# Patient Record
Sex: MALE | Race: WHITE | NOT HISPANIC OR LATINO | Employment: OTHER | URBAN - METROPOLITAN AREA
[De-identification: names, ages, dates, MRNs, and addresses within clinical notes are randomized per-mention and may not be internally consistent; named-entity substitution may affect disease eponyms.]

---

## 2017-03-16 ENCOUNTER — APPOINTMENT (OUTPATIENT)
Dept: LAB | Facility: CLINIC | Age: 67
End: 2017-03-16
Payer: MEDICARE

## 2017-03-16 ENCOUNTER — TRANSCRIBE ORDERS (OUTPATIENT)
Dept: ADMINISTRATIVE | Facility: HOSPITAL | Age: 67
End: 2017-03-16

## 2017-03-16 DIAGNOSIS — I82.402 DEEP VEIN THROMBOSIS (DVT) OF LEFT LOWER EXTREMITY, UNSPECIFIED CHRONICITY, UNSPECIFIED VEIN (HCC): ICD-10-CM

## 2017-03-16 DIAGNOSIS — I82.402 DEEP VEIN THROMBOSIS (DVT) OF LEFT LOWER EXTREMITY, UNSPECIFIED CHRONICITY, UNSPECIFIED VEIN (HCC): Primary | ICD-10-CM

## 2017-03-16 LAB
ALBUMIN SERPL BCP-MCNC: 3.7 G/DL (ref 3.5–5)
ALP SERPL-CCNC: 57 U/L (ref 46–116)
ALT SERPL W P-5'-P-CCNC: 41 U/L (ref 12–78)
ANION GAP SERPL CALCULATED.3IONS-SCNC: 8 MMOL/L (ref 4–13)
APTT PPP: 31 SECONDS (ref 24–36)
AST SERPL W P-5'-P-CCNC: 23 U/L (ref 5–45)
BASOPHILS # BLD AUTO: 0.01 THOUSANDS/ΜL (ref 0–0.1)
BASOPHILS NFR BLD AUTO: 0 % (ref 0–1)
BILIRUB SERPL-MCNC: 0.53 MG/DL (ref 0.2–1)
BUN SERPL-MCNC: 15 MG/DL (ref 5–25)
CALCIUM SERPL-MCNC: 9 MG/DL (ref 8.3–10.1)
CHLORIDE SERPL-SCNC: 108 MMOL/L (ref 100–108)
CO2 SERPL-SCNC: 26 MMOL/L (ref 21–32)
CREAT SERPL-MCNC: 1.09 MG/DL (ref 0.6–1.3)
EOSINOPHIL # BLD AUTO: 0.41 THOUSAND/ΜL (ref 0–0.61)
EOSINOPHIL NFR BLD AUTO: 5 % (ref 0–6)
ERYTHROCYTE [DISTWIDTH] IN BLOOD BY AUTOMATED COUNT: 13 % (ref 11.6–15.1)
GFR SERPL CREATININE-BSD FRML MDRD: >60 ML/MIN/1.73SQ M
GLUCOSE SERPL-MCNC: 134 MG/DL (ref 65–140)
HCT VFR BLD AUTO: 43.6 % (ref 36.5–49.3)
HGB BLD-MCNC: 15.5 G/DL (ref 12–17)
INR PPP: 1.19 (ref 0.86–1.16)
LYMPHOCYTES # BLD AUTO: 2.88 THOUSANDS/ΜL (ref 0.6–4.47)
LYMPHOCYTES NFR BLD AUTO: 38 % (ref 14–44)
MCH RBC QN AUTO: 32.8 PG (ref 26.8–34.3)
MCHC RBC AUTO-ENTMCNC: 35.6 G/DL (ref 31.4–37.4)
MCV RBC AUTO: 92 FL (ref 82–98)
MONOCYTES # BLD AUTO: 0.84 THOUSAND/ΜL (ref 0.17–1.22)
MONOCYTES NFR BLD AUTO: 11 % (ref 4–12)
NEUTROPHILS # BLD AUTO: 3.52 THOUSANDS/ΜL (ref 1.85–7.62)
NEUTS SEG NFR BLD AUTO: 46 % (ref 43–75)
NRBC BLD AUTO-RTO: 0 /100 WBCS
PLATELET # BLD AUTO: 240 THOUSANDS/UL (ref 149–390)
PMV BLD AUTO: 11.4 FL (ref 8.9–12.7)
POTASSIUM SERPL-SCNC: 3.7 MMOL/L (ref 3.5–5.3)
PROT SERPL-MCNC: 7.5 G/DL (ref 6.4–8.2)
PROTHROMBIN TIME: 15.2 SECONDS (ref 12–14.3)
RBC # BLD AUTO: 4.72 MILLION/UL (ref 3.88–5.62)
SODIUM SERPL-SCNC: 142 MMOL/L (ref 136–145)
WBC # BLD AUTO: 7.68 THOUSAND/UL (ref 4.31–10.16)

## 2017-03-16 PROCEDURE — 36415 COLL VENOUS BLD VENIPUNCTURE: CPT

## 2017-03-16 PROCEDURE — 85613 RUSSELL VIPER VENOM DILUTED: CPT

## 2017-03-16 PROCEDURE — 85598 HEXAGNAL PHOSPH PLTLT NEUTRL: CPT

## 2017-03-16 PROCEDURE — 85610 PROTHROMBIN TIME: CPT

## 2017-03-16 PROCEDURE — 85730 THROMBOPLASTIN TIME PARTIAL: CPT

## 2017-03-16 PROCEDURE — 85670 THROMBIN TIME PLASMA: CPT

## 2017-03-16 PROCEDURE — 85732 THROMBOPLASTIN TIME PARTIAL: CPT

## 2017-03-16 PROCEDURE — 80053 COMPREHEN METABOLIC PANEL: CPT

## 2017-03-16 PROCEDURE — 85025 COMPLETE CBC W/AUTO DIFF WBC: CPT

## 2017-03-16 PROCEDURE — 85705 THROMBOPLASTIN INHIBITION: CPT

## 2017-03-21 LAB
APTT HEX PL PPP: 24 SEC (ref 0–11)
APTT SCREEN TO CONFIRM RATIO: 0.82 RATIO (ref 0–1.4)
APTT-LA IMM 4:1 NP PPP: 44.5 SEC (ref 0–40.6)
CONFIRM APTT/NORMAL: 54.2 SEC (ref 0–55)
DRVVT IMM 1:2 NP PPP: 54.9 SEC (ref 0–44)
DRVVT SCREEN TO CONFIRM RATIO: 1.6 RATIO (ref 0.8–1.2)
LA PPP-IMP: ABNORMAL
SCREEN APTT: 49.9 SEC (ref 0–43.6)
SCREEN DRVVT: 88.8 SEC (ref 0–44)
THROMBIN TIME: 18.7 SEC (ref 0–20.9)

## 2017-03-27 ENCOUNTER — ALLSCRIPTS OFFICE VISIT (OUTPATIENT)
Dept: OTHER | Facility: OTHER | Age: 67
End: 2017-03-27

## 2017-03-28 ENCOUNTER — GENERIC CONVERSION - ENCOUNTER (OUTPATIENT)
Dept: OTHER | Facility: OTHER | Age: 67
End: 2017-03-28

## 2017-03-30 ENCOUNTER — LAB CONVERSION - ENCOUNTER (OUTPATIENT)
Dept: OTHER | Facility: OTHER | Age: 67
End: 2017-03-30

## 2017-03-30 ENCOUNTER — GENERIC CONVERSION - ENCOUNTER (OUTPATIENT)
Dept: OTHER | Facility: OTHER | Age: 67
End: 2017-03-30

## 2017-03-30 LAB
CHOLEST SERPL-MCNC: 159 MG/DL (ref 125–200)
CHOLEST/HDLC SERPL: 3.3 (CALC)
HBA1C MFR BLD HPLC: 5.9 % OF TOTAL HGB
HDLC SERPL-MCNC: 48 MG/DL
LDL CHOLESTEROL (HISTORICAL): 61 MG/DL (CALC)
NON-HDL-CHOL (CHOL-HDL) (HISTORICAL): 111 MG/DL (CALC)
PROSTATE SPECIFIC ANTIGEN TOTAL (HISTORICAL): 0.5 NG/ML
TRIGL SERPL-MCNC: 250 MG/DL

## 2017-03-31 ENCOUNTER — GENERIC CONVERSION - ENCOUNTER (OUTPATIENT)
Dept: OTHER | Facility: OTHER | Age: 67
End: 2017-03-31

## 2017-04-03 ENCOUNTER — ALLSCRIPTS OFFICE VISIT (OUTPATIENT)
Dept: OTHER | Facility: OTHER | Age: 67
End: 2017-04-03

## 2017-04-03 DIAGNOSIS — Z13.6 ENCOUNTER FOR SCREENING FOR CARDIOVASCULAR DISORDERS: ICD-10-CM

## 2017-10-06 ENCOUNTER — GENERIC CONVERSION - ENCOUNTER (OUTPATIENT)
Dept: OTHER | Facility: OTHER | Age: 67
End: 2017-10-06

## 2017-10-10 ENCOUNTER — GENERIC CONVERSION - ENCOUNTER (OUTPATIENT)
Dept: OTHER | Facility: OTHER | Age: 67
End: 2017-10-10

## 2017-10-12 ENCOUNTER — GENERIC CONVERSION - ENCOUNTER (OUTPATIENT)
Dept: OTHER | Facility: OTHER | Age: 67
End: 2017-10-12

## 2017-10-13 ENCOUNTER — APPOINTMENT (OUTPATIENT)
Dept: LAB | Facility: CLINIC | Age: 67
End: 2017-10-13
Payer: MEDICARE

## 2017-10-13 ENCOUNTER — TRANSCRIBE ORDERS (OUTPATIENT)
Dept: ADMINISTRATIVE | Facility: HOSPITAL | Age: 67
End: 2017-10-13

## 2017-10-13 DIAGNOSIS — R73.01 IMPAIRED FASTING GLUCOSE: ICD-10-CM

## 2017-10-13 DIAGNOSIS — I10 BENIGN HYPERTENSION: ICD-10-CM

## 2017-10-13 DIAGNOSIS — E78.5 HYPERLIPIDEMIA, UNSPECIFIED HYPERLIPIDEMIA TYPE: Primary | ICD-10-CM

## 2017-10-13 DIAGNOSIS — E78.5 HYPERLIPIDEMIA, UNSPECIFIED HYPERLIPIDEMIA TYPE: ICD-10-CM

## 2017-10-13 LAB
ALBUMIN SERPL BCP-MCNC: 3.9 G/DL (ref 3.5–5)
ALP SERPL-CCNC: 51 U/L (ref 46–116)
ALT SERPL W P-5'-P-CCNC: 46 U/L (ref 12–78)
ANION GAP SERPL CALCULATED.3IONS-SCNC: 3 MMOL/L (ref 4–13)
AST SERPL W P-5'-P-CCNC: 26 U/L (ref 5–45)
BILIRUB SERPL-MCNC: 0.7 MG/DL (ref 0.2–1)
BUN SERPL-MCNC: 15 MG/DL (ref 5–25)
CALCIUM SERPL-MCNC: 9.1 MG/DL (ref 8.3–10.1)
CHLORIDE SERPL-SCNC: 105 MMOL/L (ref 100–108)
CO2 SERPL-SCNC: 30 MMOL/L (ref 21–32)
CREAT SERPL-MCNC: 1.17 MG/DL (ref 0.6–1.3)
EST. AVERAGE GLUCOSE BLD GHB EST-MCNC: 123 MG/DL
GFR SERPL CREATININE-BSD FRML MDRD: 65 ML/MIN/1.73SQ M
GLUCOSE P FAST SERPL-MCNC: 119 MG/DL (ref 65–99)
HBA1C MFR BLD: 5.9 % (ref 4.2–6.3)
POTASSIUM SERPL-SCNC: 3.8 MMOL/L (ref 3.5–5.3)
PROT SERPL-MCNC: 8.1 G/DL (ref 6.4–8.2)
SODIUM SERPL-SCNC: 138 MMOL/L (ref 136–145)

## 2017-10-13 PROCEDURE — 80053 COMPREHEN METABOLIC PANEL: CPT

## 2017-10-13 PROCEDURE — 83036 HEMOGLOBIN GLYCOSYLATED A1C: CPT

## 2017-10-13 PROCEDURE — 36415 COLL VENOUS BLD VENIPUNCTURE: CPT

## 2017-11-13 ENCOUNTER — TRANSCRIBE ORDERS (OUTPATIENT)
Dept: ADMINISTRATIVE | Facility: HOSPITAL | Age: 67
End: 2017-11-13

## 2017-11-13 DIAGNOSIS — R42 VERTIGO: Primary | ICD-10-CM

## 2017-11-21 ENCOUNTER — HOSPITAL ENCOUNTER (OUTPATIENT)
Dept: NON INVASIVE DIAGNOSTICS | Facility: HOSPITAL | Age: 67
Discharge: HOME | End: 2017-11-21
Attending: OTOLARYNGOLOGY
Payer: MEDICARE

## 2017-11-21 ENCOUNTER — HOSPITAL ENCOUNTER (OUTPATIENT)
Dept: MRI IMAGING | Facility: HOSPITAL | Age: 67
Discharge: HOME | End: 2017-11-21
Attending: OTOLARYNGOLOGY
Payer: MEDICARE

## 2017-11-21 DIAGNOSIS — R42 VERTIGO: ICD-10-CM

## 2017-11-21 PROCEDURE — A9585 GADOBUTROL INJECTION: HCPCS | Performed by: OTOLARYNGOLOGY

## 2017-11-21 PROCEDURE — 93880 EXTRACRANIAL BILAT STUDY: CPT

## 2017-11-21 PROCEDURE — 70553 MRI BRAIN STEM W/O & W/DYE: CPT

## 2017-11-21 RX ADMIN — GADOBUTROL 9 ML: 604.72 INJECTION INTRAVENOUS at 09:34

## 2017-12-12 ENCOUNTER — GENERIC CONVERSION - ENCOUNTER (OUTPATIENT)
Dept: FAMILY MEDICINE CLINIC | Facility: CLINIC | Age: 67
End: 2017-12-12

## 2017-12-12 ENCOUNTER — GENERIC CONVERSION - ENCOUNTER (OUTPATIENT)
Dept: OTHER | Facility: OTHER | Age: 67
End: 2017-12-12

## 2017-12-12 ENCOUNTER — APPOINTMENT (OUTPATIENT)
Dept: PHYSICAL THERAPY | Facility: CLINIC | Age: 67
End: 2017-12-12
Payer: MEDICARE

## 2017-12-12 PROCEDURE — G8978 MOBILITY CURRENT STATUS: HCPCS

## 2017-12-12 PROCEDURE — G8979 MOBILITY GOAL STATUS: HCPCS

## 2017-12-12 PROCEDURE — 97162 PT EVAL MOD COMPLEX 30 MIN: CPT

## 2017-12-14 ENCOUNTER — APPOINTMENT (OUTPATIENT)
Dept: PHYSICAL THERAPY | Facility: CLINIC | Age: 67
End: 2017-12-14
Payer: MEDICARE

## 2017-12-14 PROCEDURE — 97112 NEUROMUSCULAR REEDUCATION: CPT

## 2017-12-18 ENCOUNTER — APPOINTMENT (OUTPATIENT)
Dept: PHYSICAL THERAPY | Facility: CLINIC | Age: 67
End: 2017-12-18
Payer: MEDICARE

## 2017-12-18 PROCEDURE — 97140 MANUAL THERAPY 1/> REGIONS: CPT

## 2017-12-18 PROCEDURE — 97112 NEUROMUSCULAR REEDUCATION: CPT

## 2017-12-21 ENCOUNTER — APPOINTMENT (OUTPATIENT)
Dept: PHYSICAL THERAPY | Facility: CLINIC | Age: 67
End: 2017-12-21
Payer: MEDICARE

## 2017-12-21 PROCEDURE — 97140 MANUAL THERAPY 1/> REGIONS: CPT

## 2017-12-21 PROCEDURE — 97112 NEUROMUSCULAR REEDUCATION: CPT

## 2017-12-26 ENCOUNTER — APPOINTMENT (OUTPATIENT)
Dept: PHYSICAL THERAPY | Facility: CLINIC | Age: 67
End: 2017-12-26
Payer: MEDICARE

## 2017-12-26 PROCEDURE — 97140 MANUAL THERAPY 1/> REGIONS: CPT

## 2017-12-26 PROCEDURE — 97112 NEUROMUSCULAR REEDUCATION: CPT

## 2017-12-28 ENCOUNTER — APPOINTMENT (OUTPATIENT)
Dept: PHYSICAL THERAPY | Facility: CLINIC | Age: 67
End: 2017-12-28
Payer: MEDICARE

## 2017-12-28 PROCEDURE — 97140 MANUAL THERAPY 1/> REGIONS: CPT

## 2018-01-04 ENCOUNTER — APPOINTMENT (OUTPATIENT)
Dept: PHYSICAL THERAPY | Facility: CLINIC | Age: 68
End: 2018-01-04
Payer: MEDICARE

## 2018-01-05 ENCOUNTER — APPOINTMENT (OUTPATIENT)
Dept: PHYSICAL THERAPY | Facility: CLINIC | Age: 68
End: 2018-01-05
Payer: MEDICARE

## 2018-01-05 PROCEDURE — 97112 NEUROMUSCULAR REEDUCATION: CPT

## 2018-01-05 PROCEDURE — 97140 MANUAL THERAPY 1/> REGIONS: CPT

## 2018-01-09 NOTE — RESULT NOTES
Message   Recorded as Task   Date: 03/30/2017 01:09 PM, Created By: Tee Sensor   Task Name: Call Patient with results   Assigned To: Sedrick Villarreal   Regarding Patient: Hannah Vieira, Status: Active   CommentHenery Krabbe - 30 Mar 2017 1:09 PM     Patient Phone: [phone]    Labs are okay-I thought he was to have schedule appointment   Heydi Burdick - 31 Mar 2017 10:35 AM     TASK EDITED  PT AWARE   MM SCHEDULED FOR 4/3/17 WITH PM        Signatures   Electronically signed by : Willow Cobian, ; Mar 31 2017 10:35AM EST                       (Author)

## 2018-01-10 ENCOUNTER — APPOINTMENT (OUTPATIENT)
Dept: PHYSICAL THERAPY | Facility: CLINIC | Age: 68
End: 2018-01-10
Payer: MEDICARE

## 2018-01-10 PROCEDURE — 97112 NEUROMUSCULAR REEDUCATION: CPT

## 2018-01-10 PROCEDURE — 97140 MANUAL THERAPY 1/> REGIONS: CPT

## 2018-01-10 PROCEDURE — G8979 MOBILITY GOAL STATUS: HCPCS

## 2018-01-10 PROCEDURE — G8978 MOBILITY CURRENT STATUS: HCPCS

## 2018-01-10 NOTE — RESULT NOTES
Verified Results  (1) HEMOGLOBIN A1C [id number] 08:42AM [facility]     Test Name Result Flag Reference   HEMOGLOBIN A1C 5 9 %  4 2-6 3   EST  AVG  GLUCOSE 123 mg/dl       (1) COMPREHENSIVE METABOLIC PANEL [id number] 71:77ET [facility]     Test Name Result Flag Reference   SODIUM 138 mmol/L  136-145   POTASSIUM 3 8 mmol/L  3 5-5 3   CHLORIDE 105 mmol/L  100-108   CARBON DIOXIDE 30 mmol/L  21-32   ANION GAP (CALC) 3 mmol/L L 4-13   BLOOD UREA NITROGEN 15 mg/dL  5-25   CREATININE 1 17 mg/dL  0 60-1 30   Standardized to IDMS reference method   CALCIUM 9 1 mg/dL  8 3-10 1   BILI, TOTAL 0 70 mg/dL  0 20-1 00   ALK PHOSPHATAS 51 U/L     ALT (SGPT) 46 U/L  12-78   Specimen collection should occur prior to Sulfasalazine and/or Sulfapyridine administration due to the potential for falsely depressed results  AST(SGOT) 26 U/L  5-45   Specimen collection should occur prior to Sulfasalazine administration due to the potential for falsely depressed results  ALBUMIN 3 9 g/dL  3 5-5 0   TOTAL PROTEIN 8 1 g/dL  6 4-8 2   eGFR 65 ml/min/1 73sq m     National Kidney Disease Education Program recommendations are as follows:  GFR calculation is accurate only with a steady state creatinine  Chronic Kidney disease less than 60 ml/min/1 73 sq  meters  Kidney failure less than 15 ml/min/1 73 sq  meters  GLUCOSE FASTING 119 mg/dL H 65-99   Specimen collection should occur prior to Sulfasalazine administration due to the potential for falsely depressed results  Specimen collection should occur prior to Sulfapyridine administration due to the potential for falsely elevated results         Plan  Hypertension    · AmLODIPine Besylate 5 MG Oral Tablet; TAKE 1 TABLET DAILY FOR BLOOD  PRESSURE

## 2018-01-11 NOTE — RESULT NOTES
Verified Results  (1) LIPID PANEL, FASTING [id number] 09:44AM [facility]     Test Name Result Flag Reference   CHOLESTEROL, TOTAL 159 mg/dL  125-200   HDL CHOLESTEROL 48 mg/dL  > OR = 40   TRIGLICERIDES 188 mg/dL H <150   LDL-CHOLESTEROL 61 mg/dL (calc)  <130   Desirable range <100 mg/dL for patients with CHD or  diabetes and <70 mg/dL for diabetic patients with  known heart disease  CHOL/HDLC RATIO 3 3 (calc)  < OR = 5 0   NON HDL CHOLESTEROL 111 mg/dL (calc)     Target for non-HDL cholesterol is 30 mg/dL higher than   LDL cholesterol target  (1) PSA (SCREEN) (Dx V76 44 Screen for Prostate Cancer) [id number] 09:44AM [facility]     Test Name Result Flag Reference   PSA, TOTAL 0 5 ng/mL  < OR = 4 0   This test was performed using the Siemens  chemiluminescent method  Values obtained from  different assay methods cannot be used  interchangeably  PSA levels, regardless of  value, should not be interpreted as absolute  evidence of the presence or absence of disease  (Q) HEMOGLOBIN A1c [id number] 09:44AM [facility]   REPORT COMMENT:  FASTING:YES     Test Name Result Flag Reference   HEMOGLOBIN A1c 5 9 % of total Hgb H <5 7   For someone without known diabetes, a hemoglobin   A1c value between 5 7% and 6 4% is consistent with  prediabetes and should be confirmed with a   follow-up test      For someone with known diabetes, a value <7%  indicates that their diabetes is well controlled  A1c  targets should be individualized based on duration of  diabetes, age, comorbid conditions, and other  considerations  This assay result is consistent with an increased risk  of diabetes  Currently, no consensus exists regarding use of  hemoglobin A1c for diagnosis of diabetes for children

## 2018-01-13 VITALS
SYSTOLIC BLOOD PRESSURE: 122 MMHG | RESPIRATION RATE: 16 BRPM | HEIGHT: 70 IN | OXYGEN SATURATION: 98 % | WEIGHT: 228.25 LBS | BODY MASS INDEX: 32.68 KG/M2 | HEART RATE: 64 BPM | DIASTOLIC BLOOD PRESSURE: 80 MMHG | TEMPERATURE: 98 F

## 2018-01-13 VITALS
BODY MASS INDEX: 32.25 KG/M2 | OXYGEN SATURATION: 98 % | HEART RATE: 84 BPM | WEIGHT: 225.25 LBS | RESPIRATION RATE: 16 BRPM | SYSTOLIC BLOOD PRESSURE: 130 MMHG | DIASTOLIC BLOOD PRESSURE: 80 MMHG | HEIGHT: 70 IN

## 2018-01-15 NOTE — RESULT NOTES
Verified Results  [address] [place] [address] [address]andres Rdz [id number] 10:47AM García Syriac Order Number: [id number]     Test Name Result Flag Reference   US KIDNEY AND BLADDER (Report)     RENAL ULTRASOUND     INDICATION: And catheter for therapeutic drug level monitoring  Gross hematuria  COMPARISON: None  TECHNIQUE:  Ultrasound of the retroperitoneum was performed with a curvilinear transducer utilizing volumetric sweeps and still imaging techniques  FINDINGS:     KIDNEYS:   Symmetric and normal size  Right kidney: 12 1 x 5 5 cm  Normal echogenicity and contour  No suspicious masses detected  No hydronephrosis  No shadowing calculi  No perinephric fluid collections  Left kidney: 12 2 x 6 1 cm  Normal echogenicity and contour  No suspicious masses detected  No hydronephrosis  No shadowing calculi  No perinephric fluid collections  URETERS:   Nonvisualized  BLADDER:    Normally distended  No focal thickening or mass lesions  Bilateral ureteral jets not detected  Prevoid bladder volume measures 697 mL  Post void bladder volume measures 108 mL (normal less than 50 mL)  IMPRESSION:   1  Normal sonographic appearance of the kidneys  2  Elevated post void residual bladder volume         Workstation performed: [id number]     Signed by:   Jarrod Galarza DO   3/31/16

## 2018-01-18 NOTE — RESULT NOTES
Verified Results  (1) COMPREHENSIVE METABOLIC PANEL [id number] 78:21TB [facility]     Test Name Result Flag Reference   GLUCOSE,RANDM 93 mg/dL     If the patient is fasting, the ADA then defines impaired fasting glucose as > 100 mg/dL and diabetes as > or equal to 123 mg/dL  SODIUM 140 mmol/L  136-145   POTASSIUM 4 1 mmol/L  3 5-5 3   CHLORIDE 104 mmol/L  100-108   CARBON DIOXIDE 25 mmol/L  21-32   ANION GAP (CALC) 11 mmol/L  4-13   BLOOD UREA NITROGEN 16 mg/dL  5-25   CREATININE 1 04 mg/dL  0 60-1 30   Standardized to IDMS reference method   CALCIUM 9 4 mg/dL  8 3-10 1   BILI, TOTAL 0 44 mg/dL  0 20-1 00   ALK PHOSPHATAS 53 U/L     ALT (SGPT) 38 U/L  12-78   AST(SGOT) 26 U/L  5-45   ALBUMIN 4 1 g/dL  3 5-5 0   TOTAL PROTEIN 8 1 g/dL  6 4-8 2   eGFR Non-African American      >60 0 ml/min/1 73sq [facility] Disease Education Program recommendations are as follows:  GFR calculation is accurate only with a steady state creatinine  Chronic Kidney disease less than 60 ml/min/1 73 sq  meters  Kidney failure less than 15 ml/min/1 73 sq  meters  (1) LIPID PANEL, FASTING [id number] 10:04AM [facility]     Test Name Result Flag Reference   CHOLESTEROL 165 mg/dL     HDL,DIRECT 55 mg/dL  40-60   Specimen collection should occur prior to Metamizole administration due to the potential for falsely depressed results  LDL CHOLESTEROL CALCULATED 60 mg/dL  0-100   Triglyceride:         Normal              <150 mg/dl       Borderline High    150-199 mg/dl       High               200-499 mg/dl       Very High          >499 mg/dl  Cholesterol:         Desirable        <200 mg/dl      Borderline High  200-239 mg/dl      High             >239 mg/dl  HDL Cholesterol:        High    >59 mg/dL      Low     <41 mg/dL  LDL CALCULATED:    This screening LDL is a calculated result    It does not have the accuracy of the Direct Measured LDL in the monitoring of patients with hyperlipidemia and/or statin therapy  Direct Measure LDL (HDZ115) must be ordered separately in these patients  TRIGLYCERIDES 248 mg/dL H <=150   Specimen collection should occur prior to N-Acetylcysteine or Metamizole administration due to the potential for falsely depressed results       (1) URINALYSIS WITH MICROSCOPIC [id number] 10:04AM [facility]     Test Name Result Flag Reference   COLOR Yellow     CLARITY Clear     PH UA 6 5  4 5-8 0   LEUKOCYTE ESTERASE UA Negative  Negative   NITRITE UA Negative  Negative   PROTEIN UA Negative mg/dl  Negative   GLUCOSE UA Negative mg/dl  Negative   KETONES UA Negative mg/dl  Negative   UROBILINOGEN UA 0 2 E U /dl  0 2, 1 0 E U /dl   BILIRUBIN UA Negative  Negative   BLOOD UA Negative  Negative   SPECIFIC GRAVITY UA 1 018  1 003-1 030   WBC UA None Seen /hpf  None Seen   RBC UA None Seen /hpf  None Seen   BACTERIA None Seen /hpf  None Seen, Occasional   EPITHELIAL CELLS None Seen /hpf  None Seen, Occasional

## 2018-01-22 VITALS
SYSTOLIC BLOOD PRESSURE: 126 MMHG | BODY MASS INDEX: 32.21 KG/M2 | HEIGHT: 70 IN | DIASTOLIC BLOOD PRESSURE: 84 MMHG | WEIGHT: 225 LBS

## 2018-01-24 ENCOUNTER — OFFICE VISIT (OUTPATIENT)
Dept: PHYSICAL THERAPY | Facility: CLINIC | Age: 68
End: 2018-01-24
Payer: MEDICARE

## 2018-01-24 DIAGNOSIS — R42 DIZZINESS: Primary | ICD-10-CM

## 2018-01-24 PROCEDURE — G8980 MOBILITY D/C STATUS: HCPCS | Performed by: PHYSICAL THERAPIST

## 2018-01-24 PROCEDURE — G8979 MOBILITY GOAL STATUS: HCPCS | Performed by: PHYSICAL THERAPIST

## 2018-01-24 PROCEDURE — 97140 MANUAL THERAPY 1/> REGIONS: CPT | Performed by: PHYSICAL THERAPIST

## 2018-01-24 NOTE — PROGRESS NOTES
Daily Note / D/c Note    Today's date: 2018  Patient name: Lara Dear  : 1950  MRN: [id number]  Referring provider: Terese Beavers MD  Dx:   Encounter Diagnosis   Name Primary?  Dizziness Yes                  Subjective: Pt reports some dizziness if he turns his head quickly  At times he feels clumsy with his feet if he turns and walks quickly  Pt is performing       Objective: See treatment diary below  Performed progress report  Will d/c today  All impairment and functional goals have been met  Assessment: Tolerated treatment well   Patient has a convergcence of 15 cm      Plan: D/c from PT at this time

## 2018-02-07 DIAGNOSIS — I82.402 ACUTE EMBOLISM AND THROMBOSIS OF DEEP VEIN OF LEFT LOWER EXTREMITY (HCC): Primary | ICD-10-CM

## 2018-03-09 DIAGNOSIS — E78.5 HYPERLIPIDEMIA, UNSPECIFIED HYPERLIPIDEMIA TYPE: Primary | ICD-10-CM

## 2018-03-12 RX ORDER — FENOFIBRIC ACID 135 MG/1
CAPSULE, DELAYED RELEASE ORAL
Qty: 30 CAPSULE | Refills: 0 | Status: SHIPPED | OUTPATIENT
Start: 2018-03-12 | End: 2018-04-02 | Stop reason: SDUPTHER

## 2018-03-12 NOTE — TELEPHONE ENCOUNTER
Please approve    Pt due labs: lip, cmp, psa and hm 04/2018 - vm for pt to call back with lab and sched ov

## 2018-03-26 DIAGNOSIS — Z12.5 SCREENING PSA (PROSTATE SPECIFIC ANTIGEN): ICD-10-CM

## 2018-03-26 DIAGNOSIS — E78.5 HYPERLIPIDEMIA, UNSPECIFIED HYPERLIPIDEMIA TYPE: Primary | ICD-10-CM

## 2018-03-27 ENCOUNTER — LAB (OUTPATIENT)
Dept: LAB | Facility: CLINIC | Age: 68
End: 2018-03-27
Payer: MEDICARE

## 2018-03-27 DIAGNOSIS — E78.5 HYPERLIPIDEMIA, UNSPECIFIED HYPERLIPIDEMIA TYPE: ICD-10-CM

## 2018-03-27 DIAGNOSIS — Z12.5 SCREENING PSA (PROSTATE SPECIFIC ANTIGEN): ICD-10-CM

## 2018-03-27 LAB
ALBUMIN SERPL BCP-MCNC: 4.2 G/DL (ref 3.5–5)
ALP SERPL-CCNC: 53 U/L (ref 46–116)
ALT SERPL W P-5'-P-CCNC: 45 U/L (ref 12–78)
ANION GAP SERPL CALCULATED.3IONS-SCNC: 7 MMOL/L (ref 4–13)
AST SERPL W P-5'-P-CCNC: 27 U/L (ref 5–45)
BILIRUB SERPL-MCNC: 0.66 MG/DL (ref 0.2–1)
BUN SERPL-MCNC: 14 MG/DL (ref 5–25)
CALCIUM SERPL-MCNC: 9.2 MG/DL (ref 8.3–10.1)
CHLORIDE SERPL-SCNC: 104 MMOL/L (ref 100–108)
CHOLEST SERPL-MCNC: 174 MG/DL (ref 50–200)
CO2 SERPL-SCNC: 28 MMOL/L (ref 21–32)
CREAT SERPL-MCNC: 1.18 MG/DL (ref 0.6–1.3)
GFR SERPL CREATININE-BSD FRML MDRD: 64 ML/MIN/1.73SQ M
GLUCOSE P FAST SERPL-MCNC: 130 MG/DL (ref 65–99)
HDLC SERPL-MCNC: 53 MG/DL (ref 40–60)
LDLC SERPL CALC-MCNC: 76 MG/DL (ref 0–100)
POTASSIUM SERPL-SCNC: 4 MMOL/L (ref 3.5–5.3)
PROT SERPL-MCNC: 8.1 G/DL (ref 6.4–8.2)
PSA SERPL-MCNC: 0.6 NG/ML (ref 0–4)
SODIUM SERPL-SCNC: 139 MMOL/L (ref 136–145)
TRIGL SERPL-MCNC: 227 MG/DL

## 2018-03-27 PROCEDURE — G0103 PSA SCREENING: HCPCS

## 2018-03-27 PROCEDURE — 36415 COLL VENOUS BLD VENIPUNCTURE: CPT

## 2018-03-27 PROCEDURE — 80053 COMPREHEN METABOLIC PANEL: CPT

## 2018-03-27 PROCEDURE — 80061 LIPID PANEL: CPT

## 2018-03-28 ENCOUNTER — TELEPHONE (OUTPATIENT)
Dept: FAMILY MEDICINE CLINIC | Facility: CLINIC | Age: 68
End: 2018-03-28

## 2018-03-28 NOTE — TELEPHONE ENCOUNTER
Advised patient as per PM -- appt scheduled for 4/2/18 @ 1430 ([id number])        ----- Message from Dariel Salazar MD sent at 3/27/2018  1:07 PM EDT -----  Call patient with lab result- labs are okay, looks like due for 6 month office visit

## 2018-04-02 ENCOUNTER — OFFICE VISIT (OUTPATIENT)
Dept: FAMILY MEDICINE CLINIC | Facility: CLINIC | Age: 68
End: 2018-04-02
Payer: MEDICARE

## 2018-04-02 VITALS
RESPIRATION RATE: 16 BRPM | BODY MASS INDEX: 29.8 KG/M2 | DIASTOLIC BLOOD PRESSURE: 100 MMHG | OXYGEN SATURATION: 98 % | HEIGHT: 72 IN | SYSTOLIC BLOOD PRESSURE: 170 MMHG | HEART RATE: 124 BPM | WEIGHT: 220 LBS

## 2018-04-02 DIAGNOSIS — I10 ESSENTIAL HYPERTENSION: ICD-10-CM

## 2018-04-02 DIAGNOSIS — Z79.01 ANTICOAGULANT LONG-TERM USE: ICD-10-CM

## 2018-04-02 DIAGNOSIS — R73.01 IMPAIRED FASTING GLUCOSE: ICD-10-CM

## 2018-04-02 DIAGNOSIS — Z13.6 SCREENING FOR AAA (ABDOMINAL AORTIC ANEURYSM): ICD-10-CM

## 2018-04-02 DIAGNOSIS — I87.2 CHRONIC VENOUS INSUFFICIENCY: ICD-10-CM

## 2018-04-02 DIAGNOSIS — I82.402 ACUTE EMBOLISM AND THROMBOSIS OF DEEP VEIN OF LEFT LOWER EXTREMITY (HCC): ICD-10-CM

## 2018-04-02 DIAGNOSIS — E78.01 FAMILIAL HYPERCHOLESTEROLEMIA: ICD-10-CM

## 2018-04-02 DIAGNOSIS — E78.5 HYPERLIPIDEMIA, UNSPECIFIED HYPERLIPIDEMIA TYPE: ICD-10-CM

## 2018-04-02 DIAGNOSIS — Z00.00 MEDICARE ANNUAL WELLNESS VISIT, INITIAL: Primary | ICD-10-CM

## 2018-04-02 PROCEDURE — G0438 PPPS, INITIAL VISIT: HCPCS | Performed by: FAMILY MEDICINE

## 2018-04-02 PROCEDURE — 99214 OFFICE O/P EST MOD 30 MIN: CPT | Performed by: FAMILY MEDICINE

## 2018-04-02 RX ORDER — AMLODIPINE BESYLATE 5 MG/1
5 TABLET ORAL DAILY
Qty: 30 TABLET | Refills: 5 | Status: SHIPPED | OUTPATIENT
Start: 2018-04-02 | End: 2018-11-02 | Stop reason: SDUPTHER

## 2018-04-02 RX ORDER — FENOFIBRIC ACID 135 MG/1
1 CAPSULE, DELAYED RELEASE ORAL DAILY
Qty: 30 CAPSULE | Refills: 5 | Status: SHIPPED | OUTPATIENT
Start: 2018-04-02 | End: 2018-11-02 | Stop reason: SDUPTHER

## 2018-04-02 RX ORDER — SIMVASTATIN 10 MG
10 TABLET ORAL
Qty: 90 TABLET | Refills: 1 | Status: SHIPPED | OUTPATIENT
Start: 2018-04-02 | End: 2018-12-03 | Stop reason: SDUPTHER

## 2018-04-02 RX ORDER — CHLORAL HYDRATE 500 MG
CAPSULE ORAL
COMMUNITY
Start: 2015-11-25 | End: 2019-06-18 | Stop reason: ALTCHOICE

## 2018-04-02 NOTE — PROGRESS NOTES
HPI:  Anny Chavez is a 78 y o  male here for his Initial Wellness Visit  Patient Active Problem List   Diagnosis    Hyperlipidemia    Hypertension    Impaired fasting glucose    BPH with obstruction/lower urinary tract symptoms    Chronic venous insufficiency    Varicose veins with swelling    Primary osteoarthritis of left knee    Medicare annual wellness visit, initial     Past Medical History:   Diagnosis Date    DVT (deep venous thrombosis) ([facility] [address] )     Gross hematuria     [place]  [place] Sinks Herminia Sinks 3/29/16   R    4/3/17     Hematuria     [place]    Herminia Sinks Herminia Sinks 4/12/16    R    4/3/17    Hyperlipidemia     Hypertension     Long-term (current) use of anticoagulants     [place]  Herminia Sinks Herminia Sinks 3/29/16   R    4/3/17     Seasonal allergies      Past Surgical History:   Procedure Laterality Date    APPENDECTOMY      WISDOM TOOTH EXTRACTION       Family History   Problem Relation Age of Onset    Hypertension Mother     Leukemia Father     Heart disease Father     Alcohol abuse Father     Depression Father     Heart disease Family     Leukemia Family     Depression Sister     Alcohol abuse Brother     Depression Brother      History   Smoking Status    Former Smoker    Types: Cigarettes    Quit date: 1975   Smokeless Tobacco    Never Used     History   Alcohol Use    Yes     Comment: social      History   Drug Use No     /100 (BP Location: Left arm, Patient Position: Sitting, Cuff Size: Standard)   Pulse (!) 124   Resp 16   Ht 6' (1 829 m)   Wt 99 8 kg (220 lb)   SpO2 98%   BMI 29 84 kg/m²       Current Outpatient Prescriptions   Medication Sig Dispense Refill    Omega-3 Fatty Acids (FISH OIL) 1,000 mg Take by mouth      amLODIPine (NORVASC) 5 mg tablet Take 5 mg by mouth daily        Choline Fenofibrate (FENOFIBRIC ACID) 135 MG CPDR TAKE ONE CAPSULE BY MOUTH EVERY DAY 30 capsule 0    rivaroxaban (XARELTO) 15 mg tablet Take 1 tablet (15 mg total) by mouth daily with breakfast 30 tablet 1    simvastatin (ZOCOR) 10 mg tablet Take 10 mg by mouth daily at bedtime Indications: unsure of dosage  No current facility-administered medications for this visit  No Known Allergies  Immunization History   Administered Date(s) Administered    Influenza 11/24/2014    Influenza Quadrivalent Preservative Free 3 years and older IM 11/25/2015, 04/03/2017    Pneumococcal Conjugate 13-Valent 04/03/2017    Tdap 06/17/2015       Patient Care Team:  Aarti Gamez MD as PCP - Lupe Boucher MD    Medicare Screening Tests and Risk Assessments:  AWV Clinical     ISAR:       Once in a Lifetime Medicare Screening:       Medicare Screening Tests and Risk Assessment:   AAA Risk Assessment     Tobacco use (males only):   Yes   Age over 72 (males only):  Yes    Osteoporosis Risk Assessment    HIV Risk Assessment        Drug and Alcohol Use:   Tobacco use    Tobacco use duration    Tobacco Cessation Readiness    Alcohol use    Alcohol Treatment Readiness   Illicit Drug Use        Diet & Exercise:   Diet   How many servings a day of the following:   Exercise        Cognitive Impairment Screening:   Cognitive Impairment Screening        Functional Ability/Level of Safety:   Hearing    Hearing Impairment Assessment    Current Activities    Help needed with the folllowing:    ADL    Fall Risk   Injury History       Home Safety:   Home Safety Risk Factors       Advanced Directives:   Advanced Directives    Patient's End of Life Decisions        Urinary Incontinence:       Glaucoma:            Provider Screening     Preventative Screening/Counseling:   Cardiovascular Screening/Counseling:   (Labs Q5 years, EKG optional one-time)   General:  Screening Current           Diabetes Screening/Counseling:   (2 tests/year if Pre-Diabetes or 1 test/year if no Diabetes)   General:  Screening Current           Colorectal Cancer Screening/Counseling:   (FOBT Q1 yr; Flex Sig Q4 yrs or Q10 yrs after Screening Colonoscopy; Screening Colonoscpy Q2 yrs High Risk or Q10 yrs Low Risk; Barium Enema Q2 yrs High Risk or Q4 yrs Low Risk)   General:  Screening Current           Prostate Cancer Screening/Counseling:   (Annual)    General:  Screening Current          Breast Cancer Screening/Counseling:   (Baseline Age 28 - 43; Annual Age 36+)         Cervical Cancer Screening/Counseling:   (Annual for High Risk or Childbearing Age with Abnormal Pap in Last 3 yrs; Every 2 all others)         Osteoporosis Screening/Counseling:   (Every 2 Yrs if at risk or more if medically necessary)   General:  Screening Not Indicated           AAA Screening/Counseling:   (Once per Lifetime with risk factors)     Age over 72 (males only):  Yes Tobacco use (males only):  Yes   General:  Risks and Benefits Discussed  [place] US:  Screening US         Glaucoma Screening/Counseling:   (Annual)   General:  Screening Current          HIV Screening/Counseling:   (Voluntary; Once annually for high risk OR 3 times for Pregnancy at diagnosis of IUP; 3rd trimester; and at Labor         Hepatitis C Screening:   Hepatitis C Counseling Provided: Yes               Immunizations:   Influenza (annual): Influenza Recommended Annually   Pneumococcal (Once in a Lifetime):  Pneumococcal Due Today   Zostavax (Medicare D Coverage, Pt >72 yo):  Risks & Benefits Discussed   Tdap (Non-Medicare Wellness Visit required):   Tdap Vaccine UTD       Other Preventative Couseling (Non-Medicare Wellness Visit Required):   nutrition counseling performed, fall prevention education provided, car/seat belt/driving safety reviewed       Referrals (Non-Medicare Wellness Visit Required):   neurology consult, referral to otolaryngology       Medical Equipment/Suppliers:           No exam data present    Physical Exam :  Physical Exam    Reviewed Updated [facility] Prior Wellness Visits:   Last Medicare wellness visit information was reviewed, patient interviewed , no change since last AWVyes  Last Medicare wellness visit information was reviewed, patient interviewed and updates made to the record today yes    Assessment and Plan:  1  Medicare annual wellness visit, initial     2  Screening for AAA (abdominal aortic aneurysm)  US abdominal aorta screening aaa       Health Maintenance Due   Topic Date Due    Hepatitis C Screening  1950    PT PLAN OF CARE  1950    Depression Screening PHQ-9  07/06/1963    Fall Risk  07/06/2016    ABDOMINAL AORTIC ANEURYSM (AAA) SCREEN  07/06/2016    PNEUMOCOCCAL POLYSACCHARIDE VACCINE AGE 72 AND OVER  07/06/2016    INFLUENZA VACCINE  09/01/2017     Patient Instructions   Medicare health maintenance-metabolic screens are current  Cancer screens are also current  He should have a ultrasound of aorta due to your age in previous smoking history  Hepatitis-C should have been screened doing blood donation 3-4 years ago, I see no need to repeat that at this point  Continue with eye exams on a scheduled basis  Advance directives forms are provided  Vaccines are current  If you wish to get a shingles vaccine this will need to be done at the pharmacy  I would wait the new 1 is fully available as will be more effective the current Zostavax

## 2018-04-02 NOTE — PROGRESS NOTES
Assessment/Plan:    No problem-specific Assessment & Plan notes found for this encounter  Diagnoses and all orders for this visit:    Medicare annual wellness visit, initial    Screening for AAA (abdominal aortic aneurysm)  -     US abdominal aorta screening aaa; Future    Essential hypertension  -     amLODIPine (NORVASC) 5 mg tablet; Take 1 tablet (5 mg total) by mouth daily    Familial hypercholesterolemia  -     Choline Fenofibrate (FENOFIBRIC ACID) 135 MG CPDR; Take 1 capsule (135 mg total) by mouth daily  -     simvastatin (ZOCOR) 10 mg tablet; Take 1 tablet (10 mg total) by mouth daily at bedtime    Chronic venous insufficiency    Impaired fasting glucose  -     Basic metabolic panel; Future  -     HEMOGLOBIN A1C W/ EAG ESTIMATION; Future    Hyperlipidemia, unspecified hyperlipidemia type  -     Choline Fenofibrate (FENOFIBRIC ACID) 135 MG CPDR; Take 1 capsule (135 mg total) by mouth daily    Acute embolism and thrombosis of deep vein of left lower extremity (HCC)    Anticoagulant long-term use  -     rivaroxaban (XARELTO) 15 mg tablet; Take 1 tablet (15 mg total) by mouth daily with breakfast    Other orders  -     Omega-3 Fatty Acids (FISH OIL) 1,000 mg; Take by mouth        Medical management-continue current medications  We should recheck a fasting sugar and A1c in approximately 6 weeks  This would to be sure there is no diabetes developing  Continue healthy lifestyle with exercise, proper diet, and weight loss  Recheck her blood pressure over the next 2-3 weeks once or twice a week to be sure this is just a transient elevation of your blood pressure  Goal should be less than 135/85  Subjective:   Chief Complaint   Patient presents with    Follow-up     mm - review labs, med refill        Medicare Wellness Visit     annual wellness          Patient ID: Vasiliy Yip is a 78 y o  male      Medical management multiple conditions-  1) hypertension-elevated today-patient does have monitor home which we will monitor over the next several weeks before making any medication changes  2) hyperlipidemia-probably high triglycerides in the past he believes to have been greater than 300  No side effects from current combination therapy of statin and fenofibrate  3) impaired fasting glucose-slightly elevated at 130 fasting blood sugar  No A1c was done  4) vertigo-this has resolved for the most part  However doing evaluation by ear nose and throat there is some abnormality found on MRI of the cerebellum  Patient has appointment to discuss with Neurology which is in June  There been multiple delays to reschedule that appointment  He  5) chronic edema with venous insufficiency and varicose veins in the left leg  The following portions of the patient's history were reviewed and updated as appropriate: allergies, current medications, past family history, past medical history, past social history, past surgical history and problem list     Review of Systems   Constitutional: Negative for activity change, appetite change, diaphoresis and fatigue  Respiratory: Negative for cough, chest tightness, shortness of breath and wheezing  Cardiovascular: Negative for chest pain, palpitations and leg swelling  Fast or slow heart rate   Gastrointestinal: Negative for abdominal pain, blood in stool, constipation, diarrhea, nausea and vomiting  Genitourinary: Positive for difficulty urinating  Negative for dysuria and frequency  Nocturia x1   Musculoskeletal: Negative for arthralgias, gait problem, joint swelling and myalgias  Neurological: Negative for dizziness, weakness, light-headedness, numbness and headaches  Psychiatric/Behavioral: Negative for agitation, confusion, dysphoric mood and sleep disturbance  The patient is not nervous/anxious            Objective:      /100 (BP Location: Left arm, Patient Position: Sitting, Cuff Size: Standard)   Pulse (!) 124   Resp 16   Ht 6' (1 829 m)   Wt 99 8 kg (220 lb)   SpO2 98%   BMI 29 84 kg/m²     Repeat 160/98   Physical Exam   Constitutional: He is oriented to person, place, and time  He appears well-developed and well-nourished  No distress  HENT:   Head: Normocephalic and atraumatic  Right Ear: Tympanic membrane, external ear and ear canal normal    Left Ear: Tympanic membrane, external ear and ear canal normal    Nose: No mucosal edema or rhinorrhea  Right sinus exhibits no maxillary sinus tenderness  Left sinus exhibits no maxillary sinus tenderness  Mouth/Throat: Uvula is midline  Mucous membranes are not pale and not dry  Normal dentition  No oropharyngeal exudate  Eyes: EOM are normal  Pupils are equal, round, and reactive to light  Right eye exhibits no discharge  Left eye exhibits no discharge  Neck: Normal range of motion  Neck supple  No thyromegaly present  Cardiovascular: Normal rate, regular rhythm, normal heart sounds and intact distal pulses  No murmur heard  Pulmonary/Chest: Effort normal and breath sounds normal  No respiratory distress  He has no wheezes  He has no rales  Musculoskeletal: Normal range of motion  He exhibits no edema or tenderness  Lymphadenopathy:     He has no cervical adenopathy  Neurological: He is alert and oriented to person, place, and time  No cranial nerve deficit  Skin: He is not diaphoretic  Psychiatric: He has a normal mood and affect   His behavior is normal

## 2018-04-02 NOTE — PATIENT INSTRUCTIONS
Medicare health maintenance-metabolic screens are current  Cancer screens are also current  He should have a ultrasound of aorta due to your age in previous smoking history  Hepatitis-C should have been screened doing blood donation 3-4 years ago, I see no need to repeat that at this point  Continue with eye exams on a scheduled basis  Advance directives forms are provided  Vaccines are current  If you wish to get a shingles vaccine this will need to be done at the pharmacy  I would wait the new 1 is fully available as will be more effective the current Zostavax  Medical management-continue current medications  We should recheck a fasting sugar and A1c in approximately 6 weeks  This would to be sure there is no diabetes developing  Continue healthy lifestyle with exercise, proper diet, and weight loss  Recheck her blood pressure over the next 2-3 weeks once or twice a week to be sure this is just a transient elevation of your blood pressure  Goal should be less than 135/85

## 2018-05-14 ENCOUNTER — TELEPHONE (OUTPATIENT)
Dept: FAMILY MEDICINE CLINIC | Facility: CLINIC | Age: 68
End: 2018-05-14

## 2018-05-14 ENCOUNTER — LAB (OUTPATIENT)
Dept: LAB | Facility: CLINIC | Age: 68
End: 2018-05-14
Payer: MEDICARE

## 2018-05-14 DIAGNOSIS — R73.01 IMPAIRED FASTING GLUCOSE: ICD-10-CM

## 2018-05-14 LAB
ANION GAP SERPL CALCULATED.3IONS-SCNC: 7 MMOL/L (ref 4–13)
BUN SERPL-MCNC: 14 MG/DL (ref 5–25)
CALCIUM SERPL-MCNC: 9.3 MG/DL (ref 8.3–10.1)
CHLORIDE SERPL-SCNC: 104 MMOL/L (ref 100–108)
CO2 SERPL-SCNC: 28 MMOL/L (ref 21–32)
CREAT SERPL-MCNC: 1.1 MG/DL (ref 0.6–1.3)
EST. AVERAGE GLUCOSE BLD GHB EST-MCNC: 120 MG/DL
GFR SERPL CREATININE-BSD FRML MDRD: 70 ML/MIN/1.73SQ M
GLUCOSE P FAST SERPL-MCNC: 130 MG/DL (ref 65–99)
HBA1C MFR BLD: 5.8 % (ref 4.2–6.3)
POTASSIUM SERPL-SCNC: 4.1 MMOL/L (ref 3.5–5.3)
SODIUM SERPL-SCNC: 139 MMOL/L (ref 136–145)

## 2018-05-14 PROCEDURE — 36415 COLL VENOUS BLD VENIPUNCTURE: CPT

## 2018-05-14 PROCEDURE — 80048 BASIC METABOLIC PNL TOTAL CA: CPT

## 2018-05-14 PROCEDURE — 83036 HEMOGLOBIN GLYCOSYLATED A1C: CPT

## 2018-05-14 NOTE — TELEPHONE ENCOUNTER
----- Message from Kristopher Schirmer, MD sent at 5/14/2018  2:44 PM EDT -----  Call patient with lab result-labs look good, same medications

## 2018-05-16 ENCOUNTER — HOSPITAL ENCOUNTER (OUTPATIENT)
Dept: ULTRASOUND IMAGING | Facility: HOSPITAL | Age: 68
Discharge: HOME | End: 2018-05-16
Payer: MEDICARE

## 2018-05-16 DIAGNOSIS — Z13.6 SCREENING FOR AAA (ABDOMINAL AORTIC ANEURYSM): ICD-10-CM

## 2018-05-16 PROCEDURE — 76706 US ABDL AORTA SCREEN AAA: CPT

## 2018-05-18 ENCOUNTER — TELEPHONE (OUTPATIENT)
Dept: FAMILY MEDICINE CLINIC | Facility: CLINIC | Age: 68
End: 2018-05-18

## 2018-05-18 NOTE — TELEPHONE ENCOUNTER
----- Message from Yuan Jaramillo MD sent at 5/17/2018  5:20 PM EDT -----  Call patient with lab result-no aortic aneurysm, ultrasound does show fatty liver

## 2018-06-29 ENCOUNTER — OFFICE VISIT (OUTPATIENT)
Dept: NEUROLOGY | Facility: CLINIC | Age: 68
End: 2018-06-29
Payer: MEDICARE

## 2018-06-29 VITALS
HEART RATE: 81 BPM | RESPIRATION RATE: 14 BRPM | HEIGHT: 72 IN | DIASTOLIC BLOOD PRESSURE: 80 MMHG | SYSTOLIC BLOOD PRESSURE: 124 MMHG | WEIGHT: 220 LBS | BODY MASS INDEX: 29.8 KG/M2

## 2018-06-29 DIAGNOSIS — G93.9 CEREBELLAR LESION: ICD-10-CM

## 2018-06-29 DIAGNOSIS — R09.89 ABNORMAL VASCULAR FLOW: ICD-10-CM

## 2018-06-29 DIAGNOSIS — R90.89 MRI OF BRAIN ABNORMAL: Primary | ICD-10-CM

## 2018-06-29 DIAGNOSIS — D18.02 HEMANGIOMA OF INTRACRANIAL STRUCTURES (HCC): ICD-10-CM

## 2018-06-29 DIAGNOSIS — R90.89 ABNORMAL BRAIN MRI: Primary | ICD-10-CM

## 2018-06-29 PROCEDURE — 99204 OFFICE O/P NEW MOD 45 MIN: CPT | Performed by: PSYCHIATRY & NEUROLOGY

## 2018-06-29 NOTE — PROGRESS NOTES
Patient ID: Marta Montenegro is a 78 y o  male  Assessment/Plan:    No problem-specific Assessment & Plan notes found for this encounter  Diagnoses and all orders for this visit:    Abnormal brain MRI 11/2017- left cerebellar lesion noted- neurologic exam with minimally ataxic gait when walking tandem otherwise with no cerebellar signs  Potential hemangioma vs tumor- will evaluate further with repeat MRI And MRA to make sure no AVM, other vascular lesion  -     MRI brain with and without contrast; Future  -     MRA and or MRV head wo contrast; Future    Cerebellar lesion  -     MRI brain with and without contrast; Future  -     MRA and or MRV head wo contrast; Future    Abnormal vascular flow  -     MRA and or MRV head wo contrast; Future    Hemangioma of intracranial structures (HCC)   -     MRA and or MRV head wo contrast; Future     He will follow up as needed per his preference pending MRI Results      Subjective:    HPI     Mr Ankur Bentley, hx + lupus anticoagulant with several DVT in LLE, on xarelto, is a pleasant 77 yo male seen in consultation for abnormal MRI with left middle cerebellar peduncular region abnormality- with faint enhancement noted in 11/2017 MRI  He underwent MRI due to symptoms of vertigo that have since resolved  States vertigo occurred the first time April 2017 with no inciting etiology states was holding a cup of coffee and felt off balance (no room spinning or other nausea or hearing change or other weakness or diplopia)  Tells me had it four times in October, states it was positional  Once did have emesis  States has done vestibular therapy- and this has since resolved  States saw ENT , had VNG done and was told vertigo likely not peripheral  States even though his vertigo has resolved, balance has not been the same since April 2017 and denies falls    States no concussion or head injury  Denies headaches     Denies hx stroke, seizure, kidney, liver disease  No personal history of cancer  States has three first paternal first cousins with some type of cancer  States brother with leukemia  Denies tobacco use now  States intermittent use for 5-10 years, stopped in 1970s  The following portions of the patient's history were reviewed and updated as appropriate: allergies, current medications, past family history, past medical history, past social history, past surgical history and problem list          Objective:    Blood pressure 124/80, pulse 81, resp  rate 14, height 6' (1 829 m), weight 99 8 kg (220 lb)  Physical Exam   Constitutional: He appears well-developed and well-nourished  HENT:   Head: Normocephalic and atraumatic  Eyes: Conjunctivae and EOM are normal  Pupils are equal, round, and reactive to light  Neck: Normal range of motion  Neck supple  Cardiovascular: Normal rate and regular rhythm  Pulmonary/Chest: Effort normal    Musculoskeletal: Normal range of motion  Neurological: He has normal strength and normal reflexes  Gait and coordination normal    Nursing note and vitals reviewed  Neurological Exam    Mental Status  The patient is alert and oriented to person, place, time, and situation  His recent and remote memory are normal  He has no dysarthria  He is able to name object, read and repeat  He has normal attention span and concentration  He follows multi-step commands  He has a normal fund of knowledge  Cranial Nerves    CN II: The patient's visual acuity and visual fields are normal   CN III, IV, VI: The patient's pupils are equally round and reactive to light and ocular movements are normal   CN V: The patient has normal facial sensation  CN VII:  The patient has symmetric facial movement  CN VIII:  The patient's hearing is normal   CN IX, X: The patient has symmetric palate movement and normal gag reflex    CN XI: The patient's shoulder shrug strength is normal   CN XII: The patient's tongue is midline without atrophy or fasciculations  Motor  The patient has normal muscle bulk throughout  His overall muscle tone is normal throughout  His strength is 5/5 throughout all four extremities  Sensory  The patient's sensation is normal in all four extremities to light touch, temperature and vibration  He has no right-sided and no left-sided hemispatial neglect  Reflexes  Deep tendon reflexes are 2+ and symmetric in all four extremities with downgoing toes bilaterally  Gait and Coordination  The patient has normal gait and station  He has abnormal tandem gait  Romberg's sign is sways with eyes closed  He has normal coordination bilaterally  Minimal sway with eyes closed         ROS:    Review of Systems   Constitutional: Negative  HENT: Negative  Eyes: Negative  Respiratory: Negative  Cardiovascular: Negative  Gastrointestinal: Negative  Endocrine: Negative  Genitourinary: Negative  Musculoskeletal: Negative  Skin: Negative  Allergic/Immunologic: Negative  Neurological: Positive for dizziness  Balance problems     Hematological: Negative  Psychiatric/Behavioral: Negative

## 2018-07-02 ENCOUNTER — APPOINTMENT (OUTPATIENT)
Dept: LAB | Facility: CLINIC | Age: 68
End: 2018-07-02
Payer: MEDICARE

## 2018-07-02 DIAGNOSIS — R90.89 MRI OF BRAIN ABNORMAL: ICD-10-CM

## 2018-07-02 LAB
BUN SERPL-MCNC: 15 MG/DL (ref 5–25)
CREAT SERPL-MCNC: 1.21 MG/DL (ref 0.6–1.3)
GFR SERPL CREATININE-BSD FRML MDRD: 62 ML/MIN/1.73SQ M

## 2018-07-02 PROCEDURE — 82565 ASSAY OF CREATININE: CPT

## 2018-07-02 PROCEDURE — 36415 COLL VENOUS BLD VENIPUNCTURE: CPT

## 2018-07-02 PROCEDURE — 84520 ASSAY OF UREA NITROGEN: CPT

## 2018-07-09 ENCOUNTER — HOSPITAL ENCOUNTER (OUTPATIENT)
Dept: MRI IMAGING | Facility: HOSPITAL | Age: 68
Discharge: HOME | End: 2018-07-09
Attending: PSYCHIATRY & NEUROLOGY
Payer: MEDICARE

## 2018-07-09 DIAGNOSIS — R90.89 ABNORMAL BRAIN MRI: ICD-10-CM

## 2018-07-09 DIAGNOSIS — R09.89 ABNORMAL VASCULAR FLOW: ICD-10-CM

## 2018-07-09 DIAGNOSIS — G93.9 CEREBELLAR LESION: ICD-10-CM

## 2018-07-09 DIAGNOSIS — D18.02 HEMANGIOMA OF INTRACRANIAL STRUCTURES (HCC): ICD-10-CM

## 2018-07-09 PROCEDURE — A9585 GADOBUTROL INJECTION: HCPCS | Performed by: PSYCHIATRY & NEUROLOGY

## 2018-07-09 PROCEDURE — 70544 MR ANGIOGRAPHY HEAD W/O DYE: CPT

## 2018-07-09 PROCEDURE — 70553 MRI BRAIN STEM W/O & W/DYE: CPT

## 2018-07-09 RX ADMIN — GADOBUTROL 9 ML: 604.72 INJECTION INTRAVENOUS at 14:07

## 2018-11-02 DIAGNOSIS — E78.01 FAMILIAL HYPERCHOLESTEROLEMIA: ICD-10-CM

## 2018-11-02 DIAGNOSIS — E78.5 HYPERLIPIDEMIA, UNSPECIFIED HYPERLIPIDEMIA TYPE: ICD-10-CM

## 2018-11-02 DIAGNOSIS — I10 ESSENTIAL HYPERTENSION: ICD-10-CM

## 2018-11-02 RX ORDER — AMLODIPINE BESYLATE 5 MG/1
5 TABLET ORAL DAILY
Qty: 30 TABLET | Refills: 3 | Status: SHIPPED | OUTPATIENT
Start: 2018-11-02 | End: 2018-11-14 | Stop reason: SDUPTHER

## 2018-11-02 RX ORDER — FENOFIBRIC ACID 135 MG/1
1 CAPSULE, DELAYED RELEASE ORAL DAILY
Qty: 30 CAPSULE | Refills: 3 | Status: SHIPPED | OUTPATIENT
Start: 2018-11-02 | End: 2019-03-07 | Stop reason: SDUPTHER

## 2018-11-14 DIAGNOSIS — I10 ESSENTIAL HYPERTENSION: ICD-10-CM

## 2018-11-14 RX ORDER — AMLODIPINE BESYLATE 5 MG/1
TABLET ORAL
Qty: 30 TABLET | Refills: 0 | Status: SHIPPED | OUTPATIENT
Start: 2018-11-14 | End: 2018-12-03 | Stop reason: SDUPTHER

## 2018-11-15 DIAGNOSIS — R73.01 IFG (IMPAIRED FASTING GLUCOSE): Primary | ICD-10-CM

## 2018-11-15 DIAGNOSIS — Z13.6 SCREENING FOR CARDIOVASCULAR CONDITION: ICD-10-CM

## 2018-11-28 ENCOUNTER — LAB (OUTPATIENT)
Dept: LAB | Facility: CLINIC | Age: 68
End: 2018-11-28
Payer: MEDICARE

## 2018-11-28 ENCOUNTER — TELEPHONE (OUTPATIENT)
Dept: FAMILY MEDICINE CLINIC | Facility: CLINIC | Age: 68
End: 2018-11-28

## 2018-11-28 DIAGNOSIS — Z13.6 SCREENING FOR CARDIOVASCULAR CONDITION: ICD-10-CM

## 2018-11-28 DIAGNOSIS — R73.01 IFG (IMPAIRED FASTING GLUCOSE): ICD-10-CM

## 2018-11-28 LAB
ALBUMIN SERPL BCP-MCNC: 3.9 G/DL (ref 3.5–5)
ALP SERPL-CCNC: 56 U/L (ref 46–116)
ALT SERPL W P-5'-P-CCNC: 47 U/L (ref 12–78)
ANION GAP SERPL CALCULATED.3IONS-SCNC: 7 MMOL/L (ref 4–13)
AST SERPL W P-5'-P-CCNC: 27 U/L (ref 5–45)
BILIRUB SERPL-MCNC: 0.58 MG/DL (ref 0.2–1)
BUN SERPL-MCNC: 11 MG/DL (ref 5–25)
CALCIUM SERPL-MCNC: 8.8 MG/DL (ref 8.3–10.1)
CHLORIDE SERPL-SCNC: 107 MMOL/L (ref 100–108)
CHOLEST SERPL-MCNC: 163 MG/DL (ref 50–200)
CO2 SERPL-SCNC: 25 MMOL/L (ref 21–32)
CREAT SERPL-MCNC: 1.21 MG/DL (ref 0.6–1.3)
EST. AVERAGE GLUCOSE BLD GHB EST-MCNC: 128 MG/DL
GFR SERPL CREATININE-BSD FRML MDRD: 62 ML/MIN/1.73SQ M
GLUCOSE P FAST SERPL-MCNC: 133 MG/DL (ref 65–99)
HBA1C MFR BLD: 6.1 % (ref 4.2–6.3)
HDLC SERPL-MCNC: 49 MG/DL (ref 40–60)
LDLC SERPL CALC-MCNC: 71 MG/DL (ref 0–100)
NONHDLC SERPL-MCNC: 114 MG/DL
POTASSIUM SERPL-SCNC: 3.6 MMOL/L (ref 3.5–5.3)
PROT SERPL-MCNC: 7.9 G/DL (ref 6.4–8.2)
SODIUM SERPL-SCNC: 139 MMOL/L (ref 136–145)
TRIGL SERPL-MCNC: 217 MG/DL

## 2018-11-28 PROCEDURE — 36415 COLL VENOUS BLD VENIPUNCTURE: CPT

## 2018-11-28 PROCEDURE — 80061 LIPID PANEL: CPT

## 2018-11-28 PROCEDURE — 83036 HEMOGLOBIN GLYCOSYLATED A1C: CPT

## 2018-11-28 PROCEDURE — 80053 COMPREHEN METABOLIC PANEL: CPT

## 2018-11-28 NOTE — TELEPHONE ENCOUNTER
----- Message from Shin Luke MD sent at 11/28/2018  1:31 PM EST -----  Results reviewed-will discuss at scheduled appointment-no changes needed now

## 2018-12-01 DIAGNOSIS — Z79.01 ANTICOAGULANT LONG-TERM USE: ICD-10-CM

## 2018-12-03 ENCOUNTER — OFFICE VISIT (OUTPATIENT)
Dept: FAMILY MEDICINE CLINIC | Facility: CLINIC | Age: 68
End: 2018-12-03
Payer: MEDICARE

## 2018-12-03 VITALS
DIASTOLIC BLOOD PRESSURE: 86 MMHG | WEIGHT: 222 LBS | BODY MASS INDEX: 30.07 KG/M2 | OXYGEN SATURATION: 96 % | SYSTOLIC BLOOD PRESSURE: 130 MMHG | HEIGHT: 72 IN | HEART RATE: 98 BPM | RESPIRATION RATE: 16 BRPM

## 2018-12-03 DIAGNOSIS — I10 ESSENTIAL HYPERTENSION: ICD-10-CM

## 2018-12-03 DIAGNOSIS — R73.01 IMPAIRED FASTING GLUCOSE: ICD-10-CM

## 2018-12-03 DIAGNOSIS — E78.01 FAMILIAL HYPERCHOLESTEROLEMIA: ICD-10-CM

## 2018-12-03 DIAGNOSIS — E66.9 OBESITY (BMI 30.0-34.9): ICD-10-CM

## 2018-12-03 DIAGNOSIS — N40.1 BPH WITH OBSTRUCTION/LOWER URINARY TRACT SYMPTOMS: ICD-10-CM

## 2018-12-03 DIAGNOSIS — M17.12 PRIMARY OSTEOARTHRITIS OF LEFT KNEE: ICD-10-CM

## 2018-12-03 DIAGNOSIS — Z23 NEED FOR VACCINATION: Primary | ICD-10-CM

## 2018-12-03 DIAGNOSIS — N13.8 BPH WITH OBSTRUCTION/LOWER URINARY TRACT SYMPTOMS: ICD-10-CM

## 2018-12-03 DIAGNOSIS — I83.899 VARICOSE VEINS WITH SWELLING: ICD-10-CM

## 2018-12-03 PROCEDURE — G0009 ADMIN PNEUMOCOCCAL VACCINE: HCPCS

## 2018-12-03 PROCEDURE — G0008 ADMIN INFLUENZA VIRUS VAC: HCPCS

## 2018-12-03 PROCEDURE — 99214 OFFICE O/P EST MOD 30 MIN: CPT | Performed by: FAMILY MEDICINE

## 2018-12-03 PROCEDURE — 90662 IIV NO PRSV INCREASED AG IM: CPT

## 2018-12-03 PROCEDURE — 90732 PPSV23 VACC 2 YRS+ SUBQ/IM: CPT

## 2018-12-03 RX ORDER — AMLODIPINE BESYLATE 5 MG/1
5 TABLET ORAL DAILY
Qty: 30 TABLET | Refills: 5 | Status: SHIPPED | OUTPATIENT
Start: 2018-12-03 | End: 2019-06-18 | Stop reason: SDUPTHER

## 2018-12-03 RX ORDER — RIVAROXABAN 15 MG/1
TABLET, FILM COATED ORAL
Qty: 30 TABLET | Refills: 0 | Status: SHIPPED | OUTPATIENT
Start: 2018-12-03 | End: 2019-01-02 | Stop reason: SDUPTHER

## 2018-12-03 RX ORDER — SIMVASTATIN 10 MG
10 TABLET ORAL
Qty: 30 TABLET | Refills: 5 | Status: SHIPPED | OUTPATIENT
Start: 2018-12-03 | End: 2019-06-18 | Stop reason: SDUPTHER

## 2018-12-03 NOTE — PROGRESS NOTES
[id number]         NAME: Dede Hughes is a 80 y o  male  : 1950    MRN: [id number]  DATE: December 3, 2018  TIME: 3:52 PM    Assessment and Plan   Need for vaccination [Z23]  1  Need for vaccination  influenza vaccine, 2309-0911, high-dose, PF 0 5 mL, for patients 65 yr+ (FLUZONE HIGH-DOSE)    PNEUMOCOCCAL POLYSACCHARIDE VACCINE 23-VALENT =>3YO SQ IM   2  Essential hypertension  amLODIPine (NORVASC) 5 mg tablet   3  Varicose veins with swelling  Ambulatory referral to Vascular Surgery   4  Impaired fasting glucose     5  BPH with obstruction/lower urinary tract symptoms     6  Primary osteoarthritis of left knee     7  Familial hypercholesterolemia  simvastatin (ZOCOR) 10 mg tablet   8  Obesity (BMI 30 0-34 9)         No problem-specific Assessment & Plan notes found for this encounter  Patient Instructions     Patient Instructions   Continue current medications  Vascular surgeon-Dr Agustin-re-consultation  Flu shot today and pneumonia shot  Consider getting new shingles vaccine at the pharmacy  Recheck labs 6 months with office visit after  Obesity   AMBULATORY CARE:   Obesity  is when your body mass index (BMI) is greater than 30  Your healthcare provider will use your height and weight to measure your BMI  The risks of obesity include  many health problems, such as injuries or physical disability  You may need tests to check for the following:  · Diabetes     · High blood pressure or high cholesterol     · Heart disease     · Gallbladder or liver disease     · Cancer of the colon, breast, prostate, liver, or kidney     · Sleep apnea     · Arthritis or gout  Seek care immediately if:   · You have a severe headache, confusion, or difficulty speaking  · You have weakness on one side of your body  · You have chest pain, sweating, or shortness of breath    Contact your healthcare provider if:   · You have symptoms of gallbladder or liver disease, such as pain in your upper abdomen  · You have knee or hip pain and discomfort while walking  · You have symptoms of diabetes, such as intense hunger and thirst, and frequent urination  · You have symptoms of sleep apnea, such as snoring or daytime sleepiness  · You have questions or concerns about your condition or care  Treatment for obesity  focuses on helping you lose weight to improve your health  Even a small decrease in BMI can reduce the risk for many health problems  Your healthcare provider will help you set a weight-loss goal   · Lifestyle changes  are the first step in treating obesity  These include making healthy food choices and getting regular physical activity  Your healthcare provider may suggest a weight-loss program that involves coaching, education, and therapy  · Medicine  may help you lose weight when it is used with a healthy diet and physical activity  · Surgery  can help you lose weight if you are very obese and have other health problems  There are several types of weight-loss surgery  Ask your healthcare provider for more information  Be successful losing weight:   · Set small, realistic goals  An example of a small goal is to walk for 20 minutes 5 days a week  Anther goal is to lose 5% of your body weight  · Tell friends, family members, and coworkers about your goals  and ask for their support  Ask a friend to lose weight with you, or join a weight-loss support group  · Identify foods or triggers that may cause you to overeat , and find ways to avoid them  Remove tempting high-calorie foods from your home and workplace  Place a bowl of fresh fruit on your kitchen counter  If stress causes you to eat, then find other ways to cope with stress  · Keep a diary to track what you eat and drink  Also write down how many minutes of physical activity you do each day  Weigh yourself once a week and record it in your diary  Eating changes:   You will need to eat 500 to 1,000 fewer calories each day than you currently eat to lose 1 to 2 pounds a week  The following changes will help you cut calories:  · Eat smaller portions  Use small plates, no larger than 9 inches in diameter  Fill your plate half full of fruits and vegetables  Measure your food using measuring cups until you know what a serving size looks like  · Eat 3 meals and 1 or 2 snacks each day  Plan your meals in advance  Cal Moyer and eat at home most of the time  Eat slowly  · Eat fruits and vegetables at every meal   They are low in calories and high in fiber, which makes you feel full  Do not add butter, margarine, or cream sauce to vegetables  Use herbs to season steamed vegetables  · Eat less fat and fewer fried foods  Eat more baked or grilled chicken and fish  These protein sources are lower in calories and fat than red meat  Limit fast food  Dress your salads with olive oil and vinegar instead of bottled dressing  · Limit the amount of sugar you eat  Do not drink sugary beverages  Limit alcohol  Activity changes:  Physical activity is good for your body in many ways  It helps you burn calories and build strong muscles  It decreases stress and depression, and improves your mood  It can also help you sleep better  Talk to your healthcare provider before you begin an exercise program   · Exercise for at least 30 minutes 5 days a week  Start slowly  Set aside time each day for physical activity that you enjoy and that is convenient for you  It is best to do both weight training and an activity that increases your heart rate, such as walking, bicycling, or swimming  · Find ways to be more active  Do yard work and housecleaning  Walk up the stairs instead of using elevators  Spend your leisure time going to events that require walking, such as outdoor festivals or fairs  This extra physical activity can help you lose weight and keep it off    Follow up with your healthcare provider as directed: You may need to meet with a dietitian  Write down your questions so you remember to ask them during your visits  © 2017 [employer] Information is for End User's use only and may not be sold, redistributed or otherwise used for commercial purposes  All illustrations and images included in CareNotes® are the copyrighted property of [employer]  or [employer]  The above information is an  only  It is not intended as medical advice for individual conditions or treatments  Talk to your doctor, nurse or pharmacist before following any medical regimen to see if it is safe and effective for you  Weight Management   AMBULATORY CARE:   Why it is important to manage your weight:  Being overweight increases your risk of health conditions such as heart disease, high blood pressure, type 2 diabetes, and certain types of cancer  It can also increase your risk for osteoarthritis, sleep apnea, and other respiratory problems  Aim for a slow, steady weight loss  Even a small amount of weight loss can lower your risk of health problems  How to lose weight safely:  A safe and healthy way to lose weight is to eat fewer calories and get regular exercise  You can lose up about 1 pound a week by decreasing the number of calories you eat by 500 calories each day  You can decrease calories by eating smaller portion sizes or by cutting out high-calorie foods  Read labels to find out how many calories are in the foods you eat  You can also burn calories with exercise such as walking, swimming, or biking  You will be more likely to keep weight off if you make these changes part of your lifestyle  Healthy meal plan for weight management:  A healthy meal plan includes a variety of foods, contains fewer calories, and helps you stay healthy  A healthy meal plan includes the following:  · Eat whole-grain foods more often  A healthy meal plan should contain fiber   Fiber is the part of grains, fruits, and vegetables that is not broken down by your body  Whole-grain foods are healthy and provide extra fiber in your diet  Some examples of whole-grain foods are whole-wheat breads and pastas, oatmeal, brown rice, and bulgur  · Eat a variety of vegetables every day  Include dark, leafy greens such as spinach, kale, emy greens, and mustard greens  Eat yellow and orange vegetables such as carrots, sweet potatoes, and winter squash  · Eat a variety of fruits every day  Choose fresh or canned fruit (canned in its own juice or light syrup) instead of juice  Fruit juice has very little or no fiber  · Eat low-fat dairy foods  Drink fat-free (skim) milk or 1% milk  Eat fat-free yogurt and low-fat cottage cheese  Try low-fat cheeses such as mozzarella and other reduced-fat cheeses  · Choose meat and other protein foods that are low in fat  Choose beans or other legumes such as split peas or lentils  Choose fish, skinless poultry (chicken or turkey), or lean cuts of red meat (beef or pork)  Before you cook meat or poultry, cut off any visible fat  · Use less fat and oil  Try baking foods instead of frying them  Add less fat, such as margarine, sour cream, regular salad dressing and mayonnaise to foods  Eat fewer high-fat foods  Some examples of high-fat foods include french fries, doughnuts, ice cream, and cakes  · Eat fewer sweets  Limit foods and drinks that are high in sugar  This includes candy, cookies, regular soda, and sweetened drinks  Ways to decrease calories:   · Eat smaller portions  ¨ Use a small plate with smaller servings  ¨ Do not eat second helpings  ¨ When you eat at a restaurant, ask for a box and place half of your meal in the box before you eat  ¨ Share an entrée with someone else  · Replace high-calorie snacks with healthy, low-calorie snacks       ¨ Choose fresh fruit, vegetables, fat-free rice cakes, or air-popped popcorn instead of potato chips, nuts, or chocolate  ¨ Choose water or calorie-free drinks instead of soda or sweetened drinks  · Eat regular meals  Skipping meals can lead to overeating later in the day  Eat a healthy snack in place of a meal if you do not have time to eat a regular meal      · Do not shop for groceries when you are hungry  You may be more likely to make unhealthy food choices  Take a grocery list of healthy foods and shop after you have eaten  Exercise:  Exercise at least 30 minutes per day on most days of the week  Some examples of exercise include walking, biking, dancing, and swimming  You can also fit in more physical activity by taking the stairs instead of the elevator or parking farther away from stores  Ask your healthcare provider about the best exercise plan for you  Other things to consider as you try to lose weight:   · Be aware of situations that may give you the urge to overeat, such as eating while watching television  Find ways to avoid these situations  For example, read a book, go for a walk, or do crafts  · Meet with a weight loss support group or friends who are also trying to lose weight  This may help you stay motivated to continue working on your weight loss goals  © 2017 [employer] Information is for End User's use only and may not be sold, redistributed or otherwise used for commercial purposes  All illustrations and images included in CareNotes® are the copyrighted property of [employer]  or [employer]  The above information is an  only  It is not intended as medical advice for individual conditions or treatments  Talk to your doctor, nurse or pharmacist before following any medical regimen to see if it is safe and effective for you              Chief Complaint     Chief Complaint   Patient presents with    Follow-up     mm - review labs,          History of Present Illness       Medical Management-  1) hypertension stable good control current medication  2) hyperlipidemia-levels are good with triglycerides at 2:27 a m -patient has a history of very high triglycerides in the past   Tolerating current combination of fenofibrate and statin  3) impaired fasting glucose-A1c in prediabetic range  No current medications  4) varicose veins with recurrent DVTs left leg  Stable on Xarelto  Patient interested in re-consulting vascular surgeon  5) overweight-weight remains stable  6) BPH patient gets up maybe once or twice a night  No urinary flow issues  Review of Systems   Review of Systems   Constitutional: Negative for activity change, appetite change, diaphoresis and fatigue  Respiratory: Negative for cough, chest tightness, shortness of breath and wheezing  Cardiovascular: Negative for chest pain, palpitations and leg swelling  Fast or slow heart rate   Gastrointestinal: Negative for abdominal pain, blood in stool, constipation, diarrhea, nausea and vomiting  Genitourinary: Negative for difficulty urinating, dysuria and frequency  Musculoskeletal: Positive for back pain and neck stiffness  Negative for arthralgias, gait problem, joint swelling and myalgias  Neurological: Negative for dizziness, light-headedness and headaches  Psychiatric/Behavioral: Negative for agitation, confusion, dysphoric mood and sleep disturbance  The patient is not nervous/anxious            Current Medications       Current Outpatient Prescriptions:     amLODIPine (NORVASC) 5 mg tablet, Take 1 tablet (5 mg total) by mouth daily For blood pressure, Disp: 30 tablet, Rfl: 5    Choline Fenofibrate (FENOFIBRIC ACID) 135 MG CPDR, Take 1 capsule (135 mg total) by mouth daily, Disp: 30 capsule, Rfl: 3    Omega-3 Fatty Acids (FISH OIL) 1,000 mg, Take by mouth, Disp: , Rfl:     simvastatin (ZOCOR) 10 mg tablet, Take 1 tablet (10 mg total) by mouth daily at bedtime, Disp: 30 tablet, Rfl: 5    XARELTO 15 MG tablet, TAKE ONE TABLET BY MOUTH DAILY WITH breakfast, Disp: 30 tablet, Rfl: 0    Current Allergies     Allergies as of 12/03/2018    (No Known Allergies)            The following portions of the patient's history were reviewed and updated as appropriate: allergies, current medications, past family history, past medical history, past social history, past surgical history and problem list      Past Medical History:   Diagnosis Date    DVT (deep venous thrombosis) ([facility] [address] )     Gross hematuria     [place]  Lisa Nissen Lisa Nissen 3/29/16   R    4/3/17     Hematuria     [place]    Lisa Nissen Lisa Nissen 4/12/16    R    4/3/17    Hyperlipidemia     Hypertension     Long-term (current) use of anticoagulants     [place]  Lisa Nissen Lisa Nissen 3/29/16   R    4/3/17     Seasonal allergies        Past Surgical History:   Procedure Laterality Date    APPENDECTOMY      WISDOM TOOTH EXTRACTION         Family History   Problem Relation Age of Onset    Hypertension Mother     Leukemia Father     Heart disease Father     Alcohol abuse Father     Depression Father     Heart disease Family     Leukemia Family     Depression Sister     Alcohol abuse Brother     Depression Brother          Medications have been verified  Objective   /86 (BP Location: Left arm, Patient Position: Sitting, Cuff Size: Large)   Pulse 98   Resp 16   Ht 6' (1 829 m)   Wt 101 kg (222 lb)   SpO2 96%   BMI 30 11 kg/m²        Physical Exam     Physical Exam   Constitutional: He is oriented to person, place, and time  He appears well-developed and well-nourished  No distress  HENT:   Head: Normocephalic and atraumatic  Right Ear: Tympanic membrane, external ear and ear canal normal    Left Ear: Tympanic membrane, external ear and ear canal normal    Nose: No mucosal edema or rhinorrhea  Right sinus exhibits no maxillary sinus tenderness  Left sinus exhibits no maxillary sinus tenderness  Mouth/Throat: Uvula is midline  Mucous membranes are not pale and not dry  Normal dentition  No oropharyngeal exudate     Eyes: Pupils are equal, round, and reactive to light  EOM are normal  Right eye exhibits no discharge  Left eye exhibits no discharge  Neck: Normal range of motion  Neck supple  No thyromegaly present  Cardiovascular: Normal rate, regular rhythm, normal heart sounds and intact distal pulses  No murmur heard  Pulmonary/Chest: Effort normal and breath sounds normal  No respiratory distress  He has no wheezes  He has no rales  Musculoskeletal: Normal range of motion  He exhibits edema  He exhibits no tenderness  Left leg multiple varicose veins fairly well distended  Lymphadenopathy:     He has no cervical adenopathy  Neurological: He is alert and oriented to person, place, and time  No cranial nerve deficit  Skin: He is not diaphoretic  Psychiatric: He has a normal mood and affect  His behavior is normal                BMI Counseling: Body mass index is 30 11 kg/m²  Discussed the patient's BMI with him  The BMI is above average  BMI counseling and education was provided to the patient  Nutrition recommendations include reducing portion sizes, decreasing overall calorie intake, 3-5 servings of fruits/vegetables daily, moderation in carbohydrate intake, reducing intake of saturated fat and trans fat and reducing intake of cholesterol

## 2018-12-03 NOTE — PATIENT INSTRUCTIONS
Continue current medications  Vascular surgeon-Dr Agustin-re-consultation  Flu shot today and pneumonia shot  Consider getting new shingles vaccine at the pharmacy  Recheck labs 6 months with office visit after  Obesity   AMBULATORY CARE:   Obesity  is when your body mass index (BMI) is greater than 30  Your healthcare provider will use your height and weight to measure your BMI  The risks of obesity include  many health problems, such as injuries or physical disability  You may need tests to check for the following:  · Diabetes     · High blood pressure or high cholesterol     · Heart disease     · Gallbladder or liver disease     · Cancer of the colon, breast, prostate, liver, or kidney     · Sleep apnea     · Arthritis or gout  Seek care immediately if:   · You have a severe headache, confusion, or difficulty speaking  · You have weakness on one side of your body  · You have chest pain, sweating, or shortness of breath  Contact your healthcare provider if:   · You have symptoms of gallbladder or liver disease, such as pain in your upper abdomen  · You have knee or hip pain and discomfort while walking  · You have symptoms of diabetes, such as intense hunger and thirst, and frequent urination  · You have symptoms of sleep apnea, such as snoring or daytime sleepiness  · You have questions or concerns about your condition or care  Treatment for obesity  focuses on helping you lose weight to improve your health  Even a small decrease in BMI can reduce the risk for many health problems  Your healthcare provider will help you set a weight-loss goal   · Lifestyle changes  are the first step in treating obesity  These include making healthy food choices and getting regular physical activity  Your healthcare provider may suggest a weight-loss program that involves coaching, education, and therapy       · Medicine  may help you lose weight when it is used with a healthy diet and physical activity  · Surgery  can help you lose weight if you are very obese and have other health problems  There are several types of weight-loss surgery  Ask your healthcare provider for more information  Be successful losing weight:   · Set small, realistic goals  An example of a small goal is to walk for 20 minutes 5 days a week  Anther goal is to lose 5% of your body weight  · Tell friends, family members, and coworkers about your goals  and ask for their support  Ask a friend to lose weight with you, or join a weight-loss support group  · Identify foods or triggers that may cause you to overeat , and find ways to avoid them  Remove tempting high-calorie foods from your home and workplace  Place a bowl of fresh fruit on your kitchen counter  If stress causes you to eat, then find other ways to cope with stress  · Keep a diary to track what you eat and drink  Also write down how many minutes of physical activity you do each day  Weigh yourself once a week and record it in your diary  Eating changes: You will need to eat 500 to 1,000 fewer calories each day than you currently eat to lose 1 to 2 pounds a week  The following changes will help you cut calories:  · Eat smaller portions  Use small plates, no larger than 9 inches in diameter  Fill your plate half full of fruits and vegetables  Measure your food using measuring cups until you know what a serving size looks like  · Eat 3 meals and 1 or 2 snacks each day  Plan your meals in advance  Machelle Laughter and eat at home most of the time  Eat slowly  · Eat fruits and vegetables at every meal   They are low in calories and high in fiber, which makes you feel full  Do not add butter, margarine, or cream sauce to vegetables  Use herbs to season steamed vegetables  · Eat less fat and fewer fried foods  Eat more baked or grilled chicken and fish  These protein sources are lower in calories and fat than red meat  Limit fast food   Dress your salads with olive oil and vinegar instead of bottled dressing  · Limit the amount of sugar you eat  Do not drink sugary beverages  Limit alcohol  Activity changes:  Physical activity is good for your body in many ways  It helps you burn calories and build strong muscles  It decreases stress and depression, and improves your mood  It can also help you sleep better  Talk to your healthcare provider before you begin an exercise program   · Exercise for at least 30 minutes 5 days a week  Start slowly  Set aside time each day for physical activity that you enjoy and that is convenient for you  It is best to do both weight training and an activity that increases your heart rate, such as walking, bicycling, or swimming  · Find ways to be more active  Do yard work and housecleaning  Walk up the stairs instead of using elevators  Spend your leisure time going to events that require walking, such as outdoor festivals or fairs  This extra physical activity can help you lose weight and keep it off  Follow up with your healthcare provider as directed: You may need to meet with a dietitian  Write down your questions so you remember to ask them during your visits  © 2017 [address] [facility] Information is for End User's use only and may not be sold, redistributed or otherwise used for commercial purposes  All illustrations and images included in CareNotes® are the copyrighted property of [employer]  or David Lynn  The above information is an  only  It is not intended as medical advice for individual conditions or treatments  Talk to your doctor, nurse or pharmacist before following any medical regimen to see if it is safe and effective for you  Weight Management   AMBULATORY CARE:   Why it is important to manage your weight:  Being overweight increases your risk of health conditions such as heart disease, high blood pressure, type 2 diabetes, and certain types of cancer   It can also increase your risk for osteoarthritis, sleep apnea, and other respiratory problems  Aim for a slow, steady weight loss  Even a small amount of weight loss can lower your risk of health problems  How to lose weight safely:  A safe and healthy way to lose weight is to eat fewer calories and get regular exercise  You can lose up about 1 pound a week by decreasing the number of calories you eat by 500 calories each day  You can decrease calories by eating smaller portion sizes or by cutting out high-calorie foods  Read labels to find out how many calories are in the foods you eat  You can also burn calories with exercise such as walking, swimming, or biking  You will be more likely to keep weight off if you make these changes part of your lifestyle  Healthy meal plan for weight management:  A healthy meal plan includes a variety of foods, contains fewer calories, and helps you stay healthy  A healthy meal plan includes the following:  · Eat whole-grain foods more often  A healthy meal plan should contain fiber  Fiber is the part of grains, fruits, and vegetables that is not broken down by your body  Whole-grain foods are healthy and provide extra fiber in your diet  Some examples of whole-grain foods are whole-wheat breads and pastas, oatmeal, brown rice, and bulgur  · Eat a variety of vegetables every day  Include dark, leafy greens such as spinach, kale, emy greens, and mustard greens  Eat yellow and orange vegetables such as carrots, sweet potatoes, and winter squash  · Eat a variety of fruits every day  Choose fresh or canned fruit (canned in its own juice or light syrup) instead of juice  Fruit juice has very little or no fiber  · Eat low-fat dairy foods  Drink fat-free (skim) milk or 1% milk  Eat fat-free yogurt and low-fat cottage cheese  Try low-fat cheeses such as mozzarella and other reduced-fat cheeses  · Choose meat and other protein foods that are low in fat    Choose beans or other legumes such as split peas or lentils  Choose fish, skinless poultry (chicken or turkey), or lean cuts of red meat (beef or pork)  Before you cook meat or poultry, cut off any visible fat  · Use less fat and oil  Try baking foods instead of frying them  Add less fat, such as margarine, sour cream, regular salad dressing and mayonnaise to foods  Eat fewer high-fat foods  Some examples of high-fat foods include french fries, doughnuts, ice cream, and cakes  · Eat fewer sweets  Limit foods and drinks that are high in sugar  This includes candy, cookies, regular soda, and sweetened drinks  Ways to decrease calories:   · Eat smaller portions  ¨ Use a small plate with smaller servings  ¨ Do not eat second helpings  ¨ When you eat at a restaurant, ask for a box and place half of your meal in the box before you eat  ¨ Share an entrée with someone else  · Replace high-calorie snacks with healthy, low-calorie snacks  ¨ Choose fresh fruit, vegetables, fat-free rice cakes, or air-popped popcorn instead of potato chips, nuts, or chocolate  ¨ Choose water or calorie-free drinks instead of soda or sweetened drinks  · Eat regular meals  Skipping meals can lead to overeating later in the day  Eat a healthy snack in place of a meal if you do not have time to eat a regular meal      · Do not shop for groceries when you are hungry  You may be more likely to make unhealthy food choices  Take a grocery list of healthy foods and shop after you have eaten  Exercise:  Exercise at least 30 minutes per day on most days of the week  Some examples of exercise include walking, biking, dancing, and swimming  You can also fit in more physical activity by taking the stairs instead of the elevator or parking farther away from stores  Ask your healthcare provider about the best exercise plan for you     Other things to consider as you try to lose weight:   · Be aware of situations that may give you the urge to overeat, such as eating while watching television  Find ways to avoid these situations  For example, read a book, go for a walk, or do crafts  · Meet with a weight loss support group or friends who are also trying to lose weight  This may help you stay motivated to continue working on your weight loss goals  © 2017 [address] [employer] Information is for End User's use only and may not be sold, redistributed or otherwise used for commercial purposes  All illustrations and images included in CareNotes® are the copyrighted property of [employer]  or David Lynn  The above information is an  only  It is not intended as medical advice for individual conditions or treatments  Talk to your doctor, nurse or pharmacist before following any medical regimen to see if it is safe and effective for you

## 2018-12-17 ENCOUNTER — OFFICE VISIT (OUTPATIENT)
Dept: VASCULAR SURGERY | Facility: CLINIC | Age: 68
End: 2018-12-17
Payer: MEDICARE

## 2018-12-17 VITALS
HEIGHT: 72 IN | DIASTOLIC BLOOD PRESSURE: 74 MMHG | SYSTOLIC BLOOD PRESSURE: 136 MMHG | BODY MASS INDEX: 30.11 KG/M2 | TEMPERATURE: 98.4 F

## 2018-12-17 DIAGNOSIS — I87.2 CHRONIC VENOUS INSUFFICIENCY: Primary | ICD-10-CM

## 2018-12-17 DIAGNOSIS — Z86.718 HISTORY OF DVT (DEEP VEIN THROMBOSIS): ICD-10-CM

## 2018-12-17 DIAGNOSIS — I83.899 VARICOSE VEINS WITH SWELLING: ICD-10-CM

## 2018-12-17 PROCEDURE — 99203 OFFICE O/P NEW LOW 30 MIN: CPT | Performed by: NURSE PRACTITIONER

## 2018-12-17 NOTE — PROGRESS NOTES
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 who presents for vascular evaluation  -Recommended compression stockings to help manage lower extremity edema  Rx given  -Periodic leg elevation  -Regular moisturizers to maintain skin integrity  -Continued anticoagulation  -Follow up on as-needed basis       Problem List Items Addressed This Visit        Cardiovascular and Mediastinum    Chronic venous insufficiency - Primary    Relevant Orders    Compression Stocking    Varicose veins with swelling       Other    History of DVT (deep vein thrombosis)    Relevant Orders    Compression Stocking            Subjective:      Patient ID: Briana Arenas is a 80 y o  male  Chief complaint: Pt is new to our office referred by PCP  Pt is here to discuss swelling and discoloration of left foot  Pt states it has been onging for "years" Pt denies open wounds or sores  No recent testing  Pt is on xarelto  Pt has HX of DVT to left leg  HPI  15-year-old male with HTN, HLD, BPH, chronic venous insufficiency, 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 who presents for vascular evaluation referred by Dr Kiran Esteban  Mild hyperpigmentation of the left lower extremity secondary to chronic venous insufficiency  He has history of unprovoked left lower extremity DVT in 2009 and then again in 2015  He was   Previously seen by Hematology recommended for long-term anticoagulation  He is on Xarelto without any bleeding issues  He has intermittent fatigue of the left lower extremity and swelling of the left ankle  He is utilizing over-the-counter compression  He denies claudication symptoms    The following portions of the patient's history were reviewed and updated as appropriate: allergies, current medications, past family history, past medical history, past social history, past surgical history and problem list     Review of Systems   Constitutional: Negative  HENT: Negative  Eyes: Negative  Respiratory: Negative  Cardiovascular: Positive for leg swelling  Gastrointestinal: Negative  Endocrine: Negative  Genitourinary: Negative  Musculoskeletal: Negative  Skin: Positive for color change  Allergic/Immunologic: Negative  Neurological: Negative  Hematological: Negative  Psychiatric/Behavioral: Negative  Objective:    Vitals:    12/17/18 1034   BP: 136/74   BP Location: Right arm   Patient Position: Sitting   Cuff Size: Adult   Temp: 98 4 °F (36 9 °C)   TempSrc: Tympanic   Height: 6' (1 829 m)       Patient Active Problem List   Diagnosis    Familial hypercholesterolemia    Essential hypertension    Impaired fasting glucose    BPH with obstruction/lower urinary tract symptoms    Chronic venous insufficiency    Varicose veins with swelling    Primary osteoarthritis of left knee    Medicare annual wellness visit, initial    History of DVT (deep vein thrombosis)       Past Surgical History:   Procedure Laterality Date    APPENDECTOMY      WISDOM TOOTH EXTRACTION         Family History   Problem Relation Age of Onset    Hypertension Mother     Leukemia Father     Heart disease Father     Alcohol abuse Father     Depression Father     Heart disease Family     Leukemia Family     Depression Sister     Alcohol abuse Brother     Depression Brother        Social History     Social History    Marital status: /Civil Union     Spouse name: N/A    Number of children: N/A    Years of education: N/A     Occupational History    Not on file       Social History Main Topics    Smoking status: Former Smoker     Types: Cigarettes     Quit date: 1975    Smokeless tobacco: Never Used    Alcohol use Yes      Comment: social    Drug use: No    Sexual activity: Not on file     Other Topics Concern    Not on file     Social History Narrative Caffeine use       No Known Allergies      Current Outpatient Prescriptions:     amLODIPine (NORVASC) 5 mg tablet, Take 1 tablet (5 mg total) by mouth daily For blood pressure, Disp: 30 tablet, Rfl: 5    Choline Fenofibrate (FENOFIBRIC ACID) 135 MG CPDR, Take 1 capsule (135 mg total) by mouth daily, Disp: 30 capsule, Rfl: 3    Omega-3 Fatty Acids (FISH OIL) 1,000 mg, Take by mouth, Disp: , Rfl:     simvastatin (ZOCOR) 10 mg tablet, Take 1 tablet (10 mg total) by mouth daily at bedtime, Disp: 30 tablet, Rfl: 5    XARELTO 15 MG tablet, TAKE ONE TABLET BY MOUTH DAILY WITH breakfast, Disp: 30 tablet, Rfl: 0           Physical Exam   Constitutional: He appears well-nourished  HENT:   Head: Normocephalic and atraumatic  Eyes: EOM are normal    Cardiovascular: Normal heart sounds and intact distal pulses  Pulses:       Dorsalis pedis pulses are 2+ on the right side, and 2+ on the left side  Pulmonary/Chest: Effort normal and breath sounds normal    Abdominal: Soft  Bowel sounds are normal    Musculoskeletal: Normal range of motion  Trace left ankle edema   Skin: Skin is warm and dry  Psychiatric: His behavior is normal  Thought content normal    Nursing note and vitals reviewed

## 2018-12-17 NOTE — PATIENT INSTRUCTIONS
Varicose Veins   AMBULATORY CARE:   Varicose veins  are veins that become large, twisted, and swollen  They are common on the back of the calves, knees, and thighs  Varicose veins are caused by valves in your veins that do not work properly  This causes blood to collect and increase pressure in the veins of your legs  The increased pressure causes your veins to stretch, get larger, swell, and twist        Common symptoms include the following: Your symptoms may be worse after you stand or sit for long periods of time  You may have any of the following:  · Blue, purple, or bulging veins in your legs     · Pain, swelling, or muscle cramps in your legs    · Feeling of fatigue or heaviness in your legs    · Cramping in your legs  Seek care immediately if:   · You have a wound that does not heal or is infected  · You have an injury that has broken your skin and caused your varicose veins to bleed  · Your leg is swollen and hard  · You notice that your legs or feet are turning blue or black  · Your leg feels warm, tender, and painful  It may look swollen and red  Contact your healthcare provider if:   · You have pain in your leg that does not go away or gets worse  · You notice sudden large bruising on your legs  · You have a rash on your leg  · Your symptoms keep you from doing your daily activities  · You have questions or concerns about your condition or care  Treatment of varicose veins  aims to decrease symptoms, improve appearance, and prevent further problems  Treatment will depend on which veins are affected and how severe your condition is  You may need procedures to treat or remove your varicose veins  For example, your healthcare provider may inject a solution or use a laser to close the varicose veins  Surgery to remove long veins may also be done  Ask your healthcare provider for more information about procedures used to treat varicose veins    Manage varicose veins:   · Do not sit or stand for long periods of time  This can cause the blood to collect in your legs and make your symptoms worse  Bend or rotate your ankles several times every hour  Walk around for a few minutes every hour to get blood moving in your legs  · Do not cross your legs when you sit  This decreases blood flow to your feet and can make your symptoms worse  · Do not wear tight clothing or shoes  Do not wear high-heeled shoes  Do not wear clothes that are tight around the waist or knees  · Maintain a healthy weight  Being overweight or obese can make your varicose veins worse  Ask your healthcare provider how much you should weigh  Ask him or her to help you create a weight loss plan if you are overweight  · Wear pressure stockings as directed  The stockings are tight and put pressure on your legs  They improve blood flow and help prevent clots  · Elevate your legs  Keep them above the level of your heart for 15 to 30 minutes several times a day  You can also prop the end of your bed up slightly to elevate your legs while you sleep  This will help blood to flow back to your heart  · Get regular exercise  Talk to your healthcare provider about the best exercise plan for you  Exercise can improve blood flow to your legs and feet  Follow up with your healthcare provider as directed:  Write down your questions so you remember to ask them during your visits  © 2017 [address] [employer] Information is for End User's use only and may not be sold, redistributed or otherwise used for commercial purposes  All illustrations and images included in CareNotes® are the copyrighted property of [employer]  or David Lynn  The above information is an  only  It is not intended as medical advice for individual conditions or treatments  Talk to your doctor, nurse or pharmacist before following any medical regimen to see if it is safe and effective for you      Edema WHAT YOU NEED TO KNOW:   What is edema? Edema is swelling throughout your body  Edema is usually a sign that you are retaining fluid  The swelling may be caused by heart failure or kidney, thyroid, or liver disease  It may also be caused by medicines such as antidepressants, blood pressure medicines, or hormones  Sudden swelling around the lips or face may be a sign of a severe allergic reaction  Swelling of an arm or leg may be caused by blockage of your veins  What other signs and symptoms may occur with edema? · Discomfort or tenderness in the swollen areas    · Tight and shiny skin over the swollen areas    · Weight gain  How is edema diagnosed? Your healthcare provider will ask about your symptoms and any other symptoms you have  He may also ask about any medical conditions you have  Your healthcare provider will examine your skin over the swollen areas  He may gently push on the swollen area for a short time to see if this leaves a dimple  He may also order tests to find the cause of your edema  How is edema treated and managed? Treatment for edema depends on the cause  Depending on your medical condition, you may be given medicine to help get rid of extra body fluid  Your healthcare provider may suggest that you do any of the following to help manage edema:  · Elevate  your arms or legs as directed  Raise them above the level of your heart as often as you can  This will help decrease swelling and pain  Prop them on pillows or blankets to keep them elevated comfortably  · Wear pressure stockings as directed  The stockings are tight and put pressure on your legs  This helps to keep fluid from collecting in your legs or ankles  · Limit your salt intake  Salt causes your body to hold water  Ask about any other changes to your diet  · Stay active  Do not stand or sit for long periods of time  Ask your healthcare provider about the best exercise plan for you      · Keep your skin moist  using lotion, cream, or ointment  Ask your healthcare provider what to use and how often to use it  When should I contact my healthcare provider? · The swollen area feels cold and is pale or blue in color  · The swollen area feels warm, painful, and is red in color  · You have increased swelling or swelling in other parts of your body  · You have questions or concerns about your condition or care  When should I seek immediate care? · You have shortness of breath at rest, especially when you lie down  · You cough up pink, foamy sputum  · You have chest pain  · Your heartbeat is fast or uneven  CARE AGREEMENT:   You have the right to help plan your care  Learn about your health condition and how it may be treated  Discuss treatment options with your caregivers to decide what care you want to receive  You always have the right to refuse treatment  The above information is an  only  It is not intended as medical advice for individual conditions or treatments  Talk to your doctor, nurse or pharmacist before following any medical regimen to see if it is safe and effective for you  © 2017 [address] [employer] Information is for End User's use only and may not be sold, redistributed or otherwise used for commercial purposes  All illustrations and images included in CareNotes® are the copyrighted property of [employer]  or David Lynn

## 2018-12-17 NOTE — PROGRESS NOTES
Assessment/Plan:   ***         [facility]}      Subjective:      Patient ID: Kamilla Clark is a 80 y o  male     Chief Complaint: Pt is new to our office,referred by Dr Byron Vidal (PCP) Pt is here to discuss VV  Pt is on statin and xarelto   HPI  Varicose Veins    Kamilla Clark is seen for evaluation of: []Varicose veins/legs  []Spider veins/legs  []Spider veins/face  []Venous stasis ulcer  []Superficial thrombophlebitis  []Other -      He complains of []none  []bulging veins  []dilated veins  []discolored veins         There is []no edema              []right leg edema  []left leg edema       []bilateral lower extremity edema     There is   []no leg pain          []right leg pain  []left leg pain         []bilateral leg pain  []bilateral leg pain; L>R   []bilateral leg pain; R>L     Pain is described as []aching              []itching  []sharp                []burning  []throbbing         []stinging  []heavy                []dull  []other -      Symptoms have been ongoing for:  ***   There is  []no pertinent medical history  []history of DVT  []PE  []superficial venous thrombosis     Prior treatment includes []none  []EVLT  []OTC stockings  []prescription compression stockings  []vein ligation  []vein stripping  []stab phlebectomy  []sclerotherapy injections  []Other -      Current treatment includes []none  []compression socks  []avoiding tight clothing  []leg elevation  []rest  []exercise  []weight management  []skin care  []periodic evaluation   []Other-     Treatment has been []effective  []ineffective     Review of Systems       Objective   Physical Exam    No text in SmartText          {Common ambulatory SmartLinks:19977}    Review of Systems      Objective: There were no vitals filed for this visit      Patient Active Problem List   Diagnosis    Familial hypercholesterolemia    Essential hypertension    Impaired fasting glucose    BPH with obstruction/lower urinary tract symptoms    Chronic venous insufficiency    Varicose veins with swelling    Primary osteoarthritis of left knee    Medicare annual wellness visit, initial       Past Surgical History:   Procedure Laterality Date    APPENDECTOMY      WISDOM TOOTH EXTRACTION         Family History   Problem Relation Age of Onset    Hypertension Mother     Leukemia Father     Heart disease Father     Alcohol abuse Father     Depression Father     Heart disease Family     Leukemia Family     Depression Sister     Alcohol abuse Brother     Depression Brother        Social History     Social History    Marital status: /Civil Union     Spouse name: N/A    Number of children: N/A    Years of education: N/A     Occupational History    Not on file  Social History Main Topics    Smoking status: Former Smoker     Types: Cigarettes     Quit date: 1975    Smokeless tobacco: Never Used    Alcohol use Yes      Comment: social    Drug use: No    Sexual activity: Not on file     Other Topics Concern    Not on file     Social History Narrative    Caffeine use       No Known Allergies      Current Outpatient Prescriptions:     amLODIPine (NORVASC) 5 mg tablet, Take 1 tablet (5 mg total) by mouth daily For blood pressure, Disp: 30 tablet, Rfl: 5    Choline Fenofibrate (FENOFIBRIC ACID) 135 MG CPDR, Take 1 capsule (135 mg total) by mouth daily, Disp: 30 capsule, Rfl: 3    Omega-3 Fatty Acids (FISH OIL) 1,000 mg, Take by mouth, Disp: , Rfl:     simvastatin (ZOCOR) 10 mg tablet, Take 1 tablet (10 mg total) by mouth daily at bedtime, Disp: 30 tablet, Rfl: 5    XARELTO 15 MG tablet, TAKE ONE TABLET BY MOUTH DAILY WITH breakfast, Disp: 30 tablet, Rfl: 0      There were no vitals filed for this visit       Imaging Tab: ***     Physical Exam

## 2019-01-02 DIAGNOSIS — Z79.01 ANTICOAGULANT LONG-TERM USE: ICD-10-CM

## 2019-01-03 RX ORDER — RIVAROXABAN 15 MG/1
TABLET, FILM COATED ORAL
Qty: 90 TABLET | Refills: 1 | Status: SHIPPED | OUTPATIENT
Start: 2019-01-03 | End: 2019-05-02 | Stop reason: SDUPTHER

## 2019-03-07 DIAGNOSIS — E78.5 HYPERLIPIDEMIA, UNSPECIFIED HYPERLIPIDEMIA TYPE: ICD-10-CM

## 2019-03-07 DIAGNOSIS — E78.01 FAMILIAL HYPERCHOLESTEROLEMIA: ICD-10-CM

## 2019-03-08 RX ORDER — FENOFIBRIC ACID 135 MG/1
CAPSULE, DELAYED RELEASE ORAL
Qty: 90 CAPSULE | Refills: 0 | Status: SHIPPED | OUTPATIENT
Start: 2019-03-08 | End: 2019-06-04 | Stop reason: SDUPTHER

## 2019-04-09 ENCOUNTER — OFFICE VISIT (OUTPATIENT)
Dept: URGENT CARE | Facility: CLINIC | Age: 69
End: 2019-04-09
Payer: MEDICARE

## 2019-04-09 VITALS
DIASTOLIC BLOOD PRESSURE: 80 MMHG | SYSTOLIC BLOOD PRESSURE: 168 MMHG | HEIGHT: 72 IN | RESPIRATION RATE: 20 BRPM | BODY MASS INDEX: 30.2 KG/M2 | TEMPERATURE: 100.4 F | WEIGHT: 223 LBS | HEART RATE: 92 BPM

## 2019-04-09 DIAGNOSIS — S16.1XXA STRAIN OF NECK MUSCLE, INITIAL ENCOUNTER: Primary | ICD-10-CM

## 2019-04-09 PROCEDURE — G0463 HOSPITAL OUTPT CLINIC VISIT: HCPCS | Performed by: PHYSICIAN ASSISTANT

## 2019-04-09 PROCEDURE — 99213 OFFICE O/P EST LOW 20 MIN: CPT | Performed by: PHYSICIAN ASSISTANT

## 2019-04-09 RX ORDER — METHOCARBAMOL 500 MG/1
500 TABLET, FILM COATED ORAL 3 TIMES DAILY
Qty: 15 TABLET | Refills: 0 | Status: SHIPPED | OUTPATIENT
Start: 2019-04-09 | End: 2019-06-18 | Stop reason: ALTCHOICE

## 2019-05-02 DIAGNOSIS — Z79.01 ANTICOAGULANT LONG-TERM USE: ICD-10-CM

## 2019-05-02 RX ORDER — RIVAROXABAN 15 MG/1
TABLET, FILM COATED ORAL
Qty: 90 TABLET | Refills: 1 | Status: SHIPPED | OUTPATIENT
Start: 2019-05-02 | End: 2020-01-08 | Stop reason: SDUPTHER

## 2019-06-04 DIAGNOSIS — E78.01 FAMILIAL HYPERCHOLESTEROLEMIA: ICD-10-CM

## 2019-06-04 DIAGNOSIS — E78.5 HYPERLIPIDEMIA, UNSPECIFIED HYPERLIPIDEMIA TYPE: ICD-10-CM

## 2019-06-05 DIAGNOSIS — E78.01 FAMILIAL HYPERCHOLESTEROLEMIA: ICD-10-CM

## 2019-06-05 DIAGNOSIS — E78.5 HYPERLIPIDEMIA, UNSPECIFIED HYPERLIPIDEMIA TYPE: ICD-10-CM

## 2019-06-05 DIAGNOSIS — Z12.5 SCREENING PSA (PROSTATE SPECIFIC ANTIGEN): ICD-10-CM

## 2019-06-05 DIAGNOSIS — R73.01 IMPAIRED FASTING GLUCOSE: Primary | ICD-10-CM

## 2019-06-05 RX ORDER — FENOFIBRIC ACID 135 MG/1
CAPSULE, DELAYED RELEASE ORAL
Qty: 90 CAPSULE | Refills: 0 | Status: SHIPPED | OUTPATIENT
Start: 2019-06-05 | End: 2019-09-09 | Stop reason: SDUPTHER

## 2019-06-06 ENCOUNTER — LAB (OUTPATIENT)
Dept: LAB | Facility: CLINIC | Age: 69
End: 2019-06-06
Payer: MEDICARE

## 2019-06-06 DIAGNOSIS — E78.5 HYPERLIPIDEMIA, UNSPECIFIED HYPERLIPIDEMIA TYPE: ICD-10-CM

## 2019-06-06 DIAGNOSIS — Z12.5 SCREENING PSA (PROSTATE SPECIFIC ANTIGEN): ICD-10-CM

## 2019-06-06 DIAGNOSIS — E78.01 FAMILIAL HYPERCHOLESTEROLEMIA: ICD-10-CM

## 2019-06-06 DIAGNOSIS — R73.01 IMPAIRED FASTING GLUCOSE: ICD-10-CM

## 2019-06-06 LAB
ALBUMIN SERPL BCP-MCNC: 4.3 G/DL (ref 3.5–5)
ALP SERPL-CCNC: 55 U/L (ref 46–116)
ALT SERPL W P-5'-P-CCNC: 41 U/L (ref 12–78)
ANION GAP SERPL CALCULATED.3IONS-SCNC: 7 MMOL/L (ref 4–13)
AST SERPL W P-5'-P-CCNC: 23 U/L (ref 5–45)
BILIRUB SERPL-MCNC: 0.64 MG/DL (ref 0.2–1)
BUN SERPL-MCNC: 14 MG/DL (ref 5–25)
CALCIUM SERPL-MCNC: 8.9 MG/DL (ref 8.3–10.1)
CHLORIDE SERPL-SCNC: 103 MMOL/L (ref 100–108)
CHOLEST SERPL-MCNC: 152 MG/DL (ref 50–200)
CO2 SERPL-SCNC: 26 MMOL/L (ref 21–32)
CREAT SERPL-MCNC: 1.17 MG/DL (ref 0.6–1.3)
EST. AVERAGE GLUCOSE BLD GHB EST-MCNC: 128 MG/DL
GFR SERPL CREATININE-BSD FRML MDRD: 64 ML/MIN/1.73SQ M
GLUCOSE P FAST SERPL-MCNC: 142 MG/DL (ref 65–99)
HBA1C MFR BLD: 6.1 % (ref 4.2–6.3)
HDLC SERPL-MCNC: 49 MG/DL (ref 40–60)
LDLC SERPL CALC-MCNC: 64 MG/DL (ref 0–100)
NONHDLC SERPL-MCNC: 103 MG/DL
POTASSIUM SERPL-SCNC: 3.8 MMOL/L (ref 3.5–5.3)
PROT SERPL-MCNC: 8 G/DL (ref 6.4–8.2)
PSA SERPL-MCNC: 0.7 NG/ML (ref 0–4)
SODIUM SERPL-SCNC: 136 MMOL/L (ref 136–145)
TRIGL SERPL-MCNC: 196 MG/DL

## 2019-06-06 PROCEDURE — G0103 PSA SCREENING: HCPCS

## 2019-06-06 PROCEDURE — 83036 HEMOGLOBIN GLYCOSYLATED A1C: CPT

## 2019-06-06 PROCEDURE — 80053 COMPREHEN METABOLIC PANEL: CPT

## 2019-06-06 PROCEDURE — 36415 COLL VENOUS BLD VENIPUNCTURE: CPT

## 2019-06-06 PROCEDURE — 80061 LIPID PANEL: CPT

## 2019-06-07 ENCOUNTER — TELEPHONE (OUTPATIENT)
Dept: FAMILY MEDICINE CLINIC | Facility: CLINIC | Age: 69
End: 2019-06-07

## 2019-06-18 ENCOUNTER — OFFICE VISIT (OUTPATIENT)
Dept: FAMILY MEDICINE CLINIC | Facility: CLINIC | Age: 69
End: 2019-06-18
Payer: MEDICARE

## 2019-06-18 VITALS
BODY MASS INDEX: 30.2 KG/M2 | HEART RATE: 92 BPM | DIASTOLIC BLOOD PRESSURE: 98 MMHG | OXYGEN SATURATION: 97 % | HEIGHT: 72 IN | WEIGHT: 223 LBS | SYSTOLIC BLOOD PRESSURE: 152 MMHG

## 2019-06-18 DIAGNOSIS — I87.2 CHRONIC VENOUS INSUFFICIENCY: ICD-10-CM

## 2019-06-18 DIAGNOSIS — I10 ESSENTIAL HYPERTENSION: ICD-10-CM

## 2019-06-18 DIAGNOSIS — Z11.59 ENCOUNTER FOR HEPATITIS C SCREENING TEST FOR LOW RISK PATIENT: ICD-10-CM

## 2019-06-18 DIAGNOSIS — N40.1 BPH WITH OBSTRUCTION/LOWER URINARY TRACT SYMPTOMS: ICD-10-CM

## 2019-06-18 DIAGNOSIS — E78.01 FAMILIAL HYPERCHOLESTEROLEMIA: ICD-10-CM

## 2019-06-18 DIAGNOSIS — N13.8 BPH WITH OBSTRUCTION/LOWER URINARY TRACT SYMPTOMS: ICD-10-CM

## 2019-06-18 DIAGNOSIS — Z79.01 ANTICOAGULANT LONG-TERM USE: ICD-10-CM

## 2019-06-18 DIAGNOSIS — Z00.00 MEDICARE ANNUAL WELLNESS VISIT, SUBSEQUENT: Primary | ICD-10-CM

## 2019-06-18 DIAGNOSIS — R73.01 IMPAIRED FASTING GLUCOSE: ICD-10-CM

## 2019-06-18 PROBLEM — M17.10 ARTHRITIS OF KNEE: Status: ACTIVE | Noted: 2019-06-18

## 2019-06-18 PROBLEM — D18.02 HEMANGIOMA OF INTRACRANIAL STRUCTURES (HCC): Status: RESOLVED | Noted: 2019-06-18 | Resolved: 2019-06-18

## 2019-06-18 PROBLEM — I82.402 ACUTE EMBOLISM AND THROMBOSIS OF DEEP VEIN OF LEFT LOWER EXTREMITY (HCC): Status: RESOLVED | Noted: 2019-06-18 | Resolved: 2019-06-18

## 2019-06-18 PROBLEM — I82.402 ACUTE EMBOLISM AND THROMBOSIS OF DEEP VEIN OF LEFT LOWER EXTREMITY (HCC): Status: ACTIVE | Noted: 2019-06-18

## 2019-06-18 PROBLEM — D18.02 HEMANGIOMA OF INTRACRANIAL STRUCTURES (HCC): Status: ACTIVE | Noted: 2019-06-18

## 2019-06-18 PROCEDURE — G0439 PPPS, SUBSEQ VISIT: HCPCS | Performed by: FAMILY MEDICINE

## 2019-06-18 PROCEDURE — 99214 OFFICE O/P EST MOD 30 MIN: CPT | Performed by: FAMILY MEDICINE

## 2019-06-18 RX ORDER — VALSARTAN 160 MG/1
160 TABLET ORAL DAILY
Qty: 30 TABLET | Refills: 5 | Status: SHIPPED | OUTPATIENT
Start: 2019-06-18 | End: 2019-12-12 | Stop reason: SDUPTHER

## 2019-06-18 RX ORDER — SIMVASTATIN 10 MG
10 TABLET ORAL
Qty: 90 TABLET | Refills: 1 | Status: SHIPPED | OUTPATIENT
Start: 2019-06-18 | End: 2019-07-17 | Stop reason: HOSPADM

## 2019-06-18 RX ORDER — AMLODIPINE BESYLATE 5 MG/1
5 TABLET ORAL DAILY
Qty: 90 TABLET | Refills: 1 | Status: SHIPPED | OUTPATIENT
Start: 2019-06-18 | End: 2019-10-16 | Stop reason: SDUPTHER

## 2019-07-16 ENCOUNTER — HOSPITAL ENCOUNTER (OUTPATIENT)
Facility: HOSPITAL | Age: 69
Setting detail: OBSERVATION
Discharge: HOME | End: 2019-07-17
Attending: EMERGENCY MEDICINE | Admitting: INTERNAL MEDICINE
Payer: MEDICARE

## 2019-07-16 ENCOUNTER — APPOINTMENT (EMERGENCY)
Dept: RADIOLOGY | Facility: HOSPITAL | Age: 69
End: 2019-07-16
Payer: MEDICARE

## 2019-07-16 ENCOUNTER — APPOINTMENT (OUTPATIENT)
Dept: CT IMAGING | Facility: HOSPITAL | Age: 69
End: 2019-07-16
Payer: MEDICARE

## 2019-07-16 ENCOUNTER — APPOINTMENT (EMERGENCY)
Dept: CT IMAGING | Facility: HOSPITAL | Age: 69
End: 2019-07-16
Payer: MEDICARE

## 2019-07-16 DIAGNOSIS — G45.9 TIA (TRANSIENT ISCHEMIC ATTACK): Primary | ICD-10-CM

## 2019-07-16 DIAGNOSIS — R51.9 HEADACHE: ICD-10-CM

## 2019-07-16 DIAGNOSIS — R73.01 IMPAIRED FASTING GLUCOSE: ICD-10-CM

## 2019-07-16 DIAGNOSIS — E78.01 FAMILIAL HYPERCHOLESTEROLEMIA: ICD-10-CM

## 2019-07-16 PROBLEM — R20.0 NUMBNESS: Status: ACTIVE | Noted: 2019-07-16

## 2019-07-16 LAB
ANION GAP SERPL CALCULATED.3IONS-SCNC: 10 MMOL/L (ref 4–13)
APTT PPP: 31 SECONDS (ref 23–37)
BASOPHILS # BLD AUTO: 0.05 THOUSANDS/ΜL (ref 0–0.1)
BASOPHILS NFR BLD AUTO: 1 % (ref 0–1)
BUN SERPL-MCNC: 13 MG/DL (ref 5–25)
CALCIUM SERPL-MCNC: 9.4 MG/DL (ref 8.3–10.1)
CHLORIDE SERPL-SCNC: 103 MMOL/L (ref 100–108)
CO2 SERPL-SCNC: 27 MMOL/L (ref 21–32)
CREAT SERPL-MCNC: 1.06 MG/DL (ref 0.6–1.3)
EOSINOPHIL # BLD AUTO: 0.45 THOUSAND/ΜL (ref 0–0.61)
EOSINOPHIL NFR BLD AUTO: 5 % (ref 0–6)
ERYTHROCYTE [DISTWIDTH] IN BLOOD BY AUTOMATED COUNT: 13.1 % (ref 11.6–15.1)
GFR SERPL CREATININE-BSD FRML MDRD: 72 ML/MIN/1.73SQ M
GLUCOSE SERPL-MCNC: 82 MG/DL (ref 65–140)
HCT VFR BLD AUTO: 40.6 % (ref 36.5–49.3)
HGB BLD-MCNC: 14.4 G/DL (ref 12–17)
IMM GRANULOCYTES # BLD AUTO: 0.03 THOUSAND/UL (ref 0–0.2)
IMM GRANULOCYTES NFR BLD AUTO: 0 % (ref 0–2)
INR PPP: 1.28 (ref 0.84–1.19)
LYMPHOCYTES # BLD AUTO: 3.31 THOUSANDS/ΜL (ref 0.6–4.47)
LYMPHOCYTES NFR BLD AUTO: 40 % (ref 14–44)
MCH RBC QN AUTO: 33 PG (ref 26.8–34.3)
MCHC RBC AUTO-ENTMCNC: 35.5 G/DL (ref 31.4–37.4)
MCV RBC AUTO: 93 FL (ref 82–98)
MONOCYTES # BLD AUTO: 0.98 THOUSAND/ΜL (ref 0.17–1.22)
MONOCYTES NFR BLD AUTO: 12 % (ref 4–12)
NEUTROPHILS # BLD AUTO: 3.57 THOUSANDS/ΜL (ref 1.85–7.62)
NEUTS SEG NFR BLD AUTO: 42 % (ref 43–75)
NRBC BLD AUTO-RTO: 0 /100 WBCS
PLATELET # BLD AUTO: 225 THOUSANDS/UL (ref 149–390)
PMV BLD AUTO: 11.3 FL (ref 8.9–12.7)
POTASSIUM SERPL-SCNC: 3.8 MMOL/L (ref 3.5–5.3)
PROTHROMBIN TIME: 15.7 SECONDS (ref 11.6–14.5)
RBC # BLD AUTO: 4.37 MILLION/UL (ref 3.88–5.62)
SODIUM SERPL-SCNC: 140 MMOL/L (ref 136–145)
TROPONIN I SERPL-MCNC: 0.02 NG/ML
WBC # BLD AUTO: 8.39 THOUSAND/UL (ref 4.31–10.16)

## 2019-07-16 PROCEDURE — 1124F ACP DISCUSS-NO DSCNMKR DOCD: CPT | Performed by: PSYCHIATRY & NEUROLOGY

## 2019-07-16 PROCEDURE — 71046 X-RAY EXAM CHEST 2 VIEWS: CPT

## 2019-07-16 PROCEDURE — 80048 BASIC METABOLIC PNL TOTAL CA: CPT | Performed by: EMERGENCY MEDICINE

## 2019-07-16 PROCEDURE — 85025 COMPLETE CBC W/AUTO DIFF WBC: CPT | Performed by: EMERGENCY MEDICINE

## 2019-07-16 PROCEDURE — 93005 ELECTROCARDIOGRAM TRACING: CPT

## 2019-07-16 PROCEDURE — 99285 EMERGENCY DEPT VISIT HI MDM: CPT

## 2019-07-16 PROCEDURE — 85610 PROTHROMBIN TIME: CPT | Performed by: EMERGENCY MEDICINE

## 2019-07-16 PROCEDURE — 70498 CT ANGIOGRAPHY NECK: CPT

## 2019-07-16 PROCEDURE — 70496 CT ANGIOGRAPHY HEAD: CPT

## 2019-07-16 PROCEDURE — 99285 EMERGENCY DEPT VISIT HI MDM: CPT | Performed by: EMERGENCY MEDICINE

## 2019-07-16 PROCEDURE — 36415 COLL VENOUS BLD VENIPUNCTURE: CPT | Performed by: EMERGENCY MEDICINE

## 2019-07-16 PROCEDURE — 85730 THROMBOPLASTIN TIME PARTIAL: CPT | Performed by: EMERGENCY MEDICINE

## 2019-07-16 PROCEDURE — 84484 ASSAY OF TROPONIN QUANT: CPT | Performed by: EMERGENCY MEDICINE

## 2019-07-16 RX ORDER — LOSARTAN POTASSIUM 50 MG/1
100 TABLET ORAL DAILY
Status: DISCONTINUED | OUTPATIENT
Start: 2019-07-17 | End: 2019-07-17 | Stop reason: HOSPADM

## 2019-07-16 RX ORDER — PRAVASTATIN SODIUM 20 MG
20 TABLET ORAL
Status: DISCONTINUED | OUTPATIENT
Start: 2019-07-17 | End: 2019-07-17

## 2019-07-16 RX ORDER — FENOFIBRATE 145 MG/1
145 TABLET, COATED ORAL
Status: DISCONTINUED | OUTPATIENT
Start: 2019-07-16 | End: 2019-07-17 | Stop reason: HOSPADM

## 2019-07-16 RX ORDER — AMLODIPINE BESYLATE 5 MG/1
5 TABLET ORAL DAILY
Status: DISCONTINUED | OUTPATIENT
Start: 2019-07-17 | End: 2019-07-17 | Stop reason: HOSPADM

## 2019-07-16 RX ADMIN — RIVAROXABAN 15 MG: 20 TABLET, FILM COATED ORAL at 23:11

## 2019-07-16 RX ADMIN — IOHEXOL 100 ML: 350 INJECTION, SOLUTION INTRAVENOUS at 20:55

## 2019-07-16 RX ADMIN — FENOFIBRATE 145 MG: 145 TABLET ORAL at 23:12

## 2019-07-16 NOTE — ED PROVIDER NOTES
History  Chief Complaint   Patient presents with    Extremity Weakness     pt presents to ED c/o a numbing feeling on the right side of his face, arm, and leg  Now it feels weaker  Pt states he has also had pain in his neck for about a week      71y M here with episode of right sided numbness and ?mild weakness  Started while driving and went to turn right  Had some right sided neck pain / spasm earlier in the week and noted a twinge in his neck with the turn, but no specific pain  After the turn noted some numbness to the right side of the face, right arm and right leg  Feeling slightly intensified and pulled over  States right arm felt heavy and a little weak when he reached to turn down the radio  Started to feel better and drove here  Arm and leg symptoms lasted about 15 minutes and face about 30 minutes  Currently completely symptom free except mild headache  Denies recent falls or injuries  Had an episode of vertigo in January and was admitted at that time  Had MRI, echo and carotid duplex that were all essentially negative  Pt w/ hx of recurrent left LE dvt - on xarelto  No recent changes in medications  No other co      History provided by:  Patient   used: No    Neurologic Problem   Location:  Right sided numbness  Quality:  Numb  Severity:  Mild  Onset quality:  Sudden  Duration: 15-30 minutes  Timing:  Constant  Progression:  Unchanged  Chronicity:  New  Context:  See above  Relieved by:  Nothing tried  Worsened by:  Nothing  Ineffective treatments:  Nothing tried  Associated symptoms: headaches    Associated symptoms: no abdominal pain, no chest pain, no congestion, no fever, no rhinorrhea and no shortness of breath        Prior to Admission Medications   Prescriptions Last Dose Informant Patient Reported? Taking?    Choline Fenofibrate (FENOFIBRIC ACID) 135 MG CPDR   No No   Sig: TAKE ONE CAPSULE BY MOUTH DAILY   XARELTO 15 MG tablet   No No   Sig: TAKE ONE TABLET BY MOUTH DAILY WITH breakfast   acetaminophen (TYLENOL) 500 mg tablet   Yes Yes   Sig: Take 500 mg by mouth every 6 (six) hours as needed for mild pain   amLODIPine (NORVASC) 5 mg tablet   No No   Sig: Take 1 tablet (5 mg total) by mouth daily For blood pressure   diazepam (VALIUM) 5 mg tablet   Yes Yes   Sig: Take 5 mg by mouth daily at bedtime as needed for anxiety or sleep   methocarbamol (ROBAXIN) 500 mg tablet   Yes Yes   Sig: Take 500 mg by mouth 2 (two) times a day as needed for muscle spasms   simvastatin (ZOCOR) 10 mg tablet   No No   Sig: Take 1 tablet (10 mg total) by mouth daily at bedtime   valsartan (DIOVAN) 160 mg tablet   No No   Sig: Take 1 tablet (160 mg total) by mouth daily      Facility-Administered Medications: None       Past Medical History:   Diagnosis Date    [facility])     Gross hematuria     [place]  Marcine Grow Marcine Grow 3/29/16   R    4/3/17     Hematuria     [place]    Marcine Grow Marcine Grow 16    R    4/3/17    Hyperlipidemia     Hypertension     Long-term (current) use of anticoagulants     [place]  Marcine Grow Marcine Grow 3/29/16   R    4/3/17     Seasonal allergies        Past Surgical History:   Procedure Laterality Date    APPENDECTOMY      WISDOM TOOTH EXTRACTION         Family History   Problem Relation Age of Onset    Hypertension Mother     Leukemia Father     Heart disease Father     Alcohol abuse Father     Depression Father     Heart disease Family     Leukemia Family     Depression Sister     Alcohol abuse Brother     Depression Brother      I have reviewed and agree with the history as documented  Social History     Tobacco Use    Smoking status: Former Smoker     Types: Cigarettes     Last attempt to quit:      Years since quittin 5    Smokeless tobacco: Never Used   Substance Use Topics    Alcohol use:  Yes     Alcohol/week: 4 0 standard drinks     Types: 4 Cans of beer per week     Frequency: 2-4 times a month     Drinks per session: 1 or 2     Comment: social    Drug use: Yes     Types: Marijuana        Review of Systems   Constitutional: Negative for chills, diaphoresis and fever  HENT: Negative for congestion and rhinorrhea  Respiratory: Negative for chest tightness and shortness of breath  Cardiovascular: Negative for chest pain and palpitations  Gastrointestinal: Negative for abdominal pain  Genitourinary: Negative for dysuria  Musculoskeletal: Negative for back pain  Neurological: Positive for weakness, numbness and headaches  Negative for light-headedness  All other systems reviewed and are negative  Physical Exam  Physical Exam   Constitutional: He is oriented to person, place, and time  He appears well-developed and well-nourished  HENT:   Nose: Nose normal    Eyes: Conjunctivae are normal    Neck: Neck supple  Cardiovascular: Normal rate and regular rhythm  Pulmonary/Chest: Effort normal and breath sounds normal    Abdominal: Soft  There is no tenderness  Musculoskeletal: He exhibits no deformity  Neurological: He is alert and oriented to person, place, and time  He has normal strength  No cranial nerve deficit or sensory deficit  Coordination normal  GCS eye subscore is 4  GCS verbal subscore is 5  GCS motor subscore is 6  Skin: Skin is warm  Psychiatric: He has a normal mood and affect  Nursing note and vitals reviewed        Vital Signs  ED Triage Vitals [07/16/19 1717]   Temperature Pulse Respirations Blood Pressure SpO2   98 3 °F (36 8 °C) 83 17 159/85 95 %      Temp Source Heart Rate Source Patient Position - Orthostatic VS BP Location FiO2 (%)   Temporal Monitor Lying Right arm --      Pain Score       No Pain           Vitals:    07/17/19 0722 07/17/19 0915 07/17/19 1315 07/17/19 1715   BP: 145/96 142/81 132/69 131/73   Pulse: 95 80 77 67   Patient Position - Orthostatic VS: Sitting Sitting Lying Lying         Visual Acuity  Visual Acuity      Most Recent Value   L Pupil Size (mm)  3   R Pupil Size (mm)  3   L Pupil Shape  Round   R Pupil Shape Round          ED Medications  Medications   amLODIPine (NORVASC) tablet 5 mg (5 mg Oral Given 7/17/19 0955)   fenofibrate (TRICOR) tablet 145 mg (145 mg Oral Given 7/16/19 2312)   losartan (COZAAR) tablet 100 mg (100 mg Oral Given 7/17/19 0955)   rivaroxaban (XARELTO) tablet 15 mg (15 mg Oral Given 7/17/19 1558)   aspirin (ECOTRIN LOW STRENGTH) EC tablet 81 mg (81 mg Oral Given 7/17/19 0955)   atorvastatin (LIPITOR) tablet 40 mg (40 mg Oral Given 7/17/19 1558)   iohexol (OMNIPAQUE) 350 MG/ML injection (MULTI-DOSE) 100 mL (100 mL Intravenous Given 7/16/19 2055)       Diagnostic Studies  Results Reviewed     Procedure Component Value Units Date/Time    Protime-INR [[id number]]  (Abnormal) Collected:  07/16/19 1816    Lab Status:  Final result Specimen:  Blood from Arm, Left Updated:  07/16/19 1851     Protime 15 7 seconds      INR 1 28    APTT [[id number]]  (Normal) Collected:  07/16/19 1816    Lab Status:  Final result Specimen:  Blood from Arm, Left Updated:  07/16/19 1851     PTT 31 seconds     Troponin I [[id number]]  (Normal) Collected:  07/16/19 1756    Lab Status:  Final result Specimen:  Blood from Arm, Left Updated:  07/16/19 1824     Troponin I 0 02 ng/mL     Basic metabolic panel [[id number]] Collected:  07/16/19 1756    Lab Status:  Final result Specimen:  Blood from Arm, Left Updated:  07/16/19 1816     Sodium 140 mmol/L      Potassium 3 8 mmol/L      Chloride 103 mmol/L      CO2 27 mmol/L      ANION GAP 10 mmol/L      BUN 13 mg/dL      Creatinine 1 06 mg/dL      Glucose 82 mg/dL      Calcium 9 4 mg/dL      eGFR 72 ml/min/1 73sq m     Narrative:       Matt guidelines for Chronic Kidney Disease (CKD):     Stage 1 with normal or high GFR (GFR > 90 mL/min/1 73 square meters)    Stage 2 Mild CKD (GFR = 60-89 mL/min/1 73 square meters)    Stage 3A Moderate CKD (GFR = 45-59 mL/min/1 73 square meters)    Stage 3B Moderate CKD (GFR = 30-44 mL/min/1 73 square meters)    Stage 4 Severe CKD (GFR = 15-29 mL/min/1 73 square meters)    Stage 5 End Stage CKD (GFR <15 mL/min/1 73 square meters)  Note: GFR calculation is accurate only with a steady state creatinine    CBC and differential [[id number]]  (Abnormal) Collected:  07/16/19 1756    Lab Status:  Final result Specimen:  Blood from Arm, Left Updated:  07/16/19 1805     WBC 8 39 Thousand/uL      RBC 4 37 Million/uL      Hemoglobin 14 4 g/dL      Hematocrit 40 6 %      MCV 93 fL      MCH 33 0 pg      MCHC 35 5 g/dL      RDW 13 1 %      MPV 11 3 fL      Platelets 504 Thousands/uL      nRBC 0 /100 WBCs      Neutrophils Relative 42 %      Immat GRANS % 0 %      Lymphocytes Relative 40 %      Monocytes Relative 12 %      Eosinophils Relative 5 %      Basophils Relative 1 %      Neutrophils Absolute 3 57 Thousands/µL      Immature Grans Absolute 0 03 Thousand/uL      Lymphocytes Absolute 3 31 Thousands/µL      Monocytes Absolute 0 98 Thousand/µL      Eosinophils Absolute 0 45 Thousand/µL      Basophils Absolute 0 05 Thousands/µL                  CTA head and neck with and without contrast   Final Result by Jessica Garg DO (07/16 2131)      Moderate focal stenosis of the posterior cerebral artery bilaterally in its mid P1 segment  No large vessel occlusion  No significant carotid or vertebral artery stenosis  Workstation performed: [id number]         CT head without contrast   Final Result by Jessica Garg DO (07/16 1912)      No acute intracranial abnormality  Several chronic lacunar infarcts are noted as described above  Workstation performed: [id number]         XR chest 2 views   Final Result by Abner Valadez MD (07/17 1469)      No acute cardiopulmonary disease              Workstation performed: [id number]         MRI brain wo contrast    (Results Pending)              Procedures  Procedures       ED Course  ED Course as of Jul 17 1736   Tue Jul 16, 2019   1900 Care transferred to   Jennifer Saunders  Awaiting CT results and d/w neurology                Stroke Assessment     Row Name 07/16/19 4794             NIH Stroke Scale    Interval  Baseline      Level of Consciousness (1a )  0      LOC Questions (1b )  0      LOC Commands (1c )  0      Best Gaze (2 )  0      Visual (3 )  0      Facial Palsy (4 )  0      Motor Arm, Left (5a )  0      Motor Arm, Right (5b )  0      Motor Leg, Left (6a )  0      Motor Leg, Right (6b )  0      Limb Ataxia (7 )  0      Sensory (8 )  0      Best Language (9 )  0      Dysarthria (10 )  0      Extinction and Inattention (11 ) (Formerly Neglect)  0      Total  0          First Filed Value   TPA Decision  Patient not a TPA candidate  Patient is not a candidate options  Symptoms resolved/clearly non disabling  MDM    Disposition  Final diagnoses:   TIA (transient ischemic attack)   Headache   Impaired fasting glucose     Time reflects when diagnosis was documented in both MDM as applicable and the Disposition within this note     Time User Action Codes Description Comment    7/16/2019  8:13 PM Norval Soulier S Add [G45 9] TIA (transient ischemic attack)     7/16/2019  8:13 PM [place], Yolie [place] Tommy Headache     7/16/2019  9:23 PM Sharon Rodas Add [R73 01] Impaired fasting glucose       ED Disposition     ED Disposition Condition Date/Time Comment    Admit Stable Tue Jul 16, 2019  8:13 PM Case was discussed with Asher Servin and the patient's admission status was agreed to be Admission Status: observation status to the service of Dr Rosette Meredith           Follow-up Information    None         Current Discharge Medication List      CONTINUE these medications which have NOT CHANGED    Details   acetaminophen (TYLENOL) 500 mg tablet Take 500 mg by mouth every 6 (six) hours as needed for mild pain      diazepam (VALIUM) 5 mg tablet Take 5 mg by mouth daily at bedtime as needed for anxiety or sleep      methocarbamol (ROBAXIN) 500 mg tablet Take 500 mg by mouth 2 (two) times a day as needed for muscle spasms      amLODIPine (NORVASC) 5 mg tablet Take 1 tablet (5 mg total) by mouth daily For blood pressure  Qty: 90 tablet, Refills: 1    Comments: Due appt  Associated Diagnoses: Essential hypertension      Choline Fenofibrate (FENOFIBRIC ACID) 135 MG CPDR TAKE ONE CAPSULE BY MOUTH DAILY  Qty: 90 capsule, Refills: 0    Associated Diagnoses: Hyperlipidemia, unspecified hyperlipidemia type; Familial hypercholesterolemia      simvastatin (ZOCOR) 10 mg tablet Take 1 tablet (10 mg total) by mouth daily at bedtime  Qty: 90 tablet, Refills: 1    Associated Diagnoses: Familial hypercholesterolemia      valsartan (DIOVAN) 160 mg tablet Take 1 tablet (160 mg total) by mouth daily  Qty: 30 tablet, Refills: 5    Associated Diagnoses: Essential hypertension      XARELTO 15 MG tablet TAKE ONE TABLET BY MOUTH DAILY WITH breakfast  Qty: 90 tablet, Refills: 1    Associated Diagnoses: Anticoagulant long-term use           No discharge procedures on file      ED Provider  Electronically Signed by           Christa Humphreys DO  07/17/19 6293

## 2019-07-17 ENCOUNTER — APPOINTMENT (OUTPATIENT)
Dept: MRI IMAGING | Facility: HOSPITAL | Age: 69
End: 2019-07-17
Payer: MEDICARE

## 2019-07-17 ENCOUNTER — APPOINTMENT (OUTPATIENT)
Dept: NON INVASIVE DIAGNOSTICS | Facility: HOSPITAL | Age: 69
End: 2019-07-17
Payer: MEDICARE

## 2019-07-17 VITALS
OXYGEN SATURATION: 96 % | RESPIRATION RATE: 18 BRPM | TEMPERATURE: 97.8 F | HEART RATE: 75 BPM | HEIGHT: 72 IN | BODY MASS INDEX: 30.08 KG/M2 | WEIGHT: 222.1 LBS | SYSTOLIC BLOOD PRESSURE: 143 MMHG | DIASTOLIC BLOOD PRESSURE: 76 MMHG

## 2019-07-17 LAB
ANION GAP SERPL CALCULATED.3IONS-SCNC: 11 MMOL/L (ref 4–13)
ATRIAL RATE: 82 BPM
ATRIAL RATE: 84 BPM
ATRIAL RATE: 86 BPM
BASOPHILS # BLD AUTO: 0.05 THOUSANDS/ΜL (ref 0–0.1)
BASOPHILS NFR BLD AUTO: 1 % (ref 0–1)
BUN SERPL-MCNC: 14 MG/DL (ref 5–25)
CALCIUM SERPL-MCNC: 9.1 MG/DL (ref 8.3–10.1)
CHLORIDE SERPL-SCNC: 103 MMOL/L (ref 100–108)
CHOLEST SERPL-MCNC: 155 MG/DL (ref 50–200)
CO2 SERPL-SCNC: 25 MMOL/L (ref 21–32)
CREAT SERPL-MCNC: 1.17 MG/DL (ref 0.6–1.3)
EOSINOPHIL # BLD AUTO: 0.46 THOUSAND/ΜL (ref 0–0.61)
EOSINOPHIL NFR BLD AUTO: 5 % (ref 0–6)
ERYTHROCYTE [DISTWIDTH] IN BLOOD BY AUTOMATED COUNT: 12.9 % (ref 11.6–15.1)
EST. AVERAGE GLUCOSE BLD GHB EST-MCNC: 143 MG/DL
GFR SERPL CREATININE-BSD FRML MDRD: 64 ML/MIN/1.73SQ M
GLUCOSE P FAST SERPL-MCNC: 126 MG/DL (ref 65–99)
GLUCOSE SERPL-MCNC: 126 MG/DL (ref 65–140)
HBA1C MFR BLD: 6.6 % (ref 4.2–6.3)
HCT VFR BLD AUTO: 43.4 % (ref 36.5–49.3)
HDLC SERPL-MCNC: 44 MG/DL (ref 40–60)
HGB BLD-MCNC: 15 G/DL (ref 12–17)
IMM GRANULOCYTES # BLD AUTO: 0.03 THOUSAND/UL (ref 0–0.2)
IMM GRANULOCYTES NFR BLD AUTO: 0 % (ref 0–2)
LDLC SERPL CALC-MCNC: 66 MG/DL (ref 0–100)
LYMPHOCYTES # BLD AUTO: 2.95 THOUSANDS/ΜL (ref 0.6–4.47)
LYMPHOCYTES NFR BLD AUTO: 35 % (ref 14–44)
MAGNESIUM SERPL-MCNC: 1.8 MG/DL (ref 1.6–2.6)
MCH RBC QN AUTO: 32.2 PG (ref 26.8–34.3)
MCHC RBC AUTO-ENTMCNC: 34.6 G/DL (ref 31.4–37.4)
MCV RBC AUTO: 93 FL (ref 82–98)
MONOCYTES # BLD AUTO: 0.95 THOUSAND/ΜL (ref 0.17–1.22)
MONOCYTES NFR BLD AUTO: 11 % (ref 4–12)
NEUTROPHILS # BLD AUTO: 4.07 THOUSANDS/ΜL (ref 1.85–7.62)
NEUTS SEG NFR BLD AUTO: 48 % (ref 43–75)
NRBC BLD AUTO-RTO: 0 /100 WBCS
P AXIS: 63 DEGREES
P AXIS: 64 DEGREES
P AXIS: 67 DEGREES
PHOSPHATE SERPL-MCNC: 2.9 MG/DL (ref 2.3–4.1)
PLATELET # BLD AUTO: 246 THOUSANDS/UL (ref 149–390)
PMV BLD AUTO: 11.2 FL (ref 8.9–12.7)
POTASSIUM SERPL-SCNC: 3.6 MMOL/L (ref 3.5–5.3)
PR INTERVAL: 224 MS
PR INTERVAL: 238 MS
PR INTERVAL: 250 MS
QRS AXIS: -38 DEGREES
QRS AXIS: -41 DEGREES
QRS AXIS: -44 DEGREES
QRSD INTERVAL: 76 MS
QRSD INTERVAL: 78 MS
QRSD INTERVAL: 78 MS
QT INTERVAL: 362 MS
QT INTERVAL: 364 MS
QT INTERVAL: 366 MS
QTC INTERVAL: 427 MS
QTC INTERVAL: 427 MS
QTC INTERVAL: 435 MS
RBC # BLD AUTO: 4.66 MILLION/UL (ref 3.88–5.62)
SODIUM SERPL-SCNC: 139 MMOL/L (ref 136–145)
T WAVE AXIS: 55 DEGREES
T WAVE AXIS: 63 DEGREES
T WAVE AXIS: 63 DEGREES
TRIGL SERPL-MCNC: 226 MG/DL
VENTRICULAR RATE: 82 BPM
VENTRICULAR RATE: 84 BPM
VENTRICULAR RATE: 86 BPM
WBC # BLD AUTO: 8.51 THOUSAND/UL (ref 4.31–10.16)

## 2019-07-17 PROCEDURE — 83735 ASSAY OF MAGNESIUM: CPT | Performed by: PHYSICIAN ASSISTANT

## 2019-07-17 PROCEDURE — 80048 BASIC METABOLIC PNL TOTAL CA: CPT | Performed by: PHYSICIAN ASSISTANT

## 2019-07-17 PROCEDURE — 93010 ELECTROCARDIOGRAM REPORT: CPT | Performed by: INTERNAL MEDICINE

## 2019-07-17 PROCEDURE — 93799 UNLISTED CV SVC/PROCEDURE: CPT

## 2019-07-17 PROCEDURE — 93306 TTE W/DOPPLER COMPLETE: CPT | Performed by: INTERNAL MEDICINE

## 2019-07-17 PROCEDURE — G8980 MOBILITY D/C STATUS: HCPCS

## 2019-07-17 PROCEDURE — 85025 COMPLETE CBC W/AUTO DIFF WBC: CPT | Performed by: PHYSICIAN ASSISTANT

## 2019-07-17 PROCEDURE — 80061 LIPID PANEL: CPT | Performed by: PHYSICIAN ASSISTANT

## 2019-07-17 PROCEDURE — 97161 PT EVAL LOW COMPLEX 20 MIN: CPT

## 2019-07-17 PROCEDURE — 83036 HEMOGLOBIN GLYCOSYLATED A1C: CPT | Performed by: PHYSICIAN ASSISTANT

## 2019-07-17 PROCEDURE — 70551 MRI BRAIN STEM W/O DYE: CPT

## 2019-07-17 PROCEDURE — 84100 ASSAY OF PHOSPHORUS: CPT | Performed by: PHYSICIAN ASSISTANT

## 2019-07-17 PROCEDURE — 93306 TTE W/DOPPLER COMPLETE: CPT

## 2019-07-17 PROCEDURE — G8978 MOBILITY CURRENT STATUS: HCPCS

## 2019-07-17 PROCEDURE — 99236 HOSP IP/OBS SAME DATE HI 85: CPT | Performed by: INTERNAL MEDICINE

## 2019-07-17 PROCEDURE — 99215 OFFICE O/P EST HI 40 MIN: CPT | Performed by: PSYCHIATRY & NEUROLOGY

## 2019-07-17 PROCEDURE — G8979 MOBILITY GOAL STATUS: HCPCS

## 2019-07-17 PROCEDURE — 93005 ELECTROCARDIOGRAM TRACING: CPT

## 2019-07-17 RX ORDER — ATORVASTATIN CALCIUM 40 MG/1
40 TABLET, FILM COATED ORAL
Qty: 30 TABLET | Refills: 0 | Status: SHIPPED | OUTPATIENT
Start: 2019-07-18 | End: 2019-08-19 | Stop reason: SDUPTHER

## 2019-07-17 RX ORDER — ACETAMINOPHEN 500 MG
500 TABLET ORAL EVERY 6 HOURS PRN
COMMUNITY

## 2019-07-17 RX ORDER — METHOCARBAMOL 500 MG/1
500 TABLET, FILM COATED ORAL 2 TIMES DAILY PRN
COMMUNITY
End: 2020-01-08 | Stop reason: ALTCHOICE

## 2019-07-17 RX ORDER — ASPIRIN 81 MG/1
81 TABLET ORAL DAILY
Qty: 30 TABLET | Refills: 0 | Status: SHIPPED | OUTPATIENT
Start: 2019-07-18 | End: 2019-08-21 | Stop reason: SDUPTHER

## 2019-07-17 RX ORDER — ASPIRIN 81 MG/1
81 TABLET ORAL DAILY
Status: DISCONTINUED | OUTPATIENT
Start: 2019-07-17 | End: 2019-07-17 | Stop reason: HOSPADM

## 2019-07-17 RX ORDER — DIAZEPAM 5 MG/1
5 TABLET ORAL
COMMUNITY
End: 2020-01-08 | Stop reason: ALTCHOICE

## 2019-07-17 RX ORDER — ATORVASTATIN CALCIUM 40 MG/1
40 TABLET, FILM COATED ORAL
Status: DISCONTINUED | OUTPATIENT
Start: 2019-07-17 | End: 2019-07-17 | Stop reason: HOSPADM

## 2019-07-17 RX ADMIN — RIVAROXABAN 15 MG: 20 TABLET, FILM COATED ORAL at 15:58

## 2019-07-17 RX ADMIN — ATORVASTATIN CALCIUM 40 MG: 40 TABLET, FILM COATED ORAL at 15:58

## 2019-07-17 RX ADMIN — ASPIRIN 81 MG: 81 TABLET, COATED ORAL at 09:55

## 2019-07-17 RX ADMIN — LOSARTAN POTASSIUM 100 MG: 50 TABLET ORAL at 09:55

## 2019-07-17 RX ADMIN — AMLODIPINE BESYLATE 5 MG: 5 TABLET ORAL at 09:55

## 2019-07-17 NOTE — CONSULTS
Consultation - Neurology   Verenice Pierre 77 y o  male MRN: [id number]  Unit/Bed#: [address] [place] [address] [id number] Encounter: [id number]      Assessment/Plan   Assessment/Plan:  56-year-old man with TIA versus small lacunar stroke  -MRI brain to clarify  -okay to continue Xarelto, aspirin 81, and Lipitor 40   - CTA head and neck was unremarkable for any hemodynamically significant stenosis  -TTE was complete, official report pending  -A1c was 6 6, FLP demonstrated a triglycerides of 226 otherwise unremarkable  - will follow above-mentioned MRI      History of Present Illness     Reason for Consult / Principal Problem:  TIA  Hx and PE limited by:  Not applicable  HPI:(as per H&P verbatim) Verenice Pierre is a 77 y o  right handed male who presents with with right-sided numbness and mild weakness started to during driving he was trying to make a right turn  He noted some numbness in the right side of face right arm and right leg in the feeling intensified he pulled over at this point he stated his right arm felt very heavy and a little weak when he tried to reach down to turn down the radio  He waited approximately 15 minutes and his arm weakness resolved he continued to felt better and drove himself to the ER he did state that the facial weakness lasted approximately 30 minutes and after this symptoms completed he felt a mild headache  Patient does have a history of episodes of vertigo which she was worked up for and no significant finding, patient also has urea current left DVTs and he is on Xarelto for that         Inpatient consult to Neurology  Consult performed by: Katya Abernathy DO  Consult ordered by: Katharina Hand MD          Review of Systems   Constitutional: Negative  HENT: Negative for hearing loss  Eyes: Negative for photophobia and visual disturbance  Respiratory: Negative for wheezing  Cardiovascular: Negative for chest pain and palpitations     Genitourinary: Negative for dysuria and urgency  Neurological: Negative for dizziness, weakness, light-headedness, numbness and headaches  All other systems reviewed and are negative  Historical Information   Past Medical History:   Diagnosis Date    DVT (deep venous thrombosis) ([facility])     Gross hematuria     TARA Ramirez Ped 3/29/16   R    4/3/17     Hematuria     TARA Bonds 16    R    4/3/17    Hyperlipidemia     Hypertension     Long-term (current) use of anticoagulants     TARA Bonds 3/29/16   R    4/3/17     Seasonal allergies      Past Surgical History:   Procedure Laterality Date    APPENDECTOMY      WISDOM TOOTH EXTRACTION       Social History   Social History     Substance and Sexual Activity   Alcohol Use Yes    Alcohol/week: 4 0 standard drinks    Types: 4 Cans of beer per week    Frequency: 2-4 times a month    Drinks per session: 1 or 2    Comment: social     Social History     Substance and Sexual Activity   Drug Use Yes    Types: Marijuana     Social History     Tobacco Use   Smoking Status Former Smoker    Types: Cigarettes    Last attempt to quit:     Years since quittin 5   Smokeless Tobacco Never Used     Family History:   Family History   Problem Relation Age of Onset    Hypertension Mother     Leukemia Father     Heart disease Father     Alcohol abuse Father     Depression Father     Heart disease Family     Leukemia Family     Depression Sister     Alcohol abuse Brother     Depression Brother        Review of previous medical records was  completed  Meds/Allergies   all current active meds have been reviewed    No Known Allergies    Objective   Vitals:Blood pressure 132/69, pulse 77, temperature 99 3 °F (37 4 °C), temperature source Oral, resp  rate 18, height 6' (1 829 m), weight 101 kg (222 lb 1 6 oz), SpO2 95 %  ,Body mass index is 30 12 kg/m²  No intake or output data in the 24 hours ending 19 1420    Invasive Devices:    Invasive Devices     Peripheral Intravenous Line            Peripheral IV 07/16/19 Left Forearm less than 1 day                Physical Exam   Constitutional: He appears well-developed and well-nourished  HENT:   Head: Normocephalic and atraumatic  Eyes: Conjunctivae are normal    Cardiovascular: Normal rate and regular rhythm  No murmur heard  Pulmonary/Chest: Effort normal and breath sounds normal    Abdominal: Soft  Bowel sounds are normal  There is no tenderness  Neurologic Exam     Mental Status   - Oriented to person, place, and date  - level of consciousness alert  - follows commands appropriately  - Attention normal  - Fund of knowledge appropriate    - No evidence of Aphasia  Cranial Nerves   -Visual Fields full, PERRLA, EOMI  -Fundoscopic exam attempted, unable to visualize disks  -Face is symmetric with respect to motor and sensory   -Audition intact and symmetric    -Tongue, palate, uvula midline   -Shoulder shrug intact and symmetric  Motor Exam - 5/5 strength in all four extremities  (note left lower extremity was limited secondary to pain with hip flexion)  - normal bulk throughout  - normal tone throughout     Sensory Exam   Sensation intact to touch, pin, temp, vib, in all four extremities  Gait, Coordination, and Reflexes - DTR's in all four extremities are intact and symmetric   - Toes down-going bilaterally  - No Gross ataxia per finger to nose    - gait defered  Lab Results: I have personally reviewed pertinent reports  Imaging Studies: I have personally reviewed pertinent films in PACS  EKG, Pathology, and Other Studies: I have personally reviewed pertinent reports      VTE Prophylaxis: Sequential compression device (Venodyne)

## 2019-07-17 NOTE — ASSESSMENT & PLAN NOTE
Patient had a 30 minutes duration of right-sided numbness and 10 minutes duration of right upper extremity weakness  By this morning patient's numbness and weakness had resolved  CTA of the neck and head showed no carotid artery stenosis    Will get an MRI of the brain to evaluate for acute CVA  Will start patient on aspirin and high-dose atorvastatin      EKG is ordered    Telemetry so far shows sinus rhythm    Patient may not require more than 2 midnights hospital stay, will keep him in observation    Will await neurology evaluation

## 2019-07-17 NOTE — ASSESSMENT & PLAN NOTE
Continue statin and fenofibrate  Patient takes non formulary medications were changed to [address]

## 2019-07-17 NOTE — SPEECH THERAPY NOTE
Speech Language/Pathology    Speech/Language Pathology Progress Note    Patient Name: Kori Thurman  WGMZE'Y Date: 7/17/2019     Consult received for speech/swallow eval on stroke pathway  Pt passed nsg swallow screen; tolerating regular diet w/o s/s dysphagia or aspiration  No speech/language deficits reported  NIH score is 0  No need for formal speech/swallow eval at this time  Reconsult if needed    Ludwin Dangelo, SLP

## 2019-07-17 NOTE — PHYSICAL THERAPY NOTE
PHYSICAL THERAPY EVAL       07/17/19 0800   Note Type   Note type Eval only   Pain Assessment   Pain Assessment No/denies pain   Pain Score No Pain   Home Living   Type of Home Mobile home   Home Layout One level;Stairs to enter with rails  (4STE)   Prior Function   Level of Kay Independent with ADLs and functional mobility   Lives With Spouse   ADL Assistance Independent   IADLs Independent   Falls in the last 6 months 0   Vocational Retired   Comments +drives   Restrictions/Precautions   Wells Magraret Bearing Precautions Per Order No   Other Precautions Telemetry   General   Family/Caregiver Present No   Cognition   Overall Cognitive Status WFL   Arousal/Participation Alert   Orientation Level Oriented X4   Memory Within functional limits   Following Commands Follows all commands and directions without difficulty   RUE Assessment   RUE Assessment WFL   LUE Assessment   LUE Assessment WFL   RLE Assessment   RLE Assessment WFL   LLE Assessment   LLE Assessment WFL   Coordination   Movements are Fluid and Coordinated 1   Sensation WFL   Light Touch   RLE Light Touch Grossly intact   LLE Light Touch Grossly intact   Proprioception   RLE Proprioception Grossly intact   LLE Proprioception Grossly Intact   Bed Mobility   Supine to Sit 7  Independent   Transfers   Sit to Stand 7  Independent   Stand to Sit 7  Independent   Ambulation/Elevation   Gait pattern WNL   Gait Assistance 7  Independent   Distance 75ft   Stair Management Assistance Not tested   Balance   Static Sitting Normal   Dynamic Sitting Normal   Static Standing Normal   Dynamic Standing Normal   Ambulatory Normal   Endurance Deficit   Endurance Deficit No   Activity Tolerance   Activity Tolerance Patient tolerated treatment well   Nurse Made Aware TRACE Hough   Assessment   Prognosis Good   Assessment Patient is a 70y/o M who presented with R sided numbness and weakness   Now resolved  CT: negative  PMH significant for DVT, hematuria, HTN, HLD  Patient lives with spouse in a mobile home with steps to enter  He was independent with all mobility and adl's prior to admission  Current medical status includes telemetry  Patient presenting with no significant strength deficits  Sensation, coordination and proprioception all normal  Patient performed bed mobility, transfers and amb independently  He appears to be at his baseline  Discharge P T  Goals   Patient Goals To go home   Treatment Day 0   Recommendation   Recommendation D/C PT   PT - OK to Discharge Yes   Modified [place] Scale   Modified [place] Scale 0   Barthel Index   Feeding 10   Bathing 5   Grooming Score 5   Dressing Score 10   Bladder Score 10   Bowels Score 10   Toilet Use Score 10   Transfers (Bed/Chair) Score 15   Mobility (Level Surface) Score 15   Stairs Score 0   Barthel Index Score 90   Fracne Yu, PT                Patient Name: Letty Malik  RXBPN'R Date: 7/17/2019

## 2019-07-17 NOTE — PROGRESS NOTES
Patient receptive  About to undergo a procedure  Oriented him to pastoral care       07/17/19 1300   Stress Factors   Patient Stress Factors None identified

## 2019-07-17 NOTE — PROGRESS NOTES
Progress Note - Aisha Eisenmenger 1950, 77 y o  male MRN: [id number]    Unit/Bed#: [address] [place] [address] Encounter: [id number]    Primary Care Provider: Alondra Giang MD   Date and time admitted to hospital: 7/16/2019  5:14 PM        * TIA (transient ischemic attack)  Assessment & Plan  CTA head and neck :Moderate focal stenosis of the posterior cerebral artery bilaterally in its mid P1 segment  No large vessel occlusion  No significant carotid or vertebral artery stenosis       patient had symptoms of weakness and numbness in his right side today lasted approximately 30 minutes then resolved   neurology consult -   recommended observation overnight and CTA   patient is on Xarelto for chronic DVTs 15 mg at night   his CTA did show of moderate focal stenosis of the posterior cerebral artery bilaterally-  He had lesions in the cerebellum on MRA MRI but no mentioning of this focal stenosis -- neurology input appreciated   continue to monitor closely   patient is symptoms free and this time   Neurologist Jailyn Johnson - was called in the ER and CTA results were relayed, no acute measures at this time will address in the a m   - order echocardiogram  - order MRI after discussion with Neurology if he needs MRI / MRA          Anticoagulant long-term use  Assessment & Plan  Patient is on Xarelto lifelong due to history of DVTs reoccur and    History of DVT (deep vein thrombosis)  Assessment & Plan  Patient is a history of multiple DVTs  Is on Xarelto 15 mg daily at night- received his nighttime dose here    Impaired fasting glucose  Assessment & Plan  Patient has a history of impaired fasting glucose  Will monitor blood glucoses daily  If they increase will start insulin sliding scale     Essential hypertension  Assessment & Plan  Continue valsartan and Norvasc  Valsartan was changed to losartan during the hospital stay    Familial hypercholesterolemia  Assessment & Plan  Continue statin and fenofibrate  Patient takes non formulary medications were changed to [address]          VTE Prophylaxis:  Xarelto  / sequential compression device   Code Status: level 1  POLST: POLST form is not discussed and not completed at this time  Anticipated Length of Stay:  Patient will be admitted on an Observation basis with an anticipated length of stay of  < 2 midnights  Justification for Hospital Stay: YIA    Total Time for Visit, including Counseling / Coordination of Care: 30 minutes  Greater than 50% of this total time spent on direct patient counseling and coordination of care  Chief Complaint:   Numbness    History of Present Illness:    Marta Montenegro is a 77 y o  male who presents with with right-sided numbness and mild weakness started to during driving he was trying to make a right turn  He noted some numbness in the right side of face right arm and right leg in the feeling intensified he pulled over at this point he stated his right arm felt very heavy and a little weak when he tried to reach down to turn down the radio  He waited approximately 15 minutes and his arm weakness resolved he continued to felt better and drove himself to the ER he did state that the facial weakness lasted approximately 30 minutes and after this symptoms completed he felt a mild headache  Patient does have a history of episodes of vertigo which she was worked up for and no significant finding, patient also has urea current left DVTs and he is on Xarelto for that  Review of Systems:    Review of Systems   Constitutional: Negative  Negative for appetite change, chills, fatigue and fever  HENT: Negative for drooling, ear pain, facial swelling, rhinorrhea, trouble swallowing and voice change  Eyes: Negative  Negative for pain and redness  Respiratory: Negative  Negative for cough, chest tightness, shortness of breath, wheezing and stridor  Cardiovascular: Negative  Negative for chest pain, palpitations and leg swelling  Gastrointestinal: Negative  Negative for abdominal pain, blood in stool, nausea and vomiting  Endocrine: Negative  Negative for polydipsia, polyphagia and polyuria  Genitourinary: Negative for difficulty urinating, dysuria, flank pain, frequency, hematuria and urgency  Musculoskeletal: Negative  Negative for arthralgias, joint swelling, myalgias, neck pain and neck stiffness  Skin: Negative  Negative for pallor, rash and wound  Allergic/Immunologic: Negative  Negative for environmental allergies, food allergies and immunocompromised state  Neurological: Positive for facial asymmetry, weakness and numbness  Negative for dizziness, tremors, seizures, syncope, light-headedness and headaches  Hematological: Negative  Does not bruise/bleed easily  Psychiatric/Behavioral: Negative  Negative for agitation, confusion, hallucinations, self-injury and sleep disturbance  The patient is not hyperactive  Past Medical and Surgical History:     Past Medical History:   Diagnosis Date    DVT (deep venous thrombosis) (HCC)     Gross hematuria     LA  Nat Settle Nat Settle 3/29/16   R    4/3/17     Hematuria     LA    Nat Settle McPherson Settle 4/12/16    R    4/3/17    Hyperlipidemia     Hypertension     Long-term (current) use of anticoagulants     LA  Nat Settle Nat Settle 3/29/16   R    4/3/17     Seasonal allergies        Past Surgical History:   Procedure Laterality Date    APPENDECTOMY      WISDOM TOOTH EXTRACTION         Meds/Allergies:    Prior to Admission medications    Medication Sig Start Date End Date Taking?  Authorizing Provider   acetaminophen (TYLENOL) 500 mg tablet Take 500 mg by mouth every 6 (six) hours as needed for mild pain   Yes Historical Provider, MD   diazepam (VALIUM) 5 mg tablet Take 5 mg by mouth daily at bedtime as needed for anxiety or sleep   Yes Historical Provider, MD   methocarbamol (ROBAXIN) 500 mg tablet Take 500 mg by mouth 2 (two) times a day as needed for muscle spasms   Yes Historical Provider, MD   amLODIPine (NORVASC) 5 mg tablet Take 1 tablet (5 mg total) by mouth daily For blood pressure 19   Aarti Gamez MD   Choline Fenofibrate (FENOFIBRIC ACID) 135 MG CPDR TAKE ONE CAPSULE BY MOUTH DAILY 19   Aarti Gamez MD   simvastatin (ZOCOR) 10 mg tablet Take 1 tablet (10 mg total) by mouth daily at bedtime 19   Aarti Gamez MD   valsartan (DIOVAN) 160 mg tablet Take 1 tablet (160 mg total) by mouth daily 19   Aarti Gamez MD   XARELTO 15 MG tablet TAKE ONE TABLET BY MOUTH DAILY WITH breakfast 19   Aarti Gamez MD     I have reviewed home medications with patient personally      Allergies: No Known Allergies    Social History:     Marital Status: /Civil Union   Occupation: retired  Patient Pre-hospital Living Situation: home   Patient Pre-hospital Level of Mobility: normal  Patient Pre-hospital Diet Restrictions: none   Substance Use History:   Social History     Substance and Sexual Activity   Alcohol Use Yes    Alcohol/week: 4 0 standard drinks    Types: 4 Cans of beer per week    Frequency: 2-4 times a month    Drinks per session: 1 or 2    Comment: social     Social History     Tobacco Use   Smoking Status Former Smoker    Types: Cigarettes    Last attempt to quit: Susie Dates Years since quittin 5   Smokeless Tobacco Never Used     Social History     Substance and Sexual Activity   Drug Use Yes    Types: Marijuana       Family History:    Family History   Problem Relation Age of Onset    Hypertension Mother     Leukemia Father     Heart disease Father     Alcohol abuse Father     Depression Father     Heart disease Family     Leukemia Family     Depression Sister     Alcohol abuse Brother     Depression Brother        Physical Exam:     Vitals:   Blood Pressure: 135/82 (19)  Pulse: 70 (19)  Temperature: 98 5 °F (36 9 °C) (19)  Temp Source: Oral (19)  Respirations: 16 (19)  Height: 6' (182 9 cm) (19)  Weight - Scale: 101 kg (223 lb) (07/16/19 1717)  SpO2: 98 % (07/17/19 0115)    Physical Exam   Constitutional: He is oriented to person, place, and time  He appears well-developed and well-nourished  No distress  HENT:   Head: Normocephalic and atraumatic  Eyes: Pupils are equal, round, and reactive to light  Conjunctivae and EOM are normal  No scleral icterus  Neck: Normal range of motion  Neck supple  No JVD present  No tracheal deviation present  Cardiovascular: Normal rate, regular rhythm, normal heart sounds and intact distal pulses  Exam reveals no friction rub  No murmur heard  Pulmonary/Chest: Effort normal and breath sounds normal  No stridor  No respiratory distress  He has no wheezes  He has no rales  Abdominal: Soft  Bowel sounds are normal  He exhibits no distension  There is no tenderness  Musculoskeletal: He exhibits edema  Size difference between the right and the left leg left leg is significant larger, bilateral pulses are present  Neurological: He is alert and oriented to person, place, and time  He has normal reflexes  Skin: Skin is warm and dry  Capillary refill takes less than 2 seconds  He is not diaphoretic  Psychiatric: He has a normal mood and affect  His behavior is normal        Additional Data:     Lab Results: I have personally reviewed pertinent reports  Results from last 7 days   Lab Units 07/16/19  1756   WBC Thousand/uL 8 39   HEMOGLOBIN g/dL 14 4   HEMATOCRIT % 40 6   PLATELETS Thousands/uL 225   NEUTROS PCT % 42*   LYMPHS PCT % 40   MONOS PCT % 12   EOS PCT % 5     Results from last 7 days   Lab Units 07/16/19  1756   POTASSIUM mmol/L 3 8   CHLORIDE mmol/L 103   CO2 mmol/L 27   BUN mg/dL 13   CREATININE mg/dL 1 06   CALCIUM mg/dL 9 4     Results from last 7 days   Lab Units 07/16/19  1816   INR  1 28*       Imaging: I have personally reviewed pertinent reports        Cta Head And Neck With And Without Contrast    Result Date: 7/16/2019  Narrative: CTA NECK AND BRAIN WITH AND WITHOUT CONTRAST INDICATION: right sided numbness/ha  COMPARISON:   None  TECHNIQUE:  Routine CT imaging of the Brain without contrast   Post contrast imaging was performed after administration of iodinated contrast through the neck and brain  Post contrast axial 0 625 mm images timed to opacify the arterial system  3D rendering was performed on an independent workstation  MIP reconstructions performed  Coronal reconstructions were performed of the noncontrast portion of the brain  Radiation dose length product (DLP) for this visit:  519 mGy-cm   This examination, like all CT scans performed in the [facility], was performed utilizing techniques to minimize radiation dose exposure, including the use of iterative reconstruction and automated exposure control  IV Contrast:  100 mL of iohexol (OMNIPAQUE)  IMAGE QUALITY:   Diagnostic FINDINGS: NONCONTRAST BRAIN PARENCHYMA:  No intracranial mass, mass effect or midline shift  No CT signs of acute infarction  No acute parenchymal hemorrhage  VENTRICLES AND EXTRA-AXIAL SPACES:  Normal for the patient's age  VISUALIZED ORBITS AND PARANASAL SINUSES:  Unremarkable  CERVICAL VASCULATURE AORTIC ARCH AND GREAT VESSELS:  Normal aortic arch and great vessel origins  Normal visualized subclavian vessels  RIGHT VERTEBRAL ARTERY CERVICAL SEGMENT:  Normal origin  The vessel is normal in caliber throughout the neck  LEFT VERTEBRAL ARTERY CERVICAL SEGMENT:  Normal origin  The vessel is normal in caliber throughout the neck  RIGHT EXTRACRANIAL CAROTID SEGMENT:  Normal caliber common carotid artery  Normal bifurcation and cervical internal carotid artery  No stenosis or dissection  LEFT EXTRACRANIAL CAROTID SEGMENT:  Normal caliber common carotid artery  Normal bifurcation and cervical internal carotid artery  No stenosis or dissection  NASCET criteria was used to determine the degree of internal carotid artery diameter stenosis   INTRACRANIAL VASCULATURE INTERNAL CAROTID ARTERIES:  Normal enhancement of the intracranial portions of the internal carotid arteries  Normal ophthalmic artery origins  Normal ICA terminus  ANTERIOR CIRCULATION:  Symmetric A1 segments and anterior cerebral arteries with normal enhancement  Normal anterior communicating artery  MIDDLE CEREBRAL ARTERY CIRCULATION:  M1 segment and middle cerebral artery branches demonstrate normal enhancement bilaterally  DISTAL VERTEBRAL ARTERIES:  Normal distal vertebral arteries  Posterior inferior cerebellar artery origins are normal  Normal vertebral basilar junction  BASILAR ARTERY:  Basilar artery is normal in caliber  Normal superior cerebellar arteries  POSTERIOR CEREBRAL ARTERIES: There is some moderate stenosis of the bilateral posterior cerebral arteries in the mid P1 segment  DURAL VENOUS SINUSES:  Normal  NON VASCULAR ANATOMY BONY STRUCTURES:  No acute osseous abnormality  SOFT TISSUES OF THE NECK:  Normal  THORACIC INLET:  Unremarkable  Impression: Moderate focal stenosis of the posterior cerebral artery bilaterally in its mid P1 segment  No large vessel occlusion  No significant carotid or vertebral artery stenosis  Workstation performed: [id number]     Ct Head Without Contrast    Result Date: 7/16/2019  Narrative: CT BRAIN - WITHOUT CONTRAST INDICATION:   R side numb/weak - resolved  COMPARISON:  Prior MRI July 9, 2018 TECHNIQUE:  CT examination of the brain was performed  In addition to axial images, coronal 2D reformatted images were created and submitted for interpretation  Radiation dose length product (DLP) for this visit:  [phone] mGy-cm   This examination, like all CT scans performed in the [facility], was performed utilizing techniques to minimize radiation dose exposure, including the use of iterative reconstruction and automated exposure control  IMAGE QUALITY:  Diagnostic   FINDINGS: PARENCHYMA:  Chronic lacunar infarcts identified right thalamus and right subinsular region as well as the left centrum semiovale  VENTRICLES AND EXTRA-AXIAL SPACES:  Normal for the patient's age  VISUALIZED ORBITS AND PARANASAL SINUSES:  Unremarkable  CALVARIUM AND EXTRACRANIAL SOFT TISSUES:  Normal      Impression: No acute intracranial abnormality  Several chronic lacunar infarcts are noted as described above  Workstation performed: [id number]       EKG, Pathology, and Other Studies Reviewed on Admission:   EKG:  Normal sinus rhythm  Allscripts / Epic Records Reviewed: Yes     ** Please Note: This note has been constructed using a voice recognition system   **

## 2019-07-17 NOTE — PLAN OF CARE
Problem: Potential for Falls  Goal: Patient will remain free of falls  Description  INTERVENTIONS:  - Assess patient frequently for physical needs  -  Identify cognitive and physical deficits and behaviors that affect risk of falls  -  [place] fall precautions as indicated by assessment   - Educate patient/family on patient safety including physical limitations  - Instruct patient to call for assistance with activity based on assessment  - Modify environment to reduce risk of injury  - Consider OT/PT consult to assist with strengthening/mobility  Outcome: Progressing     Problem: CARDIOVASCULAR - ADULT  Goal: Maintains optimal cardiac output and hemodynamic stability  Description  INTERVENTIONS:  - Monitor I/O, vital signs and rhythm  - Monitor for S/S and trends of decreased cardiac output i e  bleeding, hypotension  - Administer and titrate ordered vasoactive medications to optimize hemodynamic stability  - Assess quality of pulses, skin color and temperature  - Assess for signs of decreased coronary artery perfusion - ex   Angina  - Instruct patient to report change in severity of symptoms  Outcome: Progressing

## 2019-07-17 NOTE — PROGRESS NOTES
Pt awake during hrly rounds with freq neuro checks and vital signs; denies pain and states no reoccurrence of symptoms

## 2019-07-17 NOTE — SOCIAL WORK
Met with patient  Observation status notice given and signed  LOS 0, Patient is not a Medicare  Bundled or 30 day readmission patient  Received consult, met with patient  He reports residng with his wife in a one level home with 4 JAIMIE with rails  He is independent of ADL's and uses no assistive device  His wife prepares meals and he drives   He is retired  He has no history of BHU, VNA, drug and alcohol or SNF rehab stay  He does reports POA and no advance directives  He declined information on AD  He indefinites his wife as his caregiver  He plans to return home and anticipates no discharge needs                        l

## 2019-07-17 NOTE — PHYSICIAN ADVISOR
Current patient class: Observation  The patient is currently on Hospital Day: 2 at [place] [address]        The patient was admitted to the hospital  on N/A at N/A for the following diagnosis:  Impaired fasting glucose [R73 01]  TIA (transient ischemic attack) [G45 9]  Numbness [R20 0]  Headache [R51]     After review of the relevant documentation, labs, vital signs and test results, the patient is most appropriate for OBSERVATION STATUS  Rationale is as follows: The patient is a 77 yrs   Male who presented to the ED at 7/16/2019  5:14 PM with a chief complaint of Extremity Weakness (pt presents to ED c/o a numbing feeling on the right side of his face, arm, and leg  Now it feels weaker  Pt states he has also had pain in his neck for about a week )  The symptoms only last half an hour and then resolved  The patient is already on anticoagulation for DVT and seen by Neurology  They recommended an MRI to rule out a TIA versus a small lacunar infarction  MRI still pending however the patient's symptoms did resolve  I would keep the patient observation status for now  The patient does have any acute findings on the MRI then we can re-evaluate and change the patient to in-patient at that time    The patients vitals on arrival were ED Triage Vitals [07/16/19 1717]   Temperature Pulse Respirations Blood Pressure SpO2   98 3 °F (36 8 °C) 83 17 159/85 95 %      Temp Source Heart Rate Source Patient Position - Orthostatic VS BP Location FiO2 (%)   Temporal Monitor Lying Right arm --      Pain Score       No Pain           Past Medical History:   Diagnosis Date    DVT (deep venous thrombosis) (HCC)     Gross hematuria     LA Georgette Ormond Georgette Ormond 3/29/16   R    4/3/17     Hematuria     LA Georgette Ormond Georgette Ormond 4/12/16    R    4/3/17    Hyperlipidemia     Hypertension     Long-term (current) use of anticoagulants     LA Georgette Ormond Georgette Ormond 3/29/16   R    4/3/17     Seasonal allergies      Past Surgical History:   Procedure Laterality Date  APPENDECTOMY      WISDOM TOOTH EXTRACTION             Consults have been placed to:   IP CONSULT TO NEUROLOGY  IP CONSULT TO CASE MANAGEMENT  IP CONSULT TO NUTRITION SERVICES    Vitals:    07/17/19 0715 07/17/19 0722 07/17/19 0915 07/17/19 1315   BP:  145/96 142/81 132/69   BP Location:  Right arm Right arm Right arm   Pulse:  95 80 77   Resp:  18 18 18   Temp:  98 6 °F (37 °C) 97 8 °F (36 6 °C) 99 3 °F (37 4 °C)   TempSrc:  Oral Oral Oral   SpO2: 95% 95% 94% 95%   Weight:       Height:           Most recent labs:    Recent Labs     07/16/19  1756 07/16/19  1816 07/17/19  0522   WBC 8 39  --  8 51   HGB 14 4  --  15 0   HCT 40 6  --  43 4     --  246   K 3 8  --  3 6   CALCIUM 9 4  --  9 1   BUN 13  --  14   CREATININE 1 06  --  1 17   INR  --  1 28*  --    TROPONINI 0 02  --   --        Scheduled Meds:  Current Facility-Administered Medications:  amLODIPine 5 mg Oral Daily Huong Rodas PA-C   aspirin 81 mg Oral Daily Isa Magallanes MD   atorvastatin 40 mg Oral Daily With Chelsy Griffin MD   fenofibrate 145 mg Oral HS Huong Rodas PA-C   losartan 100 mg Oral Daily Huong Rodas PA-C   rivaroxaban 15 mg Oral Daily With Dinner Huong Rodas PA-C     Continuous Infusions:   PRN Meds:

## 2019-07-17 NOTE — PLAN OF CARE
Problem: Potential for Falls  Goal: Patient will remain free of falls  Description  INTERVENTIONS:  - Assess patient frequently for physical needs  -  Identify cognitive and physical deficits and behaviors that affect risk of falls  -  [place] fall precautions as indicated by assessment   - Educate patient/family on patient safety including physical limitations  - Instruct patient to call for assistance with activity based on assessment  - Modify environment to reduce risk of injury  - Consider OT/PT consult to assist with strengthening/mobility  Outcome: Progressing     Problem: CARDIOVASCULAR - ADULT  Goal: Maintains optimal cardiac output and hemodynamic stability  Description  INTERVENTIONS:  - Monitor I/O, vital signs and rhythm  - Monitor for S/S and trends of decreased cardiac output i e  bleeding, hypotension  - Administer and titrate ordered vasoactive medications to optimize hemodynamic stability  - Assess quality of pulses, skin color and temperature  - Assess for signs of decreased coronary artery perfusion - ex   Angina  - Instruct patient to report change in severity of symptoms  Outcome: Progressing

## 2019-07-17 NOTE — PROGRESS NOTES
Progress Note - Marta Montenegro 1950, 77 y o  male MRN: [id number]    Unit/Bed#: [address] [place] [address] Encounter: [id number]    Primary Care Provider: Mary Ibanez MD   Date and time admitted to hospital: 7/16/2019  5:14 PM        * TIA (transient ischemic attack)  Assessment & Plan  Patient had a 30 minutes duration of right-sided numbness and 10 minutes duration of right upper extremity weakness  By this morning patient's numbness and weakness had resolved  CTA of the neck and head showed no carotid artery stenosis    Will get an MRI of the brain to evaluate for acute CVA  Will start patient on aspirin and high-dose atorvastatin  EKG is ordered    Telemetry so far shows sinus rhythm    Patient may not require more than 2 midnights hospital stay, will keep him in observation    Will await neurology evaluation      Essential hypertension  Assessment & Plan  Blood pressure runs between 130 and 150 this morning, will continue losartan and amlodipine    History of DVT (deep vein thrombosis)  Assessment & Plan  Continue Xarelto for history of DVTs    Impaired fasting glucose  Assessment & Plan  Blood sugar this morning is 126 will continue monitoring it     Anticoagulant long-term use  Assessment & Plan  Patient is on Xarelto lifelong due to history of DVTs reoccur and    Familial hypercholesterolemia  Assessment & Plan  Continue statin and fenofibrate  Patient takes non formulary medications were changed to [address]        VTE Prophylaxis: in place    Patient Centered Rounds: I rounded with patient's nurse    Current Length of Stay: 0 day(s)    Current Patient Status: Observation    Certification Statement: Pt requires additional inpatient hospital stay due to: see assessment and plan        Subjective:   Patient suddenly developed right-sided numbness and right upper extremity weakness as he was driving yesterday in the afternoon    He was not able to manipulate the volume control of his radio due to right hand weakness  This lasted for about 10 minutes but the right-sided numbness lasted for a total of 30 minutes or so  Patient denies any headache before the incident but after that he had a mild headache  He denies any chest pain, palpitations, nausea, change in vision before or after the event  Patient's nurse reports that since presentation to the floor patient has been in sinus rhythm without any complaints of numbness or weakness  This morning patient feels that he is back to normal     Denies history of TIA or CVA before  Does have history of hypertension and losartan was recently added to his amlodipine for blood pressure control  Has history of DVTs and takes Xarelto  He denies any signs of bleeding  All other ROS are negative    Objective:     Vitals:   Temp (24hrs), Av 3 °F (36 8 °C), Min:98 °F (36 7 °C), Max:98 6 °F (37 °C)    Temp:  [98 °F (36 7 °C)-98 6 °F (37 °C)] 98 6 °F (37 °C)  HR:  [64-95] 95  Resp:  [10-18] 18  BP: (110-169)/(60-96) 145/96  SpO2:  [95 %-98 %] 95 %  Body mass index is 30 12 kg/m²  Input and Output Summary (last 24 hours):     No intake or output data in the 24 hours ending 19 0901    Physical Exam:     Physical Exam   Constitutional: He is oriented to person, place, and time  He appears well-developed  No distress  HENT:   Head: Normocephalic  Mouth/Throat: Oropharynx is clear and moist    Eyes: Conjunctivae are normal    Neck: Neck supple  Cardiovascular: Normal rate and regular rhythm  Pulmonary/Chest: Effort normal  No respiratory distress  He has no wheezes  He has no rales  Abdominal: Soft  Bowel sounds are normal  He exhibits no distension  There is no tenderness  Musculoskeletal: He exhibits no tenderness  Lymphadenopathy:     He has no cervical adenopathy  Neurological: He is alert and oriented to person, place, and time  He displays normal reflexes  No cranial nerve deficit or sensory deficit   Coordination normal  Strength is 5/5 bilaterally equal, speech is normal, patient has no pronator drift   Skin: Skin is warm and dry  No rash noted  Psychiatric: He has a normal mood and affect  Vitals reviewed  I personally reviewed labs and imaging reports for today  Last 24 Hours Medication List:     Current Facility-Administered Medications:  amLODIPine 5 mg Oral Daily Huong Rodas PA-C   aspirin 81 mg Oral Daily Epi Fontanez MD   atorvastatin 40 mg Oral Daily With Hue Spivey MD   fenofibrate 145 mg Oral HS Huong Rodas PA-C   losartan 100 mg Oral Daily Huong Rodas PA-C   rivaroxaban 15 mg Oral Daily With Dinner Huong Rodas PA-C          Today, Patient Was Seen By: Epi Fontanez MD    ** Please Note: Dictation voice to text software may have been used in the creation of this document   **

## 2019-07-17 NOTE — UTILIZATION REVIEW
Initial Clinical Review    Admission: Date/Time/Statement: 7/16/2019 2012 OBSERVATION     On 7/17- patient with TIA, requiring MRI to further evaluate for CVA    Orders Placed This Encounter   Procedures    Place in Observation (expected length of stay for this patient is less than two midnights)     Standing Status:   Standing     Number of Occurrences:   1     Order Specific Question:   Admitting Physician     Answer:   Deana Mendez [[id number]]     Order Specific Question:   Level of Care     Answer:   Med Surg [[id number]]     ED Arrival Information     Expected Arrival Acuity Means of Arrival Escorted By Service Admission Type    - 7/16/2019 17:09 Urgent Walk-In Self General Medicine Urgent    Arrival Complaint    -        Chief Complaint   Patient presents with    Extremity Weakness     pt presents to ED c/o a numbing feeling on the right side of his face, arm, and leg  Now it feels weaker  Pt states he has also had pain in his neck for about a week      Assessment/Plan:   TIA (transient ischemic attack)  Assessment & Plan  CTA head and neck :Moderate focal stenosis of the posterior cerebral artery bilaterally in its mid P1 segment  No large vessel occlusion   No significant carotid or vertebral artery stenosis        patient had symptoms of weakness and numbness in his right side today lasted approximately 30 minutes then resolved   neurology consult -   recommended observation overnight and CTA   patient is on Xarelto for chronic DVTs 15 mg at night   his CTA did show of moderate focal stenosis of the posterior cerebral artery bilaterally-  He had lesions in the cerebellum on MRA MRI but no mentioning of this focal stenosis -- neurology input appreciated   continue to monitor closely   patient is symptoms free and this time   Neurologist Kacie Hall - was called in the ER and CTA results were relayed, no acute measures at this time will address in the a m   - order echocardiogram  - order MRI after discussion with Neurology if he needs MRI / MRA      Anticoagulant long-term use  Assessment & Plan  Patient is on Xarelto lifelong due to history of DVTs reoccur and     History of DVT (deep vein thrombosis)  Assessment & Plan  Patient is a history of multiple DVTs  Is on Xarelto 15 mg daily at night- received his nighttime dose here     Impaired fasting glucose  Assessment & Plan  Patient has a history of impaired fasting glucose  Will monitor blood glucoses daily  If they increase will start insulin sliding scale      Essential hypertension  Assessment & Plan  Continue valsartan and Norvasc  Valsartan was changed to losartan during the hospital stay     Familial hypercholesterolemia  Assessment & Plan  Continue statin and fenofibrate  Patient takes non formulary [facility]  ED Triage Vitals [07/16/19 1717]   Temperature Pulse Respirations Blood Pressure SpO2   98 3 °F (36 8 °C) 83 17 159/85 95 %      Temp Source Heart Rate Source Patient Position - Orthostatic VS BP Location FiO2 (%)   Temporal Monitor Lying Right arm --      Pain Score       No Pain        Wt Readings from Last 1 Encounters:   07/17/19 101 kg (222 lb 1 6 oz)     Additional Vital Signs:   07/17/19 0715          95 %  None (Room air)     07/17/19 0315  98 4 °F (36 9 °C)  88  18  135/69  97 %  None (Room air)     07/17/19 0115  98 5 °F (36 9 °C)  70  16  135/82  98 %  None (Room air)  Lying   07/17/19 0015  98 4 °F (36 9 °C)  68  16  110/60           Glasglow Coma scale   07/17/19 0715 4 5 6 15   07/17/19 0515 4 5 6 15   07/17/19 0315 4 5 6 15   07/17/19 0115 4 5 6 15   07/17/19 0015 4 5 6 15   07/16/19 2315 4 5 6 15   07/16/19 2215 4 5 6 15   07/16/19 1728 4 5 6 15         Pertinent Labs/Diagnostic Test Results:   7/16/2019 cta head and neck - Moderate focal stenosis of the posterior cerebral artery bilaterally in its mid P1 segment    No large vessel occlusion   No significant carotid or vertebral artery stenosis    7/16/2019 CT head - No acute intracranial abnormality   Several chronic lacunar infarcts are noted as described above    7/17/2019 CxR- No acute cardiopulmonary disease  Results from last 7 days   Lab Units 07/17/19  0522 07/16/19  1756   WBC Thousand/uL 8 51 8 39   HEMOGLOBIN g/dL 15 0 14 4   HEMATOCRIT % 43 4 40 6   PLATELETS Thousands/uL 246 225   NEUTROS ABS Thousands/µL 4 07 3 57     Results from last 7 days   Lab Units 07/17/19  0522 07/16/19  1756   SODIUM mmol/L 139 140   POTASSIUM mmol/L 3 6 3 8   CHLORIDE mmol/L 103 103   CO2 mmol/L 25 27   ANION GAP mmol/L 11 10   BUN mg/dL 14 13   CREATININE mg/dL 1 17 1 06   EGFR ml/min/1 73sq m 64 72   CALCIUM mg/dL 9 1 9 4   MAGNESIUM mg/dL 1 8  --    PHOSPHORUS mg/dL 2 9  --      Results from last 7 days   Lab Units 07/17/19  0522 07/16/19  1756   GLUCOSE RANDOM mg/dL 126 82     Results from last 7 days   Lab Units 07/16/19  1756   TROPONIN I ng/mL 0 02     Results from last 7 days   Lab Units 07/16/19  1816   PROTIME seconds 15 7*   INR  1 28*   PTT seconds 31       ED Treatment: no medication  Medication Administration from 07/16/2019 1709 to 07/16/2019 2137       Date/Time Order Dose Route Action Action by Comments        Past Medical History:   Diagnosis Date    DVT (deep venous thrombosis) (HCC)     Gross hematuria     [place]  Erich Sam 3/29/16   R    4/3/17     Hematuria     [place]    Erich Sam 4/12/16    R    4/3/17    Hyperlipidemia     Hypertension     Long-term (current) use of anticoagulants     [place]  Erich Sam 3/29/16   R    4/3/17     Seasonal allergies      Present on Admission:   Essential hypertension   Familial hypercholesterolemia   TIA (transient ischemic attack)   Impaired fasting glucose      Admitting Diagnosis: Impaired fasting glucose [R73 01]  TIA (transient ischemic attack) [G45 9]  Numbness [R20 0]  Headache [R51]  Age/Sex: 77 y o  male  Admission Orders:  7/16/2019  2012 OBSERVATION     Current Facility-Administered Medications:  amLODIPine 5 mg Oral Daily   fenofibrate 145 mg Oral HS   losartan 100 mg Oral Daily   pravastatin 20 mg Oral Daily With Dinner   rivaroxaban 15 mg Oral Daily With Dinner     IP CONSULT TO NEUROLOGY  Neuro checks q 1 hour x 4 then q 2 hours x 8 then q 4h      Network Utilization Review Department  Phone: [phone]; Fax [phone]  [email or web address]  ATTENTION: Please call with any questions or concerns to [phone]  and carefully listen to the prompts so that you are directed to the right person  Send all requests for admission clinical reviews, approved or denied determinations and any other requests to fax [phone]   All voicemails are confidential

## 2019-07-17 NOTE — H&P
МАРИНА and Venkat Fulton 1950, 77 y o  male MRN: [id number]    Unit/Bed#: [address] [place] [address] Encounter: [id number]    Primary Care Provider: Marifer Pena MD   Date and time admitted to hospital: 7/16/2019  5:14 PM        TIA (transient ischemic attack)  Assessment & Plan  CTA head and neck :Moderate focal stenosis of the posterior cerebral artery bilaterally in its mid P1 segment  No large vessel occlusion   No significant carotid or vertebral artery stenosis       patient had symptoms of weakness and numbness in his right side today lasted approximately 30 minutes then resolved   neurology consult -   recommended observation overnight and CTA   patient is on Xarelto for chronic DVTs 15 mg at night   his CTA did show of moderate focal stenosis of the posterior cerebral artery bilaterally-  He had lesions in the cerebellum on MRA MRI but no mentioning of this focal stenosis -- neurology input appreciated   continue to monitor closely   patient is symptoms free and this time   Neurologist Guy Kimbrough - was called in the ER and CTA results were relayed, no acute measures at this time will address in the a m   - order echocardiogram  - order MRI after discussion with Neurology if he needs MRI / MRA              Anticoagulant long-term use  Assessment & Plan  Patient is on Xarelto lifelong due to history of DVTs reoccur and     History of DVT (deep vein thrombosis)  Assessment & Plan  Patient is a history of multiple DVTs  Is on Xarelto 15 mg daily at night- received his nighttime dose here     Impaired fasting glucose  Assessment & Plan  Patient has a history of impaired fasting glucose  Will monitor blood glucoses daily  If they increase will start insulin sliding scale      Essential hypertension  Assessment & Plan  Continue valsartan and Norvasc  Valsartan was changed to losartan during the hospital stay     Familial hypercholesterolemia  Assessment & Plan  Continue statin and fenofibrate  Patient takes non formulary medications were changed to [address]             VTE Prophylaxis:  Xarelto  / sequential compression device   Code Status: level 1  POLST: POLST form is not discussed and not completed at this time  Anticipated Length of Stay:  Patient will be admitted on an Observation basis with an anticipated length of stay of  < 2 midnights  Justification for Hospital Stay: YIA    Total Time for Visit, including Counseling / Coordination of Care: 30 minutes  Greater than 50% of this total time spent on direct patient counseling and coordination of care  Chief Complaint:   Numbness    History of Present Illness:    Sheila Rouse is a 77 y o  male who presents with with right-sided numbness and mild weakness started to during driving he was trying to make a right turn  He noted some numbness in the right side of face right arm and right leg in the feeling intensified he pulled over at this point he stated his right arm felt very heavy and a little weak when he tried to reach down to turn down the radio  He waited approximately 15 minutes and his arm weakness resolved he continued to felt better and drove himself to the ER he did state that the facial weakness lasted approximately 30 minutes and after this symptoms completed he felt a mild headache  Patient does have a history of episodes of vertigo which she was worked up for and no significant finding, patient also has urea current left DVTs and he is on Xarelto for that  Review of Systems:    Review of Systems   Constitutional: Negative  Negative for appetite change, chills, fatigue and fever  HENT: Negative for drooling, ear pain, facial swelling, rhinorrhea, trouble swallowing and voice change  Eyes: Negative  Negative for pain and redness  Respiratory: Negative  Negative for cough, chest tightness, shortness of breath, wheezing and stridor  Cardiovascular: Negative    Negative for chest pain, palpitations and leg swelling  Gastrointestinal: Negative  Negative for abdominal pain, blood in stool, nausea and vomiting  Endocrine: Negative  Negative for polydipsia, polyphagia and polyuria  Genitourinary: Negative for difficulty urinating, dysuria, flank pain, frequency, hematuria and urgency  Musculoskeletal: Negative  Negative for arthralgias, joint swelling, myalgias, neck pain and neck stiffness  Skin: Negative  Negative for pallor, rash and wound  Allergic/Immunologic: Negative  Negative for environmental allergies, food allergies and immunocompromised state  Neurological: Positive for facial asymmetry, weakness and numbness  Negative for dizziness, tremors, seizures, syncope, light-headedness and headaches  Hematological: Negative  Does not bruise/bleed easily  Psychiatric/Behavioral: Negative  Negative for agitation, confusion, hallucinations, self-injury and sleep disturbance  The patient is not hyperactive  Past Medical and Surgical History:     Past Medical History:   Diagnosis Date    DVT (deep venous thrombosis) (HCC)     Gross hematuria     [facility] 3/29/16   R    4/3/17     Hematuria     [facility] 4/12/16    R    4/3/17    Hyperlipidemia     Hypertension     Long-term (current) use of anticoagulants     [facility] 3/29/16   R    4/3/17     Seasonal allergies        Past Surgical History:   Procedure Laterality Date    APPENDECTOMY      WISDOM TOOTH EXTRACTION         Meds/Allergies:    Prior to Admission medications    Medication Sig Start Date End Date Taking?  Authorizing Provider   acetaminophen (TYLENOL) 500 mg tablet Take 500 mg by mouth every 6 (six) hours as needed for mild pain   Yes Historical Provider, MD   diazepam (VALIUM) 5 mg tablet Take 5 mg by mouth daily at bedtime as needed for anxiety or sleep   Yes Historical Provider, MD   methocarbamol (ROBAXIN) 500 mg tablet Take 500 mg by mouth 2 (two) times a day as needed for muscle spasms   Yes Historical Provider, MD   amLODIPine (NORVASC) 5 mg tablet Take 1 tablet (5 mg total) by mouth daily For blood pressure 19   Davin Sanchez MD   Choline Fenofibrate (FENOFIBRIC ACID) 135 MG CPDR TAKE ONE CAPSULE BY MOUTH DAILY 19   Davin Sanchez MD   simvastatin (ZOCOR) 10 mg tablet Take 1 tablet (10 mg total) by mouth daily at bedtime 19   Davin Sanchez MD   valsartan (DIOVAN) 160 mg tablet Take 1 tablet (160 mg total) by mouth daily 19   Davin Sanchez MD   XARELTO 15 MG tablet TAKE ONE TABLET BY MOUTH DAILY WITH breakfast 19   Davin Sanchez MD     I have reviewed home medications with patient personally      Allergies: No Known Allergies    Social History:     Marital Status: /Civil Union   Occupation: retired  Patient Pre-hospital Living Situation: home   Patient Pre-hospital Level of Mobility: normal  Patient Pre-hospital Diet Restrictions: none   Substance Use History:   Social History     Substance and Sexual Activity   Alcohol Use Yes    Alcohol/week: 4 0 standard drinks    Types: 4 Cans of beer per week    Frequency: 2-4 times a month    Drinks per session: 1 or 2    Comment: social     Social History     Tobacco Use   Smoking Status Former Smoker    Types: Cigarettes    Last attempt to quit: Lamonte Silva Years since quittin 5   Smokeless Tobacco Never Used     Social History     Substance and Sexual Activity   Drug Use Yes    Types: Marijuana       Family History:    Family History   Problem Relation Age of Onset    Hypertension Mother     Leukemia Father     Heart disease Father     Alcohol abuse Father     Depression Father     Heart disease Family     Leukemia Family     Depression Sister     Alcohol abuse Brother     Depression Brother        Physical Exam:     Vitals:   Blood Pressure: 135/82 (19)  Pulse: 70 (19)  Temperature: 98 5 °F (36 9 °C) (19)  Temp Source: Oral (19)  Respirations: 16 (19)  Height: 6' (182 9 cm) (19)  Weight - Scale: 101 kg (223 lb) (07/16/19 1717)  SpO2: 98 % (07/17/19 0115)    Physical Exam   Constitutional: He is oriented to person, place, and time  He appears well-developed and well-nourished  No distress  HENT:   Head: Normocephalic and atraumatic  Eyes: Pupils are equal, round, and reactive to light  Conjunctivae and EOM are normal  No scleral icterus  Neck: Normal range of motion  Neck supple  No JVD present  No tracheal deviation present  Cardiovascular: Normal rate, regular rhythm, normal heart sounds and intact distal pulses  Exam reveals no friction rub  No murmur heard  Pulmonary/Chest: Effort normal and breath sounds normal  No stridor  No respiratory distress  He has no wheezes  He has no rales  Abdominal: Soft  Bowel sounds are normal  He exhibits no distension  There is no tenderness  Musculoskeletal: He exhibits edema  Size difference between the right and the left leg left leg is significant larger, bilateral pulses are present  Neurological: He is alert and oriented to person, place, and time  He has normal reflexes  Skin: Skin is warm and dry  Capillary refill takes less than 2 seconds  He is not diaphoretic  Psychiatric: He has a normal mood and affect  His behavior is normal        Additional Data:     Lab Results: I have personally reviewed pertinent reports  Results from last 7 days   Lab Units 07/16/19  1756   WBC Thousand/uL 8 39   HEMOGLOBIN g/dL 14 4   HEMATOCRIT % 40 6   PLATELETS Thousands/uL 225   NEUTROS PCT % 42*   LYMPHS PCT % 40   MONOS PCT % 12   EOS PCT % 5     Results from last 7 days   Lab Units 07/16/19  1756   POTASSIUM mmol/L 3 8   CHLORIDE mmol/L 103   CO2 mmol/L 27   BUN mg/dL 13   CREATININE mg/dL 1 06   CALCIUM mg/dL 9 4     Results from last 7 days   Lab Units 07/16/19  1816   INR  1 28*       Imaging: I have personally reviewed pertinent reports        Cta Head And Neck With And Without Contrast    Result Date: 7/16/2019  Narrative: CTA NECK AND BRAIN WITH AND WITHOUT CONTRAST INDICATION: right sided numbness/ha  COMPARISON:   None  TECHNIQUE:  Routine CT imaging of the Brain without contrast   Post contrast imaging was performed after administration of iodinated contrast through the neck and brain  Post contrast axial 0 625 mm images timed to opacify the arterial system  3D rendering was performed on an independent workstation  MIP reconstructions performed  Coronal reconstructions were performed of the noncontrast portion of the brain  Radiation dose length product (DLP) for this visit:  519 mGy-cm   This examination, like all CT scans performed in the [facility], was performed utilizing techniques to minimize radiation dose exposure, including the use of iterative reconstruction and automated exposure control  IV Contrast:  100 mL of iohexol (OMNIPAQUE)  IMAGE QUALITY:   Diagnostic FINDINGS: NONCONTRAST BRAIN PARENCHYMA:  No intracranial mass, mass effect or midline shift  No CT signs of acute infarction  No acute parenchymal hemorrhage  VENTRICLES AND EXTRA-AXIAL SPACES:  Normal for the patient's age  VISUALIZED ORBITS AND PARANASAL SINUSES:  Unremarkable  CERVICAL VASCULATURE AORTIC ARCH AND GREAT VESSELS:  Normal aortic arch and great vessel origins  Normal visualized subclavian vessels  RIGHT VERTEBRAL ARTERY CERVICAL SEGMENT:  Normal origin  The vessel is normal in caliber throughout the neck  LEFT VERTEBRAL ARTERY CERVICAL SEGMENT:  Normal origin  The vessel is normal in caliber throughout the neck  RIGHT EXTRACRANIAL CAROTID SEGMENT:  Normal caliber common carotid artery  Normal bifurcation and cervical internal carotid artery  No stenosis or dissection  LEFT EXTRACRANIAL CAROTID SEGMENT:  Normal caliber common carotid artery  Normal bifurcation and cervical internal carotid artery  No stenosis or dissection  NASCET criteria was used to determine the degree of internal carotid artery diameter stenosis   INTRACRANIAL VASCULATURE INTERNAL CAROTID ARTERIES:  Normal enhancement of the intracranial portions of the internal carotid arteries  Normal ophthalmic artery origins  Normal ICA terminus  ANTERIOR CIRCULATION:  Symmetric A1 segments and anterior cerebral arteries with normal enhancement  Normal anterior communicating artery  MIDDLE CEREBRAL ARTERY CIRCULATION:  M1 segment and middle cerebral artery branches demonstrate normal enhancement bilaterally  DISTAL VERTEBRAL ARTERIES:  Normal distal vertebral arteries  Posterior inferior cerebellar artery origins are normal  Normal vertebral basilar junction  BASILAR ARTERY:  Basilar artery is normal in caliber  Normal superior cerebellar arteries  POSTERIOR CEREBRAL ARTERIES: There is some moderate stenosis of the bilateral posterior cerebral arteries in the mid P1 segment  DURAL VENOUS SINUSES:  Normal  NON VASCULAR ANATOMY BONY STRUCTURES:  No acute osseous abnormality  SOFT TISSUES OF THE NECK:  Normal  THORACIC INLET:  Unremarkable  Impression: Moderate focal stenosis of the posterior cerebral artery bilaterally in its mid P1 segment  No large vessel occlusion  No significant carotid or vertebral artery stenosis  Workstation performed: [id number]     Ct Head Without Contrast    Result Date: 7/16/2019  Narrative: CT BRAIN - WITHOUT CONTRAST INDICATION:   R side numb/weak - resolved  COMPARISON:  Prior MRI July 9, 2018 TECHNIQUE:  CT examination of the brain was performed  In addition to axial images, coronal 2D reformatted images were created and submitted for interpretation  Radiation dose length product (DLP) for this visit:  [phone] mGy-cm   This examination, like all CT scans performed in the [facility], was performed utilizing techniques to minimize radiation dose exposure, including the use of iterative reconstruction and automated exposure control  IMAGE QUALITY:  Diagnostic   FINDINGS: PARENCHYMA:  Chronic lacunar infarcts identified right thalamus and right subinsular region as well as the left centrum semiovale  VENTRICLES AND EXTRA-AXIAL SPACES:  Normal for the patient's age  VISUALIZED ORBITS AND PARANASAL SINUSES:  Unremarkable  CALVARIUM AND EXTRACRANIAL SOFT TISSUES:  Normal      Impression: No acute intracranial abnormality  Several chronic lacunar infarcts are noted as described above  Workstation performed: [id number]       EKG, Pathology, and Other Studies Reviewed on Admission:   EKG:  Normal sinus rhythm  Allscripts / Epic Records Reviewed: Yes     ** Please Note: This note has been constructed using a voice recognition system   **

## 2019-07-17 NOTE — ED CARE HANDOFF
Emergency Department Sign Out Note        Sign out and transfer of care from Dr Maikol Navarro  See Separate Emergency Department note  The patient, Carrington Lennox, was evaluated by the previous provider for neuro sx  Workup Completed:  CT head without contrast   Final Result      No acute intracranial abnormality  Several chronic lacunar infarcts are noted as described above                    Workstation performed: [id number]         XR chest 2 views    (Results Pending)   CTA head and neck with and without contrast    (Results Pending)      Labs Reviewed   CBC AND DIFFERENTIAL - Abnormal       Result Value Ref Range Status    WBC 8 39  4 31 - 10 16 Thousand/uL Final    RBC 4 37  3 88 - 5 62 Million/uL Final    Hemoglobin 14 4  12 0 - 17 0 g/dL Final    Hematocrit 40 6  36 5 - 49 3 % Final    MCV 93  82 - 98 fL Final    MCH 33 0  26 8 - 34 3 pg Final    MCHC 35 5  31 4 - 37 4 g/dL Final    RDW 13 1  11 6 - 15 1 % Final    MPV 11 3  8 9 - 12 7 fL Final    Platelets 681  645 - 390 Thousands/uL Final    nRBC 0  /100 WBCs Final    Neutrophils Relative 42 (*) 43 - 75 % Final    Immat GRANS % 0  0 - 2 % Final    Lymphocytes Relative 40  14 - 44 % Final    Monocytes Relative 12  4 - 12 % Final    Eosinophils Relative 5  0 - 6 % Final    Basophils Relative 1  0 - 1 % Final    Neutrophils Absolute 3 57  1 85 - 7 62 Thousands/µL Final    Immature Grans Absolute 0 03  0 00 - 0 20 Thousand/uL Final    Lymphocytes Absolute 3 31  0 60 - 4 47 Thousands/µL Final    Monocytes Absolute 0 98  0 17 - 1 22 Thousand/µL Final    Eosinophils Absolute 0 45  0 00 - 0 61 Thousand/µL Final    Basophils Absolute 0 05  0 00 - 0 10 Thousands/µL Final   PROTIME-INR - Abnormal    Protime 15 7 (*) 11 6 - 14 5 seconds Final    INR 1 28 (*) 0 84 - 1 19 Final   TROPONIN I - Normal    Troponin I 0 02  <=0 04 ng/mL Final    Comment: 3Autovalidation override  [employer] Chemistry analyzer 99% cutoff is > 0 04 ng/mL in network labs     o cTnI 99% cutoff is useful only when applied to patients in the clinical setting of myocardial ischemia   o cTnI 99% cutoff should be interpreted in the context of clinical history, ECG findings and possibly cardiac imaging to establish correct diagnosis  o cTnI 99% cutoff may be suggestive but clearly not indicative of a coronary event without the clinical setting of myocardial ischemia  APTT - Normal    PTT 31  23 - 37 seconds Final    Comment: Therapeutic Heparin Range =  60-90 seconds   BASIC METABOLIC PANEL    Sodium 882  136 - 145 mmol/L Final    Potassium 3 8  3 5 - 5 3 mmol/L Final    Chloride 103  100 - 108 mmol/L Final    CO2 27  21 - 32 mmol/L Final    ANION GAP 10  4 - 13 mmol/L Final    BUN 13  5 - 25 mg/dL Final    Creatinine 1 06  0 60 - 1 30 mg/dL Final    Comment: Standardized to IDMS reference method    Glucose 82  65 - 140 mg/dL Final    Comment:   If the patient is fasting, the ADA then defines impaired fasting glucose as > 100 mg/dL and diabetes as > or equal to 123 mg/dL  Specimen collection should occur prior to Sulfasalazine administration due to the potential for falsely depressed results  Specimen collection should occur prior to Sulfapyridine administration due to the potential for falsely elevated results      Calcium 9 4  8 3 - 10 1 mg/dL Final    eGFR 72  ml/min/1 73sq m Final    Narrative:     [place] guidelines for Chronic Kidney Disease (CKD):     Stage 1 with normal or high GFR (GFR > 90 mL/min/1 73 square meters)    Stage 2 Mild CKD (GFR = 60-89 mL/min/1 73 square meters)    Stage 3A Moderate CKD (GFR = 45-59 mL/min/1 73 square meters)    Stage 3B Moderate CKD (GFR = 30-44 mL/min/1 73 square meters)    Stage 4 Severe CKD (GFR = 15-29 mL/min/1 73 square meters)    Stage 5 End Stage CKD (GFR <15 mL/min/1 73 square meters)  Note: GFR calculation is accurate only with a steady state creatinine        ED Course / Workup Pending (followup):        Stroke Assessment     Row Name 07/16/19 2926             NIH Stroke Scale    Interval  Baseline      Level of Consciousness (1a )  0      LOC Questions (1b )  0      LOC Commands (1c )  0      Best Gaze (2 )  0      Visual (3 )  0      Facial Palsy (4 )  0      Motor Arm, Left (5a )  0      Motor Arm, Right (5b )  0      Motor Leg, Left (6a )  0      Motor Leg, Right (6b )  0      Limb Ataxia (7 )  0      Sensory (8 )  0      Best Language (9 )  0      Dysarthria (10 )  0      Extinction and Inattention (11 ) (Formerly Neglect)  0      Total  0          First Filed Value   TPA Decision  Patient not a TPA candidate  Patient is not a candidate options  Symptoms resolved/clearly non disabling  ED Course as of Jul 16 2014 Tue Jul 16, 2019   1925 Pt signed out from Dr Romelia Galvan  See her note for full details  [facility] pend  No neuro deficit at this time per Dr Romelia Galvan  2004 Pt with only mild HA at this time, no other complaints  CTH noted  Neuro paged  2011 Discussed w/ Dr Fozia Lock (neuro)  He recommends CTA head/neck and overnight obs  Discussed with Mary Quintana, accepted           Procedures  MDM  Number of Diagnoses or Management Options  Headache: new and requires workup  TIA (transient ischemic attack): new and requires workup     Amount and/or Complexity of Data Reviewed  Clinical lab tests: reviewed  Tests in the radiology section of CPT®: ordered and reviewed  Tests in the medicine section of CPT®: ordered and reviewed  Discussion of test results with the performing providers: yes  Discuss the patient with other providers: yes  Independent visualization of images, tracings, or specimens: yes    Risk of Complications, Morbidity, and/or Mortality  Presenting problems: moderate  Diagnostic procedures: low  Management options: low    Patient Progress  Patient progress: improved      Disposition  Final diagnoses:   TIA (transient ischemic attack)   Headache     Time reflects when diagnosis was documented in both MDM as applicable and the Disposition within this note     Time User Action Codes Description Comment    7/16/2019  8:13 PM Paco Alves S Add [G45 9] TIA (transient ischemic attack)     7/16/2019  8:13 PM Yolie Bonilla [facility] Headache       ED Disposition     ED Disposition Condition Date/Time Comment    Admit Stable Tue Jul 16, 2019  8:13 PM Case was discussed with Jean-Claude Styles and the patient's admission status was agreed to be Admission Status: observation status to the service of Dr Seda Orona   Follow-up Information    None       Patient's Medications   Discharge Prescriptions    No medications on file     No discharge procedures on file         ED Provider  Electronically Signed by     Vic Coulter MD  07/16/19 2014

## 2019-07-18 ENCOUNTER — TRANSITIONAL CARE MANAGEMENT (OUTPATIENT)
Dept: FAMILY MEDICINE CLINIC | Facility: CLINIC | Age: 69
End: 2019-07-18

## 2019-07-18 NOTE — ASSESSMENT & PLAN NOTE
Patient had a 30 minutes duration of right-sided numbness and 10 minutes duration of right upper extremity weakness that led to his observation in the hospital      His symptoms completely resolved,    Echo showed no cardiac sources of emboli, ef was 55%  MRI of the brain showed old lacunar strokes but no acute cva  Pt remained in sinus rhythm during the hospital stay    Pt was discharged in a stable condition home  To prevent further strokes, I added aspirin 81mg to his medications and replaced simvastatin with atorvastatin 40mg antolin  I discussed the case with Dr Jordan Mcadams on day of discharge

## 2019-07-18 NOTE — DISCHARGE SUMMARY
Discharge- Aisha Eisenmenger 1950, 77 y o  male MRN: [id number]    Unit/Bed#: [address] [place] [address] Encounter: [id number]    Primary Care Provider: Alondra Giang MD   Date and time admitted to hospital: 7/16/2019  5:14 PM        * TIA (transient ischemic attack)  Assessment & Plan  Patient had a 30 minutes duration of right-sided numbness and 10 minutes duration of right upper extremity weakness that led to his observation in the hospital      His symptoms completely resolved,    Echo showed no cardiac sources of emboli, ef was 55%  MRI of the brain showed old lacunar strokes but no acute cva  Pt remained in sinus rhythm during the hospital stay    Pt was discharged in a stable condition home  To prevent further strokes, I added aspirin 81mg to his medications and replaced simvastatin with atorvastatin 40mg dialy  I discussed the case with Dr Camilla Gaucher on day of discharge  Essential hypertension  Assessment & Plan   will continue losartan and amlodipine as an outpatient for BP control    History of DVT (deep vein thrombosis)  Assessment & Plan  Continue Xarelto for history of DVTs              Hospital Course:     Aisha Eisenmenger is a 77 y o  male patient who originally presented to the hospital on   Admission Orders (From admission, onward)    Ordered        07/16/19 2012  Place in Observation (expected length of stay for this patient is less than two midnights)  Once            due to right sided numbness    Please see above list of diagnoses and related plan for additional information  Condition at Discharge:  good      Discharge instructions/Information to patient and family:   See after visit summary for information provided to patient and family  Provisions for Follow-Up Care:  See after visit summary for information related to follow-up care and any pertinent home health orders  Disposition:     home       Discharge Statement:  I spent 28 minutes discharging the patient   This time was spent on the day of discharge  I had direct contact with the patient on the day of discharge  Greater than 50% of the total time was spent examining patient, answering all patient questions, arranging and discussing plan of care with patient as well as directly providing post-discharge instructions  Additional time then spent on discharge activities  Discharge Medications:  See after visit summary for reconciled discharge medications provided to patient and family        ** Please Note: This note has been constructed using a voice recognition system **

## 2019-07-18 NOTE — PLAN OF CARE
Problem: Potential for Falls  Goal: Patient will remain free of falls  Description  INTERVENTIONS:  - Assess patient frequently for physical needs  -  Identify cognitive and physical deficits and behaviors that affect risk of falls  -  [place] fall precautions as indicated by assessment   - Educate patient/family on patient safety including physical limitations  - Instruct patient to call for assistance with activity based on assessment  - Modify environment to reduce risk of injury  - Consider OT/PT consult to assist with strengthening/mobility  Outcome: Adequate for Discharge     Problem: CARDIOVASCULAR - ADULT  Goal: Maintains optimal cardiac output and hemodynamic stability  Description  INTERVENTIONS:  - Monitor I/O, vital signs and rhythm  - Monitor for S/S and trends of decreased cardiac output i e  bleeding, hypotension  - Administer and titrate ordered vasoactive medications to optimize hemodynamic stability  - Assess quality of pulses, skin color and temperature  - Assess for signs of decreased coronary artery perfusion - ex  Angina  - Instruct patient to report change in severity of symptoms  Outcome: Adequate for Discharge     Problem: Neurological Deficit  Goal: Neurological status is stable or improving  Description  Interventions:  - Monitor and assess patient's level of consciousness, motor function, sensory function, and level of assistance needed for ADLs  - Monitor and report changes from baseline  Collaborate with interdisciplinary team to initiate plan and implement interventions as ordered  - Provide and maintain a safe environment  - Utilize seizure precautions  - Utilize fall precautions  - Utilize aspiration precautions  - Utilize bleeding precautions    Outcome: Adequate for Discharge

## 2019-07-23 ENCOUNTER — OFFICE VISIT (OUTPATIENT)
Dept: FAMILY MEDICINE CLINIC | Facility: CLINIC | Age: 69
End: 2019-07-23
Payer: MEDICARE

## 2019-07-23 VITALS
HEIGHT: 72 IN | HEART RATE: 86 BPM | OXYGEN SATURATION: 96 % | SYSTOLIC BLOOD PRESSURE: 124 MMHG | BODY MASS INDEX: 30.48 KG/M2 | DIASTOLIC BLOOD PRESSURE: 82 MMHG | WEIGHT: 225 LBS

## 2019-07-23 DIAGNOSIS — G45.9 TIA (TRANSIENT ISCHEMIC ATTACK): Primary | ICD-10-CM

## 2019-07-23 DIAGNOSIS — I10 ESSENTIAL HYPERTENSION: ICD-10-CM

## 2019-07-23 DIAGNOSIS — Z79.01 ANTICOAGULANT LONG-TERM USE: ICD-10-CM

## 2019-07-23 PROCEDURE — 99495 TRANSJ CARE MGMT MOD F2F 14D: CPT | Performed by: FAMILY MEDICINE

## 2019-07-23 NOTE — PATIENT INSTRUCTIONS
Continue current medications as per discharge  I will contact Neurology and ask if they feel they should see you or any other evaluation should be done

## 2019-07-23 NOTE — PROGRESS NOTES
[address]        NAME: Burke Castle is a 77 y o  male  : 1950    MRN: [id number]  DATE: 2019  TIME: 11:56 AM    Assessment and Plan   TIA (transient ischemic attack) [G45 9]  1  TIA (transient ischemic attack)     2  Essential hypertension     3  Anticoagulant long-term use         No problem-specific Assessment & Plan notes found for this encounter  Patient Instructions     Patient Instructions   Continue current medications as per discharge  I will contact Neurology and ask if they feel they should see you or any other evaluation should be done  Chief Complaint     Chief Complaint   Patient presents with    Transition of Care Management         History of Present Illness       HPI  TCM Call (since 2019)     Date and time call was made  2019  8:43 AM    Hospital care reviewed  Records reviewed    Patient was hospitialized at  [facility]    Date of Admission  19    Date of discharge  19    Diagnosis  TIA    Disposition  Home    Were the patients medications reviewed and updated  Yes    Current Symptoms  None      TCM Call (since 2019)     Should patient be enrolled in anticoag monitoring? No    Scheduled for follow up? Yes    Did you obtain your prescribed medications  Yes    Do you need help managing your prescriptions or medications  No    I have advised the patient to call PCP with any new or worsening symptoms  KANDICE WISEMAN MA    Living Arrangements  Spouse or Significiant other    Are you recieving any outpatient services  No    Are you recieving home care services  No    Have you fallen in the last 12 months  No          Review of Systems   Review of Systems   Constitutional: Negative for chills, diaphoresis, fatigue and fever  Respiratory: Negative for cough, shortness of breath and wheezing  Cardiovascular: Negative for chest pain, palpitations and leg swelling     Neurological: Negative for dizziness, syncope, speech difficulty, weakness, light-headedness and headaches  Current Medications       Current Outpatient Medications:     acetaminophen (TYLENOL) 500 mg tablet, Take 500 mg by mouth every 6 (six) hours as needed for mild pain, Disp: , Rfl:     amLODIPine (NORVASC) 5 mg tablet, Take 1 tablet (5 mg total) by mouth daily For blood pressure, Disp: 90 tablet, Rfl: 1    aspirin (ECOTRIN LOW STRENGTH) 81 mg EC tablet, Take 1 tablet (81 mg total) by mouth daily, Disp: 30 tablet, Rfl: 0    atorvastatin (LIPITOR) 40 mg tablet, Take 1 tablet (40 mg total) by mouth daily with dinner, Disp: 30 tablet, Rfl: 0    Choline Fenofibrate (FENOFIBRIC ACID) 135 MG CPDR, TAKE ONE CAPSULE BY MOUTH DAILY, Disp: 90 capsule, Rfl: 0    diazepam (VALIUM) 5 mg tablet, Take 5 mg by mouth daily at bedtime as needed for anxiety or sleep, Disp: , Rfl:     methocarbamol (ROBAXIN) 500 mg tablet, Take 500 mg by mouth 2 (two) times a day as needed for muscle spasms, Disp: , Rfl:     valsartan (DIOVAN) 160 mg tablet, Take 1 tablet (160 mg total) by mouth daily, Disp: 30 tablet, Rfl: 5    XARELTO 15 MG tablet, TAKE ONE TABLET BY MOUTH DAILY WITH breakfast, Disp: 90 tablet, Rfl: 1    Current Allergies     Allergies as of 07/23/2019    (No Known Allergies)            The following portions of the patient's history were reviewed and updated as appropriate: allergies, current medications, past family history, past medical history, past social history, past surgical history and problem list      Past Medical History:   Diagnosis Date    DVT (deep venous thrombosis) (HCC)     Gross hematuria     [place]  Meghann Shi Meghann Shi 3/29/16   R    4/3/17     Hematuria     [place]    Meghann Shi Meghann Shi 4/12/16    R    4/3/17    Hyperlipidemia     Hypertension     Long-term (current) use of anticoagulants     [place]  Meghann Shi Meghann Shi 3/29/16   R    4/3/17     Seasonal allergies        Past Surgical History:   Procedure Laterality Date    APPENDECTOMY      WISDOM TOOTH EXTRACTION Family History   Problem Relation Age of Onset    Hypertension Mother     Leukemia Father     Heart disease Father     Alcohol abuse Father     Depression Father     Heart disease Family     Leukemia Family     Depression Sister     Alcohol abuse Brother     Depression Brother          Medications have been verified  Objective   /82   Pulse 86   Ht 6' (1 829 m)   Wt 102 kg (225 lb)   SpO2 96%   BMI 30 52 kg/m²        Physical Exam     Physical Exam   Constitutional: He is oriented to person, place, and time  He appears well-developed and well-nourished  No distress  HENT:   Head: Normocephalic and atraumatic  Right Ear: Tympanic membrane and external ear normal  No drainage  Left Ear: Tympanic membrane normal  No drainage  Mouth/Throat: No oropharyngeal exudate  Eyes: Conjunctivae and EOM are normal  Right eye exhibits no discharge  Left eye exhibits no discharge  Neck: Normal range of motion  Neck supple  No thyromegaly present  Cardiovascular: Normal rate, regular rhythm and normal heart sounds  Pulmonary/Chest: Effort normal  No respiratory distress  He has no wheezes  He has no rales  Lymphadenopathy:     He has no cervical adenopathy  Neurological: He is alert and oriented to person, place, and time  No cranial nerve deficit  Coordination normal    Psychiatric: He has a normal mood and affect   His behavior is normal

## 2019-08-13 DIAGNOSIS — E78.01 FAMILIAL HYPERCHOLESTEROLEMIA: ICD-10-CM

## 2019-08-13 DIAGNOSIS — G45.9 TIA (TRANSIENT ISCHEMIC ATTACK): ICD-10-CM

## 2019-08-13 RX ORDER — ASPIRIN 81 MG/1
TABLET, COATED ORAL
Qty: 30 TABLET | Refills: 0 | OUTPATIENT
Start: 2019-08-13

## 2019-08-13 RX ORDER — ATORVASTATIN CALCIUM 40 MG/1
TABLET, FILM COATED ORAL
Qty: 30 TABLET | Refills: 0 | OUTPATIENT
Start: 2019-08-13

## 2019-08-19 DIAGNOSIS — E78.01 FAMILIAL HYPERCHOLESTEROLEMIA: ICD-10-CM

## 2019-08-19 RX ORDER — ATORVASTATIN CALCIUM 40 MG/1
40 TABLET, FILM COATED ORAL
Qty: 90 TABLET | Refills: 0 | Status: SHIPPED | OUTPATIENT
Start: 2019-08-19 | End: 2019-11-15 | Stop reason: SDUPTHER

## 2019-08-21 DIAGNOSIS — G45.9 TIA (TRANSIENT ISCHEMIC ATTACK): ICD-10-CM

## 2019-08-22 RX ORDER — ASPIRIN 81 MG/1
TABLET, COATED ORAL
Qty: 30 TABLET | Refills: 11 | Status: SHIPPED | OUTPATIENT
Start: 2019-08-22

## 2019-09-09 DIAGNOSIS — E78.01 FAMILIAL HYPERCHOLESTEROLEMIA: ICD-10-CM

## 2019-09-09 DIAGNOSIS — E78.5 HYPERLIPIDEMIA, UNSPECIFIED HYPERLIPIDEMIA TYPE: ICD-10-CM

## 2019-09-10 RX ORDER — FENOFIBRIC ACID 135 MG/1
CAPSULE, DELAYED RELEASE ORAL
Qty: 90 CAPSULE | Refills: 0 | Status: SHIPPED | OUTPATIENT
Start: 2019-09-10 | End: 2019-12-12 | Stop reason: SDUPTHER

## 2019-10-16 DIAGNOSIS — I10 ESSENTIAL HYPERTENSION: ICD-10-CM

## 2019-10-16 RX ORDER — AMLODIPINE BESYLATE 5 MG/1
5 TABLET ORAL DAILY
Qty: 90 TABLET | Refills: 1 | Status: SHIPPED | OUTPATIENT
Start: 2019-10-16 | End: 2020-01-06 | Stop reason: SDUPTHER

## 2019-10-16 NOTE — TELEPHONE ENCOUNTER
Prescription sent  Call patient let him know he should have a routine blood pressure check in December

## 2019-11-15 DIAGNOSIS — E78.01 FAMILIAL HYPERCHOLESTEROLEMIA: ICD-10-CM

## 2019-11-15 RX ORDER — ATORVASTATIN CALCIUM 40 MG/1
40 TABLET, FILM COATED ORAL
Qty: 90 TABLET | Refills: 0 | Status: SHIPPED | OUTPATIENT
Start: 2019-11-15 | End: 2020-03-03 | Stop reason: SDUPTHER

## 2019-12-12 DIAGNOSIS — E78.01 FAMILIAL HYPERCHOLESTEROLEMIA: ICD-10-CM

## 2019-12-12 DIAGNOSIS — E78.5 HYPERLIPIDEMIA, UNSPECIFIED HYPERLIPIDEMIA TYPE: ICD-10-CM

## 2019-12-12 DIAGNOSIS — I10 ESSENTIAL HYPERTENSION: ICD-10-CM

## 2019-12-13 RX ORDER — FENOFIBRIC ACID 135 MG/1
1 CAPSULE, DELAYED RELEASE ORAL DAILY
Qty: 90 CAPSULE | Refills: 0 | Status: SHIPPED | OUTPATIENT
Start: 2019-12-13 | End: 2020-03-27 | Stop reason: SDUPTHER

## 2019-12-13 RX ORDER — VALSARTAN 160 MG/1
160 TABLET ORAL DAILY
Qty: 30 TABLET | Refills: 0 | Status: SHIPPED | OUTPATIENT
Start: 2020-01-13 | End: 2020-01-08 | Stop reason: SDUPTHER

## 2020-01-06 DIAGNOSIS — I10 ESSENTIAL HYPERTENSION: ICD-10-CM

## 2020-01-06 RX ORDER — AMLODIPINE BESYLATE 5 MG/1
5 TABLET ORAL DAILY
Qty: 30 TABLET | Refills: 0 | Status: SHIPPED | OUTPATIENT
Start: 2020-01-06 | End: 2020-01-08 | Stop reason: SDUPTHER

## 2020-01-08 ENCOUNTER — OFFICE VISIT (OUTPATIENT)
Dept: FAMILY MEDICINE CLINIC | Facility: CLINIC | Age: 70
End: 2020-01-08
Payer: MEDICARE

## 2020-01-08 VITALS
HEIGHT: 72 IN | SYSTOLIC BLOOD PRESSURE: 130 MMHG | HEART RATE: 88 BPM | BODY MASS INDEX: 31.15 KG/M2 | OXYGEN SATURATION: 98 % | DIASTOLIC BLOOD PRESSURE: 80 MMHG | WEIGHT: 230 LBS

## 2020-01-08 DIAGNOSIS — F41.1 GENERALIZED ANXIETY DISORDER: ICD-10-CM

## 2020-01-08 DIAGNOSIS — G45.9 TIA (TRANSIENT ISCHEMIC ATTACK): ICD-10-CM

## 2020-01-08 DIAGNOSIS — E78.01 FAMILIAL HYPERCHOLESTEROLEMIA: ICD-10-CM

## 2020-01-08 DIAGNOSIS — R73.01 IMPAIRED FASTING GLUCOSE: ICD-10-CM

## 2020-01-08 DIAGNOSIS — Z79.01 ANTICOAGULANT LONG-TERM USE: ICD-10-CM

## 2020-01-08 DIAGNOSIS — N13.8 BPH WITH OBSTRUCTION/LOWER URINARY TRACT SYMPTOMS: ICD-10-CM

## 2020-01-08 DIAGNOSIS — N40.1 BPH WITH OBSTRUCTION/LOWER URINARY TRACT SYMPTOMS: ICD-10-CM

## 2020-01-08 DIAGNOSIS — I10 ESSENTIAL HYPERTENSION: Primary | ICD-10-CM

## 2020-01-08 PROCEDURE — 99214 OFFICE O/P EST MOD 30 MIN: CPT | Performed by: FAMILY MEDICINE

## 2020-01-08 RX ORDER — VALSARTAN 160 MG/1
160 TABLET ORAL DAILY
Qty: 90 TABLET | Refills: 1 | Status: SHIPPED | OUTPATIENT
Start: 2020-01-13 | End: 2020-07-13 | Stop reason: SDUPTHER

## 2020-01-08 RX ORDER — AMLODIPINE BESYLATE 5 MG/1
5 TABLET ORAL DAILY
Qty: 90 TABLET | Refills: 1 | Status: SHIPPED | OUTPATIENT
Start: 2020-01-08 | End: 2020-07-13 | Stop reason: SDUPTHER

## 2020-01-08 NOTE — PROGRESS NOTES
[address]        NAME: Roberto Madrid is a 77 y o  male  : 1950    MRN: [id number]  DATE: 2020  TIME: 2:20 PM    Assessment and Plan   Essential hypertension [I10]  1  Essential hypertension  valsartan (DIOVAN) 160 mg tablet    amLODIPine (NORVASC) 5 mg tablet    CBC and differential    Comprehensive metabolic panel   2  Familial hypercholesterolemia  CBC and differential    Comprehensive metabolic panel    Lipid Panel with Direct LDL reflex   3  Impaired fasting glucose  Comprehensive metabolic panel    Hemoglobin A1C   4  BPH with obstruction/lower urinary tract symptoms     5  Anticoagulant long-term use  rivaroxaban (XARELTO) 20 mg tablet    CBC and differential   6  TIA (transient ischemic attack)     7  Generalized anxiety disorder         No problem-specific Assessment & Plan notes found for this encounter  Patient Instructions     Patient Instructions   Get labs as ordered next month  Continue current medications  Consider cardiology consultation  Chief Complaint     Chief Complaint   Patient presents with    Follow-up     6 month         History of Present Illness       6 month follow-up multiple issues-  1) hypertension-good control with current medication  No cardiac symptoms  2) hyperlipidemia stable  Tolerating statin  3) impaired fasting glucose-A1c in nondiabetic range  4) BPH-stable symptom pattern  5) anxiety-patient had some local stresses  He has not been taking the Valium as previously prescribed  He is interested in medical marijuana for treatment of as anxiety  He is facing some extra stresses due to local neighborhood issues  6) history of TIAs  Currently on aspirin  No recent symptoms  Also continues on statin  6) history of multiple DVTs-has been directed long-term anticoagulation with Xarelto  No signs of bleeding        Review of Systems   Review of Systems   Constitutional: Negative for activity change, appetite change, diaphoresis and fatigue  Respiratory: Negative for cough, chest tightness, shortness of breath and wheezing  Cardiovascular: Negative for chest pain, palpitations and leg swelling  Fast or slow heart rate   Gastrointestinal: Negative for abdominal pain, blood in stool, constipation, diarrhea, nausea and vomiting  Genitourinary: Positive for difficulty urinating  Negative for dysuria, frequency and hematuria  Musculoskeletal: Negative for arthralgias, gait problem, joint swelling and myalgias  Neurological: Negative for dizziness, weakness, light-headedness, numbness and headaches  Psychiatric/Behavioral: Positive for dysphoric mood  Negative for agitation, confusion and sleep disturbance  The patient is nervous/anxious            Current Medications       Current Outpatient Medications:     acetaminophen (TYLENOL) 500 mg tablet, Take 500 mg by mouth every 6 (six) hours as needed for mild pain, Disp: , Rfl:     amLODIPine (NORVASC) 5 mg tablet, Take 1 tablet (5 mg total) by mouth daily For blood pressure, Disp: 90 tablet, Rfl: 1    ASPIRIN LOW DOSE 81 MG EC tablet, TAKE ONE TABLET BY MOUTH DAILY, Disp: 30 tablet, Rfl: 11    atorvastatin (LIPITOR) 40 mg tablet, Take 1 tablet (40 mg total) by mouth daily with dinner, Disp: 90 tablet, Rfl: 0    Choline Fenofibrate (FENOFIBRIC ACID) 135 MG CPDR, Take 1 capsule (135 mg total) by mouth daily, Disp: 90 capsule, Rfl: 0    rivaroxaban (XARELTO) 20 mg tablet, Take 1 tablet (20 mg total) by mouth daily with breakfast, Disp: 90 tablet, Rfl: 1    [START ON 1/13/2020] valsartan (DIOVAN) 160 mg tablet, Take 1 tablet (160 mg total) by mouth daily, Disp: 90 tablet, Rfl: 1    Current Allergies     Allergies as of 01/08/2020    (No Known Allergies)            The following portions of the patient's history were reviewed and updated as appropriate: allergies, current medications, past family history, past medical history, past social history, past surgical history and problem list      Past Medical History:   Diagnosis Date    DVT (deep venous thrombosis) (HCC)     Gross hematuria     TARA Nicolas 3/29/16   R    4/3/17     Hematuria     TARA Nicolas 4/12/16    R    4/3/17    Hyperlipidemia     Hypertension     Long-term (current) use of anticoagulants     TARA Nicolas 3/29/16   R    4/3/17     Seasonal allergies        Past Surgical History:   Procedure Laterality Date    APPENDECTOMY      WISDOM TOOTH EXTRACTION         Family History   Problem Relation Age of Onset    Hypertension Mother     Leukemia Father     Heart disease Father     Alcohol abuse Father     Depression Father     Heart disease Family     Leukemia Family     Depression Sister     Alcohol abuse Brother     Depression Brother          Medications have been verified  Objective   /80   Pulse 88   Ht 6' (1 829 m)   Wt 104 kg (230 lb)   SpO2 98%   BMI 31 19 kg/m²        Physical Exam     Physical Exam   Constitutional: He is oriented to person, place, and time  He appears well-developed and well-nourished  No distress  HENT:   Head: Normocephalic and atraumatic  Right Ear: Tympanic membrane and external ear normal  No drainage  Left Ear: Tympanic membrane normal  No drainage  Mouth/Throat: No oropharyngeal exudate  Eyes: Conjunctivae and EOM are normal  Right eye exhibits no discharge  Left eye exhibits no discharge  Neck: Normal range of motion  Neck supple  No thyromegaly present  Cardiovascular: Normal rate, regular rhythm and normal heart sounds  Pulmonary/Chest: Effort normal  No respiratory distress  He has no wheezes  He has no rales  Lymphadenopathy:     He has no cervical adenopathy  Neurological: He is alert and oriented to person, place, and time  Skin: Skin is warm and dry  Psychiatric: He has a normal mood and affect   His behavior is normal      Depression Screening Follow-up Plan: Patient's depression screening was positive with a PHQ-2 score of 3  Their PHQ-9 score was 8  Patient assessed for underlying major depression  They have no active suicidal ideations  Brief counseling provided and recommend additional follow-up/re-evaluation next office visit

## 2020-01-13 ENCOUNTER — OFFICE VISIT (OUTPATIENT)
Dept: FAMILY MEDICINE CLINIC | Facility: CLINIC | Age: 70
End: 2020-01-13
Payer: MEDICARE

## 2020-01-13 VITALS
HEIGHT: 72 IN | SYSTOLIC BLOOD PRESSURE: 136 MMHG | WEIGHT: 233 LBS | HEART RATE: 92 BPM | BODY MASS INDEX: 31.56 KG/M2 | OXYGEN SATURATION: 98 % | DIASTOLIC BLOOD PRESSURE: 82 MMHG

## 2020-01-13 DIAGNOSIS — F41.9 ANXIETY: ICD-10-CM

## 2020-01-13 DIAGNOSIS — Z23 NEED FOR VACCINATION: Primary | ICD-10-CM

## 2020-01-13 PROCEDURE — 99214 OFFICE O/P EST MOD 30 MIN: CPT | Performed by: FAMILY MEDICINE

## 2020-01-13 PROCEDURE — G0008 ADMIN INFLUENZA VIRUS VAC: HCPCS

## 2020-01-13 PROCEDURE — 90662 IIV NO PRSV INCREASED AG IM: CPT

## 2020-01-13 NOTE — PROGRESS NOTES
[facility]        NAME: Jacki Villa is a 77 y o  male  : 1950    MRN: [id number]  DATE: 2020  TIME: 11:11 AM    Assessment and Plan   Need for vaccination [Z23]  1  Need for vaccination  influenza vaccine, [id number], high-dose, PF 0 5 mL (FLUZONE HIGH-DOSE)   2  Anxiety           Patient Instructions     Patient Instructions   Pt appears to be good candidate for med marij          Chief Complaint     Chief Complaint   Patient presents with    Anxiety         History of Present Illness       appt to eval anxiety--incr stress lately---pt intolerant benzos      Review of Systems   Review of Systems   Constitutional: Negative for fatigue, fever and unexpected weight change  HENT: Negative for congestion, sinus pain and sore throat  Eyes: Negative for visual disturbance  Respiratory: Negative for shortness of breath and wheezing  Cardiovascular: Negative for chest pain and palpitations  Gastrointestinal: Negative for abdominal pain, nausea and vomiting  Musculoskeletal: Negative  Negative for arthralgias and myalgias  Neurological: Negative for syncope, weakness and numbness  Psychiatric/Behavioral: Positive for sleep disturbance  Negative for confusion, dysphoric mood and suicidal ideas  The patient is nervous/anxious            Current Medications       Current Outpatient Medications:     acetaminophen (TYLENOL) 500 mg tablet, Take 500 mg by mouth every 6 (six) hours as needed for mild pain, Disp: , Rfl:     amLODIPine (NORVASC) 5 mg tablet, Take 1 tablet (5 mg total) by mouth daily For blood pressure, Disp: 90 tablet, Rfl: 1    ASPIRIN LOW DOSE 81 MG EC tablet, TAKE ONE TABLET BY MOUTH DAILY, Disp: 30 tablet, Rfl: 11    atorvastatin (LIPITOR) 40 mg tablet, Take 1 tablet (40 mg total) by mouth daily with dinner, Disp: 90 tablet, Rfl: 0    Choline Fenofibrate (FENOFIBRIC ACID) 135 MG CPDR, Take 1 capsule (135 mg total) by mouth daily, Disp: 90 capsule, Rfl: 0    rivaroxaban (XARELTO) 20 mg tablet, Take 1 tablet (20 mg total) by mouth daily with breakfast, Disp: 90 tablet, Rfl: 1    valsartan (DIOVAN) 160 mg tablet, Take 1 tablet (160 mg total) by mouth daily, Disp: 90 tablet, Rfl: 1    Current Allergies     Allergies as of 01/13/2020    (No Known Allergies)            The following portions of the patient's history were reviewed and updated as appropriate: allergies, current medications, past family history, past medical history, past social history, past surgical history and problem list      Past Medical History:   Diagnosis Date    DVT (deep venous thrombosis) ([facility] [address] )     Gross hematuria     [place]  Shavonrea David Sandsrea Ramming 3/29/16   R    4/3/17     Hematuria     [place]    Audrea Ramming Audrea Ramming 4/12/16    R    4/3/17    Hyperlipidemia     Hypertension     Long-term (current) use of anticoagulants     [place]  Audrea David Sandsrea Ramming 3/29/16   R    4/3/17     Seasonal allergies        Past Surgical History:   Procedure Laterality Date    APPENDECTOMY      WISDOM TOOTH EXTRACTION         Family History   Problem Relation Age of Onset    Hypertension Mother     Leukemia Father     Heart disease Father     Alcohol abuse Father     Depression Father     Heart disease Family     Leukemia Family     Depression Sister     Alcohol abuse Brother     Depression Brother          Medications have been verified  Objective   /82   Pulse 92   Ht 6' (1 829 m)   Wt 106 kg (233 lb)   SpO2 98%   BMI 31 60 kg/m²        Physical Exam     Physical Exam   Constitutional: He is oriented to person, place, and time  Vital signs are normal  He appears well-developed and well-nourished  HENT:   Right Ear: Ear canal normal  Tympanic membrane is not injected  Left Ear: Ear canal normal  Tympanic membrane is not injected  Nose: Nose normal    Mouth/Throat: Oropharynx is clear and moist    Eyes: Pupils are equal, round, and reactive to light  Conjunctivae and EOM are normal  Right eye exhibits no discharge   Left eye exhibits no discharge  Neck: Normal range of motion  Neck supple  No thyromegaly present  Cardiovascular: Normal rate, regular rhythm and normal heart sounds  No murmur heard  Pulmonary/Chest: Effort normal and breath sounds normal  No respiratory distress  He has no wheezes  Abdominal: Soft  Bowel sounds are normal  He exhibits no distension  There is no tenderness  Musculoskeletal: Normal range of motion  Lymphadenopathy:     He has no cervical adenopathy  Neurological: He is alert and oriented to person, place, and time  He has normal strength and normal reflexes  He is not disoriented  No sensory deficit  Gait normal    Skin: Skin is warm and dry  Psychiatric: He has a normal mood and affect  His speech is normal and behavior is normal  Judgment and thought content normal  Cognition and memory are normal        BMI Counseling: Body mass index is 31 6 kg/m²  The BMI is above normal  Nutrition recommendations include reducing portion sizes

## 2020-02-17 ENCOUNTER — LAB (OUTPATIENT)
Dept: LAB | Facility: CLINIC | Age: 70
End: 2020-02-17
Payer: MEDICARE

## 2020-02-17 DIAGNOSIS — Z79.01 ANTICOAGULANT LONG-TERM USE: ICD-10-CM

## 2020-02-17 DIAGNOSIS — R73.01 IMPAIRED FASTING GLUCOSE: ICD-10-CM

## 2020-02-17 DIAGNOSIS — E78.01 FAMILIAL HYPERCHOLESTEROLEMIA: ICD-10-CM

## 2020-02-17 DIAGNOSIS — I10 ESSENTIAL HYPERTENSION: ICD-10-CM

## 2020-02-17 LAB
ALBUMIN SERPL BCP-MCNC: 4.1 G/DL (ref 3.5–5)
ALP SERPL-CCNC: 60 U/L (ref 46–116)
ALT SERPL W P-5'-P-CCNC: 47 U/L (ref 12–78)
ANION GAP SERPL CALCULATED.3IONS-SCNC: 6 MMOL/L (ref 4–13)
AST SERPL W P-5'-P-CCNC: 24 U/L (ref 5–45)
BASOPHILS # BLD AUTO: 0.07 THOUSANDS/ΜL (ref 0–0.1)
BASOPHILS NFR BLD AUTO: 1 % (ref 0–1)
BILIRUB SERPL-MCNC: 0.65 MG/DL (ref 0.2–1)
BUN SERPL-MCNC: 16 MG/DL (ref 5–25)
CALCIUM SERPL-MCNC: 9.4 MG/DL (ref 8.3–10.1)
CHLORIDE SERPL-SCNC: 107 MMOL/L (ref 100–108)
CHOLEST SERPL-MCNC: 143 MG/DL (ref 50–200)
CO2 SERPL-SCNC: 28 MMOL/L (ref 21–32)
CREAT SERPL-MCNC: 1.05 MG/DL (ref 0.6–1.3)
EOSINOPHIL # BLD AUTO: 0.47 THOUSAND/ΜL (ref 0–0.61)
EOSINOPHIL NFR BLD AUTO: 5 % (ref 0–6)
ERYTHROCYTE [DISTWIDTH] IN BLOOD BY AUTOMATED COUNT: 12.9 % (ref 11.6–15.1)
EST. AVERAGE GLUCOSE BLD GHB EST-MCNC: 163 MG/DL
GFR SERPL CREATININE-BSD FRML MDRD: 73 ML/MIN/1.73SQ M
GLUCOSE P FAST SERPL-MCNC: 145 MG/DL (ref 65–99)
HBA1C MFR BLD: 7.3 %
HCT VFR BLD AUTO: 45.2 % (ref 36.5–49.3)
HDLC SERPL-MCNC: 50 MG/DL
HGB BLD-MCNC: 15.6 G/DL (ref 12–17)
IMM GRANULOCYTES # BLD AUTO: 0.03 THOUSAND/UL (ref 0–0.2)
IMM GRANULOCYTES NFR BLD AUTO: 0 % (ref 0–2)
LDLC SERPL CALC-MCNC: 56 MG/DL (ref 0–100)
LYMPHOCYTES # BLD AUTO: 4.26 THOUSANDS/ΜL (ref 0.6–4.47)
LYMPHOCYTES NFR BLD AUTO: 42 % (ref 14–44)
MCH RBC QN AUTO: 32.9 PG (ref 26.8–34.3)
MCHC RBC AUTO-ENTMCNC: 34.5 G/DL (ref 31.4–37.4)
MCV RBC AUTO: 95 FL (ref 82–98)
MONOCYTES # BLD AUTO: 1.04 THOUSAND/ΜL (ref 0.17–1.22)
MONOCYTES NFR BLD AUTO: 10 % (ref 4–12)
NEUTROPHILS # BLD AUTO: 4.35 THOUSANDS/ΜL (ref 1.85–7.62)
NEUTS SEG NFR BLD AUTO: 42 % (ref 43–75)
NRBC BLD AUTO-RTO: 0 /100 WBCS
PLATELET # BLD AUTO: 250 THOUSANDS/UL (ref 149–390)
PMV BLD AUTO: 10.8 FL (ref 8.9–12.7)
POTASSIUM SERPL-SCNC: 3.6 MMOL/L (ref 3.5–5.3)
PROT SERPL-MCNC: 7.9 G/DL (ref 6.4–8.2)
RBC # BLD AUTO: 4.74 MILLION/UL (ref 3.88–5.62)
SODIUM SERPL-SCNC: 141 MMOL/L (ref 136–145)
TRIGL SERPL-MCNC: 184 MG/DL
WBC # BLD AUTO: 10.22 THOUSAND/UL (ref 4.31–10.16)

## 2020-02-17 PROCEDURE — 36415 COLL VENOUS BLD VENIPUNCTURE: CPT

## 2020-02-17 PROCEDURE — 83036 HEMOGLOBIN GLYCOSYLATED A1C: CPT

## 2020-02-17 PROCEDURE — 80061 LIPID PANEL: CPT

## 2020-02-17 PROCEDURE — 85025 COMPLETE CBC W/AUTO DIFF WBC: CPT

## 2020-02-17 PROCEDURE — 80053 COMPREHEN METABOLIC PANEL: CPT

## 2020-02-17 NOTE — RESULT ENCOUNTER NOTE
Call patient with lab result- A1c now all up in a diabetic range  He should start metformin 500 XR 2 tablets daily  He should schedule an office visit to talk about his diabetes and advise what else needs to be done  He should be seen within next 3-4 weeks  If he has not had an eye exam within 2 years, he should schedule 1 due to the diabetes

## 2020-02-18 DIAGNOSIS — E11.9 TYPE 2 DIABETES MELLITUS WITHOUT COMPLICATION, WITHOUT LONG-TERM CURRENT USE OF INSULIN (HCC): Primary | ICD-10-CM

## 2020-02-18 RX ORDER — METFORMIN HYDROCHLORIDE 500 MG/1
500 TABLET, EXTENDED RELEASE ORAL 2 TIMES DAILY WITH MEALS
Qty: 30 TABLET | Refills: 1 | Status: SHIPPED | OUTPATIENT
Start: 2020-02-18 | End: 2020-03-27 | Stop reason: SDUPTHER

## 2020-03-03 DIAGNOSIS — E78.01 FAMILIAL HYPERCHOLESTEROLEMIA: ICD-10-CM

## 2020-03-03 RX ORDER — ATORVASTATIN CALCIUM 40 MG/1
40 TABLET, FILM COATED ORAL
Qty: 90 TABLET | Refills: 0 | Status: SHIPPED | OUTPATIENT
Start: 2020-03-03 | End: 2020-06-01

## 2020-03-17 ENCOUNTER — OFFICE VISIT (OUTPATIENT)
Dept: FAMILY MEDICINE CLINIC | Facility: CLINIC | Age: 70
End: 2020-03-17
Payer: MEDICARE

## 2020-03-17 VITALS
TEMPERATURE: 97.6 F | BODY MASS INDEX: 33.92 KG/M2 | HEIGHT: 69 IN | DIASTOLIC BLOOD PRESSURE: 80 MMHG | OXYGEN SATURATION: 98 % | WEIGHT: 229 LBS | RESPIRATION RATE: 18 BRPM | SYSTOLIC BLOOD PRESSURE: 130 MMHG | HEART RATE: 87 BPM

## 2020-03-17 DIAGNOSIS — Z79.01 ANTICOAGULANT LONG-TERM USE: ICD-10-CM

## 2020-03-17 DIAGNOSIS — E11.9 TYPE 2 DIABETES MELLITUS WITHOUT COMPLICATION, WITHOUT LONG-TERM CURRENT USE OF INSULIN (HCC): ICD-10-CM

## 2020-03-17 DIAGNOSIS — I10 ESSENTIAL HYPERTENSION: Primary | ICD-10-CM

## 2020-03-17 DIAGNOSIS — I83.899 VARICOSE VEINS WITH SWELLING: ICD-10-CM

## 2020-03-17 PROCEDURE — 99214 OFFICE O/P EST MOD 30 MIN: CPT | Performed by: FAMILY MEDICINE

## 2020-03-17 PROCEDURE — 3079F DIAST BP 80-89 MM HG: CPT | Performed by: FAMILY MEDICINE

## 2020-03-17 PROCEDURE — 3075F SYST BP GE 130 - 139MM HG: CPT | Performed by: FAMILY MEDICINE

## 2020-03-17 PROCEDURE — 4040F PNEUMOC VAC/ADMIN/RCVD: CPT | Performed by: FAMILY MEDICINE

## 2020-03-17 PROCEDURE — 3008F BODY MASS INDEX DOCD: CPT | Performed by: FAMILY MEDICINE

## 2020-03-17 PROCEDURE — 3051F HG A1C>EQUAL 7.0%<8.0%: CPT | Performed by: FAMILY MEDICINE

## 2020-03-17 PROCEDURE — 1160F RVW MEDS BY RX/DR IN RCRD: CPT | Performed by: FAMILY MEDICINE

## 2020-03-17 PROCEDURE — 1036F TOBACCO NON-USER: CPT | Performed by: FAMILY MEDICINE

## 2020-03-17 NOTE — PATIENT INSTRUCTIONS
Diabetes-continue current medications  I would repeat blood work to include BMP, A1c, microalbumin in about 3 and half months  You will be due your colonoscopy in May 2020  You should have an eye exam due to diabetes  10% - bad control"> 10% - bad control,Hemoglobin A1c (HbA1c) greater than 10% indicating poor diabetic control,Haemoglobin A1c greater than 10% indicating poor diabetic control">   Diabetes Mellitus Type 2 in Adults, Ambulatory Care   GENERAL INFORMATION:   Diabetes mellitus type 2  is a disease that affects how your body uses glucose (sugar)  Insulin helps move sugar out of the blood so it can be used for energy  Normally, when the blood sugar level increases, the pancreas makes more insulin  Type 2 diabetes develops because either the body cannot make enough insulin, or it cannot use the insulin correctly  After many years, your pancreas may stop making insulin  Common symptoms include the following:   · More hunger or thirst than usual     · Frequent urination     · Weight loss without trying     · Blurred vision  Seek immediate care for the following symptoms:   · Severe abdominal pain, or pain that spreads to your back  You may also be vomiting  · Trouble staying awake or focusing    · Shaking or sweating    · Blurred or double vision    · Breath has a fruity, sweet smell    · Breathing is deep and labored, or rapid and shallow    · Heartbeat is fast and weak  Treatment for diabetes mellitus type 2  includes keeping your blood sugar at a normal level  You must eat the right foods, and exercise regularly  You may also need medicine if you cannot control your blood sugar level with nutrition and exercise  Manage diabetes mellitus type 2:   · Check your blood sugar level  You will be taught how to check a small drop of blood in a glucose monitor  Ask your healthcare provider when and how often to check during the day   Ask your healthcare provider what your blood sugar levels should be when you check them  · Keep track of carbohydrates (sugar and starchy foods)  Your blood sugar level can get too high if you eat too many carbohydrates  Your dietitian will help you plan meals and snacks that have the right amount of carbohydrates  · Eat low-fat foods  Some examples are skinless chicken and low-fat milk  · Eat less sodium (salt)  Some examples of high-sodium foods to limit are soy sauce, potato chips, and soup  Do not add salt to food you cook  Limit your use of table salt  · Eat high-fiber foods  Foods that are a good source of fiber include vegetables, whole grain bread, and beans  · Limit alcohol  Alcohol affects your blood sugar level and can make it harder to manage your diabetes  Women should limit alcohol to 1 drink a day  Men should limit alcohol to 2 drinks a day  A drink of alcohol is 12 ounces of beer, 5 ounces of wine, or 1½ ounces of liquor  · Get regular exercise  Exercise can help keep your blood sugar level steady, decrease your risk of heart disease, and help you lose weight  Exercise for at least 30 minutes, 5 days a week  Include muscle strengthening activities 2 days each week  Work with your healthcare provider to create an exercise plan  · Check your feet each day  for injuries or open sores  Ask your healthcare provider for activities you can do if you have an open sore  · Quit smoking  If you smoke, it is never too late to quit  Smoking can worsen the problems that may occur with diabetes  Ask your healthcare provider for information about how to stop smoking if you are having trouble quitting  · Ask about your weight:  Ask healthcare providers if you need to lose weight, and how much to lose  Ask them to help you with a weight loss program  Even a 10 to 15 pound weight loss can help you manage your blood sugar level  · Carry medical alert identification  Wear medical alert jewelry or carry a card that says you have diabetes   Ask your healthcare provider where to get these items  · Ask about vaccines  Diabetes puts you at risk of serious illness if you get the flu, pneumonia, or hepatitis  Ask your healthcare provider if you should get a flu, pneumonia, or hepatitis B vaccine, and when to get the vaccine  Follow up with your healthcare provider as directed:  Write down your questions so you remember to ask them during your visits  CARE AGREEMENT:   You have the right to help plan your care  Learn about your health condition and how it may be treated  Discuss treatment options with your caregivers to decide what care you want to receive  You always have the right to refuse treatment  The above information is an  only  It is not intended as medical advice for individual conditions or treatments  Talk to your doctor, nurse or pharmacist before following any medical regimen to see if it is safe and effective for you  © 2014 [address] is for End User's use only and may not be sold, redistributed or otherwise used for commercial purposes  All illustrations and images included in CareNotes® are the copyrighted property of A D [employer] M [employer] Inc  or David Lynn

## 2020-03-17 NOTE — PROGRESS NOTES
[id number]         NAME: Everton Dao is a 77 y o  male  : 1950    MRN: [id number]  DATE: 2020  TIME: 1:40 PM    Assessment and Plan   Essential hypertension [I10]  1  Essential hypertension     2  Type 2 diabetes mellitus without complication, without long-term current use of insulin (HCC)  Basic metabolic panel    Hemoglobin A1C    Microalbumin / creatinine urine ratio   3  Varicose veins with swelling     4  Anticoagulant long-term use         Type 2 diabetes mellitus without complication, without long-term current use of insulin (HCC)    Lab Results   Component Value Date    HGBA1C 7 3 (H) 2020   A1c last month was 7 3  This is up from 6 6  Patient has started metformin 1000 mg daily          Patient Instructions     Patient Instructions   Diabetes-continue current medications  I would repeat blood work to include BMP, A1c, microalbumin in about 3 and half months  You will be due your colonoscopy in May 2020  You should have an eye exam due to diabetes  10% - bad control"> 10% - bad control,Hemoglobin A1c (HbA1c) greater than 10% indicating poor diabetic control,Haemoglobin A1c greater than 10% indicating poor diabetic control">   Diabetes Mellitus Type 2 in Adults, Ambulatory Care   GENERAL INFORMATION:   Diabetes mellitus type 2  is a disease that affects how your body uses glucose (sugar)  Insulin helps move sugar out of the blood so it can be used for energy  Normally, when the blood sugar level increases, the pancreas makes more insulin  Type 2 diabetes develops because either the body cannot make enough insulin, or it cannot use the insulin correctly  After many years, your pancreas may stop making insulin    Common symptoms include the following:   · More hunger or thirst than usual     · Frequent urination     · Weight loss without trying     · Blurred vision  Seek immediate care for the following symptoms:   · Severe abdominal pain, or pain that spreads to your back  You may also be vomiting  · Trouble staying awake or focusing    · Shaking or sweating    · Blurred or double vision    · Breath has a fruity, sweet smell    · Breathing is deep and labored, or rapid and shallow    · Heartbeat is fast and weak  Treatment for diabetes mellitus type 2  includes keeping your blood sugar at a normal level  You must eat the right foods, and exercise regularly  You may also need medicine if you cannot control your blood sugar level with nutrition and exercise  Manage diabetes mellitus type 2:   · Check your blood sugar level  You will be taught how to check a small drop of blood in a glucose monitor  Ask your healthcare provider when and how often to check during the day  Ask your healthcare provider what your blood sugar levels should be when you check them  · Keep track of carbohydrates (sugar and starchy foods)  Your blood sugar level can get too high if you eat too many carbohydrates  Your dietitian will help you plan meals and snacks that have the right amount of carbohydrates  · Eat low-fat foods  Some examples are skinless chicken and low-fat milk  · Eat less sodium (salt)  Some examples of high-sodium foods to limit are soy sauce, potato chips, and soup  Do not add salt to food you cook  Limit your use of table salt  · Eat high-fiber foods  Foods that are a good source of fiber include vegetables, whole grain bread, and beans  · Limit alcohol  Alcohol affects your blood sugar level and can make it harder to manage your diabetes  Women should limit alcohol to 1 drink a day  Men should limit alcohol to 2 drinks a day  A drink of alcohol is 12 ounces of beer, 5 ounces of wine, or 1½ ounces of liquor  · Get regular exercise  Exercise can help keep your blood sugar level steady, decrease your risk of heart disease, and help you lose weight  Exercise for at least 30 minutes, 5 days a week   Include muscle strengthening activities 2 days each week  Work with your healthcare provider to create an exercise plan  · Check your feet each day  for injuries or open sores  Ask your healthcare provider for activities you can do if you have an open sore  · Quit smoking  If you smoke, it is never too late to quit  Smoking can worsen the problems that may occur with diabetes  Ask your healthcare provider for information about how to stop smoking if you are having trouble quitting  · Ask about your weight:  Ask healthcare providers if you need to lose weight, and how much to lose  Ask them to help you with a weight loss program  Even a 10 to 15 pound weight loss can help you manage your blood sugar level  · Carry medical alert identification  Wear medical alert jewelry or carry a card that says you have diabetes  Ask your healthcare provider where to get these items  · Ask about vaccines  Diabetes puts you at risk of serious illness if you get the flu, pneumonia, or hepatitis  Ask your healthcare provider if you should get a flu, pneumonia, or hepatitis B vaccine, and when to get the vaccine  Follow up with your healthcare provider as directed:  Write down your questions so you remember to ask them during your visits  CARE AGREEMENT:   You have the right to help plan your care  Learn about your health condition and how it may be treated  Discuss treatment options with your caregivers to decide what care you want to receive  You always have the right to refuse treatment  The above information is an  only  It is not intended as medical advice for individual conditions or treatments  Talk to your doctor, nurse or pharmacist before following any medical regimen to see if it is safe and effective for you  © 2014 [address] is for End User's use only and may not be sold, redistributed or otherwise used for commercial purposes   All illustrations and images included in CareNotes® are the copyrighted property of [employer] , Quynh  or David Lynn  Chief Complaint     Chief Complaint   Patient presents with    Follow-up     patient presents for 4 weeks follow up         History of Present Illness       Patient here for follow-up on newly diagnosed diabetes-patient is started metformin 500 mg XR 2 tablets daily  No side effect  Patient is trying to improve his diet  He has not had a recent eye exam   Hypertension-good control current medications  Varicose veins with the current clotting  On long-term Xarelto  Review of Systems   Review of Systems   Constitutional: Negative for activity change, appetite change, diaphoresis and fatigue  Respiratory: Negative for cough, chest tightness, shortness of breath and wheezing  Cardiovascular: Negative for chest pain, palpitations and leg swelling  Fast or slow heart rate   Gastrointestinal: Negative for abdominal pain, blood in stool, constipation, diarrhea, nausea and vomiting  Endocrine: Negative for cold intolerance, heat intolerance, polyphagia and polyuria  Genitourinary: Negative for difficulty urinating, dysuria, frequency and hematuria  Musculoskeletal: Negative for arthralgias, gait problem, joint swelling and myalgias  Neurological: Negative for dizziness, light-headedness and headaches  Psychiatric/Behavioral: Negative for agitation, confusion, dysphoric mood and sleep disturbance  The patient is not nervous/anxious            Current Medications       Current Outpatient Medications:     acetaminophen (TYLENOL) 500 mg tablet, Take 500 mg by mouth every 6 (six) hours as needed for mild pain, Disp: , Rfl:     amLODIPine (NORVASC) 5 mg tablet, Take 1 tablet (5 mg total) by mouth daily For blood pressure, Disp: 90 tablet, Rfl: 1    ASPIRIN LOW DOSE 81 MG EC tablet, TAKE ONE TABLET BY MOUTH DAILY, Disp: 30 tablet, Rfl: 11    atorvastatin (LIPITOR) 40 mg tablet, Take 1 tablet (40 mg total) by mouth daily with dinner, Disp: 90 tablet, Rfl: 0    Choline Fenofibrate (FENOFIBRIC ACID) 135 MG CPDR, Take 1 capsule (135 mg total) by mouth daily, Disp: 90 capsule, Rfl: 0    metFORMIN (GLUCOPHAGE-XR) 500 mg 24 hr tablet, Take 1 tablet (500 mg total) by mouth 2 (two) times a day with meals, Disp: 30 tablet, Rfl: 1    rivaroxaban (XARELTO) 20 mg tablet, Take 1 tablet (20 mg total) by mouth daily with breakfast, Disp: 90 tablet, Rfl: 1    valsartan (DIOVAN) 160 mg tablet, Take 1 tablet (160 mg total) by mouth daily, Disp: 90 tablet, Rfl: 1    Current Allergies     Allergies as of 03/17/2020    (No Known Allergies)            The following portions of the patient's history were reviewed and updated as appropriate: allergies, current medications, past family history, past medical history, past social history, past surgical history and problem list      Past Medical History:   Diagnosis Date    DVT (deep venous thrombosis) ([facility] [address] )     Gross hematuria     [place]  Cehryle Sequin Cheryle Sequin 3/29/16   R    4/3/17     Hematuria     [place]    Cheryle Sequin Cheryle Sequin 4/12/16    R    4/3/17    Hyperlipidemia     Hypertension     Long-term (current) use of anticoagulants     [place]  Cheryle Sequin Cheryle Sequin 3/29/16   R    4/3/17     Seasonal allergies        Past Surgical History:   Procedure Laterality Date    APPENDECTOMY      WISDOM TOOTH EXTRACTION         Family History   Problem Relation Age of Onset    Hypertension Mother     Leukemia Father     Heart disease Father     Alcohol abuse Father     Depression Father     Heart disease Family     Leukemia Family     Depression Sister     Alcohol abuse Brother     Depression Brother          Medications have been verified          Objective   /80 (BP Location: Left arm, Patient Position: Sitting, Cuff Size: Large)   Pulse 87   Temp 97 6 °F (36 4 °C) (Tympanic)   Resp 18   Ht 5' 9 29" (1 76 m)   Wt 104 kg (229 lb)   SpO2 98%   BMI 33 53 kg/m²        Physical Exam     Physical Exam   Constitutional: He is oriented to person, place, and time  He appears well-developed and well-nourished  No distress  HENT:   Head: Normocephalic and atraumatic  Right Ear: Tympanic membrane and external ear normal  No drainage  Left Ear: Tympanic membrane normal  No drainage  Mouth/Throat: No oropharyngeal exudate  Eyes: Conjunctivae and EOM are normal  Right eye exhibits no discharge  Left eye exhibits no discharge  Neck: Normal range of motion  Neck supple  No thyromegaly present  Cardiovascular: Normal rate, regular rhythm and normal heart sounds  Pulses are no weak pulses  Pulses:       Dorsalis pedis pulses are 2+ on the right side, and 2+ on the left side  Posterior tibial pulses are 2+ on the right side, and 2+ on the left side  Pulmonary/Chest: Effort normal  No respiratory distress  He has no wheezes  He has no rales  Musculoskeletal: He exhibits edema  Feet:   Right Foot:   Skin Integrity: Negative for ulcer, skin breakdown, erythema, warmth, callus or dry skin  Left Foot:   Skin Integrity: Negative for ulcer, skin breakdown, erythema, warmth, callus or dry skin  Lymphadenopathy:     He has no cervical adenopathy  Neurological: He is alert and oriented to person, place, and time  Skin: Skin is dry  Psychiatric: He has a normal mood and affect  His behavior is normal    Vitals reviewed  Patient's shoes and socks removed  Right Foot/Ankle   Right Foot Inspection  Skin Exam: skin normal and skin intact no dry skin, no warmth, no callus, no erythema, no maceration, no abnormal color, no pre-ulcer, no ulcer and no callus                            Sensory   Vibration: intact    Monofilament testing: intact  Vascular  Capillary refills: < 3 seconds  The right DP pulse is 2+  The right PT pulse is 2+       Left Foot/Ankle  Left Foot Inspection  Skin Exam: skin normal and skin intactno dry skin, no warmth, no erythema, no maceration, normal color, no pre-ulcer, no ulcer and no callus                         Toe Exam: swelling Sensory   Vibration: intact    Monofilament: intact  Vascular  Capillary refills: < 3 seconds  The left DP pulse is 2+  The left PT pulse is 2+  Assign Risk Category:  No deformity present; No loss of protective sensation;  No weak pulses       Risk: 0

## 2020-03-17 NOTE — ASSESSMENT & PLAN NOTE
Lab Results   Component Value Date    HGBA1C 7 3 (H) 02/17/2020   A1c last month was 7 3  This is up from 6 6    Patient has started metformin 1000 mg daily

## 2020-03-27 DIAGNOSIS — E78.01 FAMILIAL HYPERCHOLESTEROLEMIA: ICD-10-CM

## 2020-03-27 DIAGNOSIS — E11.9 TYPE 2 DIABETES MELLITUS WITHOUT COMPLICATION, WITHOUT LONG-TERM CURRENT USE OF INSULIN (HCC): ICD-10-CM

## 2020-03-27 DIAGNOSIS — E78.5 HYPERLIPIDEMIA, UNSPECIFIED HYPERLIPIDEMIA TYPE: ICD-10-CM

## 2020-03-27 NOTE — TELEPHONE ENCOUNTER
Patient called requesting a refill for metFORMIN (GLUCOPHAGE-XR) 500 mg 24 hr tablet and Choline Fenofibrate (FENOFIBRIC ACID) 135 to be send to pharmacy on file

## 2020-03-29 RX ORDER — FENOFIBRIC ACID 135 MG/1
1 CAPSULE, DELAYED RELEASE ORAL DAILY
Qty: 90 CAPSULE | Refills: 0 | Status: SHIPPED | OUTPATIENT
Start: 2020-03-29 | End: 2020-06-29

## 2020-03-29 RX ORDER — METFORMIN HYDROCHLORIDE 500 MG/1
500 TABLET, EXTENDED RELEASE ORAL 2 TIMES DAILY WITH MEALS
Qty: 30 TABLET | Refills: 1 | Status: SHIPPED | OUTPATIENT
Start: 2020-03-29 | End: 2020-04-20

## 2020-04-20 DIAGNOSIS — E11.9 TYPE 2 DIABETES MELLITUS WITHOUT COMPLICATION, WITHOUT LONG-TERM CURRENT USE OF INSULIN (HCC): ICD-10-CM

## 2020-04-20 RX ORDER — METFORMIN HYDROCHLORIDE 500 MG/1
TABLET, EXTENDED RELEASE ORAL
Qty: 30 TABLET | Refills: 1 | Status: SHIPPED | OUTPATIENT
Start: 2020-04-20 | End: 2020-06-29

## 2020-06-01 DIAGNOSIS — E78.01 FAMILIAL HYPERCHOLESTEROLEMIA: ICD-10-CM

## 2020-06-01 RX ORDER — ATORVASTATIN CALCIUM 40 MG/1
TABLET, FILM COATED ORAL
Qty: 90 TABLET | Refills: 0 | Status: SHIPPED | OUTPATIENT
Start: 2020-06-01 | End: 2020-09-10 | Stop reason: SDUPTHER

## 2020-06-24 DIAGNOSIS — E78.5 HYPERLIPIDEMIA, UNSPECIFIED HYPERLIPIDEMIA TYPE: ICD-10-CM

## 2020-06-24 DIAGNOSIS — E78.01 FAMILIAL HYPERCHOLESTEROLEMIA: ICD-10-CM

## 2020-06-24 DIAGNOSIS — E11.9 TYPE 2 DIABETES MELLITUS WITHOUT COMPLICATION, WITHOUT LONG-TERM CURRENT USE OF INSULIN (HCC): ICD-10-CM

## 2020-06-29 RX ORDER — FENOFIBRIC ACID 135 MG/1
CAPSULE, DELAYED RELEASE ORAL
Qty: 90 CAPSULE | Refills: 0 | Status: SHIPPED | OUTPATIENT
Start: 2020-06-29 | End: 2020-10-05 | Stop reason: SDUPTHER

## 2020-06-29 RX ORDER — METFORMIN HYDROCHLORIDE 500 MG/1
TABLET, EXTENDED RELEASE ORAL
Qty: 30 TABLET | Refills: 1 | Status: SHIPPED | OUTPATIENT
Start: 2020-06-29 | End: 2020-07-17 | Stop reason: SDUPTHER

## 2020-07-01 ENCOUNTER — LAB (OUTPATIENT)
Dept: LAB | Facility: CLINIC | Age: 70
End: 2020-07-01
Payer: MEDICARE

## 2020-07-01 DIAGNOSIS — E11.9 TYPE 2 DIABETES MELLITUS WITHOUT COMPLICATION, WITHOUT LONG-TERM CURRENT USE OF INSULIN (HCC): ICD-10-CM

## 2020-07-01 LAB
ANION GAP SERPL CALCULATED.3IONS-SCNC: 7 MMOL/L (ref 4–13)
BUN SERPL-MCNC: 13 MG/DL (ref 5–25)
CALCIUM SERPL-MCNC: 9.6 MG/DL (ref 8.3–10.1)
CHLORIDE SERPL-SCNC: 108 MMOL/L (ref 100–108)
CO2 SERPL-SCNC: 23 MMOL/L (ref 21–32)
CREAT SERPL-MCNC: 1.05 MG/DL (ref 0.6–1.3)
CREAT UR-MCNC: 386 MG/DL
EST. AVERAGE GLUCOSE BLD GHB EST-MCNC: 128 MG/DL
GFR SERPL CREATININE-BSD FRML MDRD: 73 ML/MIN/1.73SQ M
GLUCOSE P FAST SERPL-MCNC: 150 MG/DL (ref 65–99)
HBA1C MFR BLD: 6.1 %
MICROALBUMIN UR-MCNC: 54.6 MG/L (ref 0–20)
MICROALBUMIN/CREAT 24H UR: 14 MG/G CREATININE (ref 0–30)
POTASSIUM SERPL-SCNC: 3.9 MMOL/L (ref 3.5–5.3)
SODIUM SERPL-SCNC: 138 MMOL/L (ref 136–145)

## 2020-07-01 PROCEDURE — 36415 COLL VENOUS BLD VENIPUNCTURE: CPT

## 2020-07-01 PROCEDURE — 80048 BASIC METABOLIC PNL TOTAL CA: CPT

## 2020-07-01 PROCEDURE — 82570 ASSAY OF URINE CREATININE: CPT

## 2020-07-01 PROCEDURE — 83036 HEMOGLOBIN GLYCOSYLATED A1C: CPT

## 2020-07-01 PROCEDURE — 82043 UR ALBUMIN QUANTITATIVE: CPT

## 2020-07-02 ENCOUNTER — TELEPHONE (OUTPATIENT)
Dept: FAMILY MEDICINE CLINIC | Facility: CLINIC | Age: 70
End: 2020-07-02

## 2020-07-02 NOTE — TELEPHONE ENCOUNTER
LMOM with details results, consent okay, patient is to call us back to schedule his  4 months (around 7/17/2020 per Dr Leonard Houston instructions

## 2020-07-02 NOTE — RESULT ENCOUNTER NOTE
Call patient with lab result-fasting sugar still up an A1c much improved this 6 1  Continue current medications  Remind patient he should have a diabetic eye exam   Also looks like he is due for Medicare wellness  This could be sometime in the next 4-6 weeks  I could even also do this as have video visit  If done in 6 weeks will recheck PT I normal diabetic visit at the same time

## 2020-07-02 NOTE — TELEPHONE ENCOUNTER
----- Message from Gregorio Burch MD sent at 7/2/2020 11:49 AM EDT -----  Call patient with lab result-fasting sugar still up an A1c much improved this 6 1  Continue current medications  Remind patient he should have a diabetic eye exam   Also looks like he is due for Medicare wellness  This could be sometime in the next 4-6 weeks  I could even also do this as have video visit  If done in 6 weeks will recheck PT I normal diabetic visit at the same time

## 2020-07-13 DIAGNOSIS — I10 ESSENTIAL HYPERTENSION: ICD-10-CM

## 2020-07-14 RX ORDER — AMLODIPINE BESYLATE 5 MG/1
5 TABLET ORAL DAILY
Qty: 90 TABLET | Refills: 1 | Status: SHIPPED | OUTPATIENT
Start: 2020-07-14 | End: 2021-01-09 | Stop reason: SDUPTHER

## 2020-07-14 RX ORDER — VALSARTAN 160 MG/1
160 TABLET ORAL DAILY
Qty: 90 TABLET | Refills: 1 | Status: SHIPPED | OUTPATIENT
Start: 2020-07-14 | End: 2021-01-09 | Stop reason: SDUPTHER

## 2020-07-17 ENCOUNTER — OFFICE VISIT (OUTPATIENT)
Dept: FAMILY MEDICINE CLINIC | Facility: CLINIC | Age: 70
End: 2020-07-17
Payer: MEDICARE

## 2020-07-17 VITALS
WEIGHT: 226 LBS | SYSTOLIC BLOOD PRESSURE: 128 MMHG | BODY MASS INDEX: 33.47 KG/M2 | HEART RATE: 95 BPM | HEIGHT: 69 IN | OXYGEN SATURATION: 96 % | DIASTOLIC BLOOD PRESSURE: 80 MMHG | TEMPERATURE: 97.6 F

## 2020-07-17 DIAGNOSIS — I83.899 VARICOSE VEINS WITH SWELLING: ICD-10-CM

## 2020-07-17 DIAGNOSIS — Z79.01 ANTICOAGULANT LONG-TERM USE: ICD-10-CM

## 2020-07-17 DIAGNOSIS — I10 ESSENTIAL HYPERTENSION: ICD-10-CM

## 2020-07-17 DIAGNOSIS — Z12.5 ENCOUNTER FOR PROSTATE CANCER SCREENING: ICD-10-CM

## 2020-07-17 DIAGNOSIS — Z00.00 MEDICARE ANNUAL WELLNESS VISIT, SUBSEQUENT: Primary | ICD-10-CM

## 2020-07-17 DIAGNOSIS — E78.5 HYPERLIPIDEMIA ASSOCIATED WITH TYPE 2 DIABETES MELLITUS (HCC): ICD-10-CM

## 2020-07-17 DIAGNOSIS — I87.2 CHRONIC VENOUS INSUFFICIENCY: ICD-10-CM

## 2020-07-17 DIAGNOSIS — E11.9 TYPE 2 DIABETES MELLITUS WITHOUT COMPLICATION, WITHOUT LONG-TERM CURRENT USE OF INSULIN (HCC): ICD-10-CM

## 2020-07-17 DIAGNOSIS — E11.69 HYPERLIPIDEMIA ASSOCIATED WITH TYPE 2 DIABETES MELLITUS (HCC): ICD-10-CM

## 2020-07-17 DIAGNOSIS — Z12.11 ENCOUNTER FOR SCREENING COLONOSCOPY: ICD-10-CM

## 2020-07-17 PROCEDURE — 99214 OFFICE O/P EST MOD 30 MIN: CPT | Performed by: FAMILY MEDICINE

## 2020-07-17 PROCEDURE — 1160F RVW MEDS BY RX/DR IN RCRD: CPT | Performed by: FAMILY MEDICINE

## 2020-07-17 PROCEDURE — 1036F TOBACCO NON-USER: CPT | Performed by: FAMILY MEDICINE

## 2020-07-17 PROCEDURE — 4040F PNEUMOC VAC/ADMIN/RCVD: CPT | Performed by: FAMILY MEDICINE

## 2020-07-17 PROCEDURE — 1123F ACP DISCUSS/DSCN MKR DOCD: CPT | Performed by: FAMILY MEDICINE

## 2020-07-17 PROCEDURE — 3060F POS MICROALBUMINURIA REV: CPT | Performed by: FAMILY MEDICINE

## 2020-07-17 PROCEDURE — 1170F FXNL STATUS ASSESSED: CPT | Performed by: FAMILY MEDICINE

## 2020-07-17 PROCEDURE — 1125F AMNT PAIN NOTED PAIN PRSNT: CPT | Performed by: FAMILY MEDICINE

## 2020-07-17 PROCEDURE — 3044F HG A1C LEVEL LT 7.0%: CPT | Performed by: FAMILY MEDICINE

## 2020-07-17 PROCEDURE — 3074F SYST BP LT 130 MM HG: CPT | Performed by: FAMILY MEDICINE

## 2020-07-17 PROCEDURE — 3079F DIAST BP 80-89 MM HG: CPT | Performed by: FAMILY MEDICINE

## 2020-07-17 PROCEDURE — 3008F BODY MASS INDEX DOCD: CPT | Performed by: FAMILY MEDICINE

## 2020-07-17 PROCEDURE — G0439 PPPS, SUBSEQ VISIT: HCPCS | Performed by: FAMILY MEDICINE

## 2020-07-17 RX ORDER — METFORMIN HYDROCHLORIDE 500 MG/1
500 TABLET, EXTENDED RELEASE ORAL 2 TIMES DAILY WITH MEALS
Qty: 180 TABLET | Refills: 1 | Status: SHIPPED | OUTPATIENT
Start: 2020-07-17 | End: 2021-02-12 | Stop reason: SDUPTHER

## 2020-07-17 NOTE — PROGRESS NOTES
Assessment and Plan:     Problem List Items Addressed This Visit     None           Preventive health issues were discussed with patient, and age appropriate screening tests were ordered as noted in patient's After Visit Summary  Personalized health advice and appropriate referrals for health education or preventive services given if needed, as noted in patient's After Visit Summary  History of Present Illness:     Patient presents for Medicare Annual Wellness visit    Patient Care Team:  Terry Jain MD as PCP - Conrado August MD     Problem List:     Patient Active Problem List   Diagnosis    Familial hypercholesterolemia    Essential hypertension    Type 2 diabetes mellitus without complication, without long-term current use of insulin ([place] [address] )    BPH with obstruction/lower urinary tract symptoms    Chronic venous insufficiency    Varicose veins with swelling    Medicare annual wellness visit, initial    History of DVT (deep vein thrombosis)    Anticoagulant long-term use    Arthritis of knee    Numbness    TIA (transient ischemic attack)      Past Medical and Surgical History:     Past Medical History:   Diagnosis Date    [facility] thrombosis) ([facility] )     Gross hematuria     [place]  Elisabeth Blend Elisabeth Blend 3/29/16   R    4/3/17     Hematuria     [place]    Elisabeth Blend Elisabeth Blend 4/12/16    R    4/3/17    Hyperlipidemia     Hypertension     Long-term (current) use of anticoagulants     [place]  Elisabeth Blend Elisabeth Blend 3/29/16   R    4/3/17     Seasonal allergies      Past Surgical History:   Procedure Laterality Date    APPENDECTOMY      WISDOM TOOTH EXTRACTION        Family History:     Family History   Problem Relation Age of Onset    Hypertension Mother     Leukemia Father     Heart disease Father     Alcohol abuse Father     Depression Father     Heart disease Family     Leukemia Family     Depression Sister     Alcohol abuse Brother     Depression Brother       Social History:        Social History     Socioeconomic History    Marital status: /Civil [employer]     Spouse name: None    Number of children: None    Years of education: None    Highest education level: None   Occupational History    None   Social Needs    Financial resource strain: None    Food insecurity:     Worry: None     Inability: None    Transportation needs:     Medical: None     Non-medical: None   Tobacco Use    Smoking status: Former Smoker     Types: Cigarettes     Last attempt to quit:      Years since quittin 5    Smokeless tobacco: Never Used   Substance and Sexual Activity    Alcohol use:  Yes     Alcohol/week: 4 0 standard drinks     Types: 4 Cans of beer per week     Frequency: 2-4 times a month     Drinks per session: 1 or 2     Binge frequency: Never     Comment: social    Drug use: Yes     Types: Marijuana    Sexual activity: Yes     Partners: Female   Lifestyle    Physical activity:     Days per week: None     Minutes per session: None    Stress: None   Relationships    Social connections:     Talks on phone: None     Gets together: None     Attends [employer] service: None     Active member of club or organization: None     Attends meetings of clubs or organizations: None     Relationship status: None    Intimate partner violence:     Fear of current or ex partner: None     Emotionally abused: None     Physically abused: None     Forced sexual activity: None   Other Topics Concern    None   Social History Narrative    Caffeine use      Medications and Allergies:     Current Outpatient Medications   Medication Sig Dispense Refill    acetaminophen (TYLENOL) 500 mg tablet Take 500 mg by mouth every 6 (six) hours as needed for mild pain      amLODIPine (NORVASC) 5 mg tablet Take 1 tablet (5 mg total) by mouth daily For blood pressure 90 tablet 1    ASPIRIN LOW DOSE 81 MG EC tablet TAKE ONE TABLET BY MOUTH DAILY 30 tablet 11    atorvastatin (LIPITOR) 40 mg tablet TAKE 1 TABLET BY MOUTH DAILY WITH DINNER 90 tablet 0    Choline Fenofibrate (FENOFIBRIC ACID) 135 MG CPDR TAKE ONE CAPSULE BY MOUTH EVERY DAY 90 capsule 0    metFORMIN (GLUCOPHAGE-XR) 500 mg 24 hr tablet TAKE ONE TABLET BY MOUTH TWICE A DAY WITH MEALS 30 tablet 1    rivaroxaban (XARELTO) 20 mg tablet Take 1 tablet (20 mg total) by mouth daily with breakfast 90 tablet 1    valsartan (DIOVAN) 160 mg tablet Take 1 tablet (160 mg total) by mouth daily 90 tablet 1     No current facility-administered medications for this visit  No Known Allergies   Immunizations:     Immunization History   Administered Date(s) Administered    INFLUENZA 11/24/2014    Influenza Quadrivalent Preservative Free 3 years and older IM 11/25/2015, 04/03/2017    Influenza, high dose seasonal 0 5 mL 12/03/2018, 01/13/2020    Pneumococcal Conjugate 13-Valent 04/03/2017    Pneumococcal Polysaccharide PPV23 12/03/2018    Tdap 06/17/2015      Health Maintenance:         Topic Date Due    Hepatitis C Screening  1950    CRC Screening: Colonoscopy  05/17/2020         Topic Date Due    Influenza Vaccine  07/01/2020      Medicare Health Risk Assessment:     /80 (BP Location: Left arm, Patient Position: Sitting, Cuff Size: Extra-Large)   Pulse 95   Temp 97 6 °F (36 4 °C) (Tympanic)   Ht 5' 9" (1 753 m)   Wt 103 kg (226 lb)   SpO2 96%   BMI 33 37 kg/m²      Sara Hubbard is here for his Subsequent Wellness visit  Last Medicare Wellness visit information reviewed, patient interviewed, no change since last AWV  Health Risk Assessment:   Patient rates overall health as good  Patient feels that their physical health rating is same  Eyesight was rated as same  Hearing was rated as same  Patient feels that their emotional and mental health rating is same  Pain experienced in the last 7 days has been none  Patient states that he has experienced no weight loss or gain in last 6 months  Depression Screening:   PHQ-2 Score: 0      Fall Risk Screening:    In the past year, patient has experienced: no history of falling in past year      Home Safety:  Patient does not have trouble with stairs inside or outside of their home  Patient has working smoke alarms and has no working carbon monoxide detector  Home safety hazards include: none  Nutrition:   Current diet is Regular  Medications:   Patient is currently taking over-the-counter supplements  OTC medications include: see medication list  Patient is able to manage medications  Activities of Daily Living (ADLs)/Instrumental Activities of Daily Living (IADLs):   Walk and transfer into and out of bed and chair?: Yes  Dress and groom yourself?: Yes    Bathe or shower yourself?: Yes    Feed yourself? Yes  Do your laundry/housekeeping?: Yes  Manage your money, pay your bills and track your expenses?: Yes  Make your own meals?: Yes    Do your own shopping?: Yes    Previous Hospitalizations:   Any hospitalizations or ED visits within the last 12 months?: No      Advance Care Planning:   Living will: No    Durable POA for healthcare: No    Advanced directive: No    Five wishes given: Yes      Cognitive Screening:   Provider or family/friend/caregiver concerned regarding cognition?: No    PREVENTIVE SCREENINGS      Cardiovascular Screening:    General: Screening Not Indicated and History Lipid Disorder      Diabetes Screening:     General: Screening Not Indicated and History Diabetes      Osteoporosis Screening:    General: Risks and Benefits Discussed      Abdominal Aortic Aneurysm (AAA) Screening:    Risk factors include: age between 73-67 yo and tobacco use        Lung Cancer Screening:     General: Screening Not Indicated      Hepatitis C Screening:    General: Risks and Benefits Discussed and Screening Current      Preventive Screening Comments: Patient had donated blood up until 3 years ago  Always negative appetite is sleep    Other Counseling Topics:   Car/seat belt/driving safety and regular weightbearing exercise         Janae De Leon MD

## 2020-07-17 NOTE — PROGRESS NOTES
Subjective:   Chief Complaint   Patient presents with    Medicare Wellness Visit     sub, MM        Patient ID: Prince Crowley is a 72 y o  male  Medical management multiple issues-  1) diabetes-A1c much improved  Continues on low-dose metformin  2) hyperlipidemia-requires dual therapy with fenofibrate and atorvastatin  3) hypertension good control current meds  No cardiac symptoms  4) venous insufficiency with varicose veins on left leg  History of multiple clots  No current pain or phlebitis are noted  5) long-term anticoagulation-currently with Xarelto due to frequent DVTs  The following portions of the patient's history were reviewed and updated as appropriate: allergies, current medications, past family history, past medical history, past social history, past surgical history and problem list     Review of Systems   Constitutional: Negative for activity change, appetite change, diaphoresis and fatigue  Respiratory: Negative for cough, chest tightness, shortness of breath and wheezing  Cardiovascular: Negative for chest pain, palpitations and leg swelling  Fast or slow heart rate   Gastrointestinal: Negative for abdominal pain, blood in stool, constipation, diarrhea, nausea and vomiting  Genitourinary: Negative for difficulty urinating, dysuria and frequency  Musculoskeletal: Negative for arthralgias, gait problem, joint swelling and myalgias  Neurological: Negative for dizziness, weakness, light-headedness, numbness and headaches  Psychiatric/Behavioral: Negative for agitation, confusion, dysphoric mood and sleep disturbance  The patient is not nervous/anxious                Objective:  Vitals:    07/17/20 0916   BP: 128/80   BP Location: Left arm   Patient Position: Sitting   Cuff Size: Extra-Large   Pulse: 95   Temp: 97 6 °F (36 4 °C)   TempSrc: Tympanic   SpO2: 96%   Weight: 103 kg (226 lb)   Height: 5' 9" (1 753 m)      Physical Exam   Constitutional: He is oriented to person, place, and time  He appears well-developed and well-nourished  No distress  HENT:   Head: Normocephalic and atraumatic  Right Ear: Tympanic membrane, external ear and ear canal normal    Left Ear: Tympanic membrane, external ear and ear canal normal    Nose: No mucosal edema or rhinorrhea  Right sinus exhibits no maxillary sinus tenderness  Left sinus exhibits no maxillary sinus tenderness  Mouth/Throat: Uvula is midline  Mucous membranes are not pale and not dry  Normal dentition  No oropharyngeal exudate  Eyes: Pupils are equal, round, and reactive to light  EOM are normal  Right eye exhibits no discharge  Left eye exhibits no discharge  Neck: Normal range of motion  Neck supple  No thyromegaly present  Cardiovascular: Normal rate, regular rhythm, normal heart sounds and intact distal pulses  No murmur heard  Pulmonary/Chest: Effort normal and breath sounds normal  No respiratory distress  He has no wheezes  He has no rales  Musculoskeletal: Normal range of motion  He exhibits no edema or tenderness  Lymphadenopathy:     He has no cervical adenopathy  Neurological: He is alert and oriented to person, place, and time  No cranial nerve deficit  Skin: He is not diaphoretic  Psychiatric: He has a normal mood and affect  His behavior is normal    Vitals reviewed  Assessment/Plan:    No problem-specific Assessment & Plan notes found for this encounter  Diagnoses and all orders for this visit:    Medicare annual wellness visit, subsequent    Encounter for screening colonoscopy  -     Ambulatory referral to Gastroenterology; Future    Type 2 diabetes mellitus without complication, without long-term current use of insulin (HCC)  -     metFORMIN (GLUCOPHAGE-XR) 500 mg 24 hr tablet; Take 1 tablet (500 mg total) by mouth 2 (two) times a day with meals  -     Hemoglobin A1C; Future  -     Comprehensive metabolic panel;  Future  -     Lipid Panel with Direct LDL reflex; Future    Essential hypertension    Varicose veins with swelling    Chronic venous insufficiency    Hyperlipidemia associated with type 2 diabetes mellitus ([facility] [address] )  -     Comprehensive metabolic panel; Future  -     Lipid Panel with Direct LDL reflex; Future    Anticoagulant long-term use  -     rivaroxaban (XARELTO) 20 mg tablet; Take 1 tablet (20 mg total) by mouth daily with breakfast    Encounter for prostate cancer screening  -     PSA, Total Screen; Future          Medical management-continue current medications  Get her diabetic eye exam   Continue to work on diet, exercise, weight loss  You should schedule your 10 year colonoscopy  Repeat labs in January 2021 with office visit after

## 2020-07-17 NOTE — PATIENT INSTRUCTIONS
Medical management-continue current medications  Get her diabetic eye exam   Continue to work on diet, exercise, weight loss  You should schedule your 10 year colonoscopy  Repeat labs in January 2021 with office visit after  Medicare Preventive Visit Patient Instructions  Thank you for completing your Welcome to Medicare Visit or Medicare Annual Wellness Visit today  Your next wellness visit will be due in one year (7/17/2021)  The screening/preventive services that you may require over the next 5-10 years are detailed below  Some tests may not apply to you based off risk factors and/or age  Screening tests ordered at today's visit but not completed yet may show as past due  Also, please note that scanned in results may not display below  Preventive Screenings:  Service Recommendations Previous Testing/Comments   Colorectal Cancer Screening  · Colonoscopy    · Fecal Occult Blood Test (FOBT)/Fecal Immunochemical Test (FIT)  · Fecal DNA/Cologuard Test  · Flexible Sigmoidoscopy Age: 54-65 years old   Colonoscopy: every 10 years (May be performed more frequently if at higher risk)  OR  FOBT/FIT: every 1 year  OR  Cologuard: every 3 years  OR  Sigmoidoscopy: every 5 years  Screening may be recommended earlier than age 48 if at higher risk for colorectal cancer  Also, an individualized decision between you and your healthcare provider will decide whether screening between the ages of 74-80 would be appropriate   Colonoscopy: 05/17/2010  FOBT/FIT: Not on file  Cologuard: Not on file  Sigmoidoscopy: Not on file         Prostate Cancer Screening Individualized decision between patient and health care provider in men between ages of 53-78   Medicare will cover every 12 months beginning on the day after your 50th birthday PSA: 0 7 ng/mL          Hepatitis C Screening Once for adults born between [facility]  More frequently in patients at high risk for Hepatitis C Hep C Antibody: Not on file       Diabetes Screening 1-2 times per year if you're at risk for diabetes or have pre-diabetes Fasting glucose: 150 mg/dL   A1C: 6 1 %    Screening Not Indicated  History Diabetes   Cholesterol Screening Once every 5 years if you don't have a lipid disorder  May order more often based on risk factors  Lipid panel: 02/17/2020    Screening Not Indicated  History Lipid Disorder      Other Preventive Screenings Covered by Medicare:  1  Abdominal Aortic Aneurysm (AAA) Screening: covered once if your at risk  You're considered to be at risk if you have a family history of AAA or a male between the age of 73-68 who smoking at least 100 cigarettes in your lifetime  2  Lung Cancer Screening: covers low dose CT scan once per year if you meet all of the following conditions: (1) Age 50-69; (2) No signs or symptoms of lung cancer; (3) Current smoker or have quit smoking within the last 15 years; (4) You have a tobacco smoking history of at least 30 pack years (packs per day x number of years you smoked); (5) You get a written order from a healthcare provider  3  Glaucoma Screening: covered annually if you're considered high risk: (1) You have diabetes OR (2) Family history of glaucoma OR (3)  aged 48 and older OR (3)  American aged 72 and older  3  Osteoporosis Screening: covered every 2 years if you meet one of the following conditions: (1) Have a vertebral abnormality; (2) On glucocorticoid therapy for more than 3 months; (3) Have primary hyperparathyroidism; (4) On osteoporosis medications and need to assess response to drug therapy  5  HIV Screening: covered annually if you're between the age of 12-76  Also covered annually if you are younger than 13 and older than 72 with risk factors for HIV infection  For pregnant patients, it is covered up to 3 times per pregnancy      Immunizations:  Immunization Recommendations   Influenza Vaccine Annual influenza vaccination during flu season is recommended for all persons aged >= 6 months who do not have contraindications   Pneumococcal Vaccine (Prevnar and Pneumovax)  * Prevnar = PCV13  * Pneumovax = PPSV23 Adults 25-60 years old: 1-3 doses may be recommended based on certain risk factors  Adults 72 years old: Prevnar (PCV13) vaccine recommended followed by Pneumovax (PPSV23) vaccine  If already received PPSV23 since turning 65, then PCV13 recommended at least one year after PPSV23 dose  Hepatitis B Vaccine 3 dose series if at intermediate or high risk (ex: diabetes, end stage renal disease, liver disease)   Tetanus (Td) Vaccine - COST NOT COVERED BY MEDICARE PART B Following completion of primary series, a booster dose should be given every 10 years to maintain immunity against tetanus  Td may also be given as tetanus wound prophylaxis  Tdap Vaccine - COST NOT COVERED BY MEDICARE PART B Recommended at least once for all adults  For pregnant patients, recommended with each pregnancy  Shingles Vaccine (Shingrix) - COST NOT COVERED BY MEDICARE PART B  2 shot series recommended in those aged 48 and above     Health Maintenance Due:      Topic Date Due    Hepatitis C Screening  1950    CRC Screening: Colonoscopy  05/17/2020     Immunizations Due:      Topic Date Due    Influenza Vaccine  07/01/2020     Advance Directives   What are advance directives? Advance directives are legal documents that state your wishes and plans for medical care  These plans are made ahead of time in case you lose your ability to make decisions for yourself  Advance directives can apply to any medical decision, such as the treatments you want, and if you want to donate organs  What are the types of advance directives? There are many types of advance directives, and each state has rules about how to use them  You may choose a combination of any of the following:  · Living will: This is a written record of the treatment you want   You can also choose which treatments you do not want, which to limit, and which to stop at a certain time  This includes surgery, medicine, IV fluid, and tube feedings  · Durable power of  for healthcare [facility]): This is a written record that states who you want to make healthcare choices for you when you are unable to make them for yourself  This person, called a proxy, is usually a family member or a friend  You may choose more than 1 proxy  · Do not resuscitate (DNR) order:  A DNR order is used in case your heart stops beating or you stop breathing  It is a request not to have certain forms of treatment, such as CPR  A DNR order may be included in other types of advance directives  · Medical directive: This covers the care that you want if you are in a coma, near death, or unable to make decisions for yourself  You can list the treatments you want for each condition  Treatment may include pain medicine, surgery, blood transfusions, dialysis, IV or tube feedings, and a ventilator (breathing machine)  · Values history: This document has questions about your views, beliefs, and how you feel and think about life  This information can help others choose the care that you would choose  Why are advance directives important? An advance directive helps you control your care  Although spoken wishes may be used, it is better to have your wishes written down  Spoken wishes can be misunderstood, or not followed  Treatments may be given even if you do not want them  An advance directive may make it easier for your family to make difficult choices about your care  Weight Management   Why it is important to manage your weight:  Being overweight increases your risk of health conditions such as heart disease, high blood pressure, type 2 diabetes, and certain types of cancer  It can also increase your risk for osteoarthritis, sleep apnea, and other respiratory problems  Aim for a slow, steady weight loss   Even a small amount of weight loss can lower your risk of health problems  How to lose weight safely:  A safe and healthy way to lose weight is to eat fewer calories and get regular exercise  You can lose up about 1 pound a week by decreasing the number of calories you eat by 500 calories each day  Healthy meal plan for weight management:  A healthy meal plan includes a variety of foods, contains fewer calories, and helps you stay healthy  A healthy meal plan includes the following:  · Eat whole-grain foods more often  A healthy meal plan should contain fiber  Fiber is the part of grains, fruits, and vegetables that is not broken down by your body  Whole-grain foods are healthy and provide extra fiber in your diet  Some examples of whole-grain foods are whole-wheat breads and pastas, oatmeal, brown rice, and bulgur  · Eat a variety of vegetables every day  Include dark, leafy greens such as spinach, kale, emy greens, and mustard greens  Eat yellow and orange vegetables such as carrots, sweet potatoes, and winter squash  · Eat a variety of fruits every day  Choose fresh or canned fruit (canned in its own juice or light syrup) instead of juice  Fruit juice has very little or no fiber  · Eat low-fat dairy foods  Drink fat-free (skim) milk or 1% milk  Eat fat-free yogurt and low-fat cottage cheese  Try low-fat cheeses such as mozzarella and other reduced-fat cheeses  · Choose meat and other protein foods that are low in fat  Choose beans or other legumes such as split peas or lentils  Choose fish, skinless poultry (chicken or turkey), or lean cuts of red meat (beef or pork)  Before you cook meat or poultry, cut off any visible fat  · Use less fat and oil  Try baking foods instead of frying them  Add less fat, such as margarine, sour cream, regular salad dressing and mayonnaise to foods  Eat fewer high-fat foods  Some examples of high-fat foods include french fries, doughnuts, ice cream, and cakes  · Eat fewer sweets    Limit foods and drinks that are high in sugar  This includes candy, cookies, regular soda, and sweetened drinks  Exercise:  Exercise at least 30 minutes per day on most days of the week  Some examples of exercise include walking, biking, dancing, and swimming  You can also fit in more physical activity by taking the stairs instead of the elevator or parking farther away from stores  Ask your healthcare provider about the best exercise plan for you  © Copyright [employer] 2018 Information is for End User's use only and may not be sold, redistributed or otherwise used for commercial purposes   All illustrations and images included in CareNotes® are the copyrighted property of A D A M , Inc  or [address] [place] [address]

## 2020-08-21 DIAGNOSIS — Z79.01 ANTICOAGULANT LONG-TERM USE: ICD-10-CM

## 2020-09-10 DIAGNOSIS — E78.01 FAMILIAL HYPERCHOLESTEROLEMIA: ICD-10-CM

## 2020-09-10 RX ORDER — ATORVASTATIN CALCIUM 40 MG/1
40 TABLET, FILM COATED ORAL
Qty: 90 TABLET | Refills: 1 | Status: SHIPPED | OUTPATIENT
Start: 2020-09-10 | End: 2021-02-12 | Stop reason: SDUPTHER

## 2020-10-05 DIAGNOSIS — E78.5 HYPERLIPIDEMIA, UNSPECIFIED HYPERLIPIDEMIA TYPE: ICD-10-CM

## 2020-10-05 DIAGNOSIS — E78.01 FAMILIAL HYPERCHOLESTEROLEMIA: ICD-10-CM

## 2020-10-05 RX ORDER — FENOFIBRIC ACID 135 MG/1
1 CAPSULE, DELAYED RELEASE ORAL DAILY
Qty: 90 CAPSULE | Refills: 0 | Status: SHIPPED | OUTPATIENT
Start: 2020-10-05 | End: 2021-01-09 | Stop reason: SDUPTHER

## 2020-12-08 ENCOUNTER — OFFICE VISIT (OUTPATIENT)
Dept: FAMILY MEDICINE CLINIC | Facility: CLINIC | Age: 70
End: 2020-12-08
Payer: MEDICARE

## 2020-12-08 VITALS
TEMPERATURE: 99.1 F | WEIGHT: 225.8 LBS | OXYGEN SATURATION: 97 % | BODY MASS INDEX: 33.44 KG/M2 | SYSTOLIC BLOOD PRESSURE: 122 MMHG | HEIGHT: 69 IN | DIASTOLIC BLOOD PRESSURE: 84 MMHG | HEART RATE: 87 BPM

## 2020-12-08 DIAGNOSIS — F41.9 ANXIETY: Primary | ICD-10-CM

## 2020-12-08 PROCEDURE — 99213 OFFICE O/P EST LOW 20 MIN: CPT | Performed by: FAMILY MEDICINE

## 2020-12-10 LAB
LEFT EYE DIABETIC RETINOPATHY: NORMAL
RIGHT EYE DIABETIC RETINOPATHY: NORMAL

## 2021-01-09 ENCOUNTER — TELEPHONE (OUTPATIENT)
Dept: FAMILY MEDICINE CLINIC | Facility: CLINIC | Age: 71
End: 2021-01-09

## 2021-01-09 DIAGNOSIS — E78.5 HYPERLIPIDEMIA, UNSPECIFIED HYPERLIPIDEMIA TYPE: ICD-10-CM

## 2021-01-09 DIAGNOSIS — E78.01 FAMILIAL HYPERCHOLESTEROLEMIA: ICD-10-CM

## 2021-01-09 DIAGNOSIS — I10 ESSENTIAL HYPERTENSION: ICD-10-CM

## 2021-01-09 LAB
ALBUMIN SERPL-MCNC: 4.3 G/DL (ref 3.6–5.1)
ALBUMIN/GLOB SERPL: 1.4 (CALC) (ref 1–2.5)
ALP SERPL-CCNC: 43 U/L (ref 35–144)
ALT SERPL-CCNC: 26 U/L (ref 9–46)
AST SERPL-CCNC: 21 U/L (ref 10–35)
BILIRUB SERPL-MCNC: 0.8 MG/DL (ref 0.2–1.2)
BUN SERPL-MCNC: 15 MG/DL (ref 7–25)
BUN/CREAT SERPL: ABNORMAL (CALC) (ref 6–22)
CALCIUM SERPL-MCNC: 9.6 MG/DL (ref 8.6–10.3)
CHLORIDE SERPL-SCNC: 102 MMOL/L (ref 98–110)
CHOLEST SERPL-MCNC: 118 MG/DL
CHOLEST/HDLC SERPL: 2.5 (CALC)
CO2 SERPL-SCNC: 29 MMOL/L (ref 20–32)
CREAT SERPL-MCNC: 0.93 MG/DL (ref 0.7–1.25)
GLOBULIN SER CALC-MCNC: 3 G/DL (CALC) (ref 1.9–3.7)
GLUCOSE SERPL-MCNC: 130 MG/DL (ref 65–99)
HBA1C MFR BLD: 6.3 % OF TOTAL HGB
HDLC SERPL-MCNC: 47 MG/DL
LDLC SERPL CALC-MCNC: 45 MG/DL (CALC)
NONHDLC SERPL-MCNC: 71 MG/DL (CALC)
POTASSIUM SERPL-SCNC: 4.2 MMOL/L (ref 3.5–5.3)
PROT SERPL-MCNC: 7.3 G/DL (ref 6.1–8.1)
PSA SERPL-MCNC: 0.6 NG/ML
SL AMB EGFR AFRICAN AMERICAN: 97 ML/MIN/1.73M2
SL AMB EGFR NON AFRICAN AMERICAN: 83 ML/MIN/1.73M2
SODIUM SERPL-SCNC: 139 MMOL/L (ref 135–146)
TRIGL SERPL-MCNC: 183 MG/DL

## 2021-01-09 RX ORDER — FENOFIBRIC ACID 135 MG/1
1 CAPSULE, DELAYED RELEASE ORAL DAILY
Qty: 90 CAPSULE | Refills: 0 | Status: SHIPPED | OUTPATIENT
Start: 2021-01-09 | End: 2021-04-13 | Stop reason: SDUPTHER

## 2021-01-09 RX ORDER — AMLODIPINE BESYLATE 5 MG/1
5 TABLET ORAL DAILY
Qty: 90 TABLET | Refills: 1 | Status: SHIPPED | OUTPATIENT
Start: 2021-01-09 | End: 2021-07-09 | Stop reason: SDUPTHER

## 2021-01-09 RX ORDER — VALSARTAN 160 MG/1
160 TABLET ORAL DAILY
Qty: 90 TABLET | Refills: 1 | Status: SHIPPED | OUTPATIENT
Start: 2021-01-09 | End: 2021-07-09 | Stop reason: SDUPTHER

## 2021-01-09 NOTE — TELEPHONE ENCOUNTER
----- Message from Shilpa Tariq MD sent at 1/9/2021  8:40 AM EST -----  Call patient with lab result- labs look good, set of 6 month diabetic visit

## 2021-02-10 DIAGNOSIS — Z79.01 ANTICOAGULANT LONG-TERM USE: ICD-10-CM

## 2021-02-12 ENCOUNTER — OFFICE VISIT (OUTPATIENT)
Dept: FAMILY MEDICINE CLINIC | Facility: CLINIC | Age: 71
End: 2021-02-12
Payer: MEDICARE

## 2021-02-12 VITALS
OXYGEN SATURATION: 98 % | DIASTOLIC BLOOD PRESSURE: 70 MMHG | TEMPERATURE: 97.6 F | BODY MASS INDEX: 31.08 KG/M2 | SYSTOLIC BLOOD PRESSURE: 130 MMHG | WEIGHT: 222 LBS | HEIGHT: 71 IN | HEART RATE: 83 BPM

## 2021-02-12 DIAGNOSIS — E11.9 TYPE 2 DIABETES MELLITUS WITHOUT COMPLICATION, WITHOUT LONG-TERM CURRENT USE OF INSULIN (HCC): Primary | ICD-10-CM

## 2021-02-12 DIAGNOSIS — Z86.718 HISTORY OF DVT (DEEP VEIN THROMBOSIS): ICD-10-CM

## 2021-02-12 DIAGNOSIS — N13.8 BPH WITH OBSTRUCTION/LOWER URINARY TRACT SYMPTOMS: ICD-10-CM

## 2021-02-12 DIAGNOSIS — E11.69 HYPERLIPIDEMIA ASSOCIATED WITH TYPE 2 DIABETES MELLITUS (HCC): ICD-10-CM

## 2021-02-12 DIAGNOSIS — I87.2 CHRONIC VENOUS INSUFFICIENCY: ICD-10-CM

## 2021-02-12 DIAGNOSIS — E78.5 HYPERLIPIDEMIA ASSOCIATED WITH TYPE 2 DIABETES MELLITUS (HCC): ICD-10-CM

## 2021-02-12 DIAGNOSIS — N40.1 BPH WITH OBSTRUCTION/LOWER URINARY TRACT SYMPTOMS: ICD-10-CM

## 2021-02-12 DIAGNOSIS — I10 ESSENTIAL HYPERTENSION: ICD-10-CM

## 2021-02-12 DIAGNOSIS — Z23 NEED FOR INFLUENZA VACCINATION: ICD-10-CM

## 2021-02-12 PROCEDURE — 99214 OFFICE O/P EST MOD 30 MIN: CPT | Performed by: FAMILY MEDICINE

## 2021-02-12 PROCEDURE — G0008 ADMIN INFLUENZA VIRUS VAC: HCPCS | Performed by: FAMILY MEDICINE

## 2021-02-12 PROCEDURE — 90662 IIV NO PRSV INCREASED AG IM: CPT | Performed by: FAMILY MEDICINE

## 2021-02-12 PROCEDURE — 96372 THER/PROPH/DIAG INJ SC/IM: CPT | Performed by: FAMILY MEDICINE

## 2021-02-12 RX ORDER — ATORVASTATIN CALCIUM 40 MG/1
40 TABLET, FILM COATED ORAL
Qty: 90 TABLET | Refills: 1 | Status: SHIPPED | OUTPATIENT
Start: 2021-02-12 | End: 2021-08-12 | Stop reason: SDUPTHER

## 2021-02-12 RX ORDER — METFORMIN HYDROCHLORIDE 500 MG/1
500 TABLET, EXTENDED RELEASE ORAL 2 TIMES DAILY WITH MEALS
Qty: 180 TABLET | Refills: 1 | Status: SHIPPED | OUTPATIENT
Start: 2021-02-12 | End: 2021-07-09 | Stop reason: SDUPTHER

## 2021-02-12 NOTE — PATIENT INSTRUCTIONS
Continue current medications  Repeat labs to include BMP, A1c, and urine microalbumin in 6 months  Office visit after labs are done  Follow-up eye doctor  Okay to postpone colonoscopy until your wife is doing better  Continue to work on diet, exercise, weight loss  Pre register on [employer] for COVID vaccine

## 2021-02-12 NOTE — PROGRESS NOTES
[address]        NAME: Sara Hdz is a 72 y o  male  : 1950    MRN: [id number]  DATE: 2021  TIME: 4:43 PM    Assessment and Plan   Type 2 diabetes mellitus without complication, without long-term current use of insulin ([facility] ) [E11 9]  1  Type 2 diabetes mellitus without complication, without long-term current use of insulin (HCC)  atorvastatin (LIPITOR) 40 mg tablet    metFORMIN (GLUCOPHAGE-XR) 500 mg 24 hr tablet    Basic metabolic panel    Hemoglobin A1C With EAG    Microalbumin, Random Urine (W/Creatinine)    Basic metabolic panel    Hemoglobin A1C With EAG    Microalbumin, Random Urine (W/Creatinine)   2  History of DVT (deep vein thrombosis)     3  BPH with obstruction/lower urinary tract symptoms     4  Essential hypertension     5  Chronic venous insufficiency     6  Familial hypercholesterolemia  atorvastatin (LIPITOR) 40 mg tablet   7  Hyperlipidemia associated with type 2 diabetes mellitus ([facility] [address] )     8  Need for influenza vaccination  influenza vaccine, high-dose, PF 0 7 mL (FLUZONE HIGH-DOSE)       No problem-specific Assessment & Plan notes found for this encounter  Patient Instructions     Patient Instructions   Continue current medications  Repeat labs to include BMP, A1c, and urine microalbumin in 6 months  Office visit after labs are done  Follow-up eye doctor  Okay to postpone colonoscopy until your wife is doing better  Continue to work on diet, exercise, weight loss  Pre register on [employer] for COVID vaccine  Chief Complaint     Chief Complaint   Patient presents with    Follow-up     MM    Flu Vaccine         History of Present Illness       Medical management-6 month check  1) diabetes-good control current medications  2) hyperlipidemia-better levels with current meds  3) hypertension good control current medications  4) BPH stable symptom pattern    5) history of DVTs right leg with chronic venous insufficiency  Review of Systems   Review of Systems   Constitutional: Negative for activity change, appetite change, diaphoresis and fatigue  Respiratory: Negative for cough, chest tightness, shortness of breath and wheezing  Cardiovascular: Positive for leg swelling  Negative for chest pain and palpitations  Fast or slow heart rate   Gastrointestinal: Negative for abdominal pain, blood in stool, constipation, diarrhea, nausea and vomiting  Genitourinary: Negative for difficulty urinating, dysuria and frequency  Musculoskeletal: Negative for arthralgias, gait problem, joint swelling and myalgias  Neurological: Negative for dizziness, light-headedness, numbness and headaches  Psychiatric/Behavioral: Negative for agitation, confusion, dysphoric mood and sleep disturbance  The patient is not nervous/anxious            Current Medications       Current Outpatient Medications:     amLODIPine (NORVASC) 5 mg tablet, Take 1 tablet (5 mg total) by mouth daily For blood pressure, Disp: 90 tablet, Rfl: 1    ASPIRIN LOW DOSE 81 MG EC tablet, TAKE ONE TABLET BY MOUTH DAILY, Disp: 30 tablet, Rfl: 11    atorvastatin (LIPITOR) 40 mg tablet, Take 1 tablet (40 mg total) by mouth daily with dinner, Disp: 90 tablet, Rfl: 1    Choline Fenofibrate (Fenofibric Acid) 135 MG CPDR, Take 1 capsule (135 mg total) by mouth daily, Disp: 90 capsule, Rfl: 0    metFORMIN (GLUCOPHAGE-XR) 500 mg 24 hr tablet, Take 1 tablet (500 mg total) by mouth 2 (two) times a day with meals, Disp: 180 tablet, Rfl: 1    rivaroxaban (XARELTO) 20 mg tablet, Take 1 tablet (20 mg total) by mouth daily with breakfast, Disp: 90 tablet, Rfl: 1    valsartan (DIOVAN) 160 mg tablet, Take 1 tablet (160 mg total) by mouth daily, Disp: 90 tablet, Rfl: 1    acetaminophen (TYLENOL) 500 mg tablet, Take 500 mg by mouth every 6 (six) hours as needed for mild pain, Disp: , Rfl:     Current Allergies     Allergies as of 02/12/2021    (No Known Allergies)            The following portions of the patient's history were reviewed and updated as appropriate: allergies, current medications, past family history, past medical history, past social history, past surgical history and problem list      Past Medical History:   Diagnosis Date    DVT (deep venous thrombosis) ([facility] [address] )     Gross hematuria     TARA [facility] 3/29/16   R    4/3/17     [facility]    R    4/3/17    Hyperlipidemia     Hypertension     Long-term (current) use of anticoagulants     TARA [facility] 3/29/16   R    4/3/17     Seasonal allergies        Past Surgical History:   Procedure Laterality Date    APPENDECTOMY      WISDOM TOOTH EXTRACTION         Family History   Problem Relation Age of Onset    Hypertension Mother     Leukemia Father     Heart disease Father     Alcohol abuse Father     Depression Father     Heart disease Family     Leukemia Family     Depression Sister     Alcohol abuse Brother     Depression Brother          Medications have been verified  Objective   /70 (BP Location: Left arm, Patient Position: Sitting, Cuff Size: Standard)   Pulse 83   Temp 97 6 °F (36 4 °C) (Temporal)   Ht 5' 11" (1 803 m)   Wt 101 kg (222 lb)   SpO2 98%   BMI 30 96 kg/m²        Physical Exam     Physical Exam  Vitals signs reviewed  Constitutional:       General: He is not in acute distress  Appearance: He is well-developed  He is not diaphoretic  HENT:      Head: Normocephalic and atraumatic  Right Ear: Tympanic membrane, ear canal and external ear normal       Left Ear: Tympanic membrane, ear canal and external ear normal       Nose: No mucosal edema or rhinorrhea  Right Sinus: No maxillary sinus tenderness  Left Sinus: No maxillary sinus tenderness  Mouth/Throat:      Mouth: Mucous membranes are not pale and not dry  Dentition: Normal dentition  Pharynx: Uvula midline  No oropharyngeal exudate     Eyes:      General:         Right eye: No discharge  Left eye: No discharge  Pupils: Pupils are equal, round, and reactive to light  Neck:      Musculoskeletal: Normal range of motion and neck supple  Thyroid: No thyromegaly  Cardiovascular:      Rate and Rhythm: Normal rate and regular rhythm  Heart sounds: Normal heart sounds  No murmur  Pulmonary:      Effort: Pulmonary effort is normal  No respiratory distress  Breath sounds: Normal breath sounds  No wheezing or rales  Musculoskeletal: Normal range of motion  General: No tenderness  Right lower leg: No edema  Left lower leg: Edema present  Lymphadenopathy:      Cervical: No cervical adenopathy  Skin:     Comments: Left lower leg positive varicosities and some venous stasis dermatitis  Neurological:      Mental Status: He is alert and oriented to person, place, and time  Cranial Nerves: Cranial nerve deficit present     Psychiatric:         Mood and Affect: Mood normal          Behavior: Behavior normal

## 2021-03-29 DIAGNOSIS — Z23 ENCOUNTER FOR IMMUNIZATION: ICD-10-CM

## 2021-04-11 ENCOUNTER — IMMUNIZATIONS (OUTPATIENT)
Dept: FAMILY MEDICINE CLINIC | Facility: HOSPITAL | Age: 71
End: 2021-04-11

## 2021-04-11 DIAGNOSIS — Z23 ENCOUNTER FOR IMMUNIZATION: Primary | ICD-10-CM

## 2021-04-11 PROCEDURE — 91300 SARS-COV-2 / COVID-19 MRNA VACCINE (PFIZER-BIONTECH) 30 MCG: CPT

## 2021-04-11 PROCEDURE — 0001A SARS-COV-2 / COVID-19 MRNA VACCINE (PFIZER-BIONTECH) 30 MCG: CPT

## 2021-04-13 DIAGNOSIS — E78.01 FAMILIAL HYPERCHOLESTEROLEMIA: ICD-10-CM

## 2021-04-13 DIAGNOSIS — E78.5 HYPERLIPIDEMIA, UNSPECIFIED HYPERLIPIDEMIA TYPE: ICD-10-CM

## 2021-04-13 RX ORDER — FENOFIBRIC ACID 135 MG/1
1 CAPSULE, DELAYED RELEASE ORAL DAILY
Qty: 90 CAPSULE | Refills: 0 | Status: SHIPPED | OUTPATIENT
Start: 2021-04-13 | End: 2021-07-20 | Stop reason: SDUPTHER

## 2021-04-13 NOTE — TELEPHONE ENCOUNTER
Approve    Repeat labs to include BMP, A1c, and urine microalbumin in 6 months    Office visit after labs are done ---8/2021

## 2021-05-03 ENCOUNTER — IMMUNIZATIONS (OUTPATIENT)
Dept: FAMILY MEDICINE CLINIC | Facility: HOSPITAL | Age: 71
End: 2021-05-03

## 2021-05-03 DIAGNOSIS — Z23 ENCOUNTER FOR IMMUNIZATION: Primary | ICD-10-CM

## 2021-05-03 PROCEDURE — 91300 SARS-COV-2 / COVID-19 MRNA VACCINE (PFIZER-BIONTECH) 30 MCG: CPT

## 2021-05-03 PROCEDURE — 0002A SARS-COV-2 / COVID-19 MRNA VACCINE (PFIZER-BIONTECH) 30 MCG: CPT

## 2021-07-09 DIAGNOSIS — I10 ESSENTIAL HYPERTENSION: ICD-10-CM

## 2021-07-09 DIAGNOSIS — E11.9 TYPE 2 DIABETES MELLITUS WITHOUT COMPLICATION, WITHOUT LONG-TERM CURRENT USE OF INSULIN (HCC): ICD-10-CM

## 2021-07-09 RX ORDER — METFORMIN HYDROCHLORIDE 500 MG/1
500 TABLET, EXTENDED RELEASE ORAL 2 TIMES DAILY WITH MEALS
Qty: 180 TABLET | Refills: 1 | Status: SHIPPED | OUTPATIENT
Start: 2021-07-09 | End: 2022-02-02

## 2021-07-09 RX ORDER — VALSARTAN 160 MG/1
160 TABLET ORAL DAILY
Qty: 90 TABLET | Refills: 1 | Status: SHIPPED | OUTPATIENT
Start: 2021-07-09 | End: 2022-01-02

## 2021-07-09 RX ORDER — AMLODIPINE BESYLATE 5 MG/1
5 TABLET ORAL DAILY
Qty: 90 TABLET | Refills: 1 | Status: SHIPPED | OUTPATIENT
Start: 2021-07-09 | End: 2022-01-02

## 2021-07-20 DIAGNOSIS — E78.01 FAMILIAL HYPERCHOLESTEROLEMIA: ICD-10-CM

## 2021-07-20 DIAGNOSIS — E78.5 HYPERLIPIDEMIA, UNSPECIFIED HYPERLIPIDEMIA TYPE: ICD-10-CM

## 2021-07-20 RX ORDER — FENOFIBRIC ACID 135 MG/1
1 CAPSULE, DELAYED RELEASE ORAL DAILY
Qty: 90 CAPSULE | Refills: 0 | Status: SHIPPED | OUTPATIENT
Start: 2021-07-20 | End: 2021-10-20 | Stop reason: SDUPTHER

## 2021-07-30 ENCOUNTER — TELEPHONE (OUTPATIENT)
Dept: FAMILY MEDICINE CLINIC | Facility: CLINIC | Age: 71
End: 2021-07-30

## 2021-07-30 LAB
ALBUMIN/CREAT UR: 17 MCG/MG CREAT
BUN SERPL-MCNC: 17 MG/DL (ref 7–25)
BUN/CREAT SERPL: ABNORMAL (CALC) (ref 6–22)
CALCIUM SERPL-MCNC: 9.9 MG/DL (ref 8.6–10.3)
CHLORIDE SERPL-SCNC: 101 MMOL/L (ref 98–110)
CO2 SERPL-SCNC: 24 MMOL/L (ref 20–32)
CREAT SERPL-MCNC: 1.13 MG/DL (ref 0.7–1.18)
CREAT UR-MCNC: 228 MG/DL (ref 20–320)
EST. AVERAGE GLUCOSE BLD GHB EST-MCNC: 128 (CALC)
EST. AVERAGE GLUCOSE BLD GHB EST-SCNC: 7.1 (CALC)
GLUCOSE SERPL-MCNC: 179 MG/DL (ref 65–99)
HBA1C MFR BLD: 6.1 % OF TOTAL HGB
MICROALBUMIN UR-MCNC: 3.9 MG/DL
POTASSIUM SERPL-SCNC: 4.3 MMOL/L (ref 3.5–5.3)
SL AMB EGFR AFRICAN AMERICAN: 76 ML/MIN/1.73M2
SL AMB EGFR NON AFRICAN AMERICAN: 65 ML/MIN/1.73M2
SODIUM SERPL-SCNC: 138 MMOL/L (ref 135–146)

## 2021-07-30 NOTE — TELEPHONE ENCOUNTER
----- Message from Yuan Jaramillo MD sent at 7/30/2021 12:16 PM EDT -----  Results reviewed-will discuss at scheduled appointment-no changes needed now

## 2021-08-12 ENCOUNTER — OFFICE VISIT (OUTPATIENT)
Dept: FAMILY MEDICINE CLINIC | Facility: CLINIC | Age: 71
End: 2021-08-12
Payer: MEDICARE

## 2021-08-12 VITALS
TEMPERATURE: 98.2 F | WEIGHT: 225.2 LBS | SYSTOLIC BLOOD PRESSURE: 138 MMHG | DIASTOLIC BLOOD PRESSURE: 88 MMHG | OXYGEN SATURATION: 97 % | HEART RATE: 90 BPM | HEIGHT: 71 IN | BODY MASS INDEX: 31.53 KG/M2

## 2021-08-12 DIAGNOSIS — Z86.718 HISTORY OF DVT (DEEP VEIN THROMBOSIS): ICD-10-CM

## 2021-08-12 DIAGNOSIS — Z00.00 MEDICARE ANNUAL WELLNESS VISIT, SUBSEQUENT: Primary | ICD-10-CM

## 2021-08-12 DIAGNOSIS — E11.69 HYPERLIPIDEMIA ASSOCIATED WITH TYPE 2 DIABETES MELLITUS (HCC): ICD-10-CM

## 2021-08-12 DIAGNOSIS — I87.2 CHRONIC VENOUS INSUFFICIENCY: ICD-10-CM

## 2021-08-12 DIAGNOSIS — Z12.5 ENCOUNTER FOR PROSTATE CANCER SCREENING: ICD-10-CM

## 2021-08-12 DIAGNOSIS — I10 ESSENTIAL HYPERTENSION: ICD-10-CM

## 2021-08-12 DIAGNOSIS — Z79.01 ANTICOAGULANT LONG-TERM USE: ICD-10-CM

## 2021-08-12 DIAGNOSIS — E11.9 TYPE 2 DIABETES MELLITUS WITHOUT COMPLICATION, WITHOUT LONG-TERM CURRENT USE OF INSULIN (HCC): ICD-10-CM

## 2021-08-12 DIAGNOSIS — E78.5 HYPERLIPIDEMIA ASSOCIATED WITH TYPE 2 DIABETES MELLITUS (HCC): ICD-10-CM

## 2021-08-12 PROCEDURE — 99214 OFFICE O/P EST MOD 30 MIN: CPT | Performed by: FAMILY MEDICINE

## 2021-08-12 PROCEDURE — G0439 PPPS, SUBSEQ VISIT: HCPCS | Performed by: FAMILY MEDICINE

## 2021-08-12 PROCEDURE — 1123F ACP DISCUSS/DSCN MKR DOCD: CPT | Performed by: FAMILY MEDICINE

## 2021-08-12 RX ORDER — ATORVASTATIN CALCIUM 40 MG/1
40 TABLET, FILM COATED ORAL
Qty: 90 TABLET | Refills: 1 | Status: SHIPPED | OUTPATIENT
Start: 2021-08-12 | End: 2021-09-20 | Stop reason: SDUPTHER

## 2021-08-12 NOTE — PROGRESS NOTES
Assessment and Plan:     Problem List Items Addressed This Visit     None      Visit Diagnoses     Medicare annual wellness visit, subsequent    -  Primary        BMI Counseling: Body mass index is 31 41 kg/m²  The BMI is above normal  Nutrition recommendations include decreasing portion sizes and moderation in carbohydrate intake  Exercise recommendations include moderate physical activity 150 minutes/week  No pharmacotherapy was ordered  Preventive health issues were discussed with patient, and age appropriate screening tests were ordered as noted in patient's After Visit Summary  Personalized health advice and appropriate referrals for health education or preventive services given if needed, as noted in patient's After Visit Summary  History of Present Illness:     Patient presents for Medicare Annual Wellness visit    Patient Care Team:  Oneal Moran MD as PCP - General  Ehsan Martinez MD     Problem List:     Patient Active Problem List   Diagnosis    Essential hypertension    Type 2 diabetes mellitus without complication, without long-term current use of insulin ([facility] )    BPH with obstruction/lower urinary tract symptoms    Chronic venous insufficiency    Varicose veins with swelling    Medicare annual wellness visit, initial    History of DVT (deep vein thrombosis)    Anticoagulant long-term use    Arthritis of knee    Numbness    TIA (transient ischemic attack)    Hyperlipidemia associated with type 2 diabetes mellitus [facility])      Past Medical and Surgical History:     Past Medical History:   Diagnosis Date    DVT (deep venous thrombosis) ([facility] )     Gross hematuria     TARA Womack 3/29/16   R    4/3/17     Hematuria     TARA Womack 4/12/16    R    4/3/17    Hyperlipidemia     Hypertension     Long-term (current) use of anticoagulants     TARA Womack 3/29/16   R    4/3/17     Seasonal allergies      Past Surgical History:   Procedure Laterality Date    APPENDECTOMY      WISDOM TOOTH EXTRACTION Family History:     Family History   Problem Relation Age of Onset    Hypertension Mother     Leukemia Father     Heart disease Father     Alcohol abuse Father     Depression Father     Heart disease Family     Leukemia Family     Depression Sister     Alcohol abuse Brother     Depression Brother       Social History:     Social History     Socioeconomic History    Marital status: /Civil Union     Spouse name: Raheel Aceves Number of children: 2    Years of education: None    Highest education level: None   Occupational History    Occupation: Retired   Tobacco Use    Smoking status: Former Smoker     Types: Cigarettes     Quit date:      Years since quittin 6    Smokeless tobacco: Never Used   Vaping Use    Vaping Use: Every day    Substances: THC   Substance and Sexual Activity    Alcohol use: Yes     Alcohol/week: 4 0 standard drinks     Types: 4 Cans of beer per week     Comment: social    Drug use: Yes     Types: Marijuana    Sexual activity: Yes     Partners: Female   Other Topics Concern    None   Social History Narrative    Caffeine use     Social Determinants of Health     Financial Resource Strain:     Difficulty of Paying Living Expenses:    Food Insecurity:     Worried About Running Out of Food in the Last Year:     Ran Out of Food in the Last Year:    Transportation Needs:     Lack of Transportation (Medical):      Lack of Transportation (Non-Medical):    Physical Activity:     Days of Exercise per Week:     Minutes of Exercise per Session:    Stress:     Feeling of Stress :    Social Connections:     Frequency of Communication with Friends and Family:     Frequency of Social Gatherings with Friends and Family:     Attends Jain Services:     Active Member of Clubs or Organizations:     Attends Club or Organization Meetings:     Marital Status:    Intimate Partner Violence:     Fear of Current or Ex-Partner:     Emotionally Abused:     Physically Abused:     Sexually Abused:       Medications and Allergies:     Current Outpatient Medications   Medication Sig Dispense Refill    acetaminophen (TYLENOL) 500 mg tablet Take 500 mg by mouth every 6 (six) hours as needed for mild pain      amLODIPine (NORVASC) 5 mg tablet Take 1 tablet (5 mg total) by mouth daily For blood pressure 90 tablet 1    ASPIRIN LOW DOSE 81 MG EC tablet TAKE ONE TABLET BY MOUTH DAILY 30 tablet 11    atorvastatin (LIPITOR) 40 mg tablet Take 1 tablet (40 mg total) by mouth daily with dinner 90 tablet 1    Choline Fenofibrate (Fenofibric Acid) 135 MG CPDR Take 1 capsule (135 mg total) by mouth daily 90 capsule 0    metFORMIN (GLUCOPHAGE-XR) 500 mg 24 hr tablet Take 1 tablet (500 mg total) by mouth 2 (two) times a day with meals 180 tablet 1    rivaroxaban (XARELTO) 20 mg tablet Take 1 tablet (20 mg total) by mouth daily with breakfast 90 tablet 1    valsartan (DIOVAN) 160 mg tablet Take 1 tablet (160 mg total) by mouth daily 90 tablet 1     No current facility-administered medications for this visit  No Known Allergies   Immunizations:     Immunization History   Administered Date(s) Administered    INFLUENZA 11/24/2014    Influenza Quadrivalent Preservative Free 3 years and older IM 11/25/2015, 04/03/2017    Influenza, high dose seasonal 0 7 mL 12/03/2018, 01/13/2020, 02/12/2021    Pneumococcal Conjugate 13-Valent 04/03/2017    Pneumococcal Polysaccharide PPV23 12/03/2018    SARS-CoV-2 / COVID-19 mRNA IM (Acteavo) 04/11/2021, 05/03/2021    Tdap 06/17/2015      Health Maintenance:         Topic Date Due    Hepatitis C Screening  Never done    Colorectal Cancer Screening  05/17/2020         Topic Date Due    Influenza Vaccine (1) 09/01/2021      Medicare Health Risk Assessment:     Pulse 90   Temp 98 2 °F (36 8 °C) (Oral)   Ht 5' 11" (1 803 m)   Wt 102 kg (225 lb 3 2 oz)   SpO2 97%   BMI 31 41 kg/m²      Adry Pena is here for his Subsequent Wellness visit   Last Medicare Wellness visit information reviewed, patient interviewed, no change since last AWV  Health Risk Assessment:   Patient rates overall health as good  Patient feels that their physical health rating is same  Patient is satisfied with their life  Eyesight was rated as same  Hearing was rated as same  Patient feels that their emotional and mental health rating is same  Patients states they are never, rarely angry  Patient states they are never, rarely unusually tired/fatigued  Pain experienced in the last 7 days has been none  Patient states that he has experienced no weight loss or gain in last 6 months  Depression Screening:   PHQ-2 Score: 0      Fall Risk Screening: In the past year, patient has experienced: no history of falling in past year      Home Safety:  Patient does not have trouble with stairs inside or outside of their home  Patient has working smoke alarms and has no working carbon monoxide detector  Home safety hazards include: none  Nutrition:   Current diet is Diabetic  Medications:   Patient is not currently taking any over-the-counter supplements  Patient is able to manage medications  Activities of Daily Living (ADLs)/Instrumental Activities of Daily Living (IADLs):   Walk and transfer into and out of bed and chair?: Yes  Dress and groom yourself?: Yes    Bathe or shower yourself?: Yes    Feed yourself?  Yes  Do your laundry/housekeeping?: Yes  Manage your money, pay your bills and track your expenses?: Yes  Make your own meals?: Yes    Do your own shopping?: Yes    Previous Hospitalizations:   Any hospitalizations or ED visits within the last 12 months?: No      Advance Care Planning:   Living will: No    Durable POA for healthcare: No    Five wishes given: Yes      Cognitive Screening:   Provider or family/friend/caregiver concerned regarding cognition?: No    PREVENTIVE SCREENINGS      Cardiovascular Screening:    General: Screening Not Indicated and History Lipid Disorder      Diabetes Screening:     General: Screening Not Indicated and History Diabetes      Prostate Cancer Screening:    General: Screening Current      Osteoporosis Screening:    General: Screening Not Indicated      Abdominal Aortic Aneurysm (AAA) Screening:    Risk factors include: age between 73-67 yo and tobacco use        Lung Cancer Screening:     General: Screening Not Indicated      Hepatitis C Screening:    General: Risks and Benefits Discussed      Preventive Screening Comments: Donates blood regularly    Screening, Brief Intervention, and Referral to Treatment (SBIRT)    Screening    Typical number of drinks in a week: 4    Single Item Drug Screening:  How often have you used an illegal drug (including marijuana) or a prescription medication for non-medical reasons in the past year? never    Single Item Drug Screen Score: 0  Interpretation: Negative screen for possible drug use disorder      Jaxson Gonzales MD

## 2021-08-12 NOTE — PATIENT INSTRUCTIONS
Medical management-continue current medications  Recheck labs as ordered in 6 months  Schedule colonoscopy  Medicare Preventive Visit Patient Instructions  Thank you for completing your Welcome to Medicare Visit or Medicare Annual Wellness Visit today  Your next wellness visit will be due in one year (8/13/2022)  The screening/preventive services that you may require over the next 5-10 years are detailed below  Some tests may not apply to you based off risk factors and/or age  Screening tests ordered at today's visit but not completed yet may show as past due  Also, please note that scanned in results may not display below  Preventive Screenings:  Service Recommendations Previous Testing/Comments   Colorectal Cancer Screening  · Colonoscopy    · Fecal Occult Blood Test (FOBT)/Fecal Immunochemical Test (FIT)  · Fecal DNA/Cologuard Test  · Flexible Sigmoidoscopy Age: 54-65 years old   Colonoscopy: every 10 years (May be performed more frequently if at higher risk)  OR  FOBT/FIT: every 1 year  OR  Cologuard: every 3 years  OR  Sigmoidoscopy: every 5 years  Screening may be recommended earlier than age 48 if at higher risk for colorectal cancer  Also, an individualized decision between you and your healthcare provider will decide whether screening between the ages of 74-80 would be appropriate   Colonoscopy: 05/17/2010  FOBT/FIT: Not on file  Cologuard: Not on file  Sigmoidoscopy: Not on file          Prostate Cancer Screening Individualized decision between patient and health care provider in men between ages of 53-78   Medicare will cover every 12 months beginning on the day after your 50th birthday PSA: 0 6 ng/mL     Screening Current     Hepatitis C Screening Once for adults born between [facility] [place]  More frequently in patients at high risk for Hepatitis C Hep C Antibody: Not on file        Diabetes Screening 1-2 times per year if you're at risk for diabetes or have pre-diabetes Fasting glucose: 150 mg/dL A1C: 6 1 % of total Hgb    Screening Not Indicated  History Diabetes   Cholesterol Screening Once every 5 years if you don't have a lipid disorder  May order more often based on risk factors  Lipid panel: 01/08/2021    Screening Not Indicated  History Lipid Disorder      Other Preventive Screenings Covered by Medicare:  1  Abdominal Aortic Aneurysm (AAA) Screening: covered once if your at risk  You're considered to be at risk if you have a family history of AAA or a male between the age of 73-68 who smoking at least 100 cigarettes in your lifetime  2  Lung Cancer Screening: covers low dose CT scan once per year if you meet all of the following conditions: (1) Age 50-69; (2) No signs or symptoms of lung cancer; (3) Current smoker or have quit smoking within the last 15 years; (4) You have a tobacco smoking history of at least 30 pack years (packs per day x number of years you smoked); (5) You get a written order from a healthcare provider  3  Glaucoma Screening: covered annually if you're considered high risk: (1) You have diabetes OR (2) Family history of glaucoma OR (3)  aged 48 and older OR (3)  American aged 72 and older  3  Osteoporosis Screening: covered every 2 years if you meet one of the following conditions: (1) Have a vertebral abnormality; (2) On glucocorticoid therapy for more than 3 months; (3) Have primary hyperparathyroidism; (4) On osteoporosis medications and need to assess response to drug therapy  5  HIV Screening: covered annually if you're between the age of 12-76  Also covered annually if you are younger than 13 and older than 72 with risk factors for HIV infection  For pregnant patients, it is covered up to 3 times per pregnancy      Immunizations:  Immunization Recommendations   Influenza Vaccine Annual influenza vaccination during flu season is recommended for all persons aged >= 6 months who do not have contraindications   Pneumococcal Vaccine (Prevnar and Pneumovax)  * Prevnar = PCV13  * Pneumovax = PPSV23 Adults 25-60 years old: 1-3 doses may be recommended based on certain risk factors  Adults 72 years old: Prevnar (PCV13) vaccine recommended followed by Pneumovax (PPSV23) vaccine  If already received PPSV23 since turning 65, then PCV13 recommended at least one year after PPSV23 dose  Hepatitis B Vaccine 3 dose series if at intermediate or high risk (ex: diabetes, end stage renal disease, liver disease)   Tetanus (Td) Vaccine - COST NOT COVERED BY MEDICARE PART B Following completion of primary series, a booster dose should be given every 10 years to maintain immunity against tetanus  Td may also be given as tetanus wound prophylaxis  Tdap Vaccine - COST NOT COVERED BY MEDICARE PART B Recommended at least once for all adults  For pregnant patients, recommended with each pregnancy  Shingles Vaccine (Shingrix) - COST NOT COVERED BY MEDICARE PART B  2 shot series recommended in those aged 48 and above     Health Maintenance Due:      Topic Date Due    Hepatitis C Screening  Never done    Colorectal Cancer Screening  05/17/2020     Immunizations Due:      Topic Date Due    Influenza Vaccine (1) 09/01/2021     Advance Directives   What are advance directives? Advance directives are legal documents that state your wishes and plans for medical care  These plans are made ahead of time in case you lose your ability to make decisions for yourself  Advance directives can apply to any medical decision, such as the treatments you want, and if you want to donate organs  What are the types of advance directives? There are many types of advance directives, and each state has rules about how to use them  You may choose a combination of any of the following:  · Living will: This is a written record of the treatment you want  You can also choose which treatments you do not want, which to limit, and which to stop at a certain time   This includes surgery, medicine, IV fluid, and tube feedings  · Durable power of  for healthcare [facility]): This is a written record that states who you want to make healthcare choices for you when you are unable to make them for yourself  This person, called a proxy, is usually a family member or a friend  You may choose more than 1 proxy  · Do not resuscitate (DNR) order:  A DNR order is used in case your heart stops beating or you stop breathing  It is a request not to have certain forms of treatment, such as CPR  A DNR order may be included in other types of advance directives  · Medical directive: This covers the care that you want if you are in a coma, near death, or unable to make decisions for yourself  You can list the treatments you want for each condition  Treatment may include pain medicine, surgery, blood transfusions, dialysis, IV or tube feedings, and a ventilator (breathing machine)  · Values history: This document has questions about your views, beliefs, and how you feel and think about life  This information can help others choose the care that you would choose  Why are advance directives important? An advance directive helps you control your care  Although spoken wishes may be used, it is better to have your wishes written down  Spoken wishes can be misunderstood, or not followed  Treatments may be given even if you do not want them  An advance directive may make it easier for your family to make difficult choices about your care  Weight Management   Why it is important to manage your weight:  Being overweight increases your risk of health conditions such as heart disease, high blood pressure, type 2 diabetes, and certain types of cancer  It can also increase your risk for osteoarthritis, sleep apnea, and other respiratory problems  Aim for a slow, steady weight loss  Even a small amount of weight loss can lower your risk of health problems    How to lose weight safely:  A safe and healthy way to lose weight is to eat fewer calories and get regular exercise  You can lose up about 1 pound a week by decreasing the number of calories you eat by 500 calories each day  Healthy meal plan for weight management:  A healthy meal plan includes a variety of foods, contains fewer calories, and helps you stay healthy  A healthy meal plan includes the following:  · Eat whole-grain foods more often  A healthy meal plan should contain fiber  Fiber is the part of grains, fruits, and vegetables that is not broken down by your body  Whole-grain foods are healthy and provide extra fiber in your diet  Some examples of whole-grain foods are whole-wheat breads and pastas, oatmeal, brown rice, and bulgur  · Eat a variety of vegetables every day  Include dark, leafy greens such as spinach, kale, emy greens, and mustard greens  Eat yellow and orange vegetables such as carrots, sweet potatoes, and winter squash  · Eat a variety of fruits every day  Choose fresh or canned fruit (canned in its own juice or light syrup) instead of juice  Fruit juice has very little or no fiber  · Eat low-fat dairy foods  Drink fat-free (skim) milk or 1% milk  Eat fat-free yogurt and low-fat cottage cheese  Try low-fat cheeses such as mozzarella and other reduced-fat cheeses  · Choose meat and other protein foods that are low in fat  Choose beans or other legumes such as split peas or lentils  Choose fish, skinless poultry (chicken or turkey), or lean cuts of red meat (beef or pork)  Before you cook meat or poultry, cut off any visible fat  · Use less fat and oil  Try baking foods instead of frying them  Add less fat, such as margarine, sour cream, regular salad dressing and mayonnaise to foods  Eat fewer high-fat foods  Some examples of high-fat foods include french fries, doughnuts, ice cream, and cakes  · Eat fewer sweets  Limit foods and drinks that are high in sugar  This includes candy, cookies, regular soda, and sweetened drinks    Exercise: Exercise at least 30 minutes per day on most days of the week  Some examples of exercise include walking, biking, dancing, and swimming  You can also fit in more physical activity by taking the stairs instead of the elevator or parking farther away from stores  Ask your healthcare provider about the best exercise plan for you  © Copyright [employer] 2018 Information is for End User's use only and may not be sold, redistributed or otherwise used for commercial purposes   All illustrations and images included in CareNotes® are the copyrighted property of [employer]  or [address] [place] [address]

## 2021-08-12 NOTE — PROGRESS NOTES
Subjective:   Chief Complaint   Patient presents with    Medicare Wellness Visit        Patient ID: Donna Argueta is a 80 y o  male  Medical Management- labs reviewed, Meds reconciled  1) Diabetes- good control  2) Hyperlipidemia- good levels  3) HTN-adequate control- slightly high today  4) Longtern Xarelto due to recurrent DVT  5) Chronic venous insufficiency       The following portions of the patient's history were reviewed and updated as appropriate: allergies, current medications, past family history, past medical history, past social history, past surgical history and problem list     Review of Systems   Constitutional: Negative for appetite change, chills, diaphoresis and fever  HENT: Negative for ear pain, rhinorrhea, sinus pressure and sore throat  Eyes: Negative for discharge, redness and itching  Respiratory: Negative for cough, shortness of breath and wheezing  Cardiovascular: Negative for chest pain and palpitations  Rapid or slow heart rate   Gastrointestinal: Negative for abdominal pain, diarrhea, nausea and vomiting  Objective:  Vitals:    08/12/21 1104   Pulse: 90   Temp: 98 2 °F (36 8 °C)   TempSrc: Oral   SpO2: 97%   Weight: 102 kg (225 lb 3 2 oz)   Height: 5' 11" (1 803 m)      Physical Exam  Vitals reviewed  Constitutional:       General: He is not in acute distress  Appearance: He is well-developed  He is not diaphoretic  HENT:      Head: Normocephalic and atraumatic  Right Ear: Tympanic membrane, ear canal and external ear normal       Left Ear: Tympanic membrane, ear canal and external ear normal       Nose: No mucosal edema or rhinorrhea  Right Sinus: No maxillary sinus tenderness  Left Sinus: No maxillary sinus tenderness  Mouth/Throat:      Mouth: Mucous membranes are not pale and not dry  Dentition: Normal dentition  Pharynx: Uvula midline  No oropharyngeal exudate     Eyes:      General:         Right eye: No discharge  Left eye: No discharge  Pupils: Pupils are equal, round, and reactive to light  Neck:      Thyroid: No thyromegaly  Cardiovascular:      Rate and Rhythm: Normal rate and regular rhythm  Pulses: no weak pulses          Dorsalis pedis pulses are 2+ on the right side and 2+ on the left side  Posterior tibial pulses are 2+ on the right side and 2+ on the left side  Heart sounds: Normal heart sounds  No murmur heard  Pulmonary:      Effort: Pulmonary effort is normal  No respiratory distress  Breath sounds: Normal breath sounds  No wheezing or rales  Musculoskeletal:         General: No tenderness  Normal range of motion  Cervical back: Normal range of motion and neck supple  Right lower leg: No edema  Left lower leg: No edema  Feet:      Right foot:      Skin integrity: No ulcer, skin breakdown, erythema, warmth, callus or dry skin  Left foot:      Skin integrity: No ulcer, skin breakdown, erythema, warmth, callus or dry skin  Lymphadenopathy:      Cervical: No cervical adenopathy  Neurological:      Mental Status: He is alert and oriented to person, place, and time  Cranial Nerves: No cranial nerve deficit  Psychiatric:         Mood and Affect: Mood normal          Behavior: Behavior normal        Patient's shoes and socks removed  Right Foot/Ankle   Right Foot Inspection  Skin Exam: skin normal and skin intact no dry skin, no warmth, no callus, no erythema, no maceration, no abnormal color, no pre-ulcer, no ulcer and no callus                            Sensory   Vibration: intact    Monofilament testing: intact  Vascular  Capillary refills: < 3 seconds  The right DP pulse is 2+  The right PT pulse is 2+       Left Foot/Ankle  Left Foot Inspection  Skin Exam: skin normal and skin intactno dry skin, no warmth, no erythema, no maceration, normal color, no pre-ulcer, no ulcer and no callus Sensory   Vibration: intact    Monofilament: intact  Vascular  Capillary refills: < 3 seconds  The left DP pulse is 2+  The left PT pulse is 2+  Assign Risk Category:  No deformity present; No loss of protective sensation; No weak pulses       Risk: 0        Assessment/Plan:    No problem-specific Assessment & Plan notes found for this encounter  Diagnoses and all orders for this visit:    Medicare annual wellness visit, subsequent    Type 2 diabetes mellitus without complication, without long-term current use of insulin (HCC)  -     atorvastatin (LIPITOR) 40 mg tablet; Take 1 tablet (40 mg total) by mouth daily with dinner  -     Hemoglobin A1C With EAG; Future  -     Comprehensive metabolic panel; Future  -     Lipid Panel with Direct LDL reflex; Future  -     Hemoglobin A1C With EAG  -     Comprehensive metabolic panel  -     Lipid Panel with Direct LDL reflex    Hyperlipidemia associated with type 2 diabetes mellitus (HCC)  -     atorvastatin (LIPITOR) 40 mg tablet; Take 1 tablet (40 mg total) by mouth daily with dinner  -     Comprehensive metabolic panel; Future  -     Lipid Panel with Direct LDL reflex; Future  -     Comprehensive metabolic panel  -     Lipid Panel with Direct LDL reflex    Anticoagulant long-term use  -     rivaroxaban (XARELTO) 20 mg tablet; Take 1 tablet (20 mg total) by mouth daily with breakfast    Essential hypertension  -     CBC and differential; Future  -     Comprehensive metabolic panel; Future  -     CBC and differential  -     Comprehensive metabolic panel    Chronic venous insufficiency    History of DVT (deep vein thrombosis)    Encounter for prostate cancer screening  -     PSA, Total Screen; Future  -     PSA, Total Screen        Medical management-continue current medications  Recheck labs as ordered in 6 months  Schedule colonoscopy

## 2021-09-20 DIAGNOSIS — E78.5 HYPERLIPIDEMIA ASSOCIATED WITH TYPE 2 DIABETES MELLITUS (HCC): ICD-10-CM

## 2021-09-20 DIAGNOSIS — E11.9 TYPE 2 DIABETES MELLITUS WITHOUT COMPLICATION, WITHOUT LONG-TERM CURRENT USE OF INSULIN (HCC): ICD-10-CM

## 2021-09-20 DIAGNOSIS — E11.69 HYPERLIPIDEMIA ASSOCIATED WITH TYPE 2 DIABETES MELLITUS (HCC): ICD-10-CM

## 2021-09-20 RX ORDER — ATORVASTATIN CALCIUM 40 MG/1
40 TABLET, FILM COATED ORAL
Qty: 90 TABLET | Refills: 1 | Status: SHIPPED | OUTPATIENT
Start: 2021-09-20 | End: 2022-03-16

## 2021-10-20 DIAGNOSIS — E78.01 FAMILIAL HYPERCHOLESTEROLEMIA: ICD-10-CM

## 2021-10-20 DIAGNOSIS — E78.5 HYPERLIPIDEMIA, UNSPECIFIED HYPERLIPIDEMIA TYPE: ICD-10-CM

## 2021-10-20 RX ORDER — FENOFIBRIC ACID 135 MG/1
1 CAPSULE, DELAYED RELEASE ORAL DAILY
Qty: 90 CAPSULE | Refills: 0 | Status: SHIPPED | OUTPATIENT
Start: 2021-10-20 | End: 2022-01-12 | Stop reason: SDUPTHER

## 2021-12-29 ENCOUNTER — OFFICE VISIT (OUTPATIENT)
Dept: FAMILY MEDICINE CLINIC | Facility: CLINIC | Age: 71
End: 2021-12-29
Payer: MEDICARE

## 2021-12-29 VITALS
OXYGEN SATURATION: 97 % | WEIGHT: 232 LBS | DIASTOLIC BLOOD PRESSURE: 82 MMHG | BODY MASS INDEX: 32.48 KG/M2 | HEIGHT: 71 IN | HEART RATE: 80 BPM | RESPIRATION RATE: 15 BRPM | SYSTOLIC BLOOD PRESSURE: 124 MMHG

## 2021-12-29 DIAGNOSIS — F41.9 ANXIETY: ICD-10-CM

## 2021-12-29 DIAGNOSIS — Z12.11 SCREENING FOR MALIGNANT NEOPLASM OF COLON: Primary | ICD-10-CM

## 2021-12-29 PROCEDURE — 99213 OFFICE O/P EST LOW 20 MIN: CPT | Performed by: FAMILY MEDICINE

## 2022-01-02 DIAGNOSIS — I10 ESSENTIAL HYPERTENSION: ICD-10-CM

## 2022-01-02 RX ORDER — VALSARTAN 160 MG/1
TABLET ORAL
Qty: 90 TABLET | Refills: 1 | Status: SHIPPED | OUTPATIENT
Start: 2022-01-02 | End: 2022-01-12 | Stop reason: SDUPTHER

## 2022-01-02 RX ORDER — AMLODIPINE BESYLATE 5 MG/1
TABLET ORAL
Qty: 90 TABLET | Refills: 1 | Status: SHIPPED | OUTPATIENT
Start: 2022-01-02 | End: 2022-01-12 | Stop reason: SDUPTHER

## 2022-01-10 DIAGNOSIS — I10 ESSENTIAL HYPERTENSION: ICD-10-CM

## 2022-01-10 DIAGNOSIS — E11.69 HYPERLIPIDEMIA ASSOCIATED WITH TYPE 2 DIABETES MELLITUS (HCC): ICD-10-CM

## 2022-01-10 DIAGNOSIS — Z12.5 ENCOUNTER FOR PROSTATE CANCER SCREENING: ICD-10-CM

## 2022-01-10 DIAGNOSIS — E78.5 HYPERLIPIDEMIA ASSOCIATED WITH TYPE 2 DIABETES MELLITUS (HCC): ICD-10-CM

## 2022-01-10 DIAGNOSIS — E11.9 TYPE 2 DIABETES MELLITUS WITHOUT COMPLICATION, WITHOUT LONG-TERM CURRENT USE OF INSULIN (HCC): Primary | ICD-10-CM

## 2022-01-11 LAB
ALBUMIN SERPL-MCNC: 4.6 G/DL (ref 3.8–4.8)
ALBUMIN/GLOB SERPL: 2 {RATIO} (ref 1.2–2.2)
ALP SERPL-CCNC: 53 IU/L (ref 44–121)
ALT SERPL-CCNC: 31 IU/L (ref 0–44)
AST SERPL-CCNC: 32 IU/L (ref 0–40)
BASOPHILS # BLD AUTO: 0 X10E3/UL (ref 0–0.2)
BASOPHILS NFR BLD AUTO: 0 %
BILIRUB SERPL-MCNC: 0.5 MG/DL (ref 0–1.2)
BUN SERPL-MCNC: 13 MG/DL (ref 8–27)
BUN/CREAT SERPL: 13 (ref 10–24)
CALCIUM SERPL-MCNC: 9.5 MG/DL (ref 8.6–10.2)
CHLORIDE SERPL-SCNC: 100 MMOL/L (ref 96–106)
CHOLEST SERPL-MCNC: 121 MG/DL (ref 100–199)
CO2 SERPL-SCNC: 25 MMOL/L (ref 20–29)
CREAT SERPL-MCNC: 1.01 MG/DL (ref 0.76–1.27)
EOSINOPHIL # BLD AUTO: 0.5 X10E3/UL (ref 0–0.4)
EOSINOPHIL NFR BLD AUTO: 5 %
ERYTHROCYTE [DISTWIDTH] IN BLOOD BY AUTOMATED COUNT: 12.8 % (ref 11.6–15.4)
GLOBULIN SER-MCNC: 2.3 G/DL (ref 1.5–4.5)
GLUCOSE SERPL-MCNC: 145 MG/DL (ref 65–99)
HBA1C MFR BLD: 7.2 % (ref 4.8–5.6)
HCT VFR BLD AUTO: 42.7 % (ref 37.5–51)
HDLC SERPL-MCNC: 48 MG/DL
HGB BLD-MCNC: 15.1 G/DL (ref 13–17.7)
IMM GRANULOCYTES # BLD: 0 X10E3/UL (ref 0–0.1)
IMM GRANULOCYTES NFR BLD: 0 %
LDLC SERPL CALC-MCNC: 44 MG/DL (ref 0–99)
LDLC/HDLC SERPL: 0.9 RATIO (ref 0–3.6)
LYMPHOCYTES # BLD AUTO: 2.4 X10E3/UL (ref 0.7–3.1)
LYMPHOCYTES NFR BLD AUTO: 28 %
MCH RBC QN AUTO: 32.8 PG (ref 26.6–33)
MCHC RBC AUTO-ENTMCNC: 35.4 G/DL (ref 31.5–35.7)
MCV RBC AUTO: 93 FL (ref 79–97)
MONOCYTES # BLD AUTO: 0.9 X10E3/UL (ref 0.1–0.9)
MONOCYTES NFR BLD AUTO: 11 %
NEUTROPHILS # BLD AUTO: 4.6 X10E3/UL (ref 1.4–7)
NEUTROPHILS NFR BLD AUTO: 56 %
PLATELET # BLD AUTO: 230 X10E3/UL (ref 150–450)
POTASSIUM SERPL-SCNC: 4.4 MMOL/L (ref 3.5–5.2)
PROT SERPL-MCNC: 6.9 G/DL (ref 6–8.5)
PSA SERPL-MCNC: 0.6 NG/ML (ref 0–4)
RBC # BLD AUTO: 4.6 X10E6/UL (ref 4.14–5.8)
SL AMB EGFR AFRICAN AMERICAN: 87 ML/MIN/1.73
SL AMB EGFR NON AFRICAN AMERICAN: 75 ML/MIN/1.73
SL AMB REFLEX CRITERIA: NORMAL
SL AMB VLDL CHOLESTEROL CALC: 29 MG/DL (ref 5–40)
SODIUM SERPL-SCNC: 138 MMOL/L (ref 134–144)
TRIGL SERPL-MCNC: 174 MG/DL (ref 0–149)
WBC # BLD AUTO: 8.4 X10E3/UL (ref 3.4–10.8)

## 2022-01-11 NOTE — RESULT ENCOUNTER NOTE
Call patient with lab result-sugar/A1cs slightly up  Continue current medication  Looks like he had visit with Dr Jennifer Tracey when I was ill  He should repeat BMP and A1c in 3 months with an office visit after

## 2022-01-12 ENCOUNTER — TELEPHONE (OUTPATIENT)
Dept: FAMILY MEDICINE CLINIC | Facility: CLINIC | Age: 72
End: 2022-01-12

## 2022-01-12 DIAGNOSIS — E78.5 HYPERLIPIDEMIA, UNSPECIFIED HYPERLIPIDEMIA TYPE: ICD-10-CM

## 2022-01-12 DIAGNOSIS — E78.01 FAMILIAL HYPERCHOLESTEROLEMIA: ICD-10-CM

## 2022-01-12 DIAGNOSIS — I10 ESSENTIAL HYPERTENSION: ICD-10-CM

## 2022-01-12 RX ORDER — AMLODIPINE BESYLATE 5 MG/1
5 TABLET ORAL DAILY
Qty: 90 TABLET | Refills: 0 | Status: SHIPPED | OUTPATIENT
Start: 2022-01-12 | End: 2022-04-05

## 2022-01-12 RX ORDER — FENOFIBRIC ACID 135 MG/1
1 CAPSULE, DELAYED RELEASE ORAL DAILY
Qty: 90 CAPSULE | Refills: 0 | Status: SHIPPED | OUTPATIENT
Start: 2022-01-12 | End: 2022-04-15

## 2022-01-12 RX ORDER — VALSARTAN 160 MG/1
160 TABLET ORAL DAILY
Qty: 90 TABLET | Refills: 0 | Status: SHIPPED | OUTPATIENT
Start: 2022-01-12 | End: 2022-04-05

## 2022-01-12 NOTE — TELEPHONE ENCOUNTER
----- Message from Bakari Hernandez MD sent at 1/11/2022  8:10 AM EST -----  Call patient with lab result-sugar/A1cs slightly up  Continue current medication  Looks like he had visit with Dr Maksim Nielsen when I was ill  He should repeat BMP and A1c in 3 months with an office visit after

## 2022-02-02 DIAGNOSIS — E11.9 TYPE 2 DIABETES MELLITUS WITHOUT COMPLICATION, WITHOUT LONG-TERM CURRENT USE OF INSULIN (HCC): ICD-10-CM

## 2022-02-02 DIAGNOSIS — Z79.01 ANTICOAGULANT LONG-TERM USE: ICD-10-CM

## 2022-02-02 RX ORDER — METFORMIN HYDROCHLORIDE 500 MG/1
TABLET, EXTENDED RELEASE ORAL
Qty: 180 TABLET | Refills: 1 | Status: SHIPPED | OUTPATIENT
Start: 2022-02-02 | End: 2022-06-13 | Stop reason: SDUPTHER

## 2022-02-02 RX ORDER — RIVAROXABAN 20 MG/1
TABLET, FILM COATED ORAL
Qty: 90 TABLET | Refills: 1 | Status: SHIPPED | OUTPATIENT
Start: 2022-02-02 | End: 2022-02-08 | Stop reason: SDUPTHER

## 2022-02-05 DIAGNOSIS — Z79.01 ANTICOAGULANT LONG-TERM USE: ICD-10-CM

## 2022-02-05 DIAGNOSIS — E11.9 TYPE 2 DIABETES MELLITUS WITHOUT COMPLICATION, WITHOUT LONG-TERM CURRENT USE OF INSULIN (HCC): ICD-10-CM

## 2022-02-05 RX ORDER — METFORMIN HYDROCHLORIDE 500 MG/1
TABLET, EXTENDED RELEASE ORAL
Qty: 180 TABLET | Refills: 1 | OUTPATIENT
Start: 2022-02-05

## 2022-02-05 RX ORDER — RIVAROXABAN 20 MG/1
TABLET, FILM COATED ORAL
Qty: 90 TABLET | Refills: 1 | OUTPATIENT
Start: 2022-02-05

## 2022-02-08 DIAGNOSIS — Z79.01 ANTICOAGULANT LONG-TERM USE: ICD-10-CM

## 2022-02-27 DIAGNOSIS — E11.9 TYPE 2 DIABETES MELLITUS WITHOUT COMPLICATION, WITHOUT LONG-TERM CURRENT USE OF INSULIN (HCC): ICD-10-CM

## 2022-02-27 RX ORDER — METFORMIN HYDROCHLORIDE 500 MG/1
500 TABLET, EXTENDED RELEASE ORAL 2 TIMES DAILY WITH MEALS
Qty: 180 TABLET | Refills: 0 | Status: CANCELLED | OUTPATIENT
Start: 2022-02-27

## 2022-03-16 DIAGNOSIS — E11.9 TYPE 2 DIABETES MELLITUS WITHOUT COMPLICATION, WITHOUT LONG-TERM CURRENT USE OF INSULIN (HCC): ICD-10-CM

## 2022-03-16 DIAGNOSIS — E78.5 HYPERLIPIDEMIA ASSOCIATED WITH TYPE 2 DIABETES MELLITUS (HCC): ICD-10-CM

## 2022-03-16 DIAGNOSIS — E11.69 HYPERLIPIDEMIA ASSOCIATED WITH TYPE 2 DIABETES MELLITUS (HCC): ICD-10-CM

## 2022-03-16 RX ORDER — ATORVASTATIN CALCIUM 40 MG/1
TABLET, FILM COATED ORAL
Qty: 90 TABLET | Refills: 1 | Status: SHIPPED | OUTPATIENT
Start: 2022-03-16 | End: 2022-06-13 | Stop reason: SDUPTHER

## 2022-04-05 DIAGNOSIS — I10 ESSENTIAL HYPERTENSION: ICD-10-CM

## 2022-04-05 RX ORDER — VALSARTAN 160 MG/1
TABLET ORAL
Qty: 90 TABLET | Refills: 0 | Status: SHIPPED | OUTPATIENT
Start: 2022-04-05 | End: 2022-04-18 | Stop reason: SDUPTHER

## 2022-04-05 RX ORDER — AMLODIPINE BESYLATE 5 MG/1
TABLET ORAL
Qty: 90 TABLET | Refills: 0 | Status: SHIPPED | OUTPATIENT
Start: 2022-04-05 | End: 2022-04-18 | Stop reason: SDUPTHER

## 2022-04-15 DIAGNOSIS — E78.01 FAMILIAL HYPERCHOLESTEROLEMIA: ICD-10-CM

## 2022-04-15 DIAGNOSIS — E78.5 HYPERLIPIDEMIA, UNSPECIFIED HYPERLIPIDEMIA TYPE: ICD-10-CM

## 2022-04-15 RX ORDER — FENOFIBRIC ACID 135 MG/1
CAPSULE, DELAYED RELEASE ORAL
Qty: 90 CAPSULE | Refills: 0 | Status: SHIPPED | OUTPATIENT
Start: 2022-04-15 | End: 2022-07-11

## 2022-04-18 DIAGNOSIS — I10 ESSENTIAL HYPERTENSION: ICD-10-CM

## 2022-04-18 DIAGNOSIS — E78.5 HYPERLIPIDEMIA, UNSPECIFIED HYPERLIPIDEMIA TYPE: ICD-10-CM

## 2022-04-18 DIAGNOSIS — E78.01 FAMILIAL HYPERCHOLESTEROLEMIA: ICD-10-CM

## 2022-04-19 RX ORDER — VALSARTAN 160 MG/1
160 TABLET ORAL DAILY
Qty: 90 TABLET | Refills: 0 | Status: SHIPPED | OUTPATIENT
Start: 2022-04-19 | End: 2022-08-02 | Stop reason: SDUPTHER

## 2022-04-19 RX ORDER — AMLODIPINE BESYLATE 5 MG/1
5 TABLET ORAL DAILY
Qty: 90 TABLET | Refills: 0 | Status: SHIPPED | OUTPATIENT
Start: 2022-04-19 | End: 2022-07-15 | Stop reason: ALTCHOICE

## 2022-04-19 RX ORDER — FENOFIBRIC ACID 135 MG/1
1 CAPSULE, DELAYED RELEASE ORAL DAILY
Qty: 90 CAPSULE | Refills: 0 | OUTPATIENT
Start: 2022-04-19

## 2022-05-03 DIAGNOSIS — Z79.01 ANTICOAGULANT LONG-TERM USE: ICD-10-CM

## 2022-05-03 RX ORDER — RIVAROXABAN 20 MG/1
TABLET, FILM COATED ORAL
Qty: 90 TABLET | Refills: 0 | Status: SHIPPED | OUTPATIENT
Start: 2022-05-03 | End: 2022-08-04

## 2022-05-12 ENCOUNTER — OFFICE VISIT (OUTPATIENT)
Dept: FAMILY MEDICINE CLINIC | Facility: CLINIC | Age: 72
End: 2022-05-12
Payer: MEDICARE

## 2022-05-12 VITALS
TEMPERATURE: 98.2 F | BODY MASS INDEX: 31.92 KG/M2 | DIASTOLIC BLOOD PRESSURE: 84 MMHG | WEIGHT: 228 LBS | SYSTOLIC BLOOD PRESSURE: 136 MMHG | OXYGEN SATURATION: 96 % | HEART RATE: 79 BPM | HEIGHT: 71 IN

## 2022-05-12 DIAGNOSIS — Z79.01 ANTICOAGULANT LONG-TERM USE: ICD-10-CM

## 2022-05-12 DIAGNOSIS — E11.9 TYPE 2 DIABETES MELLITUS WITHOUT COMPLICATION, WITHOUT LONG-TERM CURRENT USE OF INSULIN (HCC): ICD-10-CM

## 2022-05-12 DIAGNOSIS — Z86.718 HISTORY OF DVT (DEEP VEIN THROMBOSIS): ICD-10-CM

## 2022-05-12 DIAGNOSIS — E11.69 HYPERLIPIDEMIA ASSOCIATED WITH TYPE 2 DIABETES MELLITUS (HCC): ICD-10-CM

## 2022-05-12 DIAGNOSIS — I10 ESSENTIAL HYPERTENSION: ICD-10-CM

## 2022-05-12 DIAGNOSIS — H25.813 COMBINED FORMS OF AGE-RELATED CATARACT OF BOTH EYES: ICD-10-CM

## 2022-05-12 DIAGNOSIS — Z01.818 PRE-OP EXAMINATION: Primary | ICD-10-CM

## 2022-05-12 DIAGNOSIS — E78.5 HYPERLIPIDEMIA ASSOCIATED WITH TYPE 2 DIABETES MELLITUS (HCC): ICD-10-CM

## 2022-05-12 PROCEDURE — 99214 OFFICE O/P EST MOD 30 MIN: CPT | Performed by: FAMILY MEDICINE

## 2022-05-12 NOTE — PATIENT INSTRUCTIONS
Patient medically stable  Will clear for proposed cataract surgeries  No pre-admission testing requested by operating surgeon  Patient on long-term anticoagulation, eye doctor has not requested this be stopped

## 2022-05-12 NOTE — PROGRESS NOTES
[id number]         NAME: Douglas Juan is a 80 y o  male  : 1950    MRN: [id number]  DATE: May 12, 2022  TIME: 3:00 PM    Assessment and Plan   Pre-op examination [Z01 818]  1  Pre-op examination     2  Combined forms of age-related cataract of both eyes     3  Essential hypertension  CBC and differential    Comprehensive metabolic panel    CBC and differential    Comprehensive metabolic panel   4  Type 2 diabetes mellitus without complication, without long-term current use of insulin (HCC)  Hemoglobin A1C    Comprehensive metabolic panel    Hemoglobin A1C    Comprehensive metabolic panel   5  History of DVT (deep vein thrombosis)     6  Anticoagulant long-term use     7  Hyperlipidemia associated with type 2 diabetes mellitus ([address] )         No problem-specific Assessment & Plan notes found for this encounter  Patient Instructions     Patient Instructions   Patient medically stable  Will clear for proposed cataract surgeries  No pre-admission testing requested by operating surgeon  Patient on long-term anticoagulation, eye doctor has not requested this be stopped  Chief Complaint     Chief Complaint   Patient presents with    Pre-op Exam         History of Present Illness       Patient here for preoperative examination for cataract surgery  1st procedure is 2022  Operating surgeon Dr Clarissa Gonzales  No preoperative testing was requested by operating surgeon  Review of Systems   Review of Systems   Constitutional: Negative for activity change, appetite change, diaphoresis and fatigue  Eyes: Positive for visual disturbance  Respiratory: Negative for cough, chest tightness, shortness of breath and wheezing  Cardiovascular: Negative for chest pain, palpitations and leg swelling  Fast or slow heart rate   Gastrointestinal: Negative for abdominal pain, blood in stool, constipation, diarrhea, nausea and vomiting     Genitourinary: Negative for difficulty urinating, dysuria, frequency and hematuria  Musculoskeletal: Negative for gait problem  Neurological: Negative for dizziness, light-headedness and headaches  Psychiatric/Behavioral: Negative for agitation, confusion, dysphoric mood and sleep disturbance  The patient is not nervous/anxious  Current Medications       Current Outpatient Medications:     acetaminophen (TYLENOL) 500 mg tablet, Take 500 mg by mouth every 6 (six) hours as needed for mild pain, Disp: , Rfl:     amLODIPine (NORVASC) 5 mg tablet, Take 1 tablet (5 mg total) by mouth daily For blood pressure, Disp: 90 tablet, Rfl: 0    ASPIRIN LOW DOSE 81 MG EC tablet, TAKE ONE TABLET BY MOUTH DAILY, Disp: 30 tablet, Rfl: 11    atorvastatin (LIPITOR) 40 mg tablet, TAKE ONE TABLET BY MOUTH EVERY DAY WITH DINNER, Disp: 90 tablet, Rfl: 1    Choline Fenofibrate (Fenofibric Acid) 135 MG CPDR, TAKE ONE CAPSULE BY MOUTH EVERY DAY, Disp: 90 capsule, Rfl: 0    metFORMIN (GLUCOPHAGE-XR) 500 mg 24 hr tablet, TAKE 1 TABLET BY MOUTH TWICE DAILY WITH MEALS, Disp: 180 tablet, Rfl: 1    valsartan (DIOVAN) 160 mg tablet, Take 1 tablet (160 mg total) by mouth daily, Disp: 90 tablet, Rfl: 0    Xarelto 20 MG tablet, TAKE ONE TABLET BY MOUTH EVERY DAY WITH BREAKFAST, Disp: 90 tablet, Rfl: 0    Current Allergies     Allergies as of 05/12/2022    (No Known Allergies)            The following portions of the patient's history were reviewed and updated as appropriate: allergies, current medications, past family history, past medical history, past social history, past surgical history and problem list      Past Medical History:   Diagnosis Date    DVT (deep venous thrombosis) (HCC)     Gross hematuria     [facility] 3/29/16   R    4/3/17     Hematuria     [facility] 4/12/16    R    4/3/17    Hyperlipidemia     Hypertension     Long-term (current) use of anticoagulants     [facility] 3/29/16   R    4/3/17     Seasonal allergies        Past Surgical History: Procedure Laterality Date    APPENDECTOMY      WISDOM TOOTH EXTRACTION         Family History   Problem Relation Age of Onset    Hypertension Mother     Leukemia Father     Heart disease Father     Alcohol abuse Father     Depression Father     Heart disease Family     Leukemia Family     Depression Sister     Alcohol abuse Brother     Depression Brother          Medications have been verified  Objective   /84   Pulse 79   Temp 98 2 °F (36 8 °C) (Tympanic)   Ht 5' 11" (1 803 m)   Wt 103 kg (228 lb)   SpO2 96%   BMI 31 80 kg/m²        Physical Exam     Physical Exam  Vitals reviewed  Constitutional:       General: He is not in acute distress  Appearance: He is well-developed  He is not diaphoretic  HENT:      Head: Normocephalic and atraumatic  Right Ear: Tympanic membrane, ear canal and external ear normal       Left Ear: Tympanic membrane, ear canal and external ear normal       Nose: No mucosal edema or rhinorrhea  Right Sinus: No maxillary sinus tenderness  Left Sinus: No maxillary sinus tenderness  Mouth/Throat:      Mouth: Mucous membranes are not pale and not dry  Dentition: Normal dentition  Pharynx: Uvula midline  No oropharyngeal exudate  Eyes:      General:         Right eye: No discharge  Left eye: No discharge  Pupils: Pupils are equal, round, and reactive to light  Neck:      Thyroid: No thyromegaly  Cardiovascular:      Rate and Rhythm: Normal rate and regular rhythm  Heart sounds: Normal heart sounds  No murmur heard  Pulmonary:      Effort: Pulmonary effort is normal  No respiratory distress  Breath sounds: Normal breath sounds  No wheezing or rales  Musculoskeletal:         General: No tenderness  Cervical back: Normal range of motion and neck supple  Right lower leg: No edema  Left lower leg: No edema  Lymphadenopathy:      Cervical: No cervical adenopathy     Neurological: Mental Status: He is alert and oriented to person, place, and time  Cranial Nerves: No cranial nerve deficit     Psychiatric:         Mood and Affect: Mood normal          Behavior: Behavior normal

## 2022-06-13 DIAGNOSIS — E11.9 TYPE 2 DIABETES MELLITUS WITHOUT COMPLICATION, WITHOUT LONG-TERM CURRENT USE OF INSULIN (HCC): ICD-10-CM

## 2022-06-13 DIAGNOSIS — E11.69 HYPERLIPIDEMIA ASSOCIATED WITH TYPE 2 DIABETES MELLITUS (HCC): ICD-10-CM

## 2022-06-13 DIAGNOSIS — E78.5 HYPERLIPIDEMIA ASSOCIATED WITH TYPE 2 DIABETES MELLITUS (HCC): ICD-10-CM

## 2022-06-13 RX ORDER — METFORMIN HYDROCHLORIDE 500 MG/1
500 TABLET, EXTENDED RELEASE ORAL 2 TIMES DAILY WITH MEALS
Qty: 180 TABLET | Refills: 1 | Status: SHIPPED | OUTPATIENT
Start: 2022-06-13

## 2022-06-13 RX ORDER — ATORVASTATIN CALCIUM 40 MG/1
40 TABLET, FILM COATED ORAL
Qty: 90 TABLET | Refills: 1 | Status: SHIPPED | OUTPATIENT
Start: 2022-06-13

## 2022-06-22 ENCOUNTER — APPOINTMENT (EMERGENCY)
Dept: RADIOLOGY | Facility: HOSPITAL | Age: 72
End: 2022-06-22
Payer: MEDICARE

## 2022-06-22 ENCOUNTER — HOSPITAL ENCOUNTER (EMERGENCY)
Facility: HOSPITAL | Age: 72
Discharge: HOME | End: 2022-06-22
Attending: EMERGENCY MEDICINE
Payer: MEDICARE

## 2022-06-22 VITALS
HEIGHT: 71 IN | WEIGHT: 228 LBS | DIASTOLIC BLOOD PRESSURE: 87 MMHG | HEART RATE: 75 BPM | OXYGEN SATURATION: 97 % | TEMPERATURE: 97.6 F | SYSTOLIC BLOOD PRESSURE: 158 MMHG | RESPIRATION RATE: 19 BRPM | BODY MASS INDEX: 31.92 KG/M2

## 2022-06-22 DIAGNOSIS — I48.92 NEW ONSET ATRIAL FLUTTER (HCC): ICD-10-CM

## 2022-06-22 DIAGNOSIS — S16.1XXA ACUTE STRAIN OF NECK MUSCLE: Primary | ICD-10-CM

## 2022-06-22 LAB
2HR DELTA HS TROPONIN: 0 NG/L
ALBUMIN SERPL BCP-MCNC: 3.7 G/DL (ref 3.5–5)
ALP SERPL-CCNC: 47 U/L (ref 46–116)
ALT SERPL W P-5'-P-CCNC: 27 U/L (ref 12–78)
ANION GAP SERPL CALCULATED.3IONS-SCNC: 8 MMOL/L (ref 4–13)
AST SERPL W P-5'-P-CCNC: 14 U/L (ref 5–45)
ATRIAL RATE: 300 BPM
ATRIAL RATE: 303 BPM
BASOPHILS # BLD AUTO: 0.02 THOUSANDS/ΜL (ref 0–0.1)
BASOPHILS NFR BLD AUTO: 0 % (ref 0–1)
BILIRUB SERPL-MCNC: 1 MG/DL (ref 0.2–1)
BUN SERPL-MCNC: 10 MG/DL (ref 5–25)
CALCIUM SERPL-MCNC: 9 MG/DL (ref 8.3–10.1)
CARDIAC TROPONIN I PNL SERPL HS: 18 NG/L
CARDIAC TROPONIN I PNL SERPL HS: 18 NG/L
CHLORIDE SERPL-SCNC: 98 MMOL/L (ref 100–108)
CO2 SERPL-SCNC: 27 MMOL/L (ref 21–32)
CREAT SERPL-MCNC: 1.16 MG/DL (ref 0.6–1.3)
EOSINOPHIL # BLD AUTO: 0.06 THOUSAND/ΜL (ref 0–0.61)
EOSINOPHIL NFR BLD AUTO: 1 % (ref 0–6)
ERYTHROCYTE [DISTWIDTH] IN BLOOD BY AUTOMATED COUNT: 12.9 % (ref 11.6–15.1)
GFR SERPL CREATININE-BSD FRML MDRD: 63 ML/MIN/1.73SQ M
GLUCOSE SERPL-MCNC: 278 MG/DL (ref 65–140)
HCT VFR BLD AUTO: 44 % (ref 36.5–49.3)
HGB BLD-MCNC: 15.1 G/DL (ref 12–17)
IMM GRANULOCYTES # BLD AUTO: 0.04 THOUSAND/UL (ref 0–0.2)
IMM GRANULOCYTES NFR BLD AUTO: 0 % (ref 0–2)
LYMPHOCYTES # BLD AUTO: 1.66 THOUSANDS/ΜL (ref 0.6–4.47)
LYMPHOCYTES NFR BLD AUTO: 14 % (ref 14–44)
MCH RBC QN AUTO: 32.8 PG (ref 26.8–34.3)
MCHC RBC AUTO-ENTMCNC: 34.3 G/DL (ref 31.4–37.4)
MCV RBC AUTO: 95 FL (ref 82–98)
MONOCYTES # BLD AUTO: 1.19 THOUSAND/ΜL (ref 0.17–1.22)
MONOCYTES NFR BLD AUTO: 10 % (ref 4–12)
NEUTROPHILS # BLD AUTO: 8.54 THOUSANDS/ΜL (ref 1.85–7.62)
NEUTS SEG NFR BLD AUTO: 75 % (ref 43–75)
NRBC BLD AUTO-RTO: 0 /100 WBCS
P AXIS: 121 DEGREES
P AXIS: 74 DEGREES
PLATELET # BLD AUTO: 226 THOUSANDS/UL (ref 149–390)
PMV BLD AUTO: 10.8 FL (ref 8.9–12.7)
POTASSIUM SERPL-SCNC: 3.9 MMOL/L (ref 3.5–5.3)
PROT SERPL-MCNC: 7.7 G/DL (ref 6.4–8.2)
QRS AXIS: -44 DEGREES
QRS AXIS: -53 DEGREES
QRSD INTERVAL: 66 MS
QRSD INTERVAL: 70 MS
QT INTERVAL: 312 MS
QT INTERVAL: 346 MS
QTC INTERVAL: 392 MS
QTC INTERVAL: 448 MS
RBC # BLD AUTO: 4.61 MILLION/UL (ref 3.88–5.62)
SODIUM SERPL-SCNC: 133 MMOL/L (ref 136–145)
T WAVE AXIS: -14 DEGREES
T WAVE AXIS: 41 DEGREES
VENTRICULAR RATE: 101 BPM
VENTRICULAR RATE: 95 BPM
WBC # BLD AUTO: 11.51 THOUSAND/UL (ref 4.31–10.16)

## 2022-06-22 PROCEDURE — 93010 ELECTROCARDIOGRAM REPORT: CPT | Performed by: INTERNAL MEDICINE

## 2022-06-22 PROCEDURE — 99284 EMERGENCY DEPT VISIT MOD MDM: CPT

## 2022-06-22 PROCEDURE — 99285 EMERGENCY DEPT VISIT HI MDM: CPT | Performed by: PHYSICIAN ASSISTANT

## 2022-06-22 PROCEDURE — 71045 X-RAY EXAM CHEST 1 VIEW: CPT

## 2022-06-22 PROCEDURE — 80053 COMPREHEN METABOLIC PANEL: CPT | Performed by: PHYSICIAN ASSISTANT

## 2022-06-22 PROCEDURE — 93005 ELECTROCARDIOGRAM TRACING: CPT

## 2022-06-22 PROCEDURE — 36415 COLL VENOUS BLD VENIPUNCTURE: CPT | Performed by: PHYSICIAN ASSISTANT

## 2022-06-22 PROCEDURE — 84484 ASSAY OF TROPONIN QUANT: CPT | Performed by: PHYSICIAN ASSISTANT

## 2022-06-22 PROCEDURE — 85025 COMPLETE CBC W/AUTO DIFF WBC: CPT | Performed by: PHYSICIAN ASSISTANT

## 2022-06-22 RX ORDER — METHOCARBAMOL 500 MG/1
500 TABLET, FILM COATED ORAL ONCE
Status: COMPLETED | OUTPATIENT
Start: 2022-06-22 | End: 2022-06-22

## 2022-06-22 RX ORDER — LIDOCAINE 50 MG/G
1 PATCH TOPICAL ONCE
Status: DISCONTINUED | OUTPATIENT
Start: 2022-06-22 | End: 2022-06-22 | Stop reason: HOSPADM

## 2022-06-22 RX ORDER — METHOCARBAMOL 500 MG/1
500 TABLET, FILM COATED ORAL 3 TIMES DAILY
Qty: 30 TABLET | Refills: 0 | Status: SHIPPED | OUTPATIENT
Start: 2022-06-22

## 2022-06-22 RX ORDER — ACETAMINOPHEN 325 MG/1
650 TABLET ORAL ONCE
Status: COMPLETED | OUTPATIENT
Start: 2022-06-22 | End: 2022-06-22

## 2022-06-22 RX ADMIN — ACETAMINOPHEN 650 MG: 325 TABLET, FILM COATED ORAL at 08:46

## 2022-06-22 RX ADMIN — METHOCARBAMOL 500 MG: 500 TABLET ORAL at 08:46

## 2022-06-22 RX ADMIN — LIDOCAINE 5% 1 PATCH: 700 PATCH TOPICAL at 08:46

## 2022-06-22 NOTE — DISCHARGE INSTRUCTIONS
Rest, ice for next 2 days, heating pad after 2 days  Tylenol for discomfort  Robaxin 3 times a day for muscle spasm  Follow up with family doctor in 3-5 days for recheck  Return to ER if symptoms worsen  Follow up with cardiologist concerning new onset atrial flutter

## 2022-06-22 NOTE — ED PROVIDER NOTES
History  Chief Complaint   Patient presents with    Neck Pain     Pt arrives with c/o neck pain since last night, denies injury  Patient is a 79 y/o M with h/o DVT on xarelto, HTN, Hyperlipidemia that presents to the ED with "neck spasms" that started last night  Patient states he was laying on the couch all day and thinks it aggravated his neck  He states he has had this same pain in the past and was started on a muscle relaxant and it improved  He denies chest pain or SOB  He denies numbness, weakness, tingling  He states the pain is worse with movement, better with rest   He did not take anything for pain  No radiation of pain  No back pain  No history of trauma  History provided by:  Patient  Neck Pain  Pain location:  Generalized neck  Quality:  Aching  Pain radiates to:  Does not radiate  Pain severity:  Moderate  Onset quality:  Gradual  Duration:  2 days  Timing:  Constant  Progression:  Unchanged  Chronicity:  Recurrent  Context: not fall and not recent injury    Relieved by:  Neck support  Worsened by:  Twisting  Ineffective treatments:  None tried  Associated symptoms: no bladder incontinence, no bowel incontinence, no chest pain, no fever, no headaches, no leg pain, no numbness, no tingling, no visual change and no weakness        Prior to Admission Medications   Prescriptions Last Dose Informant Patient Reported? Taking?    ASPIRIN LOW DOSE 81 MG EC tablet   No No   Sig: TAKE ONE TABLET BY MOUTH DAILY   Choline Fenofibrate (Fenofibric Acid) 135 MG CPDR   No No   Sig: TAKE ONE CAPSULE BY MOUTH EVERY DAY   Xarelto 20 MG tablet   No No   Sig: TAKE ONE TABLET BY MOUTH EVERY DAY WITH BREAKFAST   acetaminophen (TYLENOL) 500 mg tablet   Yes No   Sig: Take 500 mg by mouth every 6 (six) hours as needed for mild pain   amLODIPine (NORVASC) 5 mg tablet   No No   Sig: Take 1 tablet (5 mg total) by mouth daily For blood pressure   atorvastatin (LIPITOR) 40 mg tablet   No No   Sig: Take 1 tablet (40 mg total) by mouth daily with dinner   metFORMIN (GLUCOPHAGE-XR) 500 mg 24 hr tablet   No No   Sig: Take 1 tablet (500 mg total) by mouth 2 (two) times a day with meals   valsartan (DIOVAN) 160 mg tablet   No No   Sig: Take 1 tablet (160 mg total) by mouth daily      Facility-Administered Medications: None       Past Medical History:   Diagnosis Date    DVT (deep venous thrombosis) (HCC)     Gross hematuria     [place]  Farooq Mccracken 3/29/16   R    4/3/17     Hematuria     [place]    Farooq Gayleise 16    R    4/3/17    Hyperlipidemia     Hypertension     Long-term (current) use of anticoagulants     [place]  Farooq Gayleise 3/29/16   R    4/3/17     Seasonal allergies        Past Surgical History:   Procedure Laterality Date    APPENDECTOMY      WISDOM TOOTH EXTRACTION         Family History   Problem Relation Age of Onset    Hypertension Mother     Leukemia Father     Heart disease Father     Alcohol abuse Father     Depression Father     Heart disease Family     Leukemia Family     Depression Sister     Alcohol abuse Brother     Depression Brother      I have reviewed and agree with the history as documented  E-Cigarette/Vaping    E-Cigarette Use Current Every Day User     Comments 1 cartridge per 3 weeks      E-Cigarette/Vaping Substances    THC Yes      Social History     Tobacco Use    Smoking status: Former Smoker     Types: Cigarettes     Quit date:      Years since quittin 5    Smokeless tobacco: Never Used   Vaping Use    Vaping Use: Every day    Substances: THC   Substance Use Topics    Alcohol use: Yes     Alcohol/week: 4 0 standard drinks     Types: 4 Cans of beer per week     Comment: social    Drug use: Yes     Types: Marijuana       Review of Systems   Constitutional: Negative for chills and fever  HENT: Negative  Eyes: Negative for visual disturbance  Respiratory: Negative for cough and shortness of breath  Cardiovascular: Negative for chest pain, palpitations and leg swelling     Gastrointestinal: Negative for abdominal pain, bowel incontinence, diarrhea, nausea and vomiting  Genitourinary: Negative for bladder incontinence and dysuria  Musculoskeletal: Positive for neck pain  Negative for back pain  Skin: Negative for color change, pallor and rash  Neurological: Negative for dizziness, tingling, syncope, facial asymmetry, speech difficulty, weakness, light-headedness, numbness and headaches  Psychiatric/Behavioral: Negative for confusion and decreased concentration  All other systems reviewed and are negative  Physical Exam  Physical Exam  Vitals and nursing note reviewed  Constitutional:       General: He is not in acute distress  Appearance: Normal appearance  He is well-developed, well-groomed and normal weight  He is not ill-appearing or diaphoretic  HENT:      Head: Normocephalic and atraumatic  Right Ear: External ear normal       Left Ear: External ear normal       Nose: Nose normal    Eyes:      Conjunctiva/sclera: Conjunctivae normal       Pupils: Pupils are equal    Neck:     Cardiovascular:      Rate and Rhythm: Regular rhythm  Bradycardia present  Heart sounds: Normal heart sounds  Pulmonary:      Effort: Pulmonary effort is normal       Breath sounds: Normal breath sounds  Chest:      Chest wall: No deformity, swelling or tenderness  Musculoskeletal:         General: Normal range of motion  Cervical back: No rigidity  Muscular tenderness present  No spinous process tenderness  Normal range of motion  Skin:     General: Skin is warm and dry  Coloration: Skin is not jaundiced or pale  Findings: No rash  Neurological:      General: No focal deficit present  Mental Status: He is alert and oriented to person, place, and time  GCS: GCS eye subscore is 4  GCS verbal subscore is 5  GCS motor subscore is 6  Cranial Nerves: Cranial nerves are intact  Sensory: Sensation is intact  Motor: No weakness        Gait: Gait is intact  Psychiatric:         Mood and Affect: Mood normal          Speech: Speech normal          Behavior: Behavior is cooperative           Vital Signs  ED Triage Vitals [06/22/22 0812]   Temperature Pulse Respirations Blood Pressure SpO2   97 6 °F (36 4 °C) (!) 51 16 135/83 95 %      Temp Source Heart Rate Source Patient Position - Orthostatic VS BP Location FiO2 (%)   Temporal Monitor Sitting Left arm --      Pain Score       10 - Worst Possible Pain           Vitals:    06/22/22 0812 06/22/22 0845 06/22/22 1115 06/22/22 1157   BP: 135/83  152/93 158/87   Pulse: (!) 51 73 72 75   Patient Position - Orthostatic VS: Sitting  Sitting Lying         Visual Acuity      ED Medications  Medications   lidocaine (LIDODERM) 5 % patch 1 patch (1 patch Topical Medication Applied 6/22/22 0846)   acetaminophen (TYLENOL) tablet 650 mg (650 mg Oral Given 6/22/22 0846)   methocarbamol (ROBAXIN) tablet 500 mg (500 mg Oral Given 6/22/22 0846)       Diagnostic Studies  Results Reviewed     Procedure Component Value Units Date/Time    HS Troponin I 2hr [[id number]]  (Normal) Collected: 06/22/22 1113    Lab Status: Final result Specimen: Blood from Arm, Left Updated: 06/22/22 1145     hs TnI 2hr 18 ng/L      Delta 2hr hsTnI 0 ng/L     HS Troponin 0hr (reflex protocol) [[id number]]  (Normal) Collected: 06/22/22 0912    Lab Status: Final result Specimen: Blood from Arm, Left Updated: 06/22/22 0947     hs TnI 0hr 18 ng/L     Comprehensive metabolic panel [[id number]]  (Abnormal) Collected: 06/22/22 0912    Lab Status: Final result Specimen: Blood from Arm, Left Updated: 06/22/22 0943     Sodium 133 mmol/L      Potassium 3 9 mmol/L      Chloride 98 mmol/L      CO2 27 mmol/L      ANION GAP 8 mmol/L      BUN 10 mg/dL      Creatinine 1 16 mg/dL      Glucose 278 mg/dL      Calcium 9 0 mg/dL      AST 14 U/L      ALT 27 U/L      Alkaline Phosphatase 47 U/L      Total Protein 7 7 g/dL      Albumin 3 7 g/dL      Total Bilirubin 1 00 mg/dL      eGFR 61 ml/min/1 73sq m     Narrative:      [place] guidelines for Chronic Kidney Disease (CKD):     Stage 1 with normal or high GFR (GFR > 90 mL/min/1 73 square meters)    Stage 2 Mild CKD (GFR = 60-89 mL/min/1 73 square meters)    Stage 3A Moderate CKD (GFR = 45-59 mL/min/1 73 square meters)    Stage 3B Moderate CKD (GFR = 30-44 mL/min/1 73 square meters)    Stage 4 Severe CKD (GFR = 15-29 mL/min/1 73 square meters)    Stage 5 End Stage CKD (GFR <15 mL/min/1 73 square meters)  Note: GFR calculation is accurate only with a steady state creatinine    CBC and differential [[id number]]  (Abnormal) Collected: 06/22/22 0912    Lab Status: Final result Specimen: Blood from Arm, Left Updated: 06/22/22 0919     WBC 11 51 Thousand/uL      RBC 4 61 Million/uL      Hemoglobin 15 1 g/dL      Hematocrit 44 0 %      MCV 95 fL      MCH 32 8 pg      MCHC 34 3 g/dL      RDW 12 9 %      MPV 10 8 fL      Platelets 450 Thousands/uL      nRBC 0 /100 WBCs      Neutrophils Relative 75 %      Immat GRANS % 0 %      Lymphocytes Relative 14 %      Monocytes Relative 10 %      Eosinophils Relative 1 %      Basophils Relative 0 %      Neutrophils Absolute 8 54 Thousands/µL      Immature Grans Absolute 0 04 Thousand/uL      Lymphocytes Absolute 1 66 Thousands/µL      Monocytes Absolute 1 19 Thousand/µL      Eosinophils Absolute 0 06 Thousand/µL      Basophils Absolute 0 02 Thousands/µL                  XR chest 1 view portable   Final Result by Michelle Lama MD (06/22 0930)      No acute cardiopulmonary disease        Findings are stable            Workstation performed: [id number]                    Procedures  ECG 12 Lead Documentation Only    Date/Time: 6/22/2022 8:57 AM  Performed by: Nancy Vela PA-C  Authorized by: Nancy Vela PA-C     Indications / Diagnosis:  Bradycardia  Patient location:  ED  Previous ECG:     Previous ECG:  Compared to current    Comparison ECG info:  Atrial flutter now present    Similarity:  Changes noted  Rate:     ECG rate:  95  Rhythm:     Rhythm: atrial flutter    ST segments:     ST segments:  Normal             ED Course                               SBIRT 20yo+    Flowsheet Row Most Recent Value   SBIRT (25 yo +)    In order to provide better care to our patients, we are screening all of our patients for alcohol and drug use  Would it be okay to ask you these screening questions? No Filed at: 06/22/2022 0831                    MDM  Number of Diagnoses or Management Options  Acute strain of neck muscle: new and does not require workup  New onset atrial flutter [facility]): new and requires workup  Diagnosis management comments: Patient with neck spasms similar to prior episode, will prescribe robaxin  Patient found to be bradycardic, will order ekg to further evaluate  Patient with new onset atrial flutter, will obtain labs  Patient referred to cardiology concerning new onset afib  He is currently on xarelto, flutter in controlled rate  Return precautions given  Amount and/or Complexity of Data Reviewed  Clinical lab tests: ordered and reviewed  Tests in the radiology section of CPT®: ordered and reviewed    Patient Progress  Patient progress: stable      Disposition  Final diagnoses:   Acute strain of neck muscle   New onset atrial flutter ([address] )     Time reflects when diagnosis was documented in both MDM as applicable and the Disposition within this note     Time User Action Codes Description Comment    6/22/2022  8:40 AM Tracy Cho Add [S16  1XXA] Acute strain of neck muscle     6/22/2022  8:58 AM Tracy Cho Add [I48 92] New onset atrial flutter [facility])       ED Disposition     ED Disposition   Discharge    Condition   Stable    Date/Time   Wed Jun 22, 2022 11:47 AM    Comment   Pj Marquez discharge to home/self care                 Follow-up Information     Follow up With Specialties Details Why Contact Info Additional [address] [facility] Lenin Fernando MD Family Medicine Call in 1 week For recheck [address] [address]  [address] [facility] Cardiology Schedule an appointment as soon as possible for a visit  for further evaluation of atrial flutter   [address] [facility] [address]  [address]  Cardiology [address], [address] [place], [place], [address] [phone]          Discharge Medication List as of 6/22/2022 11:51 AM      START taking these medications    Details   methocarbamol (ROBAXIN) 500 mg tablet Take 1 tablet (500 mg total) by mouth 3 (three) times a day, Starting Wed 6/22/2022, Normal         CONTINUE these medications which have NOT CHANGED    Details   acetaminophen (TYLENOL) 500 mg tablet Take 500 mg by mouth every 6 (six) hours as needed for mild pain, Historical Med      amLODIPine (NORVASC) 5 mg tablet Take 1 tablet (5 mg total) by mouth daily For blood pressure, Starting Tue 4/19/2022, Normal      ASPIRIN LOW DOSE 81 MG EC tablet TAKE ONE TABLET BY MOUTH DAILY, Normal      atorvastatin (LIPITOR) 40 mg tablet Take 1 tablet (40 mg total) by mouth daily with dinner, Starting Mon 6/13/2022, Normal      Choline Fenofibrate (Fenofibric Acid) 135 MG CPDR TAKE ONE CAPSULE BY MOUTH EVERY DAY, Normal      metFORMIN (GLUCOPHAGE-XR) 500 mg 24 hr tablet Take 1 tablet (500 mg total) by mouth 2 (two) times a day with meals, Starting Mon 6/13/2022, Normal      valsartan (DIOVAN) 160 mg tablet Take 1 tablet (160 mg total) by mouth daily, Starting Tue 4/19/2022, Normal      Xarelto 20 MG tablet TAKE ONE TABLET BY MOUTH EVERY DAY WITH BREAKFAST, Normal                 PDMP Review     None          ED Provider  Electronically Signed by           Salima Barclay PA-C  06/22/22 0506

## 2022-06-23 ENCOUNTER — TELEPHONE (OUTPATIENT)
Dept: OTHER | Facility: OTHER | Age: 72
End: 2022-06-23

## 2022-06-30 ENCOUNTER — OFFICE VISIT (OUTPATIENT)
Dept: FAMILY MEDICINE CLINIC | Facility: CLINIC | Age: 72
End: 2022-06-30
Payer: MEDICARE

## 2022-06-30 ENCOUNTER — TELEPHONE (OUTPATIENT)
Dept: FAMILY MEDICINE CLINIC | Facility: CLINIC | Age: 72
End: 2022-06-30

## 2022-06-30 VITALS
DIASTOLIC BLOOD PRESSURE: 80 MMHG | HEIGHT: 71 IN | SYSTOLIC BLOOD PRESSURE: 126 MMHG | HEART RATE: 85 BPM | BODY MASS INDEX: 30.8 KG/M2 | TEMPERATURE: 97.2 F | OXYGEN SATURATION: 95 % | WEIGHT: 220 LBS

## 2022-06-30 DIAGNOSIS — E78.5 HYPERLIPIDEMIA ASSOCIATED WITH TYPE 2 DIABETES MELLITUS (HCC): ICD-10-CM

## 2022-06-30 DIAGNOSIS — M62.838 CERVICAL PARASPINAL MUSCLE SPASM: Primary | ICD-10-CM

## 2022-06-30 DIAGNOSIS — I10 ESSENTIAL HYPERTENSION: ICD-10-CM

## 2022-06-30 DIAGNOSIS — E11.9 TYPE 2 DIABETES MELLITUS WITHOUT COMPLICATION, WITHOUT LONG-TERM CURRENT USE OF INSULIN (HCC): ICD-10-CM

## 2022-06-30 DIAGNOSIS — E11.69 HYPERLIPIDEMIA ASSOCIATED WITH TYPE 2 DIABETES MELLITUS (HCC): ICD-10-CM

## 2022-06-30 DIAGNOSIS — Z79.01 ANTICOAGULANT LONG-TERM USE: ICD-10-CM

## 2022-06-30 DIAGNOSIS — I48.19 PERSISTENT ATRIAL FIBRILLATION (HCC): ICD-10-CM

## 2022-06-30 PROCEDURE — 99214 OFFICE O/P EST MOD 30 MIN: CPT | Performed by: FAMILY MEDICINE

## 2022-06-30 NOTE — PATIENT INSTRUCTIONS
Continue current medications  I did contact Cardiology who is advises a Holter monitor  Keep appointment with Cardiology July 15, 2022  Advise should he have persistent heart palpitations with rapid rate above 100, should be evaluate hospital emergency room  Per Cardiology, will order Holter before starting any beta blocker

## 2022-06-30 NOTE — PROGRESS NOTES
[address]        NAME: Jose F Watts is a 80 y o  male  : 1950    MRN: [id number]  DATE: 2022  TIME: 12:32 PM    Assessment and Plan   Cervical paraspinal muscle spasm [M62 838]  1  Cervical paraspinal muscle spasm     2  Hyperlipidemia associated with type 2 diabetes mellitus ([address] )     3  Type 2 diabetes mellitus without complication, without long-term current use of insulin (HCC)  Basic metabolic panel    Hemoglobin P6A    Basic metabolic panel    Hemoglobin A1C   4  Anticoagulant long-term use     5  Persistent atrial fibrillation (HCC)  TSH, 3rd generation with Free T4 reflex    TSH, 3rd generation with Free T4 reflex    Holter monitor   6  Essential hypertension  Basic metabolic panel    TSH, 3rd generation with Free T4 reflex    Basic metabolic panel    TSH, 3rd generation with Free T4 reflex       No problem-specific Assessment & Plan notes found for this encounter  Patient Instructions     Patient Instructions   Continue current medications  I did contact Cardiology who is advises a Holter monitor  Keep appointment with Cardiology July 15, 2022  Advise should he have persistent heart palpitations with rapid rate above 100, should be evaluate hospital emergency room  Per Cardiology, will order Holter before starting any beta blocker  Chief Complaint     Chief Complaint   Patient presents with    Follow-up         History of Present Illness       Patient comes in today for follow-up on ER visit  Patient went there for severe neck pain and spasms  Also found to be in new onset a fib/flutter on his EKG  Other cardiac testing was negative  He is on Xarelto due to previous DVT  On clinical exam appears to be in atrial fib today  I did contact cardiology to advise possibly a Holter monitor prior to their consultation        Review of Systems   Review of Systems   Constitutional: Negative for activity change, appetite change, diaphoresis and fatigue  Respiratory: Negative for cough, chest tightness, shortness of breath and wheezing  Cardiovascular: Positive for leg swelling  Negative for chest pain and palpitations  Fast or slow heart rate   Gastrointestinal: Negative for abdominal pain, blood in stool, constipation, diarrhea, nausea and vomiting  Genitourinary: Negative for difficulty urinating, dysuria and frequency  Musculoskeletal: Positive for neck pain and neck stiffness  Negative for arthralgias, gait problem, joint swelling and myalgias  Neurological: Negative for dizziness, light-headedness and headaches  Psychiatric/Behavioral: Negative for agitation, confusion, dysphoric mood and sleep disturbance  The patient is not nervous/anxious            Current Medications       Current Outpatient Medications:     acetaminophen (TYLENOL) 500 mg tablet, Take 500 mg by mouth every 6 (six) hours as needed for mild pain, Disp: , Rfl:     amLODIPine (NORVASC) 5 mg tablet, Take 1 tablet (5 mg total) by mouth daily For blood pressure, Disp: 90 tablet, Rfl: 0    ASPIRIN LOW DOSE 81 MG EC tablet, TAKE ONE TABLET BY MOUTH DAILY, Disp: 30 tablet, Rfl: 11    atorvastatin (LIPITOR) 40 mg tablet, Take 1 tablet (40 mg total) by mouth daily with dinner, Disp: 90 tablet, Rfl: 1    Choline Fenofibrate (Fenofibric Acid) 135 MG CPDR, TAKE ONE CAPSULE BY MOUTH EVERY DAY, Disp: 90 capsule, Rfl: 0    metFORMIN (GLUCOPHAGE-XR) 500 mg 24 hr tablet, Take 1 tablet (500 mg total) by mouth 2 (two) times a day with meals, Disp: 180 tablet, Rfl: 1    methocarbamol (ROBAXIN) 500 mg tablet, Take 1 tablet (500 mg total) by mouth 3 (three) times a day, Disp: 30 tablet, Rfl: 0    valsartan (DIOVAN) 160 mg tablet, Take 1 tablet (160 mg total) by mouth daily, Disp: 90 tablet, Rfl: 0    Xarelto 20 MG tablet, TAKE ONE TABLET BY MOUTH EVERY DAY WITH BREAKFAST, Disp: 90 tablet, Rfl: 0    Current Allergies     Allergies as of 06/30/2022    (No Known Allergies)            The following portions of the patient's history were reviewed and updated as appropriate: allergies, current medications, past family history, past medical history, past social history, past surgical history and problem list      Past Medical History:   Diagnosis Date    D[facility]) [address] [address] )     Gross hematuria     [place]  Lola Los Angeles Lola Los Angeles 3/29/16   R    4/3/17     Hematuria     [place]    Lola [place] Angeles Lola Los Angeles 4/12/16    R    4/3/17    Hyperlipidemia     Hypertension     Long-term (current) use of anticoagulants     [place]  Lola Los Angeles Lola Los Angeles 3/29/16   R    4/3/17     Seasonal allergies        Past Surgical History:   Procedure Laterality Date    APPENDECTOMY      WISDOM TOOTH EXTRACTION         Family History   Problem Relation Age of Onset    Hypertension Mother     Leukemia Father     Heart disease Father     Alcohol abuse Father     Depression Father     Heart disease Family     Leukemia Family     Depression Sister     Alcohol abuse Brother     Depression Brother          Medications have been verified  Objective   /80   Pulse 85   Temp (!) 97 2 °F (36 2 °C) (Tympanic)   Ht 5' 11" (1 803 m)   Wt 99 8 kg (220 lb)   SpO2 95%   BMI 30 68 kg/m²        Physical Exam     Physical Exam  Vitals reviewed  Constitutional:       General: He is not in acute distress  Appearance: He is well-developed  HENT:      Head: Normocephalic and atraumatic  Right Ear: Tympanic membrane and external ear normal  No drainage  Left Ear: Tympanic membrane normal  No drainage  Mouth/Throat:      Pharynx: No oropharyngeal exudate  Eyes:      General:         Right eye: No discharge  Left eye: No discharge  Conjunctiva/sclera: Conjunctivae normal    Neck:      Thyroid: No thyromegaly  Cardiovascular:      Rate and Rhythm: Normal rate  Rhythm irregularly irregular  Heart sounds: Normal heart sounds  Pulmonary:      Effort: Pulmonary effort is normal  No respiratory distress        Breath sounds: No wheezing or rales  Musculoskeletal:      Cervical back: Normal range of motion and neck supple  Lymphadenopathy:      Cervical: No cervical adenopathy  Skin:     General: Skin is warm and dry

## 2022-07-02 LAB
BUN SERPL-MCNC: 20 MG/DL (ref 8–27)
BUN/CREAT SERPL: 18 (ref 10–24)
CALCIUM SERPL-MCNC: 9.9 MG/DL (ref 8.6–10.2)
CHLORIDE SERPL-SCNC: 100 MMOL/L (ref 96–106)
CO2 SERPL-SCNC: 19 MMOL/L (ref 20–29)
CREAT SERPL-MCNC: 1.09 MG/DL (ref 0.76–1.27)
EGFR: 73 ML/MIN/1.73
EST. AVERAGE GLUCOSE BLD GHB EST-MCNC: 160 MG/DL
GLUCOSE SERPL-MCNC: 155 MG/DL (ref 65–99)
HBA1C MFR BLD: 7.2 % (ref 4.8–5.6)
POTASSIUM SERPL-SCNC: 4.5 MMOL/L (ref 3.5–5.2)
SODIUM SERPL-SCNC: 137 MMOL/L (ref 134–144)
TSH SERPL DL<=0.005 MIU/L-ACNC: 1.25 UIU/ML (ref 0.45–4.5)

## 2022-07-04 NOTE — RESULT ENCOUNTER NOTE
Call patient with lab result-fasting sugar better  A1c is still slightly high  Keep appointment with Cardiology  Continue same meds for diabetes  Try to improve diet

## 2022-07-05 ENCOUNTER — TELEPHONE (OUTPATIENT)
Dept: FAMILY MEDICINE CLINIC | Facility: CLINIC | Age: 72
End: 2022-07-05

## 2022-07-05 NOTE — TELEPHONE ENCOUNTER
----- Message from Clarice Rivas MD sent at 7/4/2022 11:04 AM EDT -----  Call patient with lab result-fasting sugar better  A1c is still slightly high  Keep appointment with Cardiology  Continue same meds for diabetes  Try to improve diet

## 2022-07-08 ENCOUNTER — HOSPITAL ENCOUNTER (OUTPATIENT)
Dept: NON INVASIVE DIAGNOSTICS | Age: 72
Discharge: HOME | End: 2022-07-08
Payer: MEDICARE

## 2022-07-08 DIAGNOSIS — I48.19 PERSISTENT ATRIAL FIBRILLATION (HCC): ICD-10-CM

## 2022-07-08 PROCEDURE — 93225 XTRNL ECG REC<48 HRS REC: CPT

## 2022-07-08 PROCEDURE — 93226 XTRNL ECG REC<48 HR SCAN A/R: CPT

## 2022-07-11 DIAGNOSIS — E78.01 FAMILIAL HYPERCHOLESTEROLEMIA: ICD-10-CM

## 2022-07-11 DIAGNOSIS — E78.5 HYPERLIPIDEMIA, UNSPECIFIED HYPERLIPIDEMIA TYPE: ICD-10-CM

## 2022-07-11 RX ORDER — FENOFIBRIC ACID 135 MG/1
CAPSULE, DELAYED RELEASE ORAL
Qty: 90 CAPSULE | Refills: 0 | Status: SHIPPED | OUTPATIENT
Start: 2022-07-11 | End: 2022-07-14

## 2022-07-14 DIAGNOSIS — E78.5 HYPERLIPIDEMIA, UNSPECIFIED HYPERLIPIDEMIA TYPE: ICD-10-CM

## 2022-07-14 DIAGNOSIS — E78.01 FAMILIAL HYPERCHOLESTEROLEMIA: ICD-10-CM

## 2022-07-14 RX ORDER — FENOFIBRIC ACID 135 MG/1
CAPSULE, DELAYED RELEASE ORAL
Qty: 90 CAPSULE | Refills: 0 | Status: SHIPPED | OUTPATIENT
Start: 2022-07-14 | End: 2022-07-15 | Stop reason: SDUPTHER

## 2022-07-15 ENCOUNTER — OFFICE VISIT (OUTPATIENT)
Dept: CARDIOLOGY CLINIC | Facility: CLINIC | Age: 72
End: 2022-07-15
Payer: MEDICARE

## 2022-07-15 VITALS
HEART RATE: 70 BPM | HEIGHT: 71 IN | WEIGHT: 220.2 LBS | BODY MASS INDEX: 30.83 KG/M2 | DIASTOLIC BLOOD PRESSURE: 82 MMHG | SYSTOLIC BLOOD PRESSURE: 132 MMHG

## 2022-07-15 DIAGNOSIS — I48.92 ATRIAL FLUTTER, UNSPECIFIED TYPE (HCC): Primary | ICD-10-CM

## 2022-07-15 DIAGNOSIS — G45.9 TIA (TRANSIENT ISCHEMIC ATTACK): ICD-10-CM

## 2022-07-15 DIAGNOSIS — E78.5 HYPERLIPIDEMIA ASSOCIATED WITH TYPE 2 DIABETES MELLITUS (HCC): ICD-10-CM

## 2022-07-15 DIAGNOSIS — I49.9 PRE-OPERATIVE CARDIOVASCULAR EXAMINATION, SUPRAVENTRICULAR ARRHYTHMIA: ICD-10-CM

## 2022-07-15 DIAGNOSIS — E78.5 HYPERLIPIDEMIA, UNSPECIFIED HYPERLIPIDEMIA TYPE: ICD-10-CM

## 2022-07-15 DIAGNOSIS — I10 ESSENTIAL HYPERTENSION: ICD-10-CM

## 2022-07-15 DIAGNOSIS — I48.11 LONGSTANDING PERSISTENT ATRIAL FIBRILLATION (HCC): ICD-10-CM

## 2022-07-15 DIAGNOSIS — E11.69 HYPERLIPIDEMIA ASSOCIATED WITH TYPE 2 DIABETES MELLITUS (HCC): ICD-10-CM

## 2022-07-15 DIAGNOSIS — E78.01 FAMILIAL HYPERCHOLESTEROLEMIA: ICD-10-CM

## 2022-07-15 DIAGNOSIS — Z01.810 PRE-OPERATIVE CARDIOVASCULAR EXAMINATION, SUPRAVENTRICULAR ARRHYTHMIA: ICD-10-CM

## 2022-07-15 PROCEDURE — 99205 OFFICE O/P NEW HI 60 MIN: CPT | Performed by: INTERNAL MEDICINE

## 2022-07-15 PROCEDURE — 93000 ELECTROCARDIOGRAM COMPLETE: CPT | Performed by: INTERNAL MEDICINE

## 2022-07-15 RX ORDER — FENOFIBRIC ACID 135 MG/1
1 CAPSULE, DELAYED RELEASE ORAL DAILY
Qty: 90 CAPSULE | Refills: 3 | Status: SHIPPED | OUTPATIENT
Start: 2022-07-15

## 2022-07-15 RX ORDER — METOPROLOL SUCCINATE 25 MG/1
25 TABLET, EXTENDED RELEASE ORAL DAILY
Qty: 30 TABLET | Refills: 3 | Status: SHIPPED | OUTPATIENT
Start: 2022-07-15

## 2022-07-15 NOTE — PROGRESS NOTES
Cardiology Consultation     Yashira Judd  [id number]  1950  [facility] [address] [address] [phone]    1  Atrial flutter, unspecified type ([facility] [address] )  POCT ECG   2  Essential hypertension     3  Longstanding persistent atrial fibrillation (HCC)  metoprolol succinate (TOPROL-XL) 25 mg 24 hr tablet    Echo complete w/ contrast if indicated    NM myocardial perfusion spect (stress and/or rest)   4  TIA (transient ischemic attack)     5  Pre-operative cardiovascular examination, supraventricular arrhythmia     6  Hyperlipidemia, unspecified hyperlipidemia type  Choline Fenofibrate (Fenofibric Acid) 135 MG CPDR   7  Familial hypercholesterolemia  Choline Fenofibrate (Fenofibric Acid) 135 MG CPDR   8  Hyperlipidemia associated with type 2 diabetes mellitus [facility])       Chief Complaint   Patient presents with    Atrial Flutter     New pt  Occasional heart fluttering  HPI: Patient is here for cardiac evaluation of atrial flutter and sensation of heart fluttering  No reported chest pain, shortness of breath, lightheadedness, syncope, LE edema, orthopnea, PND, or significant weight changes  Patient remains active without any increased fatigue out of the ordinary        Patient Active Problem List   Diagnosis    Essential hypertension    Type 2 diabetes mellitus without complication, without long-term current use of insulin ([facility] [address] )    BPH with obstruction/lower urinary tract symptoms    Chronic venous insufficiency    Varicose veins with swelling    History of DVT (deep vein thrombosis)    Anticoagulant long-term use    Arthritis of knee    Numbness    TIA (transient ischemic attack)    Hyperlipidemia associated with type 2 diabetes mellitus ([facility] [address] )    Anxiety    Longstanding persistent atrial fibrillation ([facility] [address] )    Pre-operative cardiovascular examination, supraventricular arrhythmia     Past Medical History:   Diagnosis Date    DVT (deep venous thrombosis) ([facility])     Gross hematuria     TARA Hooper Scuddy 3/29/16   R    4/3/17     Hematuria     TARA Hooper [place] 16    R    4/3/17    Hyperlipidemia     Hypertension     Long-term (current) use of anticoagulants     TARA Hooper Scuddy 3/29/16   R    4/3/17     Seasonal allergies      Social History     Socioeconomic History    Marital status: /Civil Union     Spouse name: Mary Kate Hunt Number of children: 2    Years of education: Not on file    Highest education level: Not on file   Occupational History    Occupation: Retired   Tobacco Use    Smoking status: Former Smoker     Types: Cigarettes     Quit date:      Years since quittin 5    Smokeless tobacco: Never Used   Vaping Use    Vaping Use: Every day    Substances: THC   Substance and Sexual Activity    Alcohol use:  Yes     Alcohol/week: 4 0 standard drinks     Types: 4 Cans of beer per week     Comment: social    Drug use: Yes     Types: Marijuana    Sexual activity: Yes     Partners: Female   Other Topics Concern    Not on file   Social History Narrative    Caffeine use     Social Determinants of Health     Financial Resource Strain: Not on file   Food Insecurity: Not on file   Transportation Needs: Not on file   Physical Activity: Not on file   Stress: Not on file   Social Connections: Not on file   Intimate Partner Violence: Not on file   Housing Stability: Not on file      Family History   Problem Relation Age of Onset    Hypertension Mother     Leukemia Father     Heart disease Father     Alcohol abuse Father     Depression Father     Heart disease Family     Leukemia Family     Depression Sister     Alcohol abuse Brother     Depression Brother      Past Surgical History:   Procedure Laterality Date    APPENDECTOMY      WISDOM TOOTH EXTRACTION         Current Outpatient Medications:     acetaminophen (TYLENOL) 500 mg tablet, Take 500 mg by mouth every 6 (six) hours as needed for mild pain, Disp: , Rfl:     ASPIRIN LOW DOSE 81 MG EC tablet, TAKE ONE TABLET BY MOUTH DAILY, Disp: 30 tablet, Rfl: 11    atorvastatin (LIPITOR) 40 mg tablet, Take 1 tablet (40 mg total) by mouth daily with dinner, Disp: 90 tablet, Rfl: 1    Choline Fenofibrate (Fenofibric Acid) 135 MG CPDR, Take 1 capsule (135 mg total) by mouth daily, Disp: 90 capsule, Rfl: 3    metFORMIN (GLUCOPHAGE-XR) 500 mg 24 hr tablet, Take 1 tablet (500 mg total) by mouth 2 (two) times a day with meals, Disp: 180 tablet, Rfl: 1    metoprolol succinate (TOPROL-XL) 25 mg 24 hr tablet, Take 1 tablet (25 mg total) by mouth daily, Disp: 30 tablet, Rfl: 3    valsartan (DIOVAN) 160 mg tablet, Take 1 tablet (160 mg total) by mouth daily, Disp: 90 tablet, Rfl: 0    Xarelto 20 MG tablet, TAKE ONE TABLET BY MOUTH EVERY DAY WITH BREAKFAST, Disp: 90 tablet, Rfl: 0    methocarbamol (ROBAXIN) 500 mg tablet, Take 1 tablet (500 mg total) by mouth 3 (three) times a day (Patient not taking: Reported on 7/15/2022), Disp: 30 tablet, Rfl: 0  No Known Allergies  Vitals:    07/15/22 1253   BP: 132/82   BP Location: Left arm   Patient Position: Sitting   Cuff Size: Large   Pulse: 70   Weight: 99 9 kg (220 lb 3 2 oz)   Height: 5' 11" (1 803 m)       Labs:  Office Visit on 06/30/2022   Component Date Value    Glucose, Random 07/01/2022 155 (A)    BUN 07/01/2022 20     Creatinine 07/01/2022 1 09     eGFR 07/01/2022 73     SL AMB BUN/CREATININE RA* 07/01/2022 18     Sodium 07/01/2022 137     Potassium 07/01/2022 4 5     Chloride 07/01/2022 100     CO2 07/01/2022 19 (A)    CALCIUM 07/01/2022 9 9     Hemoglobin A1C 07/01/2022 7 2 (A)    Estimated Average Glucose 07/01/2022 160     TSH 07/01/2022 1 250    Admission on 06/22/2022, Discharged on 06/22/2022   Component Date Value    WBC 06/22/2022 11 51 (A)    RBC 06/22/2022 4 61     Hemoglobin 06/22/2022 15 1     Hematocrit 06/22/2022 44 0     MCV 06/22/2022 95     MCH 06/22/2022 32 8  MCHC 06/22/2022 34 3     RDW 06/22/2022 12 9     MPV 06/22/2022 10 8     Platelets 19/87/6958 226     nRBC 06/22/2022 0     Neutrophils Relative 06/22/2022 75     Immat GRANS % 06/22/2022 0     Lymphocytes Relative 06/22/2022 14     Monocytes Relative 06/22/2022 10     Eosinophils Relative 06/22/2022 1     Basophils Relative 06/22/2022 0     Neutrophils Absolute 06/22/2022 8 54 (A)    Immature Grans Absolute 06/22/2022 0 04     Lymphocytes Absolute 06/22/2022 1 66     Monocytes Absolute 06/22/2022 1 19     Eosinophils Absolute 06/22/2022 0 06     Basophils Absolute 06/22/2022 0 02     Sodium 06/22/2022 133 (A)    Potassium 06/22/2022 3 9     Chloride 06/22/2022 98 (A)    CO2 06/22/2022 27     ANION GAP 06/22/2022 8     BUN 06/22/2022 10     Creatinine 06/22/2022 1 16     Glucose 06/22/2022 278 (A)    Calcium 06/22/2022 9 0     AST 06/22/2022 14     ALT 06/22/2022 27     Alkaline Phosphatase 06/22/2022 47     Total Protein 06/22/2022 7 7     Albumin 06/22/2022 3 7     Total Bilirubin 06/22/2022 1 00     eGFR 06/22/2022 63     hs TnI 0hr 06/22/2022 18     hs TnI 2hr 06/22/2022 18     Delta 2hr hsTnI 06/22/2022 0     Ventricular Rate 06/22/2022 101     Atrial Rate 06/22/2022 303     QRSD Interval 06/22/2022 66     QT Interval 06/22/2022 346     QTC Interval 06/22/2022 448     P Axis 06/22/2022 121     QRS Axis 06/22/2022 -44     T Wave Axis 06/22/2022 41     Ventricular Rate 06/22/2022 95     Atrial Rate 06/22/2022 300     QRSD Interval 06/22/2022 70     QT Interval 06/22/2022 312     QTC Interval 06/22/2022 392     P Axis 06/22/2022 74     QRS Axis 06/22/2022 -48     T Wave Axis 06/22/2022 -14    Orders Only on 03/24/2022   Component Date Value    Right Eye Diabetic Retin* 03/24/2022 None     Left Eye Diabetic Retino* 03/24/2022 None      Lab Results   Component Value Date    CHOL 159 03/29/2017    TRIG 174 (H) 01/10/2022    HDL 48 01/10/2022     Imaging: XR chest 1 view portable    Result Date: 6/22/2022  Narrative: CHEST INDICATION:   neck pain  COMPARISON:  7/16/2019 EXAM PERFORMED/VIEWS:  XR CHEST PORTABLE Single view FINDINGS: Cardiomediastinal silhouette appears unremarkable  The lungs are clear  No pneumothorax or pleural effusion  Osseous structures appear within normal limits for patient age  Impression: No acute cardiopulmonary disease  Findings are stable Workstation performed: [id number]       Review of Systems:  Review of Systems   Constitutional: Negative for activity change, appetite change, fatigue and fever  HENT: Negative for nosebleeds and sore throat  Eyes: Negative for photophobia and visual disturbance  Respiratory: Negative for cough, chest tightness, shortness of breath and wheezing  Cardiovascular: Positive for palpitations  Negative for chest pain and leg swelling  Gastrointestinal: Negative for abdominal pain, diarrhea, nausea and vomiting  Endocrine: Negative for polyuria  Genitourinary: Negative for dysuria, frequency and hematuria  Musculoskeletal: Negative for arthralgias, back pain and gait problem  Skin: Negative for pallor and rash  Neurological: Negative for dizziness, syncope, speech difficulty and light-headedness  Hematological: Does not bruise/bleed easily  Psychiatric/Behavioral: Negative for agitation, behavioral problems and confusion  Physical Exam:  Physical Exam  Vitals reviewed  Constitutional:       General: He is not in acute distress  Appearance: He is well-developed  He is not diaphoretic  HENT:      Head: Normocephalic and atraumatic  Nose: Nose normal    Eyes:      General: No scleral icterus  Pupils: Pupils are equal, round, and reactive to light  Neck:      Vascular: No JVD  Cardiovascular:      Rate and Rhythm: Normal rate and regular rhythm  Heart sounds: S1 normal and S2 normal  Heart sounds not distant  No murmur heard     No systolic murmur is present  No friction rub  No gallop  No S3 sounds  Pulmonary:      Effort: Pulmonary effort is normal  No respiratory distress  Breath sounds: Normal breath sounds  No wheezing or rales  Abdominal:      General: Bowel sounds are normal  There is no distension  Palpations: Abdomen is soft  Musculoskeletal:         General: No deformity  Cervical back: Normal range of motion and neck supple  Skin:     General: Skin is warm and dry  Findings: No erythema  Neurological:      Mental Status: He is alert and oriented to person, place, and time  Cranial Nerves: No cranial nerve deficit  Psychiatric:         Behavior: Behavior normal        Blood pressure 132/82, pulse 70, height 5' 11" (1 803 m), weight 99 9 kg (220 lb 3 2 oz)  EKG:  Atrial fibrillation  Left axis deviation  Inferior infarct, age undetermined  Abnormal ECG    Discussion/Summary:  Afib: although computer reads ECG as atrial flutter, there is some organization of atrial activity in V1, but otherwise, the rhythm is irregular, so more consistent with afib  Continued on Xarelto for [facility] [place]  Currently not on any AV devyn blocking agents  Echo in 2019 with normal LVEF  He had a Holter monitor done, without results available yet  Would favor changing amlodipine to metoprolol for combination BP and HR effect - will change today  Will check an echo to assess for structural heart disease to cause afib as well as stress test to look for ischemia  HTN: continues on amlodipine and valsartan, with good control  H/o TIA: presumed from afib, continued on Xarelto - although he's been on Xarelto for DVT  Also continued on statin  Pre-op cardiac risk eval: for low risk cataracts surgery - can hold Xarelto for 3 days prior, if needed  No additional testing required prior to this surgery

## 2022-07-15 NOTE — LETTER
July 15, 2022     Referral [address] [facility] [id number]    Patient: Ross Kesy   YOB: 1950   Date of Visit: 7/15/2022       Dear Dr David Madden:    Thank you for referring Adarsh Huffman to me for evaluation  Below are my notes for this consultation  If you have questions, please do not hesitate to call me  I look forward to following your patient along with you  Sincerely,        Lucetta Bosworth, MD        CC: Junnie Cutting, MD Lucetta Bosworth, MD  7/15/2022  1:24 PM  Incomplete                                             Cardiology Consultation     Ross Keys  [id number]  1950  [facility] [address] [address] [phone]    1  Atrial flutter, unspecified type ([address] )  POCT ECG   2  Essential hypertension     3  Longstanding persistent atrial fibrillation ([facility] )     4  TIA (transient ischemic attack)       Chief Complaint   Patient presents with    Atrial Flutter     New pt  Occasional heart fluttering  HPI: Patient is here for cardiac evaluation of atrial flutter and sensation of heart fluttering  No reported chest pain, shortness of breath, lightheadedness, syncope, LE edema, orthopnea, PND, or significant weight changes  Patient remains active without any increased fatigue out of the ordinary        Patient Active Problem List   Diagnosis    Essential hypertension    Type 2 diabetes mellitus without complication, without long-term current use of insulin ([facility] )    BPH with obstruction/lower urinary tract symptoms    Chronic venous insufficiency    Varicose veins with swelling    History of DVT (deep vein thrombosis)    Anticoagulant long-term use    Arthritis of knee    Numbness    TIA (transient ischemic attack)    Hyperlipidemia associated with type 2 diabetes mellitus ([facility] )    Anxiety    Longstanding persistent atrial fibrillation [facility])     Past Medical History:   Diagnosis Date    [facility] thrombosis) ([facility])     Gross [facility]uria     [facility]   R    4/3/17     Hematuria     LA Gerhardt Sep Gerhardt Sep 16    R    4/3/17    Hyperlipidemia     Hypertension     Long-term (current) use of anticoagulants     [facility]   R    4/3/17     Seasonal allergies      Social History     Socioeconomic History    Marital status: /Civil Union     Spouse name: Daija Nance Number of children: 2    Years of education: Not on file    Highest education level: Not on file   Occupational History    Occupation: Retired   Tobacco Use    Smoking status: Former Smoker     Types: Cigarettes     Quit date:      Years since quittin 5    Smokeless tobacco: Never Used   Vaping Use    Vaping Use: Every day    Substances: THC   Substance and Sexual Activity    Alcohol use:  Yes     Alcohol/week: 4 0 standard drinks     Types: 4 Cans of beer per week     Comment: social    Drug use: Yes     Types: Marijuana    Sexual activity: Yes     Partners: Female   Other Topics Concern    Not on file   Social History Narrative    Caffeine use     Social Determinants of Health     Financial Resource Strain: Not on file   Food Insecurity: Not on file   Transportation Needs: Not on file   Physical Activity: Not on file   Stress: Not on file   Social Connections: Not on file   Intimate Partner Violence: Not on file   Housing Stability: Not on file      Family History   Problem Relation Age of Onset    Hypertension Mother     Leukemia Father     Heart disease Father     Alcohol abuse Father     Depression Father     Heart disease Family     Leukemia Family     Depression Sister     Alcohol abuse Brother     Depression Brother      Past Surgical History:   Procedure Laterality Date    APPENDECTOMY      WISDOM TOOTH EXTRACTION         Current Outpatient Medications:     acetaminophen (TYLENOL) 500 mg tablet, Take 500 mg by mouth every 6 (six) hours as needed for mild pain, Disp: , Rfl:     amLODIPine (NORVASC) 5 mg tablet, Take 1 tablet (5 mg total) by mouth daily For blood pressure, Disp: 90 tablet, Rfl: 0    ASPIRIN LOW DOSE 81 MG EC tablet, TAKE ONE TABLET BY MOUTH DAILY, Disp: 30 tablet, Rfl: 11    atorvastatin (LIPITOR) 40 mg tablet, Take 1 tablet (40 mg total) by mouth daily with dinner, Disp: 90 tablet, Rfl: 1    Choline Fenofibrate (Fenofibric Acid) 135 MG CPDR, TAKE ONE CAPSULE BY MOUTH EVERY DAY, Disp: 90 capsule, Rfl: 0    metFORMIN (GLUCOPHAGE-XR) 500 mg 24 hr tablet, Take 1 tablet (500 mg total) by mouth 2 (two) times a day with meals, Disp: 180 tablet, Rfl: 1    valsartan (DIOVAN) 160 mg tablet, Take 1 tablet (160 mg total) by mouth daily, Disp: 90 tablet, Rfl: 0    Xarelto 20 MG tablet, TAKE ONE TABLET BY MOUTH EVERY DAY WITH BREAKFAST, Disp: 90 tablet, Rfl: 0    methocarbamol (ROBAXIN) 500 mg tablet, Take 1 tablet (500 mg total) by mouth 3 (three) times a day (Patient not taking: Reported on 7/15/2022), Disp: 30 tablet, Rfl: 0  No Known Allergies  Vitals:    07/15/22 1253   BP: 132/82   BP Location: Left arm   Patient Position: Sitting   Cuff Size: Large   Pulse: 70   Weight: 99 9 kg (220 lb 3 2 oz)   Height: 5' 11" (1 803 m)       Labs:  Office Visit on 06/30/2022   Component Date Value    Glucose, Random 07/01/2022 155 (A)    BUN 07/01/2022 20     Creatinine 07/01/2022 1 09     eGFR 07/01/2022 73     SL AMB BUN/CREATININE RA* 07/01/2022 18     Sodium 07/01/2022 137     Potassium 07/01/2022 4 5     Chloride 07/01/2022 100     CO2 07/01/2022 19 (A)    CALCIUM 07/01/2022 9 9     Hemoglobin A1C 07/01/2022 7 2 (A)    Estimated Average Glucose 07/01/2022 160     TSH 07/01/2022 1 250    Admission on 06/22/2022, Discharged on 06/22/2022   Component Date Value    WBC 06/22/2022 11 51 (A)    RBC 06/22/2022 4 61     Hemoglobin 06/22/2022 15 1     Hematocrit 06/22/2022 44 0     MCV 06/22/2022 95     MCH 06/22/2022 32 8     MCHC 06/22/2022 34 3     RDW 06/22/2022 12 9     MPV 06/22/2022 10 8     Platelets 60/19/0032 226     nRBC 06/22/2022 0     Neutrophils Relative 06/22/2022 75     Immat GRANS % 06/22/2022 0     Lymphocytes Relative 06/22/2022 14     Monocytes Relative 06/22/2022 10     Eosinophils Relative 06/22/2022 1     Basophils Relative 06/22/2022 0     Neutrophils Absolute 06/22/2022 8 54 (A)    Immature Grans Absolute 06/22/2022 0 04     Lymphocytes Absolute 06/22/2022 1 66     Monocytes Absolute 06/22/2022 1 19     Eosinophils Absolute 06/22/2022 0 06     Basophils Absolute 06/22/2022 0 02     Sodium 06/22/2022 133 (A)    Potassium 06/22/2022 3 9     Chloride 06/22/2022 98 (A)    CO2 06/22/2022 27     ANION GAP 06/22/2022 8     BUN 06/22/2022 10     Creatinine 06/22/2022 1 16     Glucose 06/22/2022 278 (A)    Calcium 06/22/2022 9 0     AST 06/22/2022 14     ALT 06/22/2022 27     Alkaline Phosphatase 06/22/2022 47     Total Protein 06/22/2022 7 7     Albumin 06/22/2022 3 7     Total Bilirubin 06/22/2022 1 00     eGFR 06/22/2022 63     hs TnI 0hr 06/22/2022 18     hs TnI 2hr 06/22/2022 18     Delta 2hr hsTnI 06/22/2022 0     Ventricular Rate 06/22/2022 101     Atrial Rate 06/22/2022 303     QRSD Interval 06/22/2022 66     QT Interval 06/22/2022 346     QTC Interval 06/22/2022 448     P Axis 06/22/2022 121     QRS Axis 06/22/2022 -44     T Wave Axis 06/22/2022 41     Ventricular Rate 06/22/2022 95     Atrial Rate 06/22/2022 300     QRSD Interval 06/22/2022 70     QT Interval 06/22/2022 312     QTC Interval 06/22/2022 392     P Axis 06/22/2022 74     QRS Axis 06/22/2022 -48     T Wave Axis 06/22/2022 -14    Orders Only on 03/24/2022   Component Date Value    Right Eye Diabetic Retin* 03/24/2022 None     Left Eye Diabetic Retino* 03/24/2022 None      Lab Results   Component Value Date    CHOL 159 03/29/2017    TRIG 174 (H) 01/10/2022    HDL 48 01/10/2022     Imaging: XR chest 1 view portable    Result Date: 6/22/2022  Narrative: CHEST INDICATION:   neck pain  COMPARISON:  7/16/2019 EXAM PERFORMED/VIEWS:  XR CHEST PORTABLE Single view FINDINGS: Cardiomediastinal silhouette appears unremarkable  The lungs are clear  No pneumothorax or pleural effusion  Osseous structures appear within normal limits for patient age  Impression: No acute cardiopulmonary disease  Findings are stable Workstation performed: [id number]       Review of Systems:  Review of Systems   Constitutional: Negative for activity change, appetite change, fatigue and fever  HENT: Negative for nosebleeds and sore throat  Eyes: Negative for photophobia and visual disturbance  Respiratory: Negative for cough, chest tightness, shortness of breath and wheezing  Cardiovascular: Positive for palpitations  Negative for chest pain and leg swelling  Gastrointestinal: Negative for abdominal pain, diarrhea, nausea and vomiting  Endocrine: Negative for polyuria  Genitourinary: Negative for dysuria, frequency and hematuria  Musculoskeletal: Negative for arthralgias, back pain and gait problem  Skin: Negative for pallor and rash  Neurological: Negative for dizziness, syncope, speech difficulty and light-headedness  Hematological: Does not bruise/bleed easily  Psychiatric/Behavioral: Negative for agitation, behavioral problems and confusion  Physical Exam:  Physical Exam  Vitals reviewed  Constitutional:       General: He is not in acute distress  Appearance: He is well-developed  He is not diaphoretic  HENT:      Head: Normocephalic and atraumatic  Nose: Nose normal    Eyes:      General: No scleral icterus  Pupils: Pupils are equal, round, and reactive to light  Neck:      Vascular: No JVD  Cardiovascular:      Rate and Rhythm: Normal rate and regular rhythm  Heart sounds: S1 normal and S2 normal  Heart sounds not distant  No murmur heard  No systolic murmur is present  No friction rub  No gallop   No S3 sounds  Pulmonary:      Effort: Pulmonary effort is normal  No respiratory distress  Breath sounds: Normal breath sounds  No wheezing or rales  Abdominal:      General: Bowel sounds are normal  There is no distension  Palpations: Abdomen is soft  Musculoskeletal:         General: No deformity  Cervical back: Normal range of motion and neck supple  Skin:     General: Skin is warm and dry  Findings: No erythema  Neurological:      Mental Status: He is alert and oriented to person, place, and time  Cranial Nerves: No cranial nerve deficit  Psychiatric:         Behavior: Behavior normal        Blood pressure 132/82, pulse 70, height 5' 11" (1 803 m), weight 99 9 kg (220 lb 3 2 oz)  EKG:  Atrial fibrillation  Left axis deviation  Inferior infarct, age undetermined  Abnormal ECG    Discussion/Summary:  Afib: although computer reads ECG as atrial flutter, there is some organization of atrial activity in V1, but otherwise, the rhythm is irregular, so more consistent with afib  Continued on Xarelto for [facility]  Currently not on any AV devyn blocking agents  Echo in 2019 with normal LVEF  He had a Holter monitor done, without results available yet  Would favor changing amlodipine to metoprolol for combination BP and HR effect - will change today  Will check an echo to assess for structural heart disease to cause afib as well as stress test to look for ischemia  HTN: continues on amlodipine and valsartan, with good control  H/o TIA: presumed from afib, continued on Xarelto - although he's been on Xarelto for DVT  Pre-op cardiac risk eval: for low risk cataracts surgery - can hold Xarelto for 3 days prior, if needed  No additional testing required prior to this surgery      Geovanni Penn MD  7/15/2022  1:14 PM  Sign when Signing Visit                                             Cardiology Consultation     Elise Tyson  [id number]  1950  [address] [place] [address] [address] [address]  [phone]    1  Atrial flutter, unspecified type ([facility] )  POCT ECG   2  Essential hypertension     3  Longstanding persistent atrial fibrillation ([facility] )     4  TIA (transient ischemic attack)       Chief Complaint   Patient presents with    Atrial Flutter     New pt  Occasional heart fluttering  HPI: Patient is here for cardiac evaluation of atrial flutter and sensation of heart fluttering  No reported chest pain, shortness of breath, lightheadedness, syncope, LE edema, orthopnea, PND, or significant weight changes  Patient remains active without any increased fatigue out of the ordinary  Patient Active Problem List   Diagnosis    Essential hypertension    Type 2 diabetes mellitus without complication, without long-term current use of insulin ([place] [address] )    BPH with obstruction/lower urinary tract symptoms    Chronic venous insufficiency    Varicose veins with swelling    History of DVT (deep vein thrombosis)    Anticoagulant long-term use    Arthritis of knee    Numbness    TIA (transient ischemic attack)    Hyperlipidemia associated with type 2 diabetes mellitus ([place] [address] )    Anxiety    Longstanding persistent atrial fibrillation (HCC)     Past Medical History:   Diagnosis Date    DVT (deep venous thrombosis) (HCC)     Gross hematuria     LA  Lajean Cassette Lajean Cassette 3/29/16   R    4/3/17     Hematuria     LA    Lajean Cassette Lajean Cassette 4/12/16    R    4/3/17    Hyperlipidemia     Hypertension     Long-term (current) use of anticoagulants     LA  Lajean Cassette Lajean Cassette 3/29/16   R    4/3/17     Seasonal allergies      Social History     Socioeconomic History    Marital status: /Civil Union     Spouse name: Anthony Saenz Number of children: 2    Years of education: Not on file    Highest education level: Not on file   Occupational History    Occupation: Retired   Tobacco Use    Smoking status: Former Smoker     Types: Cigarettes     Quit date: 1975     Years since quittin 5    Smokeless tobacco: Never Used   Vaping Use    Vaping Use: Every day    Substances: THC   Substance and Sexual Activity    Alcohol use:  Yes     Alcohol/week: 4 0 standard drinks     Types: 4 Cans of beer per week     Comment: social    Drug use: Yes     Types: Marijuana    Sexual activity: Yes     Partners: Female   Other Topics Concern    Not on file   Social History Narrative    Caffeine use     Social Determinants of Health     Financial Resource Strain: Not on file   Food Insecurity: Not on file   Transportation Needs: Not on file   Physical Activity: Not on file   Stress: Not on file   Social Connections: Not on file   Intimate Partner Violence: Not on file   Housing Stability: Not on file      Family History   Problem Relation Age of Onset    Hypertension Mother     Leukemia Father     Heart disease Father     Alcohol abuse Father     Depression Father     Heart disease Family     Leukemia Family     Depression Sister     Alcohol abuse Brother     Depression Brother      Past Surgical History:   Procedure Laterality Date    APPENDECTOMY      WISDOM TOOTH EXTRACTION         Current Outpatient Medications:     acetaminophen (TYLENOL) 500 mg tablet, Take 500 mg by mouth every 6 (six) hours as needed for mild pain, Disp: , Rfl:     amLODIPine (NORVASC) 5 mg tablet, Take 1 tablet (5 mg total) by mouth daily For blood pressure, Disp: 90 tablet, Rfl: 0    ASPIRIN LOW DOSE 81 MG EC tablet, TAKE ONE TABLET BY MOUTH DAILY, Disp: 30 tablet, Rfl: 11    atorvastatin (LIPITOR) 40 mg tablet, Take 1 tablet (40 mg total) by mouth daily with dinner, Disp: 90 tablet, Rfl: 1    Choline Fenofibrate (Fenofibric Acid) 135 MG CPDR, TAKE ONE CAPSULE BY MOUTH EVERY DAY, Disp: 90 capsule, Rfl: 0    metFORMIN (GLUCOPHAGE-XR) 500 mg 24 hr tablet, Take 1 tablet (500 mg total) by mouth 2 (two) times a day with meals, Disp: 180 tablet, Rfl: 1    valsartan (DIOVAN) 160 mg tablet, Take 1 tablet (160 mg total) by mouth daily, Disp: 90 tablet, Rfl: 0    Xarelto 20 MG tablet, TAKE ONE TABLET BY MOUTH EVERY DAY WITH BREAKFAST, Disp: 90 tablet, Rfl: 0    methocarbamol (ROBAXIN) 500 mg tablet, Take 1 tablet (500 mg total) by mouth 3 (three) times a day (Patient not taking: Reported on 7/15/2022), Disp: 30 tablet, Rfl: 0  No Known Allergies  Vitals:    07/15/22 1253   BP: 132/82   BP Location: Left arm   Patient Position: Sitting   Cuff Size: Large   Pulse: 70   Weight: 99 9 kg (220 lb 3 2 oz)   Height: 5' 11" (1 803 m)       Labs:  Office Visit on 06/30/2022   Component Date Value    Glucose, Random 07/01/2022 155 (A)    BUN 07/01/2022 20     Creatinine 07/01/2022 1 09     eGFR 07/01/2022 73     SL AMB BUN/CREATININE RA* 07/01/2022 18     Sodium 07/01/2022 137     Potassium 07/01/2022 4 5     Chloride 07/01/2022 100     CO2 07/01/2022 19 (A)    CALCIUM 07/01/2022 9 9     Hemoglobin A1C 07/01/2022 7 2 (A)    Estimated Average Glucose 07/01/2022 160     TSH 07/01/2022 1 250    Admission on 06/22/2022, Discharged on 06/22/2022   Component Date Value    WBC 06/22/2022 11 51 (A)    RBC 06/22/2022 4 61     Hemoglobin 06/22/2022 15 1     Hematocrit 06/22/2022 44 0     MCV 06/22/2022 95     MCH 06/22/2022 32 8     MCHC 06/22/2022 34 3     RDW 06/22/2022 12 9     MPV 06/22/2022 10 8     Platelets 93/99/7406 226     nRBC 06/22/2022 0     Neutrophils Relative 06/22/2022 75     Immat GRANS % 06/22/2022 0     Lymphocytes Relative 06/22/2022 14     Monocytes Relative 06/22/2022 10     Eosinophils Relative 06/22/2022 1     Basophils Relative 06/22/2022 0     Neutrophils Absolute 06/22/2022 8 54 (A)    Immature Grans Absolute 06/22/2022 0 04     Lymphocytes Absolute 06/22/2022 1 66     Monocytes Absolute 06/22/2022 1 19     Eosinophils Absolute 06/22/2022 0 06     Basophils Absolute 06/22/2022 0 02     Sodium 06/22/2022 133 (A)    Potassium 06/22/2022 3 9     Chloride 06/22/2022 98 (A)    CO2 06/22/2022 27     ANION GAP 06/22/2022 8     BUN 06/22/2022 10     Creatinine 06/22/2022 1 16     Glucose 06/22/2022 278 (A)    Calcium 06/22/2022 9 0     AST 06/22/2022 14     ALT 06/22/2022 27     Alkaline Phosphatase 06/22/2022 47     Total Protein 06/22/2022 7 7     Albumin 06/22/2022 3 7     Total Bilirubin 06/22/2022 1 00     eGFR 06/22/2022 63     hs TnI 0hr 06/22/2022 18     hs TnI 2hr 06/22/2022 18     Delta 2hr hsTnI 06/22/2022 0     Ventricular Rate 06/22/2022 101     Atrial Rate 06/22/2022 303     QRSD Interval 06/22/2022 66     QT Interval 06/22/2022 346     QTC Interval 06/22/2022 448     P Axis 06/22/2022 121     QRS Axis 06/22/2022 -44     T Wave Axis 06/22/2022 41     Ventricular Rate 06/22/2022 95     Atrial Rate 06/22/2022 300     QRSD Interval 06/22/2022 70     QT Interval 06/22/2022 312     QTC Interval 06/22/2022 392     P Axis 06/22/2022 74     QRS Axis 06/22/2022 -48     T Wave Axis 06/22/2022 -14    Orders Only on 03/24/2022   Component Date Value    Right Eye Diabetic Retin* 03/24/2022 None     Left Eye Diabetic Retino* 03/24/2022 None      Lab Results   Component Value Date    CHOL 159 03/29/2017    TRIG 174 (H) 01/10/2022    HDL 48 01/10/2022     Imaging: XR chest 1 view portable    Result Date: 6/22/2022  Narrative: CHEST INDICATION:   neck pain  COMPARISON:  7/16/2019 EXAM PERFORMED/VIEWS:  XR CHEST PORTABLE Single view FINDINGS: Cardiomediastinal silhouette appears unremarkable  The lungs are clear  No pneumothorax or pleural effusion  Osseous structures appear within normal limits for patient age  Impression: No acute cardiopulmonary disease  Findings are stable Workstation performed: [id number]       Review of Systems:  Review of Systems   Constitutional: Negative for activity change, appetite change, fatigue and fever  HENT: Negative for nosebleeds and sore throat  Eyes: Negative for photophobia and visual disturbance     Respiratory: Negative for cough, chest tightness, shortness of breath and wheezing  Cardiovascular: Positive for palpitations  Negative for chest pain and leg swelling  Gastrointestinal: Negative for abdominal pain, diarrhea, nausea and vomiting  Endocrine: Negative for polyuria  Genitourinary: Negative for dysuria, frequency and hematuria  Musculoskeletal: Negative for arthralgias, back pain and gait problem  Skin: Negative for pallor and rash  Neurological: Negative for dizziness, syncope, speech difficulty and light-headedness  Hematological: Does not bruise/bleed easily  Psychiatric/Behavioral: Negative for agitation, behavioral problems and confusion  Physical Exam:  Physical Exam  Vitals reviewed  Constitutional:       General: He is not in acute distress  Appearance: He is well-developed  He is not diaphoretic  HENT:      Head: Normocephalic and atraumatic  Nose: Nose normal    Eyes:      General: No scleral icterus  Pupils: Pupils are equal, round, and reactive to light  Neck:      Vascular: No JVD  Cardiovascular:      Rate and Rhythm: Normal rate and regular rhythm  Heart sounds: S1 normal and S2 normal  Heart sounds not distant  No murmur heard  No systolic murmur is present  No friction rub  No gallop  No S3 sounds  Pulmonary:      Effort: Pulmonary effort is normal  No respiratory distress  Breath sounds: Normal breath sounds  No wheezing or rales  Abdominal:      General: Bowel sounds are normal  There is no distension  Palpations: Abdomen is soft  Musculoskeletal:         General: No deformity  Cervical back: Normal range of motion and neck supple  Skin:     General: Skin is warm and dry  Findings: No erythema  Neurological:      Mental Status: He is alert and oriented to person, place, and time  Cranial Nerves: No cranial nerve deficit     Psychiatric:         Behavior: Behavior normal        Blood pressure 132/82, pulse 70, height 5' 11" (1 803 m), weight 99 9 kg (220 lb 3 2 oz)  EKG:  Atrial fibrillation  Left axis deviation  Inferior infarct, age undetermined  Abnormal ECG    Discussion/Summary:  Afib: although computer reads ECG as atrial flutter, there is some organization of atrial activity in V1, but otherwise, the rhythm is irregular, so more consistent with afib  Continued on Xarelto for [facility]  Currently not on any AV devyn blocking agents  Echo in 2019 with normal LVEF  He had a Holter monitor done, without results available yet  Would favor changing amlodipine to metoprolol for combination BP and HR effect  HTN: continues on amlodipine and valsartan, with good control  H/o TIA: presumed from afib, continued on Xarelto

## 2022-07-19 PROCEDURE — 93227 XTRNL ECG REC<48 HR R&I: CPT | Performed by: INTERNAL MEDICINE

## 2022-07-22 ENCOUNTER — OFFICE VISIT (OUTPATIENT)
Dept: FAMILY MEDICINE CLINIC | Facility: CLINIC | Age: 72
End: 2022-07-22
Payer: MEDICARE

## 2022-07-22 VITALS
TEMPERATURE: 98.3 F | HEART RATE: 73 BPM | BODY MASS INDEX: 31.08 KG/M2 | SYSTOLIC BLOOD PRESSURE: 128 MMHG | DIASTOLIC BLOOD PRESSURE: 78 MMHG | WEIGHT: 222 LBS | OXYGEN SATURATION: 97 % | HEIGHT: 71 IN

## 2022-07-22 DIAGNOSIS — E11.9 TYPE 2 DIABETES MELLITUS WITHOUT COMPLICATION, WITHOUT LONG-TERM CURRENT USE OF INSULIN (HCC): ICD-10-CM

## 2022-07-22 DIAGNOSIS — I48.11 LONGSTANDING PERSISTENT ATRIAL FIBRILLATION (HCC): ICD-10-CM

## 2022-07-22 DIAGNOSIS — Z01.818 PRE-OP EXAMINATION: Primary | ICD-10-CM

## 2022-07-22 DIAGNOSIS — H26.9 CATARACT OF BOTH EYES, UNSPECIFIED CATARACT TYPE: ICD-10-CM

## 2022-07-22 DIAGNOSIS — Z79.01 ANTICOAGULANT LONG-TERM USE: ICD-10-CM

## 2022-07-22 PROCEDURE — 99214 OFFICE O/P EST MOD 30 MIN: CPT | Performed by: FAMILY MEDICINE

## 2022-07-22 RX ORDER — MOXIFLOXACIN 5 MG/ML
SOLUTION/ DROPS OPHTHALMIC
COMMUNITY
Start: 2022-07-05 | End: 2022-10-11

## 2022-07-22 RX ORDER — KETOROLAC TROMETHAMINE 5 MG/ML
SOLUTION OPHTHALMIC
COMMUNITY
Start: 2022-07-05 | End: 2022-10-11

## 2022-07-22 RX ORDER — PREDNISOLONE ACETATE 10 MG/ML
SUSPENSION/ DROPS OPHTHALMIC
COMMUNITY
Start: 2022-07-05 | End: 2022-10-11

## 2022-07-22 NOTE — PATIENT INSTRUCTIONS
Patient is medically stable  Will be cleared for cataract surgery  Cardiologists would like to do a stress test but this is not necessarily prior to surgery  Patient will ask operating I surgeon about any advice as to timing of stress test around to different cataract procedures  Cardiologist is okay with holding Xarelto if the operating surgeon chooses to do so  This would be 3 days before each surgery and start after surgery completed

## 2022-07-22 NOTE — PROGRESS NOTES
[id number]        NAME: Shruti Campos is a 71 y o  male  : 1950    MRN: [id number]  DATE: 2022  TIME: 2:56 PM    Assessment and Plan   Pre-op examination [Z37 206]  2  Pre-op examination     2  Cataract of both eyes, unspecified cataract type     3  Longstanding persistent atrial fibrillation ([address] )     4  Anticoagulant long-term use     5  Type 2 diabetes mellitus without complication, without long-term current use of insulin (HCC)         No problem-specific Assessment & Plan notes found for this encounter  Patient Instructions     Patient Instructions   Patient is medically stable  Will be cleared for cataract surgery  Cardiologists would like to do a stress test but this is not necessarily prior to surgery  Patient will ask operating I surgeon about any advice as to timing of stress test around to different cataract procedures  Cardiologist is okay with holding Xarelto if the operating surgeon chooses to do so  This would be 3 days before each surgery and start after surgery completed  Chief Complaint     Chief Complaint   Patient presents with    Pre-op Exam     2022 eye surgery          History of Present Illness       Patient here for preoperative exam for proposed cataract surgery  Having right eye done 1st and 1 week later the left eye  Had seen Cardiology due to persistent atrial flutter  They have cleared for proposed surgery  If operating surgeon desires, okay to hold the [facility]  Operating surgeon Dr Rojelio Yen  Tentative date August 3, 2022  Review of Systems   Review of Systems   Constitutional: Negative for appetite change, chills, diaphoresis and fever  HENT: Negative for ear pain, rhinorrhea, sinus pressure and sore throat  Eyes: Positive for visual disturbance  Negative for discharge, redness and itching  Respiratory: Negative for cough, shortness of breath and wheezing      Cardiovascular: Negative for chest pain and palpitations  Rapid or slow heart rate   Gastrointestinal: Negative for abdominal pain, diarrhea, nausea and vomiting  Current Medications       Current Outpatient Medications:     acetaminophen (TYLENOL) 500 mg tablet, Take 500 mg by mouth every 6 (six) hours as needed for mild pain, Disp: , Rfl:     ASPIRIN LOW DOSE 81 MG EC tablet, TAKE ONE TABLET BY MOUTH DAILY, Disp: 30 tablet, Rfl: 11    atorvastatin (LIPITOR) 40 mg tablet, Take 1 tablet (40 mg total) by mouth daily with dinner, Disp: 90 tablet, Rfl: 1    Choline Fenofibrate (Fenofibric Acid) 135 MG CPDR, Take 1 capsule (135 mg total) by mouth daily, Disp: 90 capsule, Rfl: 3    ketorolac (ACULAR) 0 5 % ophthalmic solution, , Disp: , Rfl:     metFORMIN (GLUCOPHAGE-XR) 500 mg 24 hr tablet, Take 1 tablet (500 mg total) by mouth 2 (two) times a day with meals, Disp: 180 tablet, Rfl: 1    methocarbamol (ROBAXIN) 500 mg tablet, Take 1 tablet (500 mg total) by mouth 3 (three) times a day, Disp: 30 tablet, Rfl: 0    metoprolol succinate (TOPROL-XL) 25 mg 24 hr tablet, Take 1 tablet (25 mg total) by mouth daily, Disp: 30 tablet, Rfl: 3    moxifloxacin (VIGAMOX) 0 5 % ophthalmic solution, , Disp: , Rfl:     prednisoLONE acetate (PRED FORTE) 1 % ophthalmic suspension, , Disp: , Rfl:     valsartan (DIOVAN) 160 mg tablet, Take 1 tablet (160 mg total) by mouth daily, Disp: 90 tablet, Rfl: 0    Xarelto 20 MG tablet, TAKE ONE TABLET BY MOUTH EVERY DAY WITH BREAKFAST, Disp: 90 tablet, Rfl: 0    Current Allergies     Allergies as of 07/22/2022    (No Known Allergies)            The following portions of the patient's history were reviewed and updated as appropriate: allergies, current medications, past family history, past medical history, past social history, past surgical history and problem list      Past Medical History:   Diagnosis Date    DVT (deep venous thrombosis) (HCC)     Gross hematuria     [place]  Maria Antonia Thornton 3/29/16   R    4/3/17  Hematuria     TARA Reynolds 4/12/16    R    4/3/17    Hyperlipidemia     Hypertension     Long-term (current) use of anticoagulants     TARA Reynolds 3/29/16   R    4/3/17     Seasonal allergies        Past Surgical History:   Procedure Laterality Date    APPENDECTOMY      WISDOM TOOTH EXTRACTION         Family History   Problem Relation Age of Onset    Hypertension Mother     Leukemia Father     Heart disease Father     Alcohol abuse Father     Depression Father     Heart disease Family     Leukemia Family     Depression Sister     Alcohol abuse Brother     Depression Brother          Medications have been verified  Objective   /78 (BP Location: Left arm, Patient Position: Sitting, Cuff Size: Adult)   Pulse 73   Temp 98 3 °F (36 8 °C) (Oral)   Ht 5' 11" (1 803 m)   Wt 101 kg (222 lb)   SpO2 97%   BMI 30 96 kg/m²        Physical Exam     Physical Exam  Vitals reviewed  Constitutional:       General: He is not in acute distress  Appearance: He is well-developed  HENT:      Head: Normocephalic and atraumatic  Right Ear: Tympanic membrane and external ear normal  No drainage  Left Ear: Tympanic membrane normal  No drainage  Nose: Nose normal       Mouth/Throat:      Mouth: Mucous membranes are moist       Pharynx: No oropharyngeal exudate  Eyes:      General:         Right eye: No discharge  Left eye: No discharge  Conjunctiva/sclera: Conjunctivae normal    Neck:      Thyroid: No thyromegaly  Cardiovascular:      Rate and Rhythm: Normal rate and regular rhythm  Heart sounds: Normal heart sounds  Pulmonary:      Effort: Pulmonary effort is normal  No respiratory distress  Breath sounds: No wheezing or rales  Musculoskeletal:      Cervical back: Normal range of motion and neck supple  Right lower leg: No edema  Left lower leg: Edema present  Lymphadenopathy:      Cervical: No cervical adenopathy     Skin:     General: Skin is warm and dry    Psychiatric:         Mood and Affect: Mood normal          Behavior: Behavior normal

## 2022-07-25 ENCOUNTER — TELEPHONE (OUTPATIENT)
Dept: CARDIOLOGY CLINIC | Facility: CLINIC | Age: 72
End: 2022-07-25

## 2022-07-25 NOTE — TELEPHONE ENCOUNTER
Faxed OV note to [facility] [address]  Fax# [phone] Attn: Mihaela Jones    Call back # [phone]

## 2022-07-26 NOTE — TELEPHONE ENCOUNTER
Called yesterday and today - [facility] surgery center closed  Will attempt again tomorrow

## 2022-08-02 DIAGNOSIS — I10 ESSENTIAL HYPERTENSION: ICD-10-CM

## 2022-08-02 RX ORDER — VALSARTAN 160 MG/1
160 TABLET ORAL DAILY
Qty: 90 TABLET | Refills: 0 | Status: SHIPPED | OUTPATIENT
Start: 2022-08-02 | End: 2022-10-02

## 2022-08-02 NOTE — TELEPHONE ENCOUNTER
Medication Refill Request     Name noryvan   Dose/Frequency 160mg/daily  Quantity 90  Verified pharmacy   [x]  Verified ordering Provider   [x]  Does patient have enough for the next 3 days?  Yes [x] No []

## 2022-08-03 ENCOUNTER — TELEPHONE (OUTPATIENT)
Dept: NON INVASIVE DIAGNOSTICS | Facility: HOSPITAL | Age: 72
End: 2022-08-03

## 2022-08-04 DIAGNOSIS — Z79.01 ANTICOAGULANT LONG-TERM USE: ICD-10-CM

## 2022-08-04 RX ORDER — RIVAROXABAN 20 MG/1
TABLET, FILM COATED ORAL
Qty: 90 TABLET | Refills: 0 | Status: SHIPPED | OUTPATIENT
Start: 2022-08-04 | End: 2022-10-30

## 2022-08-11 ENCOUNTER — HOSPITAL ENCOUNTER (OUTPATIENT)
Dept: NON INVASIVE DIAGNOSTICS | Facility: HOSPITAL | Age: 72
Discharge: HOME | End: 2022-08-11
Attending: INTERNAL MEDICINE
Payer: MEDICARE

## 2022-08-11 VITALS
HEIGHT: 71 IN | WEIGHT: 222 LBS | SYSTOLIC BLOOD PRESSURE: 128 MMHG | DIASTOLIC BLOOD PRESSURE: 78 MMHG | BODY MASS INDEX: 31.08 KG/M2

## 2022-08-11 DIAGNOSIS — I48.11 LONGSTANDING PERSISTENT ATRIAL FIBRILLATION (HCC): ICD-10-CM

## 2022-08-11 LAB
AORTIC ROOT: 3.3 CM
APICAL FOUR CHAMBER EJECTION FRACTION: 54 %
E WAVE DECELERATION TIME: 200 MS
FRACTIONAL SHORTENING: 35 (ref 28–44)
INTERVENTRICULAR SEPTUM IN DIASTOLE (PARASTERNAL SHORT AXIS VIEW): 1.2 CM
INTERVENTRICULAR SEPTUM: 1.2 CM (ref 0.6–1.1)
LAAS-AP2: 30.2 CM2
LAAS-AP4: 31.4 CM2
LEFT ATRIUM AREA SYSTOLE SINGLE PLANE A4C: 30.6 CM2
LEFT ATRIUM SIZE: 3.6 CM
LEFT INTERNAL DIMENSION IN SYSTOLE: 3.1 CM (ref 2.1–4)
LEFT VENTRICLE DIASTOLIC VOLUME (MOD BIPLANE): 176 ML
LEFT VENTRICLE SYSTOLIC VOLUME (MOD BIPLANE): 88 ML
LEFT VENTRICULAR INTERNAL DIMENSION IN DIASTOLE: 4.8 CM (ref 3.5–6)
LEFT VENTRICULAR POSTERIOR WALL IN END DIASTOLE: 1.2 CM
LEFT VENTRICULAR STROKE VOLUME: 70 ML
LV EF: 50 %
LVSV (TEICH): 70 ML
MV E'TISSUE VEL-SEP: 10 CM/S
MV PEAK A VEL: 0.28 M/S
MV PEAK E VEL: 79 CM/S
MV STENOSIS PRESSURE HALF TIME: 58 MS
MV VALVE AREA P 1/2 METHOD: 3.79
PA SYSTOLIC PRESSURE: 37 MMHG
RIGHT ATRIUM AREA SYSTOLE A4C: 19.2 CM2
RIGHT VENTRICLE ID DIMENSION: 3.6 CM
SL CV LEFT ATRIUM LENGTH A2C: 6.8 CM
SL CV LV EF: 55
SL CV PED ECHO LEFT VENTRICLE DIASTOLIC VOLUME (MOD BIPLANE) 2D: 109 ML
SL CV PED ECHO LEFT VENTRICLE SYSTOLIC VOLUME (MOD BIPLANE) 2D: 39 ML
TR MAX PG: 27 MMHG
TR PEAK VELOCITY: 2.6 M/S
TRICUSPID VALVE PEAK REGURGITATION VELOCITY: 2.6 M/S

## 2022-08-11 PROCEDURE — 93306 TTE W/DOPPLER COMPLETE: CPT

## 2022-08-11 PROCEDURE — 93306 TTE W/DOPPLER COMPLETE: CPT | Performed by: INTERNAL MEDICINE

## 2022-08-23 ENCOUNTER — HOSPITAL ENCOUNTER (OUTPATIENT)
Dept: NUCLEAR MEDICINE | Facility: HOSPITAL | Age: 72
Discharge: HOME | End: 2022-08-23
Attending: INTERNAL MEDICINE

## 2022-08-23 DIAGNOSIS — I48.11 LONGSTANDING PERSISTENT ATRIAL FIBRILLATION (HCC): ICD-10-CM

## 2022-08-26 ENCOUNTER — HOSPITAL ENCOUNTER (OUTPATIENT)
Dept: NUCLEAR MEDICINE | Facility: HOSPITAL | Age: 72
End: 2022-08-26
Attending: INTERNAL MEDICINE
Payer: MEDICARE

## 2022-08-26 ENCOUNTER — HOSPITAL ENCOUNTER (OUTPATIENT)
Dept: NON INVASIVE DIAGNOSTICS | Facility: HOSPITAL | Age: 72
Discharge: HOME | End: 2022-08-26
Attending: INTERNAL MEDICINE
Payer: MEDICARE

## 2022-08-26 LAB
MAX HR PERCENT: 94 %
MAX HR: 141 BPM
RATE PRESSURE PRODUCT: NORMAL
SL CV REST NUCLEAR ISOTOPE DOSE: 10.5 MCI
SL CV STRESS NUCLEAR ISOTOPE DOSE: 33 MCI
SL CV STRESS STAGE REACHED: 1
STRESS ANGINA INDEX: 0
STRESS BASELINE HR: 67 BPM
STRESS PEAK HR: 141 BPM
STRESS POST ESTIMATED WORKLOAD: 4.6 METS
STRESS POST EXERCISE DUR MIN: 2 MIN
STRESS POST EXERCISE DUR SEC: 45 SEC
STRESS POST PEAK BP: 182 MMHG
STRESS/REST PERFUSION RATIO: 1

## 2022-08-26 PROCEDURE — 78452 HT MUSCLE IMAGE SPECT MULT: CPT | Performed by: INTERNAL MEDICINE

## 2022-08-26 PROCEDURE — G1004 CDSM NDSC: HCPCS

## 2022-08-26 PROCEDURE — 93016 CV STRESS TEST SUPVJ ONLY: CPT | Performed by: INTERNAL MEDICINE

## 2022-08-26 PROCEDURE — 93017 CV STRESS TEST TRACING ONLY: CPT

## 2022-08-26 PROCEDURE — 93018 CV STRESS TEST I&R ONLY: CPT | Performed by: INTERNAL MEDICINE

## 2022-08-26 PROCEDURE — A9502 TC99M TETROFOSMIN: HCPCS

## 2022-08-26 PROCEDURE — 78452 HT MUSCLE IMAGE SPECT MULT: CPT

## 2022-08-26 RX ADMIN — REGADENOSON 0.4 MG: 0.08 INJECTION, SOLUTION INTRAVENOUS at 13:28

## 2022-09-30 ENCOUNTER — TELEPHONE (OUTPATIENT)
Dept: FAMILY MEDICINE CLINIC | Facility: CLINIC | Age: 72
End: 2022-09-30

## 2022-09-30 NOTE — TELEPHONE ENCOUNTER
Not sure if Pt needs labs completed before apt on 10/11  If needed please send to [facility] in [place] [address] and advise to inform pt

## 2022-10-02 DIAGNOSIS — I10 ESSENTIAL HYPERTENSION: ICD-10-CM

## 2022-10-02 RX ORDER — VALSARTAN 160 MG/1
TABLET ORAL
Qty: 90 TABLET | Refills: 0 | Status: SHIPPED | OUTPATIENT
Start: 2022-10-02

## 2022-10-11 ENCOUNTER — OFFICE VISIT (OUTPATIENT)
Dept: FAMILY MEDICINE CLINIC | Facility: CLINIC | Age: 72
End: 2022-10-11
Payer: MEDICARE

## 2022-10-11 VITALS
DIASTOLIC BLOOD PRESSURE: 76 MMHG | HEIGHT: 71 IN | WEIGHT: 220 LBS | TEMPERATURE: 97.3 F | OXYGEN SATURATION: 97 % | HEART RATE: 73 BPM | SYSTOLIC BLOOD PRESSURE: 136 MMHG | BODY MASS INDEX: 30.8 KG/M2

## 2022-10-11 DIAGNOSIS — Z00.00 MEDICARE ANNUAL WELLNESS VISIT, SUBSEQUENT: Primary | ICD-10-CM

## 2022-10-11 DIAGNOSIS — F01.52: ICD-10-CM

## 2022-10-11 DIAGNOSIS — G45.9 TIA (TRANSIENT ISCHEMIC ATTACK): ICD-10-CM

## 2022-10-11 DIAGNOSIS — R41.3 MEMORY DYSFUNCTION: ICD-10-CM

## 2022-10-11 DIAGNOSIS — Z12.11 ENCOUNTER FOR SCREENING COLONOSCOPY: ICD-10-CM

## 2022-10-11 DIAGNOSIS — I48.11 LONGSTANDING PERSISTENT ATRIAL FIBRILLATION (HCC): ICD-10-CM

## 2022-10-11 PROCEDURE — G0439 PPPS, SUBSEQ VISIT: HCPCS | Performed by: FAMILY MEDICINE

## 2022-10-11 PROCEDURE — 99214 OFFICE O/P EST MOD 30 MIN: CPT | Performed by: FAMILY MEDICINE

## 2022-10-11 NOTE — PROGRESS NOTES
Assessment and Plan:     Medical management-referral to neurology for further evaluation as to memory dysfunction, possible paranoia, known history of previous lacunar changes on MRI 2018  I would support at least a trial of the Seroquel to see if this helps before you see the psychiatrist again  If the neurologist cannot see you for 3-4 weeks, I will contact the provider your scheduled with an ask if I can order any studies prior to that consultation  Problem List Items Addressed This Visit        Cardiovascular and Mediastinum    TIA (transient ischemic attack)    Relevant Orders    Ambulatory Referral to Neurology    Longstanding persistent atrial fibrillation [facility])      Other Visit Diagnoses     Medicare annual wellness visit, subsequent    -  Primary    Memory dysfunction        Relevant Orders    Ambulatory Referral to Neurology    Vascular dementia with paranoia        Relevant Orders    Ambulatory Referral to Neurology    Encounter for screening colonoscopy        Relevant Orders    Ambulatory referral for colonoscopy        BMI Counseling: Body mass index is 30 68 kg/m²  The BMI is above normal  Nutrition recommendations include decreasing portion sizes and moderation in carbohydrate intake  Exercise recommendations include moderate physical activity 150 minutes/week  No pharmacotherapy was ordered  Rationale for BMI follow-up plan is due to patient being overweight or obese  Depression Screening and Follow-up Plan: Patient was screened for depression during today's encounter  They screened negative with a PHQ-2 score of 1  Preventive health issues were discussed with patient, and age appropriate screening tests were ordered as noted in patient's After Visit Summary  Personalized health advice and appropriate referrals for health education or preventive services given if needed, as noted in patient's After Visit Summary       History of Present Illness:     Patient presents for a Medicare Wellness Visit    Patient here for Medicare wellness  Also having some issues with increased anxiety with some paranoid ideation per his wife  He did see a mental health provider/psychiatrist yesterday  She would recommend possibly starting some Seroquel  Also does question whether to repeat any imaging or get Neurology consultation  Patient does have abnormalities on previous MRI 5 years ago  In addition some changes on CT 4 years ago  History of possible TIAs  Apparently there is some issues in the neighborhood which the patient is reacting adversely to  Patient Care Team:  Shin Luke MD as PCP - Myra Cole MD     Review of Systems:     Review of Systems   Constitutional: Negative for activity change, appetite change, diaphoresis and fatigue  HENT: Negative for congestion, sinus pressure and sore throat  Respiratory: Negative for cough, chest tightness, shortness of breath and wheezing  Cardiovascular: Negative for chest pain, palpitations and leg swelling  Fast or slow heart rate   Gastrointestinal: Negative for abdominal pain, blood in stool, constipation, diarrhea, nausea and vomiting  Genitourinary: Negative for difficulty urinating, dysuria, frequency and hematuria  Musculoskeletal: Negative for arthralgias, gait problem, joint swelling and myalgias  Neurological: Positive for headaches  Negative for dizziness and light-headedness  Psychiatric/Behavioral: Positive for behavioral problems  Negative for agitation, confusion, dysphoric mood and sleep disturbance  The patient is nervous/anxious           Problem List:     Patient Active Problem List   Diagnosis   • Essential hypertension   • Type 2 diabetes mellitus without complication, without long-term current use of insulin (HCC)   • BPH with obstruction/lower urinary tract symptoms   • Chronic venous insufficiency   • Varicose veins with swelling   • History of DVT (deep vein thrombosis)   • Anticoagulant long-term use   • Arthritis of knee   • Numbness   • TIA (transient ischemic attack)   • Hyperlipidemia associated with type 2 diabetes mellitus ([address] )   • Anxiety   • Longstanding persistent atrial fibrillation (HCC)   • Pre-operative cardiovascular examination, supraventricular arrhythmia      Past Medical and Surgical History:     Past Medical History:   Diagnosis Date   • DVT (deep venous thrombosis) (HCC)    • Gross hematuria     TARA Trevino 3/29/16   R    4/3/17    • Hematuria     TARA Trevino 16    R    4/3/17   • Hyperlipidemia    • Hypertension    • Long-term (current) use of anticoagulants     TARA Trevino 3/29/16   R    4/3/17    • Seasonal allergies      Past Surgical History:   Procedure Laterality Date   • APPENDECTOMY     • WISDOM TOOTH EXTRACTION        Family History:     Family History   Problem Relation Age of Onset   • Hypertension Mother    • Leukemia Father    • Heart disease Father    • Alcohol abuse Father    • Depression Father    • Heart disease Family    • Leukemia Family    • Depression Sister    • Alcohol abuse Brother    • Depression Brother       Social History:     Social History     Socioeconomic History   • Marital status: /Civil Union     Spouse name: Melody Sender   • Number of children: 2   • Years of education: None   • Highest education level: None   Occupational History   • Occupation: Retired   Tobacco Use   • Smoking status: Former Smoker     Types: Cigarettes     Quit date:      Years since quittin 8   • Smokeless tobacco: Never Used   Vaping Use   • Vaping Use: Some days   • Substances: THC   Substance and Sexual Activity   • Alcohol use:  Yes     Alcohol/week: 4 0 standard drinks     Types: 4 Cans of beer per week     Comment: social   • Drug use: Yes     Types: Marijuana   • Sexual activity: Yes     Partners: Female   Other Topics Concern   • None   Social History Narrative    Caffeine use     Social Determinants of Health     Financial Resource Strain: Low Risk    • Difficulty of Paying Living Expenses: Not hard at all   Food Insecurity: Not on file   Transportation Needs: No Transportation Needs   • Lack of Transportation (Medical): No   • Lack of Transportation (Non-Medical): No   Physical Activity: Not on file   Stress: Not on file   Social Connections: Not on file   Intimate Partner Violence: Not on file   Housing Stability: Not on file      Medications and Allergies:     Current Outpatient Medications   Medication Sig Dispense Refill   • acetaminophen (TYLENOL) 500 mg tablet Take 500 mg by mouth every 6 (six) hours as needed for mild pain     • ASPIRIN LOW DOSE 81 MG EC tablet TAKE ONE TABLET BY MOUTH DAILY 30 tablet 11   • atorvastatin (LIPITOR) 40 mg tablet Take 1 tablet (40 mg total) by mouth daily with dinner 90 tablet 1   • Choline Fenofibrate (Fenofibric Acid) 135 MG CPDR Take 1 capsule (135 mg total) by mouth daily 90 capsule 3   • metFORMIN (GLUCOPHAGE-XR) 500 mg 24 hr tablet Take 1 tablet (500 mg total) by mouth 2 (two) times a day with meals 180 tablet 1   • methocarbamol (ROBAXIN) 500 mg tablet Take 1 tablet (500 mg total) by mouth 3 (three) times a day 30 tablet 0   • metoprolol succinate (TOPROL-XL) 25 mg 24 hr tablet Take 1 tablet (25 mg total) by mouth daily 30 tablet 3   • valsartan (DIOVAN) 160 mg tablet TAKE 1 TABLET ONCE DAILY 90 tablet 0   • Xarelto 20 MG tablet TAKE ONE TABLET BY MOUTH EVERY DAY WITH BREAKFAST 90 tablet 0     No current facility-administered medications for this visit       No Known Allergies   Immunizations:     Immunization History   Administered Date(s) Administered   • COVID-19 PFIZER VACCINE 0 3 ML IM 04/11/2021, 05/03/2021   • INFLUENZA 11/24/2014   • Influenza Quadrivalent Preservative Free 3 years and older IM 11/25/2015, 04/03/2017   • Influenza, high dose seasonal 0 7 mL 12/03/2018, 01/13/2020, 02/12/2021   • Pneumococcal Conjugate 13-Valent 04/03/2017   • Pneumococcal Polysaccharide PPV23 12/03/2018   • Tdap 06/17/2015      Health Maintenance:         Topic Date Due   • Hepatitis C Screening  Never done   • Colorectal Cancer Screening  05/17/2020         Topic Date Due   • Hepatitis A Vaccine (1 of 2 - Risk 2-dose series) Never done   • Hepatitis B Vaccine (1 of 3 - Risk 3-dose series) Never done   • COVID-19 Vaccine (3 - Booster for Pfizer series) 10/03/2021   • Influenza Vaccine (1) 09/01/2022      Medicare Screening Tests and Risk Assessments:     Kristyn Delgado is here for his Subsequent Wellness visit  Last Medicare Wellness visit information reviewed, patient interviewed and updates made to the record today  Health Risk Assessment:   Patient rates overall health as good  Patient feels that their physical health rating is same  Patient is satisfied with their life  Eyesight was rated as same  Hearing was rated as slightly worse  Patient feels that their emotional and mental health rating is same  Patients states they are sometimes angry  Patient states they are sometimes unusually tired/fatigued  Pain experienced in the last 7 days has been none  Patient states that he has experienced no weight loss or gain in last 6 months  Depression Screening:   PHQ-2 Score: 1      Fall Risk Screening: In the past year, patient has experienced: no history of falling in past year      Home Safety:  Patient does not have trouble with stairs inside or outside of their home  Patient has working smoke alarms and has no working carbon monoxide detector  Home safety hazards include: none  Nutrition:   Current diet is Diabetic  Medications:   Patient is not currently taking any over-the-counter supplements  Patient is able to manage medications  Activities of Daily Living (ADLs)/Instrumental Activities of Daily Living (IADLs):   Walk and transfer into and out of bed and chair?: Yes  Dress and groom yourself?: Yes    Bathe or shower yourself?: Yes    Feed yourself?  Yes  Do your laundry/housekeeping?: Yes  Manage your money, pay your bills and track your expenses?: Yes  Make your own meals?: Yes    Do your own shopping?: Yes    Previous Hospitalizations:   Any hospitalizations or ED visits within the last 12 months?: Yes    How many hospitalizations have you had in the last year?: 1-2    Advance Care Planning:   Living will: No    Durable POA for healthcare: No    Advanced directive: No    Five wishes given: Yes      Cognitive Screening:   Provider or family/friend/caregiver concerned regarding cognition?: No    PREVENTIVE SCREENINGS      Cardiovascular Screening:    General: Screening Not Indicated and History Lipid Disorder      Diabetes Screening:     General: Screening Not Indicated and History Diabetes      Colorectal Cancer Screening:       Due for: Colonoscopy - Low Risk      Prostate Cancer Screening:    General: Screening Current      Osteoporosis Screening:    General: Screening Not Indicated      Abdominal Aortic Aneurysm (AAA) Screening:    Risk factors include: age between 73-67 yo and tobacco use        Lung Cancer Screening:     General: Screening Not Indicated      Hepatitis C Screening:    General: Screening Not Indicated    Screening, Brief Intervention, and Referral to Treatment (SBIRT)    Screening  Typical number of drinks in a day: 0  Typical number of drinks in a week: 2  Interpretation: Low risk drinking behavior  AUDIT-C Screenin) How often did you have a drink containing alcohol in the past year? monthly or less  2) How many drinks did you have on a typical day when you were drinking in the past year?  1 to 2  3) How often did you have 6 or more drinks on one occasion in the past year? never    AUDIT-C Score: 1  Interpretation: Score 0-3 (male): Negative screen for alcohol misuse    No exam data present     Physical Exam:     /76 (BP Location: Left arm, Patient Position: Sitting, Cuff Size: Adult)   Pulse 73   Temp (!) 97 3 °F (36 3 °C) (Tympanic)   Ht 5' 11" (1 803 m)   Wt 99 8 kg (220 lb)   SpO2 97%   BMI 30 68 kg/m²     Physical Exam  Vitals and nursing note reviewed  Constitutional:       General: He is not in acute distress  Appearance: He is not ill-appearing  HENT:      Head: Normocephalic and atraumatic  Right Ear: Tympanic membrane, ear canal and external ear normal       Left Ear: Tympanic membrane, ear canal and external ear normal       Nose: Nose normal       Mouth/Throat:      Pharynx: No posterior oropharyngeal erythema  Eyes:      General:         Right eye: No discharge  Left eye: No discharge  Extraocular Movements: Extraocular movements intact  Conjunctiva/sclera: Conjunctivae normal       Pupils: Pupils are equal, round, and reactive to light  Neck:      Thyroid: No thyromegaly  Vascular: No carotid bruit  Trachea: No tracheal deviation  Cardiovascular:      Rate and Rhythm: Normal rate and regular rhythm  Pulses: no weak pulses          Dorsalis pedis pulses are 2+ on the right side and 2+ on the left side  Posterior tibial pulses are 2+ on the right side and 2+ on the left side  Heart sounds: Normal heart sounds  No murmur heard  Pulmonary:      Effort: Pulmonary effort is normal  No respiratory distress  Breath sounds: Normal breath sounds  No wheezing  Abdominal:      General: Bowel sounds are normal  There is no distension  Palpations: Abdomen is soft  There is no mass  Tenderness: There is no abdominal tenderness  There is no guarding  Musculoskeletal:      Cervical back: Normal range of motion and neck supple  Right lower leg: No edema  Left lower leg: No edema  Feet:      Right foot:      Skin integrity: No ulcer, skin breakdown, erythema, warmth, callus or dry skin  Left foot:      Skin integrity: No ulcer, skin breakdown, erythema, warmth, callus or dry skin  Lymphadenopathy:      Cervical: No cervical adenopathy  Skin:     Findings: No rash     Neurological:      General: No focal deficit present  Mental Status: He is alert and oriented to person, place, and time  Cranial Nerves: No cranial nerve deficit  Deep Tendon Reflexes: Reflexes normal    Psychiatric:         Mood and Affect: Mood normal          Behavior: Behavior normal       Patient's shoes and socks removed  Right Foot/Ankle   Right Foot Inspection  Skin Exam: skin normal and skin intact  No dry skin, no warmth, no callus, no erythema, no maceration, no abnormal color, no pre-ulcer, no ulcer and no callus  Toe Exam: ROM and strength within normal limits  Sensory   Vibration: intact  Proprioception: intact  Monofilament testing: intact    Vascular  Capillary refills: < 3 seconds  The right DP pulse is 2+  The right PT pulse is 2+  Left Foot/Ankle  Left Foot Inspection  Skin Exam: skin normal and skin intact  No dry skin, no warmth, no erythema, no maceration, normal color, no pre-ulcer, no ulcer and no callus  Toe Exam: ROM and strength within normal limits  Sensory   Vibration: intact  Proprioception: intact  Monofilament testing: intact    Vascular  Capillary refills: < 3 seconds  The left DP pulse is 2+  The left PT pulse is 2+       Assign Risk Category  No deformity present  No loss of protective sensation  No weak pulses  Risk: 0        Ingird Bergman MD

## 2022-10-11 NOTE — PATIENT INSTRUCTIONS
Medical management-referral to neurology for further evaluation as to memory dysfunction, possible paranoia, known history of previous lacunar changes on MRI 2018  I would support at least a trial of the Seroquel to see if this helps before you see the psychiatrist again  If the neurologist cannot see you for 3-4 weeks, I will contact the provider your scheduled with an ask if I can order any studies prior to that consultation  Medicare Preventive Visit Patient Instructions  Thank you for completing your Welcome to Medicare Visit or Medicare Annual Wellness Visit today  Your next wellness visit will be due in one year (10/12/2023)  The screening/preventive services that you may require over the next 5-10 years are detailed below  Some tests may not apply to you based off risk factors and/or age  Screening tests ordered at today's visit but not completed yet may show as past due  Also, please note that scanned in results may not display below  Preventive Screenings:  Service Recommendations Previous Testing/Comments   Colorectal Cancer Screening  Colonoscopy    Fecal Occult Blood Test (FOBT)/Fecal Immunochemical Test (FIT)  Fecal DNA/Cologuard Test  Flexible Sigmoidoscopy Age: 39-70 years old   Colonoscopy: every 10 years (May be performed more frequently if at higher risk)  OR  FOBT/FIT: every 1 year  OR  Cologuard: every 3 years  OR  Sigmoidoscopy: every 5 years  Screening may be recommended earlier than age 39 if at higher risk for colorectal cancer  Also, an individualized decision between you and your healthcare provider will decide whether screening between the ages of 74-80 would be appropriate   Colonoscopy: 05/17/2010  FOBT/FIT: Not on file  Cologuard: Not on file  Sigmoidoscopy: Not on file          Prostate Cancer Screening Individualized decision between patient and health care provider in men between ages of 53-78   Medicare will cover every 12 months beginning on the day after your 50th birthday PSA: 0 6 ng/mL           Hepatitis C Screening Once for adults born between 1945 and 1965  More frequently in patients at high risk for Hepatitis C Hep C Antibody: Not on file        Diabetes Screening 1-2 times per year if you're at risk for diabetes or have pre-diabetes Fasting glucose: 150 mg/dL (7/1/2020)  A1C: 7 2 % (7/1/2022)      Cholesterol Screening Once every 5 years if you don't have a lipid disorder  May order more often based on risk factors  Lipid panel: 01/10/2022         Other Preventive Screenings Covered by Medicare:  Abdominal Aortic Aneurysm (AAA) Screening: covered once if your at risk  You're considered to be at risk if you have a family history of AAA or a male between the age of 73-68 who smoking at least 100 cigarettes in your lifetime  Lung Cancer Screening: covers low dose CT scan once per year if you meet all of the following conditions: (1) Age 50-69; (2) No signs or symptoms of lung cancer; (3) Current smoker or have quit smoking within the last 15 years; (4) You have a tobacco smoking history of at least 20 pack years (packs per day x number of years you smoked); (5) You get a written order from a healthcare provider  Glaucoma Screening: covered annually if you're considered high risk: (1) You have diabetes OR (2) Family history of glaucoma OR (3)  aged 48 and older OR (3)  American aged 72 and older  Osteoporosis Screening: covered every 2 years if you meet one of the following conditions: (1) Have a vertebral abnormality; (2) On glucocorticoid therapy for more than 3 months; (3) Have primary hyperparathyroidism; (4) On osteoporosis medications and need to assess response to drug therapy  HIV Screening: covered annually if you're between the age of 12-76  Also covered annually if you are younger than 13 and older than 72 with risk factors for HIV infection   For pregnant patients, it is covered up to 3 times per pregnancy  Immunizations:  Immunization Recommendations   Influenza Vaccine Annual influenza vaccination during flu season is recommended for all persons aged >= 6 months who do not have contraindications   Pneumococcal Vaccine   * Pneumococcal conjugate vaccine = PCV13 (Prevnar 13), PCV15 (Vaxneuvance), PCV20 (Prevnar 20)  * Pneumococcal polysaccharide vaccine = PPSV23 (Pneumovax) Adults 25-60 years old: 1-3 doses may be recommended based on certain risk factors  Adults 72 years old: 1-2 doses may be recommended based off what pneumonia vaccine you previously received   Hepatitis B Vaccine 3 dose series if at intermediate or high risk (ex: diabetes, end stage renal disease, liver disease)   Tetanus (Td) Vaccine - COST NOT COVERED BY MEDICARE PART B Following completion of primary series, a booster dose should be given every 10 years to maintain immunity against tetanus  Td may also be given as tetanus wound prophylaxis  Tdap Vaccine - COST NOT COVERED BY MEDICARE PART B Recommended at least once for all adults  For pregnant patients, recommended with each pregnancy  Shingles Vaccine (Shingrix) - COST NOT COVERED BY MEDICARE PART B  2 shot series recommended in those aged 48 and above     Health Maintenance Due:      Topic Date Due    Hepatitis C Screening  Never done    Colorectal Cancer Screening  05/17/2020     Immunizations Due:      Topic Date Due    Hepatitis A Vaccine (1 of 2 - Risk 2-dose series) Never done    Hepatitis B Vaccine (1 of 3 - Risk 3-dose series) Never done    COVID-19 Vaccine (3 - Booster for Pfizer series) 10/03/2021    Influenza Vaccine (1) 09/01/2022     Advance Directives   What are advance directives? Advance directives are legal documents that state your wishes and plans for medical care  These plans are made ahead of time in case you lose your ability to make decisions for yourself   Advance directives can apply to any medical decision, such as the treatments you want, and if you want to donate organs  What are the types of advance directives? There are many types of advance directives, and each state has rules about how to use them  You may choose a combination of any of the following:  Living will: This is a written record of the treatment you want  You can also choose which treatments you do not want, which to limit, and which to stop at a certain time  This includes surgery, medicine, IV fluid, and tube feedings  [facility] power of  for [facility]): This is a written record that states who you want to make healthcare choices for you when you are unable to make them for yourself  This person, called a proxy, is usually a family member or a friend  You may choose more than 1 proxy  Do not resuscitate (DNR) order:  A DNR order is used in case your heart stops beating or you stop breathing  It is a request not to have certain forms of treatment, such as CPR  A DNR order may be included in other types of advance directives  Medical directive: This covers the care that you want if you are in a coma, near death, or unable to make decisions for yourself  You can list the treatments you want for each condition  Treatment may include pain medicine, surgery, blood transfusions, dialysis, IV or tube feedings, and a ventilator (breathing machine)  Values history: This document has questions about your views, beliefs, and how you feel and think about life  This information can help others choose the care that you would choose  Why are advance directives important? An advance directive helps you control your care  Although spoken wishes may be used, it is better to have your wishes written down  Spoken wishes can be misunderstood, or not followed  Treatments may be given even if you do not want them  An advance directive may make it easier for your family to make difficult choices about your care     Weight Management   Why it is important to manage your weight:  Being overweight increases your risk of health conditions such as heart disease, high blood pressure, type 2 diabetes, and certain types of cancer  It can also increase your risk for osteoarthritis, sleep apnea, and other respiratory problems  Aim for a slow, steady weight loss  Even a small amount of weight loss can lower your risk of health problems  How to lose weight safely:  A safe and healthy way to lose weight is to eat fewer calories and get regular exercise  You can lose up about 1 pound a week by decreasing the number of calories you eat by 500 calories each day  Healthy meal plan for weight management:  A healthy meal plan includes a variety of foods, contains fewer calories, and helps you stay healthy  A healthy meal plan includes the following:  Eat whole-grain foods more often  A healthy meal plan should contain fiber  Fiber is the part of grains, fruits, and vegetables that is not broken down by your body  Whole-grain foods are healthy and provide extra fiber in your diet  Some examples of whole-grain foods are whole-wheat breads and pastas, oatmeal, brown rice, and bulgur  Eat a variety of vegetables every day  Include dark, leafy greens such as spinach, kale, emy greens, and mustard greens  Eat yellow and orange vegetables such as carrots, sweet potatoes, and winter squash  Eat a variety of fruits every day  Choose fresh or canned fruit (canned in its own juice or light syrup) instead of juice  Fruit juice has very little or no fiber  Eat low-fat dairy foods  Drink fat-free (skim) milk or 1% milk  Eat fat-free yogurt and low-fat cottage cheese  Try low-fat cheeses such as mozzarella and other reduced-fat cheeses  Choose meat and other protein foods that are low in fat  Choose beans or other legumes such as split peas or lentils  Choose fish, skinless poultry (chicken or turkey), or lean cuts of red meat (beef or pork)  Before you cook meat or poultry, cut off any visible fat     Use less fat and oil  Try baking foods instead of frying them  Add less fat, such as margarine, sour cream, regular salad dressing and mayonnaise to foods  Eat fewer high-fat foods  Some examples of high-fat foods include french fries, doughnuts, ice cream, and cakes  Eat fewer sweets  Limit foods and drinks that are high in sugar  This includes candy, cookies, regular soda, and sweetened drinks  Exercise:  Exercise at least 30 minutes per day on most days of the week  Some examples of exercise include walking, biking, dancing, and swimming  You can also fit in more physical activity by taking the stairs instead of the elevator or parking farther away from stores  Ask your healthcare provider about the best exercise plan for you  © Copyright [employer] 2018 Information is for End User's use only and may not be sold, redistributed or otherwise used for commercial purposes   All illustrations and images included in CareNotes® are the copyrighted property of A D A M , Inc  or [address] [place] [address]

## 2022-10-13 ENCOUNTER — TELEPHONE (OUTPATIENT)
Dept: NEUROLOGY | Facility: CLINIC | Age: 72
End: 2022-10-13

## 2022-10-13 NOTE — TELEPHONE ENCOUNTER
Patient called to schedule new patient appointment for memory issues  No testing done  Triage intake sent

## 2022-10-30 DIAGNOSIS — Z79.01 ANTICOAGULANT LONG-TERM USE: ICD-10-CM

## 2022-10-30 DIAGNOSIS — E11.9 TYPE 2 DIABETES MELLITUS WITHOUT COMPLICATION, WITHOUT LONG-TERM CURRENT USE OF INSULIN (HCC): ICD-10-CM

## 2022-10-30 RX ORDER — METFORMIN HYDROCHLORIDE 500 MG/1
TABLET, EXTENDED RELEASE ORAL
Qty: 180 TABLET | Refills: 1 | Status: SHIPPED | OUTPATIENT
Start: 2022-10-30

## 2022-10-30 RX ORDER — RIVAROXABAN 20 MG/1
TABLET, FILM COATED ORAL
Qty: 90 TABLET | Refills: 0 | Status: SHIPPED | OUTPATIENT
Start: 2022-10-30 | End: 2022-11-08 | Stop reason: SDUPTHER

## 2022-10-31 ENCOUNTER — TELEPHONE (OUTPATIENT)
Dept: NEUROLOGY | Facility: CLINIC | Age: 72
End: 2022-10-31

## 2022-10-31 NOTE — TELEPHONE ENCOUNTER
I called and spoke with patient; offered a sooner appt at the residency clinic for 11/3 at 2 pm with Dr Masoud Wright  The patient accepted  Address reconfirmed with patient

## 2022-11-03 ENCOUNTER — CONSULT (OUTPATIENT)
Dept: NEUROLOGY | Facility: CLINIC | Age: 72
End: 2022-11-03

## 2022-11-03 VITALS
BODY MASS INDEX: 30.52 KG/M2 | HEIGHT: 71 IN | HEART RATE: 67 BPM | DIASTOLIC BLOOD PRESSURE: 80 MMHG | WEIGHT: 218 LBS | SYSTOLIC BLOOD PRESSURE: 120 MMHG

## 2022-11-03 DIAGNOSIS — R41.3 MEMORY DYSFUNCTION: ICD-10-CM

## 2022-11-03 DIAGNOSIS — G45.9 TIA (TRANSIENT ISCHEMIC ATTACK): ICD-10-CM

## 2022-11-03 DIAGNOSIS — F01.52: ICD-10-CM

## 2022-11-03 PROBLEM — F22 PARANOID BEHAVIOR (HCC): Status: ACTIVE | Noted: 2022-11-03

## 2022-11-03 NOTE — PROGRESS NOTES
Assessment/Plan:    Paranoid behavior ([facility] [address] )  72 y/o m with h/o multiple DVTs, HTN, HLD, mild DM, TIA presents here as a new patient for evaluation of paranoid symptoms  To review, patient started having paranoid about 10 years ago, slightly getting worse in last 3 years that he has also started accusing his brother-in-law and sister-in-law, almost all the neighbors  Possible visual hallucinations that he sees people in his house  No major change in memory  No seizure  Independent in all daily living activities  Seen psychiatrist however does not want to try medication, plan to see again next week  Impression- since paranoia is almost about the same in last 10 years less likely neuro degenerative issue however we will do MRI with NeuroQuant to rule out Alzheimer's disease  Differentials also include Lewy body dementia  Less likely frontotemporal dementia given no major change in other behavior in last 10 years  Most likely his symptoms are nonorganic origin  Patient plans to see psychiatrist next week  Will benefit from low-dose antipsychotics  Plan  MRI brain NeuroQuant  Check labs TSH vitamin B12 (reversible causes of dementia/behavior changes)  Follow-up in 4 months       Diagnoses and all orders for this visit:    Memory dysfunction  -     Ambulatory Referral to Neurology  -     MRI brain NeuroQuant wo contrast; Future  -     TSH, 3rd generation with Free T4 reflex; Future  -     Vitamin B12/Folate, Serum Panel; Future    Vascular dementia with paranoia  -     Ambulatory Referral to Neurology    TIA (transient ischemic attack)  -     Ambulatory Referral to Neurology          Subjective:      Patient ID: Carlito Schilling is a 71 y o  male  72 y/o m with h/o multiple DVTs, HTN, HLD, mild DM, TIA presents here as a new patient for evaluation of paranoid symptoms       Patient's wife started noticing that he has been paranoid for last 10 years, he used to [employer] job and he quit because he felt his boss and boss's son would whisper about him  Next job he felt people were messing with his machine and quit after about 5 years  Recently patient has also started accusing patient's neighbors, brother in law, sister in law  No major change in memory per patient's wife  Patient's wife noted mild behavior issues when he becomes agitated  He would sometimes say to his wife "he would punch his wife" becomes agitated sometimes, he also made a fist one time and his wife left the house about 2 years ago  He feels people are mostly against him  He has been using marijuana since he is been a child  Currently has medical marijuana card given by his family physician apparently for depression  Family history positive for dementia with patient's mother and grand mother  Denies seizures  Independent in all daily living activities, patient able to drive, do his normal daily chores that he used to do, independent in financial matters in billing  Possible visual hallucinations    They recently saw a psychiatrist, patient was given- quetiapine however patient does not want to take it  The following portions of the patient's history were reviewed and updated as appropriate: allergies, current medications, past family history, past medical history, past social history, past surgical history and problem list     Review of Systems   HENT: Negative for trouble swallowing  Eyes: Negative for photophobia and visual disturbance  Respiratory: Negative for cough, chest tightness and shortness of breath  Cardiovascular: Negative for chest pain, palpitations and leg swelling  Gastrointestinal: Negative for abdominal pain, constipation, diarrhea and nausea  Neurological: Negative for dizziness, seizures, facial asymmetry, speech difficulty, numbness and headaches           Objective:      /80 (BP Location: Left arm, Patient Position: Sitting, Cuff Size: Large)   Pulse 67   Ht 5' 11" (1 803 m)   Wt 98 9 kg (218 lb)   BMI 30 40 kg/m²          Physical Exam  Vitals reviewed  Constitutional:       Appearance: Normal appearance  HENT:      Head: Normocephalic  Mouth/Throat:      Mouth: Mucous membranes are moist       Pharynx: Oropharynx is clear  Eyes:      Extraocular Movements: Extraocular movements intact  Pupils: Pupils are equal, round, and reactive to light  Cardiovascular:      Rate and Rhythm: Normal rate  Pulses: Normal pulses  Pulmonary:      Effort: Pulmonary effort is normal    Abdominal:      Palpations: Abdomen is soft  Musculoskeletal:         General: Normal range of motion  Cervical back: Normal range of motion  Skin:     General: Skin is warm  Capillary Refill: Capillary refill takes less than 2 seconds  Neurological:      Mental Status: He is alert  Psychiatric:         Mood and Affect: Mood normal            Neurological Examination:     Mental Status: The patient was awake, alert, attentive, oriented to person, place, and time  [facility]  Visuospatial 3/5, naming 3/3, attention 6/6, language 3/3, abstraction 1/2, delayed recall 3/5, orientation 5/6  Cranial Nerves:   I: smell Not tested   II: visual fields Full to confrontation  Pupils equal, round, reactive to light with normal accomodation  Fundus: benign fundus  III,IV,VI: extraocular muscles EOMI, no nystagmus   V: masseter and pterygoid strength full  Sensation in the V1 through V3 distributions intact to pinprick and light touch bilaterally  VII: Face is symmetric with no weakness noted  VIII: Audition intact to finger rub bilaterally  IX/X: Uvula midline  Soft palate elevation symmetric  XI: Trapezius and SCM strength 5/5 B/L  XII: Tongue midline with no atrophy or fasciculations with appropriate movement  Motor Examination:   No pronator drift  Bulk: Normal  No atrophy Tone: Normal  Fasciculations: None        Deltoid Biceps Triceps WE   WF   FF IO Right        5         5          5         5      5      5   5        Left           5        5          5          5      5     5   5                       IP        Quad   Ham     TA       Gastroc   Right      5            5          5         5                5  Left         5            5         5         5                5       Reflexes:                   Biceps Brachioradialis Triceps Patella Achilles Plantars   Right          2+            2+                  2+        2+       2+         Down   Left            2+             2+                 2+         2+       2+         Down     Clonus: None    Coordination: Patient able to perform normal finger-to-nose     Sensory: Normal sensation to light touch throughout  Gait:normal stance and posture, normal stride length and arm swing    Cautious walk

## 2022-11-03 NOTE — ASSESSMENT & PLAN NOTE
70 y/o m with h/o multiple DVTs, HTN, HLD, mild DM, TIA presents here as a new patient for evaluation of paranoid symptoms  To review, patient started having paranoid about 10 years ago, slightly getting worse in last 3 years that he has also started accusing his brother-in-law and sister-in-law, almost all the neighbors  Possible visual hallucinations that he sees people in his house  No major change in memory  No seizure  Independent in all daily living activities  Seen psychiatrist however does not want to try medication, plan to see again next week  Impression- since paranoia is almost about the same in last 10 years less likely neuro degenerative issue however we will do MRI with NeuroQuant to rule out Alzheimer's disease  Differentials also include Lewy body dementia  Less likely frontotemporal dementia given no major change in other behavior in last 10 years  Most likely his symptoms are nonorganic origin  Patient plans to see psychiatrist next week  Will benefit from low-dose antipsychotics       Plan  MRI brain NeuroQuant  Check labs TSH vitamin B12 (reversible causes of dementia/behavior changes)  Follow-up in 4 months

## 2022-11-08 DIAGNOSIS — Z79.01 ANTICOAGULANT LONG-TERM USE: ICD-10-CM

## 2022-11-08 LAB
FOLATE SERPL-MCNC: 10.5 NG/ML
TSH SERPL DL<=0.005 MIU/L-ACNC: 1.48 UIU/ML (ref 0.45–4.5)
VIT B12 SERPL-MCNC: 311 PG/ML (ref 232–1245)

## 2022-11-09 ENCOUNTER — TELEPHONE (OUTPATIENT)
Dept: NEUROLOGY | Facility: CLINIC | Age: 72
End: 2022-11-09

## 2022-11-09 NOTE — TELEPHONE ENCOUNTER
Lalito Duran has called and requested his last office notes to be e-mailed to the psychiatrist at [email or web address]    I explained that I was not sure if that was possible as we usually fax to providers fax number but I would see if the office can do that  I called Dr Saleem at [phone] and they gave me the fax number [phone]  Can you please e-mail and fax to the provider?     I thank you in advance,     Varun López

## 2022-11-22 DIAGNOSIS — I48.11 LONGSTANDING PERSISTENT ATRIAL FIBRILLATION (HCC): ICD-10-CM

## 2022-11-22 RX ORDER — METOPROLOL SUCCINATE 25 MG/1
TABLET, EXTENDED RELEASE ORAL
Qty: 30 TABLET | Refills: 3 | Status: SHIPPED | OUTPATIENT
Start: 2022-11-22

## 2022-11-27 ENCOUNTER — HOSPITAL ENCOUNTER (OUTPATIENT)
Dept: MRI IMAGING | Facility: HOSPITAL | Age: 72
Discharge: HOME | End: 2022-11-27

## 2022-11-27 DIAGNOSIS — R41.3 MEMORY DYSFUNCTION: ICD-10-CM

## 2022-12-08 DIAGNOSIS — E78.5 HYPERLIPIDEMIA ASSOCIATED WITH TYPE 2 DIABETES MELLITUS (HCC): ICD-10-CM

## 2022-12-08 DIAGNOSIS — E11.9 TYPE 2 DIABETES MELLITUS WITHOUT COMPLICATION, WITHOUT LONG-TERM CURRENT USE OF INSULIN (HCC): ICD-10-CM

## 2022-12-08 DIAGNOSIS — E11.69 HYPERLIPIDEMIA ASSOCIATED WITH TYPE 2 DIABETES MELLITUS (HCC): ICD-10-CM

## 2022-12-08 RX ORDER — ATORVASTATIN CALCIUM 40 MG/1
TABLET, FILM COATED ORAL
Qty: 90 TABLET | Refills: 1 | Status: SHIPPED | OUTPATIENT
Start: 2022-12-08

## 2022-12-28 DIAGNOSIS — I10 ESSENTIAL HYPERTENSION: ICD-10-CM

## 2022-12-28 RX ORDER — VALSARTAN 160 MG/1
TABLET ORAL
Qty: 90 TABLET | Refills: 0 | Status: SHIPPED | OUTPATIENT
Start: 2022-12-28

## 2023-01-24 DIAGNOSIS — E11.9 TYPE 2 DIABETES MELLITUS WITHOUT COMPLICATION, WITHOUT LONG-TERM CURRENT USE OF INSULIN (HCC): ICD-10-CM

## 2023-01-24 RX ORDER — METFORMIN HYDROCHLORIDE 500 MG/1
500 TABLET, EXTENDED RELEASE ORAL 2 TIMES DAILY WITH MEALS
Qty: 180 TABLET | Refills: 1 | Status: SHIPPED | OUTPATIENT
Start: 2023-01-24

## 2023-02-02 DIAGNOSIS — Z79.01 ANTICOAGULANT LONG-TERM USE: ICD-10-CM

## 2023-03-22 ENCOUNTER — OFFICE VISIT (OUTPATIENT)
Dept: FAMILY MEDICINE CLINIC | Facility: CLINIC | Age: 73
End: 2023-03-22

## 2023-03-22 VITALS
WEIGHT: 209.2 LBS | HEART RATE: 90 BPM | SYSTOLIC BLOOD PRESSURE: 122 MMHG | BODY MASS INDEX: 29.18 KG/M2 | OXYGEN SATURATION: 97 % | DIASTOLIC BLOOD PRESSURE: 80 MMHG

## 2023-03-22 DIAGNOSIS — E11.69 HYPERLIPIDEMIA ASSOCIATED WITH TYPE 2 DIABETES MELLITUS (HCC): ICD-10-CM

## 2023-03-22 DIAGNOSIS — D48.9 NEOPLASM OF UNCERTAIN BEHAVIOR: Primary | ICD-10-CM

## 2023-03-22 DIAGNOSIS — I10 ESSENTIAL HYPERTENSION: ICD-10-CM

## 2023-03-22 DIAGNOSIS — E78.5 HYPERLIPIDEMIA ASSOCIATED WITH TYPE 2 DIABETES MELLITUS (HCC): ICD-10-CM

## 2023-03-22 DIAGNOSIS — E11.9 TYPE 2 DIABETES MELLITUS WITHOUT COMPLICATION, WITHOUT LONG-TERM CURRENT USE OF INSULIN (HCC): ICD-10-CM

## 2023-03-22 NOTE — PROGRESS NOTES
[address]        NAME: Briana Arenas is a 71 y o  male  : 1950    MRN: [id number]  DATE: 2023  TIME: 11:44 AM    Assessment and Plan   Neoplasm of uncertain behavior [T64 7]  1  Neoplasm of uncertain behavior  Biopsy    Pathology Report      2  Type 2 diabetes mellitus without complication, without long-term current use of insulin (HCC)  Hemoglobin A1C    Comprehensive metabolic panel    Lipid Panel with Direct LDL reflex    Hemoglobin A1C    Comprehensive metabolic panel    Lipid Panel with Direct LDL reflex      3  Hyperlipidemia associated with type 2 diabetes mellitus (HCC)  Comprehensive metabolic panel    Lipid Panel with Direct LDL reflex    Comprehensive metabolic panel    Lipid Panel with Direct LDL reflex      4  Essential hypertension  CBC and Platelet    CBC and Platelet          No problem-specific Assessment & Plan notes found for this encounter  Patient Instructions     Patient Instructions   A biopsy results will be called when available  Get labs as ordered at [facility]   Follow-up visit once these are done  Chief Complaint     Chief Complaint   Patient presents with   • Follow-up     Growth on right upper arm x several months         History of Present Illness       Patient comes in today for evaluation of a skin lesion on the right shoulder  Has been there for approximately 3 months  The base was wider at 1 point it is now become smaller in diameter but recent elevation  Consistent with keratoacanthoma  Review of Systems   Review of Systems   Skin: Negative for wound           Current Medications       Current Outpatient Medications:   •  valsartan (DIOVAN) 160 mg tablet, TAKE ONE TABLET BY MOUTH EVERY DAY, Disp: 90 tablet, Rfl: 0  •  acetaminophen (TYLENOL) 500 mg tablet, Take 500 mg by mouth every 6 (six) hours as needed for mild pain, Disp: , Rfl:   •  ASPIRIN LOW DOSE 81 MG EC tablet, TAKE ONE TABLET BY MOUTH DAILY, Disp: 30 tablet, Rfl: 11  •  atorvastatin (LIPITOR) 40 mg tablet, TAKE ONE TABLET BY MOUTH EVERY DAY WITH DINNER, Disp: 90 tablet, Rfl: 1  •  Choline Fenofibrate (Fenofibric Acid) 135 MG CPDR, Take 1 capsule (135 mg total) by mouth daily, Disp: 90 capsule, Rfl: 3  •  metFORMIN (GLUCOPHAGE-XR) 500 mg 24 hr tablet, Take 1 tablet (500 mg total) by mouth 2 (two) times a day with meals, Disp: 180 tablet, Rfl: 1  •  methocarbamol (ROBAXIN) 500 mg tablet, Take 1 tablet (500 mg total) by mouth 3 (three) times a day, Disp: 30 tablet, Rfl: 0  •  metoprolol succinate (TOPROL-XL) 25 mg 24 hr tablet, TAKE ONE TABLET BY MOUTH EVERY DAY, Disp: 30 tablet, Rfl: 3  •  rivaroxaban (Xarelto) 20 mg tablet, Take 1 tablet (20 mg total) by mouth daily with breakfast, Disp: 90 tablet, Rfl: 0    Current Allergies     Allergies as of 03/22/2023   • (No Known Allergies)            The following portions of the patient's history were reviewed and updated as appropriate: allergies, current medications, past family history, past medical history, past social history, past surgical history and problem list      Past Medical History:   Diagnosis Date   • DVT (deep venous thrombosis) (HCC)    • Gross hematuria     LA  Sharif De Witt Sharif De Witt 3/29/16   R    4/3/17    • Hematuria     LA    Sharif De Witt Sharif De Witt 4/12/16    R    4/3/17   • Hyperlipidemia    • Hypertension    • Long-term (current) use of anticoagulants     LA  Sharif De Witt Sharif De Witt 3/29/16   R    4/3/17    • Seasonal allergies        Past Surgical History:   Procedure Laterality Date   • APPENDECTOMY     • WISDOM TOOTH EXTRACTION         Family History   Problem Relation Age of Onset   • Hypertension Mother    • Leukemia Father    • Heart disease Father    • Alcohol abuse Father    • Depression Father    • Heart disease Family    • Leukemia Family    • Depression Sister    • Alcohol abuse Brother    • Depression Brother          Medications have been verified          Objective   /80   Pulse 90   Wt 94 9 kg (209 lb 3 2 oz)   SpO2 97% BMI 29 18 kg/m²          Physical Exam     Physical Exam  Vitals reviewed  Constitutional:       General: He is not in acute distress  Appearance: Normal appearance  He is not ill-appearing  Cardiovascular:      Rate and Rhythm: Normal rate and regular rhythm  Pulmonary:      Effort: Pulmonary effort is normal  No respiratory distress  Skin:     Findings: Lesion present  Comments: Right shoulder approximately 4 mm diameter lesion with verrucal on the top  Neurological:      Mental Status: He is alert  Biopsy    Date/Time: 3/22/2023 10:41 AM  Performed by: Anam Kruse MD  Authorized by: Anam Kruse MD   Universal Protocol:  Consent: Verbal consent obtained  Risks and benefits: risks, benefits and alternatives were discussed  Consent given by: patient  Patient understanding: patient states understanding of the procedure being performed      Procedure Details - Lesion Biopsy: Body area:  Upper extremity    Upper extremity location:  R shoulder    Biopsy method: shave biopsy      Biopsy tissue type: skin    Initial size (mm):  5    Final defect size (mm):  0    Malignancy: malignancy unknown       Local care discussed  Hemostasis with cautery

## 2023-03-22 NOTE — PATIENT INSTRUCTIONS
A biopsy results will be called when available  Get labs as ordered at [facility]   Follow-up visit once these are done

## 2023-03-28 ENCOUNTER — TELEPHONE (OUTPATIENT)
Dept: FAMILY MEDICINE CLINIC | Facility: CLINIC | Age: 73
End: 2023-03-28

## 2023-03-28 DIAGNOSIS — I10 ESSENTIAL HYPERTENSION: ICD-10-CM

## 2023-03-28 LAB
ALBUMIN SERPL-MCNC: 4.4 G/DL (ref 3.7–4.7)
ALBUMIN/GLOB SERPL: 1.7 {RATIO} (ref 1.2–2.2)
ALP SERPL-CCNC: 50 IU/L (ref 44–121)
ALT SERPL-CCNC: 23 IU/L (ref 0–44)
AST SERPL-CCNC: 21 IU/L (ref 0–40)
BILIRUB SERPL-MCNC: 0.5 MG/DL (ref 0–1.2)
BUN SERPL-MCNC: 13 MG/DL (ref 8–27)
BUN/CREAT SERPL: 12 (ref 10–24)
CALCIUM SERPL-MCNC: 9.8 MG/DL (ref 8.6–10.2)
CHLORIDE SERPL-SCNC: 104 MMOL/L (ref 96–106)
CHOLEST SERPL-MCNC: 119 MG/DL (ref 100–199)
CO2 SERPL-SCNC: 25 MMOL/L (ref 20–29)
CREAT SERPL-MCNC: 1.06 MG/DL (ref 0.76–1.27)
EGFR: 75 ML/MIN/1.73
ERYTHROCYTE [DISTWIDTH] IN BLOOD BY AUTOMATED COUNT: 13 % (ref 11.6–15.4)
EST. AVERAGE GLUCOSE BLD GHB EST-MCNC: 140 MG/DL
GLOBULIN SER-MCNC: 2.6 G/DL (ref 1.5–4.5)
GLUCOSE SERPL-MCNC: 145 MG/DL (ref 70–99)
HBA1C MFR BLD: 6.5 % (ref 4.8–5.6)
HCT VFR BLD AUTO: 44.6 % (ref 37.5–51)
HDLC SERPL-MCNC: 46 MG/DL
HGB BLD-MCNC: 15.5 G/DL (ref 13–17.7)
LDLC SERPL CALC-MCNC: 53 MG/DL (ref 0–99)
LDLC/HDLC SERPL: 1.2 RATIO (ref 0–3.6)
MCH RBC QN AUTO: 32.2 PG (ref 26.6–33)
MCHC RBC AUTO-ENTMCNC: 34.8 G/DL (ref 31.5–35.7)
MCV RBC AUTO: 93 FL (ref 79–97)
PLATELET # BLD AUTO: 242 X10E3/UL (ref 150–450)
POTASSIUM SERPL-SCNC: 4.5 MMOL/L (ref 3.5–5.2)
PROT SERPL-MCNC: 7 G/DL (ref 6–8.5)
RBC # BLD AUTO: 4.82 X10E6/UL (ref 4.14–5.8)
SL AMB VLDL CHOLESTEROL CALC: 20 MG/DL (ref 5–40)
SODIUM SERPL-SCNC: 141 MMOL/L (ref 134–144)
TRIGL SERPL-MCNC: 109 MG/DL (ref 0–149)
WBC # BLD AUTO: 7.3 X10E3/UL (ref 3.4–10.8)

## 2023-03-28 RX ORDER — VALSARTAN 160 MG/1
TABLET ORAL
Qty: 90 TABLET | Refills: 0 | Status: SHIPPED | OUTPATIENT
Start: 2023-03-28

## 2023-03-28 NOTE — TELEPHONE ENCOUNTER
Patient aware of results and made appt  For 4/2/23----- Message from Cindy Gil MD sent at 3/28/2023  9:44 AM EDT -----  Call patient with lab result-A1c much improved  Patient due for follow-up on diabetes  Please schedule appointment  Pathology from biopsy last week still pending

## 2023-03-28 NOTE — RESULT ENCOUNTER NOTE
Call patient with lab result-A1c much improved  Patient due for follow-up on diabetes  Please schedule appointment  Pathology from biopsy last week still pending

## 2023-03-29 LAB
PATH REPORT.COMMENTS IMP SPEC: NORMAL
PATH REPORT.SITE OF ORIGIN SPEC: NORMAL
PAYMENT PROCEDURE: NORMAL
SL AMB .: NORMAL

## 2023-03-29 NOTE — RESULT ENCOUNTER NOTE
Call patient with lab result-pathology report this is probable keratoacanthoma  However squamous cell carcinoma is still a possibility  I would get a look at the site when it is totally healed in approximately 4 to 5 weeks  At that point we determine whether should have whole area excised or continue to observe

## 2023-03-30 ENCOUNTER — TELEPHONE (OUTPATIENT)
Dept: FAMILY MEDICINE CLINIC | Facility: CLINIC | Age: 73
End: 2023-03-30

## 2023-03-30 NOTE — TELEPHONE ENCOUNTER
Pt aware, scheduled appt on 04/16/2023 (moved from 04/06/2023 to allow for healing)     ----- Message from Ahmet Mcleod MD sent at 3/29/2023 12:33 PM EDT -----  Call patient with lab result-pathology report this is probable keratoacanthoma  However squamous cell carcinoma is still a possibility  I would get a look at the site when it is totally healed in approximately 4 to 5 weeks  At that point we determine whether should have whole area excised or continue to observe

## 2023-04-28 DIAGNOSIS — Z79.01 ANTICOAGULANT LONG-TERM USE: ICD-10-CM

## 2023-04-28 RX ORDER — RIVAROXABAN 20 MG/1
TABLET, FILM COATED ORAL
Qty: 90 TABLET | Refills: 0 | Status: SHIPPED | OUTPATIENT
Start: 2023-04-28

## 2023-06-06 DIAGNOSIS — E11.9 TYPE 2 DIABETES MELLITUS WITHOUT COMPLICATION, WITHOUT LONG-TERM CURRENT USE OF INSULIN (HCC): ICD-10-CM

## 2023-06-06 DIAGNOSIS — E11.69 HYPERLIPIDEMIA ASSOCIATED WITH TYPE 2 DIABETES MELLITUS (HCC): ICD-10-CM

## 2023-06-06 DIAGNOSIS — E78.5 HYPERLIPIDEMIA ASSOCIATED WITH TYPE 2 DIABETES MELLITUS (HCC): ICD-10-CM

## 2023-06-06 RX ORDER — ATORVASTATIN CALCIUM 40 MG/1
TABLET, FILM COATED ORAL
Qty: 90 TABLET | Refills: 1 | Status: SHIPPED | OUTPATIENT
Start: 2023-06-06

## 2023-07-06 ENCOUNTER — TELEPHONE (OUTPATIENT)
Dept: OTHER | Facility: OTHER | Age: 73
End: 2023-07-06

## 2023-07-27 DIAGNOSIS — E78.01 FAMILIAL HYPERCHOLESTEROLEMIA: ICD-10-CM

## 2023-07-27 DIAGNOSIS — I10 ESSENTIAL HYPERTENSION: ICD-10-CM

## 2023-07-27 DIAGNOSIS — E78.5 HYPERLIPIDEMIA, UNSPECIFIED HYPERLIPIDEMIA TYPE: ICD-10-CM

## 2023-07-27 DIAGNOSIS — Z79.01 ANTICOAGULANT LONG-TERM USE: ICD-10-CM

## 2023-07-27 RX ORDER — VALSARTAN 160 MG/1
TABLET ORAL
Qty: 90 TABLET | Refills: 0 | Status: SHIPPED | OUTPATIENT
Start: 2023-07-27

## 2023-07-27 RX ORDER — FENOFIBRIC ACID 135 MG/1
1 CAPSULE, DELAYED RELEASE ORAL DAILY
Qty: 90 CAPSULE | Refills: 0 | Status: SHIPPED | OUTPATIENT
Start: 2023-07-27

## 2023-07-27 RX ORDER — RIVAROXABAN 20 MG/1
20 TABLET, FILM COATED ORAL
Qty: 90 TABLET | Refills: 0 | Status: SHIPPED | OUTPATIENT
Start: 2023-07-27

## 2023-08-09 DIAGNOSIS — Z79.01 ANTICOAGULANT LONG-TERM USE: ICD-10-CM

## 2023-08-15 ENCOUNTER — TELEPHONE (OUTPATIENT)
Dept: FAMILY MEDICINE CLINIC | Facility: CLINIC | Age: 73
End: 2023-08-15

## 2023-08-15 NOTE — TELEPHONE ENCOUNTER
Left a Vm        \----- Message from Sedrick Villarreal MD sent at 8/15/2023  8:31 AM EDT -----  Results reviewed-will discuss at scheduled appointment-no changes needed now

## 2023-08-16 ENCOUNTER — OFFICE VISIT (OUTPATIENT)
Dept: FAMILY MEDICINE CLINIC | Facility: CLINIC | Age: 73
End: 2023-08-16
Payer: MEDICARE

## 2023-08-16 VITALS — WEIGHT: 206.8 LBS | HEART RATE: 86 BPM | BODY MASS INDEX: 28.84 KG/M2 | OXYGEN SATURATION: 97 %

## 2023-08-16 DIAGNOSIS — I87.2 CHRONIC VENOUS INSUFFICIENCY: ICD-10-CM

## 2023-08-16 DIAGNOSIS — E11.9 TYPE 2 DIABETES MELLITUS WITHOUT COMPLICATION, WITHOUT LONG-TERM CURRENT USE OF INSULIN (HCC): Primary | ICD-10-CM

## 2023-08-16 DIAGNOSIS — I48.11 LONGSTANDING PERSISTENT ATRIAL FIBRILLATION (HCC): ICD-10-CM

## 2023-08-16 DIAGNOSIS — F01.52 VASCULAR DEMENTIA WITH PARANOIA (HCC): ICD-10-CM

## 2023-08-16 DIAGNOSIS — Z79.01 ANTICOAGULANT LONG-TERM USE: ICD-10-CM

## 2023-08-16 DIAGNOSIS — E11.69 HYPERLIPIDEMIA ASSOCIATED WITH TYPE 2 DIABETES MELLITUS (HCC): ICD-10-CM

## 2023-08-16 DIAGNOSIS — I10 ESSENTIAL HYPERTENSION: ICD-10-CM

## 2023-08-16 DIAGNOSIS — E78.5 HYPERLIPIDEMIA ASSOCIATED WITH TYPE 2 DIABETES MELLITUS (HCC): ICD-10-CM

## 2023-08-16 PROCEDURE — 99214 OFFICE O/P EST MOD 30 MIN: CPT | Performed by: FAMILY MEDICINE

## 2023-08-16 RX ORDER — VALSARTAN 160 MG/1
160 TABLET ORAL DAILY
Qty: 90 TABLET | Refills: 1 | Status: SHIPPED | OUTPATIENT
Start: 2023-08-16

## 2023-08-16 NOTE — PROGRESS NOTES
[facility]        NAME: Cain Kapoor is a 68 y.o. male  : 1950    MRN: [id number]  DATE: 2023  TIME: 12:26 PM    Assessment and Plan   Type 2 diabetes mellitus without complication, without long-term current use of insulin ([address]) [E11.9]  1. Type 2 diabetes mellitus without complication, without long-term current use of insulin (HCC)  Basic metabolic panel    Hemoglobin R8S    Basic metabolic panel    Hemoglobin A1C      2. Hyperlipidemia associated with type 2 diabetes mellitus ([address])        3. Longstanding persistent atrial fibrillation ([address])        4. Essential hypertension  valsartan (DIOVAN) 160 mg tablet      5. Chronic venous insufficiency        6. Anticoagulant long-term use  rivaroxaban (Xarelto) 20 mg tablet      7. Vascular dementia with paranoia ([address])            No problem-specific Assessment & Plan notes found for this encounter. Patient Instructions     Patient Instructions   Repeat BMP and A1c in 3 to 4 months. Office visit 4 months for Medicare wellness and follow-up. Continue current medications. I would advise scheduling colonoscopy. Chief Complaint     Chief Complaint   Patient presents with   • Medication Management     4 month check -- LAB REVIEW         History of Present Illness       Patient comes in today for medical follow-up. Recent labs are reviewed. 1) diabetes-good control. 2) hypertension-good control with current meds. 3) hyperlipidemia-levels are good. Triglycerides under control with current dose of atorvastatin. 4) longstanding atrial fibrillation. 5) history of venous insufficiency with multiple DVTs. On long-term anticoagulation with Xarelto. 6) history of paranoid behavior-did have neurologic evaluation. No current medications and has had no recent evaluations. I do see a call from neurology to patient for follow-up.       Review of Systems   Review of Systems   Constitutional: Negative for activity change, appetite change, diaphoresis and fatigue. HENT: Negative for congestion, sinus pressure and sore throat. Respiratory: Negative for cough, chest tightness, shortness of breath and wheezing. Cardiovascular: Negative for chest pain, palpitations and leg swelling. Fast or slow heart rate   Gastrointestinal: Negative for abdominal pain, blood in stool, constipation, diarrhea, nausea and vomiting. Genitourinary: Negative for difficulty urinating, dysuria, frequency and hematuria. Musculoskeletal: Negative for arthralgias, gait problem, joint swelling and myalgias. Neurological: Negative for dizziness, light-headedness and headaches. Psychiatric/Behavioral: Negative for agitation, confusion, dysphoric mood and sleep disturbance. The patient is not nervous/anxious.           Current Medications       Current Outpatient Medications:   •  rivaroxaban (Xarelto) 20 mg tablet, Take 1 tablet (20 mg total) by mouth daily with breakfast, Disp: 90 tablet, Rfl: 1  •  valsartan (DIOVAN) 160 mg tablet, Take 1 tablet (160 mg total) by mouth daily, Disp: 90 tablet, Rfl: 1  •  acetaminophen (TYLENOL) 500 mg tablet, Take 500 mg by mouth every 6 (six) hours as needed for mild pain, Disp: , Rfl:   •  ASPIRIN LOW DOSE 81 MG EC tablet, TAKE ONE TABLET BY MOUTH DAILY, Disp: 30 tablet, Rfl: 11  •  atorvastatin (LIPITOR) 40 mg tablet, TAKE ONE TABLET BY MOUTH EVERY DAY WITH DINNER, Disp: 90 tablet, Rfl: 1  •  metFORMIN (GLUCOPHAGE-XR) 500 mg 24 hr tablet, Take 1 tablet (500 mg total) by mouth 2 (two) times a day with meals, Disp: 180 tablet, Rfl: 1  •  methocarbamol (ROBAXIN) 500 mg tablet, Take 1 tablet (500 mg total) by mouth 3 (three) times a day, Disp: 30 tablet, Rfl: 0  •  metoprolol succinate (TOPROL-XL) 25 mg 24 hr tablet, Take 1 tablet (25 mg total) by mouth daily, Disp: 90 tablet, Rfl: 3    Current Allergies     Allergies as of 08/16/2023   • (No Known Allergies)            The following portions of the patient's history were reviewed and updated as appropriate: allergies, current medications, past family history, past medical history, past social history, past surgical history and problem list.     Past Medical History:   Diagnosis Date   • DVT (deep venous thrombosis) ([address])    • Gross hematuria     LA. Adilson Star Adilson Star 3/29/16   R. .. 4/3/17    • Hematuria     LA. .. Adilson Star [place] Star 4/12/16    R. ... 4/3/17   • Hyperlipidemia    • Hypertension    • Long-term (current) use of anticoagulants     LA. Adilson Star [place] Star 3/29/16   R. .. 4/3/17    • Seasonal allergies        Past Surgical History:   Procedure Laterality Date   • APPENDECTOMY     • WISDOM TOOTH EXTRACTION         Family History   Problem Relation Age of Onset   • Hypertension Mother    • Leukemia Father    • Heart disease Father    • Alcohol abuse Father    • Depression Father    • Heart disease Family    • Leukemia Family    • Depression Sister    • Alcohol abuse Brother    • Depression Brother          Medications have been verified. Objective   Pulse 86   Wt 93.8 kg (206 lb 12.8 oz)   SpO2 97%   BMI 28.84 kg/m²        Physical Exam     Physical Exam  Vitals reviewed. Constitutional:       General: He is not in acute distress. Appearance: He is well-developed. He is not diaphoretic. HENT:      Head: Normocephalic and atraumatic. Right Ear: Tympanic membrane, ear canal and external ear normal.      Left Ear: Tympanic membrane, ear canal and external ear normal.      Nose: Nose normal. No mucosal edema or rhinorrhea. Right Sinus: No maxillary sinus tenderness. Left Sinus: No maxillary sinus tenderness. Mouth/Throat:      Mouth: Mucous membranes are not pale and not dry. Dentition: Normal dentition. Pharynx: Uvula midline. No oropharyngeal exudate. Eyes:      General:         Right eye: No discharge. Left eye: No discharge. Extraocular Movements: Extraocular movements intact.       Conjunctiva/sclera: Conjunctivae normal.      Pupils: Pupils are equal, round, and reactive to light. Neck:      Thyroid: No thyromegaly. Cardiovascular:      Rate and Rhythm: Normal rate. Rhythm irregular. Heart sounds: Normal heart sounds. No murmur heard. Pulmonary:      Effort: Pulmonary effort is normal. No respiratory distress. Breath sounds: Normal breath sounds. No wheezing or rales. Musculoskeletal:      Cervical back: Normal range of motion and neck supple. Right lower leg: No edema. Left lower leg: No edema. Lymphadenopathy:      Cervical: No cervical adenopathy. Neurological:      Mental Status: He is alert and oriented to person, place, and time. Psychiatric:         Attention and Perception: Attention normal.         Mood and Affect: Mood is depressed.          Speech: Speech normal.         Behavior: Behavior normal.         Cognition and Memory: Cognition normal.

## 2023-08-16 NOTE — PATIENT INSTRUCTIONS
Repeat BMP and A1c in 3 to 4 months. Office visit 4 months for Medicare wellness and follow-up. Continue current medications. I would advise scheduling colonoscopy.

## 2023-09-06 DIAGNOSIS — E11.9 TYPE 2 DIABETES MELLITUS WITHOUT COMPLICATION, WITHOUT LONG-TERM CURRENT USE OF INSULIN (HCC): ICD-10-CM

## 2023-09-06 RX ORDER — METFORMIN HYDROCHLORIDE 500 MG/1
500 TABLET, EXTENDED RELEASE ORAL 2 TIMES DAILY WITH MEALS
Qty: 180 TABLET | Refills: 1 | Status: SHIPPED | OUTPATIENT
Start: 2023-09-06

## 2023-09-21 ENCOUNTER — TELEPHONE (OUTPATIENT)
Dept: FAMILY MEDICINE CLINIC | Facility: CLINIC | Age: 73
End: 2023-09-21

## 2023-09-21 NOTE — TELEPHONE ENCOUNTER
Riddhi Ivy called and mentioned that he thinks that his dementia is increasing and worsening. He does not know where he is anymore and is paranoid according to her. Pt seems like he wants to hurt his neighbor as well. Been smoking to help ease symptoms and calm self down so he wont do that.

## 2023-10-12 DIAGNOSIS — Z79.01 ANTICOAGULANT LONG-TERM USE: ICD-10-CM

## 2023-10-19 ENCOUNTER — OFFICE VISIT (OUTPATIENT)
Dept: CARDIOLOGY CLINIC | Facility: CLINIC | Age: 73
End: 2023-10-19
Payer: MEDICARE

## 2023-10-19 VITALS
HEIGHT: 71 IN | WEIGHT: 206 LBS | DIASTOLIC BLOOD PRESSURE: 82 MMHG | BODY MASS INDEX: 28.84 KG/M2 | SYSTOLIC BLOOD PRESSURE: 132 MMHG | HEART RATE: 72 BPM

## 2023-10-19 DIAGNOSIS — I48.11 LONGSTANDING PERSISTENT ATRIAL FIBRILLATION (HCC): ICD-10-CM

## 2023-10-19 DIAGNOSIS — I10 ESSENTIAL HYPERTENSION: ICD-10-CM

## 2023-10-19 DIAGNOSIS — I48.92 ATRIAL FLUTTER, UNSPECIFIED TYPE (HCC): Primary | ICD-10-CM

## 2023-10-19 DIAGNOSIS — G45.9 TIA (TRANSIENT ISCHEMIC ATTACK): ICD-10-CM

## 2023-10-19 PROCEDURE — 93000 ELECTROCARDIOGRAM COMPLETE: CPT | Performed by: INTERNAL MEDICINE

## 2023-10-19 PROCEDURE — 99214 OFFICE O/P EST MOD 30 MIN: CPT | Performed by: INTERNAL MEDICINE

## 2023-10-19 NOTE — PROGRESS NOTES
Cardiology Consultation     Loetta Holter  [id number]  1950  [facility] [address] [place] [id number] [phone]    1. Atrial flutter, unspecified type ([address])        2. Longstanding persistent atrial fibrillation (HCC)  POCT ECG      3. Essential hypertension        4. TIA (transient ischemic attack)          Chief Complaint   Patient presents with    Follow-up     No cardiac complaints     HPI: Patient feels well, without complaints. No reported chest pain, shortness of breath, palpitations, lightheadedness, syncope, LE edema, orthopnea, PND, or significant weight changes. Patient remains active without any increased fatigue out of the ordinary. Patient Active Problem List   Diagnosis    Essential hypertension    Type 2 diabetes mellitus without complication, without long-term current use of insulin (HCC)    BPH with obstruction/lower urinary tract symptoms    Chronic venous insufficiency    Varicose veins with swelling    History of DVT (deep vein thrombosis)    Anticoagulant long-term use    Arthritis of knee    Numbness    TIA (transient ischemic attack)    Hyperlipidemia associated with type 2 diabetes mellitus     Anxiety    Longstanding persistent atrial fibrillation (HCC)    Pre-operative cardiovascular examination, supraventricular arrhythmia    Paranoid behavior ([address])    Vascular dementia with paranoia ([address])     Past Medical History:   Diagnosis Date    DVT (deep venous thrombosis) ([address])     Gross hematuria     LA. Saranya Angles Saranya Angles 3/29/16   R. .. 4/3/17     Hematuria     LA. .. Saranya Angles Saranya Angles 4/12/16    R. ... 4/3/17    Hyperlipidemia     Hypertension     Long-term (current) use of anticoagulants     LA. Saranya Angles Saranya Angles 3/29/16   R. .. 4/3/17     Seasonal allergies      Social History     Socioeconomic History    Marital status: Legally      Spouse name: Jana Ni    Number of children: 2    Years of education: Not on file    Highest education level: Not on file   Occupational History    Occupation: Retired   Tobacco Use    Smoking status: Former     Packs/day: 1.00     Years: 9.00     Total pack years: 9.00     Types: Cigarettes     Start date:      Quit date:      Years since quittin.8    Smokeless tobacco: Never   Vaping Use    Vaping Use: Never used   Substance and Sexual Activity    Alcohol use: Not Currently     Comment: social    Drug use: Yes     Types: Marijuana     Comment: Via pipe X 50 years    Sexual activity: Yes     Partners: Female   Other Topics Concern    Not on file   Social History Narrative    Caffeine use     Social Determinants of Health     Financial Resource Strain: Low Risk  (10/11/2022)    Overall Financial Resource Strain (CARDIA)     Difficulty of Paying Living Expenses: Not hard at all   Food Insecurity: Not on file   Transportation Needs: No Transportation Needs (10/11/2022)    PRAPARE - Transportation     Lack of Transportation (Medical): No     Lack of Transportation (Non-Medical):  No   Physical Activity: Not on file   Stress: Not on file   Social Connections: Not on file   Intimate Partner Violence: Not on file   Housing Stability: Not on file      Family History   Problem Relation Age of Onset    Hypertension Mother     Leukemia Father     Heart disease Father     Alcohol abuse Father     Depression Father     Heart disease Family     Leukemia Family     Depression Sister     Alcohol abuse Brother     Depression Brother      Past Surgical History:   Procedure Laterality Date    APPENDECTOMY      WISDOM TOOTH EXTRACTION         Current Outpatient Medications:     acetaminophen (TYLENOL) 500 mg tablet, Take 500 mg by mouth every 6 (six) hours as needed for mild pain, Disp: , Rfl:     ASPIRIN LOW DOSE 81 MG EC tablet, TAKE ONE TABLET BY MOUTH DAILY, Disp: 30 tablet, Rfl: 11    atorvastatin (LIPITOR) 40 mg tablet, TAKE ONE TABLET BY MOUTH EVERY DAY WITH DINNER, Disp: 90 tablet, Rfl: 1 metFORMIN (GLUCOPHAGE-XR) 500 mg 24 hr tablet, TAKE ONE TABLET BY MOUTH TWICE A DAY WITH MEALS, Disp: 180 tablet, Rfl: 1    methocarbamol (ROBAXIN) 500 mg tablet, Take 1 tablet (500 mg total) by mouth 3 (three) times a day (Patient taking differently: Take 500 mg by mouth 3 (three) times a day as needed), Disp: 30 tablet, Rfl: 0    metoprolol succinate (TOPROL-XL) 25 mg 24 hr tablet, Take 1 tablet (25 mg total) by mouth daily, Disp: 90 tablet, Rfl: 3    rivaroxaban (Xarelto) 20 mg tablet, Take 1 tablet (20 mg total) by mouth daily with breakfast, Disp: 90 tablet, Rfl: 1    valsartan (DIOVAN) 160 mg tablet, Take 1 tablet (160 mg total) by mouth daily, Disp: 90 tablet, Rfl: 1  No Known Allergies  Vitals:    10/19/23 1344   BP: 132/82   BP Location: Left arm   Patient Position: Sitting   Cuff Size: Standard   Pulse: 72   Weight: 93.4 kg (206 lb)   Height: 5' 11" (1.803 m)       Labs:  No visits with results within 6 Month(s) from this visit. Latest known visit with results is:   Office Visit on 04/13/2023   Component Date Value    Hemoglobin A1C 08/14/2023 6.3 (H)     Estimated Average Glucose 08/14/2023 134     Glucose, Random 08/14/2023 152 (H)     BUN 08/14/2023 11     Creatinine 08/14/2023 0.82     eGFR 08/14/2023 93     SL AMB BUN/CREATININE RA* 08/14/2023 13     Sodium 08/14/2023 142     Potassium 08/14/2023 4.4     Chloride 08/14/2023 103     CO2 08/14/2023 26     CALCIUM 08/14/2023 9.5     Protein, Total 08/14/2023 7.5     Albumin 08/14/2023 4.6     Globulin, Total 08/14/2023 2.9     Albumin/Globulin Ratio 08/14/2023 1.6     TOTAL BILIRUBIN 08/14/2023 0.6     Alk Phos Isoenzymes 08/14/2023 84     AST 08/14/2023 20     ALT 08/14/2023 20     Cholesterol, Total 08/14/2023 120     Triglycerides 08/14/2023 193 (H)     HDL 08/14/2023 50     VLDL Cholesterol Calcula* 08/14/2023 31     LDL Calculated 08/14/2023 39     LDl/HDL Ratio 08/14/2023 0.8     Creatinine, Urine 04/13/2023 228. 2     Albumin,U,Random 04/13/2023 21.4     Microalb/Creat Ratio 04/13/2023 9      Lab Results   Component Value Date    CHOL 159 03/29/2017    TRIG 193 (H) 08/14/2023    HDL 50 08/14/2023     Imaging: XR chest 1 view portable    Result Date: 6/22/2022  Narrative: CHEST INDICATION:   neck pain. COMPARISON:  7/16/2019 EXAM PERFORMED/VIEWS:  XR CHEST PORTABLE Single view FINDINGS: Cardiomediastinal silhouette appears unremarkable. The lungs are clear. No pneumothorax or pleural effusion. Osseous structures appear within normal limits for patient age. Impression: No acute cardiopulmonary disease. Findings are stable Workstation performed: [id number]       Review of Systems:  Review of Systems   Constitutional:  Negative for activity change, appetite change, fatigue and fever. HENT:  Negative for nosebleeds and sore throat. Eyes:  Negative for photophobia and visual disturbance. Respiratory:  Negative for cough, chest tightness, shortness of breath and wheezing. Cardiovascular:  Negative for chest pain, palpitations and leg swelling. Gastrointestinal:  Negative for abdominal pain, diarrhea, nausea and vomiting. Endocrine: Negative for polyuria. Genitourinary:  Negative for dysuria, frequency and hematuria. Musculoskeletal:  Negative for arthralgias, back pain and gait problem. Skin:  Negative for pallor and rash. Neurological:  Negative for dizziness, syncope, speech difficulty and light-headedness. Hematological:  Does not bruise/bleed easily. Psychiatric/Behavioral:  Negative for agitation, behavioral problems and confusion. Physical Exam:  Physical Exam  Vitals reviewed. Constitutional:       General: He is not in acute distress. Appearance: He is well-developed. He is not diaphoretic. HENT:      Head: Normocephalic and atraumatic. Nose: Nose normal.   Eyes:      General: No scleral icterus. Pupils: Pupils are equal, round, and reactive to light. Neck:      Vascular: No JVD.    Cardiovascular: Rate and Rhythm: Normal rate and regular rhythm. Heart sounds: S1 normal and S2 normal. Heart sounds not distant. No murmur heard. No systolic murmur is present. No friction rub. No gallop. No S3 sounds. Pulmonary:      Effort: Pulmonary effort is normal. No respiratory distress. Breath sounds: Normal breath sounds. No wheezing or rales. Abdominal:      General: Bowel sounds are normal. There is no distension. Palpations: Abdomen is soft. Musculoskeletal:         General: No deformity. Cervical back: Normal range of motion and neck supple. Skin:     General: Skin is warm and dry. Findings: No erythema. Neurological:      Mental Status: He is alert and oriented to person, place, and time. Cranial Nerves: No cranial nerve deficit. Psychiatric:         Behavior: Behavior normal.       Blood pressure 132/82, pulse 72, height 5' 11" (1.803 m), weight 93.4 kg (206 lb). EKG:  Atrial fibrillation  Left anterior fascicular block  Nonspecific ST and T wave abnormality   Abnormal ECG    Discussion/Summary:  Afib: although computer reads ECG as atrial flutter, there is some organization of atrial activity in V1, but otherwise, the rhythm is irregular, so more consistent with afib. Continued on Xarelto for [facility]. Echo in 2019 with normal LVEF. Tolerating change of amlodipine to metoprolol for improved heart rate control. He again revealed normal LV size and function with biatrial enlargement and mild mitral and tricuspid regurgitation. Clear stress test revealed no significant ischemic changes or perfusion defects. Continue current regiment. HTN: continues on metoprolol and valsartan, with good control. H/o TIA: presumed from afib, continued on Xarelto - although he's been on Xarelto for DVT. Also continued on statin.

## 2023-11-09 DIAGNOSIS — E11.69 HYPERLIPIDEMIA ASSOCIATED WITH TYPE 2 DIABETES MELLITUS: ICD-10-CM

## 2023-11-09 DIAGNOSIS — E11.9 TYPE 2 DIABETES MELLITUS WITHOUT COMPLICATION, WITHOUT LONG-TERM CURRENT USE OF INSULIN (HCC): ICD-10-CM

## 2023-11-09 DIAGNOSIS — E78.5 HYPERLIPIDEMIA ASSOCIATED WITH TYPE 2 DIABETES MELLITUS: ICD-10-CM

## 2023-11-09 RX ORDER — ATORVASTATIN CALCIUM 40 MG/1
40 TABLET, FILM COATED ORAL
Qty: 90 TABLET | Refills: 1 | Status: SHIPPED | OUTPATIENT
Start: 2023-11-09

## 2023-12-07 ENCOUNTER — HOSPITAL ENCOUNTER (EMERGENCY)
Facility: HOSPITAL | Age: 73
Discharge: HOME | End: 2023-12-07
Attending: EMERGENCY MEDICINE
Payer: MEDICARE

## 2023-12-07 VITALS
RESPIRATION RATE: 16 BRPM | OXYGEN SATURATION: 95 % | TEMPERATURE: 98.4 F | DIASTOLIC BLOOD PRESSURE: 105 MMHG | HEART RATE: 100 BPM | SYSTOLIC BLOOD PRESSURE: 149 MMHG

## 2023-12-07 DIAGNOSIS — F32.A DEPRESSION: ICD-10-CM

## 2023-12-07 DIAGNOSIS — Z91.89 AT RISK FOR DANGER TO OTHERS: Primary | ICD-10-CM

## 2023-12-07 DIAGNOSIS — J32.9 SINUSITIS: ICD-10-CM

## 2023-12-07 LAB
ALBUMIN SERPL BCP-MCNC: 5.1 G/DL (ref 3.5–5)
ALP SERPL-CCNC: 75 U/L (ref 34–104)
ALT SERPL W P-5'-P-CCNC: 27 U/L (ref 7–52)
ANION GAP SERPL CALCULATED.3IONS-SCNC: 12 MMOL/L
ANISOCYTOSIS BLD QL SMEAR: PRESENT
AST SERPL W P-5'-P-CCNC: 26 U/L (ref 13–39)
ATRIAL RATE: 286 BPM
ATRIAL RATE: 286 BPM
BACTERIA UR QL AUTO: ABNORMAL /HPF
BASOPHILS # BLD MANUAL: 0 THOUSAND/UL (ref 0–0.1)
BASOPHILS NFR MAR MANUAL: 0 % (ref 0–1)
BILIRUB SERPL-MCNC: 1.42 MG/DL (ref 0.2–1)
BILIRUB UR QL STRIP: NEGATIVE
BUN SERPL-MCNC: 15 MG/DL (ref 5–25)
CALCIUM SERPL-MCNC: 10.5 MG/DL (ref 8.4–10.2)
CAOX CRY URNS QL MICRO: ABNORMAL /HPF
CHLORIDE SERPL-SCNC: 99 MMOL/L (ref 96–108)
CLARITY UR: CLEAR
CO2 SERPL-SCNC: 27 MMOL/L (ref 21–32)
COLOR UR: YELLOW
CREAT SERPL-MCNC: 0.89 MG/DL (ref 0.6–1.3)
EOSINOPHIL # BLD MANUAL: 0.26 THOUSAND/UL (ref 0–0.4)
EOSINOPHIL NFR BLD MANUAL: 2 % (ref 0–6)
ERYTHROCYTE [DISTWIDTH] IN BLOOD BY AUTOMATED COUNT: 12.6 % (ref 11.6–15.1)
ETHANOL SERPL-MCNC: <10 MG/DL
GFR SERPL CREATININE-BSD FRML MDRD: 85 ML/MIN/1.73SQ M
GLUCOSE SERPL-MCNC: 117 MG/DL (ref 65–140)
GLUCOSE UR STRIP-MCNC: NEGATIVE MG/DL
HCT VFR BLD AUTO: 53.1 % (ref 36.5–49.3)
HGB BLD-MCNC: 18.4 G/DL (ref 12–17)
HGB UR QL STRIP.AUTO: NEGATIVE
HYALINE CASTS #/AREA URNS LPF: ABNORMAL /LPF
KETONES UR STRIP-MCNC: ABNORMAL MG/DL
LEUKOCYTE ESTERASE UR QL STRIP: NEGATIVE
LYMPHOCYTES # BLD AUTO: 24 % (ref 14–44)
LYMPHOCYTES # BLD AUTO: 4.03 THOUSAND/UL (ref 0.6–4.47)
MACROCYTES BLD QL AUTO: PRESENT
MCH RBC QN AUTO: 32.8 PG (ref 26.8–34.3)
MCHC RBC AUTO-ENTMCNC: 34.7 G/DL (ref 31.4–37.4)
MCV RBC AUTO: 95 FL (ref 82–98)
MONOCYTES # BLD AUTO: 1.95 THOUSAND/UL (ref 0–1.22)
MONOCYTES NFR BLD: 15 % (ref 4–12)
NEUTROPHILS # BLD MANUAL: 6.75 THOUSAND/UL (ref 1.85–7.62)
NEUTS SEG NFR BLD AUTO: 52 % (ref 43–75)
NITRITE UR QL STRIP: NEGATIVE
NON-SQ EPI CELLS URNS QL MICRO: ABNORMAL /HPF
PH UR STRIP.AUTO: 5.5 [PH]
PLATELET # BLD AUTO: 219 THOUSANDS/UL (ref 149–390)
PLATELET BLD QL SMEAR: ADEQUATE
PMV BLD AUTO: 10.8 FL (ref 8.9–12.7)
POTASSIUM SERPL-SCNC: 3.5 MMOL/L (ref 3.5–5.3)
PROT SERPL-MCNC: 8.7 G/DL (ref 6.4–8.4)
PROT UR STRIP-MCNC: ABNORMAL MG/DL
QRS AXIS: -56 DEGREES
QRS AXIS: -59 DEGREES
QRSD INTERVAL: 64 MS
QRSD INTERVAL: 70 MS
QT INTERVAL: 320 MS
QT INTERVAL: 324 MS
QTC INTERVAL: 446 MS
QTC INTERVAL: 458 MS
RBC # BLD AUTO: 5.61 MILLION/UL (ref 3.88–5.62)
RBC #/AREA URNS AUTO: ABNORMAL /HPF
RBC MORPH BLD: PRESENT
SODIUM SERPL-SCNC: 138 MMOL/L (ref 135–147)
SP GR UR STRIP.AUTO: >=1.03 (ref 1–1.03)
T WAVE AXIS: 11 DEGREES
T WAVE AXIS: 13 DEGREES
TSH SERPL DL<=0.05 MIU/L-ACNC: 2.13 UIU/ML (ref 0.45–4.5)
UROBILINOGEN UR STRIP-ACNC: <2 MG/DL
VARIANT LYMPHS # BLD AUTO: 7 %
VENTRICULAR RATE: 114 BPM
VENTRICULAR RATE: 123 BPM
WBC # BLD AUTO: 12.99 THOUSAND/UL (ref 4.31–10.16)
WBC #/AREA URNS AUTO: ABNORMAL /HPF

## 2023-12-07 PROCEDURE — 82077 ASSAY SPEC XCP UR&BREATH IA: CPT | Performed by: EMERGENCY MEDICINE

## 2023-12-07 PROCEDURE — 99284 EMERGENCY DEPT VISIT MOD MDM: CPT | Performed by: EMERGENCY MEDICINE

## 2023-12-07 PROCEDURE — 81001 URINALYSIS AUTO W/SCOPE: CPT | Performed by: EMERGENCY MEDICINE

## 2023-12-07 PROCEDURE — 80053 COMPREHEN METABOLIC PANEL: CPT | Performed by: EMERGENCY MEDICINE

## 2023-12-07 PROCEDURE — 93005 ELECTROCARDIOGRAM TRACING: CPT

## 2023-12-07 PROCEDURE — 84443 ASSAY THYROID STIM HORMONE: CPT | Performed by: EMERGENCY MEDICINE

## 2023-12-07 PROCEDURE — 36415 COLL VENOUS BLD VENIPUNCTURE: CPT | Performed by: EMERGENCY MEDICINE

## 2023-12-07 PROCEDURE — 99283 EMERGENCY DEPT VISIT LOW MDM: CPT

## 2023-12-07 PROCEDURE — 85027 COMPLETE CBC AUTOMATED: CPT | Performed by: EMERGENCY MEDICINE

## 2023-12-07 PROCEDURE — 85007 BL SMEAR W/DIFF WBC COUNT: CPT | Performed by: EMERGENCY MEDICINE

## 2023-12-07 RX ORDER — HALOPERIDOL 2 MG/1
2 TABLET ORAL ONCE
Status: COMPLETED | OUTPATIENT
Start: 2023-12-07 | End: 2023-12-07

## 2023-12-07 RX ORDER — AMOXICILLIN 250 MG/1
500 CAPSULE ORAL ONCE
Status: COMPLETED | OUTPATIENT
Start: 2023-12-07 | End: 2023-12-07

## 2023-12-07 RX ADMIN — HALOPERIDOL 2 MG: 2 TABLET ORAL at 15:06

## 2023-12-07 RX ADMIN — AMOXICILLIN 500 MG: 250 CAPSULE ORAL at 16:04

## 2023-12-07 NOTE — ED NOTES
This writer discussed the patients current presentation and recommended discharge plan with Dr Stephan Lock. They agree with the patient being discharged at this time with referrals and/or information about  outpatient providers and      The patient was provided with referral information for:   Outpatient metal health resources        The patient declined to complete a safety plan however a blank plan was provided for future use. In addition, the patient was instructed to call local [facility] crisis, other crisis services, [facility] or to go to the nearest ER immediately if their situation changes at any time. This writer discussed discharge plans with the patient and family- who agrees with and understands the discharge plans. SAFETY PLAN  Warning Signs (thoughts, images, mood, behavior, situations) of a potential crisis:       Coping Skills (what can I do to take my mind off the problem, or to keep myself safe):        Outside Support (who can I reach out to for support and help):         National Suicide Prevention Hotline:  [address] [place] [address] Drive [address]: [address] [facility] [address]: [address] [place] [address] [phone] - Crisis   [phone] - Peer [address] (1-9pm daily)  [phone] - Teen Support Talk Line (1-9pm daily)  [address] [place] [address] 1815 [facility] [address] [place] [address] [address] [place] [address]) [phone] - 127 [facility]
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
## 2023-12-07 NOTE — DISCHARGE INSTRUCTIONS
This writer discussed the patients current presentation and recommended discharge plan with Dr Yocasta Hoawrd. They agree with the patient being discharged at this time with referrals and/or information about  outpatient providers and       The patient was provided with referral information for:   Outpatient metal health resources         The patient declined to complete a safety plan however a blank plan was provided for future use. In addition, the patient was instructed to call local [facility] crisis, other crisis services, [facility] or to go to the nearest ER immediately if their situation changes at any time. This writer discussed discharge plans with the patient and family- who agrees with and understands the discharge plans. SAFETY PLAN  Warning Signs (thoughts, images, mood, behavior, situations) of a potential crisis:         Coping Skills (what can I do to take my mind off the problem, or to keep myself safe):          Outside Support (who can I reach out to for support and help):            National Suicide Prevention Hotline:  [address] [place] [address] Drive: [address]: [address] [place] [address] 1600 [facility] [address] [place] [address] [phone] - Crisis   [phone] - Peer [address] (1-9pm daily)  [phone] - Teen Support Talk Line (1-9pm daily)  [address] [place] [address] 1815 [facility] [address] [place] [address] [address] [place] [address]) [phone] - 738 [facility]

## 2023-12-14 ENCOUNTER — OFFICE VISIT (OUTPATIENT)
Dept: FAMILY MEDICINE CLINIC | Facility: CLINIC | Age: 73
End: 2023-12-14
Payer: MEDICARE

## 2023-12-14 VITALS
BODY MASS INDEX: 28.84 KG/M2 | OXYGEN SATURATION: 98 % | HEART RATE: 78 BPM | WEIGHT: 206 LBS | TEMPERATURE: 97.6 F | HEIGHT: 71 IN

## 2023-12-14 DIAGNOSIS — Z23 ENCOUNTER FOR IMMUNIZATION: ICD-10-CM

## 2023-12-14 DIAGNOSIS — E78.5 HYPERLIPIDEMIA ASSOCIATED WITH TYPE 2 DIABETES MELLITUS: ICD-10-CM

## 2023-12-14 DIAGNOSIS — F32.A DEPRESSION, UNSPECIFIED DEPRESSION TYPE: ICD-10-CM

## 2023-12-14 DIAGNOSIS — E11.9 TYPE 2 DIABETES MELLITUS WITHOUT COMPLICATION, WITHOUT LONG-TERM CURRENT USE OF INSULIN (HCC): ICD-10-CM

## 2023-12-14 DIAGNOSIS — F22 PARANOID BEHAVIOR (HCC): ICD-10-CM

## 2023-12-14 DIAGNOSIS — Z00.00 MEDICARE ANNUAL WELLNESS VISIT, SUBSEQUENT: Primary | ICD-10-CM

## 2023-12-14 DIAGNOSIS — E11.69 HYPERLIPIDEMIA ASSOCIATED WITH TYPE 2 DIABETES MELLITUS: ICD-10-CM

## 2023-12-14 PROCEDURE — 90662 IIV NO PRSV INCREASED AG IM: CPT

## 2023-12-14 PROCEDURE — 99214 OFFICE O/P EST MOD 30 MIN: CPT

## 2023-12-14 PROCEDURE — G0008 ADMIN INFLUENZA VIRUS VAC: HCPCS

## 2023-12-14 PROCEDURE — G0439 PPPS, SUBSEQ VISIT: HCPCS

## 2023-12-14 RX ORDER — HALOPERIDOL 2 MG/1
2 TABLET ORAL 3 TIMES DAILY
Qty: 180 TABLET | Refills: 3 | Status: SHIPPED | OUTPATIENT
Start: 2023-12-14

## 2023-12-14 RX ORDER — BUPROPION HYDROCHLORIDE 150 MG/1
150 TABLET ORAL EVERY MORNING
Qty: 90 TABLET | Refills: 3 | Status: SHIPPED | OUTPATIENT
Start: 2023-12-14 | End: 2024-12-08

## 2023-12-14 RX ORDER — LORAZEPAM 1 MG/1
1 TABLET ORAL EVERY 8 HOURS PRN
Refills: 0 | Status: CANCELLED | OUTPATIENT
Start: 2023-12-14

## 2023-12-14 NOTE — ASSESSMENT & PLAN NOTE
Lipid panel reviewed-stable. Elevated triglycerides. Reviewed dietary/physical activity recommendations. Continue current medication regimen.    Lab Results   Component Value Date    HGBA1C 6.3 (H) 08/14/2023
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
## 2023-12-14 NOTE — PATIENT INSTRUCTIONS
Medicare Preventive Visit Patient Instructions  Thank you for completing your Welcome to Medicare Visit or Medicare Annual Wellness Visit today. Your next wellness visit will be due in one year (12/14/2024). The screening/preventive services that you may require over the next 5-10 years are detailed below. Some tests may not apply to you based off risk factors and/or age. Screening tests ordered at today's visit but not completed yet may show as past due. Also, please note that scanned in results may not display below. Preventive Screenings:  Service Recommendations Previous Testing/Comments   Colorectal Cancer Screening  Colonoscopy    Fecal Occult Blood Test (FOBT)/Fecal Immunochemical Test (FIT)  Fecal DNA/Cologuard Test  Flexible Sigmoidoscopy Age: 43-73 years old   Colonoscopy: every 10 years (May be performed more frequently if at higher risk)  OR  FOBT/FIT: every 1 year  OR  Cologuard: every 3 years  OR  Sigmoidoscopy: every 5 years  Screening may be recommended earlier than age 39 if at higher risk for colorectal cancer. Also, an individualized decision between you and your healthcare provider will decide whether screening between the ages of 77-80 would be appropriate.  Colonoscopy: 05/17/2010  FOBT/FIT: Not on file  Cologuard: Not on file  Sigmoidoscopy: Not on file          Prostate Cancer Screening Individualized decision between patient and health care provider in men between ages of 53-66   Medicare will cover every 12 months beginning on the day after your 50th birthday PSA: 0.6 ng/mL           Hepatitis C Screening Once for adults born between 1945 and 1965  More frequently in patients at high risk for Hepatitis C Hep C Antibody: Not on file        Diabetes Screening 1-2 times per year if you're at risk for diabetes or have pre-diabetes Fasting glucose: 150 mg/dL (7/1/2020)  A1C: 6.3 % (8/14/2023)  Screening Not Indicated  History Diabetes   Cholesterol Screening Once every 5 years if you don't have a lipid disorder. May order more often based on risk factors. Lipid panel: 08/14/2023  Screening Not Indicated  History Lipid Disorder      Other Preventive Screenings Covered by Medicare:  Abdominal Aortic Aneurysm (AAA) Screening: covered once if your at risk. You're considered to be at risk if you have a family history of AAA or a male between the age of 70-76 who smoking at least 100 cigarettes in your lifetime. Lung Cancer Screening: covers low dose CT scan once per year if you meet all of the following conditions: (1) Age 48-67; (2) No signs or symptoms of lung cancer; (3) Current smoker or have quit smoking within the last 15 years; (4) You have a tobacco smoking history of at least 20 pack years (packs per day x number of years you smoked); (5) You get a written order from a healthcare provider. Glaucoma Screening: covered annually if you're considered high risk: (1) You have diabetes OR (2) Family history of glaucoma OR (3)  aged 48 and older OR (3)  American aged 72 and older  Osteoporosis Screening: covered every 2 years if you meet one of the following conditions: (1) Have a vertebral abnormality; (2) On glucocorticoid therapy for more than 3 months; (3) Have primary hyperparathyroidism; (4) On osteoporosis medications and need to assess response to drug therapy. HIV Screening: covered annually if you're between the age of 14-79. Also covered annually if you are younger than 13 and older than 72 with risk factors for HIV infection. For pregnant patients, it is covered up to 3 times per pregnancy.     Immunizations:  Immunization Recommendations   Influenza Vaccine Annual influenza vaccination during flu season is recommended for all persons aged >= 6 months who do not have contraindications   Pneumococcal Vaccine   * Pneumococcal conjugate vaccine = PCV13 (Prevnar 13), PCV15 (Vaxneuvance), PCV20 (Prevnar 20)  * Pneumococcal polysaccharide vaccine = PPSV23 (Pneumovax) Adults 67-41 yo with certain risk factors or if 69+ yo  If never received any pneumonia vaccine: recommend Prevnar 21 (PCV20)  Give PCV20 if previously received 1 dose of PCV13 or PPSV23   Hepatitis B Vaccine 3 dose series if at intermediate or high risk (ex: diabetes, end stage renal disease, liver disease)   Respiratory syncytial virus (RSV) Vaccine - COVERED BY MEDICARE PART D  * RSVPreF3 (Arexvy) CDC recommends that adults 61years of age and older may receive a single dose of RSV vaccine using shared clinical decision-making (SCDM)   Tetanus (Td) Vaccine - COST NOT COVERED BY MEDICARE PART B Following completion of primary series, a booster dose should be given every 10 years to maintain immunity against tetanus. Td may also be given as tetanus wound prophylaxis. Tdap Vaccine - COST NOT COVERED BY MEDICARE PART B Recommended at least once for all adults. For pregnant patients, recommended with each pregnancy. Shingles Vaccine (Shingrix) - COST NOT COVERED BY MEDICARE PART B  2 shot series recommended in those 19 years and older who have or will have weakened immune systems or those 50 years and older     Health Maintenance Due:      Topic Date Due   • Hepatitis C Screening  Never done   • Colorectal Cancer Screening  05/17/2020     Immunizations Due:      Topic Date Due   • COVID-19 Vaccine (3 - 2023-24 season) 09/01/2023     Advance Directives   What are advance directives? Advance directives are legal documents that state your wishes and plans for medical care. These plans are made ahead of time in case you lose your ability to make decisions for yourself. Advance directives can apply to any medical decision, such as the treatments you want, and if you want to donate organs. What are the types of advance directives? There are many types of advance directives, and each state has rules about how to use them. You may choose a combination of any of the following:  Living will:   This is a written record of the treatment you want. You can also choose which treatments you do not want, which to limit, and which to stop at a certain time. This includes surgery, medicine, IV fluid, and tube feedings. Durable power of  for [facility]): This is a written record that states who you want to make healthcare choices for you when you are unable to make them for yourself. This person, called a proxy, is usually a family member or a friend. You may choose more than 1 proxy. Do not resuscitate (DNR) order:  A DNR order is used in case your heart stops beating or you stop breathing. It is a request not to have certain forms of treatment, such as CPR. A DNR order may be included in other types of advance directives. Medical directive: This covers the care that you want if you are in a coma, near death, or unable to make decisions for yourself. You can list the treatments you want for each condition. Treatment may include pain medicine, surgery, blood transfusions, dialysis, IV or tube feedings, and a ventilator (breathing machine). Values history: This document has questions about your views, beliefs, and how you feel and think about life. This information can help others choose the care that you would choose. Why are advance directives important? An advance directive helps you control your care. Although spoken wishes may be used, it is better to have your wishes written down. Spoken wishes can be misunderstood, or not followed. Treatments may be given even if you do not want them. An advance directive may make it easier for your family to make difficult choices about your care. Weight Management   Why it is important to manage your weight:  Being overweight increases your risk of health conditions such as heart disease, high blood pressure, type 2 diabetes, and certain types of cancer. It can also increase your risk for osteoarthritis, sleep apnea, and other respiratory problems.  Aim for a slow, steady weight loss. Even a small amount of weight loss can lower your risk of health problems. How to lose weight safely:  A safe and healthy way to lose weight is to eat fewer calories and get regular exercise. You can lose up about 1 pound a week by decreasing the number of calories you eat by 500 calories each day. Healthy meal plan for weight management:  A healthy meal plan includes a variety of foods, contains fewer calories, and helps you stay healthy. A healthy meal plan includes the following:  Eat whole-grain foods more often. A healthy meal plan should contain fiber. Fiber is the part of grains, fruits, and vegetables that is not broken down by your body. Whole-grain foods are healthy and provide extra fiber in your diet. Some examples of whole-grain foods are whole-wheat breads and pastas, oatmeal, brown rice, and bulgur. Eat a variety of vegetables every day. Include dark, leafy greens such as spinach, kale, emy greens, and mustard greens. Eat yellow and orange vegetables such as carrots, sweet potatoes, and winter squash. Eat a variety of fruits every day. Choose fresh or canned fruit (canned in its own juice or light syrup) instead of juice. Fruit juice has very little or no fiber. Eat low-fat dairy foods. Drink fat-free (skim) milk or 1% milk. Eat fat-free yogurt and low-fat cottage cheese. Try low-fat cheeses such as mozzarella and other reduced-fat cheeses. Choose meat and other protein foods that are low in fat. Choose beans or other legumes such as split peas or lentils. Choose fish, skinless poultry (chicken or turkey), or lean cuts of red meat (beef or pork). Before you cook meat or poultry, cut off any visible fat. Use less fat and oil. Try baking foods instead of frying them. Add less fat, such as margarine, sour cream, regular salad dressing and mayonnaise to foods. Eat fewer high-fat foods. Some examples of high-fat foods include french fries, doughnuts, ice cream, and cakes.   Eat fewer sweets. Limit foods and drinks that are high in sugar. This includes candy, cookies, regular soda, and sweetened drinks. Exercise:  Exercise at least 30 minutes per day on most days of the week. Some examples of exercise include walking, biking, dancing, and swimming. You can also fit in more physical activity by taking the stairs instead of the elevator or parking farther away from stores. Ask your healthcare provider about the best exercise plan for you. © Copyright [employer] 2018 Information is for End User's use only and may not be sold, redistributed or otherwise used for commercial purposes. All illustrations and images included in CareNotes® are the copyrighted property of [employer]. or [address] [place] [address]    Type 2 Diabetes Management for Adults   AMBULATORY CARE:   Type 2 diabetes  is a disease that affects how your body uses glucose (sugar). Either your body cannot make enough insulin, or it cannot use the insulin correctly. It is important to keep diabetes controlled to prevent damage to your heart, blood vessels, and other organs. Management will help you feel well and enjoy your daily activities. Your diabetes care team providers can help you make a plan to fit diabetes care into your schedule. Your plan can change over time to fit your needs and your family's needs. Have someone call your local emergency number (911 in the [address]) if:   You cannot be woken.     You have signs of diabetic ketoacidosis:     confusion, fatigue    vomiting    rapid heartbeat    fruity smelling breath    extreme thirst    dry mouth and skin    You have any of the following signs of a heart attack:      Squeezing, pressure, or pain in your chest    You may  also have any of the following:     Discomfort or pain in your back, neck, jaw, stomach, or arm    Shortness of breath    Nausea or vomiting    Lightheadedness or a sudden cold sweat    You have any of the following signs of a stroke:      Numbness or drooping on one side of your face     Weakness in an arm or leg    Confusion or difficulty speaking    Dizziness, a severe headache, or vision loss    Call your doctor or diabetes care team provider if:   You have a sore or wound that will not heal.    You have a change in the amount you urinate. Your blood sugar levels are higher than your target goals. You often have lower blood sugar levels than your target goals. Your skin is red, dry, warm, or swollen. You have trouble coping with diabetes, or you feel anxious or depressed. You have trouble following any part of your care plan, such as your meal plan. You have questions or concerns about your condition or care. What you need to know about high blood sugar levels:  High blood sugar levels may not cause any symptoms. You may feel more thirsty or urinate more often than usual. Over time, high blood sugar levels can damage your nerves, blood vessels, tissues, and organs. The following can increase your blood sugar levels:  Large meals or large amounts of carbohydrates at one time    Less physical activity    Stress    Illness    A lower dose of diabetes medicine or insulin, or a late dose    What you need to know about low blood sugar levels:  Symptoms include feeling shaky, dizzy, irritable, or confused. You can prevent symptoms by keeping your blood sugar levels from going too low. Treat a low blood sugar level right away:      Drink 4 ounces of juice or have 1 tube of glucose gel. Check your blood sugar level again 10 to 15 minutes later. When the level goes back to normal, eat a meal or snack to prevent another decrease. Keep glucose gel, raisins, or hard candy with you at all times to treat a low blood sugar level. Your blood sugar level can get too low if you take diabetes medicine or insulin and do not eat enough food. If you use insulin, check your blood sugar level before you exercise.       If your blood sugar level is below 100 mg/dL, eat 4 crackers or 2 ounces of raisins, or drink 4 ounces of juice. Check your level every 30 minutes if you exercise longer than 1 hour. You may need a snack during or after exercise. What you can do to manage your blood sugar levels:   Check your blood sugar levels as directed and as needed. Several items are available to use to check your levels. You may need to check by testing a drop of blood in a glucose monitor. You may instead be given a continuous glucose monitoring (CGM) device. The device is worn at all times. The CGM checks your blood sugar level every 5 minutes. It sends results to an electronic device such as a smart phone. A CGM can be used with or without an insulin pump. You and your diabetes care team providers will decide on the best method for you. The goal for blood sugar levels before meals  is between 80 and 130 mg/dL and 2 hours after eating  is lower than 180 mg/dL. Make healthy food choices. Work with a dietitian to create a meal plan that works for you and your schedule. A dietitian can help you learn how to eat the right amount of carbohydrates (sugar and starchy foods) during your meals and snacks. Examples of carbohydrates are breads, cereals, rice, pasta, fruit, low-fat dairy, and sweets. Carbohydrates can raise your blood sugar level if you eat too many at one time. Eat high-fiber foods as directed. Fiber helps improve blood sugar levels. Fiber also lowers your risk for heart disease and other problems diabetes can cause. Examples of high-fiber foods include vegetables, whole-grain bread, and beans such as downey beans. Your dietitian can tell you how much fiber to have each day. Get regular physical activity. Physical activity can help you get to your target blood sugar level goal and manage your weight. Get at least 150 minutes of moderate to vigorous aerobic physical activity each week.  Resistance training, such as lifting weights, should be done 3 times each week. Do not miss more than 2 days of physical activity in a row. Do not sit longer than 30 minutes at a time. Your healthcare provider can help you create an activity plan. The plan can include the best activities for you and can help you build your strength and endurance. Maintain a healthy weight. Ask your team what a healthy weight is for you. A healthy weight can help you control diabetes and prevent heart disease. Ask your team to help you create a weight-loss plan, if needed. Even a loss of 3% to 7% of your excess body weight can help make a difference in managing diabetes. Your team will help you set a weight-loss goal, such as 10 to 15 pounds, or 5% of your extra weight. Together you and your team can set manageable weight-loss goals. Take your diabetes medicine or insulin as directed. You may need diabetes medicine, insulin, or both to help control your blood sugar levels. Your healthcare provider will teach you how and when to take your diabetes medicine or insulin. You will also be taught about side effects oral diabetes medicine can cause. Insulin may be injected or given through a pump or pen. You and your providers will decide on the best method for you: An insulin pump  is an implanted device that gives your insulin 24 hours a day. An insulin pump prevents the need for multiple insulin injections in a day. An insulin pen  is a device prefilled with the right amount of insulin. You and your family members will be taught how to draw up and give insulin  if this is the best method for you. Your providers will also teach you how to dispose of needles and syringes. You will learn how much insulin you need  and when to give it. You will be taught when not to give insulin. You will also be taught what to do if your blood sugar level drops too low.  This may happen if you take insulin and do not eat the right amount of carbohydrates. More ways to manage type 2 diabetes:   Wear medical alert identification. Wear medical alert jewelry or carry a card that says you have diabetes. Ask your provider where to get these items. Do not smoke. Nicotine and other chemicals in cigarettes and cigars can cause lung and blood vessel damage. It also makes it more difficult to manage your diabetes. Ask your provider for information if you currently smoke and need help to quit. Do not use e-cigarettes or smokeless tobacco in place of cigarettes or to help you quit. They still contain nicotine. Check your feet each day for cuts, scratches, calluses, or other wounds. Look for redness and swelling, and feel for warmth. Wear shoes that fit well. Check your shoes for rocks or other objects that can hurt your feet. Do not walk barefoot or wear shoes without socks. Wear cotton socks to help keep your feet dry. Ask about vaccines you may need. You have a higher risk for serious illness if you get the flu, pneumonia, COVID-19, or hepatitis. Ask your provider if you should get vaccines to prevent these or other diseases, and when to get the vaccines. Talk to your provider if you become stressed about diabetes care. Sometimes being able to fit diabetes care into your life can cause increased stress. The stress can cause you not to take care of yourself properly. Your provider can help by offering tips about self-care. A mental health provider can listen and offer help with self-care issues. Other types of counseling can help you make nutrition or physical activity changes. Have your A1c checked as directed. Your provider may check your A1c every 3 months, or 2 times each year if your diabetes is controlled. An A1c test shows the average amount of sugar in your blood over the past 2 to 3 months. Your provider will tell you what your A1c level should be. Have screening tests as directed.   Your provider may recommend screening for complications of diabetes and other conditions that may develop. Some screenings may begin right away and some may happen within the first 5 years of diagnosis:    Examples of diabetes complications  include kidney problems, high cholesterol, high blood pressure, blood vessel problems, eye problems, and sleep apnea. You may be screened for a low vitamin B level  if you take oral diabetes medicine for a long time. You may be screened for polycystic ovarian syndrome (PCOS)  if you are of childbearing age. Follow up with your doctor or diabetes care team providers as directed: You may need to have blood tests done before your follow-up visit. The test results will show if changes need to be made in your treatment or self-care. Talk to your provider if you cannot afford your medicine. Write down your questions so you remember to ask them during your visits. © Copyright [employer] 2023 Information is for End User's use only and may not be sold, redistributed or otherwise used for commercial purposes. The above information is an  only. It is not intended as medical advice for individual conditions or treatments. Talk to your doctor, nurse or pharmacist before following any medical regimen to see if it is safe and effective for you.

## 2023-12-14 NOTE — ASSESSMENT & PLAN NOTE
A1C stable. Continue current medication regimen.    Lab Results   Component Value Date    HGBA1C 6.3 (H) 08/14/2023

## 2023-12-14 NOTE — PROGRESS NOTES
Assessment and Plan:     Problem List Items Addressed This Visit       Type 2 diabetes mellitus without complication, without long-term current use of insulin ([facility])     A1C stable. Continue current medication regimen. Lab Results   Component Value Date    HGBA1C 6.3 (H) 08/14/2023            Hyperlipidemia associated with type 2 diabetes mellitus      Lipid panel reviewed-stable. Elevated triglycerides. Reviewed dietary/physical activity recommendations. Continue current medication regimen. Lab Results   Component Value Date    HGBA1C 6.3 (H) 08/14/2023            Paranoid behavior ([address])     Patient recently seen in the ER due to increased paranoia involving neighbor and state police. Patient denies SI/HI at this time. States that he is trying to find a  that will assist him with his issues with the neighbor. Wife states that there is not actually anything occurring with the neighbor but patient has paranoid delusions. Patient unable to get into therapy at this time. Wife has called several places and "they are a year out." Encourages telehealth therapy until they can be seen in person but both are opposed to this option. Patient and wife educated on 12 and 302 processes--- encouraged to go to ER should SI or HI occur. Both verbalized understanding. Patient open to pharm intervention. Trial wellbutrin and haldol. Follow up in 1 week. Relevant Medications    buPROPion (WELLBUTRIN XL) 150 mg 24 hr tablet    haloperidol (HALDOL) 2 mg tablet     Other Visit Diagnoses       Medicare annual wellness visit, subsequent    -  Primary    Depression, unspecified depression type        Relevant Medications    buPROPion (WELLBUTRIN XL) 150 mg 24 hr tablet    haloperidol (HALDOL) 2 mg tablet    Encounter for immunization        Relevant Orders    influenza vaccine, high-dose, PF 0.7 mL (FLUZONE HIGH-DOSE) (Completed)          BMI Counseling: Body mass index is 28.73 kg/m².  The BMI is above normal. Nutrition recommendations include decreasing portion sizes. Exercise recommendations include exercising 3-5 times per week. Rationale for BMI follow-up plan is due to patient being overweight or obese. Depression Screening and Follow-up Plan: Patient was screened for depression during today's encounter. They screened negative with a PHQ-2 score of 0. Preventive health issues were discussed with patient, and age appropriate screening tests were ordered as noted in patient's After Visit Summary. Personalized health advice and appropriate referrals for health education or preventive services given if needed, as noted in patient's After Visit Summary. History of Present Illness:     Patient presents for a Medicare Wellness Visit    See assessment/plan. Patient Care Team:  Tiffany Botello as PCP - General (Family Medicine)  Jonas Bond MD     Review of Systems:     Review of Systems   Constitutional:  Negative for chills and fever. HENT:  Negative for ear pain and sore throat. Eyes:  Negative for pain and visual disturbance. Respiratory:  Negative for cough and shortness of breath. Cardiovascular:  Negative for chest pain and palpitations. Gastrointestinal:  Negative for abdominal pain and vomiting. Genitourinary:  Negative for dysuria and hematuria. Musculoskeletal:  Negative for arthralgias and back pain. Skin:  Negative for color change and rash. Neurological:  Negative for seizures and syncope. Psychiatric/Behavioral:  The patient is nervous/anxious. All other systems reviewed and are negative.        Problem List:     Patient Active Problem List   Diagnosis    Essential hypertension    Type 2 diabetes mellitus without complication, without long-term current use of insulin (HCC)    BPH with obstruction/lower urinary tract symptoms    Chronic venous insufficiency    Varicose veins with swelling    History of DVT (deep vein thrombosis)    Anticoagulant long-term use Arthritis of knee    Numbness    TIA (transient ischemic attack)    Hyperlipidemia associated with type 2 diabetes mellitus     Anxiety    Longstanding persistent atrial fibrillation (HCC)    Pre-operative cardiovascular examination, supraventricular arrhythmia    Paranoid behavior ([address])    Vascular dementia with paranoia [facility])      Past Medical and Surgical History:     Past Medical History:   Diagnosis Date    DVT (deep venous thrombosis) ([address])     Gross hematuria     LA. Jessica Arshad 3/29/16   R. .. 4/3/17     Hematuria     LA. .. Jessica Arshad 16    R. ... 4/3/17    Hyperlipidemia     Hypertension     Long-term (current) use of anticoagulants     LUIS MIGUEL Arshad 3/29/16   R. .. 4/3/17     Seasonal allergies      Past Surgical History:   Procedure Laterality Date    APPENDECTOMY      WISDOM TOOTH EXTRACTION        Family History:     Family History   Problem Relation Age of Onset    Hypertension Mother     Leukemia Father     Heart disease Father     Alcohol abuse Father     Depression Father     Heart disease Family     Leukemia Family     Depression Sister     Alcohol abuse Brother     Depression Brother       Social History:     Social History     Socioeconomic History    Marital status: Legally      Spouse name: Tavares Gardner    Number of children: 2    Years of education: None    Highest education level: None   Occupational History    Occupation: Retired   Tobacco Use    Smoking status: Former     Current packs/day: 0.00     Average packs/day: 1 pack/day for 9.0 years (9.0 ttl pk-yrs)     Types: Cigarettes     Start date:      Quit date:      Years since quittin.9    Smokeless tobacco: Never   Vaping Use    Vaping status: Never Used   Substance and Sexual Activity    Alcohol use: Not Currently     Comment: social    Drug use: Yes     Types: Marijuana     Comment: Via pipe X 50 years    Sexual activity: Yes     Partners: Female   Other Topics Concern    None   Social History Narrative    Caffeine use     Social Determinants of Health     Financial Resource Strain: Low Risk  (12/14/2023)    Overall Financial Resource Strain (CARDIA)     Difficulty of Paying Living Expenses: Not hard at all   Food Insecurity: Not on file   Transportation Needs: No Transportation Needs (12/14/2023)    PRAPARE - Transportation     Lack of Transportation (Medical): No     Lack of Transportation (Non-Medical): No   Physical Activity: Not on file   Stress: Not on file   Social Connections: Not on file   Intimate Partner Violence: Not on file   Housing Stability: Not on file      Medications and Allergies:     Current Outpatient Medications   Medication Sig Dispense Refill    acetaminophen (TYLENOL) 500 mg tablet Take 500 mg by mouth every 6 (six) hours as needed for mild pain      ASPIRIN LOW DOSE 81 MG EC tablet TAKE ONE TABLET BY MOUTH DAILY 30 tablet 11    atorvastatin (LIPITOR) 40 mg tablet TAKE 1 TABLET BY MOUTH DAILY WITH DINNER 90 tablet 1    buPROPion (WELLBUTRIN XL) 150 mg 24 hr tablet Take 1 tablet (150 mg total) by mouth every morning 90 tablet 3    haloperidol (HALDOL) 2 mg tablet Take 1 tablet (2 mg total) by mouth 3 (three) times a day 180 tablet 3    metFORMIN (GLUCOPHAGE-XR) 500 mg 24 hr tablet TAKE ONE TABLET BY MOUTH TWICE A DAY WITH MEALS 180 tablet 1    methocarbamol (ROBAXIN) 500 mg tablet Take 1 tablet (500 mg total) by mouth 3 (three) times a day (Patient taking differently: Take 500 mg by mouth 3 (three) times a day as needed) 30 tablet 0    metoprolol succinate (TOPROL-XL) 25 mg 24 hr tablet Take 1 tablet (25 mg total) by mouth daily 90 tablet 3    rivaroxaban (Xarelto) 20 mg tablet Take 1 tablet (20 mg total) by mouth daily with breakfast 90 tablet 1    valsartan (DIOVAN) 160 mg tablet Take 1 tablet (160 mg total) by mouth daily 90 tablet 1     No current facility-administered medications for this visit.      No Known Allergies   Immunizations:     Immunization History   Administered Date(s) Administered    COVID-19 PFIZER VACCINE 0.3 ML IM 04/11/2021, 05/03/2021    INFLUENZA 11/24/2014    Influenza Quadrivalent Preservative Free 3 years and older IM 11/25/2015, 04/03/2017    Influenza, high dose seasonal 0.7 mL 12/03/2018, 01/13/2020, 02/12/2021, 12/14/2023    Pneumococcal Conjugate 13-Valent 04/03/2017    Pneumococcal Polysaccharide PPV23 12/03/2018    Tdap 06/17/2015      Health Maintenance:         Topic Date Due    Hepatitis C Screening  Never done    Colorectal Cancer Screening  05/17/2020         Topic Date Due    COVID-19 Vaccine (3 - 2023-24 season) 09/01/2023      Medicare Screening Tests and Risk Assessments:     Last Medicare Wellness visit information reviewed, patient interviewed and updates made to the record today. Health Risk Assessment:   Patient rates overall health as very good. Patient feels that their physical health rating is same. Patient is very dissatisfied with their life. Eyesight was rated as same. Hearing was rated as same. Patient feels that their emotional and mental health rating is much worse. Patients states they are always angry. Patient states they are never, rarely unusually tired/fatigued. Pain experienced in the last 7 days has been none. Patient states that he has experienced no weight loss or gain in last 6 months. Depression Screening:   PHQ-2 Score: 0  PHQ-9 Score: 8      Fall Risk Screening: In the past year, patient has experienced: no history of falling in past year      Home Safety:  Patient does not have trouble with stairs inside or outside of their home. Patient has working smoke alarms and has no working carbon monoxide detector. Home safety hazards include: none. Nutrition:   Current diet is Regular. Medications:   Patient is not currently taking any over-the-counter supplements. Patient is able to manage medications.      Activities of Daily Living (ADLs)/Instrumental Activities of Daily Living (IADLs):   Walk and transfer into and out of bed and chair?: Yes  Dress and groom yourself?: Yes    Bathe or shower yourself?: Yes    Feed yourself? Yes  Do your laundry/housekeeping?: Yes  Manage your money, pay your bills and track your expenses?: Yes  Make your own meals?: Yes    Do your own shopping?: Yes    Previous Hospitalizations:   Any hospitalizations or ED visits within the last 12 months?: Yes    How many hospitalizations have you had in the last year?: 1-2    Advance Care Planning:   Living will: Yes    Durable POA for healthcare: Yes    Advanced directive: Yes    End of Life Decisions reviewed with patient: Yes    Provider agrees with end of life decisions: Yes      Cognitive Screening:   Provider or family/friend/caregiver concerned regarding cognition?: No    PREVENTIVE SCREENINGS      Cardiovascular Screening:    General: Screening Not Indicated and History Lipid Disorder      Diabetes Screening:     General: Screening Not Indicated and History Diabetes      Abdominal Aortic Aneurysm (AAA) Screening:    Risk factors include: age between 70-77 yo and tobacco use        Lung Cancer Screening:     General: Screening Not Indicated    Screening, Brief Intervention, and Referral to Treatment (SBIRT)    Screening      Single Item Drug Screening:  How often have you used an illegal drug (including marijuana) or a prescription medication for non-medical reasons in the past year? never    Single Item Drug Screen Score: 0  Interpretation: Negative screen for possible drug use disorder    No results found. Physical Exam:     Pulse 78   Temp 97.6 °F (36.4 °C) (Tympanic)   Ht 5' 11" (1.803 m)   Wt 93.4 kg (206 lb)   SpO2 98%   BMI 28.73 kg/m²     Physical Exam  Vitals and nursing note reviewed. Constitutional:       General: He is not in acute distress. Appearance: He is well-developed. HENT:      Head: Normocephalic and atraumatic. Eyes:      Conjunctiva/sclera: Conjunctivae normal.   Cardiovascular:      Rate and Rhythm: Normal rate and regular rhythm.       Pulses: no weak pulses          Dorsalis pedis pulses are 2+ on the right side and 2+ on the left side. Posterior tibial pulses are 2+ on the right side and 2+ on the left side. Heart sounds: No murmur heard. Pulmonary:      Effort: Pulmonary effort is normal. No respiratory distress. Breath sounds: Normal breath sounds. Abdominal:      Palpations: Abdomen is soft. Tenderness: There is no abdominal tenderness. Musculoskeletal:         General: No swelling. Cervical back: Neck supple. Feet:      Right foot:      Skin integrity: No ulcer, skin breakdown, erythema, warmth, callus or dry skin. Left foot:      Skin integrity: No ulcer, skin breakdown, erythema, warmth, callus or dry skin. Skin:     General: Skin is warm and dry. Capillary Refill: Capillary refill takes less than 2 seconds. Neurological:      Mental Status: He is alert. Psychiatric:         Attention and Perception: Attention normal.         Mood and Affect: Mood is anxious. Affect is tearful. Speech: Speech normal.         Behavior: Behavior normal. Behavior is cooperative. Thought Content: Thought content is paranoid and delusional. Thought content does not include homicidal or suicidal ideation. Thought content does not include homicidal or suicidal plan. Diabetic Foot Exam    Patient's shoes and socks removed. Right Foot/Ankle   Right Foot Inspection  Skin Exam: skin normal and skin intact. No dry skin, no warmth, no callus, no erythema, no maceration, no abnormal color, no pre-ulcer, no ulcer and no callus. Toe Exam: ROM and strength within normal limits. Sensory   Vibration: intact  Proprioception: intact  Monofilament testing: intact    Vascular  Capillary refills: < 3 seconds  The right DP pulse is 2+. The right PT pulse is 2+. Left Foot/Ankle  Left Foot Inspection  Skin Exam: skin normal and skin intact.  No dry skin, no warmth, no erythema, no maceration, normal color, no pre-ulcer, no ulcer and no callus. Toe Exam: ROM and strength within normal limits. Sensory   Vibration: intact  Proprioception: intact  Monofilament testing: intact    Vascular  Capillary refills: < 3 seconds  The left DP pulse is 2+. The left PT pulse is 2+.      Assign Risk Category  No deformity present  No loss of protective sensation  No weak pulses  Risk: 0       SAM Carreno

## 2023-12-21 ENCOUNTER — OFFICE VISIT (OUTPATIENT)
Dept: FAMILY MEDICINE CLINIC | Facility: CLINIC | Age: 73
End: 2023-12-21
Payer: MEDICARE

## 2023-12-21 VITALS
BODY MASS INDEX: 28.7 KG/M2 | DIASTOLIC BLOOD PRESSURE: 78 MMHG | OXYGEN SATURATION: 99 % | WEIGHT: 205 LBS | SYSTOLIC BLOOD PRESSURE: 126 MMHG | TEMPERATURE: 95.7 F | HEART RATE: 79 BPM | HEIGHT: 71 IN

## 2023-12-21 DIAGNOSIS — F22 PARANOID BEHAVIOR (HCC): Primary | ICD-10-CM

## 2023-12-21 DIAGNOSIS — Z12.11 SCREEN FOR COLON CANCER: ICD-10-CM

## 2023-12-21 PROCEDURE — 99213 OFFICE O/P EST LOW 20 MIN: CPT

## 2023-12-21 NOTE — ASSESSMENT & PLAN NOTE
"Reports improvement in agitation and depression since starting meds. States that he \"definitely sees an improvement.\" Continue with current medication regimen. Consider increasing wellbutrin to 300.   "

## 2023-12-21 NOTE — PROGRESS NOTES
"Name: Shawn Marquez      : 1950      MRN: [id number]  Encounter Provider: SAM Baer  Encounter Date: 2023   Encounter department: [facility]    Assessment & Plan     1. Paranoid behavior (HCC)  Assessment & Plan:  Reports improvement in agitation and depression since starting meds. States that he \"definitely sees an improvement.\" Continue with current medication regimen. Consider increasing wellbutrin to 300.       2. Screen for colon cancer        Depression Screening and Follow-up Plan: Patient was screened for depression during today's encounter. They screened negative with a PHQ-2 score of 0.        Subjective      Depression  This is a new problem. The current episode started more than 1 month ago. The problem occurs every several days. The problem has been gradually improving. Pertinent negatives include no abdominal pain, anorexia, arthralgias, change in bowel habit, chest pain, chills, congestion, coughing, diaphoresis, fatigue, fever, headaches, joint swelling, myalgias, nausea, neck pain, numbness, rash, sore throat, swollen glands, urinary symptoms, vertigo, visual change, vomiting or weakness.     Review of Systems   Constitutional:  Negative for activity change, chills, diaphoresis, fatigue and fever.   HENT:  Negative for congestion, ear pain, rhinorrhea and sore throat.    Eyes:  Negative for pain.   Respiratory:  Negative for cough, shortness of breath and wheezing.    Cardiovascular:  Negative for chest pain and leg swelling.   Gastrointestinal:  Negative for abdominal pain, anorexia, change in bowel habit, diarrhea, nausea and vomiting.   Musculoskeletal:  Negative for arthralgias, joint swelling, myalgias and neck pain.   Skin:  Negative for rash.   Neurological:  Negative for dizziness, vertigo, weakness, numbness and headaches.   Psychiatric/Behavioral:  Positive for depression.    All other systems reviewed and are " "negative.      Current Outpatient Medications on File Prior to Visit   Medication Sig    acetaminophen (TYLENOL) 500 mg tablet Take 500 mg by mouth every 6 (six) hours as needed for mild pain    ASPIRIN LOW DOSE 81 MG EC tablet TAKE ONE TABLET BY MOUTH DAILY    atorvastatin (LIPITOR) 40 mg tablet TAKE 1 TABLET BY MOUTH DAILY WITH DINNER    buPROPion (WELLBUTRIN XL) 150 mg 24 hr tablet Take 1 tablet (150 mg total) by mouth every morning    haloperidol (HALDOL) 2 mg tablet Take 1 tablet (2 mg total) by mouth 3 (three) times a day    metFORMIN (GLUCOPHAGE-XR) 500 mg 24 hr tablet TAKE ONE TABLET BY MOUTH TWICE A DAY WITH MEALS    methocarbamol (ROBAXIN) 500 mg tablet Take 1 tablet (500 mg total) by mouth 3 (three) times a day (Patient taking differently: Take 500 mg by mouth 3 (three) times a day as needed)    metoprolol succinate (TOPROL-XL) 25 mg 24 hr tablet Take 1 tablet (25 mg total) by mouth daily    rivaroxaban (Xarelto) 20 mg tablet Take 1 tablet (20 mg total) by mouth daily with breakfast    valsartan (DIOVAN) 160 mg tablet Take 1 tablet (160 mg total) by mouth daily       Objective     /78 (BP Location: Left arm, Patient Position: Sitting, Cuff Size: Standard)   Pulse 79   Temp (!) 95.7 °F (35.4 °C) (Tympanic)   Ht 5' 11\" (1.803 m)   Wt 93 kg (205 lb)   SpO2 99%   BMI 28.59 kg/m²     Physical Exam  Vitals and nursing note reviewed.   Constitutional:       Appearance: Normal appearance.   HENT:      Head: Normocephalic.   Cardiovascular:      Rate and Rhythm: Normal rate and regular rhythm.      Heart sounds: Murmur heard.   Pulmonary:      Effort: Pulmonary effort is normal.      Breath sounds: Normal breath sounds.   Abdominal:      General: Bowel sounds are normal. There is no distension.   Skin:     General: Skin is warm and dry.   Neurological:      General: No focal deficit present.      Mental Status: He is alert and oriented to person, place, and time. Mental status is at baseline. "   Psychiatric:         Attention and Perception: Attention and perception normal.         Mood and Affect: Mood and affect normal.         Speech: Speech normal.         Behavior: Behavior normal. Behavior is not agitated or aggressive. Behavior is cooperative.         Thought Content: Thought content is not paranoid or delusional. Thought content does not include homicidal or suicidal ideation. Thought content does not include homicidal or suicidal plan.         Cognition and Memory: Cognition and memory normal.       SAM Baer

## 2024-01-16 LAB
BUN SERPL-MCNC: 10 MG/DL (ref 8–27)
BUN/CREAT SERPL: 11 (ref 10–24)
CALCIUM SERPL-MCNC: 9.4 MG/DL (ref 8.6–10.2)
CHLORIDE SERPL-SCNC: 101 MMOL/L (ref 96–106)
CO2 SERPL-SCNC: 25 MMOL/L (ref 20–29)
CREAT SERPL-MCNC: 0.93 MG/DL (ref 0.76–1.27)
EGFR: 87 ML/MIN/1.73
EST. AVERAGE GLUCOSE BLD GHB EST-MCNC: 146 MG/DL
GLUCOSE SERPL-MCNC: 186 MG/DL (ref 70–99)
HBA1C MFR BLD: 6.7 % (ref 4.8–5.6)
POTASSIUM SERPL-SCNC: 4.2 MMOL/L (ref 3.5–5.2)
SODIUM SERPL-SCNC: 138 MMOL/L (ref 134–144)

## 2024-01-22 ENCOUNTER — OFFICE VISIT (OUTPATIENT)
Dept: FAMILY MEDICINE CLINIC | Facility: CLINIC | Age: 74
End: 2024-01-22
Payer: MEDICARE

## 2024-01-22 VITALS
DIASTOLIC BLOOD PRESSURE: 74 MMHG | OXYGEN SATURATION: 99 % | SYSTOLIC BLOOD PRESSURE: 124 MMHG | WEIGHT: 198.2 LBS | BODY MASS INDEX: 27.75 KG/M2 | HEIGHT: 71 IN

## 2024-01-22 DIAGNOSIS — F01.52 VASCULAR DEMENTIA WITH PARANOIA (HCC): ICD-10-CM

## 2024-01-22 DIAGNOSIS — Z79.01 ANTICOAGULANT LONG-TERM USE: ICD-10-CM

## 2024-01-22 DIAGNOSIS — I48.11 LONGSTANDING PERSISTENT ATRIAL FIBRILLATION (HCC): ICD-10-CM

## 2024-01-22 DIAGNOSIS — F32.A DEPRESSION, UNSPECIFIED DEPRESSION TYPE: ICD-10-CM

## 2024-01-22 DIAGNOSIS — F22 PARANOID BEHAVIOR (HCC): Primary | ICD-10-CM

## 2024-01-22 DIAGNOSIS — Z12.11 SCREEN FOR COLON CANCER: ICD-10-CM

## 2024-01-22 PROCEDURE — 99214 OFFICE O/P EST MOD 30 MIN: CPT

## 2024-01-22 RX ORDER — BUPROPION HYDROCHLORIDE 300 MG/1
300 TABLET ORAL EVERY MORNING
Qty: 90 TABLET | Refills: 3 | Status: SHIPPED | OUTPATIENT
Start: 2024-01-22 | End: 2024-02-01 | Stop reason: SDUPTHER

## 2024-01-22 NOTE — PROGRESS NOTES
"Name: Shawn Marquez      : 1950      MRN: [id number]  Encounter Provider: SAM Baer  Encounter Date: 2024   Encounter department: [facility]    Assessment & Plan     1. Paranoid behavior (HCC)  Assessment & Plan:  Presents with wife for follow up for med mgmt-- wellbutrin/haldol. Patient initially reports that he is feeling well but then became tearful stating that he is having a hard time due to family issues. Wife does not live in home and patient states that \"he hardly ever sees her.\" Patient also reports continued discord with neighbor. Patient states, \"I was thinking about talking to him man to man but he's not a man so I don't think that would go well.\" Wife reports continued paranoia and delusions involving neighbor. Patient states, \"I don't think that will ever go away. She never believes me.\" Denies SI or HI. Denies any plans to confront neighbor. Patient still has not followed up with telehealth. Encouraged to reach out to a telehealth services to start with therapy. Instructed to put name on local waiting lists if he prefers in person therapy sessions and transition from telehealth to in person when an opening occurs. Patient verbalized understanding. Discussed meds-- patient taking wellbutrin 150 mg QD and haldol \"as needed but at least once a day.\" Increase wellbutrin to 300 mg. Encouraged patient to take the haldol as prescribed-- TID-- due to continued paranoia. Patient verbalized understanding. Also discussed medical marijuana use as there was concern that marijuana was contributing to paranoia and psychosis. Patient states that he has not used marijuana since 24. Wife reports no difference in thought process since discontinuing use. Patient to return for follow up in 4 weeks.     Orders:  -     buPROPion (WELLBUTRIN XL) 300 mg 24 hr tablet; Take 1 tablet (300 mg total) by mouth every morning    2. Anticoagulant long-term " use  -     rivaroxaban (Xarelto) 20 mg tablet; Take 1 tablet (20 mg total) by mouth daily with breakfast    3. Depression, unspecified depression type  -     buPROPion (WELLBUTRIN XL) 300 mg 24 hr tablet; Take 1 tablet (300 mg total) by mouth every morning    4. Screen for colon cancer  -     Colluis    5. Vascular dementia with paranoia (HCC)  Assessment & Plan:  See paranoia note.       6. Longstanding persistent atrial fibrillation (HCC)  Assessment & Plan:  Follows with cards.          Depression Screening and Follow-up Plan: Patient was screened for depression during today's encounter. They screened negative with a PHQ-9 score of 0.        Subjective      Depression  This is a chronic problem. The current episode started more than 1 year ago. The problem has been gradually improving. Pertinent negatives include no abdominal pain, anorexia, arthralgias, change in bowel habit, chest pain, chills, congestion, coughing, diaphoresis, fatigue, fever, headaches, joint swelling, myalgias, nausea, neck pain, numbness, rash, sore throat, swollen glands, urinary symptoms, vertigo, visual change, vomiting or weakness.     Review of Systems   Constitutional:  Negative for activity change, chills, diaphoresis, fatigue and fever.   HENT:  Negative for congestion, ear pain, rhinorrhea and sore throat.    Eyes:  Negative for pain.   Respiratory:  Negative for cough, shortness of breath and wheezing.    Cardiovascular:  Negative for chest pain and leg swelling.   Gastrointestinal:  Negative for abdominal pain, anorexia, change in bowel habit, diarrhea, nausea and vomiting.   Musculoskeletal:  Negative for arthralgias, joint swelling, myalgias and neck pain.   Skin:  Negative for rash.   Neurological:  Negative for dizziness, vertigo, weakness, numbness and headaches.   Psychiatric/Behavioral:  Positive for depression and dysphoric mood. Negative for agitation, behavioral problems, self-injury and suicidal ideas.    All other  "systems reviewed and are negative.      Current Outpatient Medications on File Prior to Visit   Medication Sig    acetaminophen (TYLENOL) 500 mg tablet Take 500 mg by mouth every 6 (six) hours as needed for mild pain    ASPIRIN LOW DOSE 81 MG EC tablet TAKE ONE TABLET BY MOUTH DAILY    atorvastatin (LIPITOR) 40 mg tablet TAKE 1 TABLET BY MOUTH DAILY WITH DINNER    haloperidol (HALDOL) 2 mg tablet Take 1 tablet (2 mg total) by mouth 3 (three) times a day    metFORMIN (GLUCOPHAGE-XR) 500 mg 24 hr tablet TAKE ONE TABLET BY MOUTH TWICE A DAY WITH MEALS    methocarbamol (ROBAXIN) 500 mg tablet Take 1 tablet (500 mg total) by mouth 3 (three) times a day    metoprolol succinate (TOPROL-XL) 25 mg 24 hr tablet Take 1 tablet (25 mg total) by mouth daily    valsartan (DIOVAN) 160 mg tablet Take 1 tablet (160 mg total) by mouth daily    [DISCONTINUED] buPROPion (WELLBUTRIN XL) 150 mg 24 hr tablet Take 1 tablet (150 mg total) by mouth every morning    [DISCONTINUED] rivaroxaban (Xarelto) 20 mg tablet Take 1 tablet (20 mg total) by mouth daily with breakfast       Objective     /74 (BP Location: Left arm, Patient Position: Sitting, Cuff Size: Standard)   Ht 5' 11\" (1.803 m)   Wt 89.9 kg (198 lb 3.2 oz)   SpO2 99%   BMI 27.64 kg/m²     Physical Exam  Vitals and nursing note reviewed.   Constitutional:       General: He is not in acute distress.     Appearance: Normal appearance. He is not ill-appearing.   HENT:      Head: Normocephalic.      Right Ear: External ear normal.   Cardiovascular:      Rate and Rhythm: Normal rate and regular rhythm.      Heart sounds: Normal heart sounds.   Pulmonary:      Effort: Pulmonary effort is normal.      Breath sounds: Normal breath sounds.   Abdominal:      General: Bowel sounds are normal.   Musculoskeletal:      Cervical back: Neck supple.   Skin:     General: Skin is warm and dry.   Neurological:      General: No focal deficit present.      Mental Status: He is alert and oriented " to person, place, and time. Mental status is at baseline.      Motor: No weakness.      Coordination: Coordination normal.      Gait: Gait normal.   Psychiatric:         Attention and Perception: Attention normal.         Mood and Affect: Mood is depressed.         Speech: Speech normal.         Behavior: Behavior normal. Behavior is not agitated, slowed, aggressive, withdrawn, hyperactive or combative. Behavior is cooperative.         Thought Content: Thought content is paranoid. Thought content is not delusional. Thought content does not include homicidal or suicidal ideation. Thought content does not include homicidal or suicidal plan.       SAM Baer

## 2024-01-22 NOTE — ASSESSMENT & PLAN NOTE
"Presents with wife for follow up for med mgmt-- wellbutrin/haldol. Patient initially reports that he is feeling well but then became tearful stating that he is having a hard time due to family issues. Wife does not live in home and patient states that \"he hardly ever sees her.\" Patient also reports continued discord with neighbor. Patient states, \"I was thinking about talking to him man to man but he's not a man so I don't think that would go well.\" Wife reports continued paranoia and delusions involving neighbor. Patient states, \"I don't think that will ever go away. She never believes me.\" Denies SI or HI. Denies any plans to confront neighbor. Patient still has not followed up with telehealth. Encouraged to reach out to a telehealth services to start with therapy. Instructed to put name on local waiting lists if he prefers in person therapy sessions and transition from telehealth to in person when an opening occurs. Patient verbalized understanding. Discussed meds-- patient taking wellbutrin 150 mg QD and haldol \"as needed but at least once a day.\" Increase wellbutrin to 300 mg. Encouraged patient to take the haldol as prescribed-- TID-- due to continued paranoia. Patient verbalized understanding. Also discussed medical marijuana use as there was concern that marijuana was contributing to paranoia and psychosis. Patient states that he has not used marijuana since 1/16/24. Wife reports no difference in thought process since discontinuing use. Patient to return for follow up in 4 weeks.   "

## 2024-01-24 ENCOUNTER — TELEPHONE (OUTPATIENT)
Dept: FAMILY MEDICINE CLINIC | Facility: CLINIC | Age: 74
End: 2024-01-24

## 2024-01-24 DIAGNOSIS — E11.9 TYPE 2 DIABETES MELLITUS WITHOUT COMPLICATION, WITHOUT LONG-TERM CURRENT USE OF INSULIN (HCC): Primary | ICD-10-CM

## 2024-01-24 NOTE — TELEPHONE ENCOUNTER
----- Message from SAM Baer sent at 1/24/2024  2:09 PM EST -----  Please notify patient:    A1C was 6.7. It is trending up as previous A1C was 6.3 although fairly stable. Watch carb intake. If continues to elevate may need to consider taking metformin 1000 mg 2x/day.   ----- Message -----  From: Koki Nguyen MA  Sent: 1/24/2024   1:28 PM EST  To: SAM Baer      ----- Message -----  From: [facility] Lab Results In  Sent: 1/16/2024   6:11 AM EST  To: Sedrick Villarreal MD

## 2024-02-01 ENCOUNTER — TELEPHONE (OUTPATIENT)
Dept: FAMILY MEDICINE CLINIC | Facility: CLINIC | Age: 74
End: 2024-02-01

## 2024-02-01 DIAGNOSIS — F32.A DEPRESSION, UNSPECIFIED DEPRESSION TYPE: ICD-10-CM

## 2024-02-01 DIAGNOSIS — F22 PARANOID BEHAVIOR (HCC): ICD-10-CM

## 2024-02-01 RX ORDER — BUPROPION HYDROCHLORIDE 150 MG/1
150 TABLET ORAL EVERY MORNING
Qty: 90 TABLET | Refills: 3 | Status: SHIPPED | OUTPATIENT
Start: 2024-02-01 | End: 2025-01-26

## 2024-02-01 NOTE — TELEPHONE ENCOUNTER
Patient calls in, states that the medication dose that was increased he has stopped taking as it caused him constipation. Medication was wellbutrin.

## 2024-02-07 LAB — COLOGUARD RESULT REPORTABLE: NEGATIVE

## 2024-02-08 ENCOUNTER — TELEPHONE (OUTPATIENT)
Dept: FAMILY MEDICINE CLINIC | Facility: CLINIC | Age: 74
End: 2024-02-08

## 2024-02-08 NOTE — TELEPHONE ENCOUNTER
----- Message from SAM Baer sent at 2/8/2024  7:38 AM EST -----  Please notify patient:    Tatouasil negative--- good for 3 years.   ----- Message -----  From: Interface, Lab Results In  Sent: 2/7/2024   6:00 PM EST  To: SAM Baer

## 2024-03-04 ENCOUNTER — OFFICE VISIT (OUTPATIENT)
Dept: FAMILY MEDICINE CLINIC | Facility: CLINIC | Age: 74
End: 2024-03-04
Payer: MEDICARE

## 2024-03-04 VITALS
HEIGHT: 71 IN | WEIGHT: 190 LBS | HEART RATE: 67 BPM | DIASTOLIC BLOOD PRESSURE: 98 MMHG | TEMPERATURE: 96 F | BODY MASS INDEX: 26.6 KG/M2 | SYSTOLIC BLOOD PRESSURE: 142 MMHG | OXYGEN SATURATION: 98 %

## 2024-03-04 DIAGNOSIS — F22 PARANOID BEHAVIOR (HCC): ICD-10-CM

## 2024-03-04 DIAGNOSIS — F32.A DEPRESSION, UNSPECIFIED DEPRESSION TYPE: ICD-10-CM

## 2024-03-04 PROCEDURE — G2211 COMPLEX E/M VISIT ADD ON: HCPCS

## 2024-03-04 PROCEDURE — 99214 OFFICE O/P EST MOD 30 MIN: CPT

## 2024-03-04 RX ORDER — HALOPERIDOL 2 MG/1
2 TABLET ORAL 2 TIMES DAILY
Qty: 180 TABLET | Refills: 1 | Status: SHIPPED | OUTPATIENT
Start: 2024-03-04

## 2024-03-04 RX ORDER — HALOPERIDOL 1 MG/1
1 TABLET ORAL 2 TIMES DAILY
Qty: 180 TABLET | Refills: 1 | Status: SHIPPED | OUTPATIENT
Start: 2024-03-04

## 2024-03-04 NOTE — ASSESSMENT & PLAN NOTE
Patient states that he is unable to remember to take haldol 3x/day. Has only been taking 2x/day. Increase haldol to 3 mg BID. Decreased wellbutrin to 150 mg due to constipation with 300 mg. Has been more tearful since he had a court date with his neighbor where he was charged with harrassment. Ref psychotherapy.

## 2024-03-04 NOTE — PROGRESS NOTES
Name: Shawn Marquez      : 1950      MRN: [id number]  Encounter Provider: SAM Baer  Encounter Date: 3/4/2024   Encounter department: [facility]    Assessment & Plan     1. Paranoid behavior (HCC)  Assessment & Plan:  Patient states that he is unable to remember to take haldol 3x/day. Has only been taking 2x/day. Increase haldol to 3 mg BID. Decreased wellbutrin to 150 mg due to constipation with 300 mg. Has been more tearful since he had a court date with his neighbor where he was charged with harrassment. Ref psychotherapy.    Orders:  -     haloperidol (HALDOL) 1 mg tablet; Take 1 tablet (1 mg total) by mouth 2 (two) times a day  -     haloperidol (HALDOL) 2 mg tablet; Take 1 tablet (2 mg total) by mouth 2 (two) times a day    2. Depression, unspecified depression type  -     haloperidol (HALDOL) 1 mg tablet; Take 1 tablet (1 mg total) by mouth 2 (two) times a day  -     haloperidol (HALDOL) 2 mg tablet; Take 1 tablet (2 mg total) by mouth 2 (two) times a day        Depression Screening and Follow-up Plan: Patient was screened for depression during today's encounter. They screened negative with a PHQ-2 score of 0.        Subjective      Depression  This is a chronic problem. The current episode started more than 1 year ago. The problem occurs daily. The problem has been gradually improving. Pertinent negatives include no abdominal pain, anorexia, arthralgias, change in bowel habit, chest pain, chills, congestion, coughing, diaphoresis, fatigue, fever, headaches, joint swelling, myalgias, nausea, neck pain, numbness, rash, sore throat, swollen glands, urinary symptoms, vertigo, visual change, vomiting or weakness. The symptoms are aggravated by stress.     Review of Systems   Constitutional:  Negative for activity change, chills, diaphoresis, fatigue and fever.   HENT:  Negative for congestion, ear pain, rhinorrhea and sore throat.    Eyes:  Negative  "for pain.   Respiratory:  Negative for cough, shortness of breath and wheezing.    Cardiovascular:  Negative for chest pain and leg swelling.   Gastrointestinal:  Negative for abdominal pain, anorexia, change in bowel habit, diarrhea, nausea and vomiting.   Musculoskeletal:  Negative for arthralgias, joint swelling, myalgias and neck pain.   Skin:  Negative for rash.   Neurological:  Negative for dizziness, vertigo, weakness, numbness and headaches.   Psychiatric/Behavioral:  Positive for depression.    All other systems reviewed and are negative.      Current Outpatient Medications on File Prior to Visit   Medication Sig    acetaminophen (TYLENOL) 500 mg tablet Take 500 mg by mouth every 6 (six) hours as needed for mild pain    ASPIRIN LOW DOSE 81 MG EC tablet TAKE ONE TABLET BY MOUTH DAILY    atorvastatin (LIPITOR) 40 mg tablet TAKE 1 TABLET BY MOUTH DAILY WITH DINNER    buPROPion (WELLBUTRIN XL) 150 mg 24 hr tablet Take 1 tablet (150 mg total) by mouth every morning    metFORMIN (GLUCOPHAGE-XR) 500 mg 24 hr tablet TAKE ONE TABLET BY MOUTH TWICE A DAY WITH MEALS    methocarbamol (ROBAXIN) 500 mg tablet Take 1 tablet (500 mg total) by mouth 3 (three) times a day    metoprolol succinate (TOPROL-XL) 25 mg 24 hr tablet Take 1 tablet (25 mg total) by mouth daily    rivaroxaban (Xarelto) 20 mg tablet Take 1 tablet (20 mg total) by mouth daily with breakfast    valsartan (DIOVAN) 160 mg tablet Take 1 tablet (160 mg total) by mouth daily    [DISCONTINUED] haloperidol (HALDOL) 2 mg tablet Take 1 tablet (2 mg total) by mouth 3 (three) times a day       Objective     /98 (BP Location: Right arm, Patient Position: Sitting, Cuff Size: Standard)   Pulse 67   Temp (!) 96 °F (35.6 °C) (Tympanic)   Ht 5' 11\" (1.803 m)   Wt 86.2 kg (190 lb)   SpO2 98%   BMI 26.50 kg/m²     Physical Exam  Vitals and nursing note reviewed.   Constitutional:       General: He is not in acute distress.     Appearance: Normal appearance. He " is not ill-appearing.   HENT:      Head: Normocephalic.   Pulmonary:      Effort: Pulmonary effort is normal.   Musculoskeletal:      Cervical back: Neck supple.   Skin:     General: Skin is warm and dry.   Neurological:      General: No focal deficit present.      Mental Status: He is alert and oriented to person, place, and time. Mental status is at baseline.      GCS: GCS eye subscore is 4. GCS verbal subscore is 5. GCS motor subscore is 6.   Psychiatric:         Mood and Affect: Mood is depressed. Mood is not anxious. Affect is tearful.         Speech: Speech normal.         Behavior: Behavior normal. Behavior is not agitated, aggressive or hyperactive. Behavior is cooperative.         Thought Content: Thought content is paranoid. Thought content does not include homicidal or suicidal ideation. Thought content does not include homicidal or suicidal plan.       SAM Baer

## 2024-03-06 ENCOUNTER — SOCIAL WORK (OUTPATIENT)
Dept: BEHAVIORAL/MENTAL HEALTH CLINIC | Facility: CLINIC | Age: 74
End: 2024-03-06

## 2024-03-06 DIAGNOSIS — F34.1 DYSTHYMIA: ICD-10-CM

## 2024-03-06 DIAGNOSIS — F22 PARANOIA (HCC): Primary | ICD-10-CM

## 2024-03-06 PROCEDURE — 90834 PSYTX W PT 45 MINUTES: CPT | Performed by: SOCIAL WORKER

## 2024-03-06 NOTE — PSYCH
" Behavioral Health Psychotherapy Assessment    Date of Initial Psychotherapy Assessment: 03/06/24  Referral Source: [facility]   Has a release of information been signed for the referral source? Yes    Preferred Name: Shawn Marquez  Preferred Pronouns: He/him  YOB: 1950 Age: 73 y.o.  Sex assigned at birth: male   Gender Identity:   Race:   Preferred Language: English    Emergency Contact:  Full Name:   Relationship to Client:   Contact information:     Primary Care Physician:  SAM Baer  [address] [id number] [phone]  Has a release of information been signed? Yes    Physical Health History:  Past surgical procedures:   Do you have a history of any of the following:   Do you have any mobility issues? No    Relevant Family History:      Presenting Problem (What brings you in?)  Others tell client he is paranoid; he believes actions against him are \"well-planned.\" He is estranged from both sons and therefore his grandchildren; he sobs in session while describing this. Spouse is present in session but they do not live together due to this presentation and some aggression toward her; he has also considered aggression toward a neighbor and been charged with harassment. Mr. Marquez does commit to medication adherence and to developing a safety plan in our next session and does sign a release to talk to other health care professionals about his care in today's session. He agrees as a component of treatment to go to a higher level of care if directed to do so. Mr. Marquez may be appropriate for a support group for retired/isolated individuals as well as weekly individual treatment session monitoring medication adherence; urges toward aggression; paranoid/anxious thoughts.     Mental Health Advance Directive:  Do you currently have a Mental Health Advance Directive?no    Diagnosis:   Diagnosis ICD-10-CM Associated " Orders   1. Paranoia (HCC)  F22       2. Dysthymia  F34.1           Initial Assessment:     Current Mental Status:    Appearance: appropriate      Behavior/Manner: cooperative      Affect/Mood:  Depressed   Clinical Symptoms    Depression: yes      Anxiety: yes      Psychosis: yes      Psychosis Symptoms: delusions      Were you under the influence of drugs or alcohol: No      Have you ever been assaultive to others or the environment: No      Have you ever been self-injurious: No      Counseling History:  Previous Counseling or Treatment  (Mental Health or Drug & Alcohol): Yes    Previous Counseling Details:  Saw a therapist but quit because she just wanted him to use medication.   Have you previously taken psychiatric medications: Yes    Previous Medications Attempted:  Is on Haldol and Wellbutrin    Suicide Risk Assessment  Have you ever had a suicide attempt: No    Have you had incidents of suicidal ideation: No    Are you currently experiencing suicidal thoughts: No      Substance Abuse/Addiction Assessment:  Marijuana: Yes    Frequency:  Daily  Amount:  1 gram daily    Disordered Eating History:  Do you have a history of disordered eating: No      Social Determinants of Health:    SDOH:  Social isolation    Legal History:    Any pending legal charges: Yes      Additional Legal History:  Harrassment charges against neighbor were dropped     Relationship History:    Current marital status:       Relationship History:  No identified sources of emotional support    Employment History    Are you currently employed: No      Sources of income/financial support:  jail SSA     History:      Status: no history of  duty  Educational History:     Highest level of education:  High school graduate    Recommended Treatment:     Psychotherapy:  Individual sessions    Frequency:  1 time    Session frequency:  Weekly    Visit start and stop times:    03/06/24  Start Time: 1503  Stop Time:  1559  Total Visit Time: 47 minutes

## 2024-03-11 LAB
LEFT EYE DIABETIC RETINOPATHY: NORMAL
RIGHT EYE DIABETIC RETINOPATHY: NORMAL

## 2024-03-12 ENCOUNTER — SOCIAL WORK (OUTPATIENT)
Dept: BEHAVIORAL/MENTAL HEALTH CLINIC | Facility: CLINIC | Age: 74
End: 2024-03-12
Payer: MEDICARE

## 2024-03-12 DIAGNOSIS — F22 PARANOIA (HCC): Primary | ICD-10-CM

## 2024-03-12 PROCEDURE — 90834 PSYTX W PT 45 MINUTES: CPT | Performed by: SOCIAL WORKER

## 2024-03-12 NOTE — PSYCH
"Behavioral Health Psychotherapy Progress Note    Psychotherapy Provided: Individual Psychotherapy     1. Paranoia (HCC)            Goals addressed in session: Will write Tx plan in next session     DATA: Mr. Marquez continues to have adversairal interactions with neighbor involving police being called for a near accident; he believes there is a plan to evict him.   During this session, this clinician used the following therapeutic modalities: Engagement Strategies, Cognitive Behavioral Therapy, and Motivational Interviewing    Substance Abuse was addressed during this session. If the client is diagnosed with a co-occurring substance use disorder, please indicate any changes in the frequency or amount of use: Daily use of THC to defuse thoughts and anxiety. Stage of change for addressing substance use diagnoses: Pre-contemplation    ASSESSMENT:  Shawn Marquez presents with a Anxious and paranoid/hypervigilant  mood.     his affect is Constricted, Flat, and Tearful, which is congruent, with his mood and the content of the session. The client has not made progress on their goals.     Shawn Marquez presents with a none risk of suicide, none risk of self-harm, and none risk of harm to others.    For any risk assessment that surpasses a \"low\" rating, a safety plan must be developed.    A safety plan was indicated: yes  If yes, describe in detail Client will log daily about any incidents with neighbor or others that may endanger individuals and/or limit ability to engage in weekly therapy    PLAN: Between sessions, Shawn Marquez will begin a daily log. At the next session, the therapist will use Cognitive Behavioral Therapy to address the results of this effort; write a treatment plan; and address agenda items from today regarding spouse moving in with him and other concerns.    Behavioral Health Treatment Plan and Discharge Planning: Shawn Marquez is aware of and agrees to continue to work on their " treatment plan. They have identified and are working toward their discharge goals. No; will write collaborative treatment goals in next session including how to defuse from anxiety-provoking thoughts without THC; how to engage in conversation without discussing concerns; cost/benefit of this regarding valued goals including resuming relationship with sons.     Visit start and stop times:    03/12/24  Start Time: 1000  Stop Time: 1045  Total Visit Time: 45 minutes

## 2024-03-14 ENCOUNTER — SOCIAL WORK (OUTPATIENT)
Dept: BEHAVIORAL/MENTAL HEALTH CLINIC | Facility: CLINIC | Age: 74
End: 2024-03-14
Payer: MEDICARE

## 2024-03-14 DIAGNOSIS — F34.1 DYSTHYMIA: Primary | ICD-10-CM

## 2024-03-14 PROCEDURE — 90834 PSYTX W PT 45 MINUTES: CPT | Performed by: SOCIAL WORKER

## 2024-03-21 ENCOUNTER — SOCIAL WORK (OUTPATIENT)
Dept: BEHAVIORAL/MENTAL HEALTH CLINIC | Facility: CLINIC | Age: 74
End: 2024-03-21
Payer: MEDICARE

## 2024-03-21 DIAGNOSIS — F22 PARANOIA (HCC): Primary | ICD-10-CM

## 2024-03-21 PROCEDURE — 90834 PSYTX W PT 45 MINUTES: CPT | Performed by: SOCIAL WORKER

## 2024-03-21 NOTE — PSYCH
"Behavioral Health Psychotherapy Progress Note    Psychotherapy Provided: Individual Psychotherapy     1. Paranoia (HCC)            Goals addressed in session: Goal 1 and Goal 2     DATA:   During this session, this clinician used the following therapeutic modalities: Engagement Strategies and Cognitive Behavioral Therapy    Substance Abuse was addressed during this session. If the client is diagnosed with a co-occurring substance use disorder, please indicate any changes in the frequency or amount of use: Daily THC usage. Stage of change for addressing substance use diagnoses: No substance use/Not applicable    ASSESSMENT:  Shawn Marquez presents with a Depressed mood.     his affect is Flat, which is congruent, with his mood and the content of the session. The client has made progress on their goals.     Shawn Marquez presents with a low risk of suicide, none risk of self-harm, and none risk of harm to others.    For any risk assessment that surpasses a \"low\" rating, a safety plan must be developed.    A safety plan was indicated: no  If yes, describe in detail Low risk is due to family history of suicide (father  by suicide; SI in siblings)    PLAN: Between sessions, Shawn Marquez will maintain log and attempt to engage spouse in topics other than evidence of neighbor's malfeasance. At the next session, the therapist will use Cognitive Behavioral Therapy to address the results of this effort.    Behavioral Health Treatment Plan and Discharge Planning: Shawn Marquez is aware of and agrees to continue to work on their treatment plan. They have identified and are working toward their discharge goals. yes    Visit start and stop times:    24  Start Time: 1000  Stop Time: 1045  Total Visit Time: 45 minutes  "

## 2024-03-21 NOTE — BH TREATMENT PLAN
"Outpatient Behavioral Health Psychotherapy Treatment Plan    Shawn ORTEZ Marquez  1950     Date of Initial Psychotherapy Assessment: 03/06/2024  Date of Current Treatment Plan: 03/21/24  Treatment Plan Target Date: 06/21/24  Treatment Plan Expiration Date: 09/21/24    Diagnosis:   1. Paranoia (HCC)            Area(s) of Need: Paranoia, isolation, depression, grief. Unstable living situation.     Long Term Goal 1 (in the client's own words): Broaden thoughts, recognize and alter inflexible/rigid thoughts    Stage of Change: Contemplation    Target Date for completion: 06/21/2024     Anticipated therapeutic modalities: CBT     People identified to complete this goal:       Objective 1: (identify the means of measuring success in meeting the objective): Maintain daily log noticed events that elicit beliefs about being \"messed with\"       Objective 2: (identify the means of measuring success in meeting the objective): Learn and practice MBSR skills to broaden attention; decrease focus on belief      Long Term Goal 2 (in the client's own words): I need to fix my relationships    Stage of Change: Contemplation    Target Date for completion: 06/21/2024     Anticipated therapeutic modalities: CBT     People identified to complete this goal:       Objective 1: (identify the means of measuring success in meeting the objective): Recognize and alter topics of conversation that tend to damage/limit interpersonal relationships      Objective 2: (identify the means of measuring success in meeting the objective): Attend \"Feeling Good\" matters support group to practice skills      Long Term Goal 3 (in the client's own words):     Stage of Change:     Target Date for completion:      Anticipated therapeutic modalities:      People identified to complete this goal:       Objective 1: (identify the means of measuring success in meeting the objective):       Objective 2: (identify the means of measuring success in meeting the " objective):      I am currently under the care of a [facility] psychiatric provider: no    My [facility] psychiatric provider is:     I am currently taking psychiatric medications: Yes, as prescribed    I feel that I will be ready for discharge from mental health care when I reach the following (measurable goal/objective): More social connections; less anxious response to suspicious beliefs and evidence     For children and adults who have a legal guardian:   Has there been any change to custody orders and/or guardianship status? NA. If yes, attach updated documentation.    I have created my Crisis Plan and have been offered a copy of this plan    Behavioral Health Treatment Plan  Luke: Diagnosis and Treatment Plan explained to Shawn Marquez acknowledges an understanding of their diagnosis. Shawn Marquze agrees to this treatment plan.    I have been offered a copy of this Treatment Plan. yes

## 2024-03-28 ENCOUNTER — SOCIAL WORK (OUTPATIENT)
Dept: BEHAVIORAL/MENTAL HEALTH CLINIC | Facility: CLINIC | Age: 74
End: 2024-03-28
Payer: MEDICARE

## 2024-03-28 DIAGNOSIS — F34.1 DYSTHYMIA: ICD-10-CM

## 2024-03-28 DIAGNOSIS — F22 PARANOIA (HCC): Primary | ICD-10-CM

## 2024-03-28 PROCEDURE — 90834 PSYTX W PT 45 MINUTES: CPT | Performed by: SOCIAL WORKER

## 2024-03-28 NOTE — PSYCH
"Behavioral Health Psychotherapy Progress Note    Psychotherapy Provided: Individual Psychotherapy     1. Paranoia (HCC)        2. Dysthymia            Goals addressed in session: Goal 1     DATA:   Mr. Marquez has reconnected with some family members and spouse has returned home; he visibly looks happier and more energetic. We review how to respond if/when new stressors arise that tend to catch his attention. It also arose in session that he needs to be assessed for hearing aids. During this session, this clinician used the following therapeutic modalities: Engagement Strategies, Cognitive Behavioral Therapy, and Motivational Interviewing    Substance Abuse was addressed during this session. If the client is diagnosed with a co-occurring substance use disorder, please indicate any changes in the frequency or amount of use: Decreased in THC from 1 g to 1/3 g daily. Stage of change for addressing substance use diagnoses: Action    ASSESSMENT:  Shawn Marquez presents with a Euthymic/ normal mood.     his affect is Normal range and intensity, which is congruent, with his mood and the content of the session. The client has made progress on their goals.     Shawn Marquez presents with a low risk of suicide, none risk of self-harm, and low risk of harm to others.    For any risk assessment that surpasses a \"low\" rating, a safety plan must be developed.    A safety plan was indicated: no  If yes, describe in detail Low risk of suicide relates to family history of suicide (father). Low risk of harm to others relates to conflict with neighbors which we are monitoring through the use of a daily log.     PLAN: Between sessions, Shawn Marquez will notice when he is \"gathering evidence\" regarding suspicious, etc - he and spouse will use a safe word such as \"Here it goes again\" or simply \"stop it\" when this occurs/ and or he will write in his log. At the next session, the therapist will use Cognitive Behavioral " Therapy to address the results of these efforts.    Behavioral Health Treatment Plan and Discharge Planning: Shawn Marquez is aware of and agrees to continue to work on their treatment plan. They have identified and are working toward their discharge goals. yes    Visit start and stop times:    03/28/24  Start Time: 1100  Stop Time: 1145  Total Visit Time: 45 minutes

## 2024-04-01 ENCOUNTER — OFFICE VISIT (OUTPATIENT)
Dept: FAMILY MEDICINE CLINIC | Facility: CLINIC | Age: 74
End: 2024-04-01
Payer: MEDICARE

## 2024-04-01 VITALS
OXYGEN SATURATION: 99 % | BODY MASS INDEX: 27.3 KG/M2 | HEIGHT: 71 IN | WEIGHT: 195 LBS | DIASTOLIC BLOOD PRESSURE: 78 MMHG | SYSTOLIC BLOOD PRESSURE: 126 MMHG

## 2024-04-01 DIAGNOSIS — E78.5 HYPERLIPIDEMIA ASSOCIATED WITH TYPE 2 DIABETES MELLITUS  (HCC): ICD-10-CM

## 2024-04-01 DIAGNOSIS — F22 PARANOID BEHAVIOR (HCC): Primary | ICD-10-CM

## 2024-04-01 DIAGNOSIS — E11.69 HYPERLIPIDEMIA ASSOCIATED WITH TYPE 2 DIABETES MELLITUS  (HCC): ICD-10-CM

## 2024-04-01 PROCEDURE — G2211 COMPLEX E/M VISIT ADD ON: HCPCS

## 2024-04-01 PROCEDURE — 99214 OFFICE O/P EST MOD 30 MIN: CPT

## 2024-04-01 NOTE — ASSESSMENT & PLAN NOTE
Patient reports that he is overall feeling well with current medication regimen. Continue wellbutrin 150 mg and haldol 3 mg BID. Patient states that therapy with Floresita is going very well. Seeing her weekly and is starting group therapy this week. Follow up 4 weeks.

## 2024-04-01 NOTE — ASSESSMENT & PLAN NOTE
Lipid panel stable. Triglycerides elevated last lipid panel. Recheck 3-6 months.   Lab Results   Component Value Date    HGBA1C 6.7 (H) 01/15/2024

## 2024-04-01 NOTE — PROGRESS NOTES
Name: Shawn Marquez      : 1950      MRN: [id number]  Encounter Provider: SAM Baer  Encounter Date: 2024   Encounter department: [facility]    Assessment & Plan     1. Paranoid behavior (HCC)  Assessment & Plan:  Patient reports that he is overall feeling well with current medication regimen. Continue wellbutrin 150 mg and haldol 3 mg BID. Patient states that therapy with Floresita is going very well. Seeing her weekly and is starting group therapy this week. Follow up 4 weeks.       2. Hyperlipidemia associated with type 2 diabetes mellitus  (HCC)  Assessment & Plan:  Lipid panel stable. Triglycerides elevated last lipid panel. Recheck 3-6 months.   Lab Results   Component Value Date    HGBA1C 6.7 (H) 01/15/2024             Depression Screening and Follow-up Plan: Patient was screened for depression during today's encounter. They screened negative with a PHQ-2 score of 0.        Subjective      Depression  This is a chronic problem. The current episode started more than 1 year ago. The problem occurs intermittently. The problem has been gradually improving. Pertinent negatives include no abdominal pain, anorexia, arthralgias, change in bowel habit, chest pain, chills, congestion, coughing, diaphoresis, fatigue, fever, headaches, joint swelling, myalgias, nausea, neck pain, numbness, rash, sore throat, swollen glands, urinary symptoms, vertigo, visual change, vomiting or weakness. The symptoms are aggravated by stress. Treatments tried: therapy/wellbutrin/haldol. The treatment provided significant relief.     Review of Systems   Constitutional:  Negative for activity change, chills, diaphoresis, fatigue and fever.   HENT:  Negative for congestion, ear pain, rhinorrhea and sore throat.    Eyes:  Negative for pain.   Respiratory:  Negative for cough, shortness of breath and wheezing.    Cardiovascular:  Negative for chest pain and leg swelling.  "  Gastrointestinal:  Negative for abdominal pain, anorexia, change in bowel habit, diarrhea, nausea and vomiting.   Musculoskeletal:  Negative for arthralgias, joint swelling, myalgias and neck pain.   Skin:  Negative for rash.   Neurological:  Negative for dizziness, vertigo, weakness, numbness and headaches.   Psychiatric/Behavioral:  Positive for depression. Negative for behavioral problems, confusion, dysphoric mood, hallucinations, self-injury and suicidal ideas. The patient is not nervous/anxious.    All other systems reviewed and are negative.      Current Outpatient Medications on File Prior to Visit   Medication Sig    acetaminophen (TYLENOL) 500 mg tablet Take 500 mg by mouth every 6 (six) hours as needed for mild pain    ASPIRIN LOW DOSE 81 MG EC tablet TAKE ONE TABLET BY MOUTH DAILY    atorvastatin (LIPITOR) 40 mg tablet TAKE 1 TABLET BY MOUTH DAILY WITH DINNER    buPROPion (WELLBUTRIN XL) 150 mg 24 hr tablet Take 1 tablet (150 mg total) by mouth every morning    haloperidol (HALDOL) 1 mg tablet Take 1 tablet (1 mg total) by mouth 2 (two) times a day    haloperidol (HALDOL) 2 mg tablet Take 1 tablet (2 mg total) by mouth 2 (two) times a day    metFORMIN (GLUCOPHAGE-XR) 500 mg 24 hr tablet TAKE ONE TABLET BY MOUTH TWICE A DAY WITH MEALS    methocarbamol (ROBAXIN) 500 mg tablet Take 1 tablet (500 mg total) by mouth 3 (three) times a day    metoprolol succinate (TOPROL-XL) 25 mg 24 hr tablet Take 1 tablet (25 mg total) by mouth daily    rivaroxaban (Xarelto) 20 mg tablet Take 1 tablet (20 mg total) by mouth daily with breakfast    valsartan (DIOVAN) 160 mg tablet Take 1 tablet (160 mg total) by mouth daily       Objective     /78 (BP Location: Left arm, Patient Position: Sitting, Cuff Size: Standard)   Ht 5' 11\" (1.803 m)   Wt 88.5 kg (195 lb)   SpO2 99%   BMI 27.20 kg/m²     Physical Exam  Vitals and nursing note reviewed.   Constitutional:       General: He is not in acute distress.     " Appearance: Normal appearance. He is not ill-appearing.   HENT:      Head: Normocephalic.      Right Ear: External ear normal.      Left Ear: External ear normal.   Pulmonary:      Effort: Pulmonary effort is normal.   Musculoskeletal:      Cervical back: Neck supple.   Skin:     General: Skin is warm and dry.   Neurological:      General: No focal deficit present.      Mental Status: He is alert and oriented to person, place, and time. Mental status is at baseline.      Motor: No weakness.   Psychiatric:         Attention and Perception: Attention and perception normal. He is attentive.         Mood and Affect: Mood and affect normal. Mood is not anxious or depressed. Affect is not tearful.         Speech: Speech normal. Speech is not rapid and pressured or delayed.         Behavior: Behavior normal. Behavior is not agitated, aggressive or combative. Behavior is cooperative.         Thought Content: Thought content is paranoid. Thought content does not include homicidal or suicidal ideation. Thought content does not include homicidal or suicidal plan.       SAM Baer

## 2024-04-02 ENCOUNTER — OFFICE VISIT (OUTPATIENT)
Dept: BEHAVIORAL/MENTAL HEALTH CLINIC | Facility: CLINIC | Age: 74
End: 2024-04-02
Payer: MEDICARE

## 2024-04-02 DIAGNOSIS — F22 PARANOIA (HCC): Primary | ICD-10-CM

## 2024-04-02 PROCEDURE — 90853 GROUP PSYCHOTHERAPY: CPT | Performed by: SOCIAL WORKER

## 2024-04-02 NOTE — PSYCH
"Behavioral Health Psychotherapy Group Progress Note    Psychotherapy Provided: Group Therapy    1. Paranoia (HCC)            Goals addressed in session: Goal 1     Group Name: Feeling Good Matters     Topic(s) covered: Introductions/Isolation/Family concerns     Skill(s) covered: Gaining awareness of difficulty leaving home due to mobility, responsibilities etc. In concurrence with shelter     Group summary:  Today's group introduced a new member and gradually discussed topics of concern including grandchildren, health of spouse/relationship stress, as well as pleasurable matters such as pets. All group members were engaged and attentive to one another while participating and disclosing appropriately.      Data: Shawn attended today's group. He was engaged and interested in the other participants and disclosed nominal information about himself; agreed to return.     Substance Abuse was not addressed during this session. If the client is diagnosed with a co-occurring substance use disorder, please indicate any changes in the frequency or amount of use: . Stage of change for addressing substance use diagnoses: Contemplation    ASSESSMENT:  Shawn appeared  with a Euthymic/ normal mood. His affect is Normal range and intensity, which is congruent, with his mood and the content of the session. He appeared to actively participated in the group and interacted appropriately with and was supportive of the other group members.     Shawn Marquez presents with a nonerisk of suicide,nonerisk of self-harm, and low risk of harm to others.    For any risk assessment that surpasses a \"low\" rating, a safety plan must be developed.    A safety plan was indicated: no  If yes, describe in detail Client's risk to others is being monitored in session and is non-specific currently; at times prior to entry into treatment has been angry at neighbors and historically has been threatening to spouse     PLAN: Shawn will be seen " individually in between groups and THC and risk of harm to others will be assessed at that time. The next group is scheduled for 4/16 and will cover the topic of Mindfulness and sleep concerns.    Behavioral Health Treatment Plan and Discharge Planning: Shawn Marquez is aware of and agrees to continue to work on their treatment plan. They have identified and are working toward their discharge goals. yes    Visit start and stop times:    04/02/24  Start Time: 1203  Stop Time: 1305  Total Visit Time: 62 minutes

## 2024-04-04 ENCOUNTER — SOCIAL WORK (OUTPATIENT)
Dept: BEHAVIORAL/MENTAL HEALTH CLINIC | Facility: CLINIC | Age: 74
End: 2024-04-04
Payer: MEDICARE

## 2024-04-04 DIAGNOSIS — F22 PARANOIA (HCC): Primary | ICD-10-CM

## 2024-04-04 PROCEDURE — 90834 PSYTX W PT 45 MINUTES: CPT | Performed by: SOCIAL WORKER

## 2024-04-04 NOTE — PSYCH
"Behavioral Health Psychotherapy Progress Note    Psychotherapy Provided: Individual Psychotherapy     1. Paranoia (HCC)            Goals addressed in session: Goal 1     DATA:   Mr. Marquez continues to enjoy an improved mood and felt the support group he has begun to attend is \"about what he expected\"; does perceive some commonalities between himself and another member. During this session, this clinician used the following therapeutic modalities: Cognitive Behavioral Therapy    Substance Abuse was addressed during this session. If the client is diagnosed with a co-occurring substance use disorder, please indicate any changes in the frequency or amount of use: Mild decrease in THC. Stage of change for addressing substance use diagnoses: Action    ASSESSMENT:  Shawn Marquez presents with a Euthymic/ normal mood.     his affect is Normal range and intensity, which is congruent, with his mood and the content of the session. The client has made progress on their goals.     Shawn Marquez presents with a none risk of suicide, none risk of self-harm, and none risk of harm to others.    For any risk assessment that surpasses a \"low\" rating, a safety plan must be developed.    A safety plan was indicated: no  If yes, describe in detail     PLAN: Between sessions, Shawn Marquez will keep daily log including thoughts about others being in his car during its inspection. At the next session, the therapist will use Cognitive Behavioral Therapy to address how attention/cognition may lead to paranoia that interferes with valued relationships.    Behavioral Health Treatment Plan and Discharge Planning: Shawn Marquez is aware of and agrees to continue to work on their treatment plan. They have identified and are working toward their discharge goals. yes    Visit start and stop times:    04/04/24  Start Time: 1100  Stop Time: 1138  Total Visit Time: 38 minutes  "

## 2024-04-11 ENCOUNTER — SOCIAL WORK (OUTPATIENT)
Dept: BEHAVIORAL/MENTAL HEALTH CLINIC | Facility: CLINIC | Age: 74
End: 2024-04-11

## 2024-04-11 DIAGNOSIS — F33.40 DEPRESSION, MAJOR, RECURRENT, IN REMISSION (HCC): ICD-10-CM

## 2024-04-11 DIAGNOSIS — F22 PARANOIA (HCC): Primary | ICD-10-CM

## 2024-04-11 NOTE — PSYCH
"Behavioral Health Psychotherapy Progress Note    Psychotherapy Provided: Individual Psychotherapy     1. Paranoia (HCC)        2. Depression, major, recurrent, in remission (HCC)            Goals addressed in session: Goal 1     DATA:   Mr. Marquez continues to feel well and observably is not tearful and has less monotone than in initial sessions. He does realize that procrastination is an ongoing problem; does feel that he is managing paranoia better and/or experiences it less intensely due to improvement in mood. During this session, this clinician used the following therapeutic modalities: Cognitive Behavioral Therapy    Substance Abuse was addressed during this session. If the client is diagnosed with a co-occurring substance use disorder, please indicate any changes in the frequency or amount of use: Up to once daily. Stage of change for addressing substance use diagnoses: Maintenance    ASSESSMENT:  Shawn Marquez presents with a Euthymic/ normal mood.     his affect is Normal range and intensity, which is congruent, with his mood and the content of the session. The client has made progress on their goals.     Shawn Marquez presents with a none risk of suicide, none risk of self-harm, and none risk of harm to others.    For any risk assessment that surpasses a \"low\" rating, a safety plan must be developed.    A safety plan was indicated: no  If yes, describe in detail     PLAN: Between sessions, Shawn Marquez will activate hearing aids due to realization that these help improve valued relationships At the next session, the therapist will use Cognitive Behavioral Therapy to address the results of this effort. He will then be decreased to monthly maintenance individual sessions while continuing to attend weekly support group.    Behavioral Health Treatment Plan and Discharge Planning: Shawn Marquez is aware of and agrees to continue to work on their treatment plan. They have identified and " are working toward their discharge goals. yes    Visit start and stop times:    04/11/24  Start Time: 1103  Stop Time: 1146  Total Visit Time: 43 minutes

## 2024-04-16 ENCOUNTER — OFFICE VISIT (OUTPATIENT)
Dept: BEHAVIORAL/MENTAL HEALTH CLINIC | Facility: CLINIC | Age: 74
End: 2024-04-16
Payer: MEDICARE

## 2024-04-16 DIAGNOSIS — F22 PARANOIA (HCC): Primary | ICD-10-CM

## 2024-04-16 PROCEDURE — 90853 GROUP PSYCHOTHERAPY: CPT | Performed by: SOCIAL WORKER

## 2024-04-16 NOTE — PSYCH
"Behavioral Health Psychotherapy Group Progress Note    Psychotherapy Provided: Group Therapy    1. Paranoia (HCC)            Goals addressed in session: Goal 1     Group Name: Feeling Good Matters     Topic(s) covered: Mindfulness, Self-care     Skill(s) covered: [place] how components of mindfulness such as thought defusion and emotional awareness cna decrease stress and physical pain      Group summary:  Four members attended    Data: Shawn attended today's group. He is somewhat quieter than other members but is attentive and asked questions. Has not self-disclosed significantly to date.     Substance Abuse was not addressed during this session. If the client is diagnosed with a co-occurring substance use disorder, please indicate any changes in the frequency or amount of use: . Stage of change for addressing substance use diagnoses: No substance use/Not applicable    ASSESSMENT:  Shawn appeared  with a Euthymic/ normal mood. His affect is Normal range and intensity, which is congruent, with his mood and the content of the session. He appeared to actively participated in the group and interacted appropriately with and was supportive of the other group members.     Shawn Marquez presents with a nonerisk of suicide,nonerisk of self-harm, and none risk of harm to others.    For any risk assessment that surpasses a \"low\" rating, a safety plan must be developed.    A safety plan was indicated: no  If yes, describe in detail     PLAN: Shawn will attempt Mindfulness. The next group is scheduled for 4/23 and will cover the topic of self-care and paced breathing.     Behavioral Health Treatment Plan and Discharge Planning: Shawn Marquez is aware of and agrees to continue to work on their treatment plan. They have identified and are working toward their discharge goals. yes    Visit start and stop times:    04/16/24  Start Time: 1205  Stop Time: 1315  Total Visit Time: 70 minutes      "

## 2024-04-18 ENCOUNTER — SOCIAL WORK (OUTPATIENT)
Dept: BEHAVIORAL/MENTAL HEALTH CLINIC | Facility: CLINIC | Age: 74
End: 2024-04-18
Payer: MEDICARE

## 2024-04-18 DIAGNOSIS — F22 PARANOIA (HCC): Primary | ICD-10-CM

## 2024-04-18 DIAGNOSIS — E11.9 TYPE 2 DIABETES MELLITUS WITHOUT COMPLICATION, WITHOUT LONG-TERM CURRENT USE OF INSULIN (HCC): ICD-10-CM

## 2024-04-18 PROCEDURE — 90834 PSYTX W PT 45 MINUTES: CPT | Performed by: SOCIAL WORKER

## 2024-04-18 RX ORDER — METFORMIN HYDROCHLORIDE 500 MG/1
500 TABLET, EXTENDED RELEASE ORAL 2 TIMES DAILY WITH MEALS
Qty: 180 TABLET | Refills: 1 | Status: SHIPPED | OUTPATIENT
Start: 2024-04-18

## 2024-04-18 NOTE — PSYCH
"Behavioral Health Psychotherapy Progress Note    Psychotherapy Provided: Individual Psychotherapy     1. Paranoia (HCC)            Goals addressed in session: Goal 1     DATA:   Mr. Marquez has yet to read the instructions for his hearing aids and this is the topic of a chain analysis in today's session. During this session, this clinician used the following therapeutic modalities: Cognitive Behavioral Therapy to learn that some suspicion of his spouse's motives in purchasing them, as well as a desire to resist her, is the cause of this behavior, but he then feels disgusted with himself, realizes he is damaging a valued relationship, and still cannot hear in settings such as our support group.     Substance Abuse was not addressed during this session. If the client is diagnosed with a co-occurring substance use disorder, please indicate any changes in the frequency or amount of use: . Stage of change for addressing substance use diagnoses: No substance use/Not applicable    ASSESSMENT:  Shawn Marquez presents with a Euthymic/ normal mood.     his affect is Normal range and intensity, which is congruent, with his mood and the content of the session. The client has not made progress on their goals.     Shawn Marquez presents with a none risk of suicide, none risk of self-harm, and none risk of harm to others.    For any risk assessment that surpasses a \"low\" rating, a safety plan must be developed.    A safety plan was indicated: no  If yes, describe in detail     PLAN: Between sessions, Shawn Marquez will read instructions prior to engaging in pleasurable/relaxing activities.  At the next session, the therapist will use Cognitive Behavioral Therapy to address the results of this effort.    Behavioral Health Treatment Plan and Discharge Planning: Shawn Marquez is aware of and agrees to continue to work on their treatment plan. They have identified and are working toward their discharge goals. " yes    Visit start and stop times:    04/18/24  Start Time: 1100  Stop Time: 1148  Total Visit Time: 48 minutes

## 2024-04-24 DIAGNOSIS — I48.11 LONGSTANDING PERSISTENT ATRIAL FIBRILLATION (HCC): ICD-10-CM

## 2024-04-24 DIAGNOSIS — I10 ESSENTIAL HYPERTENSION: ICD-10-CM

## 2024-04-25 ENCOUNTER — SOCIAL WORK (OUTPATIENT)
Dept: BEHAVIORAL/MENTAL HEALTH CLINIC | Facility: CLINIC | Age: 74
End: 2024-04-25
Payer: MEDICARE

## 2024-04-25 DIAGNOSIS — F22 PARANOIA (HCC): Primary | ICD-10-CM

## 2024-04-25 PROCEDURE — 90832 PSYTX W PT 30 MINUTES: CPT | Performed by: SOCIAL WORKER

## 2024-04-25 RX ORDER — METOPROLOL SUCCINATE 25 MG/1
25 TABLET, EXTENDED RELEASE ORAL DAILY
Qty: 90 TABLET | Refills: 3 | OUTPATIENT
Start: 2024-04-25

## 2024-04-25 RX ORDER — VALSARTAN 160 MG/1
160 TABLET ORAL DAILY
Qty: 90 TABLET | Refills: 1 | OUTPATIENT
Start: 2024-04-25

## 2024-04-25 NOTE — PSYCH
"Behavioral Health Psychotherapy Progress Note    Psychotherapy Provided: Individual Psychotherapy     1. Paranoia (HCC)            Goals addressed in session: Goal 1     DATA:   Mr. Marquez is seen for a brief session scheduled same-day and appears mildly confused/forgetful about our discussion of moving to group only for a month. His mood is about the same. He wonders what he will get out of the group and wonders about how his wife is feeling about him and why she bought him hearing aids; in session we observed that he has been \"in his head\" about all of these questions and has not expressed them. During this session, this clinician used the following therapeutic modalities: Cognitive Behavioral Therapy    Substance Abuse was not addressed during this session. If the client is diagnosed with a co-occurring substance use disorder, please indicate any changes in the frequency or amount of use: Daily THC use has remained the same. Stage of change for addressing substance use diagnoses: Pre-contemplation    ASSESSMENT:  Shawn Marquez presents with a Euthymic/ normal mood.     his affect is Normal range and intensity, which is congruent, with his mood and the content of the session. The client has not made progress on their goals.     Shawn aMrquez presents with a none risk of suicide, none risk of self-harm, and none risk of harm to others.    For any risk assessment that surpasses a \"low\" rating, a safety plan must be developed.    A safety plan was indicated: no  If yes, describe in detail     PLAN: Between sessions, Shawn Marquez will ask spouse his questions; will attend group and express thoughts and feelings about relationship with son.  At the next session, the therapist will use Cognitive Behavioral Therapy to address the results of these efforts and further explore THC use.    Behavioral Health Treatment Plan and Discharge Planning: Shanw Marquez is aware of and agrees to continue to " work on their treatment plan. They have identified and are working toward their discharge goals. yes    Visit start and stop times:    04/25/24  Start Time: 1100  Stop Time: 1125  Total Visit Time: 25 minutes

## 2024-04-27 DIAGNOSIS — I48.11 LONGSTANDING PERSISTENT ATRIAL FIBRILLATION (HCC): ICD-10-CM

## 2024-04-27 DIAGNOSIS — I10 ESSENTIAL HYPERTENSION: ICD-10-CM

## 2024-04-29 ENCOUNTER — OFFICE VISIT (OUTPATIENT)
Dept: FAMILY MEDICINE CLINIC | Facility: CLINIC | Age: 74
End: 2024-04-29
Payer: MEDICARE

## 2024-04-29 VITALS
TEMPERATURE: 97.1 F | SYSTOLIC BLOOD PRESSURE: 126 MMHG | OXYGEN SATURATION: 99 % | BODY MASS INDEX: 26.88 KG/M2 | WEIGHT: 192 LBS | DIASTOLIC BLOOD PRESSURE: 78 MMHG | HEIGHT: 71 IN

## 2024-04-29 DIAGNOSIS — F22 PARANOID BEHAVIOR (HCC): ICD-10-CM

## 2024-04-29 DIAGNOSIS — E11.9 TYPE 2 DIABETES MELLITUS WITHOUT COMPLICATION, WITHOUT LONG-TERM CURRENT USE OF INSULIN (HCC): Primary | ICD-10-CM

## 2024-04-29 PROCEDURE — 99213 OFFICE O/P EST LOW 20 MIN: CPT

## 2024-04-29 PROCEDURE — G2211 COMPLEX E/M VISIT ADD ON: HCPCS

## 2024-04-29 RX ORDER — VALSARTAN 160 MG/1
160 TABLET ORAL DAILY
Qty: 90 TABLET | Refills: 1 | Status: SHIPPED | OUTPATIENT
Start: 2024-04-29

## 2024-04-29 RX ORDER — METOPROLOL SUCCINATE 25 MG/1
25 TABLET, EXTENDED RELEASE ORAL DAILY
Qty: 90 TABLET | Refills: 3 | Status: SHIPPED | OUTPATIENT
Start: 2024-04-29

## 2024-04-29 NOTE — ASSESSMENT & PLAN NOTE
"Per patient's therapist Sheyla, she had consulted with Dr. John (psychiatrist)--- recommended that haldol be discontinued. Per Sheyla, \"I did receive brief consult from a psychiatrist Dr. 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 Will discontinue haldol-- patient to decrease to 2 mg x1 week then 1 mg x1 week and then discontinue all together. Discussed with patient that we can always resume haldol if he feels that discontinuing it has had a negative impact. Also discussed attempting to increase wellbutrin again if depression increases. Patient to continue to individual and group therapy. Follow up 1-2 months.   "

## 2024-04-29 NOTE — PROGRESS NOTES
"Name: Shawn Marquez      : 1950      MRN: [id number]  Encounter Provider: SAM Baer  Encounter Date: 2024   Encounter department: [facility]    Assessment & Plan     1. Type 2 diabetes mellitus without complication, without long-term current use of insulin (HCC)  -     Albumin / creatinine urine ratio    2. Paranoid behavior (HCC)  Assessment & Plan:  Per patient's therapist Sheyla, she had consulted with Dr. John (psychiatrist)--- recommended that haldol be discontinued. Per Sheyla, \"I did receive brief consult from a psychiatrist Dr. 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 Will discontinue haldol-- patient to decrease to 2 mg x1 week then 1 mg x1 week and then discontinue all together. Discussed with patient that we can always resume haldol if he feels that discontinuing it has had a negative impact. Also discussed attempting to increase wellbutrin again if depression increases. Patient to continue to individual and group therapy. Follow up 1-2 months.              Subjective      Depression  This is a chronic problem. The current episode started more than 1 year ago. The problem has been gradually improving. Pertinent negatives include no abdominal pain, anorexia, arthralgias, change in bowel habit, chest pain, chills, congestion, coughing, diaphoresis, fatigue, fever, headaches, joint swelling, myalgias, nausea, neck pain, numbness, rash, sore throat, swollen glands, urinary symptoms, vertigo, visual change, vomiting or weakness. The symptoms are aggravated by stress. The treatment provided significant (therapy/meds) relief.     Review of Systems   Constitutional:  Negative for activity change, chills, diaphoresis, fatigue and fever.   HENT:  Negative for congestion, ear " pain, rhinorrhea and sore throat.    Eyes:  Negative for pain.   Respiratory:  Negative for cough, shortness of breath and wheezing.    Cardiovascular:  Negative for chest pain and leg swelling.   Gastrointestinal:  Negative for abdominal pain, anorexia, change in bowel habit, diarrhea, nausea and vomiting.   Musculoskeletal:  Negative for arthralgias, joint swelling, myalgias and neck pain.   Skin:  Negative for rash.   Neurological:  Negative for dizziness, vertigo, weakness, numbness and headaches.   Psychiatric/Behavioral:  Positive for depression.    All other systems reviewed and are negative.      Current Outpatient Medications on File Prior to Visit   Medication Sig    acetaminophen (TYLENOL) 500 mg tablet Take 500 mg by mouth every 6 (six) hours as needed for mild pain    ASPIRIN LOW DOSE 81 MG EC tablet TAKE ONE TABLET BY MOUTH DAILY    atorvastatin (LIPITOR) 40 mg tablet TAKE 1 TABLET BY MOUTH DAILY WITH DINNER    buPROPion (WELLBUTRIN XL) 150 mg 24 hr tablet Take 1 tablet (150 mg total) by mouth every morning    metFORMIN (GLUCOPHAGE-XR) 500 mg 24 hr tablet Take 1 tablet (500 mg total) by mouth 2 (two) times a day with meals    methocarbamol (ROBAXIN) 500 mg tablet Take 1 tablet (500 mg total) by mouth 3 (three) times a day    metoprolol succinate (TOPROL-XL) 25 mg 24 hr tablet TAKE ONE TABLET BY MOUTH EVERY DAY    rivaroxaban (Xarelto) 20 mg tablet Take 1 tablet (20 mg total) by mouth daily with breakfast    valsartan (DIOVAN) 160 mg tablet TAKE ONE TABLET BY MOUTH EVERY DAY    [DISCONTINUED] haloperidol (HALDOL) 1 mg tablet Take 1 tablet (1 mg total) by mouth 2 (two) times a day    [DISCONTINUED] haloperidol (HALDOL) 2 mg tablet Take 1 tablet (2 mg total) by mouth 2 (two) times a day    [DISCONTINUED] metoprolol succinate (TOPROL-XL) 25 mg 24 hr tablet Take 1 tablet (25 mg total) by mouth daily    [DISCONTINUED] valsartan (DIOVAN) 160 mg tablet Take 1 tablet (160 mg total) by mouth daily  "      Objective     /78 (BP Location: Left arm, Patient Position: Sitting, Cuff Size: Standard)   Temp (!) 97.1 °F (36.2 °C) (Tympanic)   Ht 5' 11\" (1.803 m)   Wt 87.1 kg (192 lb)   SpO2 99%   BMI 26.78 kg/m²     Physical Exam  Vitals and nursing note reviewed.   Constitutional:       General: He is not in acute distress.     Appearance: Normal appearance. He is not ill-appearing.   HENT:      Head: Normocephalic.      Right Ear: External ear normal.      Left Ear: External ear normal.   Cardiovascular:      Rate and Rhythm: Normal rate and regular rhythm.      Heart sounds: Normal heart sounds. No murmur heard.  Pulmonary:      Effort: Pulmonary effort is normal. No respiratory distress.      Breath sounds: Normal breath sounds. No wheezing.   Abdominal:      General: Bowel sounds are normal. There is no distension.      Palpations: Abdomen is soft.      Tenderness: There is no abdominal tenderness.   Musculoskeletal:      Cervical back: Neck supple.   Skin:     General: Skin is warm and dry.   Neurological:      General: No focal deficit present.      Mental Status: He is alert and oriented to person, place, and time. Mental status is at baseline.   Psychiatric:         Attention and Perception: Attention and perception normal.         Mood and Affect: Mood normal. Mood is not anxious or depressed.         Speech: Speech normal.         Behavior: Behavior normal. Behavior is not agitated, aggressive or withdrawn. Behavior is cooperative.         Thought Content: Thought content normal. Thought content is not paranoid or delusional. Thought content does not include homicidal or suicidal ideation. Thought content does not include homicidal or suicidal plan.         Judgment: Judgment normal.       SAM Baer    "

## 2024-04-30 ENCOUNTER — TELEPHONE (OUTPATIENT)
Dept: FAMILY MEDICINE CLINIC | Facility: CLINIC | Age: 74
End: 2024-04-30

## 2024-04-30 ENCOUNTER — OFFICE VISIT (OUTPATIENT)
Dept: BEHAVIORAL/MENTAL HEALTH CLINIC | Facility: CLINIC | Age: 74
End: 2024-04-30
Payer: MEDICARE

## 2024-04-30 DIAGNOSIS — F22 PARANOIA (HCC): Primary | ICD-10-CM

## 2024-04-30 LAB
ALBUMIN/CREAT UR: 15 MG/G CREAT (ref 0–29)
CREAT UR-MCNC: 134.9 MG/DL
MICROALBUMIN UR-MCNC: 20 UG/ML

## 2024-04-30 PROCEDURE — 90853 GROUP PSYCHOTHERAPY: CPT | Performed by: SOCIAL WORKER

## 2024-04-30 NOTE — TELEPHONE ENCOUNTER
----- Message from SAM Baer sent at 4/30/2024  2:21 PM EDT -----  Please notify patient:    Urine microalbumin was normal.  ----- Message -----  From: [facility] Results In  Sent: 4/30/2024   2:06 PM EDT  To: SAM Baer

## 2024-04-30 NOTE — PSYCH
"Behavioral Health Psychotherapy Group Progress Note    Psychotherapy Provided: Group Therapy    1. Paranoia (HCC)            Goals addressed in session: Goal 1     Group Name: [facility]    Topic(s) covered:  Intergenerational patterns of mental illness and addiction     Skill(s) covered: Recognizing and altering harmful patterns     Group summary:  Two members attended    Data: Shawn attended today's group. He is gradually more talkative and more likely to disclose relevant and difficult emotional experiences.     Substance Abuse was not addressed during this session. If the client is diagnosed with a co-occurring substance use disorder, please indicate any changes in the frequency or amount of use: . Stage of change for addressing substance use diagnoses: No substance use/Not applicable    ASSESSMENT:  Shawn appeared  with a Euthymic/ normal mood. His affect is Normal range and intensity, which is congruent, with his mood and the content of the session. He appeared to actively participated in the group and interacted appropriately with and was supportive of the other group members.     Shawn Marquez presents with a nonerisk of suicide,nonerisk of self-harm, and none risk of harm to others.    For any risk assessment that surpasses a \"low\" rating, a safety plan must be developed.    A safety plan was indicated: no  If yes, describe in detail     PLAN: Shawn will discuss smoking cessation with his spouse who has struggled to quit independently. The next group is scheduled for 5/7 and will cover the topic of intergenerational patterns.    Behavioral Health Treatment Plan and Discharge Planning: Shawn Marquez is aware of and agrees to continue to work on their treatment plan. They have identified and are working toward their discharge goals. yes    Visit start and stop times:    04/30/24  Start Time: 1200  Stop Time: 1310  Total Visit Time: 70 minutes      "

## 2024-05-07 ENCOUNTER — OFFICE VISIT (OUTPATIENT)
Dept: BEHAVIORAL/MENTAL HEALTH CLINIC | Facility: CLINIC | Age: 74
End: 2024-05-07
Payer: MEDICARE

## 2024-05-07 DIAGNOSIS — F22 PARANOIA (HCC): Primary | ICD-10-CM

## 2024-05-07 PROCEDURE — 90853 GROUP PSYCHOTHERAPY: CPT | Performed by: SOCIAL WORKER

## 2024-05-07 NOTE — PSYCH
"Behavioral Health Psychotherapy Group Progress Note    Psychotherapy Provided: Group Therapy    1. Paranoia (HCC)            Goals addressed in session: Goal 1     Group Name: [facility]    Topic(s) covered: Mindfulness and compassion     Skill(s) covered: In-session Mindfulness practice     Group summary:  Three members attended     Data: Shawn attended today's group. He engaged in the practice and reflected that he derived some benefit; dislcosed a past event of feeling very down and gaining relief through interacting with a pet (conversation related to how animals can have a relaxing/mindful effect on us)     Substance Abuse was not addressed during this session. If the client is diagnosed with a co-occurring substance use disorder, please indicate any changes in the frequency or amount of use: . Stage of change for addressing substance use diagnoses: No substance use/Not applicable    ASSESSMENT:  Shawn appeared  with a Euthymic/ normal mood. His affect is Normal range and intensity, which is congruent, with his mood and the content of the session. He appeared to actively participated in the group and interacted appropriately with and was supportive of the other group members.     Shawn Marquez presents with a nonerisk of suicide,nonerisk of self-harm, and none risk of harm to others.    For any risk assessment that surpasses a \"low\" rating, a safety plan must be developed.    A safety plan was indicated: no  If yes, describe in detail     PLAN: Shawn will attempt Mindfulness informally during the day. The next group is scheduled for 5/14 and will cover the topic of the results of these efforts.    Behavioral Health Treatment Plan and Discharge Planning: Shawn Marquez is aware of and agrees to continue to work on their treatment plan. They have identified and are working toward their discharge goals. yes    Visit start and stop times:    05/07/24  Start Time: 1202  Stop Time: 1256  Total Visit " Time: 54 minutes

## 2024-05-09 ENCOUNTER — SOCIAL WORK (OUTPATIENT)
Dept: BEHAVIORAL/MENTAL HEALTH CLINIC | Facility: CLINIC | Age: 74
End: 2024-05-09

## 2024-05-09 DIAGNOSIS — F33.40 DEPRESSION, MAJOR, RECURRENT, IN REMISSION (HCC): Primary | ICD-10-CM

## 2024-05-09 NOTE — PSYCH
"Behavioral Health Psychotherapy Progress Note    Psychotherapy Provided: Individual Psychotherapy     1. Depression, major, recurrent, in remission (HCC)            Goals addressed in session: Goal 1     DATA:   MR. Marquez continues to enjoy a good mood and has not had any instances of feeling others are plotting against him, etc. For several weeks. Continues to derive benefit form Beaming group and felt sad that he missed it last week due to hurting his back. During this session, this clinician used the following therapeutic modalities: Cognitive Behavioral Therapy    Substance Abuse was not addressed during this session. If the client is diagnosed with a co-occurring substance use disorder, please indicate any changes in the frequency or amount of use: . Stage of change for addressing substance use diagnoses: No substance use/Not applicable    ASSESSMENT:  Shawn Marquez presents with a Euthymic/ normal mood.     his affect is Normal range and intensity, which is congruent, with his mood and the content of the session. The client has made progress on their goals.     Shawn Marquez presents with a none risk of suicide, none risk of self-harm, and none risk of harm to others.    For any risk assessment that surpasses a \"low\" rating, a safety plan must be developed.    A safety plan was indicated: no  If yes, describe in detail     PLAN: Between sessions, Shawn Marquez will express self more in group including when other male members are present.  At the next session, the therapist will use Cognitive Behavioral Therapy to address the results of these efforts; he will attend group weekly and decrease individual session frequency to monthly.    Behavioral Health Treatment Plan and Discharge Planning: Shawn Marquez is aware of and agrees to continue to work on their treatment plan. They have identified and are working toward their discharge goals. yes    Visit start and stop " times:    05/09/24  Start Time: 1102  Stop Time: 1140  Total Visit Time: 38 minutes

## 2024-05-14 ENCOUNTER — OFFICE VISIT (OUTPATIENT)
Dept: BEHAVIORAL/MENTAL HEALTH CLINIC | Facility: CLINIC | Age: 74
End: 2024-05-14
Payer: MEDICARE

## 2024-05-14 DIAGNOSIS — F33.40 DEPRESSION, MAJOR, RECURRENT, IN REMISSION (HCC): Primary | ICD-10-CM

## 2024-05-14 PROCEDURE — 90853 GROUP PSYCHOTHERAPY: CPT | Performed by: SOCIAL WORKER

## 2024-05-14 NOTE — PSYCH
"Behavioral Health Psychotherapy Group Progress Note    Psychotherapy Provided: Group Therapy    1. Depression, major, recurrent, in remission (HCC)            Goals addressed in session: Goal 1     Group Name: [facility]     Topic(s) covered: Life Stressors (Caregiving and Parenting)    Resource investigation; assisting others without assuming unnecessary stress   Skill(s) covered:     Group summary:  Four members attended     Data: Shawn attended today's group. He offers some support and advice to others but as of yet has not been significantly open about concerns beyond statements such as \"Mother's Day was not the best this year.\"     Substance Abuse was not addressed during this session. If the client is diagnosed with a co-occurring substance use disorder, please indicate any changes in the frequency or amount of use: . Stage of change for addressing substance use diagnoses: No substance use/Not applicable    ASSESSMENT:  Shawn appeared  with a Euthymic/ normal mood. His affect is Normal range and intensity, which is congruent, with his mood and the content of the session. He appeared to actively participated in the group and interacted appropriately with and was supportive of the other group members.     Shawn Marquez presents with a nonerisk of suicide,nonerisk of self-harm, and none risk of harm to others.    For any risk assessment that surpasses a \"low\" rating, a safety plan must be developed.    A safety plan was indicated: no  If yes, describe in detail     PLAN: Shawn will continue to engage in Mindfulness as needed. The next group is scheduled for 5/21 and will cover the topic of the results of these efforts.     Behavioral Health Treatment Plan and Discharge Planning: Shawn Marquez is aware of and agrees to continue to work on their treatment plan. They have identified and are working toward their discharge goals. yes    Visit start and stop times:    05/14/24  Start Time: " 1205  Stop Time: 1315  Total Visit Time: 70 minutes

## 2024-05-21 ENCOUNTER — OFFICE VISIT (OUTPATIENT)
Dept: BEHAVIORAL/MENTAL HEALTH CLINIC | Facility: CLINIC | Age: 74
End: 2024-05-21
Payer: MEDICARE

## 2024-05-21 DIAGNOSIS — F33.40 DEPRESSION, MAJOR, RECURRENT, IN REMISSION (HCC): Primary | ICD-10-CM

## 2024-05-21 PROCEDURE — 90853 GROUP PSYCHOTHERAPY: CPT | Performed by: SOCIAL WORKER

## 2024-05-21 NOTE — PSYCH
"Behavioral Health Psychotherapy Group Progress Note    Psychotherapy Provided: Group Therapy    1. Depression, major, recurrent, in remission (HCC)            Goals addressed in session: Goal 1     Group Name: [facility]    Topic(s) covered: Introductions; the connection between emotions/procrastination/being a \"pack rat\"      Skill(s) covered: Setting small, manageable, and time-based goals     Group summary:  Five members attendee    Data: Shawn attended today's group. He continues to be generally quiet but will interject at times, often with questions for other members.     Substance Abuse was not addressed during this session. If the client is diagnosed with a co-occurring substance use disorder, please indicate any changes in the frequency or amount of use: . Stage of change for addressing substance use diagnoses: No substance use/Not applicable    ASSESSMENT:  Shawn appeared  with a Euthymic/ normal mood. His affect is Normal range and intensity, which is congruent, with his mood and the content of the session. He appeared to actively participated in the group and interacted appropriately with and was supportive of the other group members.     Shawn Marquez presents with a nonerisk of suicide,nonerisk of self-harm, and none risk of harm to others.    For any risk assessment that surpasses a \"low\" rating, a safety plan must be developed.    A safety plan was indicated: no  If yes, describe in detail     PLAN: Shawn will notice emotional experience and any overwhelmed feelings when attempting tasks. The next group is scheduled for 5/28 and will cover the topic of the results of these efforts.    Behavioral Health Treatment Plan and Discharge Planning: Shawn Marquez is aware of and agrees to continue to work on their treatment plan. They have identified and are working toward their discharge goals. yes    Visit start and stop times:    05/21/24  Start Time: 1205  Stop Time: 1315  Total Visit " Time: 70 minutes

## 2024-05-31 ENCOUNTER — OFFICE VISIT (OUTPATIENT)
Dept: FAMILY MEDICINE CLINIC | Facility: CLINIC | Age: 74
End: 2024-05-31
Payer: MEDICARE

## 2024-05-31 VITALS — BODY MASS INDEX: 26.08 KG/M2 | HEART RATE: 71 BPM | WEIGHT: 187 LBS | OXYGEN SATURATION: 98 % | TEMPERATURE: 98.1 F

## 2024-05-31 DIAGNOSIS — F41.9 ANXIETY: ICD-10-CM

## 2024-05-31 DIAGNOSIS — F32.A DEPRESSION, UNSPECIFIED DEPRESSION TYPE: ICD-10-CM

## 2024-05-31 DIAGNOSIS — F01.52 VASCULAR DEMENTIA WITH PARANOIA (HCC): Primary | ICD-10-CM

## 2024-05-31 PROCEDURE — 99214 OFFICE O/P EST MOD 30 MIN: CPT

## 2024-05-31 PROCEDURE — G2211 COMPLEX E/M VISIT ADD ON: HCPCS

## 2024-05-31 RX ORDER — ESCITALOPRAM OXALATE 10 MG/1
10 TABLET ORAL DAILY
Qty: 90 TABLET | Refills: 2 | Status: SHIPPED | OUTPATIENT
Start: 2024-05-31 | End: 2025-05-26

## 2024-05-31 RX ORDER — DIAZEPAM 5 MG/1
5 TABLET ORAL EVERY 8 HOURS PRN
Qty: 30 TABLET | Refills: 1 | Status: SHIPPED | OUTPATIENT
Start: 2024-05-31

## 2024-05-31 NOTE — PROGRESS NOTES
"Ambulatory Visit  Name: Shawn Marquez      : 1950      MRN: [id number]  Encounter Provider: SAM Baer  Encounter Date: 2024   Encounter department: [facility]    Assessment & Plan   1. Vascular dementia with paranoia (HCC)  Assessment & Plan:  Patient reports that recent political events have caused his neighbors to \"retaliate against me. \" Reports that the AC in his wife's car stopped working last week and he is certain his neighbors have done something to cause this to happen. Also reports that the AC in his house also malfunctioned-- suspects his neighbors caused this as well. States that his furnace \"went in the winter time. I think the people coming out to service the furnace have something to do with it not working. It works when they come in and doesn't work when they leave. My wife thinks I'm crazy. She doesn't listen to me.\" Patient reports that his neighbors are \"retaliating\" against him due to differing political views-- \"I'm the only Green party in the neighborhood.\" States that the stress is making him physically ill. Denies SI or HI.    Patient continues with group and individual therapy. Has group therapy on  and individual . Concerned as haldol was discontinued per psychiatry recommendation. However, paranoia seems to have increased in the last month since discontinuing. Referred psychiatry. Added lexapro QD and valium as needed for anxiety--- instructed to take sparingly. Sent in basket message to therapist for continuity of care-- notified her of patient's thought process at today's visit. Encouraged patient to present to the ED with SI, thoughts of self harm, or thoughts of harming others. Patient verbalized understanding.   2. Depression, unspecified depression type  Assessment & Plan:  See vascular dementia note.   Orders:  -     escitalopram (LEXAPRO) 10 mg tablet; Take 1 tablet (10 mg total) by mouth daily  -     " Ambulatory referral to Psych Services; Future  3. Anxiety  Assessment & Plan:  See vascular dementia note.   Orders:  -     diazepam (VALIUM) 5 mg tablet; Take 1 tablet (5 mg total) by mouth every 8 (eight) hours as needed for anxiety or sleep  -     Ambulatory referral to Psych Services; Future       History of Present Illness     Anxiety  Presents for follow-up visit. Symptoms include depressed mood, excessive worry, irritability, muscle tension, nausea, nervous/anxious behavior, obsessions and restlessness. Patient reports no chest pain, compulsions, confusion, decreased concentration, dizziness, dry mouth, feeling of choking, hyperventilation, malaise, palpitations, panic, shortness of breath or suicidal ideas. Symptoms occur constantly. The severity of symptoms is causing significant distress.           Review of Systems   Constitutional:  Positive for irritability. Negative for activity change.   HENT:  Negative for congestion, ear pain, rhinorrhea and sore throat.    Eyes:  Negative for pain.   Respiratory:  Negative for cough, shortness of breath and wheezing.    Cardiovascular:  Negative for chest pain, palpitations and leg swelling.   Gastrointestinal:  Positive for nausea. Negative for abdominal pain, diarrhea and vomiting.   Musculoskeletal:  Negative for arthralgias and myalgias.   Skin:  Negative for rash.   Neurological:  Negative for dizziness, weakness and numbness.   Psychiatric/Behavioral:  Positive for dysphoric mood. Negative for confusion, decreased concentration, self-injury and suicidal ideas. The patient is nervous/anxious.    All other systems reviewed and are negative.      Objective     Pulse 71   Temp 98.1 °F (36.7 °C) (Tympanic)   Wt 84.8 kg (187 lb)   SpO2 98%   BMI 26.08 kg/m²     Physical Exam  Vitals and nursing note reviewed.   Constitutional:       General: He is not in acute distress.     Appearance: Normal appearance. He is well-developed. He is not ill-appearing.   HENT:       Head: Normocephalic and atraumatic.      Right Ear: External ear normal.      Left Ear: External ear normal.   Cardiovascular:      Rate and Rhythm: Normal rate and regular rhythm.      Heart sounds: Normal heart sounds. No murmur heard.  Pulmonary:      Effort: Pulmonary effort is normal. No respiratory distress.      Breath sounds: Normal breath sounds.   Abdominal:      General: Bowel sounds are normal. There is no distension.      Palpations: Abdomen is soft.      Tenderness: There is no abdominal tenderness.   Musculoskeletal:         General: No swelling.      Cervical back: Neck supple.   Skin:     General: Skin is warm and dry.   Neurological:      Mental Status: He is alert and oriented to person, place, and time. Mental status is at baseline.   Psychiatric:         Attention and Perception: Attention and perception normal.         Mood and Affect: Mood is depressed. Affect is flat.         Speech: Speech normal.         Behavior: Behavior is withdrawn. Behavior is not agitated, aggressive or hyperactive. Behavior is cooperative.         Thought Content: Thought content is paranoid. Thought content does not include homicidal or suicidal ideation. Thought content does not include homicidal or suicidal plan.       Administrative Statements

## 2024-05-31 NOTE — ASSESSMENT & PLAN NOTE
"Patient reports that recent political events have caused his neighbors to \"retaliate against me. \" Reports that the AC in his wife's car stopped working last week and he is certain his neighbors have done something to cause this to happen. Also reports that the AC in his house also malfunctioned-- suspects his neighbors caused this as well. States that his furnace \"went in the winter time. I think the people coming out to service the furnace have something to do with it not working. It works when they come in and doesn't work when they leave. My wife thinks I'm crazy. She doesn't listen to me.\" Patient reports that his neighbors are \"retaliating\" against him due to differing political views-- \"I'm the only [employer] party in the neighborhood.\" States that the stress is making him physically ill. Denies SI or HI.    Patient continues with group and individual therapy. Has group therapy on 6/4 and individual 6/6. Concerned as haldol was discontinued per psychiatry recommendation. However, paranoia seems to have increased in the last month since discontinuing. Referred psychiatry. Added lexapro QD and valium as needed for anxiety--- instructed to take sparingly. Sent in basket message to therapist for continuity of care-- notified her of patient's thought process at today's visit. Encouraged patient to present to the ED with SI, thoughts of self harm, or thoughts of harming others. Patient verbalized understanding.   "

## 2024-06-03 ENCOUNTER — TELEPHONE (OUTPATIENT)
Age: 74
End: 2024-06-03

## 2024-06-03 NOTE — TELEPHONE ENCOUNTER
PA for diazepam (VALIUM)     Submitted via    []CMM-KEY    [x][employer]-Case ID # [id number]   []Faxed to plan   []Other website    []Phone call Case ID #      Office notes sent, clinical questions answered. Awaiting determination    Turnaround time for your insurance to make a decision on your Prior Authorization can take 7-21 business days.

## 2024-06-04 ENCOUNTER — OFFICE VISIT (OUTPATIENT)
Dept: BEHAVIORAL/MENTAL HEALTH CLINIC | Facility: CLINIC | Age: 74
End: 2024-06-04
Payer: MEDICARE

## 2024-06-04 DIAGNOSIS — F33.40 DEPRESSION, MAJOR, RECURRENT, IN REMISSION (HCC): Primary | ICD-10-CM

## 2024-06-04 PROCEDURE — 90853 GROUP PSYCHOTHERAPY: CPT | Performed by: SOCIAL WORKER

## 2024-06-04 NOTE — PSYCH
"Behavioral Health Psychotherapy Group Progress Note    Psychotherapy Provided: Group Therapy    1. Depression, major, recurrent, in remission (HCC)            Goals addressed in session: Goal 1     Group Name: [facility]     Topic(s) covered: Updates on health and family concerns; brief discussion of world/political events     Skill(s) covered: Problem solving suggestions; entry into discussion of dialectic approaches during disagreements     Group summary:  Four members attended     Data: Shawn attended today's group. He advised that his current biggest stressor is world events and that he avoids these when possible. He is inquisitive about others' experiences with medical concerns etc., without sharing his own.     Substance Abuse was not addressed during this session. If the client is diagnosed with a co-occurring substance use disorder, please indicate any changes in the frequency or amount of use: . Stage of change for addressing substance use diagnoses: No substance use/Not applicable    ASSESSMENT:  Shawn appeared  with a Euthymic/ normal mood. His affect is Normal range and intensity, which is congruent, with his mood and the content of the session. He appeared to actively participated in the group and interacted appropriately with and was supportive of the other group members.     Shawn Marquez presents with a nonerisk of suicide,nonerisk of self-harm, and none risk of harm to others.    For any risk assessment that surpasses a \"low\" rating, a safety plan must be developed.    A safety plan was indicated: no  If yes, describe in detail     PLAN: Shawn will consider use of Mindfulness as an option when anxious or upset about news events. The next group is scheduled for 6/11 and will cover the topic of  Dialectic thought and other IPE skills related to political disagreements.        Behavioral Health Treatment Plan and Discharge Planning: Shawn Marquez is aware of and agrees to continue to " work on their treatment plan. They have identified and are working toward their discharge goals. yes    Visit start and stop times:    06/04/24  Start Time: 1200  Stop Time: 1305  Total Visit Time: 65 minutes

## 2024-06-04 NOTE — TELEPHONE ENCOUNTER
PA for diazepam Approved     Date(s) approved 3/2/24-6/30/24    Case #[id number]     Patient advised by          [x] [employer] Systemshart Message  [] Phone call   []LMOM  []L/M to call office as no active Communication consent on file  []Unable to leave detailed message as VM not approved on Communication consent       Pharmacy advised by    [x]Fax  []Phone call    Approval letter scanned into Media Yes

## 2024-06-06 ENCOUNTER — SOCIAL WORK (OUTPATIENT)
Dept: BEHAVIORAL/MENTAL HEALTH CLINIC | Facility: CLINIC | Age: 74
End: 2024-06-06
Payer: MEDICARE

## 2024-06-06 DIAGNOSIS — F22 PARANOIA (HCC): Primary | ICD-10-CM

## 2024-06-06 PROCEDURE — 90834 PSYTX W PT 45 MINUTES: CPT | Performed by: SOCIAL WORKER
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
## 2024-06-11 ENCOUNTER — OFFICE VISIT (OUTPATIENT)
Dept: BEHAVIORAL/MENTAL HEALTH CLINIC | Facility: CLINIC | Age: 74
End: 2024-06-11
Payer: MEDICARE

## 2024-06-11 DIAGNOSIS — F34.1 DYSTHYMIA: Primary | ICD-10-CM

## 2024-06-11 PROCEDURE — 90853 GROUP PSYCHOTHERAPY: CPT | Performed by: SOCIAL WORKER

## 2024-06-11 NOTE — PSYCH
"Behavioral Health Psychotherapy Group Progress Note    Psychotherapy Provided: Group Therapy    1. Dysthymia            Goals addressed in session: Goal 1     Group Name: [facility]eling[facility]joni[facility]ata[facility]    Topic(s) covered: How to discuss personal and political differences diplomatically     Skill(s) covered: Explore and validate experiences/Perspectives     Group summary:  Four members attended    Data: Shawn attended today's group. He was moderately more engaged than usual and asked questions/contributed ideas on the topic.     Substance Abuse was not addressed during this session. If the client is diagnosed with a co-occurring substance use disorder, please indicate any changes in the frequency or amount of use: . Stage of change for addressing substance use diagnoses: Contemplation    ASSESSMENT:  Shawn appeared  with a Euthymic/ normal mood. His affect is Normal range and intensity, which is congruent, with his mood and the content of the session. He appeared to actively participated in the group and interacted appropriately with and was supportive of the other group members.     Shawn Marquez presents with a nonerisk of suicide,nonerisk of self-harm, and none risk of harm to others.    For any risk assessment that surpasses a \"low\" rating, a safety plan must be developed.    A safety plan was indicated: no  If yes, describe in detail     PLAN: Shawn will continued to recgonize and resist interpretations of other's behavior; will continue in individual and group treatment settings. The next group is scheduled for 6/25 and will cover the topic of follow up from today's discussion.    Behavioral Health Treatment Plan and Discharge Planning: Shawn Marquez is aware of and agrees to continue to work on their treatment plan. They have identified and are working toward their discharge goals. yes    Visit start and stop times:    06/11/24  Start Time: 1200  Stop Time: 1315  Total Visit Time: 75 minutes      "

## 2024-06-12 ENCOUNTER — SOCIAL WORK (OUTPATIENT)
Dept: BEHAVIORAL/MENTAL HEALTH CLINIC | Facility: CLINIC | Age: 74
End: 2024-06-12
Payer: MEDICARE

## 2024-06-12 DIAGNOSIS — F22 PARANOIA (HCC): Primary | ICD-10-CM

## 2024-06-12 PROCEDURE — 90834 PSYTX W PT 45 MINUTES: CPT | Performed by: SOCIAL WORKER
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
## 2024-06-13 ENCOUNTER — TELEPHONE (OUTPATIENT)
Dept: FAMILY MEDICINE CLINIC | Facility: CLINIC | Age: 74
End: 2024-06-13

## 2024-06-13 ENCOUNTER — TELEPHONE (OUTPATIENT)
Dept: PSYCHIATRY | Facility: CLINIC | Age: 74
End: 2024-06-13

## 2024-06-13 DIAGNOSIS — F32.9 MAJOR DEPRESSIVE DISORDER WITH CURRENT ACTIVE EPISODE, UNSPECIFIED DEPRESSION EPISODE SEVERITY, UNSPECIFIED WHETHER RECURRENT: ICD-10-CM

## 2024-06-13 DIAGNOSIS — F01.52 VASCULAR DEMENTIA WITH PARANOIA (HCC): ICD-10-CM

## 2024-06-13 DIAGNOSIS — F22 PARANOID BEHAVIOR (HCC): Primary | ICD-10-CM

## 2024-06-13 DIAGNOSIS — F41.9 ANXIETY: ICD-10-CM

## 2024-06-13 NOTE — TELEPHONE ENCOUNTER
received a call from [facility] asking if we can call the patient in regards to his referral. She stated he doesn't use his my chart much. She was seeking to get the patient scheduled.  explained we are working off of a work list.  called patient in regards to the referral and left vm for the patient to call the office back.

## 2024-06-13 NOTE — TELEPHONE ENCOUNTER
Contacted [facility] spoke with Monae.    I spoke with Monae that Psychiatric had reached out to patient via [employer] but patient does not utilize mychart, Per Monae they reach out to patients like that and then they will call patient. I asked Monae if a message can be sent to a psychiatric doctor as patients paranoia has gotten worse and he needs to be scheduled as soon as possible, Monae reported that there is still a wait list but she would send over a message to their team who handles scheduling. Monae reported that referral was in as Routine, I asked if provider changed to STAT she said that there was still a waiting list.     Per Provider SAM Tolentnio she needs patient to be seen ASAP.     Malika VARGAS changed referral to STAT.

## 2024-06-14 ENCOUNTER — TELEPHONE (OUTPATIENT)
Age: 74
End: 2024-06-14

## 2024-06-14 ENCOUNTER — TELEPHONE (OUTPATIENT)
Dept: PSYCHIATRY | Facility: CLINIC | Age: 74
End: 2024-06-14

## 2024-06-14 NOTE — TELEPHONE ENCOUNTER
Patient called requesting refill for Toprol-XL 25 mg 24 hr capsule Patient made aware medication was refilled on 4/29/24 for 90 tablets with 3 refills to [address] pharmacy. Patient instructed to contact the pharmacy to obtain refills of medication. Patient verbalized understanding.

## 2024-06-14 NOTE — TELEPHONE ENCOUNTER
Spoke to client who has a Medicare Advantage plan that we are not contracted with.  IB message sent back letting referral source that we are unable to schedule with this client at this time.

## 2024-06-20 ENCOUNTER — TELEPHONE (OUTPATIENT)
Dept: PSYCHIATRY | Facility: CLINIC | Age: 74
End: 2024-06-20

## 2024-06-20 ENCOUNTER — SOCIAL WORK (OUTPATIENT)
Dept: BEHAVIORAL/MENTAL HEALTH CLINIC | Facility: CLINIC | Age: 74
End: 2024-06-20
Payer: MEDICARE

## 2024-06-20 DIAGNOSIS — F22 PARANOIA (HCC): Primary | ICD-10-CM

## 2024-06-20 PROCEDURE — 90837 PSYTX W PT 60 MINUTES: CPT | Performed by: SOCIAL WORKER

## 2024-06-20 NOTE — TELEPHONE ENCOUNTER
Contacted patient in regards to scheduling ASAP med mgmt appt. Spoke w. Patient and was able to schedule appt       Jane Shepherd 8/15 @ 8a

## 2024-06-21 NOTE — TELEPHONE ENCOUNTER
Janine- can you please see if this patient is available on 7/2 at 2:00 in office at [place]? If so, can you place him on my schedule at 2:00 IN PERSON (it is a virtual day, but I have no one scheduled in the afternoon as of yet and I'll make the trip in to see him).  If he can, then please block my schedule for 1.5 hrs (60 min eval + 30 min charting time).  Thank you.     Maria Esther- I copied you on this so you were in the loop.
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
## 2024-06-21 NOTE — TELEPHONE ENCOUNTER
Contacted patient in regard to [employer] from provider in attempts to offer patient a sooner in-person visit. LM for patient to contact intake dept in regards to upcoming appt with Jane Shepherd

## 2024-06-25 ENCOUNTER — OFFICE VISIT (OUTPATIENT)
Dept: BEHAVIORAL/MENTAL HEALTH CLINIC | Facility: CLINIC | Age: 74
End: 2024-06-25
Payer: MEDICARE

## 2024-06-25 DIAGNOSIS — F22 PARANOIA (HCC): Primary | ICD-10-CM

## 2024-06-25 PROCEDURE — 90853 GROUP PSYCHOTHERAPY: CPT | Performed by: SOCIAL WORKER

## 2024-06-25 NOTE — PSYCH
"Behavioral Health Psychotherapy Group Progress Note    Psychotherapy Provided: Group Therapy    1. Paranoia (HCC)            Goals addressed in session: Goal 1     Group Name: FeelingGojoniMakeenanhallie    Topic(s) covered:  Various stressors; experience and overcoming of traumatic events      Skill(s) covered: Visualization, persistence and connection as factors in growth and wellness      Group summary:  Four members attended    Data: Shawn attended today's group. He humorously states he's \"lousy\" due to bedbugs at home but is otherwise doing well and appears engaged.     Substance Abuse was not addressed during this session. If the client is diagnosed with a co-occurring substance use disorder, please indicate any changes in the frequency or amount of use: . Stage of change for addressing substance use diagnoses: No substance use/Not applicable    ASSESSMENT:  Shawn appeared  with a Euthymic/ normal mood. His affect is Normal range and intensity, which is congruent, with his mood and the content of the session. He appeared to actively participated in the group and interacted appropriately with and was supportive of the other group members.     Shawn Marquez presents with a nonerisk of suicide,nonerisk of self-harm, and none risk of harm to others.    For any risk assessment that surpasses a \"low\" rating, a safety plan must be developed.    A safety plan was indicated: no  If yes, describe in detail     PLAN: Shawn will continue to engage with others and challenge distorted/incomplete thinking. The next group is scheduled for 7/2 and will cover the topic of the results of these efforts.    Behavioral Health Treatment Plan and Discharge Planning: Shawn Marquez is aware of and agrees to continue to work on their treatment plan. They have identified and are working toward their discharge goals. yes    Visit start and stop times:    06/25/24  Start Time: 1205  Stop Time: 1300  Total Visit Time: 55 minutes      "

## 2024-06-26 ENCOUNTER — SOCIAL WORK (OUTPATIENT)
Dept: BEHAVIORAL/MENTAL HEALTH CLINIC | Facility: CLINIC | Age: 74
End: 2024-06-26
Payer: MEDICARE

## 2024-06-26 DIAGNOSIS — F22 PARANOIA (HCC): Primary | ICD-10-CM

## 2024-06-26 PROCEDURE — 90834 PSYTX W PT 45 MINUTES: CPT | Performed by: SOCIAL WORKER
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
## 2024-06-27 ENCOUNTER — TELEPHONE (OUTPATIENT)
Dept: FAMILY MEDICINE CLINIC | Facility: CLINIC | Age: 74
End: 2024-06-27

## 2024-06-28 ENCOUNTER — OFFICE VISIT (OUTPATIENT)
Dept: FAMILY MEDICINE CLINIC | Facility: CLINIC | Age: 74
End: 2024-06-28
Payer: MEDICARE

## 2024-06-28 VITALS
BODY MASS INDEX: 26.07 KG/M2 | HEART RATE: 68 BPM | WEIGHT: 186.2 LBS | SYSTOLIC BLOOD PRESSURE: 124 MMHG | OXYGEN SATURATION: 98 % | DIASTOLIC BLOOD PRESSURE: 80 MMHG | HEIGHT: 71 IN | TEMPERATURE: 98.1 F

## 2024-06-28 DIAGNOSIS — F01.52 VASCULAR DEMENTIA WITH PARANOIA (HCC): Primary | ICD-10-CM

## 2024-06-28 PROCEDURE — 99213 OFFICE O/P EST LOW 20 MIN: CPT

## 2024-06-28 PROCEDURE — G2211 COMPLEX E/M VISIT ADD ON: HCPCS
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
## 2024-07-02 ENCOUNTER — OFFICE VISIT (OUTPATIENT)
Dept: PSYCHIATRY | Facility: CLINIC | Age: 74
End: 2024-07-02
Payer: MEDICARE

## 2024-07-02 DIAGNOSIS — F41.9 ANXIETY: Primary | ICD-10-CM

## 2024-07-02 DIAGNOSIS — F60.0 PARANOID PERSONALITY DISORDER (HCC): ICD-10-CM

## 2024-07-02 PROCEDURE — 90792 PSYCH DIAG EVAL W/MED SRVCS: CPT | Performed by: NURSE PRACTITIONER

## 2024-07-02 RX ORDER — ESCITALOPRAM OXALATE 5 MG/1
5 TABLET ORAL DAILY
Qty: 30 TABLET | Refills: 1 | Status: SHIPPED | OUTPATIENT
Start: 2024-07-02

## 2024-07-02 NOTE — PSYCH
PSYCHIATRIC EVALUATION     [facility] - PSYCHIATRIC ASSOCIATES    Name and Date of Birth:  Shawn Marquez 73 y.o. 1950 MRN: [id number]    Date of Visit: July 2, 2024    Reason for visit: Full psychiatric intake assessment for medication management        Chief Complaint   Patient presents with    [facility]    Medication Management    Behavior Issues    Paranoia    Anxiety       HPI:     Shawn Marquez is a 73 y.o. male, , domiciled with wife, retired, w/ PMH of HTN, DM 2, BPH, DVT, TIA, HLD, A-fib and PPH of vascular dementia with paranoia, depression, no prior psychiatric admissions, no prior SA, no h/o self-injurious behavior,  who presented to the mental health clinic for the initial intake and psychiatric evaluation on July 2, 2024.  Shawn was referred to the clinic by PCP, Malika VARGAS, on Wellbutrin  mg daily, Lexapro 10 mg daily,  Valium 5 mg every 8 hours as needed.  Tolerating medication well with no medication side effects observed or reported.  Actively involved in individual psychotherapy with Shayy Peñaloza (weekly individual/ twice weekly with group).      Reports first meeting with psychiatrist approximately 1 year ago due to depression secondary to his wife leaving the home.  This occurred in the context of Shawn not attending her mother's viewing for fear that he would verbally/physically become aggressive with his wife's brother.  Adds that his son also stopped communicating with him at that time.  During this period, he endorsed frequent crying spells, decreased motivation, decreased energy, poor appetite with 35 pound weight loss, and passive suicidal ideation.  He is uncertain what the psychiatrist had prescribed for him, or what his diagnosis was, but he was unwilling to take any medication at that time.  Depressive symptoms resolved upon his wife's return home in April of this year. Reports that he was started on Haldol by  "PCP around the time of her return, but the medication, regardless of dose, did not result in any change I his thinking pattern. Per chart review, consult occurred with [facility] psychiatrist, Dr. Johnson, and PCP discontinued Haldol as it appeared his symptoms more more in line with paranoid personality disorder and less a psychotic process.  Patient noted an improvement in energy level, but no other changes.  PCP initiated treatment with Wellbutrin XL and then Lexapro to which he is currently maintained.  Shawn describes a history of suspiciousness/paranoia dating back approximately 10 years, though states that he has had issues with \"people not liking me\" since his teenage years.  Paranoia has involved previous coworkers, neighbors, the state police department, family members, and more recently random people with differing political views (MONIE).  The context of his suspicions ranged from individuals talking about him, manipulating objects at his work, disruptions in his WIFI/electricity/home security system, destruction of home appliances, and tampering with his vehicles.  His suspicions are not corroborated or supported by others and they remain fixed.  He feels guilty that his past behaviors would have caused individuals to act so aggressively towards him, though he is unable to identify any specific reasons/behaviors that would warrant this reaction.      Per Neurology's, Dr. House,  note dated 11/8/22:  To review, patient started having paranoid about 10 years ago, slightly getting worse in last 3 years that he has also started accusing his brother-in-law and sister-in-law, almost all the neighbors.   Possible visual hallucinations that he sees people in his house.   No major change in memory.  No seizure.  Independent in all daily living activities.  Seen psychiatrist however does not want to try medication, plan to see again next week.   Impression- since paranoia is almost about the same in last 10 years less " "likely neuro degenerative issue however we will do MRI with NeuroQuant to rule out Alzheimer's disease.  Differentials also include Lewy body dementia.   Less likely frontotemporal dementia given no major change in other behavior in last 10 years.   Most likely his symptoms are nonorganic origin.  Patient plans to see psychiatrist next week.  Will benefit from low-dose antipsychotics.     On evaluation today, Shawn reports seeking treatment to establish care. Currently reports feeling “ok” for the past couple months. Both wife and Peter feel that current medications have resulted in an improvement in Shawn's ability to remain calm and \"let things go\".  Utilized Valium once since prescribed to assist in falling asleep.  He has appreciated benefit in therapy as he is able to refrain from conversations with his wife about his suspicions, though he feels that he may be at a cross roads as he believes his current challenges are not psychiatric in nature, therefore they can not be fixed with therapy. He endorses anxiety related to limited finances and ongoing, fixed suspiciousness of others.  Worries do not extend to other areas/topics in life. No panic attacks. Appetite has improved with no change in weight since April.  Energy level is baseline.  Endorses insomnia.  Typically goes to be around 1 AM and wakes at 6 AM.  Sleeps on average 5 hours/night. Naps frequently during the day.  Endorses issues with sleep initiation/maintenance/early awakening. Enjoys music and going to concerts.  Denied anhedonia, hopelessness, or worthlessness.  Does feel guilt as noted above.  No thought constriction related to death. Denied SI/HI, intent or plan upon direct inquiry at this time.   PHQ-9 score: 2.  BERTHA-7 score: 4.    Endorses a history of trauma.  No intrusive, avoidance, negative alterations, or hyperarousal symptoms of PTSD noted. Denied any history of disordered eating.  No symptoms of OCD including obsessive, ritualistic " acts, intrusive thoughts or images noted. He denies current or history of perceptual disturbances including AVH, thought insertion/ withdrawal, or ideas of reference.  He does not appear internally preoccupied at time of encounter.  No history or current symptoms of catia.  He is not currently irritable, grandiose, or pathologically euphoric. Denies recent ETOH or illicit substance abuse.      Psychiatric Review Of Systems:    Sleep changes:  Chronic insomnia  Appetite changes: no change  Weight changes: yes, lost 30 pounds when wife was not living in the home; appetite/weight has stabilized since her return  Energy/anergy: no change  Interest/pleasure/anhedonia: no  Somatic symptoms: no  Anxiety/panic: worrying  Catia: no  Guilty/hopeless: Yes- guilt  Self injurious behavior/risky behavior: no  Suicidal ideation: no  Homicidal ideation: no  Auditory hallucinations: no  Visual hallucinations: no  Other hallucinations: no  Delusional thinking: paranoid delusions  Eating disorder history: no  Obsessive/compulsive symptoms: no          Review Of Systems:    Constitutional negative   ENT negative   Cardiovascular negative   Respiratory negative   Gastrointestinal negative   Genitourinary negative   Musculoskeletal negative   Integumentary negative   Neurological negative   Endocrine negative   Other Symptoms none, all other systems are negative         PHQ-2/9 Depression Screening    Little interest or pleasure in doing things: 0 - not at all  Feeling down, depressed, or hopeless: 1 - several days  Trouble falling or staying asleep, or sleeping too much: 0 - not at all  Feeling tired or having little energy: 0 - not at all  Poor appetite or overeatin - not at all  Feeling bad about yourself - or that you are a failure or have let yourself or your family down: 1 - several days  Trouble concentrating on things, such as reading the newspaper or watching television: 0 - not at all  Moving or speaking so slowly that  other people could have noticed. Or the opposite - being so fidgety or restless that you have been moving around a lot more than usual: 0 - not at all  Thoughts that you would be better off dead, or of hurting yourself in some way: 0 - not at all  PHQ-9 Score: 2  PHQ-9 Interpretation: No or Minimal depression         BERTHA-7 Flowsheet Screening      Flowsheet Row Most Recent Value   Over the last 2 weeks, how often have you been bothered by any of the following problems?    Feeling nervous, anxious, or on edge 1   Not being able to stop or control worrying 1   Worrying too much about different things 1   Trouble relaxing 0   Being so restless that it is hard to sit still 0   Becoming easily annoyed or irritable 0   Feeling afraid as if something awful might happen 1   BERTHA-7 Total Score 4              Past Psychiatric History:      Inpatient psychiatric admissions:   No history of past inpatient psychiatric admissions  Prior outpatient psychiatric linkage:   Was in outpatient psychiatric treatment in the past with a psychiatrist at [facility] approximately 1 year ago for 2 visits only  Past/current psychotherapy:    Has a therapist at [facility] (Shayy Peñaloza)  Substance abuse inpatient/outpatient rehabilitation: No  History of suicidal attempts/gestures: no  History of violence/aggressive behaviors: no, but threatened others  Past Psychiatric Medication Trials:   Lexapro (current)  Haldol (for approximately 2 months)  Wellbutrin XL (constipation at doses over 150 mg)  Valium (used only once, for sleep and anxiety)    Substance Abuse History:     Tobacco/alcohol/caffeine:   Tobacco use:  no  Caffeine Use:  2 cups/day  Alcohol use:  social use/occasional use  Other substance use:   Cannabis use- started at 20 years old, last use 1 month; smoke flower, 4-5 times/daily; medical card; helps with relaxation  Endorses previous experimentation with: cocaine in the 80's; recreational  use  Longest clean time: n/a  History of Inpatient/Outpatient rehabilitation program: none      Social History:      Developmental: Denies a history of milestone/developmental delay. Denies a history of in-utero exposure to toxins/illicit substances. There is no documented history of IEP or need for special education.  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 Shawn Marquez has no history of arrests or violence with a deadly weapon.     Traumatic History:      Abuse: none  Other Traumatic Events: Father completed suicide by hanging when patient was 15 years old; father was an alcoholic and often left Shawn in the car alone as a child while he was at the bar; father often left the home for long periods of time    Family Psychiatric History:     Psychiatric Illness: Depression (father, sister, brother)  Substance Abuse: Alcohol abuse, father  Suicide Attempts: suicide attempt, brother; suicidal ideation, sister; completed suicide, hanging, father         Family History   Problem Relation Age of Onset    Hypertension Mother     Leukemia Father     Heart disease Father     Alcohol abuse Father     Depression Father     Completed Suicide  Father     Depression Sister     Suicide Attempts Brother     Alcohol abuse Brother     Depression Brother     Heart disease Family     Leukemia Family          Past Medical History:    Past Medical History:   Diagnosis Date    Anxiety     Depression     DVT (deep venous thrombosis) (HCC)     Gross hematuria     LA...3/29/16   R...4/3/17     Hematuria     LA.....4/12/16    R....4/3/17    Hyperlipidemia     Hypertension     Long-term (current) use of anticoagulants     LA...3/29/16   R...4/3/17     Seasonal allergies         Past Surgical History:    Procedure Laterality Date    APPENDECTOMY      WISDOM TOOTH EXTRACTION       No Known Allergies    History Review:    The following portions of the patient's history were reviewed and updated as appropriate:allergies, current medications, past family history, past medical history, past social history, past surgical history and problem list.    OBJECTIVE:    Vital signs in last 24 hours:    There were no vitals filed for this visit.    Mental Status Evaluation:    Appearance age appropriate, casually dressed   Behavior cooperative, calm   Speech normal rate, normal volume, normal pitch, increased latency of response, delayed   Mood dysphoric, anxious   Affect constricted   Thought Processes organized, perseverative, decreased rate of thoughts, negative thinking   Associations perseverative   Thought Content paranoid ideation, negative thinking, ruminations   Perceptual Disturbances: Denies auditory or visual hallucinations and Does not appear to be responding to internal stimuli   Abnormal Thoughts  Risk Potential Denies suicidal or homicidal ideation, plan, or intent   Orientation oriented to person, place, time/date, and situation   Memory recent and remote memory grossly intact   Consciousness alert and awake   Attention Span Concentration Span attention span and concentration appear shorter than expected for age   Intellect appears to be of average intelligence   Insight limited   Judgement fair   Muscle Strength and  Gait normal muscle strength and normal muscle tone, normal gait and normal balance   Motor Activity no abnormal movements   Language no difficulty naming common objects, no difficulty repeating a phrase   Fund of Knowledge adequate knowledge of current events  adequate fund of knowledge regarding past history  adequate fund of knowledge regarding vocabulary      Lab Results: I have personally reviewed all pertinent laboratory/tests results  Most Recent Labs:   Lab Results   Component Value Date     WBC 12.99 (H) 12/07/2023    RBC 5.61 12/07/2023    HGB 18.4 (H) 12/07/2023    HCT 53.1 (H) 12/07/2023     12/07/2023    RDW 12.6 12/07/2023    NEUTROABS 8.54 (H) 06/22/2022     01/28/2016    SODIUM 138 01/15/2024    K 4.2 01/15/2024     01/15/2024    CO2 25 01/15/2024    BUN 10 01/15/2024    CREATININE 0.93 01/15/2024    GLUCOSE 72 01/28/2016    CALCIUM 10.5 (H) 12/07/2023    AST 26 12/07/2023    ALT 27 12/07/2023    ALKPHOS 75 12/07/2023    PROT 7.3 01/28/2016    TP 8.7 (H) 12/07/2023    BILITOT 0.3 01/28/2016    TBILI 1.42 (H) 12/07/2023    CHOL 159 03/29/2017    CHOLESTEROL 120 08/14/2023    TRIG 193 (H) 08/14/2023    HDL 50 08/14/2023    LDLCALC 39 08/14/2023    NONHDLC 103 06/06/2019    KLM1QDTNGKOW 2.135 12/07/2023     EKG   Lab Results   Component Value Date    VENTRATE 123 12/07/2023    ATRIALRATE 286 12/07/2023    PRINT none 08/26/2022    PRINT 00:09:43 08/26/2022    PRINT 182 08/26/2022    PRINT 93 08/26/2022    PRINT 141 08/26/2022    PRINT 149 08/26/2022    PRINT End of Vasodilator Protocol 08/26/2022    PRINT A-Fib 08/26/2022    PRINT (220 - Age)*85% 08/26/2022    QRSDINT 64 12/07/2023    QTINT 320 12/07/2023    PAXIS 121 06/22/2022    QRSAXIS -59 12/07/2023    TWAVEAXIS 11 12/07/2023     Imaging Studies:   MRI Brain w/o Contrast 11/27/22:    Narrative & Impression   MRI BRAIN WITHOUT CONTRAST, NeuroQuant IMAGING     INDICATION: R41.3: Other amnesia.   Paranoid Symptoms, mild cognitive changes., Memory dysfunction     COMPARISON:   MRI brain without contrast July 17, 2019.     TECHNIQUE:  Multiplanar, multisequence imaging of the brain was performed.  Sagittal 3D volumetric imaging processed by NEUROQUANT software creating General Morphology and Age Related Atrophy reports.          IMAGE QUALITY:  Diagnostic.     FINDINGS:     BRAIN PARENCHYMA:  There is no discrete mass, mass effect or midline shift.  Brainstem and cerebellum demonstrate normal signal.  No acute intracranial  hemorrhage.  Chronic microhemorrhage in right frontal lobe.  There is no evidence of acute infarction   and diffusion imaging is unremarkable.  Chronic lacunar infarcts in bilateral posterior periventricular white matter, right parietal lobe deep white matter, and right thalamus.  Small scattered hyperintensities on T2/FLAIR imaging are noted in the   periventricular and subcortical white matter demonstrating an appearance that is statistically most likely to represent mild microangiopathic change.  Prominent arachnoid granulation in left posterior fossa.     IMPRESSION:     No acute intracranial abnormality.     Multifocal chronic lacunar infarcts and mild chronic microangiopathy.     NeuroQuant analysis was performed: Normal study; Does not support neurodegeneration.    Ellis House MD  11/30/2022  1:38 PM EST       Reviewed MRI brain, doesn't support neurodegeneration like alzheimer ds. No new strokes.  Previous small chronic infarcts seen in previous imaging as well.       Suicide/Homicide Risk Assessment:    Risk of Harm to Self:  The following ratings are based on assessment at the time of the interview  Demographic risk factors include: , male, age: over 50 or older  Historical Risk Factors include: history of depression, chronic anxiety symptoms, history of traumatic experiences, paranoia  Recent Specific Risk Factors include: current anxiety symptoms, presence of paranoid ideation, feelings of guilt or self blame, worries about finances or work  Protective Factors: no current suicidal ideation, access to mental health treatment, being a parent, being , compliant with medications, compliant with mental health treatment, connection to own children, having a desire to be alive, having a desire to live, having a sense of purpose or meaning in life, no substance use problems, restricted access to lethal means, stable living environment, supportive family  Based on today's assessment, Shawn  presents the following risk of harm to self: low    Risk of Harm to Others:  The following ratings are based on assessment at the time of the interview  Demographic Risk Factors include: male.  Historical Risk Factors include: none.  Recent Specific Risk Factors include: paranoid ideation, paranoid delusions, multiple stressors, sees self as a victim .  Protective Factors: no current homicidal ideation, access to mental health treatment, being a parent, being , compliant with medications, compliant with mental health treatment, connection to own children, moral system, no substance use problems, restricted access to lethal means, safe and stable living environment, supportive family  Based on today's assessment, Shawn presents the following risk of harm to others: low    The following interventions are recommended: no intervention changes needed. Although patient's acute lethality risk is LOW, long-term/chronic lethality risk is mildly elevated given history of trauma and paranoia. However, at the current moment, Shawn is future-oriented, forward-thinking, and demonstrates ability to act in a self-preserving manner as evidenced by volitionally presenting to the clinic today, seeking treatment.  At this juncture, inpatient hospitalization is not currently warranted. To mitigate future risk, patient should adhere to treatment recommendations, avoid alcohol/illicit substance use, utilize community-based resources and familiar support, and prioritize mental health treatment.     Assessment/Plan:   Shawn Marquez is a 73 y.o. male, , domiciled with wife, retired, w/ PMH of HTN, DM 2, BPH, DVT, TIA, HLD, A-fib and PPH of vascular dementia with paranoia, depression, no prior psychiatric admissions, no prior SA, no h/o self-injurious behavior,  who presented to the mental health clinic for the initial intake and psychiatric evaluation on July 2, 2024.  Shawn was referred to the clinic by PCP, Malika  "Kristin VARGAS, on Wellbutrin  mg daily, Lexapro 10 mg daily,  Valium 5 mg every 8 hours as needed.  Tolerating medication well with no medication side effects observed or reported.  Actively involved in individual psychotherapy with Shayy Peñaloza (weekly individual/ twice weekly with group). Reports first meeting with psychiatrist approximately 1 year ago due to depression secondary to his wife leaving the home.  This occurred in the context of Shawn not attending her mother's viewing for fear that he would verbally/physically become aggressive with his wife's brother.  Adds that his son also stopped communicating with him at that time.  Depressive symptoms resolved upon his wife's return home in April of this year. Reports that he was started on Haldol by PCP around the time of her return, but the medication, regardless of dose, did not result in any change I his thinking pattern. Per chart review, consult occurred with [facility] psychiatrist, Dr. Johnson, and PCP discontinued Haldol as it appeared his symptoms more more in line with paranoid personality disorder and less a psychotic process. PCP initiated treatment with Wellbutrin XL and then Lexapro to which he is currently maintained.  Shawn describes a history of suspiciousness/paranoia dating back approximately 10 years, though states that he has had issues with \"people not liking me\" since his teenage years.  Paranoia has involved previous coworkers, neighbors, the state police department, family members, and more recently random people with differing political views (MONIE).  The context of his suspicions ranged from individuals talking about him, manipulating objects at his work, disruptions in his WIFI/electricity/home security system, destruction of home appliances, and tampering with his vehicles.  His suspicions are not corroborated or supported by others and they remain fixed.  He feels guilty that his past behaviors would have caused " individuals to act so aggressively towards him, though he is unable to identify any specific reasons/behaviors that would warrant this reaction. PHQ-9 score: 2; BERTHA-7 score: 4.  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  Currently he is not at risk for suicide, homicide, self-injury, aggressive behaviors, self-neglect, or neglect of dependents or children. Given this presentation, the patient will benefit from further outpatient follow up for management of his symptoms.       Today's Plan/Medical Decision Making:    Psychopharmacologically, Shawn endorses ongoing, likely life-long suspiciousness/paranoia which remained fixed despite antipsychotic use in the past.  He has found Lexapro helpful in decreasing the anxiety he feels regarding suspicious behaviors of others and is able to refrain from discussing these thoughts on a constant basis with his wife.  As such, Lexapro will be increased to 15 mg daily to assist with anxious ruminations.       Plan:  Increase Lexapro to 15 mg daily for depression and anxiety  Continue Wellbutrin  mg daily for depression  Continue Valium 5 mg every 8 hours as needed for anxiety or sleep per PCP  Psychotherapy-continue individual and group therapy  Follow up with primary care provider for ongoing medical care  Follow up with this provider in 6 weeks     Diagnoses and all orders for this visit:    Anxiety  -     escitalopram (LEXAPRO) 5 mg tablet; Take 1 tablet (5 mg total) by mouth daily Take with 10 mg for a total daily dose of 15 mg daily.    Paranoid personality disorder (HCC)          Treatment Recommendations/Precautions:    Aware of 24 hour and weekend coverage for urgent situations accessed by calling [facility] main practice number      Medications Risks/Benefits:      Risks, Benefits And Possible Side Effects Of Medications:    Risks, benefits, and possible side effects of  medications explained to Shawn including risk of suicidality and serotonin syndrome related to treatment with antidepressants. He verbalizes understanding and agreement for treatment.    Controlled Medication Discussion:     Not applicable - controlled prescriptions are not prescribed by this practice    Treatment Plan:    Completed and signed during the session: Not applicable - Treatment Plan not due at this session    Visit Time    Visit Start Time: 2:00 PM  Visit Stop Time: 3:30 PM  Total Visit Duration:  90 minutes    The total visit duration detailed above includes: patient engagement, medication management, psychotherapy/counseling, discussion regarding treatment goals, documentation, review of past medical records, and coordination of care.    Note Share Disclaimer:     This note was not shared with the patient due to reasonable likelihood of causing patient harm      SAM Jacobsen 07/02/24    This note was completed in part utilizing [employer]. Grammatical, translation, syntax errors, random word insertions, spelling mistakes, and incomplete sentences may be an occasional consequence of this system secondary to software limitations with voice recognition, ambient noise, and hardware issues. If you have any questions or concerns about the content, text, or information contained within the body of this dictation, please contact the provider for clarification.        room air

## 2024-07-06 ENCOUNTER — HOSPITAL ENCOUNTER (OUTPATIENT)
Facility: HOSPITAL | Age: 74
Setting detail: OBSERVATION
Discharge: HOME | End: 2024-07-07
Attending: EMERGENCY MEDICINE | Admitting: INTERNAL MEDICINE
Payer: MEDICARE

## 2024-07-06 ENCOUNTER — APPOINTMENT (EMERGENCY)
Dept: CT IMAGING | Facility: HOSPITAL | Age: 74
End: 2024-07-06
Payer: MEDICARE

## 2024-07-06 ENCOUNTER — APPOINTMENT (EMERGENCY)
Dept: RADIOLOGY | Facility: HOSPITAL | Age: 74
End: 2024-07-06
Payer: MEDICARE

## 2024-07-06 DIAGNOSIS — R55 SYNCOPE: Primary | ICD-10-CM

## 2024-07-06 DIAGNOSIS — I50.9 CONGESTIVE HEART FAILURE (CHF) (HCC): ICD-10-CM

## 2024-07-06 LAB
2HR DELTA HS TROPONIN: 1 NG/L
ALBUMIN SERPL BCG-MCNC: 4.1 G/DL (ref 3.5–5)
ALP SERPL-CCNC: 57 U/L (ref 34–104)
ALT SERPL W P-5'-P-CCNC: 22 U/L (ref 7–52)
ANION GAP SERPL CALCULATED.3IONS-SCNC: 5 MMOL/L (ref 4–13)
APTT PPP: 36 SECONDS (ref 23–37)
AST SERPL W P-5'-P-CCNC: 18 U/L (ref 13–39)
ATRIAL RATE: 264 BPM
ATRIAL RATE: 264 BPM
ATRIAL RATE: 267 BPM
BASOPHILS # BLD AUTO: 0.04 THOUSANDS/ÂΜL (ref 0–0.1)
BASOPHILS NFR BLD AUTO: 0 % (ref 0–1)
BILIRUB SERPL-MCNC: 0.51 MG/DL (ref 0.2–1)
BNP SERPL-MCNC: 126 PG/ML (ref 0–100)
BUN SERPL-MCNC: 17 MG/DL (ref 5–25)
CALCIUM SERPL-MCNC: 9.5 MG/DL (ref 8.4–10.2)
CARDIAC TROPONIN I PNL SERPL HS: 12 NG/L
CARDIAC TROPONIN I PNL SERPL HS: 13 NG/L
CHLORIDE SERPL-SCNC: 103 MMOL/L (ref 96–108)
CO2 SERPL-SCNC: 30 MMOL/L (ref 21–32)
CREAT SERPL-MCNC: 0.9 MG/DL (ref 0.6–1.3)
EOSINOPHIL # BLD AUTO: 0.35 THOUSAND/ÂΜL (ref 0–0.61)
EOSINOPHIL NFR BLD AUTO: 3 % (ref 0–6)
ERYTHROCYTE [DISTWIDTH] IN BLOOD BY AUTOMATED COUNT: 12.2 % (ref 11.6–15.1)
GFR SERPL CREATININE-BSD FRML MDRD: 84 ML/MIN/1.73SQ M
GLUCOSE SERPL-MCNC: 102 MG/DL (ref 65–140)
GLUCOSE SERPL-MCNC: 147 MG/DL (ref 65–140)
HCT VFR BLD AUTO: 40.9 % (ref 36.5–49.3)
HGB BLD-MCNC: 13.8 G/DL (ref 12–17)
IMM GRANULOCYTES # BLD AUTO: 0.06 THOUSAND/UL (ref 0–0.2)
IMM GRANULOCYTES NFR BLD AUTO: 1 % (ref 0–2)
INR PPP: 2.43 (ref 0.84–1.19)
LIPASE SERPL-CCNC: 21 U/L (ref 11–82)
LYMPHOCYTES # BLD AUTO: 1.9 THOUSANDS/ÂΜL (ref 0.6–4.47)
LYMPHOCYTES NFR BLD AUTO: 19 % (ref 14–44)
MCH RBC QN AUTO: 32.5 PG (ref 26.8–34.3)
MCHC RBC AUTO-ENTMCNC: 33.7 G/DL (ref 31.4–37.4)
MCV RBC AUTO: 97 FL (ref 82–98)
MONOCYTES # BLD AUTO: 0.79 THOUSAND/ÂΜL (ref 0.17–1.22)
MONOCYTES NFR BLD AUTO: 8 % (ref 4–12)
NEUTROPHILS # BLD AUTO: 7.09 THOUSANDS/ÂΜL (ref 1.85–7.62)
NEUTS SEG NFR BLD AUTO: 69 % (ref 43–75)
NRBC BLD AUTO-RTO: 0 /100 WBCS
P AXIS: 102 DEGREES
P AXIS: 72 DEGREES
P AXIS: 76 DEGREES
PLATELET # BLD AUTO: 182 THOUSANDS/UL (ref 149–390)
PMV BLD AUTO: 10.6 FL (ref 8.9–12.7)
POTASSIUM SERPL-SCNC: 4.3 MMOL/L (ref 3.5–5.3)
PROT SERPL-MCNC: 6.9 G/DL (ref 6.4–8.4)
PROTHROMBIN TIME: 26.9 SECONDS (ref 11.6–14.5)
QRS AXIS: -24 DEGREES
QRS AXIS: -29 DEGREES
QRS AXIS: -29 DEGREES
QRSD INTERVAL: 70 MS
QRSD INTERVAL: 76 MS
QRSD INTERVAL: 78 MS
QT INTERVAL: 422 MS
QT INTERVAL: 424 MS
QT INTERVAL: 424 MS
QTC INTERVAL: 442 MS
QTC INTERVAL: 444 MS
QTC INTERVAL: 444 MS
RBC # BLD AUTO: 4.24 MILLION/UL (ref 3.88–5.62)
SODIUM SERPL-SCNC: 138 MMOL/L (ref 135–147)
T WAVE AXIS: 19 DEGREES
T WAVE AXIS: 23 DEGREES
T WAVE AXIS: 30 DEGREES
VENTRICULAR RATE: 66 BPM
WBC # BLD AUTO: 10.23 THOUSAND/UL (ref 4.31–10.16)

## 2024-07-06 PROCEDURE — 96374 THER/PROPH/DIAG INJ IV PUSH: CPT

## 2024-07-06 PROCEDURE — 83880 ASSAY OF NATRIURETIC PEPTIDE: CPT | Performed by: EMERGENCY MEDICINE

## 2024-07-06 PROCEDURE — 96361 HYDRATE IV INFUSION ADD-ON: CPT

## 2024-07-06 PROCEDURE — 83690 ASSAY OF LIPASE: CPT | Performed by: EMERGENCY MEDICINE

## 2024-07-06 PROCEDURE — 71045 X-RAY EXAM CHEST 1 VIEW: CPT

## 2024-07-06 PROCEDURE — 80053 COMPREHEN METABOLIC PANEL: CPT | Performed by: EMERGENCY MEDICINE

## 2024-07-06 PROCEDURE — 85730 THROMBOPLASTIN TIME PARTIAL: CPT | Performed by: EMERGENCY MEDICINE

## 2024-07-06 PROCEDURE — 93010 ELECTROCARDIOGRAM REPORT: CPT | Performed by: INTERNAL MEDICINE

## 2024-07-06 PROCEDURE — 93005 ELECTROCARDIOGRAM TRACING: CPT

## 2024-07-06 PROCEDURE — 99285 EMERGENCY DEPT VISIT HI MDM: CPT

## 2024-07-06 PROCEDURE — 82948 REAGENT STRIP/BLOOD GLUCOSE: CPT

## 2024-07-06 PROCEDURE — 99285 EMERGENCY DEPT VISIT HI MDM: CPT | Performed by: EMERGENCY MEDICINE

## 2024-07-06 PROCEDURE — 70450 CT HEAD/BRAIN W/O DYE: CPT

## 2024-07-06 PROCEDURE — 36415 COLL VENOUS BLD VENIPUNCTURE: CPT | Performed by: EMERGENCY MEDICINE

## 2024-07-06 PROCEDURE — 85610 PROTHROMBIN TIME: CPT | Performed by: EMERGENCY MEDICINE

## 2024-07-06 PROCEDURE — 84484 ASSAY OF TROPONIN QUANT: CPT | Performed by: EMERGENCY MEDICINE

## 2024-07-06 PROCEDURE — 85025 COMPLETE CBC W/AUTO DIFF WBC: CPT | Performed by: EMERGENCY MEDICINE

## 2024-07-06 RX ORDER — ONDANSETRON 2 MG/ML
4 INJECTION INTRAMUSCULAR; INTRAVENOUS ONCE
Status: COMPLETED | OUTPATIENT
Start: 2024-07-06 | End: 2024-07-06

## 2024-07-06 RX ORDER — METOPROLOL SUCCINATE 25 MG/1
25 TABLET, EXTENDED RELEASE ORAL DAILY
Status: DISCONTINUED | OUTPATIENT
Start: 2024-07-07 | End: 2024-07-07 | Stop reason: HOSPADM

## 2024-07-06 RX ORDER — LOSARTAN POTASSIUM 50 MG/1
100 TABLET ORAL DAILY
Status: DISCONTINUED | OUTPATIENT
Start: 2024-07-07 | End: 2024-07-07 | Stop reason: HOSPADM

## 2024-07-06 RX ORDER — BUPROPION HYDROCHLORIDE 150 MG/1
150 TABLET ORAL EVERY MORNING
Status: DISCONTINUED | OUTPATIENT
Start: 2024-07-07 | End: 2024-07-07 | Stop reason: HOSPADM

## 2024-07-06 RX ORDER — INSULIN LISPRO 100 [IU]/ML
1-6 INJECTION, SOLUTION INTRAVENOUS; SUBCUTANEOUS
Status: DISCONTINUED | OUTPATIENT
Start: 2024-07-06 | End: 2024-07-07 | Stop reason: HOSPADM

## 2024-07-06 RX ORDER — INSULIN LISPRO 100 [IU]/ML
1-6 INJECTION, SOLUTION INTRAVENOUS; SUBCUTANEOUS
Status: DISCONTINUED | OUTPATIENT
Start: 2024-07-07 | End: 2024-07-07 | Stop reason: HOSPADM

## 2024-07-06 RX ORDER — ATORVASTATIN CALCIUM 40 MG/1
40 TABLET, FILM COATED ORAL
Status: DISCONTINUED | OUTPATIENT
Start: 2024-07-07 | End: 2024-07-07 | Stop reason: HOSPADM

## 2024-07-06 RX ORDER — ONDANSETRON 2 MG/ML
4 INJECTION INTRAMUSCULAR; INTRAVENOUS EVERY 6 HOURS PRN
Status: DISCONTINUED | OUTPATIENT
Start: 2024-07-06 | End: 2024-07-07 | Stop reason: HOSPADM

## 2024-07-06 RX ORDER — ACETAMINOPHEN 325 MG/1
650 TABLET ORAL EVERY 6 HOURS PRN
Status: DISCONTINUED | OUTPATIENT
Start: 2024-07-06 | End: 2024-07-07 | Stop reason: HOSPADM

## 2024-07-06 RX ADMIN — SODIUM CHLORIDE 1000 ML: 0.9 INJECTION, SOLUTION INTRAVENOUS at 19:06

## 2024-07-06 RX ADMIN — ONDANSETRON 4 MG: 2 INJECTION INTRAMUSCULAR; INTRAVENOUS at 19:06

## 2024-07-06 NOTE — ED PROVIDER NOTES
History  Chief Complaint   Patient presents with    Syncope     Pt was in the garage and started not feeling well. Pt then sat in a chair and passed out. Pt says that he is nausea. Pt smoked thc before this happened     This is a 74-year-old male who was in a garage that was very hot no engines were running he also smoked marijuana then became lightheaded dizzy with generalized weakness he sat in a chair and had a syncopal episode for 5 minutes followed by some nausea and vomiting.  There is no seizure activity.  He denies any headache chest pain shortness of breath or abdominal pain no prior syncopal episodes.  Patient is on Xarelto for history of a flutter and DVT of the left leg      Medical Problem  Location:  Generalized  Quality:  Weakness with syncope  Severity:  Moderate  Onset quality:  Sudden  Duration:  5 minutes  Progression:  Improving  Chronicity:  New  Context:  Syncopal episode  Associated symptoms: nausea and vomiting    Associated symptoms: no abdominal pain, no chest pain and no shortness of breath        Prior to Admission Medications   Prescriptions Last Dose Informant Patient Reported? Taking?   ASPIRIN LOW DOSE 81 MG EC tablet  Self No No   Sig: TAKE ONE TABLET BY MOUTH DAILY   Patient not taking: Reported on 7/2/2024   acetaminophen (TYLENOL) 500 mg tablet  Self Yes No   Sig: Take 500 mg by mouth every 6 (six) hours as needed for mild pain   atorvastatin (LIPITOR) 40 mg tablet   No No   Sig: TAKE 1 TABLET BY MOUTH DAILY WITH DINNER   buPROPion (WELLBUTRIN XL) 150 mg 24 hr tablet   No No   Sig: Take 1 tablet (150 mg total) by mouth every morning   diazepam (VALIUM) 5 mg tablet   No No   Sig: Take 1 tablet (5 mg total) by mouth every 8 (eight) hours as needed for anxiety or sleep   escitalopram (LEXAPRO) 10 mg tablet   No No   Sig: Take 1 tablet (10 mg total) by mouth daily   escitalopram (LEXAPRO) 5 mg tablet   No No   Sig: Take 1 tablet (5 mg total) by mouth daily Take with 10 mg for a total  daily dose of 15 mg daily.   metFORMIN (GLUCOPHAGE-XR) 500 mg 24 hr tablet   No No   Sig: Take 1 tablet (500 mg total) by mouth 2 (two) times a day with meals   methocarbamol (ROBAXIN) 500 mg tablet  Self No No   Sig: Take 1 tablet (500 mg total) by mouth 3 (three) times a day   Patient not taking: Reported on 2024   metoprolol succinate (TOPROL-XL) 25 mg 24 hr tablet   No No   Sig: TAKE ONE TABLET BY MOUTH EVERY DAY   rivaroxaban (Xarelto) 20 mg tablet   No No   Sig: Take 1 tablet (20 mg total) by mouth daily with breakfast   valsartan (DIOVAN) 160 mg tablet   No No   Sig: TAKE ONE TABLET BY MOUTH EVERY DAY      Facility-Administered Medications: None       Past Medical History:   Diagnosis Date    Anxiety     Depression     DVT (deep venous thrombosis) (HCC)     Gross hematuria     LA...3/29/16   R...4/3/17     Hematuria     LA.....16    R....4/3/17    Hyperlipidemia     Hypertension     Long-term (current) use of anticoagulants     LA...3/29/16   R...4/3/17     Seasonal allergies        Past Surgical History:   Procedure Laterality Date    APPENDECTOMY      WISDOM TOOTH EXTRACTION         Family History   Problem Relation Age of Onset    Hypertension Mother     Leukemia Father     Heart disease Father     Alcohol abuse Father     Depression Father     Completed Suicide  Father     Depression Sister     Suicide Attempts Brother     Alcohol abuse Brother     Depression Brother     Heart disease Family     Leukemia Family      I have reviewed and agree with the history as documented.    E-Cigarette/Vaping    E-Cigarette Use Never User      E-Cigarette/Vaping Substances     Social History     Tobacco Use    Smoking status: Former     Current packs/day: 0.00     Average packs/day: 1 pack/day for 9.0 years (9.0 ttl pk-yrs)     Types: Cigarettes     Start date:      Quit date:      Years since quittin.5    Smokeless tobacco: Never   Vaping Use    Vaping status: Never Used   Substance Use Topics     Alcohol use: Not Currently     Comment: social    Drug use: Yes     Types: Marijuana     Comment: Via pipe X 50 years       Review of Systems   Respiratory:  Negative for shortness of breath.    Cardiovascular:  Negative for chest pain.   Gastrointestinal:  Positive for nausea and vomiting. Negative for abdominal pain.   Neurological:  Positive for syncope and light-headedness.   All other systems reviewed and are negative.      Physical Exam  Physical Exam  Vitals and nursing note reviewed.   Constitutional:       General: He is not in acute distress.     Appearance: He is not toxic-appearing.      Comments: Fatigued   HENT:      Head: Normocephalic and atraumatic.      Right Ear: Tympanic membrane, ear canal and external ear normal.      Left Ear: Tympanic membrane, ear canal and external ear normal.   Eyes:      General:         Right eye: No discharge.         Left eye: No discharge.      Extraocular Movements: Extraocular movements intact.      Pupils: Pupils are equal, round, and reactive to light.   Cardiovascular:      Rate and Rhythm: Normal rate.      Pulses: Normal pulses.      Heart sounds: No murmur heard.     No friction rub. No gallop.   Pulmonary:      Effort: Pulmonary effort is normal. No respiratory distress.      Breath sounds: No stridor. No wheezing, rhonchi or rales.   Abdominal:      General: There is no distension.      Palpations: Abdomen is soft.      Tenderness: There is no abdominal tenderness. There is no guarding or rebound.   Musculoskeletal:         General: Normal range of motion.      Cervical back: Neck supple. No rigidity.      Left lower leg: Edema present.      Comments: Chronic left leg edema   Skin:     General: Skin is warm and dry.      Coloration: Skin is pale. Skin is not jaundiced.      Findings: No bruising.   Neurological:      General: No focal deficit present.      Mental Status: He is oriented to person, place, and time.      Cranial Nerves: No cranial nerve  deficit.      Coordination: Coordination normal.      Comments: Generalized weakness without any focal neurologic deficit   Psychiatric:         Mood and Affect: Mood normal.         Behavior: Behavior normal.         Vital Signs  ED Triage Vitals   Temperature Pulse Respirations Blood Pressure SpO2   07/06/24 1835 07/06/24 1832 07/06/24 1832 07/06/24 1832 07/06/24 1832   97.8 °F (36.6 °C) 67 18 92/67 96 %      Temp Source Heart Rate Source Patient Position - Orthostatic VS BP Location FiO2 (%)   07/06/24 1835 07/06/24 1845 07/06/24 1930 07/06/24 1930 --   Temporal Monitor Sitting Left arm       Pain Score       07/06/24 1915       No Pain           Vitals:    07/06/24 1845 07/06/24 1930 07/06/24 2000 07/06/24 2100   BP: 100/64 103/62 110/64 109/71   Pulse: 66 66 61 65   Patient Position - Orthostatic VS:  Sitting Sitting Sitting         Visual Acuity  Visual Acuity      Flowsheet Row Most Recent Value   L Pupil Size (mm) 3   R Pupil Size (mm) 3            ED Medications  Medications   sodium chloride 0.9 % bolus 1,000 mL (1,000 mL Intravenous New Bag 7/6/24 1906)   ondansetron (ZOFRAN) injection 4 mg (4 mg Intravenous Given 7/6/24 1906)       Diagnostic Studies  Results Reviewed       Procedure Component Value Units Date/Time    HS Troponin I 2hr [[id number]] Collected: 07/06/24 2104    Lab Status: In process Specimen: Blood from Arm, Left Updated: 07/06/24 2110    HS Troponin I 4hr [[id number]]     Lab Status: No result Specimen: Blood     B-Type Natriuretic Peptide(BNP) [[id number]]  (Abnormal) Collected: 07/06/24 1905    Lab Status: Final result Specimen: Blood from Arm, Right Updated: 07/06/24 1942      pg/mL     HS Troponin 0hr (reflex protocol) [[id number]]  (Normal) Collected: 07/06/24 1905    Lab Status: Final result Specimen: Blood from Arm, Right Updated: 07/06/24 1933     hs TnI 0hr 12 ng/L     Comprehensive metabolic panel [[id number]]  (Abnormal) Collected: 07/06/24 1905    Lab Status: Final result  Specimen: Blood from Arm, Right Updated: 07/06/24 1926     Sodium 138 mmol/L      Potassium 4.3 mmol/L      Chloride 103 mmol/L      CO2 30 mmol/L      ANION GAP 5 mmol/L      BUN 17 mg/dL      Creatinine 0.90 mg/dL      Glucose 147 mg/dL      Calcium 9.5 mg/dL      AST 18 U/L      ALT 22 U/L      Alkaline Phosphatase 57 U/L      Total Protein 6.9 g/dL      Albumin 4.1 g/dL      Total Bilirubin 0.51 mg/dL      eGFR 84 ml/min/1.73sq m     Narrative:      [employer] guidelines for Chronic Kidney Disease (CKD):     Stage 1 with normal or high GFR (GFR > 90 mL/min/1.73 square meters)    Stage 2 Mild CKD (GFR = 60-89 mL/min/1.73 square meters)    Stage 3A Moderate CKD (GFR = 45-59 mL/min/1.73 square meters)    Stage 3B Moderate CKD (GFR = 30-44 mL/min/1.73 square meters)    Stage 4 Severe CKD (GFR = 15-29 mL/min/1.73 square meters)    Stage 5 End Stage CKD (GFR <15 mL/min/1.73 square meters)  Note: GFR calculation is accurate only with a steady state creatinine    Lipase [[id number]]  (Normal) Collected: 07/06/24 1905    Lab Status: Final result Specimen: Blood from Arm, Right Updated: 07/06/24 1926     Lipase 21 u/L     Protime-INR [[id number]]  (Abnormal) Collected: 07/06/24 1905    Lab Status: Final result Specimen: Blood from Arm, Right Updated: 07/06/24 1926     Protime 26.9 seconds      INR 2.43    APTT [[id number]]  (Normal) Collected: 07/06/24 1905    Lab Status: Final result Specimen: Blood from Arm, Right Updated: 07/06/24 1926     PTT 36 seconds     CBC and differential [[id number]]  (Abnormal) Collected: 07/06/24 1905    Lab Status: Final result Specimen: Blood from Arm, Right Updated: 07/06/24 1912     WBC 10.23 Thousand/uL      RBC 4.24 Million/uL      Hemoglobin 13.8 g/dL      Hematocrit 40.9 %      MCV 97 fL      MCH 32.5 pg      MCHC 33.7 g/dL      RDW 12.2 %      MPV 10.6 fL      Platelets 182 Thousands/uL      nRBC 0 /100 WBCs      Segmented % 69 %      Immature Grans % 1 %       Lymphocytes % 19 %      Monocytes % 8 %      Eosinophils Relative 3 %      Basophils Relative 0 %      Absolute Neutrophils 7.09 Thousands/µL      Absolute Immature Grans 0.06 Thousand/uL      Absolute Lymphocytes 1.90 Thousands/µL      Absolute Monocytes 0.79 Thousand/µL      Eosinophils Absolute 0.35 Thousand/µL      Basophils Absolute 0.04 Thousands/µL                    CT head without contrast   Final Result by Russell Sebastian MD (07/06 1938)      No acute intracranial abnormality.      Moderate chronic small vessel ischemic changes.            Workstation performed: [id number]         XR chest 1 view portable   ED Interpretation by Jason Spicer DO (07/06 1928)   No acute infiltrate or pneumothorax      Final Result by Viri Soriano MD (07/06 1959)      Mild pulmonary venous congestion.            Workstation performed: [id number]                    Procedures  ECG 12 Lead Documentation Only    Date/Time: 7/6/2024 6:45 PM    Performed by: Jason Spicer DO  Authorized by: Jason Spicer DO    ECG reviewed by me, the ED Provider: yes    Patient location:  ED  Rate:     ECG rate:  66  Rhythm:     Rhythm: atrial flutter    Conduction:     Conduction: normal    T waves:     T waves: normal             ED Course             HEART Risk Score      Flowsheet Row Most Recent Value   Heart Score Risk Calculator    History 0 Filed at: 07/06/2024 2035   ECG 0 Filed at: 07/06/2024 2035   Age 2 Filed at: 07/06/2024 2035   Risk Factors 1 Filed at: 07/06/2024 2035   Troponin 0 Filed at: 07/06/2024 2035   HEART Score 3 Filed at: 07/06/2024 2035                          SBIRT 22yo+      Flowsheet Row Most Recent Value   Initial Alcohol Screen: US AUDIT-C     1. How often do you have a drink containing alcohol? 2 Filed at: 07/06/2024 1931   2. How many drinks containing alcohol do you have on a typical day you are drinking?  0 Filed at: 07/06/2024 1931   3a. Male UNDER 65: How often do you have five or more  drinks on one occasion? 2 Filed at: 07/06/2024 1931   3b. FEMALE Any Age, or MALE 65+: How often do you have 4 or more drinks on one occassion? 0 Filed at: 07/06/2024 1931   Audit-C Score 4 Filed at: 07/06/2024 1931   ADRIANA: How many times in the past year have you...    Used an illegal drug or used a prescription medication for non-medical reasons? Never Filed at: 07/06/2024 1931                      Medical Decision Making  Syncope differential includes dehydration vasovagal episode cardiac rhythm disturbance subarachnoid hemorrhage workup in progress including CAT scan EKG lab work treat symptomatically with IV fluids and Zofran    Amount and/or Complexity of Data Reviewed  Labs: ordered.  Radiology: ordered and independent interpretation performed.    Risk  Prescription drug management.  Decision regarding hospitalization.             Disposition  Final diagnoses:   Syncope   Congestive heart failure (CHF) (HCC)     Time reflects when diagnosis was documented in both MDM as applicable and the Disposition within this note       Time User Action Codes Description Comment    7/6/2024  8:48 PM Jason Spicer [R55] Syncope     7/6/2024  8:48 PM Jason Spicer [I50.9] Congestive heart failure (CHF) (HCC)           ED Disposition       ED Disposition   Admit    Condition   Stable    Date/Time   Sat Jul 6, 2024 2104    Comment   Case was discussed with misa suarez and the patient's admission status was agreed to be Admission Status: observation status to the service of Dr. Perea .               Follow-up Information    None         Patient's Medications   Discharge Prescriptions    No medications on file       No discharge procedures on file.    PDMP Review         Value Time User    PDMP Reviewed  Yes 5/31/2024 11:18 AM SAM Baer            ED Provider  Electronically Signed by             Jason Spicer DO  07/06/24 7320

## 2024-07-07 VITALS
TEMPERATURE: 97.9 F | RESPIRATION RATE: 16 BRPM | OXYGEN SATURATION: 94 % | SYSTOLIC BLOOD PRESSURE: 120 MMHG | WEIGHT: 190.6 LBS | BODY MASS INDEX: 25.81 KG/M2 | DIASTOLIC BLOOD PRESSURE: 70 MMHG | HEIGHT: 72 IN | HEART RATE: 66 BPM

## 2024-07-07 PROBLEM — R79.89 ELEVATED BRAIN NATRIURETIC PEPTIDE (BNP) LEVEL: Status: ACTIVE | Noted: 2024-07-07

## 2024-07-07 PROBLEM — Z79.899 MEDICAL MARIJUANA USE: Status: ACTIVE | Noted: 2024-07-07

## 2024-07-07 LAB
ANION GAP SERPL CALCULATED.3IONS-SCNC: 4 MMOL/L (ref 4–13)
BASOPHILS # BLD AUTO: 0.03 THOUSANDS/ÂΜL (ref 0–0.1)
BASOPHILS NFR BLD AUTO: 0 % (ref 0–1)
BUN SERPL-MCNC: 16 MG/DL (ref 5–25)
CALCIUM SERPL-MCNC: 8.8 MG/DL (ref 8.4–10.2)
CHLORIDE SERPL-SCNC: 107 MMOL/L (ref 96–108)
CO2 SERPL-SCNC: 30 MMOL/L (ref 21–32)
CREAT SERPL-MCNC: 0.8 MG/DL (ref 0.6–1.3)
EOSINOPHIL # BLD AUTO: 0.28 THOUSAND/ÂΜL (ref 0–0.61)
EOSINOPHIL NFR BLD AUTO: 3 % (ref 0–6)
ERYTHROCYTE [DISTWIDTH] IN BLOOD BY AUTOMATED COUNT: 12.2 % (ref 11.6–15.1)
EST. AVERAGE GLUCOSE BLD GHB EST-MCNC: 120 MG/DL
GFR SERPL CREATININE-BSD FRML MDRD: 88 ML/MIN/1.73SQ M
GLUCOSE SERPL-MCNC: 123 MG/DL (ref 65–140)
GLUCOSE SERPL-MCNC: 145 MG/DL (ref 65–140)
GLUCOSE SERPL-MCNC: 99 MG/DL (ref 65–140)
HBA1C MFR BLD: 5.8 %
HCT VFR BLD AUTO: 38.2 % (ref 36.5–49.3)
HGB BLD-MCNC: 12.8 G/DL (ref 12–17)
IMM GRANULOCYTES # BLD AUTO: 0.03 THOUSAND/UL (ref 0–0.2)
IMM GRANULOCYTES NFR BLD AUTO: 0 % (ref 0–2)
LYMPHOCYTES # BLD AUTO: 3.02 THOUSANDS/ÂΜL (ref 0.6–4.47)
LYMPHOCYTES NFR BLD AUTO: 33 % (ref 14–44)
MAGNESIUM SERPL-MCNC: 1.7 MG/DL (ref 1.9–2.7)
MCH RBC QN AUTO: 32.7 PG (ref 26.8–34.3)
MCHC RBC AUTO-ENTMCNC: 33.5 G/DL (ref 31.4–37.4)
MCV RBC AUTO: 98 FL (ref 82–98)
MONOCYTES # BLD AUTO: 1.01 THOUSAND/ÂΜL (ref 0.17–1.22)
MONOCYTES NFR BLD AUTO: 11 % (ref 4–12)
NEUTROPHILS # BLD AUTO: 4.81 THOUSANDS/ÂΜL (ref 1.85–7.62)
NEUTS SEG NFR BLD AUTO: 53 % (ref 43–75)
NRBC BLD AUTO-RTO: 0 /100 WBCS
PLATELET # BLD AUTO: 157 THOUSANDS/UL (ref 149–390)
PMV BLD AUTO: 10.6 FL (ref 8.9–12.7)
POTASSIUM SERPL-SCNC: 3.7 MMOL/L (ref 3.5–5.3)
RBC # BLD AUTO: 3.91 MILLION/UL (ref 3.88–5.62)
SODIUM SERPL-SCNC: 141 MMOL/L (ref 135–147)
WBC # BLD AUTO: 9.18 THOUSAND/UL (ref 4.31–10.16)

## 2024-07-07 PROCEDURE — 99215 OFFICE O/P EST HI 40 MIN: CPT | Performed by: INTERNAL MEDICINE

## 2024-07-07 PROCEDURE — 80048 BASIC METABOLIC PNL TOTAL CA: CPT | Performed by: INTERNAL MEDICINE

## 2024-07-07 PROCEDURE — 83036 HEMOGLOBIN GLYCOSYLATED A1C: CPT | Performed by: INTERNAL MEDICINE

## 2024-07-07 PROCEDURE — 82948 REAGENT STRIP/BLOOD GLUCOSE: CPT

## 2024-07-07 PROCEDURE — 99223 1ST HOSP IP/OBS HIGH 75: CPT | Performed by: INTERNAL MEDICINE

## 2024-07-07 PROCEDURE — 85025 COMPLETE CBC W/AUTO DIFF WBC: CPT | Performed by: INTERNAL MEDICINE

## 2024-07-07 PROCEDURE — 83735 ASSAY OF MAGNESIUM: CPT | Performed by: INTERNAL MEDICINE

## 2024-07-07 PROCEDURE — 99239 HOSP IP/OBS DSCHRG MGMT >30: CPT | Performed by: INTERNAL MEDICINE

## 2024-07-07 RX ORDER — MAGNESIUM SULFATE 1 G/100ML
1 INJECTION INTRAVENOUS ONCE
Status: COMPLETED | OUTPATIENT
Start: 2024-07-07 | End: 2024-07-07

## 2024-07-07 RX ADMIN — ASPIRIN 81 MG: 81 TABLET, COATED ORAL at 08:32

## 2024-07-07 RX ADMIN — METOPROLOL SUCCINATE 25 MG: 25 TABLET, EXTENDED RELEASE ORAL at 08:31

## 2024-07-07 RX ADMIN — LOSARTAN POTASSIUM 100 MG: 50 TABLET, FILM COATED ORAL at 08:31

## 2024-07-07 RX ADMIN — ESCITALOPRAM OXALATE 15 MG: 10 TABLET, FILM COATED ORAL at 08:31

## 2024-07-07 RX ADMIN — MAGNESIUM SULFATE HEPTAHYDRATE 1 G: 10 INJECTION, SOLUTION INTRAVENOUS at 11:06

## 2024-07-07 RX ADMIN — BUPROPION HYDROCHLORIDE 150 MG: 150 TABLET, EXTENDED RELEASE ORAL at 08:31

## 2024-07-07 RX ADMIN — RIVAROXABAN 20 MG: 10 TABLET, FILM COATED ORAL at 08:31

## 2024-07-07 NOTE — ASSESSMENT & PLAN NOTE
Medical marijuana for anxiety   Presented to the hospital with marijuana on possession. Security has it locked up. Patient informed he can have it back at dischage.

## 2024-07-07 NOTE — ASSESSMENT & PLAN NOTE
No prior history of chf. Denies sob. No edema noted on extremities.   CXR: Mild pulmonary venous congestion.   ECHO ordered due to syncopal event   Cards consulted

## 2024-07-07 NOTE — UTILIZATION REVIEW
Initial Clinical Review    Admission: Date/Time/Statement:   Admission Orders (From admission, onward)       Ordered        24  Place in Observation  Once                          Orders Placed This Encounter   Procedures    Place in Observation     Standing Status:   Standing     Number of Occurrences:   1     Order Specific Question:   Level of Care     Answer:   Med Surg [16]     ED Arrival Information       Expected   -    Arrival   2024 18:25    Acuity   Urgent              Means of arrival   Walk-In    Escorted by   Family Member    Service   Hospitalist    Admission type   Emergency              Arrival complaint   syncope             Chief Complaint   Patient presents with    Syncope     Pt was in the garage and started not feeling well. Pt then sat in a chair and passed out. Pt says that he is nausea. Pt smoked thc before this happened       Initial Presentation: 74 y.o. male to ED via walk in from Son's house with family member.    Admitted to observation with Dx: Syncope.  Presented to ED with syncopal event. He was in his sons garage that was warm due to the heat outside smoking his medical marijuana. He believes he may have smoked more than usual. Prior to passing out felt lightheaded. Got himself to a chair and then passed out. Son thinks he was out for about 5 minutes. Denies seizure activity. Initially hypotensive in the ER but improved with IV fluids. At time of admission patient reports he feels back to his baseline other than feeling tired. .   PMHx:anxiety, DM2, atrial fibrillation .   Date: 2024   On exam: Pale, + for fatigue.  Generalized weakness without any focal neurologic deficit.  + for syncope & lightheadedness.  + N/V.   + edema left leg (chronic).  Imaging shows CT head: No acute intracranial abnormality.  Moderate chronic small vessel ischemic changes.  Chest X:  NAD. Mild pulmonary venous congestion.   ED treatment IV flds.  IV zofran x 1 dose.  EC, A.  Flutter.    Plan includes Telemetry.  Obtain ECHO.  Saturation well on room air.  Medical marijuana for anxiety.  Continue metoprolol / xarelto.      Day 2: 07/07/2024 07/07/2024  Consult Cardio:  Syncope in the setting of marijuana use, most likely vasovagal.  Low likelihood of cardiac dysrhythmia as etiology of syncope.  Chronic atrial flutter with controlled ventricular response: Telemetry reviewed overnight.  Slow ventricular response noted but rates are over 40.  No prolonged pauses noted.  Currently on Toprol-XL 25 mg daily.   Mildly elevated BNP, related to underlying dysrhythmia, no overt signs of heart failure  Medical marijuana use  Diabetes historically well-controlled     Continue rivaroxaban to lower stroke risk.  Ventricular rate remains well-controlled on metoprolol.  Overnight slow ventricular response noted but rates over 40.  It is reasonable to continue Toprol-XL 25 mg daily.  Further inpatient cardiac workup is not planned.  Echocardiogram can be obtained as an outpatient or if patient stays in the hospital can be done tomorrow morning.  He will benefit from outpatient rhythm monitoring with a Zio patch for 2 weeks to ensure that his atrial flutter remains controlled and he has no significant slow ventricular rate episodes.        ED Triage Vitals   Temperature Pulse Respirations Blood Pressure SpO2 Pain Score   07/06/24 1835 07/06/24 1832 07/06/24 1832 07/06/24 1832 07/06/24 1832 07/06/24 1915   97.8 °F (36.6 °C) 67 18 92/67 96 % No Pain     Weight (last 2 days)       Date/Time Weight    07/07/24 0551 86.5 (190.6)    07/06/24 2313 86.5 (190.6)    07/06/24 2231 86.5 (190.6)    07/06/24 2148 86.5 (190.6)            Vital Signs (last 3 days)       Date/Time Temp Pulse Resp BP MAP (mmHg) SpO2 O2 Device Patient Position - Orthostatic VS Myah Coma Scale Score Pain    07/07/24 07:08:19 97.9 °F (36.6 °C) 66 16 120/70 87 94 % -- -- -- --    07/06/24 2313 97.8 °F (36.6 °C) 64 18 124/70 -- -- -- --  -- --    07/06/24 2225 -- -- -- -- -- 96 % None (Room air) -- 15 No Pain    07/06/24 2153 -- -- 18 -- -- -- None (Room air) Lying -- No Pain    07/06/24 21:51:55 97.8 °F (36.6 °C) 64 18 124/70 88 96 % -- -- -- --    07/06/24 2100 -- 65 15 109/71 85 96 % None (Room air) Sitting -- --    07/06/24 2000 -- 61 16 110/64 83 94 % None (Room air) Sitting -- --    07/06/24 1930 -- 66 19 103/62 -- 95 % None (Room air) Sitting -- No Pain    07/06/24 1915 -- -- -- -- -- -- None (Room air) -- 15 No Pain    07/06/24 1845 -- 66 16 100/64 76 -- -- -- -- --    07/06/24 1836 -- -- -- 89/67 -- -- -- -- -- --    07/06/24 1835 97.8 °F (36.6 °C) -- -- -- -- -- -- -- -- --    07/06/24 1832 -- 67 18 92/67 -- 96 % -- -- -- --              Pertinent Labs/Diagnostic Test Results:   Radiology:  CT head without contrast   Final Interpretation by Russell Sebastian MD (07/06 1938)   No acute intracranial abnormality.      Moderate chronic small vessel ischemic changes.      XR chest 1 view portable   ED Interpretation by Jason Spicer DO (07/06 1928)   No acute infiltrate or pneumothorax      Final Interpretation by Viri Soriano MD (07/06 1959)   Mild pulmonary venous congestion.        Cardiology:  No orders to display     GI:  No orders to display     Results from last 7 days   Lab Units 07/07/24  0443 07/06/24  1905   WBC Thousand/uL 9.18 10.23*   HEMOGLOBIN g/dL 12.8 13.8   HEMATOCRIT % 38.2 40.9   PLATELETS Thousands/uL 157 182   TOTAL NEUT ABS Thousands/µL 4.81 7.09     Results from last 7 days   Lab Units 07/07/24  0443 07/06/24  1905   SODIUM mmol/L 141 138   POTASSIUM mmol/L 3.7 4.3   CHLORIDE mmol/L 107 103   CO2 mmol/L 30 30   ANION GAP mmol/L 4 5   BUN mg/dL 16 17   CREATININE mg/dL 0.80 0.90   EGFR ml/min/1.73sq m 88 84   CALCIUM mg/dL 8.8 9.5   MAGNESIUM mg/dL 1.7*  --      Results from last 7 days   Lab Units 07/06/24  1905   AST U/L 18   ALT U/L 22   ALK PHOS U/L 57   TOTAL PROTEIN g/dL 6.9   ALBUMIN g/dL 4.1   TOTAL  BILIRUBIN mg/dL 0.51     Results from last 7 days   Lab Units 07/07/24  0706 07/06/24  2243   POC GLUCOSE mg/dl 99 102     Results from last 7 days   Lab Units 07/07/24  0443 07/06/24  1905   GLUCOSE RANDOM mg/dL 123 147*     Results from last 7 days   Lab Units 07/06/24  2104 07/06/24  1905   HS TNI 0HR ng/L  --  12   HS TNI 2HR ng/L 13  --    HSTNI D2 ng/L 1  --      Results from last 7 days   Lab Units 07/06/24  1905   PROTIME seconds 26.9*   INR  2.43*   PTT seconds 36     Results from last 7 days   Lab Units 07/06/24  1905   BNP pg/mL 126*     Results from last 7 days   Lab Units 07/06/24  1905   LIPASE u/L 21     ED Treatment-Medication Administration from 07/06/2024 1824 to 07/06/2024 2147         Date/Time Order Dose Route Action     07/06/2024 1906 sodium chloride 0.9 % bolus 1,000 mL 1,000 mL Intravenous New Bag     07/06/2024 1906 ondansetron (ZOFRAN) injection 4 mg 4 mg Intravenous Given            Past Medical History:   Diagnosis Date    Anxiety     Depression     DVT (deep venous thrombosis) ([facility])     Gross hematuria     LA...3/29/16   R...4/3/17     Hematuria     LA.....4/12/16    R....4/3/17    Hyperlipidemia     Hypertension     Long-term (current) use of anticoagulants     LA...3/29/16   R...4/3/17     Seasonal allergies      Present on Admission:   Type 2 diabetes mellitus without complication, without long-term current use of insulin ([facility])   Longstanding persistent atrial fibrillation ([facility])      Admitting Diagnosis: Syncope [R55]  Congestive heart failure (CHF) ([facility]) [I50.9]  Age/Sex: 74 y.o. male  Admission Orders:  Lane/CHO Controlled  Diet  Up & OOB as tolerated / Fall precautions  Telemetry  Daily weight  I&O q8h  Pio SCDs      Scheduled Medications:  aspirin, 81 mg, Oral, Daily  atorvastatin, 40 mg, Oral, Daily With Dinner  buPROPion, 150 mg, Oral, QAM  escitalopram, 15 mg, Oral, Daily  insulin lispro, 1-6 Units, Subcutaneous, TID AC  insulin lispro, 1-6 Units, Subcutaneous, HS  losartan,  100 mg, Oral, Daily  metoprolol succinate, 25 mg, Oral, Daily  rivaroxaban, 20 mg, Oral, Daily With Breakfast      Continuous IV Infusions:     PRN Meds:  acetaminophen, 650 mg, Oral, Q6H PRN  ondansetron, 4 mg, Intravenous, Q6H PRN        IP CONSULT TO CARDIOLOGY  IP CONSULT TO CASE MANAGEMENT    Network Utilization Review Department  ATTENTION: Please call with any questions or concerns to [phone] and carefully listen to the prompts so that you are directed to the right person. All voicemails are confidential.   For Discharge needs, contact Care Management DC Support Team at [phone] opt. 2  Send all requests for admission clinical reviews, approved or denied determinations and any other requests to dedicated fax number below belonging to the campus where the patient is receiving treatment. List of dedicated fax numbers for the Facilities:  FACILITY NAME UR FAX NUMBER   ADMISSION DENIALS (Administrative/Medical Necessity) [phone]   DISCHARGE SUPPORT TEAM (NETWORK) [phone]   PARENT CHILD HEALTH (Maternity/NICU/Pediatrics) [phone]   [facility] [phone]   [facility] [phone]   [facility] [phone]   [facility] [phone]   [facility] [phone]   [facility] [phone]   [facility] [phone]   [facility] [phone]   [facility] [phone]   [facility] [phone]   [facility] [phone]   [facility] [phone]

## 2024-07-07 NOTE — CONSULTS
Consultation - Cardiology   Shawn Marquez 74 y.o. male MRN: [id number]  Unit/Bed#: -01 Encounter: [id number]        Inpatient consult to Cardiology  Consult performed by: Raymond Meadows MD  Consult ordered by: Zeny Harris PA-C            Assessment/Plan     Syncope in the setting of marijuana use, most likely vasovagal.  Low likelihood of cardiac dysrhythmia as etiology of syncope.  Chronic atrial flutter with controlled ventricular response: Telemetry reviewed overnight.  Slow ventricular response noted but rates are over 40.  No prolonged pauses noted.  Currently on Toprol-XL 25 mg daily.   Mildly elevated BNP, related to underlying dysrhythmia, no overt signs of heart failure  Medical marijuana use  Diabetes historically well-controlled    Continue rivaroxaban to lower stroke risk.  Ventricular rate remains well-controlled on metoprolol.  Overnight slow ventricular response noted but rates over 40.  It is reasonable to continue Toprol-XL 25 mg daily.  Further inpatient cardiac workup is not planned.  Echocardiogram can be obtained as an outpatient or if patient stays in the hospital can be done tomorrow morning.  He will benefit from outpatient rhythm monitoring with a Zio patch for 2 weeks to ensure that his atrial flutter remains controlled and he has no significant slow ventricular rate episodes.  Plan of care discussed with the patient.    Thank you for allowing us to participate in the care of this patient.  If there are any further questions regarding this patient please feel free to contact us.        Physician Requesting Consult: Lamont Perea MD    Reason for Consult / Principal Problem:   Chief Complaint   Patient presents with    Syncope     Pt was in the garage and started not feeling well. Pt then sat in a chair and passed out. Pt says that he is nausea. Pt smoked thc before this happened       HPI: Shawn Marquez is a 74 y.o. year old male with longstanding history of persistent  atrial fibrillation, medical marijuana use who presented after an episode of lightheadedness and syncope after smoking marijuana in the garage.  Syncope was witnessed.  He has had a previous episode syncope associated with drug use a few years ago.  In the ER patient was hypotensive and responded to IV fluids.  CT of the head showed chronic small vessel changes.  Serial cardiac troponins unremarkable.  BNP was 126.  No preceding chest pain, palpitations or shortness of breath.  He follows up with Dr. Son in the cardiology clinic.  Last visit from 10/23 reviewed.  He has a prior history of TIA.  He is on anticoagulation therapy.  He is also on low-dose aspirin for unclear reasons.    Cardiac studies independently reviewed.  EKG personally reviewed: Atrial flutter with AV block, ventricular rate controlled.  Previous nuclear stress test 9/22 showed no ischemia.  Last echo in 8/22 showed ejection fraction 55%, biatrial enlargement, mild MR and TR.  PASP was 37.    Since patient was in the hospital, he has had no further symptoms.  He denies any dizziness, lightheadedness or palpitations.  Denies any chest pain or shortness of breath.  He also reported he had an episode of vomiting after admission to the hospital.  No further nausea.    Review of Systems   Constitutional:  Negative for fever.   Respiratory:  Negative for cough and shortness of breath.    Cardiovascular:  Negative for chest pain, palpitations and leg swelling.   Gastrointestinal:  Positive for nausea and vomiting.   Skin:  Negative for rash.   Neurological:  Positive for syncope.   Hematological:  Does not bruise/bleed easily.   All other systems reviewed and are negative.       Historical Information   Past Medical History:   Diagnosis Date    Anxiety     Depression     DVT (deep venous thrombosis) ([facility])     Gross hematuria     LA...3/29/16   R...4/3/17     Hematuria     LA.....4/12/16    R....4/3/17    Hyperlipidemia     Hypertension     Long-term  (current) use of anticoagulants     LA...3/29/16   R...4/3/17     Seasonal allergies      Past Surgical History:   Procedure Laterality Date    APPENDECTOMY      WISDOM TOOTH EXTRACTION       Social History     Substance and Sexual Activity   Alcohol Use Not Currently    Comment: social     Social History     Substance and Sexual Activity   Drug Use Yes    Types: Marijuana    Comment: Via pipe X 50 years     Social History     Tobacco Use   Smoking Status Former    Current packs/day: 0.00    Average packs/day: 1 pack/day for 9.0 years (9.0 ttl pk-yrs)    Types: Cigarettes    Start date:     Quit date:     Years since quittin.5   Smokeless Tobacco Never     Family History   Problem Relation Age of Onset    Hypertension Mother     Leukemia Father     Heart disease Father     Alcohol abuse Father     Depression Father     Completed Suicide  Father     Depression Sister     Suicide Attempts Brother     Alcohol abuse Brother     Depression Brother     Heart disease Family     Leukemia Family        Allergies:  No Known Allergies    Medications:     Current Facility-Administered Medications:     acetaminophen (TYLENOL) tablet 650 mg, 650 mg, Oral, Q6H PRN, Zeny Harris PA-C    aspirin (ECOTRIN LOW STRENGTH) EC tablet 81 mg, 81 mg, Oral, Daily, Zeny Harris PA-C    atorvastatin (LIPITOR) tablet 40 mg, 40 mg, Oral, Daily With Dinner, Zeny Harris PA-C    buPROPion (WELLBUTRIN XL) 24 hr tablet 150 mg, 150 mg, Oral, QAM, Zeny Harris PA-C    escitalopram (LEXAPRO) tablet 15 mg, 15 mg, Oral, Daily, Zeny Harris PA-C    insulin lispro (HumALOG/ADMELOG) 100 units/mL subcutaneous injection 1-6 Units, 1-6 Units, Subcutaneous, TID AC **AND** Fingerstick Glucose (POCT), , , TID AC, Zeny Harris PA-C    insulin lispro (HumALOG/ADMELOG) 100 units/mL subcutaneous injection 1-6 Units, 1-6 Units, Subcutaneous, HS, Zeny Harris PA-C    losartan (COZAAR) tablet 100 mg, 100 mg, Oral, Daily, Zeny Harris PA-C    metoprolol succinate  (TOPROL-XL) 24 hr tablet 25 mg, 25 mg, Oral, Daily, Zeny Harris PA-C    ondansetron (ZOFRAN) injection 4 mg, 4 mg, Intravenous, Q6H PRN, Zeny Harris PA-C    rivaroxaban (XARELTO) tablet 20 mg, 20 mg, Oral, Daily With Breakfast, Zeny Harris PA-C     Objective:   Vitals:   Vitals:    24 0708   BP: 120/70   Pulse: 66   Resp: 16   Temp: 97.9 °F (36.6 °C)   SpO2: 94%     Body mass index is 25.85 kg/m².  Orthostatic Blood Pressures      Flowsheet Row Most Recent Value   Blood Pressure 120/70 filed at 2024 0708   Patient Position - Orthostatic VS Lying filed at 2024 2153          Systolic (24hrs), Av , Min:89 , Max:124     Diastolic (24hrs), Av, Min:62, Max:71      Intake/Output Summary (Last 24 hours) at 2024 0804  Last data filed at 2024 0300  Gross per 24 hour   Intake 240 ml   Output --   Net 240 ml     Weight (last 2 days)       Date/Time Weight    24 0551 86.5 (190.6)    24 2313 86.5 (190.6)    24 2231 86.5 (190.6)    24 2148 86.5 (190.6)          Invasive Devices       Peripheral Intravenous Line  Duration             Peripheral IV 24 Distal;Left;Upper;Ventral (anterior) Arm <1 day                    Physical Exam  Vitals reviewed.   Constitutional:       General: He is not in acute distress.  HENT:      Head: Normocephalic.   Neck:      Vascular: No carotid bruit.   Cardiovascular:      Rate and Rhythm: Normal rate and regular rhythm.      Heart sounds: S1 normal and S2 normal. No murmur heard.  Pulmonary:      Breath sounds: No wheezing or rhonchi.   Musculoskeletal:      Right lower leg: No edema.      Left lower leg: No edema.   Skin:     General: Skin is warm.   Neurological:      Mental Status: He is alert. Mental status is at baseline.   Psychiatric:         Mood and Affect: Mood normal.         Labs:     Lab Results   Component Value Date    SODIUM 141 2024    K 3.7 2024     2024    CO2 30 2024    BUN 16 2024  "   CREATININE 0.80 07/07/2024    GLUC 123 07/07/2024    CALCIUM 8.8 07/07/2024     Lab Results   Component Value Date    WBC 9.18 07/07/2024    WBC 6.5 02/18/2016    RBC 3.91 07/07/2024    HGB 12.8 07/07/2024    HGB 16.1 (H) 02/18/2016    HCT 38.2 07/07/2024    HCT 47.1 (H) 02/18/2016    MCV 98 07/07/2024    MCV 95 02/18/2016    MCH 32.7 07/07/2024    MCH 32.5 02/18/2016    RDW 12.2 07/07/2024    RDW 14.7 02/18/2016     07/07/2024     02/18/2016     Results from last 7 days   Lab Units 07/06/24  1905   PTT seconds 36   INR  2.43*     Lab Results   Component Value Date    LDLCALC 39 08/14/2023     Lab Results   Component Value Date    TNR2IQHWISSN 2.135 12/07/2023    TSH 1.480 11/07/2022         No results for input(s): \"NTBNP\" in the last 72 hours.       Imaging & Testing   Cardiac testing:     EKG and telemetry reviewed personally if available      Imaging:   I have personally reviewed pertinent imaging studies in PACS.    Raymond Meadows MD, formerly [facility]      Portions of the record may have been created with voice recognition software.  Occasional wrong word or \"sound a like\" substitutions may have occurred due to the inherent limitations of voice recognition software.  Please read the chart carefully and recognize, using context, where substitutions have occurred. Please call the author of the note for any clarifications.    "

## 2024-07-07 NOTE — PLAN OF CARE
Problem: PAIN - ADULT  Goal: Verbalizes/displays adequate comfort level or baseline comfort level  Description: Interventions:  - Encourage patient to monitor pain and request assistance  - Assess pain using appropriate pain scale  - Administer analgesics based on type and severity of pain and evaluate response  - Implement non-pharmacological measures as appropriate and evaluate response  - Consider cultural and social influences on pain and pain management  - Notify physician/advanced practitioner if interventions unsuccessful or patient reports new pain  Outcome: Progressing     Problem: INFECTION - ADULT  Goal: Absence or prevention of progression during hospitalization  Description: INTERVENTIONS:  - Assess and monitor for signs and symptoms of infection  - Monitor lab/diagnostic results  - Monitor all insertion sites, i.e. indwelling lines, tubes, and drains  - Monitor endotracheal if appropriate and nasal secretions for changes in amount and color  - [place] appropriate cooling/warming therapies per order  - Administer medications as ordered  - Instruct and encourage patient and family to use good hand hygiene technique  - Identify and instruct in appropriate isolation precautions for identified infection/condition  Outcome: Progressing  Goal: Absence of fever/infection during neutropenic period  Description: INTERVENTIONS:  - Monitor WBC    Outcome: Progressing     Problem: SAFETY ADULT  Goal: Patient will remain free of falls  Description: INTERVENTIONS:  - Educate patient/family on patient safety including physical limitations  - Instruct patient to call for assistance with activity   - Consult OT/PT to assist with strengthening/mobility   - Keep Call bell within reach  - Keep bed low and locked with side rails adjusted as appropriate  - Keep care items and personal belongings within reach  - Initiate and maintain comfort rounds  - Make Fall Risk Sign visible to staff  - Offer Toileting every 2 Hours,  in advance of need  - Initiate/Maintain bed alarm  - Obtain necessary fall risk management equipment: yellow socks  - Apply yellow socks and bracelet for high fall risk patients  - Consider moving patient to room near nurses station  Outcome: Progressing  Goal: Maintain or return to baseline ADL function  Description: INTERVENTIONS:  -  Assess patient's ability to carry out ADLs; assess patient's baseline for ADL function and identify physical deficits which impact ability to perform ADLs (bathing, care of mouth/teeth, toileting, grooming, dressing, etc.)  - Assess/evaluate cause of self-care deficits   - Assess range of motion  - Assess patient's mobility; develop plan if impaired  - Assess patient's need for assistive devices and provide as appropriate  - Encourage maximum independence but intervene and supervise when necessary  - Involve family in performance of ADLs  - Assess for home care needs following discharge   - Consider OT consult to assist with ADL evaluation and planning for discharge  - Provide patient education as appropriate  Outcome: Progressing  Goal: Maintains/Returns to pre admission functional level  Description: INTERVENTIONS:  - Perform AM-PAC 6 Click Basic Mobility/ Daily Activity assessment daily.  - Set and communicate daily mobility goal to care team and patient/family/caregiver.   - Collaborate with rehabilitation services on mobility goals if consulted  - Perform Range of Motion 3 times a day.  - Reposition patient every 2 hours.  - Dangle patient 3 times a day  - Stand patient 3 times a day  - Ambulate patient 3 times a day  - Out of bed to chair 3 times a day   - Out of bed for meals 3 times a day  - Out of bed for toileting  - Record patient progress and toleration of activity level   Outcome: Progressing     Problem: DISCHARGE PLANNING  Goal: Discharge to home or other facility with appropriate resources  Description: INTERVENTIONS:  - Identify barriers to discharge w/patient and  caregiver  - Arrange for needed discharge resources and transportation as appropriate  - Identify discharge learning needs (meds, wound care, etc.)  - Arrange for interpretive services to assist at discharge as needed  - Refer to Case Management Department for coordinating discharge planning if the patient needs post-hospital services based on physician/advanced practitioner order or complex needs related to functional status, cognitive ability, or social support system  Outcome: Progressing     Problem: Knowledge Deficit  Goal: Patient/family/caregiver demonstrates understanding of disease process, treatment plan, medications, and discharge instructions  Description: Complete learning assessment and assess knowledge base.  Interventions:  - Provide teaching at level of understanding  - Provide teaching via preferred learning methods  Outcome: Progressing     Problem: MOBILITY - ADULT  Goal: Maintain or return to baseline ADL function  Description: INTERVENTIONS:  -  Assess patient's ability to carry out ADLs; assess patient's baseline for ADL function and identify physical deficits which impact ability to perform ADLs (bathing, care of mouth/teeth, toileting, grooming, dressing, etc.)  - Assess/evaluate cause of self-care deficits   - Assess range of motion  - Assess patient's mobility; develop plan if impaired  - Assess patient's need for assistive devices and provide as appropriate  - Encourage maximum independence but intervene and supervise when necessary  - Involve family in performance of ADLs  - Assess for home care needs following discharge   - Consider OT consult to assist with ADL evaluation and planning for discharge  - Provide patient education as appropriate  Outcome: Progressing  Goal: Maintains/Returns to pre admission functional level  Description: INTERVENTIONS:  - Perform AM-PAC 6 Click Basic Mobility/ Daily Activity assessment daily.  - Set and communicate daily mobility goal to care team and  patient/family/caregiver.   - Collaborate with rehabilitation services on mobility goals if consulted  - Perform Range of Motion 3 times a day.  - Reposition patient every 2 hours.  - Dangle patient 3 times a day  - Stand patient 3 times a day  - Ambulate patient 3 times a day  - Out of bed to chair 3 times a day   - Out of bed for meals 3 times a day  - Out of bed for toileting  - Record patient progress and toleration of activity level   Outcome: Progressing     Problem: Nutrition/Hydration-ADULT  Goal: Nutrient/Hydration intake appropriate for improving, restoring or maintaining nutritional needs  Description: Monitor and assess patient's nutrition/hydration status for malnutrition. Collaborate with interdisciplinary team and initiate plan and interventions as ordered.  Monitor patient's weight and dietary intake as ordered or per policy. Utilize nutrition screening tool and intervene as necessary. Determine patient's food preferences and provide high-protein, high-caloric foods as appropriate.     INTERVENTIONS:  - Monitor oral intake, urinary output, labs, and treatment plans  - Assess nutrition and hydration status and recommend course of action  - Evaluate amount of meals eaten  - Assist patient with eating if necessary   - Allow adequate time for meals  - Recommend/ encourage appropriate diets, oral nutritional supplements, and vitamin/mineral supplements  - Order, calculate, and assess calorie counts as needed  - Recommend, monitor, and adjust tube feedings and TPN/PPN based on assessed needs  - Assess need for intravenous fluids  - Provide specific nutrition/hydration education as appropriate  - Include patient/family/caregiver in decisions related to nutrition  Outcome: Progressing

## 2024-07-07 NOTE — PLAN OF CARE
Problem: PAIN - ADULT  Goal: Verbalizes/displays adequate comfort level or baseline comfort level  Description: Interventions:  - Encourage patient to monitor pain and request assistance  - Assess pain using appropriate pain scale  - Administer analgesics based on type and severity of pain and evaluate response  - Implement non-pharmacological measures as appropriate and evaluate response  - Consider cultural and social influences on pain and pain management  - Notify physician/advanced practitioner if interventions unsuccessful or patient reports new pain  Outcome: Progressing     Problem: INFECTION - ADULT  Goal: Absence or prevention of progression during hospitalization  Description: INTERVENTIONS:  - Assess and monitor for signs and symptoms of infection  - Monitor lab/diagnostic results  - Monitor all insertion sites, i.e. indwelling lines, tubes, and drains  - Monitor endotracheal if appropriate and nasal secretions for changes in amount and color  - [place] appropriate cooling/warming therapies per order  - Administer medications as ordered  - Instruct and encourage patient and family to use good hand hygiene technique  - Identify and instruct in appropriate isolation precautions for identified infection/condition  Outcome: Progressing  Goal: Absence of fever/infection during neutropenic period  Description: INTERVENTIONS:  - Monitor WBC    Outcome: Progressing     Problem: SAFETY ADULT  Goal: Patient will remain free of falls  Description: INTERVENTIONS:  - Educate patient/family on patient safety including physical limitations  - Instruct patient to call for assistance with activity   - Consult OT/PT to assist with strengthening/mobility   - Keep Call bell within reach  - Keep bed low and locked with side rails adjusted as appropriate  - Keep care items and personal belongings within reach  - Initiate and maintain comfort rounds  - Make Fall Risk Sign visible to staff  - Offer Toileting every 2 Hours,  in advance of need  - Initiate/Maintain bed alarm  - Obtain necessary fall risk management equipment: yellow socks  - Apply yellow socks and bracelet for high fall risk patients  - Consider moving patient to room near nurses station  Outcome: Progressing  Goal: Maintain or return to baseline ADL function  Description: INTERVENTIONS:  -  Assess patient's ability to carry out ADLs; assess patient's baseline for ADL function and identify physical deficits which impact ability to perform ADLs (bathing, care of mouth/teeth, toileting, grooming, dressing, etc.)  - Assess/evaluate cause of self-care deficits   - Assess range of motion  - Assess patient's mobility; develop plan if impaired  - Assess patient's need for assistive devices and provide as appropriate  - Encourage maximum independence but intervene and supervise when necessary  - Involve family in performance of ADLs  - Assess for home care needs following discharge   - Consider OT consult to assist with ADL evaluation and planning for discharge  - Provide patient education as appropriate  Outcome: Progressing  Goal: Maintains/Returns to pre admission functional level  Description: INTERVENTIONS:  - Perform AM-PAC 6 Click Basic Mobility/ Daily Activity assessment daily.  - Set and communicate daily mobility goal to care team and patient/family/caregiver.   - Collaborate with rehabilitation services on mobility goals if consulted  - Perform Range of Motion 3 times a day.  - Reposition patient every 2 hours.  - Dangle patient 3 times a day  - Stand patient 3 times a day  - Ambulate patient 3 times a day  - Out of bed to chair 3 times a day   - Out of bed for meals 3 times a day  - Out of bed for toileting  - Record patient progress and toleration of activity level   Outcome: Progressing     Problem: DISCHARGE PLANNING  Goal: Discharge to home or other facility with appropriate resources  Description: INTERVENTIONS:  - Identify barriers to discharge w/patient and  caregiver  - Arrange for needed discharge resources and transportation as appropriate  - Identify discharge learning needs (meds, wound care, etc.)  - Arrange for interpretive services to assist at discharge as needed  - Refer to Case Management Department for coordinating discharge planning if the patient needs post-hospital services based on physician/advanced practitioner order or complex needs related to functional status, cognitive ability, or social support system  Outcome: Progressing     Problem: Knowledge Deficit  Goal: Patient/family/caregiver demonstrates understanding of disease process, treatment plan, medications, and discharge instructions  Description: Complete learning assessment and assess knowledge base.  Interventions:  - Provide teaching at level of understanding  - Provide teaching via preferred learning methods  Outcome: Progressing     Problem: MOBILITY - ADULT  Goal: Maintain or return to baseline ADL function  Description: INTERVENTIONS:  -  Assess patient's ability to carry out ADLs; assess patient's baseline for ADL function and identify physical deficits which impact ability to perform ADLs (bathing, care of mouth/teeth, toileting, grooming, dressing, etc.)  - Assess/evaluate cause of self-care deficits   - Assess range of motion  - Assess patient's mobility; develop plan if impaired  - Assess patient's need for assistive devices and provide as appropriate  - Encourage maximum independence but intervene and supervise when necessary  - Involve family in performance of ADLs  - Assess for home care needs following discharge   - Consider OT consult to assist with ADL evaluation and planning for discharge  - Provide patient education as appropriate  Outcome: Progressing  Goal: Maintains/Returns to pre admission functional level  Description: INTERVENTIONS:  - Perform AM-PAC 6 Click Basic Mobility/ Daily Activity assessment daily.  - Set and communicate daily mobility goal to care team and  patient/family/caregiver.   - Collaborate with rehabilitation services on mobility goals if consulted  - Perform Range of Motion 3 times a day.  - Reposition patient every 2 hours.  - Dangle patient 3 times a day  - Stand patient 3 times a day  - Ambulate patient 3 times a day  - Out of bed to chair 3 times a day   - Out of bed for meals 3 times a day  - Out of bed for toileting  - Record patient progress and toleration of activity level   Outcome: Progressing     Problem: Nutrition/Hydration-ADULT  Goal: Nutrient/Hydration intake appropriate for improving, restoring or maintaining nutritional needs  Description: Monitor and assess patient's nutrition/hydration status for malnutrition. Collaborate with interdisciplinary team and initiate plan and interventions as ordered.  Monitor patient's weight and dietary intake as ordered or per policy. Utilize nutrition screening tool and intervene as necessary. Determine patient's food preferences and provide high-protein, high-caloric foods as appropriate.     INTERVENTIONS:  - Monitor oral intake, urinary output, labs, and treatment plans  - Assess nutrition and hydration status and recommend course of action  - Evaluate amount of meals eaten  - Assist patient with eating if necessary   - Allow adequate time for meals  - Recommend/ encourage appropriate diets, oral nutritional supplements, and vitamin/mineral supplements  - Order, calculate, and assess calorie counts as needed  - Recommend, monitor, and adjust tube feedings and TPN/PPN based on assessed needs  - Assess need for intravenous fluids  - Provide specific nutrition/hydration education as appropriate  - Include patient/family/caregiver in decisions related to nutrition  Outcome: Progressing

## 2024-07-07 NOTE — ASSESSMENT & PLAN NOTE
Presents from home after syncopal event.   Patient was in his sons garage smoking marijuana when he became lightheaded. He was able to get to a chair and sit down. Passed out in the chair. Per son unconscious for about 5 minutes. No seizure activity witnessed   Patient reports he passed out once before after using mushrooms years ago  Initially hypotensive. Improved with IV fluids  CT head   No acute intracranial abnormality.  Moderate chronic small vessel ischemic changes.  Trop 12, 13. Delta 1   Denies CP    No prior history of chf. Denies sob. No edema noted on extremities. Mild congestion on CXR  Fall precautions   Telemety   Suspect syncope in setting of heat and marijuana use   ECHO order   May be able to be performed outpatient. Defer to cardiology   Cards consult

## 2024-07-07 NOTE — ASSESSMENT & PLAN NOTE
Presents from home after syncopal event.   Patient was in his sons garage smoking marijuana when he became lightheaded. He was able to get to a chair and sit down. Passed out in the chair. Per son unconscious for about 5 minutes. No seizure activity witnessed   Patient reports he passed out once before after using mushrooms years ago  Initially hypotensive. Improved with IV fluids  CT head   No acute intracranial abnormality.  Moderate chronic small vessel ischemic changes.  Trop 12, 13. Delta 1   Denies CP    No prior history of chf. Denies sob. No edema noted on extremities. Mild congestion on CXR  Fall precautions   Telemety   Suspect syncope in setting of heat and marijuana use   Cardiology consult appreciated  As per cardiology patient symptoms likely secondary to vasovagal in setting of marijuana use  As per cardiology okay to discharge patient with outpatient echocardiogram  Likely benefit from Zio patch for 2 weeks as outpatient with cardiologist  Discussed with patient and wife were in agreement with the discharge plan

## 2024-07-07 NOTE — ASSESSMENT & PLAN NOTE
Lab Results   Component Value Date    HGBA1C 6.7 (H) 01/15/2024       Recent Labs     07/06/24  2243   POCGLU 102       Blood Sugar Average: Last 72 hrs:  (P) 102  Hold home metformin   SSI

## 2024-07-07 NOTE — ASSESSMENT & PLAN NOTE
No prior history of chf. Denies sob. No edema noted on extremities.   CXR: Mild pulmonary venous congestion.   ECHO ordered due to syncopal event   Cardiology recommended outpatient echocardiogram.  Patient is ambulating without any discomfort.  Patient is saturating well on room air

## 2024-07-07 NOTE — H&P
Formerly [facility], [facility]  H&P  Name: Shawn Marquez 74 y.o. male I MRN: [id number]  Unit/Bed#: -01 I Date of Admission: 7/6/2024   Date of Service: 7/7/2024 I Hospital Day: 0      Assessment & Plan   * Syncope  Assessment & Plan  Presents from home after syncopal event.   Patient was in his sons garage smoking marijuana when he became lightheaded. He was able to get to a chair and sit down. Passed out in the chair. Per son unconscious for about 5 minutes. No seizure activity witnessed   Patient reports he passed out once before after using mushrooms years ago  Initially hypotensive. Improved with IV fluids  CT head   No acute intracranial abnormality.  Moderate chronic small vessel ischemic changes.  Trop 12, 13. Delta 1   Denies CP    No prior history of chf. Denies sob. No edema noted on extremities. Mild congestion on CXR  Fall precautions   Telemety   Suspect syncope in setting of heat and marijuana use   ECHO order   May be able to be performed outpatient. Defer to cardiology   Cards consult     Elevated brain natriuretic peptide (BNP) level  Assessment & Plan    No prior history of chf. Denies sob. No edema noted on extremities.   CXR: Mild pulmonary venous congestion.   ECHO ordered due to syncopal event   Cards consulted    Medical marijuana use  Assessment & Plan  Medical marijuana for anxiety   Presented to the hospital with marijuana on possession. Security has it locked up. Patient informed he can have it back at dischage.     Longstanding persistent atrial fibrillation (HCC)  Assessment & Plan  Continue metoprolol and xarelto     Type 2 diabetes mellitus without complication, without long-term current use of insulin (HCC)  Assessment & Plan  Lab Results   Component Value Date    HGBA1C 6.7 (H) 01/15/2024       Recent Labs     07/06/24  2243   POCGLU 102       Blood Sugar Average: Last 72 hrs:  (P) 102  Hold home metformin   SSI            VTE Pharmacologic  "Prophylaxis: VTE Score: 3 Moderate Risk (Score 3-4) - Pharmacological DVT Prophylaxis Ordered: rivaroxaban (Xarelto).  Code Status: Level 1 - Full Code   Discussion with family: Patient declined call to .     Anticipated Length of Stay: Patient will be admitted on an observation basis with an anticipated length of stay of less than 2 midnights secondary to syncope.    Total Time Spent on Date of Encounter in care of patient: 55 mins. This time was spent on one or more of the following: performing physical exam; counseling and coordination of care; obtaining or reviewing history; documenting in the medical record; reviewing/ordering tests, medications or procedures; communicating with other healthcare professionals and discussing with patient's family/caregivers.    Chief Complaint: \"I think I smoked too much weed\"    History of Present Illness:  Shawn Marquez is a 74 y.o. male with a PMH of anxiety, DM2, atrial fibrillation who presents from his sons house after syncopal event. He was in his sons garage that was warm due to the heat outside smoking his medical marijuana. He believes he may have smoked more than usual. Prior to passing out felt lightheaded. Got himself to a chair and then passed out. Son thinks he was out for about 5 minutes. Denies seizure activity. Initially hypotensive in the ER but improved with IV fluids. At time of admission patient reports he feels back to his baseline other than feeling tired. Prior syncopal episode years ago after using mushrooms.     Denies drug use other than his marijuana. Does not use tobacco or drink alcohol.     Review of Systems:  Review of Systems   Constitutional:  Positive for fatigue. Negative for fever.   HENT:  Negative for sore throat.    Respiratory:  Negative for cough, chest tightness and shortness of breath.    Cardiovascular:  Negative for chest pain.   Gastrointestinal:  Negative for abdominal distention, abdominal pain, diarrhea, " nausea and vomiting.   Genitourinary:  Negative for difficulty urinating.   Musculoskeletal:  Negative for arthralgias.   Neurological:  Positive for syncope and light-headedness. Negative for weakness and headaches.   Psychiatric/Behavioral:  Negative for agitation and behavioral problems.    All other systems reviewed and are negative.      Past Medical and Surgical History:   Past Medical History:   Diagnosis Date    Anxiety     Depression     DVT (deep venous thrombosis) ([facility])     Gross hematuria     LA...3/29/16   R...4/3/17     Hematuria     LA.....4/12/16    R....4/3/17    Hyperlipidemia     Hypertension     Long-term (current) use of anticoagulants     LA...3/29/16   R...4/3/17     Seasonal allergies        Past Surgical History:   Procedure Laterality Date    APPENDECTOMY      WISDOM TOOTH EXTRACTION         Meds/Allergies:  Prior to Admission medications    Medication Sig Start Date End Date Taking? Authorizing Provider   ASPIRIN LOW DOSE 81 MG EC tablet TAKE ONE TABLET BY MOUTH DAILY 8/22/19  Yes Sedrick Villarreal MD   atorvastatin (LIPITOR) 40 mg tablet TAKE 1 TABLET BY MOUTH DAILY WITH DINNER 11/9/23  Yes SAM Leonardo   buPROPion (WELLBUTRIN XL) 150 mg 24 hr tablet Take 1 tablet (150 mg total) by mouth every morning 2/1/24 1/26/25 Yes SAM Baer   diazepam (VALIUM) 5 mg tablet Take 1 tablet (5 mg total) by mouth every 8 (eight) hours as needed for anxiety or sleep 5/31/24  Yes SAM Baer   escitalopram (LEXAPRO) 10 mg tablet Take 1 tablet (10 mg total) by mouth daily 5/31/24 5/26/25 Yes SAM Baer   escitalopram (LEXAPRO) 5 mg tablet Take 1 tablet (5 mg total) by mouth daily Take with 10 mg for a total daily dose of 15 mg daily. 7/2/24  Yes SAM Jacobsen   metFORMIN (GLUCOPHAGE-XR) 500 mg 24 hr tablet Take 1 tablet (500 mg total) by mouth 2 (two) times a day with meals 4/18/24  Yes SAM Baer   metoprolol succinate (TOPROL-XL)  25 mg 24 hr tablet TAKE ONE TABLET BY MOUTH EVERY DAY 24  Yes SAM Baer   rivaroxaban (Xarelto) 20 mg tablet Take 1 tablet (20 mg total) by mouth daily with breakfast 24  Yes SAM Baer   valsartan (DIOVAN) 160 mg tablet TAKE ONE TABLET BY MOUTH EVERY DAY 24  Yes SAM Baer   acetaminophen (TYLENOL) 500 mg tablet Take 500 mg by mouth every 6 (six) hours as needed for mild pain    Historical Provider, MD KANG have reviewed home medications with patient personally.    Allergies: No Known Allergies    Social History:  Marital Status: Legally    Occupation: [employer]   Patient Pre-hospital Living Situation: Home  Patient Pre-hospital Level of Mobility: walks  Patient Pre-hospital Diet Restrictions: diabetic  Substance Use History:   Social History     Substance and Sexual Activity   Alcohol Use Not Currently    Comment: social     Social History     Tobacco Use   Smoking Status Former    Current packs/day: 0.00    Average packs/day: 1 pack/day for 9.0 years (9.0 ttl pk-yrs)    Types: Cigarettes    Start date:     Quit date:     Years since quittin.5   Smokeless Tobacco Never     Social History     Substance and Sexual Activity   Drug Use Yes    Types: Marijuana    Comment: Via pipe X 50 years       Family History:  Family History   Problem Relation Age of Onset    Hypertension Mother     Leukemia Father     Heart disease Father     Alcohol abuse Father     Depression Father     Completed Suicide  Father     Depression Sister     Suicide Attempts Brother     Alcohol abuse Brother     Depression Brother     Heart disease Family     Leukemia Family        Physical Exam:     Vitals:   Blood Pressure: 124/70 (24)  Pulse: 64 (24)  Temperature: 97.8 °F (36.6 °C) (24)  Temp Source: Oral (24)  Respirations: 18 (24)  Height: 6' (182.9 cm) (24)  Weight - Scale: 86.5 kg (190 lb  9.6 oz) (07/06/24 2313)  SpO2: 96 % (07/06/24 2225)    Physical Exam  Vitals and nursing note reviewed.   Constitutional:       Appearance: Normal appearance.   HENT:      Head: Normocephalic.   Eyes:      Extraocular Movements: Extraocular movements intact.      Pupils: Pupils are equal, round, and reactive to light.   Cardiovascular:      Rate and Rhythm: Normal rate and regular rhythm.      Heart sounds: No murmur heard.     No gallop.   Pulmonary:      Effort: No respiratory distress.      Breath sounds: Normal breath sounds. No wheezing.   Abdominal:      General: Bowel sounds are normal. There is no distension.      Tenderness: There is no abdominal tenderness.   Musculoskeletal:         General: Normal range of motion.      Cervical back: Normal range of motion.      Right lower leg: No edema.      Left lower leg: No edema.   Skin:     General: Skin is warm.   Neurological:      General: No focal deficit present.      Mental Status: He is alert and oriented to person, place, and time. Mental status is at baseline.   Psychiatric:         Mood and Affect: Mood normal.         Behavior: Behavior normal.         Thought Content: Thought content normal.          Additional Data:     Lab Results:  Results from last 7 days   Lab Units 07/06/24  1905   WBC Thousand/uL 10.23*   HEMOGLOBIN g/dL 13.8   HEMATOCRIT % 40.9   PLATELETS Thousands/uL 182   SEGS PCT % 69   LYMPHO PCT % 19   MONO PCT % 8   EOS PCT % 3     Results from last 7 days   Lab Units 07/06/24  1905   SODIUM mmol/L 138   POTASSIUM mmol/L 4.3   CHLORIDE mmol/L 103   CO2 mmol/L 30   BUN mg/dL 17   CREATININE mg/dL 0.90   ANION GAP mmol/L 5   CALCIUM mg/dL 9.5   ALBUMIN g/dL 4.1   TOTAL BILIRUBIN mg/dL 0.51   ALK PHOS U/L 57   ALT U/L 22   AST U/L 18   GLUCOSE RANDOM mg/dL 147*     Results from last 7 days   Lab Units 07/06/24  1905   INR  2.43*     Results from last 7 days   Lab Units 07/06/24  2243   POC GLUCOSE mg/dl 102     Lab Results   Component Value  Date    HGBA1C 6.7 (H) 01/15/2024    HGBA1C 6.3 (H) 08/14/2023    HGBA1C 6.5 (H) 03/27/2023           Lines/Drains:  Invasive Devices       Peripheral Intravenous Line  Duration             Peripheral IV 07/06/24 Distal;Left;Upper;Ventral (anterior) Arm <1 day                        Imaging: Reviewed radiology reports from this admission including: chest xray and CT head  CT head without contrast   Final Result by Russell Sebastian MD (07/06 1938)      No acute intracranial abnormality.      Moderate chronic small vessel ischemic changes.            Workstation performed: [id number]         XR chest 1 view portable   ED Interpretation by Jason Spicer DO (07/06 1928)   No acute infiltrate or pneumothorax      Final Result by Viri Soriano MD (07/06 1959)      Mild pulmonary venous congestion.            Workstation performed: [id number]             EKG and Other Studies Reviewed on Admission:   EKG: NSR. HR 66.    ** Please Note: This note has been constructed using a voice recognition system. **

## 2024-07-07 NOTE — DISCHARGE INSTR - AVS FIRST PAGE
Follow-up with PCP in 1 week and outpatient echocardiogram  Follow-up with cardiology as outpatient  Stay hydrated  Outpatient Zio patch with cardiology

## 2024-07-07 NOTE — DISCHARGE SUMMARY
[facility]  Discharge- Shawn Marquez 1950, 74 y.o. male MRN: [id number]  Unit/Bed#: MS Rafi-Farshad Encounter: [id number]  Primary Care Provider: SAM Baer   Date and time admitted to hospital: 7/6/2024  6:35 PM    Elevated brain natriuretic peptide (BNP) level  Assessment & Plan    No prior history of chf. Denies sob. No edema noted on extremities.   CXR: Mild pulmonary venous congestion.   ECHO ordered due to syncopal event   Cardiology recommended outpatient echocardiogram.  Patient is ambulating without any discomfort.  Patient is saturating well on room air    Medical marijuana use  Assessment & Plan  Medical marijuana for anxiety   Presented to the hospital with marijuana on possession. Security has it locked up. Patient informed he can have it back at dischage.     Longstanding persistent atrial fibrillation (HCC)  Assessment & Plan  Continue metoprolol and xarelto     Type 2 diabetes mellitus without complication, without long-term current use of insulin (HCC)  Assessment & Plan  Lab Results   Component Value Date    HGBA1C 6.7 (H) 01/15/2024       Recent Labs     07/06/24  2243 07/07/24  0706   POCGLU 102 99         Blood Sugar Average: Last 72 hrs:  (P) 100.5  Hold home metformin   SSI   Restart metformin at discharge    * Syncope  Assessment & Plan  Presents from home after syncopal event.   Patient was in his sons garage smoking marijuana when he became lightheaded. He was able to get to a chair and sit down. Passed out in the chair. Per son unconscious for about 5 minutes. No seizure activity witnessed   Patient reports he passed out once before after using mushrooms years ago  Initially hypotensive. Improved with IV fluids  CT head   No acute intracranial abnormality.  Moderate chronic small vessel ischemic changes.  Trop 12, 13. Delta 1   Denies CP    No prior history of chf. Denies sob. No edema noted on extremities. Mild congestion on  CXR  Fall precautions   Telemety   Suspect syncope in setting of heat and marijuana use   Cardiology consult appreciated  As per cardiology patient symptoms likely secondary to vasovagal in setting of marijuana use  As per cardiology okay to discharge patient with outpatient echocardiogram  Likely benefit from Zio patch for 2 weeks as outpatient with cardiologist  Discussed with patient and wife were in agreement with the discharge plan        Hospital Course:     Shawn Marquez is a 74 y.o. male patient who originally presented to the hospital on   Admission Orders (From admission, onward)       Ordered        07/06/24 2104  Place in Observation  Once                         due to syncope.  Patient thinks he smoked too much weed.  Patient was admitted and seen by cardiology.  Patient troponins are negative.  Patient symptoms likely secondary to vasovagal in setting of marijuana use.  Patient is hemodynamically stable and cleared by cardiology for discharge with outpatient echocardiogram and Zio patch.  Patient is ambulating without any discomfort and is saturating well on room air.  Patient and wife are in agreement with the discharge plan  On Exam-  Chest-bilateral air entry, clear to auscultation  Abdomen-soft, nontender  Heart-S1 S2 regular  Extremities-no pedal edema or calf tenderness  Neuro-alert awake oriented x3.  No focal deficits    Please see above list of diagnoses and related plan for additional information.   Follow-up with PCP in 1 week and outpatient echocardiogram  Follow-up with cardiology as outpatient  Stay hydrated  Outpatient Zio patch with cardiology    Condition at Discharge:  good      Discharge instructions/Information to patient and family:   See after visit summary for information provided to patient and family.      Provisions for Follow-Up Care:  See after visit summary for information related to follow-up care and any pertinent home health orders.      Disposition:     Home        Discharge Statement:  I spent 40 minutes discharging the patient. This time was spent on the day of discharge. I had direct contact with the patient on the day of discharge. Greater than 50% of the total time was spent examining patient, answering all patient questions, arranging and discussing plan of care with patient as well as directly providing post-discharge instructions.  Additional time then spent on discharge activities.    Discharge Medications:  See after visit summary for reconciled discharge medications provided to patient and family.      ** Please Note: This note has been constructed using a voice recognition system **

## 2024-07-07 NOTE — ASSESSMENT & PLAN NOTE
Lab Results   Component Value Date    HGBA1C 6.7 (H) 01/15/2024       Recent Labs     07/06/24  2243 07/07/24  0706   POCGLU 102 99         Blood Sugar Average: Last 72 hrs:  (P) 100.5  Hold home metformin   SSI   Restart metformin at discharge

## 2024-07-08 ENCOUNTER — TELEPHONE (OUTPATIENT)
Age: 74
End: 2024-07-08

## 2024-07-08 DIAGNOSIS — I48.11 LONGSTANDING PERSISTENT ATRIAL FIBRILLATION (HCC): Primary | ICD-10-CM

## 2024-07-08 DIAGNOSIS — R55 SYNCOPE, UNSPECIFIED SYNCOPE TYPE: ICD-10-CM

## 2024-07-08 NOTE — TELEPHONE ENCOUNTER
Caller: Shawn     Doctor: Dr. Son     Reason for call: patient was recently in ED and was advised to call and schedule echo. No echo script was placed in the system. Could an echo script be placed and then call patient to advise. He stated he wanted to wait to schedule HFU until echo was scheduled.     Call back#: [phone]

## 2024-07-09 ENCOUNTER — TRANSITIONAL CARE MANAGEMENT (OUTPATIENT)
Dept: FAMILY MEDICINE CLINIC | Facility: CLINIC | Age: 74
End: 2024-07-09

## 2024-07-09 ENCOUNTER — OFFICE VISIT (OUTPATIENT)
Dept: FAMILY MEDICINE CLINIC | Facility: CLINIC | Age: 74
End: 2024-07-09
Payer: MEDICARE

## 2024-07-09 ENCOUNTER — OFFICE VISIT (OUTPATIENT)
Dept: BEHAVIORAL/MENTAL HEALTH CLINIC | Facility: CLINIC | Age: 74
End: 2024-07-09
Payer: MEDICARE

## 2024-07-09 VITALS
HEART RATE: 69 BPM | SYSTOLIC BLOOD PRESSURE: 118 MMHG | WEIGHT: 186 LBS | OXYGEN SATURATION: 97 % | BODY MASS INDEX: 25.23 KG/M2 | DIASTOLIC BLOOD PRESSURE: 86 MMHG

## 2024-07-09 DIAGNOSIS — F22 PARANOIA (HCC): Primary | ICD-10-CM

## 2024-07-09 DIAGNOSIS — E78.5 HYPERLIPIDEMIA ASSOCIATED WITH TYPE 2 DIABETES MELLITUS  (HCC): ICD-10-CM

## 2024-07-09 DIAGNOSIS — R79.89 ELEVATED BRAIN NATRIURETIC PEPTIDE (BNP) LEVEL: ICD-10-CM

## 2024-07-09 DIAGNOSIS — E11.69 HYPERLIPIDEMIA ASSOCIATED WITH TYPE 2 DIABETES MELLITUS  (HCC): ICD-10-CM

## 2024-07-09 DIAGNOSIS — Z79.01 ANTICOAGULANT LONG-TERM USE: ICD-10-CM

## 2024-07-09 DIAGNOSIS — F22 PARANOID BEHAVIOR (HCC): ICD-10-CM

## 2024-07-09 DIAGNOSIS — I10 ESSENTIAL HYPERTENSION: ICD-10-CM

## 2024-07-09 DIAGNOSIS — I48.11 LONGSTANDING PERSISTENT ATRIAL FIBRILLATION (HCC): ICD-10-CM

## 2024-07-09 DIAGNOSIS — E11.9 TYPE 2 DIABETES MELLITUS WITHOUT COMPLICATION, WITHOUT LONG-TERM CURRENT USE OF INSULIN (HCC): ICD-10-CM

## 2024-07-09 DIAGNOSIS — E11.9 TYPE 2 DIABETES MELLITUS WITHOUT COMPLICATION, WITHOUT LONG-TERM CURRENT USE OF INSULIN (HCC): Primary | ICD-10-CM

## 2024-07-09 PROCEDURE — 90853 GROUP PSYCHOTHERAPY: CPT | Performed by: SOCIAL WORKER

## 2024-07-09 PROCEDURE — 99495 TRANSJ CARE MGMT MOD F2F 14D: CPT

## 2024-07-09 RX ORDER — VALSARTAN 160 MG/1
160 TABLET ORAL DAILY
Qty: 90 TABLET | Refills: 2 | Status: SHIPPED | OUTPATIENT
Start: 2024-07-09

## 2024-07-09 RX ORDER — ATORVASTATIN CALCIUM 40 MG/1
40 TABLET, FILM COATED ORAL
Qty: 90 TABLET | Refills: 1 | Status: SHIPPED | OUTPATIENT
Start: 2024-07-09

## 2024-07-09 RX ORDER — RIVAROXABAN 20 MG/1
20 TABLET, FILM COATED ORAL
Qty: 90 TABLET | Refills: 1 | Status: SHIPPED | OUTPATIENT
Start: 2024-07-09

## 2024-07-09 NOTE — PSYCH
"Behavioral Health Psychotherapy Group Progress Note    Psychotherapy Provided: Group Therapy    1. Paranoia (HCC)            Goals addressed in session: Goal 1     Group Name: Pearl    Topic(s) covered: Fraud scams targeting seniors; skepticism/conspiracy theories     Skill(s) covered: Self-advocacy; trustable/valid sources     Group summary:  Two members attended     Data: Shawn attended today's group. He remained engaged in group and is moderately more talkative with only one other member present.     Substance Abuse was not addressed during this session. If the client is diagnosed with a co-occurring substance use disorder, please indicate any changes in the frequency or amount of use: . Stage of change for addressing substance use diagnoses: Maintenance (per MD note has discontinued THC usage)     ASSESSMENT:  Shawn appeared  with a Euthymic/ normal mood. His affect is Normal range and intensity, which is congruent, with his mood and the content of the session. He appeared to actively participated in the group and interacted appropriately with and was supportive of the other group members.     Shawn Marquez presents with a nonerisk of suicide,nonerisk of self-harm, and none risk of harm to others.    For any risk assessment that surpasses a \"low\" rating, a safety plan must be developed.    A safety plan was indicated: no  If yes, describe in detail     PLAN: Shawn will continue to be seen in individual sessions also.  The next group is scheduled for 7/16 and will cover the topic of follow up from today's group.    Behavioral Health Treatment Plan and Discharge Planning: Shawn Marquez is aware of and agrees to continue to work on their treatment plan. They have identified and are working toward their discharge goals. yes    Visit start and stop times:    07/09/24  Start Time: 1200  Stop Time: 1300  Total Visit Time: 60 minutes      "

## 2024-07-10 NOTE — PROGRESS NOTES
Transition of Care Visit  Name: Shawn Marquez      : 1950      MRN: [id number]  Encounter Provider: SAM Baer  Encounter Date: 2024   Encounter department: [facility]    Assessment & Plan   1. Type 2 diabetes mellitus without complication, without long-term current use of insulin (HCC)  Assessment & Plan:  DM well managed. Would like to continue metformin at this time. Can consider discontinuing if A1C remains stable.  Lab Results   Component Value Date    HGBA1C 5.8 (H) 2024     2. Essential hypertension  Assessment & Plan:  BP stable. Continue valsartan and metoprolol.   Orders:  -     valsartan (DIOVAN) 160 mg tablet; Take 1 tablet (160 mg total) by mouth daily  3. Paranoid behavior (HCC)  Assessment & Plan:  Following with psych. Lexapro recently increased to 15 mg. Continue current medication regimen.   4. Longstanding persistent atrial fibrillation (HCC)  Assessment & Plan:  Patient follows with cards. Continue current medication regimen-- metoprolol and xarelto. Has ECHO scheduled . Was recommended for outpatient zio patch with cards. Unclear if this was ordered. Patient to contact us if he doesn't hear from cards in regards to zio in the next week.   5. Elevated brain natriuretic peptide (BNP) level  Assessment & Plan:  Patient encouraged to check weights daily with log. Instructed to call office with weight gain of 2-3 lb/day or 5 lb in a week. Wife states that they do no currently have a scale-- will look into getting one. Mild pulmonary congestion noted on CXR during hospitalization. Patient asymptomatic.       Depression Screening and Follow-up Plan: Patient was screened for depression during today's encounter. They screened negative with a PHQ-9 score of 0.        History of Present Illness     Transitional Care Management Review:   Shawn Marquez is a 74 y.o. male here for TCM follow up.     During the TCM phone call  patient stated:  TCM Call       Date and time call was made  7/9/2024  8:55 AM    Hospital care reviewed  Records reviewed    Patient was hospitialized at  [facility]    Date of Admission  07/06/24    Date of discharge  07/07/24    Diagnosis  Syncope    Disposition  Home    Were the patients medications reviewed and updated  Yes    Current Symptoms  None          TCM Call       Post hospital issues  None    Should patient be enrolled in anticoag monitoring?  No    Scheduled for follow up?  Yes    Did you obtain your prescribed medications  Yes    Do you need help managing your prescriptions or medications  No    Is transportation to your appointment needed  No    I have advised the patient to call PCP with any new or worsening symptoms  Dayan PERALES MA.    Living Arrangements  Spouse or Significiant other    Are you recieving any outpatient services  No    Are you recieving home care services  No    Have you fallen in the last 12 months  No          Presents for TCM sp hospitalization for syncope.       Review of Systems   Constitutional:  Negative for activity change, fatigue and fever.   HENT:  Negative for congestion, ear pain, rhinorrhea and sore throat.    Eyes:  Negative for pain.   Respiratory:  Negative for cough, chest tightness, shortness of breath and wheezing.    Cardiovascular:  Negative for chest pain, palpitations and leg swelling.   Gastrointestinal:  Negative for abdominal pain, diarrhea, nausea and vomiting.   Musculoskeletal:  Negative for arthralgias and myalgias.   Skin:  Negative for rash.   Neurological:  Negative for dizziness, weakness and numbness.   Psychiatric/Behavioral:  Negative for agitation, behavioral problems, dysphoric mood and suicidal ideas. The patient is not nervous/anxious.    All other systems reviewed and are negative.    Objective     /86 (BP Location: Left arm, Patient Position: Sitting, Cuff Size: Standard)   Pulse 69   Wt 84.4 kg (186 lb)   SpO2 97%    BMI 25.23 kg/m²     Physical Exam  Vitals and nursing note reviewed.   Constitutional:       General: He is not in acute distress.     Appearance: Normal appearance. He is well-developed. He is not ill-appearing.   HENT:      Head: Normocephalic and atraumatic.      Right Ear: External ear normal.      Left Ear: External ear normal.   Cardiovascular:      Rate and Rhythm: Normal rate and regular rhythm.      Heart sounds: Normal heart sounds. No murmur heard.  Pulmonary:      Effort: Pulmonary effort is normal. No respiratory distress.      Breath sounds: Normal breath sounds. No wheezing or rales.   Chest:      Chest wall: No tenderness.   Abdominal:      Palpations: Abdomen is soft.      Tenderness: There is no abdominal tenderness.   Musculoskeletal:      Cervical back: Neck supple.   Skin:     General: Skin is warm and dry.      Capillary Refill: Capillary refill takes less than 2 seconds.   Neurological:      Mental Status: He is alert and oriented to person, place, and time. Mental status is at baseline.      GCS: GCS eye subscore is 4. GCS verbal subscore is 5. GCS motor subscore is 6.      Cranial Nerves: No dysarthria or facial asymmetry.      Motor: Motor function is intact. No weakness.      Gait: Gait is intact.   Psychiatric:         Mood and Affect: Mood normal.       Medications have been reviewed by provider in current encounter    Administrative Statements

## 2024-07-10 NOTE — ASSESSMENT & PLAN NOTE
DM well managed. Would like to continue metformin at this time. Can consider discontinuing if A1C remains stable.  Lab Results   Component Value Date    HGBA1C 5.8 (H) 07/07/2024

## 2024-07-10 NOTE — ASSESSMENT & PLAN NOTE
Patient encouraged to check weights daily with log. Instructed to call office with weight gain of 2-3 lb/day or 5 lb in a week. Wife states that they do no currently have a scale-- will look into getting one. Mild pulmonary congestion noted on CXR during hospitalization. Patient asymptomatic.

## 2024-07-10 NOTE — ASSESSMENT & PLAN NOTE
Patient follows with cards. Continue current medication regimen-- metoprolol and xarelto. Has ECHO scheduled 8/12. Was recommended for outpatient zio patch with cards. Unclear if this was ordered. Patient to contact us if he doesn't hear from cards in regards to zio in the next week.

## 2024-07-16 ENCOUNTER — OFFICE VISIT (OUTPATIENT)
Dept: BEHAVIORAL/MENTAL HEALTH CLINIC | Facility: CLINIC | Age: 74
End: 2024-07-16
Payer: MEDICARE

## 2024-07-16 DIAGNOSIS — F33.40 DEPRESSION, MAJOR, RECURRENT, IN REMISSION (HCC): Primary | ICD-10-CM

## 2024-07-16 PROCEDURE — 90853 GROUP PSYCHOTHERAPY: CPT | Performed by: SOCIAL WORKER

## 2024-07-16 NOTE — PSYCH
"Behavioral Health Psychotherapy Group Progress Note    Psychotherapy Provided: Group Therapy    1. Depression, major, recurrent, in remission (HCC)            Goals addressed in session: Goal 1     Group Name: [facility]    Topic(s) covered: Recent stressors; family concerns and strength     Skill(s) covered: Group was supportive and offered perspectives and solutions to one another      Group summary:  Four members attended    Data: Shawn attended today's group. He was recently seen in ER due to vertigo that physician felt was due to medical marijuana usage, and would like him to decrease.     Substance Abuse was addressed during this session. If the client is diagnosed with a co-occurring substance use disorder, please indicate any changes in the frequency or amount of use: . Stage of change for addressing substance use diagnoses: Contemplation    ASSESSMENT:  Shawn appeared  with a Euthymic/ normal mood. His affect is Normal range and intensity, which is congruent, with his mood and the content of the session. He appeared to actively participated in the group and interacted appropriately with and was supportive of the other group members.     Shawn Marquez presents with a nonerisk of suicide,nonerisk of self-harm, and none risk of harm to others.    For any risk assessment that surpasses a \"low\" rating, a safety plan must be developed.    A safety plan was indicated: no  If yes, describe in detail     PLAN: Shawn will be seen in individual session to further discuss decrease in THC. The next group is scheduled for 7/23 and will cover the topic of TBD.    Behavioral Health Treatment Plan and Discharge Planning: Shawn Marquez is aware of and agrees to continue to work on their treatment plan. They have identified and are working toward their discharge goals. yes    Visit start and stop times:    07/16/24  Start Time: 1200  Stop Time: 1303  Total Visit Time: 63 minutes      "

## 2024-07-18 ENCOUNTER — SOCIAL WORK (OUTPATIENT)
Dept: BEHAVIORAL/MENTAL HEALTH CLINIC | Facility: CLINIC | Age: 74
End: 2024-07-18
Payer: MEDICARE

## 2024-07-18 DIAGNOSIS — F33.40 DEPRESSION, MAJOR, RECURRENT, IN REMISSION (HCC): Primary | ICD-10-CM

## 2024-07-18 PROCEDURE — 90832 PSYTX W PT 30 MINUTES: CPT | Performed by: SOCIAL WORKER

## 2024-07-18 NOTE — PSYCH
"Behavioral Health Psychotherapy Progress Note    Psychotherapy Provided: Individual Psychotherapy     1. Depression, major, recurrent, in remission (HCC)            Goals addressed in session: Goal 1     DATA:   Mr. Marquez is seen for a brief individual session. He observably is less depressed and less tearful, likely due to Lexapro usage, increased social interaction including reconnection with son and the return of spouse to the home; and possibly due to a significant decrease in THC usage from daily usage to twice monthly. During this session, this clinician used the following therapeutic modalities: Cognitive Behavioral Therapy    Substance Abuse was addressed during this session. If the client is diagnosed with a co-occurring substance use disorder, please indicate any changes in the frequency or amount of use: Decrease. Stage of change for addressing substance use diagnoses: Action    ASSESSMENT:  Shawn Marquez presents with a Euthymic/ normal mood.     his affect is Normal range and intensity, which is congruent, with his mood and the content of the session. The client has made progress on their goals.     Shawn Marquez presents with a none risk of suicide, none risk of self-harm, and none risk of harm to others.    For any risk assessment that surpasses a \"low\" rating, a safety plan must be developed.    A safety plan was indicated: no  If yes, describe in detail     PLAN: Between sessions, Shawn Marquez will continue to attend support group. At the next session, the therapist will use Cognitive Behavioral Therapy to address substance usage and other emergent concerns.    Behavioral Health Treatment Plan and Discharge Planning: Shawn Marquez is aware of and agrees to continue to work on their treatment plan. They have identified and are working toward their discharge goals. yes    Visit start and stop times:    07/18/24  Start Time: 1400  Stop Time: 1428  Total Visit Time: 28 " minutes

## 2024-07-23 ENCOUNTER — OFFICE VISIT (OUTPATIENT)
Dept: BEHAVIORAL/MENTAL HEALTH CLINIC | Facility: CLINIC | Age: 74
End: 2024-07-23
Payer: MEDICARE

## 2024-07-23 PROCEDURE — 90853 GROUP PSYCHOTHERAPY: CPT | Performed by: SOCIAL WORKER

## 2024-07-23 NOTE — PSYCH
"Behavioral Health Psychotherapy Group Progress Note    Psychotherapy Provided: Group Therapy    No diagnosis found.    Goals addressed in session: Goal 1     Group Name: [facility]    Topic(s) covered: Chronic pain; stress and resilience related to recent political events       Skill(s) covered: Mindfulness (will continue to discuss in a future session)      Group summary:  Three members attended    Data: Shawn attended today's group. He is quieter than the other members but appears attentive and interested.     Substance Abuse was not addressed during this session. If the client is diagnosed with a co-occurring substance use disorder, please indicate any changes in the frequency or amount of use: . Stage of change for addressing substance use diagnoses: Maintenance; will discuss in his upcoming individual session     ASSESSMENT:  Shawn appeared  with a Euthymic/ normal mood. His affect is Normal range and intensity, which is congruent, with his mood and the content of the session. He appeared to actively participated in the group and interacted appropriately with and was supportive of the other group members.     Shawn Marquez presents with a nonerisk of suicide,nonerisk of self-harm, and none risk of harm to others.    For any risk assessment that surpasses a \"low\" rating, a safety plan must be developed.    A safety plan was indicated: no  If yes, describe in detail     PLAN: Shawn will continue to limit or abstain from TCH to determine its influence on his mood and paranoia experience. The next group is scheduled for 7/30 and will cover the topic of Mindfulness and stress reduction.     Behavioral Health Treatment Plan and Discharge Planning: Shawn Marquez is aware of and agrees to continue to work on their treatment plan. They have identified and are working toward their discharge goals. yes    Visit start and stop times:    07/23/24  Start Time: 1203  Stop Time: 1305  Total Visit Time: 62 " minutes

## 2024-07-25 ENCOUNTER — SOCIAL WORK (OUTPATIENT)
Dept: BEHAVIORAL/MENTAL HEALTH CLINIC | Facility: CLINIC | Age: 74
End: 2024-07-25
Payer: MEDICARE

## 2024-07-25 DIAGNOSIS — F33.40 DEPRESSION, MAJOR, RECURRENT, IN REMISSION (HCC): Primary | ICD-10-CM

## 2024-07-25 PROCEDURE — 90832 PSYTX W PT 30 MINUTES: CPT | Performed by: SOCIAL WORKER

## 2024-07-25 NOTE — PSYCH
"Behavioral Health Psychotherapy Progress Note    Psychotherapy Provided: Individual Psychotherapy     1. Depression, major, recurrent, in remission (HCC)            Goals addressed in session: Goal 1     DATA:   Mr. Marquez continues to generally feel good and to attribute low frequency of thoughts about others wanting to do damage to him to \"nothing has happened\" recently; we addressed how this many also be correlated to beginning Lexapro, decreasing THC, having spouse return to living in home, and attending weekly support group. During this session, this clinician used the following therapeutic modalities: Cognitive Behavioral Therapy    Substance Abuse was addressed during this session. If the client is diagnosed with a co-occurring substance use disorder, please indicate any changes in the frequency or amount of use: . Stage of change for addressing substance use diagnoses: Maintenance    ASSESSMENT:  Shawn Marquez presents with a Euthymic/ normal mood.     his affect is Normal range and intensity, which is congruent, with his mood and the content of the session. The client has made progress on their goals.     Shawn Marquez presents with a none risk of suicide, none risk of self-harm, and none risk of harm to others.    For any risk assessment that surpasses a \"low\" rating, a safety plan must be developed.    A safety plan was indicated: no  If yes, describe in detail     PLAN: Between sessions, Shawn Marquez will continued to take meds as prescribed and limit THC. At the next session, the therapist will use Cognitive Behavioral Therapy to address his spouse's concerns about his interactions with/beliefs about his brother-in-law (spouse will attend session.)     Behavioral Health Treatment Plan and Discharge Planning: Shawn Marquez is aware of and agrees to continue to work on their treatment plan. They have identified and are working toward their discharge goals. yes    Visit start and " stop times:    07/25/24  Start Time: 1304  Stop Time: 1333  Total Visit Time: 29 minutes

## 2024-07-31 PROBLEM — Z01.810 PRE-OPERATIVE CARDIOVASCULAR EXAMINATION, SUPRAVENTRICULAR ARRHYTHMIA: Status: RESOLVED | Noted: 2022-07-15 | Resolved: 2024-07-31

## 2024-07-31 PROBLEM — I49.9 PRE-OPERATIVE CARDIOVASCULAR EXAMINATION, SUPRAVENTRICULAR ARRHYTHMIA: Status: RESOLVED | Noted: 2022-07-15 | Resolved: 2024-07-31

## 2024-07-31 PROBLEM — Z86.73 PERSONAL HISTORY OF TRANSIENT ISCHEMIC ATTACK: Status: ACTIVE | Noted: 2024-07-31

## 2024-07-31 PROBLEM — Z86.73 PERSONAL HISTORY OF TRANSIENT ISCHEMIC ATTACK: Status: ACTIVE | Noted: 2019-07-16

## 2024-08-01 ENCOUNTER — SOCIAL WORK (OUTPATIENT)
Dept: BEHAVIORAL/MENTAL HEALTH CLINIC | Facility: CLINIC | Age: 74
End: 2024-08-01
Payer: MEDICARE

## 2024-08-01 DIAGNOSIS — F33.40 DEPRESSION, MAJOR, RECURRENT, IN REMISSION (HCC): Primary | ICD-10-CM

## 2024-08-01 PROCEDURE — 90837 PSYTX W PT 60 MINUTES: CPT | Performed by: SOCIAL WORKER

## 2024-08-01 NOTE — PSYCH
"Behavioral Health Psychotherapy Progress Note    Psychotherapy Provided: Individual Psychotherapy     1. Depression, major, recurrent, in remission (HCC)            Goals addressed in session: Goal 1     DATA:   Mr. Marquez is seen with his wife as an auxiliary source of information. During this session, this clinician used the following therapeutic modalities: Engagement Strategies, Cognitive Behavioral Therapy, and Motivational Interviewing to learn that his beliefs about her brother prevent him from attending family events, which would be in accord with his values of seeing his son and making his wife happy. He and spouse may be appropriate for marital counseling on this topic as in the past it has led him to avoid events including her mother's  which in turn led to their separation for several months.     Substance Abuse was not addressed during this session. If the client is diagnosed with a co-occurring substance use disorder, please indicate any changes in the frequency or amount of use: . Stage of change for addressing substance use diagnoses: Maintenance    ASSESSMENT:  Shawn Marquez presents with a Euthymic/ normal mood.     his affect is Normal range and intensity, which is congruent, with his mood and the content of the session. The client has made progress on their goals.     Shawn Mraquez presents with a none risk of suicide, none risk of self-harm, and none risk of harm to others.    For any risk assessment that surpasses a \"low\" rating, a safety plan must be developed.    A safety plan was indicated: no  If yes, describe in detail     PLAN: Between sessions, Shawn Marquez will consider agreeing to attend events, act \"civil\" while brushing off perceived insults, saying hello to family members he does enjoy, and watching the children play. At the next session, the therapist will use Cognitive Behavioral Therapy to address his willingness to attempt this.    Behavioral Health " Treatment Plan and Discharge Planning: Shawn Marquez is aware of and agrees to continue to work on their treatment plan. They have identified and are working toward their discharge goals. yes    Visit start and stop times:    08/01/24  Start Time: 1202  Stop Time: 1256  Total Visit Time: 54 minutes

## 2024-08-06 ENCOUNTER — OFFICE VISIT (OUTPATIENT)
Dept: BEHAVIORAL/MENTAL HEALTH CLINIC | Facility: CLINIC | Age: 74
End: 2024-08-06
Payer: MEDICARE

## 2024-08-06 DIAGNOSIS — F22 PARANOIA (HCC): Primary | ICD-10-CM

## 2024-08-06 PROCEDURE — 90853 GROUP PSYCHOTHERAPY: CPT | Performed by: SOCIAL WORKER

## 2024-08-07 NOTE — PSYCH
"Behavioral Health Psychotherapy Group Progress Note    Psychotherapy Provided: Group Therapy    1. Paranoia (HCC)            Goals addressed in session: Goal 1     Group Name: [facility]    Topic(s) covered: Departure of a group member; addressing political differences effectively     Skill(s) covered: Recognizing accomplishments and progress/connections made in group     Group summary:  Four members and a spouse attended with the permission of all group members     Data: Shawn attended today's group. He is unlikely to be able to continue due to $50 copay; brought spouse Sherice to meet other attendees and briefly introduced his concerns about being targeted by neighbor, to which group responded with equanimity and support.     Substance Abuse was not addressed during this session. If the client is diagnosed with a co-occurring substance use disorder, please indicate any changes in the frequency or amount of use: . Stage of change for addressing substance use diagnoses: No substance use/Not applicable    ASSESSMENT:  Shawn appeared  with a Euthymic/ normal and Anxious mood. His affect is Normal range and intensity, which is congruent, with his mood and the content of the session. He appeared to actively participated in the group and interacted appropriately with and was supportive of the other group members.     Shawn Marquez presents with a nonerisk of suicide,nonerisk of self-harm, and none risk of harm to others.    For any risk assessment that surpasses a \"low\" rating, a safety plan must be developed.    A safety plan was indicated: no  If yes, describe in detail     PLAN: Shawn will N/A; will continue in individual tx only.      Behavioral Health Treatment Plan and Discharge Planning: Shawn Marquez is aware of and agrees to continue to work on their treatment plan. They have identified and are working toward their discharge goals. yes    Visit start and stop times:    08/07/24  Start Time: " 1230  Stop Time: 1330  Total Visit Time: 60 minutes

## 2024-08-08 ENCOUNTER — SOCIAL WORK (OUTPATIENT)
Dept: BEHAVIORAL/MENTAL HEALTH CLINIC | Facility: CLINIC | Age: 74
End: 2024-08-08
Payer: MEDICARE

## 2024-08-08 ENCOUNTER — TELEPHONE (OUTPATIENT)
Dept: FAMILY MEDICINE CLINIC | Facility: CLINIC | Age: 74
End: 2024-08-08

## 2024-08-08 DIAGNOSIS — F22 PARANOIA (HCC): Primary | ICD-10-CM

## 2024-08-08 PROCEDURE — 90837 PSYTX W PT 60 MINUTES: CPT | Performed by: SOCIAL WORKER

## 2024-08-08 NOTE — PSYCH
"Behavioral Health Psychotherapy Progress Note    Psychotherapy Provided: Individual Psychotherapy     1. Paranoia (HCC)            Goals addressed in session: Goal 1     DATA:   During this session, this clinician used the following therapeutic modalities: Cognitive Behavioral Therapy to conduct a chain analysis of incident of taking Valium last night due to agitated thoughts following making commitment to spouse to attend a family event; engaged in solution analysis to understand how mindfulness may map on to this to allow client greater mastery of his thoughts.     Substance Abuse was addressed during this session. If the client is diagnosed with a co-occurring substance use disorder, please indicate any changes in the frequency or amount of use: . Stage of change for addressing substance use diagnoses: Maintenance    ASSESSMENT:  Shawn Marquez presents with a Euthymic/ normal mood.     his affect is Normal range and intensity, which is congruent, with his mood and the content of the session. The client has made progress on their goals.     Shawn Marquez presents with a none risk of suicide, none risk of self-harm, and none risk of harm to others.    For any risk assessment that surpasses a \"low\" rating, a safety plan must be developed.    A safety plan was indicated: no  If yes, describe in detail     PLAN: Between sessions, Shawn Marquez will notice when he begins to dwell/pay attention to \"incidents/evidence\" and will attempt Radical Acceptance. At the next session, the therapist will use Cognitive Behavioral Therapy to address the results of this effort.    Behavioral Health Treatment Plan and Discharge Planning: Shawn Marquez is aware of and agrees to continue to work on their treatment plan. They have identified and are working toward their discharge goals. yes    Visit start and stop times:    08/08/24  Start Time: 1200  Stop Time: 1300  Total Visit Time: 60 minutes  "

## 2024-08-09 ENCOUNTER — OFFICE VISIT (OUTPATIENT)
Dept: FAMILY MEDICINE CLINIC | Facility: CLINIC | Age: 74
End: 2024-08-09
Payer: MEDICARE

## 2024-08-09 VITALS
BODY MASS INDEX: 25.47 KG/M2 | HEIGHT: 72 IN | WEIGHT: 188 LBS | HEART RATE: 64 BPM | TEMPERATURE: 96 F | OXYGEN SATURATION: 97 % | SYSTOLIC BLOOD PRESSURE: 130 MMHG | DIASTOLIC BLOOD PRESSURE: 74 MMHG

## 2024-08-09 DIAGNOSIS — E11.9 TYPE 2 DIABETES MELLITUS WITHOUT COMPLICATION, WITHOUT LONG-TERM CURRENT USE OF INSULIN (HCC): ICD-10-CM

## 2024-08-09 DIAGNOSIS — F22 PARANOID BEHAVIOR (HCC): Primary | ICD-10-CM

## 2024-08-09 PROCEDURE — 99214 OFFICE O/P EST MOD 30 MIN: CPT

## 2024-08-09 PROCEDURE — G2211 COMPLEX E/M VISIT ADD ON: HCPCS

## 2024-08-09 RX ORDER — METFORMIN HYDROCHLORIDE 500 MG/1
500 TABLET, EXTENDED RELEASE ORAL
Qty: 180 TABLET | Refills: 1 | Status: SHIPPED | OUTPATIENT
Start: 2024-08-09

## 2024-08-09 NOTE — ASSESSMENT & PLAN NOTE
Last A1C 5.8. Decrease metformin to 500 mg QD. Recheck A1C at time of MAW.   Lab Results   Component Value Date    HGBA1C 5.8 (H) 07/07/2024

## 2024-08-09 NOTE — ASSESSMENT & PLAN NOTE
"Reports that he is feeling well overall. Appears in better spirits, smiling and conversing during visit. Relationship with wife and son have improved although there is discord between he and his brother in law. Patient reports that he continues with talk and group therapy. Due to financial difficulties, he is going to have to discontinue with group therapy as it is $50/session. Patient states that he plan to continue individual therapy reporting, \"Floresita and I had a really good session yesterday.\" Patient plans to go back to psych for med mgmt 8/15. Reports that he feels well on lexapro 15 mg QD, wellbutrin 150 mg QD, and valium 5 mg PRN. Does continue with paranoia. States, \"I never told you the full story about everything. I told you how the  came to my house a while back telling me that I couldn't threaten people with a baseball bat. The thing is I never said that to anyone, I said it in my kitchen. My house is wired. How else would they know this?\" Patient denies SI, HI, thoughts of self harm, or thoughts of harming others. Patient to follow up in Dec. For MAW. Instructed to call office in meantime should anything change in his care. Patient verbalized understanding.   "

## 2024-08-09 NOTE — PROGRESS NOTES
"Ambulatory Visit  Name: Shawn Marquez      : 1950      MRN: [id number]  Encounter Provider: SAM Baer  Encounter Date: 2024   Encounter department: [facility]    Assessment & Plan   1. Paranoid behavior (HCC)  Assessment & Plan:  Reports that he is feeling well overall. Appears in better spirits, smiling and conversing during visit. Relationship with wife and son have improved although there is discord between he and his brother in law. Patient reports that he continues with talk and group therapy. Due to financial difficulties, he is going to have to discontinue with group therapy as it is $50/session. Patient states that he plan to continue individual therapy reporting, \"Floresita and I had a really good session yesterday.\" Patient plans to go back to psych for med mgmt 8/15. Reports that he feels well on lexapro 15 mg QD, wellbutrin 150 mg QD, and valium 5 mg PRN. Does continue with paranoia. States, \"I never told you the full story about everything. I told you how the  came to my house a while back telling me that I couldn't threaten people with a baseball bat. The thing is I never said that to anyone, I said it in my kitchen. My house is wired. How else would they know this?\" Patient denies SI, HI, thoughts of self harm, or thoughts of harming others. Patient to follow up in Dec. For MAW. Instructed to call office in meantime should anything change in his care. Patient verbalized understanding.   2. Type 2 diabetes mellitus without complication, without long-term current use of insulin (HCC)  Assessment & Plan:  Last A1C 5.8. Decrease metformin to 500 mg QD. Recheck A1C at time of MAW.   Lab Results   Component Value Date    HGBA1C 5.8 (H) 2024     Orders:  -     metFORMIN (GLUCOPHAGE-XR) 500 mg 24 hr tablet; Take 1 tablet (500 mg total) by mouth daily with breakfast       History of Present Illness     See assessment/plan        Review of " Systems   Constitutional:  Negative for activity change, fatigue and fever.   HENT:  Negative for congestion, ear pain, rhinorrhea and sore throat.    Eyes:  Negative for pain.   Respiratory:  Negative for cough, shortness of breath and wheezing.    Cardiovascular:  Negative for chest pain and leg swelling.   Gastrointestinal:  Negative for abdominal pain, diarrhea, nausea and vomiting.   Musculoskeletal:  Negative for arthralgias and myalgias.   Skin:  Negative for rash.   Neurological:  Negative for dizziness, weakness and numbness.   Psychiatric/Behavioral:  Negative for agitation, dysphoric mood and suicidal ideas. The patient is not nervous/anxious.    All other systems reviewed and are negative.    Objective     /74   Pulse 64   Temp (!) 96 °F (35.6 °C)   Ht 6' (1.829 m)   Wt 85.3 kg (188 lb)   SpO2 97%   BMI 25.50 kg/m²     Physical Exam  Vitals and nursing note reviewed.   Constitutional:       General: He is not in acute distress.     Appearance: Normal appearance. He is well-developed. He is not ill-appearing.   HENT:      Head: Normocephalic and atraumatic.      Right Ear: External ear normal.      Left Ear: External ear normal.   Cardiovascular:      Rate and Rhythm: Normal rate and regular rhythm.      Heart sounds: Normal heart sounds. No murmur heard.  Pulmonary:      Effort: Pulmonary effort is normal. No respiratory distress.      Breath sounds: Normal breath sounds. No wheezing.   Abdominal:      General: Bowel sounds are normal. There is no distension.      Palpations: Abdomen is soft.      Tenderness: There is no abdominal tenderness.   Musculoskeletal:      Cervical back: Neck supple.   Skin:     General: Skin is warm and dry.      Capillary Refill: Capillary refill takes less than 2 seconds.   Neurological:      Mental Status: He is alert and oriented to person, place, and time. Mental status is at baseline.   Psychiatric:         Attention and Perception: Attention and perception  normal.         Mood and Affect: Mood and affect normal. Mood is not anxious or depressed.         Speech: Speech normal.         Behavior: Behavior normal. Behavior is cooperative.         Thought Content: Thought content is paranoid. Thought content is not delusional. Thought content does not include homicidal or suicidal ideation. Thought content does not include homicidal or suicidal plan.         Cognition and Memory: Cognition and memory normal.         Judgment: Judgment normal.       Administrative Statements   I have spent a total time of 30 minutes in caring for this patient on the day of the visit/encounter including Risks and benefits of tx options, Instructions for management, Patient and family education, Importance of tx compliance, Risk factor reductions, Impressions, Counseling / Coordination of care, Documenting in the medical record, Reviewing / ordering tests, medicine, procedures  , and Obtaining or reviewing history  .

## 2024-08-12 ENCOUNTER — HOSPITAL ENCOUNTER (OUTPATIENT)
Dept: NON INVASIVE DIAGNOSTICS | Age: 74
Discharge: HOME | End: 2024-08-12
Payer: MEDICARE

## 2024-08-12 VITALS
HEIGHT: 72 IN | WEIGHT: 186 LBS | BODY MASS INDEX: 25.19 KG/M2 | SYSTOLIC BLOOD PRESSURE: 130 MMHG | HEART RATE: 65 BPM | DIASTOLIC BLOOD PRESSURE: 74 MMHG

## 2024-08-12 DIAGNOSIS — R55 SYNCOPE, UNSPECIFIED SYNCOPE TYPE: ICD-10-CM

## 2024-08-12 DIAGNOSIS — I48.11 LONGSTANDING PERSISTENT ATRIAL FIBRILLATION (HCC): ICD-10-CM

## 2024-08-12 PROCEDURE — 93306 TTE W/DOPPLER COMPLETE: CPT

## 2024-08-12 PROCEDURE — 93306 TTE W/DOPPLER COMPLETE: CPT | Performed by: INTERNAL MEDICINE

## 2024-08-14 LAB
AORTIC ROOT: 3.3 CM
AORTIC VALVE MEAN VELOCITY: 8.2 M/S
APICAL FOUR CHAMBER EJECTION FRACTION: 53 %
ASCENDING AORTA: 3.5 CM
AV LVOT MEAN GRADIENT: 1 MMHG
AV LVOT PEAK GRADIENT: 2 MMHG
AV MEAN GRADIENT: 3 MMHG
AV PEAK GRADIENT: 6 MMHG
AV VELOCITY RATIO: 0.52
BSA FOR ECHO PROCEDURE: 2.07 M2
DOP CALC AO PEAK VEL: 1.18 M/S
DOP CALC AO VTI: 21.95 CM
DOP CALC LVOT PEAK VEL VTI: 11.99 CM
DOP CALC LVOT PEAK VEL: 0.61 M/S
DOP CALC MV VTI: 22 CM
FRACTIONAL SHORTENING: 33 (ref 28–44)
INTERVENTRICULAR SEPTUM IN DIASTOLE (PARASTERNAL SHORT AXIS VIEW): 1.2 CM
INTERVENTRICULAR SEPTUM: 1.2 CM (ref 0.6–1.1)
LAAS-AP2: 22.4 CM2
LAAS-AP4: 22.5 CM2
LEFT ATRIUM SIZE: 4.5 CM
LEFT ATRIUM VOLUME (MOD BIPLANE): 74 ML
LEFT ATRIUM VOLUME INDEX (MOD BIPLANE): 35.6 ML/M2
LEFT INTERNAL DIMENSION IN SYSTOLE: 3.5 CM (ref 2.1–4)
LEFT VENTRICLE DIASTOLIC VOLUME (MOD BIPLANE): 143 ML
LEFT VENTRICLE DIASTOLIC VOLUME INDEX (MOD BIPLANE): 69.1 ML/M2
LEFT VENTRICLE SYSTOLIC VOLUME (MOD BIPLANE): 67 ML
LEFT VENTRICLE SYSTOLIC VOLUME INDEX (MOD BIPLANE): 32.4 ML/M2
LEFT VENTRICULAR INTERNAL DIMENSION IN DIASTOLE: 5.2 CM (ref 3.5–6)
LEFT VENTRICULAR POSTERIOR WALL IN END DIASTOLE: 0.9 CM
LEFT VENTRICULAR STROKE VOLUME: 78 ML
LV EF: 53 %
LVSV (TEICH): 78 ML
MV MEAN GRADIENT: 0 MMHG
MV PEAK GRADIENT: 2 MMHG
RA PRESSURE ESTIMATED: 3 MMHG
RIGHT ATRIAL 2D VOLUME: 81 ML
RIGHT ATRIUM AREA SYSTOLE A4C: 22.6 CM2
RIGHT VENTRICLE ID DIMENSION: 5 CM
RV PSP: 25 MMHG
SL CV LEFT ATRIUM LENGTH A2C: 5.7 CM
SL CV LV EF: 55
SL CV PED ECHO LEFT VENTRICLE DIASTOLIC VOLUME (MOD BIPLANE) 2D: 127 ML
SL CV PED ECHO LEFT VENTRICLE SYSTOLIC VOLUME (MOD BIPLANE) 2D: 49 ML
TR MAX PG: 22 MMHG
TR PEAK VELOCITY: 2.4 M/S
TRICUSPID ANNULAR PLANE SYSTOLIC EXCURSION: 1.7 CM
TRICUSPID VALVE PEAK REGURGITATION VELOCITY: 2.37 M/S

## 2024-08-15 ENCOUNTER — SOCIAL WORK (OUTPATIENT)
Dept: BEHAVIORAL/MENTAL HEALTH CLINIC | Facility: CLINIC | Age: 74
End: 2024-08-15
Payer: MEDICARE

## 2024-08-15 DIAGNOSIS — F22 PARANOIA (HCC): Primary | ICD-10-CM

## 2024-08-15 PROCEDURE — 90834 PSYTX W PT 45 MINUTES: CPT | Performed by: SOCIAL WORKER

## 2024-08-15 NOTE — PSYCH
"Behavioral Health Psychotherapy Progress Note    Psychotherapy Provided: Individual Psychotherapy     1. Paranoia (HCC)            Goals addressed in session: Goal 1     DATA:   During this session, this clinician used the following therapeutic modalities: Engagement Strategies and Cognitive Behavioral Therapy to learn that Mr. Marquez had a good MD visit yesterday and generally feels good; however, he does plan to buy $100 of THC tomorrow and use 1 g daily of this. We reviewed this against his decision to stop attending support group due to cost, and realized that if he decreases usage to 1-2 times weekly he can afford to comet to group twice monthly, which I strongly encouraged.     Substance Abuse was addressed during this session. If the client is diagnosed with a co-occurring substance use disorder, please indicate any changes in the frequency or amount of use: . Stage of change for addressing substance use diagnoses: Contemplation    ASSESSMENT:  Shawn Marquez presents with a Euthymic/ normal mood.     his affect is Normal range and intensity, which is congruent, with his mood and the content of the session. The client has made progress on their goals.     Shawn Marquez presents with a none risk of suicide, none risk of self-harm, and none risk of harm to others.    For any risk assessment that surpasses a \"low\" rating, a safety plan must be developed.    A safety plan was indicated: no  If yes, describe in detail     PLAN: Between sessions, Shawn Marquez will consider rejoining support group rather than resuming daily THC usage. At the next session, the therapist will use Cognitive Behavioral Therapy to address the results of this effort.    Behavioral Health Treatment Plan and Discharge Planning: Shawn Marquez is aware of and agrees to continue to work on their treatment plan. They have identified and are working toward their discharge goals. yes    Visit start and stop " times:    08/15/24  Start Time: 1300  Stop Time: 1340  Total Visit Time: 40 minutes

## 2024-08-18 DIAGNOSIS — F41.9 ANXIETY: ICD-10-CM

## 2024-08-18 RX ORDER — ESCITALOPRAM OXALATE 5 MG/1
TABLET ORAL
Qty: 30 TABLET | Refills: 1 | Status: SHIPPED | OUTPATIENT
Start: 2024-08-18 | End: 2024-08-28

## 2024-08-27 ENCOUNTER — OFFICE VISIT (OUTPATIENT)
Dept: BEHAVIORAL/MENTAL HEALTH CLINIC | Facility: CLINIC | Age: 74
End: 2024-08-27
Payer: MEDICARE

## 2024-08-27 DIAGNOSIS — F22 PARANOIA (HCC): Primary | ICD-10-CM

## 2024-08-27 PROCEDURE — 90853 GROUP PSYCHOTHERAPY: CPT | Performed by: SOCIAL WORKER

## 2024-08-27 NOTE — PSYCH
"Behavioral Health Psychotherapy Group Progress Note    Psychotherapy Provided: Group Therapy    1. Paranoia (HCC)            Goals addressed in session: Goal 1     Group Name: [facility]    Topic(s) covered: Chronic health concerns; developing a curriculum/focus for Fall     Skill(s) covered:  Defining trauma (both single-incident and chronic)     Group summary:  Four members attended    Data: Shawn attended today's group. He is warmly welcomed by group but after its end disclosed to me that he feels excluded due to only being able to come every other week. I encouraged him to talk more in the group as this feeling may be a self-fulfilling prophecy.     Substance Abuse was not addressed during this session. If the client is diagnosed with a co-occurring substance use disorder, please indicate any changes in the frequency or amount of use: . Stage of change for addressing substance use diagnoses: No substance use/Not applicable    ASSESSMENT:  Shawn appeared  with a Euthymic/ normal mood. His affect is Normal range and intensity, which is congruent, with his mood and the content of the session. He appeared to remained quiet throughout the group and interacted appropriately with and was supportive of the other group members.     Shawn Marquez presents with a nonerisk of suicide,nonerisk of self-harm, and none risk of harm to others.    For any risk assessment that surpasses a \"low\" rating, a safety plan must be developed.    A safety plan was indicated: no  If yes, describe in detail     PLAN: Shawn will attend group every other week due to financial constraints. The next group is scheduled for 9/3 and will cover the topic of trauma.    Behavioral Health Treatment Plan and Discharge Planning: Shawn Marquez is aware of and agrees to continue to work on their treatment plan. They have identified and are working toward their discharge goals. yes    Visit start and stop times:    08/27/24  Start Time: " 1230  Stop Time: 1340  Total Visit Time: 70 minutes

## 2024-08-27 NOTE — PSYCH
"MEDICATION MANAGEMENT NOTE        [facility]      Name and Date of Birth:  Shawn Marquez 74 y.o. 1950 MRN: [id number]    Date of Visit: August 28, 2024    Reason for Visit:   Chief Complaint   Patient presents with    Medication Management    Follow-up    Anxiety         SUBJECTIVE:    Shawn Marquez is a 74 y.o. male with past psychiatric history significant for anxiety and paranoid personality disorder  who was personally seen and evaluated today at the [facility] outpatient clinic for follow-up and medication management. Completes psychiatric assessment without difficulty.     Shawn endorses compliance with psychotropic medication regimen that consists of Lexapro, Wellbutrin XL, and Valium per PCP .  At previous outpatient psychiatric appointment with this writer, Lexapro increased to 15 mg daily.   He denies any current adverse medication side effects.      Overall, Shawn reports a significant improvement in his overall mood.  He has felt less anxious about his surrounds.  Was able to brush worries off a bit easier.  He was less depressed. Wife also felt that he had improved.  He continues to feel better, but he does have times that he has a \"chip on my shoulder\".  He enjoyed group therapy, but was no longer able to afford it.  Upon realizing this, he became more irritable.  However, he had discussed with therapist that he would continue group therapy and put off individual therapy for the time being.  Mood has lifted.  Appetite, sleep, and energy all baseline.  He feels that increasing the dose to 20 mg may allow things to \"roll off my back\" even easier.        Current Rating Scores:     None completed today.    Past Psychiatric History: (unchanged information from previous note copied and italicized) - Information that is bolded has been updated.     Inpatient psychiatric admissions:   No history of past inpatient psychiatric " admissions  Prior outpatient psychiatric linkage:   Was in outpatient psychiatric treatment in the past with a psychiatrist at [facility] approximately 1 year ago for 2 visits only  Past/current psychotherapy:               Has a therapist at [facility] (Shayy Peñaloza)  Substance abuse inpatient/outpatient rehabilitation: No  History of suicidal attempts/gestures: no  History of violence/aggressive behaviors: no, but threatened others  Past Psychiatric Medication Trials:   Lexapro (current)  Haldol (for approximately 2 months)  Wellbutrin XL (constipation at doses over 150 mg)  Valium (used only once, for sleep and anxiety)    Substance Abuse History: (unchanged information from previous note copied and italicized) - Information that is bolded has been updated.     Tobacco/alcohol/caffeine:   Tobacco use:  no  Caffeine Use:  2 cups/day  Alcohol use:  social use/occasional use  Other substance use:   Cannabis use- started at 20 years old, last use 1 month; smoke flower, 4-5 times/daily; medical card; helps with relaxation  Endorses previous experimentation with: cocaine in the 80's; recreational use  Longest clean time: n/a  History of Inpatient/Outpatient rehabilitation program: none    Social History: (unchanged information from previous note copied and italicized) - Information that is bolded has been updated.     Developmental: Denies a history of milestone/developmental delay. Denies a history of in-utero exposure to toxins/illicit substances. There is no documented history of IEP or need for special education.  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 Shawn Marquez has no history of arrests or violence with a deadly weapon.     Traumatic  History: (unchanged information from previous note copied and italicized) - Information that is bolded has been updated.     Abuse: none  Other Traumatic Events: Father completed suicide by hanging when patient was 15 years old; father was an alcoholic and often left Peter in the car alone as a child while he was at the bar; father often left the home for long periods of time    Family Psychiatric History: (unchanged information from previous note copied and italicized) - Information that is bolded has been updated.      Psychiatric Illness: Depression (father, sister, brother)  Substance Abuse: Alcohol abuse, father  Suicide Attempts: suicide attempt, brother; suicidal ideation, sister; completed suicide, hanging, father           Past Medical History:    Past Medical History:   Diagnosis Date    Anxiety     Depression     DVT (deep venous thrombosis) ([facility])     Gross hematuria     LA...3/29/16   R...4/3/17     Hematuria     LA.....4/12/16    R....4/3/17    Hyperlipidemia     Hypertension     Long-term (current) use of anticoagulants     LA...3/29/16   R...4/3/17     Pre-operative cardiovascular examination, supraventricular arrhythmia 07/15/2022    Seasonal allergies         Past Surgical History:   Procedure Laterality Date    APPENDECTOMY      WISDOM TOOTH EXTRACTION       No Known Allergies    Substance Abuse History:    Social History     Substance and Sexual Activity   Alcohol Use Not Currently    Comment: social     Social History     Substance and Sexual Activity   Drug Use Yes    Types: Marijuana    Comment: Via pipe X 50 years       Social History:    Social History     Socioeconomic History    Marital status: Legally      Spouse name: Sherice    Number of children: 2    Years of education: Not on file    Highest education level: Not on file   Occupational History    Occupation: Retired   Tobacco Use    Smoking status: Former     Current packs/day: 0.00     Average packs/day: 1 pack/day for 9.0  years (9.0 ttl pk-yrs)     Types: Cigarettes     Start date:      Quit date:      Years since quittin.6    Smokeless tobacco: Never   Vaping Use    Vaping status: Never Used   Substance and Sexual Activity    Alcohol use: Not Currently     Comment: social    Drug use: Yes     Types: Marijuana     Comment: Via pipe X 50 years    Sexual activity: Yes     Partners: Female   Other Topics Concern    Not on file   Social History Narrative    Caffeine use     Social Determinants of Health     Financial Resource Strain: Low Risk  (2023)    Overall Financial Resource Strain (CARDIA)     Difficulty of Paying Living Expenses: Not hard at all   Food Insecurity: Not on file   Transportation Needs: No Transportation Needs (2023)    PRAPARE - Transportation     Lack of Transportation (Medical): No     Lack of Transportation (Non-Medical): No   Physical Activity: Not on file   Stress: Not on file   Social Connections: Not on file   Intimate Partner Violence: Not on file   Housing Stability: Not on file       Family Psychiatric History:     Family History   Problem Relation Age of Onset    Hypertension Mother     Leukemia Father     Heart disease Father     Alcohol abuse Father     Depression Father     Completed Suicide  Father     Depression Sister     Suicide Attempts Brother     Alcohol abuse Brother     Depression Brother     Heart disease Family     Leukemia Family        History Review: The following portions of the patient's history were reviewed and updated as appropriate: allergies, current medications, past medical history, and past social history.         OBJECTIVE:     Vital signs in last 24 hours:    There were no vitals filed for this visit.    Mental Status Evaluation:    Appearance age appropriate, casually dressed   Behavior cooperative, calm   Speech normal rate, normal volume, normal pitch   Mood improved, less anxious, less depressed   Affect normal range and intensity, appropriate    Thought Processes organized, goal directed   Associations intact associations   Thought Content no overt delusions   Perceptual Disturbances: no auditory hallucinations, no visual hallucinations   Abnormal Thoughts  Risk Potential Suicidal ideation - None  Homicidal ideation - None  Potential for aggression - No   Orientation oriented to: person, place, time/date, and situation   Memory recent and remote memory grossly intact   Consciousness alert and awake   Attention Span Concentration Span attention span and concentration are age appropriate   Intellect appears to be of average intelligence   Insight fair   Judgement fair   Muscle Strength and  Gait normal muscle strength and normal muscle tone, normal gait and normal balance   Motor activity no abnormal movements   Language no difficulty naming common objects, no difficulty repeating a phrase   Fund of Knowledge adequate knowledge of current events  adequate fund of knowledge regarding past history  adequate fund of knowledge regarding vocabulary    Pain none   Pain Scale 0       Laboratory Results: I have personally reviewed all pertinent laboratory/tests results    Lethality Statement:    Based on today's assessment and clinical criteria, Shawn contracts for safety and is not an imminent risk of harm to self or others. Outpatient level of care is deemed appropriate at this current time. He understands that if they can no longer contract for safety, they need to call the office or report to their nearest Emergency Room for immediate evaluation.    Assessment/Plan:     Shawn Marquez is a 73 y.o. male, , domiciled with wife, retired, w/ PMH of HTN, DM 2, BPH, DVT, TIA, HLD, A-fib and PPH of vascular dementia with paranoia, depression, no prior psychiatric admissions, no prior SA, no h/o self-injurious behavior,  who presented to the mental health clinic for the initial intake and psychiatric evaluation on July 2, 2024.  Shawn was referred to the  "clinic by PCP, Malika VARGAS, on Wellbutrin  mg daily, Lexapro 10 mg daily,  Valium 5 mg every 8 hours as needed.  Tolerating medication well with no medication side effects observed or reported.  Actively involved in individual psychotherapy with Shayy Peñaloza (weekly individual/ twice weekly with group). Reports first meeting with psychiatrist approximately 1 year ago due to depression secondary to his wife leaving the home.  This occurred in the context of Shawn not attending her mother's viewing for fear that he would verbally/physically become aggressive with his wife's brother.  Adds that his son also stopped communicating with him at that time.  Depressive symptoms resolved upon his wife's return home in April of this year. Reports that he was started on Haldol by PCP around the time of her return, but the medication, regardless of dose, did not result in any change I his thinking pattern. Per chart review, consult occurred with [facility] psychiatrist, Dr. Johnson, and PCP discontinued Haldol as it appeared his symptoms more more in line with paranoid personality disorder and less a psychotic process. PCP initiated treatment with Wellbutrin XL and then Lexapro to which he is currently maintained.  Shawn describes a history of suspiciousness/paranoia dating back approximately 10 years, though states that he has had issues with \"people not liking me\" since his teenage years.  Paranoia has involved previous coworkers, neighbors, the state police department, family members, and more recently random people with differing political views (MONIE).  The context of his suspicions ranged from individuals talking about him, manipulating objects at his work, disruptions in his WIFI/electricity/home security system, destruction of home appliances, and tampering with his vehicles.  His suspicions are not corroborated or supported by others and they remain fixed.  He feels guilty that his past behaviors " would have caused individuals to act so aggressively towards him, though he is unable to identify any specific reasons/behaviors that would warrant this reaction. PHQ-9 score: 2; BERTHA-7 score: 4.  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     Psychopharmacologically, Shawn endorses significant improvement in mood and ability to let go of ruminations.  However, he feels there maybe room for improvement.  Will increase Lexapro to 20 mg daily.    Risks/benefits/alternativies to treatment discussed, including a myriad of potential adverse medication side effects, to which Shawn voiced understanding and consented fully to treatment.  Also, patient is amenable to calling/contacting the outpatient office including this writer if any acute adverse effects of their medication regimen arise in addition to any comments or concerns pertaining to their psychiatric management.         Plan:  Increase Lexapro to 20 mg daily for depression and anxiety  Continue Wellbutrin  mg daily for depression  Continue Valium 5 mg every 8 hours as needed for anxiety or sleep per PCP  Psychotherapy-continue individual and group therapy  Follow up with primary care provider for ongoing medical care  Follow up with this provider in 2 months       Diagnoses and all orders for this visit:    Anxiety  -     escitalopram (LEXAPRO) 20 mg tablet; Take 1 tablet (20 mg total) by mouth daily  -     buPROPion (WELLBUTRIN XL) 150 mg 24 hr tablet; Take 1 tablet (150 mg total) by mouth every morning    Depression, unspecified depression type  -     escitalopram (LEXAPRO) 20 mg tablet; Take 1 tablet (20 mg total) by mouth daily  -     buPROPion (WELLBUTRIN XL) 150 mg 24 hr tablet; Take 1 tablet (150 mg total) by mouth every morning    Paranoid behavior (HCC)  -     buPROPion (WELLBUTRIN XL) 150 mg 24 hr tablet; Take 1 tablet (150 mg total) by mouth every morning           - Psychoeducation  provided regarding the importance of exercise and health dietary choices and their impact on mood, energy, and motivation.  - Encouraged to engage in non-verbal forms of therapy such as art therapy, music therapy, and mindfulness.   Aware of 24 hour and weekend coverage for urgent situations accessed by calling [facility] main practice number    Medications Risks/Benefits      Risks, Benefits And Possible Side Effects Of Medications:    Risks, benefits, and possible side effects of medications explained to Shawn including risk of suicidality and serotonin syndrome related to treatment with antidepressants. He verbalizes understanding and agreement for treatment.    Controlled Medication Discussion:     Not applicable - controlled prescriptions are not prescribed by this practice    Psychotherapy Provided:     Individual psychotherapy provided: No  Medication education provided to Shawn  Goals discussed during session  Reassurance and supportive therapy provided     Treatment Plan:    Completed and signed during the session: Not applicable - Treatment Plan not due at this session    Note Share Disclaimer:     This note was not shared with the patient due to reasonable likelihood of causing patient harm    Visit Time    Visit Start Time: 1:40 PM  Visit Stop Time: 2:00 PM  Total Visit Duration:  20 minutes    SAM Jacobsen 08/28/24    This note was completed in part utilizing [employer]. Grammatical, translation, syntax errors, random word insertions, spelling mistakes, and incomplete sentences may be an occasional consequence of this system secondary to software limitations with voice recognition, ambient noise, and hardware issues. If you have any questions or concerns about the content, text, or information contained within the body of this dictation, please contact the provider for clarification.

## 2024-08-28 ENCOUNTER — OFFICE VISIT (OUTPATIENT)
Dept: PSYCHIATRY | Facility: CLINIC | Age: 74
End: 2024-08-28
Payer: MEDICARE

## 2024-08-28 DIAGNOSIS — F32.A DEPRESSION, UNSPECIFIED DEPRESSION TYPE: ICD-10-CM

## 2024-08-28 DIAGNOSIS — F41.9 ANXIETY: Primary | ICD-10-CM

## 2024-08-28 DIAGNOSIS — F22 PARANOID BEHAVIOR (HCC): ICD-10-CM

## 2024-08-28 PROCEDURE — 99214 OFFICE O/P EST MOD 30 MIN: CPT | Performed by: NURSE PRACTITIONER

## 2024-08-28 RX ORDER — BUPROPION HYDROCHLORIDE 150 MG/1
150 TABLET ORAL EVERY MORNING
Qty: 90 TABLET | Refills: 1 | Status: SHIPPED | OUTPATIENT
Start: 2024-08-28 | End: 2025-08-23

## 2024-08-28 RX ORDER — ESCITALOPRAM OXALATE 20 MG/1
20 TABLET ORAL DAILY
Qty: 90 TABLET | Refills: 1 | Status: SHIPPED | OUTPATIENT
Start: 2024-08-28

## 2024-09-10 ENCOUNTER — OFFICE VISIT (OUTPATIENT)
Dept: BEHAVIORAL/MENTAL HEALTH CLINIC | Facility: CLINIC | Age: 74
End: 2024-09-10
Payer: MEDICARE

## 2024-09-10 DIAGNOSIS — F22 PARANOIA (HCC): Primary | ICD-10-CM

## 2024-09-10 PROCEDURE — 90853 GROUP PSYCHOTHERAPY: CPT | Performed by: SOCIAL WORKER

## 2024-09-10 NOTE — PSYCH
"Behavioral Health Psychotherapy Progress Note    Psychotherapy Provided: Individual Psychotherapy     1. Dysthymia            Goals addressed in session: Will write Tx plan in next session     DATA:   Mr. Marquez is seen without his spouse's presence today. He is moderately more open and engaged and discloses that his father  by suicide when he was an 7th grader. No additional information is available about his father's mental health status nor that of his brother and sister who have both experienced suicidality. During this session, this clinician used the following therapeutic modalities: Engagement Strategies and Motivational Interviewing    Substance Abuse was addressed during this session. If the client is diagnosed with a co-occurring substance use disorder, please indicate any changes in the frequency or amount of use: No changes; client will monitor THC use in daily log. Stage of change for addressing substance use diagnoses: Pre-contemplation    ASSESSMENT:  Shwan Marquez presents with a Dysthymic mood.     his affect is Constricted, which is congruent, with his mood and the content of the session. The client has not made progress on their goals.     Shawn Marquez presents with a none risk of suicide, none risk of self-harm, and low risk of harm to others.    For any risk assessment that surpasses a \"low\" rating, a safety plan must be developed.    A safety plan was indicated: no  If yes, describe in detail Potential risk to others will be monitored via daily log and discussion in session as needed.     PLAN: Between sessions, Shawn Marquez will complete a daily log regarding instances in which he used THC to get his mind off of situations in which others tried to manipulate him. At the next session, the therapist will use Engagement Strategies, Cognitive Behavioral Therapy, Mindfulness-based Strategies, and Motivational Interviewing to address two-fold goal - interpersonally resisting " discussing these instances to valued persons who have asked him to stop doing so/finding other topics to discuss; and internally learning Mindfulness TD/EA strategies to replace THC usage.     Behavioral Health Treatment Plan and Discharge Planning: Shawn Marquez is aware of and agrees to continue to work on their treatment plan. They have identified and are working toward their discharge goals. yes    Visit start and stop times:    03/14/24  Start Time: 1204  Stop Time: 1245  Total Visit Time: 41 minutes   [General Appearance - Well Developed] : well developed [Normal Appearance] : normal appearance [Well Groomed] : well groomed [General Appearance - Well Nourished] : well nourished [No Deformities] : no deformities [General Appearance - In No Acute Distress] : no acute distress [Normal Conjunctiva] : the conjunctiva exhibited no abnormalities [Eyelids - No Xanthelasma] : the eyelids demonstrated no xanthelasmas [Normal Oral Mucosa] : normal oral mucosa [No Oral Pallor] : no oral pallor [No Oral Cyanosis] : no oral cyanosis [Normal Jugular Venous A Waves Present] : normal jugular venous A waves present [Normal Jugular Venous V Waves Present] : normal jugular venous V waves present [No Jugular Venous Avina A Waves] : no jugular venous avina A waves [Respiration, Rhythm And Depth] : normal respiratory rhythm and effort [Exaggerated Use Of Accessory Muscles For Inspiration] : no accessory muscle use [Auscultation Breath Sounds / Voice Sounds] : lungs were clear to auscultation bilaterally [Heart Rate And Rhythm] : heart rate and rhythm were normal [Heart Sounds] : normal S1 and S2 [Abdomen Soft] : soft [Abdomen Tenderness] : non-tender [Abdomen Mass (___ Cm)] : no abdominal mass palpated [Abnormal Walk] : normal gait [Gait - Sufficient For Exercise Testing] : the gait was sufficient for exercise testing [Nail Clubbing] : no clubbing of the fingernails [Cyanosis, Localized] : no localized cyanosis [Petechial Hemorrhages (___cm)] : no petechial hemorrhages [Skin Color & Pigmentation] : normal skin color and pigmentation [] : no rash [No Venous Stasis] : no venous stasis [Skin Lesions] : no skin lesions [No Skin Ulcers] : no skin ulcer [No Xanthoma] : no  xanthoma was observed [Oriented To Time, Place, And Person] : oriented to person, place, and time [Affect] : the affect was normal [Mood] : the mood was normal [No Anxiety] : not feeling anxious [FreeTextEntry1] : 3/6 CYNTHIA and 2/6 apical systolic murmur

## 2024-09-11 NOTE — PSYCH
"Behavioral Health Psychotherapy Group Progress Note    Psychotherapy Provided: Group Therapy    1. Paranoia (HCC)            Goals addressed in session: Goal 1     Group Name: [facility]    Topic(s) covered: Health anxiety; addiction in family members     Skill(s) covered: Developed an understanding of how addiction progresses and is treated     Group summary:  Four members attended     Data: Shawn attended today's group. He was able to stay mentally engaged in the group ,despite providing minimal verbal input.     Substance Abuse was not addressed during this session. If the client is diagnosed with a co-occurring substance use disorder, please indicate any changes in the frequency or amount of use: . Stage of change for addressing substance use diagnoses: No substance use/Not applicable    ASSESSMENT:  Shawn appeared  with a Euthymic/ normal mood. His affect is Normal range and intensity, which is congruent, with his mood and the content of the session. He appeared to was attentive throughout the group and interacted appropriately with and was supportive of the other group members.     Shawn Marquez presents with a nonerisk of suicide,nonerisk of self-harm, and none risk of harm to others.    For any risk assessment that surpasses a \"low\" rating, a safety plan must be developed.    A safety plan was indicated:   If yes, describe in detail     PLAN: Shawn will learn to engage more in social situations .The next group is scheduled for 9/17 and will cover the topic of TBD.      Behavioral Health Treatment Plan and Discharge Planning: Shawn Marquez is aware of and agrees to continue to work on their treatment plan. They have identified and are working toward their discharge goals.     Visit start and stop times:    09/11/24  Start Time: 1230  Stop Time: 1409  Total Visit Time: 99 minutes      "

## 2024-09-24 ENCOUNTER — TELEPHONE (OUTPATIENT)
Age: 74
End: 2024-09-24

## 2024-09-24 ENCOUNTER — OFFICE VISIT (OUTPATIENT)
Dept: BEHAVIORAL/MENTAL HEALTH CLINIC | Facility: CLINIC | Age: 74
End: 2024-09-24
Payer: MEDICARE

## 2024-09-24 DIAGNOSIS — F33.40 DEPRESSION, MAJOR, RECURRENT, IN REMISSION (HCC): Primary | ICD-10-CM

## 2024-09-24 PROCEDURE — 90853 GROUP PSYCHOTHERAPY: CPT | Performed by: SOCIAL WORKER

## 2024-09-24 NOTE — TELEPHONE ENCOUNTER
Patient called in regarding recurring vertigo. States that for past week its been worse. Vomiting. He wants to know if he can go back to do the therapy that he did prior. If so he needs a referral. Please advise.

## 2024-09-24 NOTE — TELEPHONE ENCOUNTER
Is he certain this is his vertigo? Would recommend eval for dizziness. If he is not willing, we can place a physical therapy referral. Encourage him to go to ER with worsening dizziness, CP, SOB, visual/speech disturbances, weakness     Pt states that he is 100% sure that it's vertigo. Although it is a bit different from the last time. Has been sick since last Wednesday.     Agreeable to evaluation and was schedule for Thursday at 1:30.

## 2024-09-25 ENCOUNTER — TELEPHONE (OUTPATIENT)
Dept: FAMILY MEDICINE CLINIC | Facility: CLINIC | Age: 74
End: 2024-09-25

## 2024-09-25 NOTE — PSYCH
"Behavioral Health Psychotherapy Group Progress Note    Psychotherapy Provided: Group Therapy    1. Depression, major, recurrent, in remission (HCC)            Goals addressed in session: Goal 1     Group Name: Rosie    Topic(s) covered: Family and addiction     Skill(s) covered: Family and addiction    Group summary:  4 people attended group    Data: Shawn attended today's group. He spoke more frequently then he had in previous groups. The client made more of an effort to contribute to the conversation.    Substance Abuse was not addressed during this session. If the client is diagnosed with a co-occurring substance use disorder, please indicate any changes in the frequency or amount of use: . Stage of change for addressing substance use diagnoses: No substance use/Not applicable    ASSESSMENT:  Shawn appeared  with a Euthymic/ normal mood. His affect is Normal range and intensity, which is congruent, with his mood and the content of the session. He appeared to actively participated in the group and interacted appropriately with and was supportive of the other group members.     Shawn Marquez presents with a nonerisk of suicide,nonerisk of self-harm, and none risk of harm to others.    For any risk assessment that surpasses a \"low\" rating, a safety plan must be developed.    A safety plan was indicated:   If yes, describe in detail     PLAN: Shawn will continue to speak more frequently in group . The next group is scheduled for 10/8/2024 and will cover the topic of TBD.    Behavioral Health Treatment Plan and Discharge Planning: Shawn Marquez is aware of and agrees to continue to work on their treatment plan. They have identified and are working toward their discharge goals. yes    Visit start and stop times:    09/25/24  Start Time: 1230  Stop Time: 1330  Total Visit Time: 60 minutes      "

## 2024-09-26 ENCOUNTER — OFFICE VISIT (OUTPATIENT)
Dept: FAMILY MEDICINE CLINIC | Facility: CLINIC | Age: 74
End: 2024-09-26
Payer: MEDICARE

## 2024-09-26 VITALS
WEIGHT: 187 LBS | DIASTOLIC BLOOD PRESSURE: 82 MMHG | BODY MASS INDEX: 25.36 KG/M2 | OXYGEN SATURATION: 98 % | SYSTOLIC BLOOD PRESSURE: 130 MMHG | TEMPERATURE: 96.6 F | HEART RATE: 66 BPM

## 2024-09-26 DIAGNOSIS — F01.52 VASCULAR DEMENTIA WITH PARANOIA (HCC): ICD-10-CM

## 2024-09-26 DIAGNOSIS — G56.02 CARPAL TUNNEL SYNDROME OF LEFT WRIST: ICD-10-CM

## 2024-09-26 DIAGNOSIS — H81.10 BENIGN PAROXYSMAL POSITIONAL VERTIGO, UNSPECIFIED LATERALITY: Primary | ICD-10-CM

## 2024-09-26 DIAGNOSIS — Z23 ENCOUNTER FOR IMMUNIZATION: ICD-10-CM

## 2024-09-26 PROCEDURE — G0008 ADMIN INFLUENZA VIRUS VAC: HCPCS

## 2024-09-26 PROCEDURE — 90662 IIV NO PRSV INCREASED AG IM: CPT

## 2024-09-26 PROCEDURE — 99214 OFFICE O/P EST MOD 30 MIN: CPT

## 2024-09-26 RX ORDER — MECLIZINE HYDROCHLORIDE 25 MG/1
25 TABLET ORAL 3 TIMES DAILY PRN
Qty: 30 TABLET | Refills: 0 | Status: SHIPPED | OUTPATIENT
Start: 2024-09-26

## 2024-09-26 RX ORDER — ONDANSETRON 4 MG/1
4 TABLET, FILM COATED ORAL EVERY 8 HOURS PRN
Qty: 20 TABLET | Refills: 0 | Status: SHIPPED | OUTPATIENT
Start: 2024-09-26

## 2024-09-26 NOTE — PROGRESS NOTES
"Ambulatory Visit  Name: Shawn Marquez      : 1950      MRN: [id number]  Encounter Provider: SAM Baer  Encounter Date: 2024   Encounter department: [facility]    Assessment & Plan  Vascular dementia with paranoia (HCC)  Follow with talk therapy (individual and group) and psychiatry.        Benign paroxysmal positional vertigo, unspecified laterality  Reports hx of vertigo. Reports that approx 1 week ago he started with dizziness when going from prone position to standing. Then I would feel nauseous and throw up. After I threw up I would feel better. It was happening more often over 4 days but now I'm feeling better. I did PT for vertigo before and it was helpful.\" Unable to explain dizziness. States, \"When I had vertigo before, it felt like the room was spinning. This time it feels like I'm off balance.\" Denies lightheadedness, weakness, CP, SOB, visual/speech disturbances, facial asymmetry, headaches, new numbness/tingling. Neuro exam benign except tandem walking--- slightly off balance. Did have a hair against right TM. Flushed successfully without immediate complications. Patient tolerated well. Declines CT imaging at this time. Trial meclizine for dizziness and ondansetron for nausea/vomiting. Ref PT. Instructed to return if symptoms fail to improve or worsen. Encouraged to go to ER lightheadedness, weakness, CP, SOB, visual/speech disturbances, facial asymmetry, severe headaches, new numbness/tingling. Patient verbalized understanding.   Orders:    Ambulatory Referral to Physical Therapy; Future    meclizine (ANTIVERT) 25 mg tablet; Take 1 tablet (25 mg total) by mouth 3 (three) times a day as needed for dizziness    ondansetron (ZOFRAN) 4 mg tablet; Take 1 tablet (4 mg total) by mouth every 8 (eight) hours as needed for nausea or vomiting    Encounter for immunization    Orders:    influenza vaccine, high-dose, PF 0.5 mL (Fluzone High " Dose)    Carpal tunnel syndrome of left wrist  Reports intermittent numbness/tingling to left hand/fingers x1 year. +phalen. Encouraged wrist splint at night and while driving. Provided carpal tunnel exercise handout. Ref hand surgeon if symptoms worsen.          Depression Screening and Follow-up Plan: Patient was screened for depression during today's encounter. They screened negative with a PHQ-9 score of 0.      History of Present Illness     Last 20 mins. Continuous for 4 days then dissipated.     Dizziness  This is a recurrent problem. The current episode started 1 to 4 weeks ago. The problem occurs intermittently. The problem has been gradually improving. Associated symptoms include anorexia, fatigue, nausea, numbness (left hand and right ear intermittently x1 year), vertigo and vomiting. Pertinent negatives include no abdominal pain, arthralgias, change in bowel habit, chest pain, chills, congestion, coughing, diaphoresis, fever, headaches, joint swelling, myalgias, neck pain, rash, sore throat, swollen glands, urinary symptoms, visual change or weakness. Exacerbated by: positional changes. He has tried rest for the symptoms. The treatment provided significant relief.       History obtained from : patient  Review of Systems   Constitutional:  Positive for fatigue. Negative for activity change, chills, diaphoresis and fever.   HENT:  Negative for congestion, ear pain, rhinorrhea and sore throat.    Eyes:  Negative for pain.   Respiratory:  Negative for cough, shortness of breath and wheezing.    Cardiovascular:  Negative for chest pain and leg swelling.   Gastrointestinal:  Positive for anorexia, nausea and vomiting. Negative for abdominal pain, change in bowel habit and diarrhea.   Musculoskeletal:  Negative for arthralgias, joint swelling, myalgias and neck pain.   Skin:  Negative for rash.   Neurological:  Positive for dizziness, vertigo and numbness (left hand and right ear intermittently x1 year).  Negative for tremors, seizures, syncope, facial asymmetry, speech difficulty, weakness, light-headedness and headaches.   All other systems reviewed and are negative.    Medical History Reviewed by provider this encounter:  Tobacco  Allergies  Meds  Problems  Med Hx  Surg Hx  Fam Hx       Current Outpatient Medications on File Prior to Visit   Medication Sig Dispense Refill    acetaminophen (TYLENOL) 500 mg tablet Take 500 mg by mouth every 6 (six) hours as needed for mild pain      ASPIRIN LOW DOSE 81 MG EC tablet TAKE ONE TABLET BY MOUTH DAILY 30 tablet 11    atorvastatin (LIPITOR) 40 mg tablet TAKE ONE TABLET BY MOUTH EVERY DAY WITH DINNER 90 tablet 1    buPROPion (WELLBUTRIN XL) 150 mg 24 hr tablet Take 1 tablet (150 mg total) by mouth every morning 90 tablet 1    diazepam (VALIUM) 5 mg tablet Take 1 tablet (5 mg total) by mouth every 8 (eight) hours as needed for anxiety or sleep 30 tablet 1    escitalopram (LEXAPRO) 20 mg tablet Take 1 tablet (20 mg total) by mouth daily 90 tablet 1    metFORMIN (GLUCOPHAGE-XR) 500 mg 24 hr tablet Take 1 tablet (500 mg total) by mouth daily with breakfast 180 tablet 1    metoprolol succinate (TOPROL-XL) 25 mg 24 hr tablet TAKE ONE TABLET BY MOUTH EVERY DAY 90 tablet 3    valsartan (DIOVAN) 160 mg tablet Take 1 tablet (160 mg total) by mouth daily 90 tablet 2    Xarelto 20 MG tablet TAKE ONE TABLET BY MOUTH EVERY DAY WITH BREAKFAST 90 tablet 1     No current facility-administered medications on file prior to visit.          Objective     /82 (BP Location: Left arm, Patient Position: Sitting, Cuff Size: Standard)   Pulse 66   Temp (!) 96.6 °F (35.9 °C) (Tympanic)   Wt 84.8 kg (187 lb)   SpO2 98%   BMI 25.36 kg/m²     Physical Exam  Vitals and nursing note reviewed.   Constitutional:       General: He is not in acute distress.     Appearance: He is well-developed.   HENT:      Head: Normocephalic and atraumatic.      Right Ear: Hearing and external ear normal.  No middle ear effusion. A foreign body (hair against TM) is present. Tympanic membrane is not erythematous.      Left Ear: Hearing, tympanic membrane, ear canal and external ear normal.  No middle ear effusion. Tympanic membrane is not erythematous.   Eyes:      General: No visual field deficit.     Conjunctiva/sclera: Conjunctivae normal.   Cardiovascular:      Rate and Rhythm: Normal rate and regular rhythm.      Heart sounds: Normal heart sounds. No murmur heard.  Pulmonary:      Effort: Pulmonary effort is normal. No respiratory distress.      Breath sounds: Normal breath sounds. No wheezing.   Abdominal:      General: Bowel sounds are normal.      Palpations: Abdomen is soft.      Tenderness: There is no abdominal tenderness.   Musculoskeletal:      Cervical back: Neck supple.   Skin:     General: Skin is warm and dry.      Capillary Refill: Capillary refill takes less than 2 seconds.   Neurological:      Mental Status: He is alert and oriented to person, place, and time. Mental status is at baseline.      GCS: GCS eye subscore is 4. GCS verbal subscore is 5. GCS motor subscore is 6.      Cranial Nerves: Cranial nerves 2-12 are intact. No cranial nerve deficit, dysarthria or facial asymmetry.      Sensory: Sensation is intact.      Motor: Motor function is intact. No weakness or tremor.      Coordination: Coordination is intact. Heel to Rivas Test normal.      Gait: Tandem walk abnormal.   Psychiatric:         Mood and Affect: Mood normal.         Behavior: Behavior normal.       Administrative Statements   I have spent a total time of 30 minutes in caring for this patient on the day of the visit/encounter including Risks and benefits of tx options, Instructions for management, Patient and family education, Importance of tx compliance, Risk factor reductions, Impressions, Documenting in the medical record, Reviewing / ordering tests, medicine, procedures  , and Obtaining or reviewing history  .

## 2024-10-08 ENCOUNTER — OFFICE VISIT (OUTPATIENT)
Dept: BEHAVIORAL/MENTAL HEALTH CLINIC | Facility: CLINIC | Age: 74
End: 2024-10-08
Payer: MEDICARE

## 2024-10-08 DIAGNOSIS — F32.A DEPRESSION, UNSPECIFIED DEPRESSION TYPE: Primary | ICD-10-CM

## 2024-10-08 PROCEDURE — 90853 GROUP PSYCHOTHERAPY: CPT | Performed by: SOCIAL WORKER

## 2024-10-08 NOTE — PSYCH
"Behavioral Health Psychotherapy Group Progress Note    Psychotherapy Provided: Group Therapy    1. Depression, unspecified depression type            Goals addressed in session: Goal 1     Group Name: Rosie    Topic(s) covered: politics and current events     Skill(s) covered: emotional regulation     Group summary:  5 people attended group    Data: Shawn attended today's group. He mainly chose to listen and provide support to the other group members . Therapist prompted the client to speak , but the client only gave short responses .    Substance Abuse was not addressed during this session. If the client is diagnosed with a co-occurring substance use disorder, please indicate any changes in the frequency or amount of use: . Stage of change for addressing substance use diagnoses: No substance use/Not applicable    ASSESSMENT:  Shawn appeared  with a Euthymic/ normal mood. His affect is Normal range and intensity, which is congruent, with his mood and the content of the session. He appeared to  only provide brief commentary   the group and interacted appropriately with and was supportive of the other group members.     Shawn Marquez presents with a nonerisk of suicide,nonerisk of self-harm, and none risk of harm to others.    For any risk assessment that surpasses a \"low\" rating, a safety plan must be developed.    A safety plan was indicated:   If yes, describe in detail     PLAN: Shawn will attempt to engage  more with the rest of the group. The next group is scheduled for 10/22/2024 and will cover the topic of TBD.    Behavioral Health Treatment Plan and Discharge Planning: Shawn Marquez is aware of and agrees to continue to work on their treatment plan. They have identified and are working toward their discharge goals. yes    Visit start and stop times:    10/08/24  Start Time: 1230  Stop Time: 1330  Total Visit Time: 60 minutes      "

## 2024-10-17 DIAGNOSIS — F41.9 ANXIETY: ICD-10-CM

## 2024-10-20 RX ORDER — ESCITALOPRAM OXALATE 5 MG/1
TABLET ORAL
Qty: 30 TABLET | Refills: 1 | OUTPATIENT
Start: 2024-10-20

## 2024-10-22 ENCOUNTER — OFFICE VISIT (OUTPATIENT)
Dept: BEHAVIORAL/MENTAL HEALTH CLINIC | Facility: CLINIC | Age: 74
End: 2024-10-22
Payer: MEDICARE

## 2024-10-22 DIAGNOSIS — F33.40 DEPRESSION, MAJOR, RECURRENT, IN REMISSION (HCC): Primary | ICD-10-CM

## 2024-10-22 PROCEDURE — 90853 GROUP PSYCHOTHERAPY: CPT | Performed by: SOCIAL WORKER

## 2024-10-22 NOTE — PSYCH
"Behavioral Health Psychotherapy Group Progress Note    Psychotherapy Provided: Group Therapy    1. Depression, major, recurrent, in remission (HCC)            Goals addressed in session: Goal 1     Group Name: Rosie    Topic(s) covered: politics and communication    Skill(s) covered: civil communication     Group summary:  4 people attended group    Data: Shawn attended today's group. He mainly contributed to giving emotional support to other group members.     Substance Abuse was not addressed during this session. If the client is diagnosed with a co-occurring substance use disorder, please indicate any changes in the frequency or amount of use: . Stage of change for addressing substance use diagnoses: No substance use/Not applicable    ASSESSMENT:  Shawn appeared  with a Euthymic/ normal mood. His affect is Normal range and intensity, which is congruent, with his mood and the content of the session. He appeared to actively participated in the group and interacted appropriately with and was supportive of the other group members.     Shawn Marquez presents with a nonerisk of suicide,nonerisk of self-harm, and none risk of harm to others.    For any risk assessment that surpasses a \"low\" rating, a safety plan must be developed.    A safety plan was indicated:   If yes, describe in detail     PLAN: Shawn will increase his contribution to group discussion . The next group is scheduled for 11/5/2024 and will cover the topic of TBD.    Behavioral Health Treatment Plan and Discharge Planning: Shawn Marquez is aware of and agrees to continue to work on their treatment plan. They have identified and are working toward their discharge goals. yes    Visit start and stop times:    10/22/24  Start Time: 1230  Stop Time: 1330  Total Visit Time: 60 minutes      "

## 2024-11-05 ENCOUNTER — OFFICE VISIT (OUTPATIENT)
Dept: BEHAVIORAL/MENTAL HEALTH CLINIC | Facility: CLINIC | Age: 74
End: 2024-11-05
Payer: MEDICARE

## 2024-11-05 DIAGNOSIS — F22 PARANOIA (HCC): Primary | ICD-10-CM

## 2024-11-05 PROCEDURE — 90853 GROUP PSYCHOTHERAPY: CPT | Performed by: SOCIAL WORKER

## 2024-11-05 NOTE — PSYCH
"Behavioral Health Psychotherapy Group Progress Note    Psychotherapy Provided: Group Therapy    1. Paranoia (HCC)            Goals addressed in session: Goal 1     Group Name: [facility]     Topic(s) covered: Mindfulness for stress and pain    Skill(s) covered: Engaged in MBSR practice and discussed its purpose     Group summary:  Four members attended     Data: Shawn attended today's group. He is aggrieved due to belief that his neighbor made it so that his car gilliam could not open; group also validated high stress due to political climate and elections.     Substance Abuse was not addressed during this session. If the client is diagnosed with a co-occurring substance use disorder, please indicate any changes in the frequency or amount of use: . Stage of change for addressing substance use diagnoses: Maintenance; will assess this in an upcoming individual session during update of treatment plan.     ASSESSMENT:  Shawn appeared  with a Dysthymic mood. His affect is Normal range and intensity, which is congruent, with his mood and the content of the session. He appeared to actively participated in the group and interacted appropriately with and was supportive of the other group members; did have difficulty hearing due to hearing loss and appears not to have pursued a hearing aid evaluation.    Shawn Marquez presents with a nonerisk of suicide,nonerisk of self-harm, and none risk of harm to others.    For any risk assessment that surpasses a \"low\" rating, a safety plan must be developed.    A safety plan was indicated: no  If yes, describe in detail     PLAN: Shawn will attempt Mindfulness practice when frustrated or anxious. The next group is scheduled for 11/19 and will cover the topic of the results of this effort    Behavioral Health Treatment Plan and Discharge Planning: Shanw Marquez is aware of and agrees to continue to work on their treatment plan. They have identified and are working toward " their discharge goals. yes    Visit start and stop times:    11/05/24  Start Time: 1230  Stop Time: 1335  Total Visit Time: 65 minutes

## 2024-11-06 NOTE — BH CRISIS PLAN
"Client Name: Shawn Marquez       Client YOB: 1950    [place] Safety Plan      Creation Date: 3/12/24 Update Date: 3/12/25   Created By: Shayy Peñaloza LCSW       Step 1: Warning Signs:   Warning Signs   Anxiety - high attention to a problem and the thought of \"I'm the only one who gets what's going on\"/\"This is a well-planned attempt to get me out of the park.\"   Stomach tightens up; behavior gets very quiet and I can't talk to others/they will poo-poo me            Step 2: Internal Coping Strategies:   Internal Coping Strategies   Smoke THC to numb the anxiety - thoughts slow down; less caring/vigilant/delays everything, feels less urgent            Step 3: People and social settings that provide distraction:   Name Contact Information   No other people I can contact to cheer me up     Places   Pub/late afternoon around dinnertime to see people I know           Step 4: People whom I can ask for help during a crisis:      Name Contact Information    Spouse (Sherice; do not live together) In cell      Step 5: Professionals or agencies I can contact during a crisis:      [facility]/Agency Name Phone Emergency Contact    Shayy Washburn MD        Local Emergency Department Emergency Department Phone Emergency Department Address    [facility]          Crisis Phone Numbers:   Suicide Prevention Lifeline: Call or Text  507 Crisis Text Line: Text HOME to 486-280   Please note: Some [facility] do not have a separate number for Child/Adolescent specific crisis. If your county is not listed under Child/Adolescent, please call the adult number for your county      Adult Crisis Numbers: Child/Adolescent Crisis Numbers   [facility]: [phone] [facility]: [phone]   [facility]: [phone] [facility]: [phone]   [facility]: [phone] [place]: [phone]   [facility]: [phone] [place]: " [phone]   [place]: [phone]   [facility]: [phone]   [facility]: [phone]   [facility]: [phone] (daytime) [phone] (after hours, weekends, holidays)      Step 6: Making the environment safer (plan for lethal means safety):   Patient did not identify any lethal methods: Yes     Optional: What is most important to me and worth living for?   Getting concerns straightened out; getting my family back      Court Safety Plan. Sera Blanco and Benny Villatoro. Used with permission of the authors.

## 2024-11-14 ENCOUNTER — TELEPHONE (OUTPATIENT)
Dept: PSYCHIATRY | Facility: CLINIC | Age: 74
End: 2024-11-14

## 2024-11-14 ENCOUNTER — TELEPHONE (OUTPATIENT)
Age: 74
End: 2024-11-14

## 2024-11-14 ENCOUNTER — DOCUMENTATION (OUTPATIENT)
Dept: PSYCHIATRY | Facility: CLINIC | Age: 74
End: 2024-11-14

## 2024-11-14 NOTE — TELEPHONE ENCOUNTER
Pt called in regards to letter he received in the mail asking to reschedule appt with provider due to missing his last week VV. Patient expressed that he no longer needs the services from either [facility] or group therapy. It is a financial strain and doesn't think he has mental health issues. Pt requested to be discharged from both services. Writer canceled appts and stated he will be receiving a letter of discharge. Pt verbalized understanding.

## 2024-11-14 NOTE — PSYCH
PSYCHIATRIC DISCHARGE SUMMARY    [facility] - PSYCHIATRIC ASSOCIATES    Name and Date of Birth:  Shawn Marquez 74 y.o. 1950    Admission Date: 8/28/24    Discharge Date: 11/14/2024     Referral source: family physician    Discharge Type: Did not return for follow up    Discharge Diagnosis:     Anxiety  Depression, unspecified depression type  Paranoid behavior (HCC)      Treating Provider: PUJA Jacobsen BC     Treatment Complications: Requested discharge.     Admit with discharge: No    Prognosis at time of discharge: Guarded    Presenting Problems/Pertinent Findings:      As per Jane Shepherd HPI on 7/2/24:    Shawn Marquez is a 73 y.o. male, , domiciled with wife, retired, w/ PMH of HTN, DM 2, BPH, DVT, TIA, HLD, A-fib and PPH of vascular dementia with paranoia, depression, no prior psychiatric admissions, no prior SA, no h/o self-injurious behavior,  who presented to the mental health clinic for the initial intake and psychiatric evaluation on July 2, 2024.  Shawn was referred to the clinic by PCP, Malika VARGAS, on Wellbutrin  mg daily, Lexapro 10 mg daily,  Valium 5 mg every 8 hours as needed.  Tolerating medication well with no medication side effects observed or reported.  Actively involved in individual psychotherapy with Shayy Peñaloza (weekly individual/ twice weekly with group).       Reports first meeting with psychiatrist approximately 1 year ago due to depression secondary to his wife leaving the home.  This occurred in the context of Shawn not attending her mother's viewing for fear that he would verbally/physically become aggressive with his wife's brother.  Adds that his son also stopped communicating with him at that time.  During this period, he endorsed frequent crying spells, decreased motivation, decreased energy, poor appetite with 35 pound weight loss, and passive suicidal ideation.  He is uncertain what the  "psychiatrist had prescribed for him, or what his diagnosis was, but he was unwilling to take any medication at that time.  Depressive symptoms resolved upon his wife's return home in April of this year. Reports that he was started on Haldol by PCP around the time of her return, but the medication, regardless of dose, did not result in any change I his thinking pattern. Per chart review[facility] and PCP discontinued Haldol as it appeared his symptoms more more in line with paranoid personality disorder and less a psychotic process.  Patient noted an improvement in energy level, but no other changes.  PCP initiated treatment with Wellbutrin XL and then Lexapro to which he is currently maintained.  Shawn describes a history of suspiciousness/paranoia dating back approximately 10 years, though states that he has had issues with \"people not liking me\" since his teenage years.  Paranoia has involved previous coworkers, neighbors, the state police department, family members, and more recently random people with differing political views (MONIE).  The context of his suspicions ranged from individuals talking about him, manipulating objects at his work, disruptions in his WIFI/electricity/home security system, destruction of home appliances, and tampering with his vehicles.  His suspicions are not corroborated or supported by others and they remain fixed.  He feels guilty that his past behaviors would have caused individuals to act so aggressively towards him, though he is unable to identify any specific reasons/behaviors that would warrant this reaction.       Per Neurology's, Dr. House,  note dated 11/8/22:  To review, patient started having paranoid about 10 years ago, slightly getting worse in last 3 years that he has also started accusing his brother-in-law and sister-in-law, almost all the neighbors.   Possible visual hallucinations that he sees people in his house.   No " "major change in memory.  No seizure.  Independent in all daily living activities.  Seen psychiatrist however does not want to try medication, plan to see again next week.   Impression- since paranoia is almost about the same in last 10 years less likely neuro degenerative issue however we will do MRI with [facility] to rule out Alzheimer's disease.  Differentials also include Lewy body dementia.   Less likely frontotemporal dementia given no major change in other behavior in last 10 years.   Most likely his symptoms are nonorganic origin.  Patient plans to see psychiatrist next week.  Will benefit from low-dose antipsychotics.      On evaluation today, Shawn reports seeking treatment to establish care. Currently reports feeling “ok” for the past couple months. Both wife and Peter feel that current medications have resulted in an improvement in Shawn's ability to remain calm and \"let things go\".  Utilized Valium once since prescribed to assist in falling asleep.  He has appreciated benefit in therapy as he is able to refrain from conversations with his wife about his suspicions, though he feels that he may be at a cross roads as he believes his current challenges are not psychiatric in nature, therefore they can not be fixed with therapy. He endorses anxiety related to limited finances and ongoing, fixed suspiciousness of others.  Worries do not extend to other areas/topics in life. No panic attacks. Appetite has improved with no change in weight since April.  Energy level is baseline.  Endorses insomnia.  Typically goes to be around 1 AM and wakes at 6 AM.  Sleeps on average 5 hours/night. Naps frequently during the day.  Endorses issues with sleep initiation/maintenance/early awakening. Enjoys music and going to concerts.  Denied anhedonia, hopelessness, or worthlessness.  Does feel guilt as noted above.  No thought constriction related to death. Denied SI/HI, intent or plan upon direct inquiry at this time.   " PHQ-9 score: 2.  BERTHA-7 score: 4.     Endorses a history of trauma.  No intrusive, avoidance, negative alterations, or hyperarousal symptoms of PTSD noted. Denied any history of disordered eating.  No symptoms of OCD including obsessive, ritualistic acts, intrusive thoughts or images noted. He denies current or history of perceptual disturbances including AVH, thought insertion/ withdrawal, or ideas of reference.  He does not appear internally preoccupied at time of encounter.  No history or current symptoms of catia.  He is not currently irritable, grandiose, or pathologically euphoric. Denies recent ETOH or illicit substance abuse.       Psychiatric Review Of Systems:     Sleep changes:  Chronic insomnia  Appetite changes: no change  Weight changes: yes, lost 30 pounds when wife was not living in the home; appetite/weight has stabilized since her return  Energy/anergy: no change  Interest/pleasure/anhedonia: no  Somatic symptoms: no  Anxiety/panic: worrying  Catia: no  Guilty/hopeless: Yes- guilt  Self injurious behavior/risky behavior: no  Suicidal ideation: no  Homicidal ideation: no  Auditory hallucinations: no  Visual hallucinations: no  Other hallucinations: no  Delusional thinking: paranoid delusions  Eating disorder history: no  Obsessive/compulsive symptoms: no        Past Psychiatric History:     Inpatient psychiatric admissions:   No history of past inpatient psychiatric admissions  Prior outpatient psychiatric linkage:   Was in outpatient psychiatric treatment in the past with a psychiatrist at [facility] approximately 1 year ago for 2 visits only  Past/current psychotherapy:               Has a therapist at [facility] (Shayy Peñaloza)  Substance abuse inpatient/outpatient rehabilitation: No  History of suicidal attempts/gestures: no  History of violence/aggressive behaviors: no, but threatened others  Past Psychiatric Medication Trials:   Lexapro (current)  Haldol  (for approximately 2 months)  Wellbutrin XL (constipation at doses over 150 mg)  Valium (used only once, for sleep and anxiety)    Traumatic History:     Abuse: none  Other Traumatic Events: Father completed suicide by hanging when patient was 15 years old; father was an alcoholic and often left Peter in the car alone as a child while he was at the bar; father often left the home for long periods of time    Past Medical History:    Past Medical History:   Diagnosis Date    Anxiety     Depression     DVT (deep venous thrombosis) (HCC)     Gross hematuria     LA...3/29/16   R...4/3/17     Hematuria     LA.....4/12/16    R....4/3/17    Hyperlipidemia     Hypertension     Long-term (current) use of anticoagulants     LA...3/29/16   R...4/3/17     Pre-operative cardiovascular examination, supraventricular arrhythmia 07/15/2022    Seasonal allergies         Past Surgical History:   Procedure Laterality Date    APPENDECTOMY      WISDOM TOOTH EXTRACTION         Allergies:    No Known Allergies    Substance Abuse History:     Social History     Substance and Sexual Activity   Drug Use Yes    Types: Marijuana    Comment: Via pipe X 50 years     Social History     Substance and Sexual Activity   Alcohol Use Not Currently    Comment: social       Family Psychiatric History:     Family History   Problem Relation Age of Onset    Hypertension Mother     Leukemia Father     Heart disease Father     Alcohol abuse Father     Depression Father     Completed Suicide  Father     Depression Sister     Suicide Attempts Brother     Alcohol abuse Brother     Depression Brother     Heart disease Family     Leukemia Family        Social History/Trauma History/Past Psychiatric History:    Social History     Socioeconomic History    Marital status: Legally      Spouse name: Sherice    Number of children: 2    Years of education: Not on file    Highest education level: Not on file   Occupational History    Occupation: Retired   Tobacco  Use    Smoking status: Former     Current packs/day: 0.00     Average packs/day: 1 pack/day for 9.0 years (9.0 ttl pk-yrs)     Types: Cigarettes     Start date:      Quit date:      Years since quittin.9    Smokeless tobacco: Never   Vaping Use    Vaping status: Never Used   Substance and Sexual Activity    Alcohol use: Not Currently     Comment: social    Drug use: Yes     Types: Marijuana     Comment: Via pipe X 50 years    Sexual activity: Yes     Partners: Female   Other Topics Concern    Not on file   Social History Narrative    Caffeine use     Social Drivers of Health     Financial Resource Strain: Low Risk  (2023)    Overall Financial Resource Strain (CARDIA)     Difficulty of Paying Living Expenses: Not hard at all   Food Insecurity: Not on file   Transportation Needs: No Transportation Needs (2023)    PRAPARE - Transportation     Lack of Transportation (Medical): No     Lack of Transportation (Non-Medical): No   Physical Activity: Not on file   Stress: Not on file   Social Connections: Not on file   Intimate Partner Violence: Not on file   Housing Stability: Not on file         Therapist: None      Course of Treatment: Psychiatric Evaluation and Medication Management      Summary of Treatment Progress:     Shawn was seen for initial psychiatric evaluation and medication management.     24-  Shawn endorses ongoing, likely life-long suspiciousness/paranoia which remained fixed despite antipsychotic use in the past.  He has found Lexapro helpful in decreasing the anxiety he feels regarding suspicious behaviors of others and is able to refrain from discussing these thoughts on a constant basis with his wife.  As such, Lexapro will be increased to 15 mg daily to assist with anxious ruminations.     24-  Shawn endorses significant improvement in mood and ability to let go of ruminations.  However, he feels there maybe room for improvement.  Will increase Lexapro to 20 mg  daily.      10/30/24-no-show      11/14/24-request discharge      Lethality Risk at the time of discharge from clinic: LOW.     At the time of the most recent psychiatric assessment, Shawn was future-oriented, forward-thinking, and demonstrated ability to act in a self-preserving manner as evidenced by volitionally presenting to the clinic, seeking treatment. To mitigate future risk, patient should adhere to treatment recommendations, avoid alcohol/illicit substance use, utilize community-based resources and familiar support, and prioritize mental health treatment.       Suicide/Homicide Risk Assessment at last office visit on 8/28/24:    Based on today's assessment and clinical criteria, Shawn contracts for safety and is not an imminent risk of harm to self or others. Outpatient level of care is deemed appropriate at this current time. He understands that if they can no longer contract for safety, they need to call the office or report to their nearest Emergency Room for immediate evaluation.       History Review:  The following portions of the patient's history were reviewed and updated as appropriate: allergies, current medications, past medical history, and past social history.. Reviewed on 8/28/24.      MENTAL STATUS EVALUATION (at time of most recent visit on 8/28/24):    Appearance age appropriate, casually dressed   Behavior cooperative, calm   Speech normal rate, normal volume, normal pitch   Mood improved, less anxious, less depressed   Affect normal range and intensity, appropriate   Thought Processes organized, goal directed   Associations intact associations   Thought Content no overt delusions   Perceptual Disturbances: no auditory hallucinations, no visual hallucinations   Abnormal Thoughts  Risk Potential Suicidal ideation - None  Homicidal ideation - None  Potential for aggression - No   Orientation oriented to: person, place, time/date, and situation   Memory recent and remote memory grossly intact    Consciousness alert and awake   Attention Span Concentration Span attention span and concentration are age appropriate   Intellect appears to be of average intelligence   Insight fair   Judgement fair   Muscle Strength and  Gait normal muscle strength and normal muscle tone, normal gait and normal balance   Motor activity no abnormal movements   Language no difficulty naming common objects, no difficulty repeating a phrase   Fund of Knowledge adequate knowledge of current events  adequate fund of knowledge regarding past history  adequate fund of knowledge regarding vocabulary    Pain none   Pain Scale 0             To what extent did Shawn achieve his goals?: Some      Criteria for Discharge: Did not return for treatment.      Aftercare Recommendations:     Follow up with family physician for psychiatric issues.      Discharge Medications:     Current Outpatient Medications:     acetaminophen (TYLENOL) 500 mg tablet, Take 500 mg by mouth every 6 (six) hours as needed for mild pain, Disp: , Rfl:     ASPIRIN LOW DOSE 81 MG EC tablet, TAKE ONE TABLET BY MOUTH DAILY, Disp: 30 tablet, Rfl: 11    atorvastatin (LIPITOR) 40 mg tablet, TAKE ONE TABLET BY MOUTH EVERY DAY WITH DINNER, Disp: 90 tablet, Rfl: 1    buPROPion (WELLBUTRIN XL) 150 mg 24 hr tablet, Take 1 tablet (150 mg total) by mouth every morning, Disp: 90 tablet, Rfl: 1    diazepam (VALIUM) 5 mg tablet, Take 1 tablet (5 mg total) by mouth every 8 (eight) hours as needed for anxiety or sleep, Disp: 30 tablet, Rfl: 1    escitalopram (LEXAPRO) 20 mg tablet, Take 1 tablet (20 mg total) by mouth daily, Disp: 90 tablet, Rfl: 1    meclizine (ANTIVERT) 25 mg tablet, Take 1 tablet (25 mg total) by mouth 3 (three) times a day as needed for dizziness, Disp: 30 tablet, Rfl: 0    metFORMIN (GLUCOPHAGE-XR) 500 mg 24 hr tablet, Take 1 tablet (500 mg total) by mouth daily with breakfast, Disp: 180 tablet, Rfl: 1    metoprolol succinate (TOPROL-XL) 25 mg 24 hr tablet, TAKE ONE  TABLET BY MOUTH EVERY DAY, Disp: 90 tablet, Rfl: 3    ondansetron (ZOFRAN) 4 mg tablet, Take 1 tablet (4 mg total) by mouth every 8 (eight) hours as needed for nausea or vomiting, Disp: 20 tablet, Rfl: 0    valsartan (DIOVAN) 160 mg tablet, Take 1 tablet (160 mg total) by mouth daily, Disp: 90 tablet, Rfl: 2    Xarelto 20 MG tablet, TAKE ONE TABLET BY MOUTH EVERY DAY WITH BREAKFAST, Disp: 90 tablet, Rfl: 1     Describe ability and willingness to work and solve mental problems:     May have difficulty solving his mental health problems    SAM Jacobsen 11/14/24

## 2024-11-27 ENCOUNTER — TELEPHONE (OUTPATIENT)
Age: 74
End: 2024-11-27

## 2024-11-27 NOTE — TELEPHONE ENCOUNTER
Patient called to see if any labs were going to be needed prior to his up coming visit please consult with provider and order if required. Please inform patient after consulting with provider thank you

## 2024-11-29 ENCOUNTER — DOCUMENTATION (OUTPATIENT)
Age: 74
End: 2024-11-29

## 2024-11-29 NOTE — PROGRESS NOTES
Psychotherapy Discharge Summary    Preferred Name: Shawn Marquez  YOB: 1950    Admission date to psychotherapy: 6/20/2024    Referred by: Baljeet Foster     Presenting Problem: Depression and isolation related to paranoid ideation     Course of treatment included : group counseling and individual therapy     Progress/Outcome of Treatment Goals (brief summary of course of treatment) Client was encouraged to be more interactive and less argumentative/attentive to features of paranoia    Treatment Complications (if any): Financial limitations     Treatment Progress: good    Current [facility] Psychiatric Provider:     Discharge Medications include:     Discharge Date: 11/29/2024    Discharge Diagnosis: No diagnosis found.    Criteria for Discharge: completed treatment goals and objectives and is no longer in need of services    Aftercare recommendations include (include specific referral names and phone numbers, if appropriate):     Prognosis: good

## 2024-11-29 NOTE — TELEPHONE ENCOUNTER
Called patient, advised that labs are not at the 6 months yet, I advised Malika will order for completion after visit.   Patient aware and agreeable to plan.

## 2024-12-01 DIAGNOSIS — I10 ESSENTIAL HYPERTENSION: ICD-10-CM

## 2024-12-01 DIAGNOSIS — E11.9 TYPE 2 DIABETES MELLITUS WITHOUT COMPLICATION, WITHOUT LONG-TERM CURRENT USE OF INSULIN (HCC): Primary | ICD-10-CM

## 2024-12-01 DIAGNOSIS — E78.5 HYPERLIPIDEMIA, UNSPECIFIED HYPERLIPIDEMIA TYPE: ICD-10-CM

## 2024-12-01 DIAGNOSIS — E83.42 HYPOMAGNESEMIA: ICD-10-CM

## 2024-12-01 DIAGNOSIS — Z79.01 ANTICOAGULANT LONG-TERM USE: ICD-10-CM

## 2024-12-14 LAB
ALBUMIN SERPL-MCNC: 4.6 G/DL (ref 3.8–4.8)
ALP SERPL-CCNC: 81 IU/L (ref 44–121)
ALT SERPL-CCNC: 23 IU/L (ref 0–44)
AST SERPL-CCNC: 17 IU/L (ref 0–40)
BASOPHILS # BLD AUTO: 0.1 X10E3/UL (ref 0–0.2)
BASOPHILS NFR BLD AUTO: 1 %
BILIRUB SERPL-MCNC: 1.2 MG/DL (ref 0–1.2)
BUN SERPL-MCNC: 10 MG/DL (ref 8–27)
BUN/CREAT SERPL: 12 (ref 10–24)
CALCIUM SERPL-MCNC: 9.5 MG/DL (ref 8.6–10.2)
CHLORIDE SERPL-SCNC: 100 MMOL/L (ref 96–106)
CHOLEST SERPL-MCNC: 129 MG/DL (ref 100–199)
CO2 SERPL-SCNC: 23 MMOL/L (ref 20–29)
CREAT SERPL-MCNC: 0.86 MG/DL (ref 0.76–1.27)
EGFR: 91 ML/MIN/1.73
EOSINOPHIL # BLD AUTO: 0.5 X10E3/UL (ref 0–0.4)
EOSINOPHIL NFR BLD AUTO: 6 %
ERYTHROCYTE [DISTWIDTH] IN BLOOD BY AUTOMATED COUNT: 13 % (ref 11.6–15.4)
GLOBULIN SER-MCNC: 2.9 G/DL (ref 1.5–4.5)
GLUCOSE SERPL-MCNC: 211 MG/DL (ref 70–99)
HBA1C MFR BLD: 6.5 % (ref 4.8–5.6)
HCT VFR BLD AUTO: 50 % (ref 37.5–51)
HDLC SERPL-MCNC: 46 MG/DL
HGB BLD-MCNC: 17.3 G/DL (ref 13–17.7)
IMM GRANULOCYTES # BLD: 0 X10E3/UL (ref 0–0.1)
IMM GRANULOCYTES NFR BLD: 0 %
LDLC SERPL CALC-MCNC: 51 MG/DL (ref 0–99)
LDLC/HDLC SERPL: 1.1 RATIO (ref 0–3.6)
LYMPHOCYTES # BLD AUTO: 2 X10E3/UL (ref 0.7–3.1)
LYMPHOCYTES NFR BLD AUTO: 25 %
MAGNESIUM SERPL-MCNC: 1.7 MG/DL (ref 1.6–2.3)
MCH RBC QN AUTO: 33.3 PG (ref 26.6–33)
MCHC RBC AUTO-ENTMCNC: 34.6 G/DL (ref 31.5–35.7)
MCV RBC AUTO: 96 FL (ref 79–97)
MONOCYTES # BLD AUTO: 0.6 X10E3/UL (ref 0.1–0.9)
MONOCYTES NFR BLD AUTO: 8 %
NEUTROPHILS # BLD AUTO: 4.9 X10E3/UL (ref 1.4–7)
NEUTROPHILS NFR BLD AUTO: 60 %
PLATELET # BLD AUTO: 172 X10E3/UL (ref 150–450)
POTASSIUM SERPL-SCNC: 3.8 MMOL/L (ref 3.5–5.2)
PROT SERPL-MCNC: 7.5 G/DL (ref 6–8.5)
RBC # BLD AUTO: 5.19 X10E6/UL (ref 4.14–5.8)
SL AMB VLDL CHOLESTEROL CALC: 32 MG/DL (ref 5–40)
SODIUM SERPL-SCNC: 140 MMOL/L (ref 134–144)
TRIGL SERPL-MCNC: 200 MG/DL (ref 0–149)
WBC # BLD AUTO: 8 X10E3/UL (ref 3.4–10.8)

## 2024-12-15 ENCOUNTER — RESULTS FOLLOW-UP (OUTPATIENT)
Dept: FAMILY MEDICINE CLINIC | Facility: CLINIC | Age: 74
End: 2024-12-15

## 2024-12-16 ENCOUNTER — OFFICE VISIT (OUTPATIENT)
Dept: FAMILY MEDICINE CLINIC | Facility: CLINIC | Age: 74
End: 2024-12-16
Payer: MEDICARE

## 2024-12-16 VITALS — BODY MASS INDEX: 27.12 KG/M2 | SYSTOLIC BLOOD PRESSURE: 136 MMHG | WEIGHT: 200 LBS | DIASTOLIC BLOOD PRESSURE: 80 MMHG

## 2024-12-16 DIAGNOSIS — E11.9 TYPE 2 DIABETES MELLITUS WITHOUT COMPLICATION, WITHOUT LONG-TERM CURRENT USE OF INSULIN (HCC): ICD-10-CM

## 2024-12-16 DIAGNOSIS — I48.11 LONGSTANDING PERSISTENT ATRIAL FIBRILLATION (HCC): ICD-10-CM

## 2024-12-16 DIAGNOSIS — I10 ESSENTIAL HYPERTENSION: ICD-10-CM

## 2024-12-16 DIAGNOSIS — E11.69 HYPERLIPIDEMIA ASSOCIATED WITH TYPE 2 DIABETES MELLITUS  (HCC): ICD-10-CM

## 2024-12-16 DIAGNOSIS — E78.5 HYPERLIPIDEMIA ASSOCIATED WITH TYPE 2 DIABETES MELLITUS  (HCC): ICD-10-CM

## 2024-12-16 DIAGNOSIS — F22 PARANOID BEHAVIOR (HCC): ICD-10-CM

## 2024-12-16 DIAGNOSIS — Z00.00 MEDICARE ANNUAL WELLNESS VISIT, SUBSEQUENT: Primary | ICD-10-CM

## 2024-12-16 DIAGNOSIS — F32.A DEPRESSION, UNSPECIFIED DEPRESSION TYPE: ICD-10-CM

## 2024-12-16 PROCEDURE — G0439 PPPS, SUBSEQ VISIT: HCPCS

## 2024-12-16 PROCEDURE — 99214 OFFICE O/P EST MOD 30 MIN: CPT

## 2024-12-16 RX ORDER — METFORMIN HYDROCHLORIDE 500 MG/1
500 TABLET, EXTENDED RELEASE ORAL 2 TIMES DAILY WITH MEALS
Qty: 180 TABLET | Refills: 2 | Status: SHIPPED | OUTPATIENT
Start: 2024-12-16

## 2024-12-16 NOTE — ASSESSMENT & PLAN NOTE
Metformin had been decreased as A1C was 5.8. increase metformin to BID. Ref DM education.   Lab Results   Component Value Date    HGBA1C 6.5 (H) 12/13/2024       Orders:    Ambulatory referral to Diabetic Education - use to refer for diabetes group classes, individual diabetes education, medical nutrition therapy, device training; Future    metFORMIN (GLUCOPHAGE-XR) 500 mg 24 hr tablet; Take 1 tablet (500 mg total) by mouth 2 (two) times a day with meals

## 2024-12-16 NOTE — PROGRESS NOTES
"Name: Shawn Marquez      : 1950      MRN: [id number]  Encounter Provider: SAM Baer  Encounter Date: 2024   Encounter department: [facility]    Assessment & Plan  Medicare annual wellness visit, subsequent         Type 2 diabetes mellitus without complication, without long-term current use of insulin (HCC)  Metformin had been decreased as A1C was 5.8. increase metformin to BID. Ref DM education.   Lab Results   Component Value Date    HGBA1C 6.5 (H) 2024       Orders:    Ambulatory referral to Diabetic Education - use to refer for diabetes group classes, individual diabetes education, medical nutrition therapy, device training; Future    metFORMIN (GLUCOPHAGE-XR) 500 mg 24 hr tablet; Take 1 tablet (500 mg total) by mouth 2 (two) times a day with meals    Hyperlipidemia associated with type 2 diabetes mellitus  (HCC)  Cholesterol 129, triglycerides 200, HDL 46, LDL 51. Continue atorvastatin  Lab Results   Component Value Date    HGBA1C 6.5 (H) 2024            Essential hypertension  BP well controlled. Continue current medication regimen.          Paranoid behavior (HCC)  See depression note.         Depression, unspecified depression type  Stopped meds approx 1 month ago as \"I didn't really notice a difference with them and circumstances at my house have changed. I'm not noticing as much things happening like I was before.\" Patient reports he discontinued therapy both individual and group as well as psychiatry.          Longstanding persistent atrial fibrillation (HCC)  Continue metoprolol and xarelto.            Depression Screening and Follow-up Plan: Patient was screened for depression during today's encounter. They screened negative with a PHQ-9 score of 0.      Preventive health issues were discussed with patient, and age appropriate screening tests were ordered as noted in patient's After Visit Summary. Personalized health advice " and appropriate referrals for health education or preventive services given if needed, as noted in patient's After Visit Summary.    History of Present Illness     Presents for  annual wellness       Patient Care Team:  SAM Baer as PCP - General (Nurse Practitioner)  Gunnar Porter MD    Review of Systems   Constitutional:  Negative for activity change, fatigue and fever.   HENT:  Negative for congestion, ear pain, rhinorrhea and sore throat.    Eyes:  Negative for pain.   Respiratory:  Negative for cough, shortness of breath and wheezing.    Cardiovascular:  Negative for chest pain and leg swelling.   Gastrointestinal:  Negative for abdominal pain, diarrhea, nausea and vomiting.   Musculoskeletal:  Negative for arthralgias and myalgias.   Skin:  Negative for rash.   Neurological:  Negative for dizziness, weakness and numbness.   Psychiatric/Behavioral:  Negative for dysphoric mood and suicidal ideas. The patient is not nervous/anxious.    All other systems reviewed and are negative.    Medical History Reviewed by provider this encounter:  Tobacco  Allergies  Meds  Problems  Med Hx  Surg Hx  Fam Hx       Annual Wellness Visit Questionnaire   Last Medicare Wellness visit information reviewed, patient interviewed and updates made to the record today.      Health Risk Assessment:   Patient rates overall health as good. Patient feels that their physical health rating is same. Patient is satisfied with their life. Eyesight was rated as much better. Hearing was rated as same. Patient feels that their emotional and mental health rating is same. Patients states they are never, rarely angry. Patient states they are never, rarely unusually tired/fatigued. Pain experienced in the last 7 days has been none. Patient states that he has experienced no weight loss or gain in last 6 months.     Depression Screening:   PHQ-9 Score: 0      Fall Risk Screening:   In the past year, patient has experienced: no  history of falling in past year      Home Safety:  Patient does not have trouble with stairs inside or outside of their home. Patient has working smoke alarms and has working carbon monoxide detector. Home safety hazards include: none.     Nutrition:   Current diet is Diabetic.     Medications:   Patient is able to manage medications.     Activities of Daily Living (ADLs)/Instrumental Activities of Daily Living (IADLs):   Walk and transfer into and out of bed and chair?: Yes  Dress and groom yourself?: Yes    Bathe or shower yourself?: Yes    Feed yourself? Yes  Do your laundry/housekeeping?: Yes  Manage your money, pay your bills and track your expenses?: Yes  Make your own meals?: Yes    Do your own shopping?: Yes    Previous Hospitalizations:   Any hospitalizations or ED visits within the last 12 months?: Yes    How many hospitalizations have you had in the last year?: 1-2    Advance Care Planning:   Living will: No    Durable POA for healthcare: No    Advanced directive: No    ACP document given: Yes    End of Life Decisions reviewed with patient: Yes    Provider agrees with end of life decisions: Yes      Cognitive Screening:   Provider or family/friend/caregiver concerned regarding cognition?: No    PREVENTIVE SCREENINGS      Cardiovascular Screening:    General: Screening Not Indicated and History Lipid Disorder      Diabetes Screening:     General: Screening Not Indicated and History Diabetes      Colorectal Cancer Screening:     General: Screening Current      Abdominal Aortic Aneurysm (AAA) Screening:    Risk factors include: age between 65-76 yo and tobacco use        Lung Cancer Screening:     General: Screening Not Indicated      Hepatitis C Screening:    General: Screening Current    Screening, Brief Intervention, and Referral to Treatment (SBIRT)    Screening      Single Item Drug Screening:  How often have you used an illegal drug (including marijuana) or a prescription medication for non-medical  reasons in the past year? never    Single Item Drug Screen Score: 0  Interpretation: Negative screen for possible drug use disorder    Social Drivers of Health     Financial Resource Strain: Low Risk  (12/14/2023)    Overall Financial Resource Strain (CARDIA)     Difficulty of Paying Living Expenses: Not hard at all   Food Insecurity: No Food Insecurity (12/16/2024)    Hunger Vital Sign     Worried About Running Out of Food in the Last Year: Never true     Ran Out of Food in the Last Year: Never true   Transportation Needs: No Transportation Needs (12/16/2024)    PRAPARE - Transportation     Lack of Transportation (Medical): No     Lack of Transportation (Non-Medical): No   Housing Stability: Unknown (12/16/2024)    Housing Stability Vital Sign     Unable to Pay for Housing in the Last Year: No     Homeless in the Last Year: No   Utilities: Not At Risk (12/16/2024)    [facility] Utilities     Threatened with loss of utilities: No     No results found.    Objective   /80 (BP Location: Left arm, Patient Position: Sitting, Cuff Size: Standard)   Wt 90.7 kg (200 lb)   BMI 27.12 kg/m²     Physical Exam  Vitals and nursing note reviewed.   Constitutional:       General: He is not in acute distress.     Appearance: Normal appearance. He is well-developed. He is not ill-appearing.   HENT:      Head: Normocephalic and atraumatic.      Right Ear: Tympanic membrane, ear canal and external ear normal. There is no impacted cerumen.      Left Ear: Tympanic membrane, ear canal and external ear normal. There is no impacted cerumen.      Mouth/Throat:      Mouth: Mucous membranes are moist.      Pharynx: No oropharyngeal exudate or posterior oropharyngeal erythema.   Cardiovascular:      Rate and Rhythm: Normal rate and regular rhythm.      Pulses: Pulses are weak.           Dorsalis pedis pulses are 1+ on the right side and 1+ on the left side.        Posterior tibial pulses are 1+ on the right side and 1+ on the left side.       Heart sounds: Normal heart sounds. No murmur heard.  Pulmonary:      Effort: Pulmonary effort is normal. No respiratory distress.      Breath sounds: Normal breath sounds. No wheezing.   Abdominal:      General: Bowel sounds are normal. There is no distension.      Palpations: Abdomen is soft.      Tenderness: There is no abdominal tenderness.   Musculoskeletal:      Cervical back: Neck supple.   Feet:      Right foot:      Skin integrity: Dry skin present. No ulcer, skin breakdown, erythema, warmth or callus.      Left foot:      Skin integrity: Dry skin present. No ulcer, skin breakdown, erythema, warmth or callus.   Skin:     General: Skin is warm and dry.      Capillary Refill: Capillary refill takes less than 2 seconds.   Neurological:      Mental Status: He is alert and oriented to person, place, and time. Mental status is at baseline.   Psychiatric:         Mood and Affect: Mood normal.         Behavior: Behavior normal.     Diabetic Foot Exam    Patient's shoes and socks removed.    Right Foot/Ankle   Right Foot Inspection  Skin Exam: skin normal, skin intact and dry skin. No warmth, no callus, no erythema, no maceration, no abnormal color, no pre-ulcer, no ulcer and no callus.     Toe Exam: ROM and strength within normal limits.     Sensory   Vibration: diminished  Monofilament testing: intact    Vascular  Capillary refills: elevated  The right DP pulse is 1+. The right PT pulse is 1+.     Left Foot/Ankle  Left Foot Inspection  Skin Exam: skin normal, skin intact and dry skin. No warmth, no erythema, no maceration, normal color, no pre-ulcer, no ulcer and no callus.     Toe Exam: ROM and strength within normal limits.     Sensory   Vibration: diminished  Monofilament testing: diminished    Vascular  Capillary refills: elevated  The left DP pulse is 1+. The left PT pulse is 1+.     Assign Risk Category  No deformity present  Loss of protective sensation  Weak pulses  Risk: 2    Administrative Statements   I  have spent a total time of 30 minutes in caring for this patient on the day of the visit/encounter including Diagnostic results, Risks and benefits of tx options, Instructions for management, Patient and family education, Importance of tx compliance, Risk factor reductions, Impressions, Documenting in the medical record, Reviewing / ordering tests, medicine, procedures  , and Obtaining or reviewing history  .

## 2024-12-16 NOTE — PATIENT INSTRUCTIONS
Medicare Preventive Visit Patient Instructions  Thank you for completing your Welcome to Medicare Visit or Medicare Annual Wellness Visit today. Your next wellness visit will be due in one year (12/17/2025).  The screening/preventive services that you may require over the next 5-10 years are detailed below. Some tests may not apply to you based off risk factors and/or age. Screening tests ordered at today's visit but not completed yet may show as past due. Also, please note that scanned in results may not display below.  Preventive Screenings:  Service Recommendations Previous Testing/Comments   Colorectal Cancer Screening  Colonoscopy    Fecal Occult Blood Test (FOBT)/Fecal Immunochemical Test (FIT)  Fecal DNA/Cologuard Test  Flexible Sigmoidoscopy Age: 45-75 years old   Colonoscopy: every 10 years (May be performed more frequently if at higher risk)  OR  FOBT/FIT: every 1 year  OR  Cologuard: every 3 years  OR  Sigmoidoscopy: every 5 years  Screening may be recommended earlier than age 45 if at higher risk for colorectal cancer. Also, an individualized decision between you and your healthcare provider will decide whether screening between the ages of 76-85 would be appropriate. Colonoscopy: 05/17/2010  FOBT/FIT: Not on file  Cologuard: 01/29/2024  Sigmoidoscopy: Not on file          Prostate Cancer Screening Individualized decision between patient and health care provider in men between ages of 55-69   Medicare will cover every 12 months beginning on the day after your 50th birthday PSA: 0.6 ng/mL           Hepatitis C Screening Once for adults born between 1945 and 1965  More frequently in patients at high risk for Hepatitis C Hep C Antibody: Not on file        Diabetes Screening 1-2 times per year if you're at risk for diabetes or have pre-diabetes Fasting glucose: 150 mg/dL (7/1/2020)  A1C: 6.5 % (12/13/2024)      Cholesterol Screening Once every 5 years if you don't have a lipid disorder. May order more often  based on risk factors. Lipid panel: 12/13/2024         Other Preventive Screenings Covered by Medicare:  Abdominal Aortic Aneurysm (AAA) Screening: covered once if your at risk. You're considered to be at risk if you have a family history of AAA or a male between the age of 65-75 who smoking at least 100 cigarettes in your lifetime.  Lung Cancer Screening: covers low dose CT scan once per year if you meet all of the following conditions: (1) Age 55-77; (2) No signs or symptoms of lung cancer; (3) Current smoker or have quit smoking within the last 15 years; (4) You have a tobacco smoking history of at least 20 pack years (packs per day x number of years you smoked); (5) You get a written order from a healthcare provider.  Glaucoma Screening: covered annually if you're considered high risk: (1) You have diabetes OR (2) Family history of glaucoma OR (3)  aged 50 and older OR (4)  American aged 65 and older  Osteoporosis Screening: covered every 2 years if you meet one of the following conditions: (1) Have a vertebral abnormality; (2) On glucocorticoid therapy for more than 3 months; (3) Have primary hyperparathyroidism; (4) On osteoporosis medications and need to assess response to drug therapy.  HIV Screening: covered annually if you're between the age of 15-65. Also covered annually if you are younger than 15 and older than 65 with risk factors for HIV infection. For pregnant patients, it is covered up to 3 times per pregnancy.    Immunizations:  Immunization Recommendations   Influenza Vaccine Annual influenza vaccination during flu season is recommended for all persons aged >= 6 months who do not have contraindications   Pneumococcal Vaccine   * Pneumococcal conjugate vaccine = PCV13 (Prevnar 13), PCV15 (Vaxneuvance), PCV20 (Prevnar 20)  * Pneumococcal polysaccharide vaccine = PPSV23 (Pneumovax) Adults 19-65 yo with certain risk factors or if 65+ yo  If never received any pneumonia vaccine:  recommend Prevnar 20 (PCV20)  Give PCV20 if previously received 1 dose of PCV13 or PPSV23   Hepatitis B Vaccine 3 dose series if at intermediate or high risk (ex: diabetes, end stage renal disease, liver disease)   Respiratory syncytial virus (RSV) Vaccine - COVERED BY MEDICARE PART D  * RSVPreF3 (Arexvy) CDC recommends that adults 60 years of age and older may receive a single dose of RSV vaccine using shared clinical decision-making (SCDM)   Tetanus (Td) Vaccine - COST NOT COVERED BY MEDICARE PART B Following completion of primary series, a booster dose should be given every 10 years to maintain immunity against tetanus. Td may also be given as tetanus wound prophylaxis.   Tdap Vaccine - COST NOT COVERED BY MEDICARE PART B Recommended at least once for all adults. For pregnant patients, recommended with each pregnancy.   Shingles Vaccine (Shingrix) - COST NOT COVERED BY MEDICARE PART B  2 shot series recommended in those 19 years and older who have or will have weakened immune systems or those 50 years and older     Health Maintenance Due:      Topic Date Due   • Hepatitis C Screening  03/04/2025 (Originally 1950)   • Colorectal Cancer Screening  01/29/2027     Immunizations Due:      Topic Date Due   • COVID-19 Vaccine (3 - 2024-25 season) 09/01/2024     Advance Directives   What are advance directives?  Advance directives are legal documents that state your wishes and plans for medical care. These plans are made ahead of time in case you lose your ability to make decisions for yourself. Advance directives can apply to any medical decision, such as the treatments you want, and if you want to donate organs.   What are the types of advance directives?  There are many types of advance directives, and each state has rules about how to use them. You may choose a combination of any of the following:  Living will:  This is a written record of the treatment you want. You can also choose which treatments you do not  want, which to limit, and which to stop at a certain time. This includes surgery, medicine, IV fluid, and tube feedings.   Durable power of  for healthcare (DPAHC):  This is a written record that states who you want to make healthcare choices for you when you are unable to make them for yourself. This person, called a proxy, is usually a family member or a friend. You may choose more than 1 proxy.  Do not resuscitate (DNR) order:  A DNR order is used in case your heart stops beating or you stop breathing. It is a request not to have certain forms of treatment, such as CPR. A DNR order may be included in other types of advance directives.  Medical directive:  This covers the care that you want if you are in a coma, near death, or unable to make decisions for yourself. You can list the treatments you want for each condition. Treatment may include pain medicine, surgery, blood transfusions, dialysis, IV or tube feedings, and a ventilator (breathing machine).  Values history:  This document has questions about your views, beliefs, and how you feel and think about life. This information can help others choose the care that you would choose.  Why are advance directives important?  An advance directive helps you control your care. Although spoken wishes may be used, it is better to have your wishes written down. Spoken wishes can be misunderstood, or not followed. Treatments may be given even if you do not want them. An advance directive may make it easier for your family to make difficult choices about your care.   Weight Management   Why it is important to manage your weight:  Being overweight increases your risk of health conditions such as heart disease, high blood pressure, type 2 diabetes, and certain types of cancer. It can also increase your risk for osteoarthritis, sleep apnea, and other respiratory problems. Aim for a slow, steady weight loss. Even a small amount of weight loss can lower your risk of  health problems.  How to lose weight safely:  A safe and healthy way to lose weight is to eat fewer calories and get regular exercise. You can lose up about 1 pound a week by decreasing the number of calories you eat by 500 calories each day.   Healthy meal plan for weight management:  A healthy meal plan includes a variety of foods, contains fewer calories, and helps you stay healthy. A healthy meal plan includes the following:  Eat whole-grain foods more often.  A healthy meal plan should contain fiber. Fiber is the part of grains, fruits, and vegetables that is not broken down by your body. Whole-grain foods are healthy and provide extra fiber in your diet. Some examples of whole-grain foods are whole-wheat breads and pastas, oatmeal, brown rice, and bulgur.  Eat a variety of vegetables every day.  Include dark, leafy greens such as spinach, kale, emy greens, and mustard greens. Eat yellow and orange vegetables such as carrots, sweet potatoes, and winter squash.   Eat a variety of fruits every day.  Choose fresh or canned fruit (canned in its own juice or light syrup) instead of juice. Fruit juice has very little or no fiber.  Eat low-fat dairy foods.  Drink fat-free (skim) milk or 1% milk. Eat fat-free yogurt and low-fat cottage cheese. Try low-fat cheeses such as mozzarella and other reduced-fat cheeses.  Choose meat and other protein foods that are low in fat.  Choose beans or other legumes such as split peas or lentils. Choose fish, skinless poultry (chicken or turkey), or lean cuts of red meat (beef or pork). Before you cook meat or poultry, cut off any visible fat.   Use less fat and oil.  Try baking foods instead of frying them. Add less fat, such as margarine, sour cream, regular salad dressing and mayonnaise to foods. Eat fewer high-fat foods. Some examples of high-fat foods include french fries, doughnuts, ice cream, and cakes.  Eat fewer sweets.  Limit foods and drinks that are high in sugar. This  includes candy, cookies, regular soda, and sweetened drinks.  Exercise:  Exercise at least 30 minutes per day on most days of the week. Some examples of exercise include walking, biking, dancing, and swimming. You can also fit in more physical activity by taking the stairs instead of the elevator or parking farther away from stores. Ask your healthcare provider about the best exercise plan for you.      © Copyright [employer] 2018 Information is for End User's use only and may not be sold, redistributed or otherwise used for commercial purposes. All illustrations and images included in CareNotes® are the copyrighted property of [employer] or [employer]

## 2024-12-16 NOTE — ASSESSMENT & PLAN NOTE
"Stopped meds approx 1 month ago as \"I didn't really notice a difference with them and circumstances at my house have changed. I'm not noticing as much things happening like I was before.\" Patient reports he discontinued therapy both individual and group as well as psychiatry.          "

## 2024-12-16 NOTE — ASSESSMENT & PLAN NOTE
Cholesterol 129, triglycerides 200, HDL 46, LDL 51. Continue atorvastatin  Lab Results   Component Value Date    HGBA1C 6.5 (H) 12/13/2024

## 2025-01-18 DIAGNOSIS — F41.9 ANXIETY: ICD-10-CM

## 2025-01-20 RX ORDER — ESCITALOPRAM OXALATE 5 MG/1
TABLET ORAL
Qty: 30 TABLET | Refills: 1 | OUTPATIENT
Start: 2025-01-20

## 2025-01-21 DIAGNOSIS — E11.69 HYPERLIPIDEMIA ASSOCIATED WITH TYPE 2 DIABETES MELLITUS  (HCC): ICD-10-CM

## 2025-01-21 DIAGNOSIS — E78.5 HYPERLIPIDEMIA ASSOCIATED WITH TYPE 2 DIABETES MELLITUS  (HCC): ICD-10-CM

## 2025-01-21 DIAGNOSIS — E11.9 TYPE 2 DIABETES MELLITUS WITHOUT COMPLICATION, WITHOUT LONG-TERM CURRENT USE OF INSULIN (HCC): ICD-10-CM

## 2025-01-21 DIAGNOSIS — Z79.01 ANTICOAGULANT LONG-TERM USE: ICD-10-CM

## 2025-01-21 RX ORDER — ATORVASTATIN CALCIUM 40 MG/1
40 TABLET, FILM COATED ORAL
Qty: 90 TABLET | Refills: 1 | Status: SHIPPED | OUTPATIENT
Start: 2025-01-21

## 2025-01-21 RX ORDER — RIVAROXABAN 20 MG/1
20 TABLET, FILM COATED ORAL
Qty: 90 TABLET | Refills: 1 | Status: SHIPPED | OUTPATIENT
Start: 2025-01-21

## 2025-02-17 DIAGNOSIS — F32.A DEPRESSION, UNSPECIFIED DEPRESSION TYPE: ICD-10-CM

## 2025-02-17 DIAGNOSIS — F41.9 ANXIETY: ICD-10-CM

## 2025-02-17 RX ORDER — ESCITALOPRAM OXALATE 20 MG/1
20 TABLET ORAL DAILY
Qty: 90 TABLET | Refills: 1 | OUTPATIENT
Start: 2025-02-17

## 2025-04-15 DIAGNOSIS — I10 ESSENTIAL HYPERTENSION: ICD-10-CM

## 2025-04-17 RX ORDER — VALSARTAN 160 MG/1
160 TABLET ORAL DAILY
Qty: 90 TABLET | Refills: 1 | Status: SHIPPED | OUTPATIENT
Start: 2025-04-17

## 2025-06-05 DIAGNOSIS — Z79.01 ANTICOAGULANT LONG-TERM USE: ICD-10-CM

## 2025-06-06 RX ORDER — RIVAROXABAN 20 MG/1
TABLET, FILM COATED ORAL
Qty: 90 TABLET | Refills: 1 | Status: SHIPPED | OUTPATIENT
Start: 2025-06-06

## 2025-06-16 ENCOUNTER — OFFICE VISIT (OUTPATIENT)
Dept: FAMILY MEDICINE CLINIC | Facility: CLINIC | Age: 75
End: 2025-06-16
Payer: COMMERCIAL

## 2025-06-16 VITALS
OXYGEN SATURATION: 97 % | HEART RATE: 87 BPM | BODY MASS INDEX: 28.23 KG/M2 | WEIGHT: 208.4 LBS | DIASTOLIC BLOOD PRESSURE: 88 MMHG | SYSTOLIC BLOOD PRESSURE: 158 MMHG | HEIGHT: 72 IN

## 2025-06-16 DIAGNOSIS — E78.5 HYPERLIPIDEMIA ASSOCIATED WITH TYPE 2 DIABETES MELLITUS  (HCC): ICD-10-CM

## 2025-06-16 DIAGNOSIS — I48.11 LONGSTANDING PERSISTENT ATRIAL FIBRILLATION (HCC): ICD-10-CM

## 2025-06-16 DIAGNOSIS — N40.1 BPH WITH OBSTRUCTION/LOWER URINARY TRACT SYMPTOMS: ICD-10-CM

## 2025-06-16 DIAGNOSIS — E78.5 HYPERLIPIDEMIA, UNSPECIFIED HYPERLIPIDEMIA TYPE: ICD-10-CM

## 2025-06-16 DIAGNOSIS — I10 ESSENTIAL HYPERTENSION: ICD-10-CM

## 2025-06-16 DIAGNOSIS — Z79.899 ENCOUNTER FOR LONG TERM BENZODIAZEPINE THERAPY: ICD-10-CM

## 2025-06-16 DIAGNOSIS — F22 PARANOID BEHAVIOR (HCC): ICD-10-CM

## 2025-06-16 DIAGNOSIS — E11.69 HYPERLIPIDEMIA ASSOCIATED WITH TYPE 2 DIABETES MELLITUS  (HCC): ICD-10-CM

## 2025-06-16 DIAGNOSIS — N13.8 BPH WITH OBSTRUCTION/LOWER URINARY TRACT SYMPTOMS: ICD-10-CM

## 2025-06-16 DIAGNOSIS — F01.52 VASCULAR DEMENTIA WITH PARANOIA (HCC): ICD-10-CM

## 2025-06-16 DIAGNOSIS — E11.9 TYPE 2 DIABETES MELLITUS WITHOUT COMPLICATION, WITHOUT LONG-TERM CURRENT USE OF INSULIN (HCC): Primary | ICD-10-CM

## 2025-06-16 LAB — SL AMB POCT HEMOGLOBIN AIC: 6.4 (ref ?–6.5)

## 2025-06-16 PROCEDURE — G2211 COMPLEX E/M VISIT ADD ON: HCPCS

## 2025-06-16 PROCEDURE — 99214 OFFICE O/P EST MOD 30 MIN: CPT

## 2025-06-16 PROCEDURE — 83036 HEMOGLOBIN GLYCOSYLATED A1C: CPT
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
## 2025-06-16 NOTE — ASSESSMENT & PLAN NOTE
A1c well controlled. Continue current medication regimen. Microalbumin sent. Iris and foot exam UTD.   Lab Results   Component Value Date    HGBA1C 6.5 (H) 12/13/2024       Orders:    POCT hemoglobin A1c    Albumin / creatinine urine ratio    CBC and differential; Future    Comprehensive metabolic panel; Future

## 2025-06-16 NOTE — ASSESSMENT & PLAN NOTE
Symptoms controlled. Monitor PSA.   Component      Latest Ref Rng 1/10/2022   PSA      0.0 - 4.0 ng/mL 0.6    REFLEX CRITERIA Comment        Orders:    PSA Total (Reflex To Free); Future

## 2025-06-16 NOTE — ASSESSMENT & PLAN NOTE
BP elevated. Continue current medication regimen. Encouraged to monitor BPs at home with log. Goal <140/90 consistently.

## 2025-06-16 NOTE — ASSESSMENT & PLAN NOTE
Component      Latest Ref Rng 12/13/2024   Cholesterol      100 - 199 mg/dL 129    Triglycerides      0 - 149 mg/dL 200 (H)    HDL      >39 mg/dL 46    VLDL Cholesterol Lane      5 - 40 mg/dL 32    LDL Calculated      0 - 99 mg/dL 51    LDL/HDL RATIO      0.0 - 3.6 ratio 1.1       Continue atorvastatin. Trend lipid panel.   Lab Results   Component Value Date    HGBA1C 6.4 06/16/2025
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
## 2025-06-16 NOTE — ASSESSMENT & PLAN NOTE
See paranoia note. Does not appear that he ever followed with neuropsych as recommended.   Orders:    Ambulatory Referral to Neuropsychology; Future

## 2025-06-19 LAB
ALBUMIN/CREAT UR: 157 MG/G CREAT (ref 0–29)
CREAT UR-MCNC: 158.1 MG/DL
MICROALBUMIN UR-MCNC: 248.3 UG/ML

## 2025-06-20 ENCOUNTER — RESULTS FOLLOW-UP (OUTPATIENT)
Dept: FAMILY MEDICINE CLINIC | Facility: CLINIC | Age: 75
End: 2025-06-20

## 2025-06-23 DIAGNOSIS — I48.11 LONGSTANDING PERSISTENT ATRIAL FIBRILLATION (HCC): ICD-10-CM

## 2025-06-24 RX ORDER — METOPROLOL SUCCINATE 25 MG/1
25 TABLET, EXTENDED RELEASE ORAL DAILY
Qty: 90 TABLET | Refills: 1 | Status: SHIPPED | OUTPATIENT
Start: 2025-06-24

## 2025-07-07 ENCOUNTER — HOSPITAL ENCOUNTER (INPATIENT)
Facility: HOSPITAL | Age: 75
LOS: 18 days | Discharge: NURSING, REHAB OR LONG-TERM CARE | DRG: 064 | End: 2025-07-25
Attending: EMERGENCY MEDICINE | Admitting: ANESTHESIOLOGY
Payer: COMMERCIAL

## 2025-07-07 ENCOUNTER — APPOINTMENT (EMERGENCY)
Dept: CT IMAGING | Facility: HOSPITAL | Age: 75
End: 2025-07-07
Payer: COMMERCIAL

## 2025-07-07 ENCOUNTER — HOSPITAL ENCOUNTER (EMERGENCY)
Facility: HOSPITAL | Age: 75
End: 2025-07-07
Attending: EMERGENCY MEDICINE | Admitting: EMERGENCY MEDICINE
Payer: COMMERCIAL

## 2025-07-07 VITALS
SYSTOLIC BLOOD PRESSURE: 142 MMHG | OXYGEN SATURATION: 95 % | WEIGHT: 214.73 LBS | HEART RATE: 82 BPM | RESPIRATION RATE: 17 BRPM | TEMPERATURE: 98.6 F | DIASTOLIC BLOOD PRESSURE: 78 MMHG | BODY MASS INDEX: 29.12 KG/M2

## 2025-07-07 DIAGNOSIS — I63.9 STROKE (CEREBRUM) (HCC): ICD-10-CM

## 2025-07-07 DIAGNOSIS — N40.1 BPH WITH OBSTRUCTION/LOWER URINARY TRACT SYMPTOMS: ICD-10-CM

## 2025-07-07 DIAGNOSIS — R53.1 ACUTE LEFT-SIDED WEAKNESS: Primary | ICD-10-CM

## 2025-07-07 DIAGNOSIS — E11.65 CONTROLLED TYPE 2 DIABETES MELLITUS WITH HYPERGLYCEMIA, WITHOUT LONG-TERM CURRENT USE OF INSULIN (HCC): ICD-10-CM

## 2025-07-07 DIAGNOSIS — I61.9 INTRAPARENCHYMAL HEMORRHAGE OF BRAIN (HCC): ICD-10-CM

## 2025-07-07 DIAGNOSIS — I10 PRIMARY HYPERTENSION: ICD-10-CM

## 2025-07-07 DIAGNOSIS — N13.8 BPH WITH OBSTRUCTION/LOWER URINARY TRACT SYMPTOMS: ICD-10-CM

## 2025-07-07 DIAGNOSIS — R29.90 STROKE-LIKE SYMPTOMS: Primary | ICD-10-CM

## 2025-07-07 LAB
2HR DELTA HS TROPONIN: -2 NG/L
ALBUMIN SERPL BCG-MCNC: 4.2 G/DL (ref 3.5–5)
ALP SERPL-CCNC: 66 U/L (ref 34–104)
ALT SERPL W P-5'-P-CCNC: 22 U/L (ref 7–52)
ANION GAP SERPL CALCULATED.3IONS-SCNC: 8 MMOL/L (ref 4–13)
APTT PPP: 29 SECONDS (ref 23–34)
AST SERPL W P-5'-P-CCNC: 20 U/L (ref 13–39)
BASOPHILS # BLD AUTO: 0.03 THOUSANDS/ÂΜL (ref 0–0.1)
BASOPHILS NFR BLD AUTO: 0 % (ref 0–1)
BILIRUB SERPL-MCNC: 0.6 MG/DL (ref 0.2–1)
BUN SERPL-MCNC: 9 MG/DL (ref 5–25)
CALCIUM SERPL-MCNC: 9.1 MG/DL (ref 8.4–10.2)
CARDIAC TROPONIN I PNL SERPL HS: 19 NG/L (ref ?–50)
CARDIAC TROPONIN I PNL SERPL HS: 21 NG/L (ref ?–50)
CHLORIDE SERPL-SCNC: 104 MMOL/L (ref 96–108)
CO2 SERPL-SCNC: 28 MMOL/L (ref 21–32)
CREAT SERPL-MCNC: 0.76 MG/DL (ref 0.6–1.3)
EOSINOPHIL # BLD AUTO: 0.25 THOUSAND/ÂΜL (ref 0–0.61)
EOSINOPHIL NFR BLD AUTO: 2 % (ref 0–6)
ERYTHROCYTE [DISTWIDTH] IN BLOOD BY AUTOMATED COUNT: 12.5 % (ref 11.6–15.1)
GFR SERPL CREATININE-BSD FRML MDRD: 89 ML/MIN/1.73SQ M
GLUCOSE SERPL-MCNC: 148 MG/DL (ref 65–140)
GLUCOSE SERPL-MCNC: 157 MG/DL (ref 65–140)
GLUCOSE SERPL-MCNC: 162 MG/DL (ref 65–140)
HCT VFR BLD AUTO: 44.9 % (ref 36.5–49.3)
HGB BLD-MCNC: 15.8 G/DL (ref 12–17)
IMM GRANULOCYTES # BLD AUTO: 0.04 THOUSAND/UL (ref 0–0.2)
IMM GRANULOCYTES NFR BLD AUTO: 0 % (ref 0–2)
INR PPP: 1.39 (ref 0.85–1.19)
LYMPHOCYTES # BLD AUTO: 1.27 THOUSANDS/ÂΜL (ref 0.6–4.47)
LYMPHOCYTES NFR BLD AUTO: 12 % (ref 14–44)
MCH RBC QN AUTO: 33.8 PG (ref 26.8–34.3)
MCHC RBC AUTO-ENTMCNC: 35.2 G/DL (ref 31.4–37.4)
MCV RBC AUTO: 96 FL (ref 82–98)
MONOCYTES # BLD AUTO: 0.63 THOUSAND/ÂΜL (ref 0.17–1.22)
MONOCYTES NFR BLD AUTO: 6 % (ref 4–12)
NEUTROPHILS # BLD AUTO: 8.19 THOUSANDS/ÂΜL (ref 1.85–7.62)
NEUTS SEG NFR BLD AUTO: 80 % (ref 43–75)
NRBC BLD AUTO-RTO: 0 /100 WBCS
PLATELET # BLD AUTO: 166 THOUSANDS/UL (ref 149–390)
PMV BLD AUTO: 10.9 FL (ref 8.9–12.7)
POTASSIUM SERPL-SCNC: 4.1 MMOL/L (ref 3.5–5.3)
PROT SERPL-MCNC: 7.1 G/DL (ref 6.4–8.4)
PROTHROMBIN TIME: 17.6 SECONDS (ref 12.3–15)
RBC # BLD AUTO: 4.68 MILLION/UL (ref 3.88–5.62)
SODIUM SERPL-SCNC: 140 MMOL/L (ref 135–147)
WBC # BLD AUTO: 10.41 THOUSAND/UL (ref 4.31–10.16)

## 2025-07-07 PROCEDURE — 96376 TX/PRO/DX INJ SAME DRUG ADON: CPT

## 2025-07-07 PROCEDURE — 96365 THER/PROPH/DIAG IV INF INIT: CPT

## 2025-07-07 PROCEDURE — 70496 CT ANGIOGRAPHY HEAD: CPT

## 2025-07-07 PROCEDURE — 99292 CRITICAL CARE ADDL 30 MIN: CPT | Performed by: EMERGENCY MEDICINE

## 2025-07-07 PROCEDURE — 70498 CT ANGIOGRAPHY NECK: CPT

## 2025-07-07 PROCEDURE — 70450 CT HEAD/BRAIN W/O DYE: CPT

## 2025-07-07 PROCEDURE — 85730 THROMBOPLASTIN TIME PARTIAL: CPT | Performed by: EMERGENCY MEDICINE

## 2025-07-07 PROCEDURE — 93005 ELECTROCARDIOGRAM TRACING: CPT

## 2025-07-07 PROCEDURE — 80053 COMPREHEN METABOLIC PANEL: CPT | Performed by: EMERGENCY MEDICINE

## 2025-07-07 PROCEDURE — 99291 CRITICAL CARE FIRST HOUR: CPT | Performed by: ANESTHESIOLOGY

## 2025-07-07 PROCEDURE — 96374 THER/PROPH/DIAG INJ IV PUSH: CPT

## 2025-07-07 PROCEDURE — 84484 ASSAY OF TROPONIN QUANT: CPT | Performed by: EMERGENCY MEDICINE

## 2025-07-07 PROCEDURE — 99291 CRITICAL CARE FIRST HOUR: CPT | Performed by: EMERGENCY MEDICINE

## 2025-07-07 PROCEDURE — 99291 CRITICAL CARE FIRST HOUR: CPT | Performed by: PSYCHIATRY & NEUROLOGY

## 2025-07-07 PROCEDURE — 82948 REAGENT STRIP/BLOOD GLUCOSE: CPT

## 2025-07-07 PROCEDURE — 85610 PROTHROMBIN TIME: CPT | Performed by: EMERGENCY MEDICINE

## 2025-07-07 PROCEDURE — 96367 TX/PROPH/DG ADDL SEQ IV INF: CPT

## 2025-07-07 PROCEDURE — 96375 TX/PRO/DX INJ NEW DRUG ADDON: CPT

## 2025-07-07 PROCEDURE — 36415 COLL VENOUS BLD VENIPUNCTURE: CPT | Performed by: EMERGENCY MEDICINE

## 2025-07-07 PROCEDURE — 85025 COMPLETE CBC W/AUTO DIFF WBC: CPT | Performed by: EMERGENCY MEDICINE

## 2025-07-07 PROCEDURE — 99285 EMERGENCY DEPT VISIT HI MDM: CPT

## 2025-07-07 RX ORDER — LEVETIRACETAM 500 MG/5ML
1000 INJECTION, SOLUTION, CONCENTRATE INTRAVENOUS ONCE
Status: COMPLETED | OUTPATIENT
Start: 2025-07-07 | End: 2025-07-07

## 2025-07-07 RX ORDER — CHLORHEXIDINE GLUCONATE ORAL RINSE 1.2 MG/ML
15 SOLUTION DENTAL EVERY 12 HOURS SCHEDULED
Status: DISCONTINUED | OUTPATIENT
Start: 2025-07-07 | End: 2025-07-21

## 2025-07-07 RX ORDER — SODIUM CHLORIDE, SODIUM LACTATE, POTASSIUM CHLORIDE, CALCIUM CHLORIDE 600; 310; 30; 20 MG/100ML; MG/100ML; MG/100ML; MG/100ML
75 INJECTION, SOLUTION INTRAVENOUS CONTINUOUS
Status: DISCONTINUED | OUTPATIENT
Start: 2025-07-07 | End: 2025-07-08

## 2025-07-07 RX ORDER — LEVETIRACETAM 500 MG/5ML
500 INJECTION, SOLUTION, CONCENTRATE INTRAVENOUS EVERY 12 HOURS SCHEDULED
Status: DISCONTINUED | OUTPATIENT
Start: 2025-07-07 | End: 2025-07-08

## 2025-07-07 RX ORDER — ONDANSETRON 2 MG/ML
4 INJECTION INTRAMUSCULAR; INTRAVENOUS ONCE
Status: COMPLETED | OUTPATIENT
Start: 2025-07-07 | End: 2025-07-07

## 2025-07-07 RX ORDER — AMOXICILLIN 250 MG
2 CAPSULE ORAL 2 TIMES DAILY
Status: DISCONTINUED | OUTPATIENT
Start: 2025-07-07 | End: 2025-07-25 | Stop reason: HOSPADM

## 2025-07-07 RX ORDER — DEXMEDETOMIDINE HYDROCHLORIDE 4 UG/ML
.1-.7 INJECTION, SOLUTION INTRAVENOUS
Status: DISCONTINUED | OUTPATIENT
Start: 2025-07-07 | End: 2025-07-08

## 2025-07-07 RX ORDER — BISACODYL 10 MG
10 SUPPOSITORY, RECTAL RECTAL DAILY PRN
Status: DISCONTINUED | OUTPATIENT
Start: 2025-07-07 | End: 2025-07-25 | Stop reason: HOSPADM

## 2025-07-07 RX ADMIN — DESMOPRESSIN ACETATE 38.8 MCG: 4 INJECTION, SOLUTION INTRAVENOUS; SUBCUTANEOUS at 12:19

## 2025-07-07 RX ADMIN — NICARDIPINE HYDROCHLORIDE 10 MG/HR: 25 INJECTION, SOLUTION INTRAVENOUS at 21:39

## 2025-07-07 RX ADMIN — Medication 2000 UNITS: at 12:12

## 2025-07-07 RX ADMIN — CHLORHEXIDINE GLUCONATE 15 ML: 1.2 SOLUTION ORAL at 21:10

## 2025-07-07 RX ADMIN — SODIUM CHLORIDE 5 MG/HR: 0.9 INJECTION, SOLUTION INTRAVENOUS at 18:34

## 2025-07-07 RX ADMIN — IOHEXOL 75 ML: 350 INJECTION, SOLUTION INTRAVENOUS at 12:00

## 2025-07-07 RX ADMIN — LEVETIRACETAM 500 MG: 100 INJECTION, SOLUTION INTRAVENOUS at 21:10

## 2025-07-07 RX ADMIN — ONDANSETRON 4 MG: 2 INJECTION INTRAMUSCULAR; INTRAVENOUS at 17:41

## 2025-07-07 RX ADMIN — LEVETIRACETAM 1000 MG: 500 INJECTION, SOLUTION INTRAVENOUS at 11:58

## 2025-07-07 RX ADMIN — NICARDIPINE HYDROCHLORIDE 7.5 MG/HR: 25 INJECTION, SOLUTION INTRAVENOUS at 20:29

## 2025-07-07 RX ADMIN — ONDANSETRON 4 MG: 2 INJECTION INTRAMUSCULAR; INTRAVENOUS at 11:58

## 2025-07-07 RX ADMIN — DEXMEDETOMIDINE HYDROCHLORIDE 0.5 MCG/KG/HR: 400 INJECTION INTRAVENOUS at 21:45

## 2025-07-07 NOTE — CONSULTS
Consultation - Neurology   Name: Shawn Marquez 75 y.o. male I MRN: [id number]  Unit/Bed#: ED 05 I Date of Admission: 7/7/2025   Date of Service: 7/7/2025 I Hospital Day: 0   Consult to Neurology  Consult performed by: Marty Mae PA-C  Consult ordered by: Ashley Collins MD        Physician Requesting Evaluation: Ashley Collins*   Reason for Evaluation / Principal Problem: Stroke Alert    Assessment & Plan  ICH    75 year old male with past medical hx as listed below, the patient has a hx of htn, hyperlipidemia, DVT, gross hematuria, atrial fibrillation (on xarelto), vascular dementia, who presents to [facility] as a stroke alert.  It was reported he was lkw last evening at around 9PM, he may have awoken at 5 AM but cannot tell us if he was with out deficits at that time  he apparently fell off the couch sometime this morning (EMS reports around 5AM).  NIH was a 14, with what appears to be a right hemispheric involvement.   ICH score of a 2.     The patient does take Xarelto at home.     VS blood pressure was 161 systolic.    Labs/Testing:  CT of head - Large acute intraparenchymal hemorrhage centered involving the posterior right basal ganglia, adjacent right thalamus, and posterior right insula, with a volume of approximately 23 mL. No midline shift.  Mild chronic white matter microangiopathic changes.  CTA of head and neck - No large vessel occlusion, high-grade stenosis, or intracranial aneurysm identified on CT angiogram of the head.  No hemodynamically significant stenosis or dissection identified on CT angiogram of the neck.Intraparenchymal hematoma centered in the right posterior basal ganglia/posterior limb of the right internal capsule and adjacent right thalamus, without active or extravasation of contrast or spot sign noted.  Thrombolytic Decision: The patient is not a tnk candidate as he is on AC, as well CT of head showed acute ICH.     ICH (intracranial hemorrhage), ICH  score is 2.   Seizure like activity in CT scanner, reported movement upward for a brief period in the left arm, which resolved.     - No anticoagulation or antiplatelet medications at this time (take aspirin and xarelto at home)  - Antiplatelet medication / anticoagulation reversed per ICU team / neurosurgery team/ED team  - Consult ICU and neurosurgical team  - MRI of brain with out contrast, when stable to look for possible etiologies  - Repeat CT of head to document stability in 6 hours  - CTA of head and neck, completed as above  - Blood pressure goal less than 140 systolically  - Loaded with Keppra in ED, as well place on 500 mg every 12 hours maintenance.  Would recommend EEG, low threshold for video EEG if the patient has further seizure like events.   - Therapies - PT / OT / ST  - Dysphagia evaluation  - Neurosurgery consultation  - Frequent neurological check notify neurology with any change in neurological condition  - STAT CT of head with change in neurological examination   - Keep euvolemic, normal thermic  - Monitor BS closely and treat  - Acted as a scribe in this case, please see attestation for details.     Recommendations for outpatient neurological follow up have yet to be determined.    History of Present Illness   Hx and PE limited by: none  Patient last known well: 9pm lkw  Stroke alert called: 1119AM  Neurology time of arrival: Immediate by phone  HPI: Shawn Marquez is a 75 y.o.male with past medical hx as listed below, the patient has a hx of htn, hyperlipidemia, DVT, gross hematuria, atrial fibrillation, vascular dementia,  who presents to [facility] as a stroke alert.  It was reported he was lkw last evening at around 9PM, he may have awoken at 5 AM but cannot tell us if he was with out deficits at that time  he apparently fell off the couch sometime this morning.  NIH was a 14, with what appears to be a right hemispheric stroke. The patient does take Xarelto at home.     Labs/Testing:  As  above.     The patient is not a tnk candidate as ICH is seen on imaging, as well the patient is on AC.  No LVO seen.   ICH score is a 2.     Case discussed with ED, plan for evaluation by [facility] cc and neurosurgery.     Historical Information   Past Medical History[1]  Past Surgical History[2]  Social History[3]  E-Cigarette/Vaping    E-Cigarette Use Never User      E-Cigarette/Vaping Substances     Family History[4]  Social History[5]  No current facility-administered medications for this encounter.  Prior to Admission Medications   Prescriptions Last Dose Informant Patient Reported? Taking?   ASPIRIN LOW DOSE 81 MG EC tablet  Self No No   Sig: TAKE ONE TABLET BY MOUTH DAILY   acetaminophen (TYLENOL) 500 mg tablet  Self Yes No   Sig: Take 500 mg by mouth every 6 (six) hours as needed for mild pain   atorvastatin (LIPITOR) 40 mg tablet   No No   Sig: TAKE ONE TABLET BY MOUTH EVERY DAY WITH DINNER   diazepam (VALIUM) 5 mg tablet   No No   Sig: Take 1 tablet (5 mg total) by mouth every 8 (eight) hours as needed for anxiety or sleep   meclizine (ANTIVERT) 25 mg tablet   No No   Sig: Take 1 tablet (25 mg total) by mouth 3 (three) times a day as needed for dizziness   metFORMIN (GLUCOPHAGE-XR) 500 mg 24 hr tablet   No No   Sig: Take 1 tablet (500 mg total) by mouth 2 (two) times a day with meals   metoprolol succinate (TOPROL-XL) 25 mg 24 hr tablet   No No   Sig: TAKE ONE TABLET BY MOUTH EVERY DAY   ondansetron (ZOFRAN) 4 mg tablet   No No   Sig: Take 1 tablet (4 mg total) by mouth every 8 (eight) hours as needed for nausea or vomiting   rivaroxaban (Xarelto) 20 mg tablet   No No   Sig: TAKE 1 TABLET EVERY DAY WITH BREAKFAST   valsartan (DIOVAN) 160 mg tablet   No No   Sig: TAKE ONE TABLET BY MOUTH EVERY DAY      Facility-Administered Medications: None     Patient has no known allergies.    Objective :     NIHSS:  1a.Level of Consciousness: 0 = Alert   1b. LOC Questions: 0 = Answers both correctly   1c. LOC Commands: 0 =  Obeys both correctly   2. Best Gaze: 1 = Partial Gaze Palsy   3. Visual: 1 = Partial hemianopia   4. Facial Palsy: 2=Partial paralysis (total or near total paralysis of the lower face)   5a. Motor Right Arm: 0=No drift, limb holds 90 (or 45) degrees for full 10 seconds   5b. Motor Left Arm: 4=No movement   6a. Motor Right Le=No drift, limb holds 90 (or 45) degrees for full 10 seconds   6b. Motor Left Le=No movement   7. Limb Ataxia:  0=Absent   8. Sensory: 1=Mild to moderate sensory loss; patient feels pinprick is less sharp or is dull on the affected side; there is a loss of superficial pain with pinprick but patient is aware He is being touched   9. Best Language:  0=No aphasia, normal   10. Dysarthria: 0=Normal articulation   11. Extinction and Inattention (formerly Neglect): 1=Visual, tactile, auditory, spatial or personal inattention or extinction to bilateral simultaneous stimulation in one of the sensory modalities   Total Score: 14     ICH score is 2.    Time NIHSS was completed: at time of stroke alert.       Lab Results: I have reviewed the following results:      Recent Results (from the past 24 hours)   ECG 12 lead    Collection Time: 25 11:20 AM   Result Value Ref Range    Ventricular Rate 72 BPM    Atrial Rate 357 BPM    TX Interval  ms    QRSD Interval 68 ms    QT Interval 396 ms    QTC Interval 433 ms    P Axis  degrees    QRS Axis -46 degrees    T Wave Axis 62 degrees   CBC and differential    Collection Time: 25 11:22 AM   Result Value Ref Range    WBC 10.41 (H) 4.31 - 10.16 Thousand/uL    RBC 4.68 3.88 - 5.62 Million/uL    Hemoglobin 15.8 12.0 - 17.0 g/dL    Hematocrit 44.9 36.5 - 49.3 %    MCV 96 82 - 98 fL    MCH 33.8 26.8 - 34.3 pg    MCHC 35.2 31.4 - 37.4 g/dL    RDW 12.5 11.6 - 15.1 %    MPV 10.9 8.9 - 12.7 fL    Platelets 166 149 - 390 Thousands/uL    nRBC 0 /100 WBCs    Segmented % 80 (H) 43 - 75 %    Immature Grans % 0 0 - 2 %    Lymphocytes % 12 (L) 14 - 44 %     Monocytes % 6 4 - 12 %    Eosinophils Relative 2 0 - 6 %    Basophils Relative 0 0 - 1 %    Absolute Neutrophils 8.19 (H) 1.85 - 7.62 Thousands/µL    Absolute Immature Grans 0.04 0.00 - 0.20 Thousand/uL    Absolute Lymphocytes 1.27 0.60 - 4.47 Thousands/µL    Absolute Monocytes 0.63 0.17 - 1.22 Thousand/µL    Eosinophils Absolute 0.25 0.00 - 0.61 Thousand/µL    Basophils Absolute 0.03 0.00 - 0.10 Thousands/µL   Fingerstick Glucose (POCT)    Collection Time: 25 11:22 AM   Result Value Ref Range    POC Glucose 148 (H) 65 - 140 mg/dl     No results found.     Reviewed case with neurology attending, history and physical examination, labs and imaging completed, plan of care as per attending physician.  Please see attestation for further details.  Examined alongside attending physician.  Acted as scribe in this case.          [1]   Past Medical History:  Diagnosis Date    Anxiety     Depression     DVT (deep venous thrombosis) (HCC)     Gross hematuria     LA...3/29/16   R...4/3/17     Hematuria     LA.....16    R....4/3/17    Hyperlipidemia     Hypertension     Long-term (current) use of anticoagulants     LA...3/29/16   R...4/3/17     Pre-operative cardiovascular examination, supraventricular arrhythmia 07/15/2022    Seasonal allergies    [2]   Past Surgical History:  Procedure Laterality Date    APPENDECTOMY      WISDOM TOOTH EXTRACTION     [3]   Social History  Tobacco Use    Smoking status: Former     Current packs/day: 0.00     Average packs/day: 1 pack/day for 9.0 years (9.0 ttl pk-yrs)     Types: Cigarettes     Start date:      Quit date:      Years since quittin.5    Smokeless tobacco: Never   Vaping Use    Vaping status: Never Used   Substance and Sexual Activity    Alcohol use: Not Currently     Comment: social    Drug use: Yes     Types: Marijuana     Comment: Via pipe X 50 years    Sexual activity: Yes     Partners: Female   [4]   Family History  Problem Relation Name Age of Onset     Hypertension Mother      Leukemia Father      Heart disease Father      Alcohol abuse Father      Depression Father      Completed Suicide  Father      Depression Sister      Suicide Attempts Brother      Alcohol abuse Brother      Depression Brother      Heart disease Family      Leukemia Family     [5]   Social History  Tobacco Use    Smoking status: Former     Current packs/day: 0.00     Average packs/day: 1 pack/day for 9.0 years (9.0 ttl pk-yrs)     Types: Cigarettes     Start date:      Quit date:      Years since quittin.5    Smokeless tobacco: Never   Vaping Use    Vaping status: Never Used   Substance and Sexual Activity    Alcohol use: Not Currently     Comment: social    Drug use: Yes     Types: Marijuana     Comment: Via pipe X 50 years    Sexual activity: Yes     Partners: Female

## 2025-07-07 NOTE — ED NOTES
Pt vomiting and had seizure like activity while in CT scan,. Provider aware.      Lizbeth Ritter RN  07/07/25 [id number]

## 2025-07-07 NOTE — EMTALA/ACUTE CARE TRANSFER
[facility] EMERGENCY DEPARTMENT  [address]  [facility]IT[facility] [address]  Dept: [phone]      EMTALA TRANSFER CONSENT    NAME Shawn Marquez                                         1950                              MRN [id number]    I have been informed of my rights regarding examination, treatment, and transfer   by Dr. Violetta hansen. providers found    Benefits: Specialized equipment and/or services available at the receiving facility (Include comment)________________________, Continuity of care    Risks: Potential for delay in receiving treatment, Increased discomfort during transfer, Possible worsening of condition or death during transfer, Potential deterioration of medical condition, Loss of IV      Consent for Transfer:  I acknowledge that my medical condition has been evaluated and explained to me by the emergency department physician or other qualified medical person and/or my attending physician, who has recommended that I be transferred to the service of  Accepting Physician: Dr. Trivedi at Accepting Facility Name, City & State : [facility]. The above potential benefits of such transfer, the potential risks associated with such transfer, and the probable risks of not being transferred have been explained to me, and I fully understand them.  The doctor has explained that, in my case, the benefits of transfer outweigh the risks.  I agree to be transferred.    I authorize the performance of emergency medical procedures and treatments upon me in both transit and upon arrival at the receiving facility.  Additionally, I authorize the release of any and all medical records to the receiving facility and request they be transported with me, if possible.  I understand that the safest mode of transportation during a medical emergency is an ambulance and that the Hospital advocates the use of this mode of transport. Risks of traveling to the receiving facility by car,  including absence of medical control, life sustaining equipment, such as oxygen, and medical personnel has been explained to me and I fully understand them.    (ADRIANO CORRECT BOX BELOW)  [  ]  I consent to the stated transfer and to be transported by ambulance/helicopter.  [  ]  I consent to the stated transfer, but refuse transportation by ambulance and accept full responsibility for my transportation by car.  I understand the risks of non-ambulance transfers and I exonerate the Hospital and its staff from any deterioration in my condition that results from this refusal.    X___________________________________________    DATE  25  TIME________  Signature of patient or legally responsible individual signing on patient behalf           RELATIONSHIP TO PATIENT_________________________          Provider Certification    NAME Shawn Marquez                                         1950                              MRN [id number]    A medical screening exam was performed on the above named patient.  Based on the examination:    Condition Necessitating Transfer The primary encounter diagnosis was Acute left-sided weakness. A diagnosis of Intraparenchymal hemorrhage of brain (HCC) was also pertinent to this visit.    Patient Condition: The patient has been stabilized such that within reasonable medical probability, no material deterioration of the patient condition or the condition of the unborn child(estefanía) is likely to result from the transfer    Reason for Transfer: Level of Care needed not available at this facility    Transfer Requirements: Facility [facility]   Space available and qualified personnel available for treatment as acknowledged by    Agreed to accept transfer and to provide appropriate medical treatment as acknowledged by       Dr. Trivedi  Appropriate medical records of the examination and treatment of the patient are provided at the time of transfer   STAFF INITIAL WHEN COMPLETED  _______  Transfer will be performed by qualified personnel from    and appropriate transfer equipment as required, including the use of necessary and appropriate life support measures.    Provider Certification: I have examined the patient and explained the following risks and benefits of being transferred/refusing transfer to the patient/family:  General risk, such as traffic hazards, adverse weather conditions, rough terrain or turbulence, possible failure of equipment (including vehicle or aircraft), or consequences of actions of persons outside the control of the transport personnel, Unanticipated needs of medical equipment and personnel during transport, Risk of worsening condition      Based on these reasonable risks and benefits to the patient and/or the unborn child(estefanía), and based upon the information available at the time of the patient’s examination, I certify that the medical benefits reasonably to be expected from the provision of appropriate medical treatments at another medical facility outweigh the increasing risks, if any, to the individual’s medical condition, and in the case of labor to the unborn child, from effecting the transfer.    X____________________________________________ DATE 07/07/25        TIME_______      ORIGINAL - SEND TO MEDICAL RECORDS   COPY - SEND WITH PATIENT DURING TRANSFER

## 2025-07-07 NOTE — ED PROVIDER NOTES
"Time reflects when diagnosis was documented in both MDM as applicable and the Disposition within this note       Time User Action Codes Description Comment    7/7/2025 11:18 AM Ashley Collins Add [R53.1] Acute left-sided weakness     7/7/2025 12:18 PM Creyer, Lauren Add [I61.9] Intraparenchymal hemorrhage of brain (HCC)           ED Disposition       ED Disposition   Transfer to Another Facility-In Network    Condition   --    Date/Time   Mon Jul 7, 2025 12:34 PM    Comment   Shawn Marquez should be transferred out to [facility].               Assessment & Plan       Medical Decision Making  Patient is a 75-year-old male with a PMHx of a-fib (on Xarelto/ASA), DVT, HLD and HTN, presenting to the ED as a prehospital stroke alert.     Patient was found to have left-sided deficits on exam with an initial NIH score of 14.  CT head showed a \"large acute intraparenchymal hemorrhage centered involving the posterior right basal ganglia, adjacent right thalamus, and posterior right insula, with a volume of approximately 23 mL; no midline shift\".  Patient was evaluated by neurology at bedside in the ED who recommend AP/AC reversal with DDAVP/Kcentra, repeat CT head in 6 hours and neurosurgery/neurocritical care consults.  Patient was also given a loading dose of Keppra in the ED for possible seizure-like activity while in CT scan.  Case was discussed with neurocritical care who recommend transfer to [facility] for further management.  Patient was monitored in the ED with no acute changes in mental status.  Patient's blood pressures started to gradually increase while he was in the ED and he was started on a Cardene drip prior to transfer.  Patient was transferred to [facility] for continued management.    Critical Care Time Statement: Upon my evaluation, this patient had a high probability of imminent or life-threatening deterioration due to acute hemorrhagic stroke, which required my " direct attention, intervention, and personal management.  I spent a total of 125 minutes directly providing critical care services, including evaluating for the presence of life-threatening injuries or illnesses, complex medical decision making (to support/prevent further life-threatening deterioration)., interpretation of hemodynamic data, titration of vasoactive medications, and titration of continuous IV medications (drips). This time is exclusive of procedures, teaching, treating other patients, family meetings, and any prior time recorded by providers other than myself.       Amount and/or Complexity of Data Reviewed  Labs: ordered.  Radiology: ordered.  Discussion of management or test interpretation with external provider(s): Dr. Trivedi (neuro critical care)  Dr. Peraza (neuro)  SAM Orozco (neurosurgery)    Risk  Prescription drug management.        ED Course as of 07/07/25 1922 Mon Jul 07, 2025   1152 Per neuro - Kcentra/DDAVP, BP under 140 systolic, repeat ct in 6 hours, neurosurgery/neuro critical care consult   1158 While in CT scan, patient had some seizure-like activity with some shaking/upward movement of his left upper extremity. Neuro recommending 1000mg Keppra loading dose and then 500mg BID.    1231 Per neurosurgery NP, does not seem surgical.   1400 Patient is awake, no complaints at this time.  No acute changes.   1621 Patient reassessed at this time.  He has no complaints and exam is unchanged.   1800 Patient has not been able to urinate. Bladder scan showing close to 300 mL of urine; however, bladder appears to be more distended on bedside U/S. Will place mahan catheter at this time.        Medications   niCARdipine (CARDENE) 25 mg (STANDARD CONCENTRATION) in sodium chloride 0.9% 250 mL (7.5 mg/hr Intravenous Rate/Dose Change 7/7/25 1908)   prothrombin complex concentrate (human) (Kcentra) 2,000 Units Infusion 80 mL (2,000 Units Intravenous Given 7/7/25 1212)   desmopressin (DDAVP)  38.8 mcg in sodium chloride 0.9 % 50 mL IVPB (0 mcg Intravenous Stopped 7/7/25 1249)   ondansetron (ZOFRAN) injection 4 mg (4 mg Intravenous Given 7/7/25 1158)   levETIRAcetam (KEPPRA) injection 1,000 mg (1,000 mg Intravenous Given 7/7/25 1158)   iohexol (OMNIPAQUE) 350 MG/ML injection (MULTI-DOSE) 75 mL (75 mL Intravenous Given 7/7/25 1200)   ondansetron (ZOFRAN) injection 4 mg (4 mg Intravenous Given 7/7/25 1741)       ED Risk Strat Scores         Stroke Assessment       Row Name 07/07/25 1119             NIH Stroke Scale    Interval Baseline      Level of Consciousness (1a.) 0      LOC Questions (1b.) 0      LOC Commands (1c.) 0      Best Gaze (2.) 1      Visual (3.) 1      Facial Palsy (4.) 2      Motor Arm, Left (5a.) 4      Motor Arm, Right (5b.) 0      Motor Leg, Left (6a.) 4      Motor Leg, Right (6b.) 0      Limb Ataxia (7.) 0      Sensory (8.) 1      Best Language (9.) 0      Dysarthria (10.) 0      Extinction and Inattention (11.) (Formerly Neglect) 1      Total 14                    Flowsheet Row Most Recent Value   Thrombolytic Decision Options    Thrombolytic Decision Patient not a candidate.   Patient is not a candidate options comment  [Patient on AC with ICH seen on imaging]                    No data recorded        SBIRT 20yo+      Flowsheet Row Most Recent Value   Initial Alcohol Screen: US AUDIT-C     1. How often do you have a drink containing alcohol? 0 Filed at: 07/07/2025 1335   2. How many drinks containing alcohol do you have on a typical day you are drinking?  0 Filed at: 07/07/2025 1335   3a. Male UNDER 65: How often do you have five or more drinks on one occasion? 0 Filed at: 07/07/2025 1335   3b. FEMALE Any Age, or MALE 65+: How often do you have 4 or more drinks on one occassion? 0 Filed at: 07/07/2025 1335   Audit-C Score 0 Filed at: 07/07/2025 1335   ADRIANA: How many times in the past year have you...    Used an illegal drug or used a prescription medication for non-medical reasons?  Never Filed at: 07/07/2025 5137                            History of Present Illness       Chief Complaint   Patient presents with    STROKE Alert     Pt to ED from home for stroke. Last well known 9pm. L side weakness.        Past Medical History[1]   Past Surgical History[2]   Family History[3]   Social History[4]   E-Cigarette/Vaping    E-Cigarette Use Never User       E-Cigarette/Vaping Substances      I have reviewed and agree with the history as documented.     Patient is a 75-year-old male with a PMHx of a-fib (on Xarelto/ASA), DVT, HLD and HTN, presenting to the ED as a prehospital stroke alert.  Patient was last seen around 9 PM last night when his wife went to bed and was reported to be at his baseline at that time.  Patient's wife states that patient sleeps on the couch in the living room and she sleeps in the bedroom. She states that around 9:30-10 AM this morning she heard him yelling from the living room and went out to find him laying on the floor with a left-sided facial droop and left-sided weakness. Patient states that he was trying to get up off the couch to use the bathroom but was not able to stand and fell. He denies any head strike. He mentions that he woke up around 5AM to pee but is not able to say whether or not he had any deficits at that time.  He denies any headaches, dizziness, chest pain, SOB, abdominal pain or N/V/D.          Review of Systems   Constitutional:  Negative for chills and fever.   Respiratory:  Negative for shortness of breath.    Cardiovascular:  Negative for chest pain.   Gastrointestinal:  Negative for abdominal pain, diarrhea, nausea and vomiting.   Genitourinary:  Negative for dysuria, flank pain and hematuria.   Musculoskeletal:  Negative for back pain and neck pain.   Skin:  Negative for rash.   Neurological:  Positive for facial asymmetry, weakness and numbness. Negative for dizziness, seizures, syncope and headaches.   All other systems reviewed and are  negative.          Objective       ED Triage Vitals   Temperature Pulse Blood Pressure Respirations SpO2 Patient Position - Orthostatic VS   07/07/25 1735 07/07/25 1130 07/07/25 1130 07/07/25 1130 07/07/25 1130 07/07/25 1200   98.6 °F (37 °C) 77 161/77 18 98 % Sitting      Temp Source Heart Rate Source BP Location FiO2 (%) Pain Score    07/07/25 1735 07/07/25 1200 07/07/25 1200 -- --    Oral Monitor Left arm        Vitals      Date and Time Temp Pulse SpO2 Resp BP Pain Score FACES Pain Rating User   07/07/25 1915 -- 82 95 % 17 142/78 -- --    07/07/25 1900 -- 79 95 % 16 154/76 -- --    07/07/25 1845 -- 79 95 % 15 151/90 -- --    07/07/25 1834 -- 79 -- -- 164/95 -- --    07/07/25 1808 -- -- -- -- 188/87 -- --    07/07/25 1800 -- -- -- -- 155/88 -- --    07/07/25 1800 -- 76 97 % 21 -- -- --    07/07/25 1755 -- 75 96 % 19 155/88 -- --    07/07/25 1735 98.6 °F (37 °C) -- -- -- -- -- --    07/07/25 1700 -- 69 97 % 13 163/83 -- --    07/07/25 1600 -- 81 98 % 17 170/81 -- --    07/07/25 1530 -- 82 97 % 17 121/59 -- --    07/07/25 1500 -- 77 Simultaneous filing. User may not have seen previous data. 98 % Simultaneous filing. User may not have seen previous data. 19 Simultaneous filing. User may not have seen previous data. 170/87 Simultaneous filing. User may not have seen previous data. -- --    07/07/25 1430 -- 79 96 % 18 150/76 -- --    07/07/25 1400 -- 82 98 % 19 135/81 -- --    07/07/25 1345 -- 86 97 % 18 154/88 -- -- ML   07/07/25 1330 -- 79 96 % 18 153/81 -- -- ML   07/07/25 1315 -- 82 97 % 20 143/76 -- -- CC   07/07/25 1300 -- 85 97 % 18 137/72 -- -- ML   07/07/25 1245 -- 85 98 % 17 126/73 -- -- ML   07/07/25 1230 -- 85 95 % 17 118/67 -- -- ML   07/07/25 1215 -- 75 96 % 19 121/74 -- -- ML   07/07/25 1200 -- 80 98 % 18 163/94 -- -- ML   07/07/25 1130 -- -- -- 18 -- -- -- LC   07/07/25 1130 -- 77 98 % -- 161/77 -- -- ML            Physical Exam  Vitals and nursing note reviewed.    Constitutional:       General: He is awake.      Appearance: He is well-developed. He is not diaphoretic.   HENT:      Head: Normocephalic and atraumatic.      Right Ear: External ear normal.      Left Ear: External ear normal.      Nose: Nose normal.      Mouth/Throat:      Lips: Pink.      Mouth: Mucous membranes are moist.     Eyes:      General: Lids are normal. No scleral icterus.     Conjunctiva/sclera: Conjunctivae normal.      Pupils: Pupils are equal, round, and reactive to light.       Cardiovascular:      Rate and Rhythm: Normal rate and regular rhythm.      Pulses: Normal pulses.           Radial pulses are 2+ on the right side and 2+ on the left side.      Heart sounds: Normal heart sounds, S1 normal and S2 normal.   Pulmonary:      Effort: Pulmonary effort is normal. No accessory muscle usage.      Breath sounds: Normal breath sounds. No stridor. No decreased breath sounds, wheezing, rhonchi or rales.   Abdominal:      General: Abdomen is flat. Bowel sounds are normal. There is no distension.      Palpations: Abdomen is soft.      Tenderness: There is no abdominal tenderness. There is no right CVA tenderness, left CVA tenderness, guarding or rebound.     Musculoskeletal:      Cervical back: Normal range of motion and neck supple.      Right lower leg: No edema.      Left lower leg: No edema.     Skin:     General: Skin is warm and dry.      Capillary Refill: Capillary refill takes less than 2 seconds.      Coloration: Skin is not cyanotic, jaundiced or pale.     Neurological:      Mental Status: He is alert.      Cranial Nerves: Dysarthria (mild) and facial asymmetry present.      Sensory: Sensory deficit present.      Motor: Weakness (left upper and lower extremity weakness) present.      Comments: Patient is awake, alert and oriented to person and place.  Left-sided facial droop with decreased sensation on the left side of the face.  Left homonymous hemianopsia. Patient is not able to move gaze  past midline when trying to look to the left. He has weakness and decreased sensation in the left upper and lower extremity. Coordination normal on right; not testable on left due to weakness.  Gait not testable due to weakness.   Psychiatric:         Attention and Perception: Attention normal.         Mood and Affect: Mood normal.         Behavior: Behavior normal. Behavior is cooperative.         Results Reviewed       Procedure Component Value Units Date/Time    Fingerstick Glucose (POCT) [[id number]]  (Abnormal) Collected: 07/07/25 1613    Lab Status: Final result Specimen: Blood Updated: 07/07/25 1614     POC Glucose 157 mg/dl     HS Troponin I 2hr [[id number]]  (Normal) Collected: 07/07/25 1339    Lab Status: Final result Specimen: Blood from Arm, Right Updated: 07/07/25 1421     hs TnI 2hr 19 ng/L      Delta 2hr hsTnI -2 ng/L     HS Troponin 0hr (reflex protocol) [[id number]]  (Normal) Collected: 07/07/25 1122    Lab Status: Final result Specimen: Blood from Arm, Left Updated: 07/07/25 1157     hs TnI 0hr 21 ng/L     Protime-INR [[id number]]  (Abnormal) Collected: 07/07/25 1122    Lab Status: Final result Specimen: Blood from Arm, Left Updated: 07/07/25 1155     Protime 17.6 seconds      INR 1.39    Narrative:      INR Therapeutic Range    Indication                                             INR Range      Atrial Fibrillation                                               2.0-3.0  Hypercoagulable State                                    2.0.2.3  Left Ventricular Asist Device                            2.0-3.0  Mechanical Heart Valve                                  -    Aortic(with afib, MI, embolism, HF, LA enlargement,    and/or coagulopathy)                                     2.0-3.0 (2.5-3.5)     Mitral                                                             2.5-3.5  Prosthetic/Bioprosthetic Heart Valve               2.0-3.0  Venous thromboembolism (VTE: VT, PE        2.0-3.0    APTT [[id number]]   (Normal) Collected: 07/07/25 1122    Lab Status: Final result Specimen: Blood from Arm, Left Updated: 07/07/25 1155     PTT 29 seconds     Comprehensive metabolic panel [[id number]]  (Abnormal) Collected: 07/07/25 1122    Lab Status: Final result Specimen: Blood from Arm, Left Updated: 07/07/25 1153     Sodium 140 mmol/L      Potassium 4.1 mmol/L      Chloride 104 mmol/L      CO2 28 mmol/L      ANION GAP 8 mmol/L      BUN 9 mg/dL      Creatinine 0.76 mg/dL      Glucose 162 mg/dL      Calcium 9.1 mg/dL      AST 20 U/L      ALT 22 U/L      Alkaline Phosphatase 66 U/L      Total Protein 7.1 g/dL      Albumin 4.2 g/dL      Total Bilirubin 0.60 mg/dL      eGFR 89 ml/min/1.73sq m     Narrative:      National Kid[employer] Disease Foundation guidelines for Chronic Kidney Disease (CKD):     Stage 1 with normal or high GFR (GFR > 90 mL/min/1.73 square meters)    Stage 2 Mild CKD (GFR = 60-89 mL/min/1.73 square meters)    Stage 3A Moderate CKD (GFR = 45-59 mL/min/1.73 square meters)    Stage 3B Moderate CKD (GFR = 30-44 mL/min/1.73 square meters)    Stage 4 Severe CKD (GFR = 15-29 mL/min/1.73 square meters)    Stage 5 End Stage CKD (GFR <15 mL/min/1.73 square meters)  Note: GFR calculation is accurate only with a steady state creatinine    CBC and differential [[id number]]  (Abnormal) Collected: 07/07/25 1122    Lab Status: Final result Specimen: Blood from Arm, Left Updated: 07/07/25 1134     WBC 10.41 Thousand/uL      RBC 4.68 Million/uL      Hemoglobin 15.8 g/dL      Hematocrit 44.9 %      MCV 96 fL      MCH 33.8 pg      MCHC 35.2 g/dL      RDW 12.5 %      MPV 10.9 fL      Platelets 166 Thousands/uL      nRBC 0 /100 WBCs      Segmented % 80 %      Immature Grans % 0 %      Lymphocytes % 12 %      Monocytes % 6 %      Eosinophils Relative 2 %      Basophils Relative 0 %      Absolute Neutrophils 8.19 Thousands/µL      Absolute Immature Grans 0.04 Thousand/uL      Absolute Lymphocytes 1.27 Thousands/µL      Absolute Monocytes 0.63  Thousand/µL      Eosinophils Absolute 0.25 Thousand/µL      Basophils Absolute 0.03 Thousands/µL     Fingerstick Glucose (POCT) [[id number]]  (Abnormal) Collected: 07/07/25 1122    Lab Status: Final result Specimen: Blood Updated: 07/07/25 1123     POC Glucose 148 mg/dl             CT stroke alert brain   Final Interpretation by Raudel Chaudhari MD (07/07 1201)      Large acute intraparenchymal hemorrhage centered involving the posterior right basal ganglia, adjacent right thalamus, and posterior right insula, with a volume of approximately 23 mL. No midline shift.      Mild chronic white matter microangiopathic changes.      Findings were directly communicated with Hank Peraza  at approximately 11:52 a.m., on 7/7/2025. .      Workstation performed: [id number]         CTA stroke alert (head/neck)   Final Interpretation by Raudel Chaudhari MD (07/07 1200)   No large vessel occlusion, high-grade stenosis, or intracranial aneurysm identified on CT angiogram of the head.      No hemodynamically significant stenosis or dissection identified on CT angiogram of the neck.      Intraparenchymal hematoma centered in the right posterior basal ganglia/posterior limb of the right internal capsule and adjacent right thalamus, without active or extravasation of contrast or spot sign noted.         Findings were directly communicated with Hank Peraza  at approximately 11:57 a.m. on 7/7/2025. .      Workstation performed: [id number]         CT head without contrast    (Results Pending)       ECG 12 Lead Documentation Only    Date/Time: 7/7/2025 1:29 PM    Performed by: Lauren N Creyer, PA-C  Authorized by: Lauren N Creyer, PA-C    Indications / Diagnosis:  Stroke alert  ECG reviewed by me, the ED Provider: yes    Patient location:  ED  Previous ECG:     Previous ECG:  Unavailable    Comparison to cardiac monitor: Yes    Rate:     ECG rate:  72    ECG rate assessment: normal    Rhythm:     Rhythm: atrial fibrillation    Ectopy:     Ectopy: none     QRS:     QRS axis:  Left    QRS intervals:  Normal  Conduction:     Conduction: abnormal      Abnormal conduction: LAFB    ST segments:     ST segments:  Normal  T waves:     T waves: normal    Comments:      No STEMI.  QT//433      ED Medication and Procedure Management   Prior to Admission Medications   Prescriptions Last Dose Informant Patient Reported? Taking?   ASPIRIN LOW DOSE 81 MG EC tablet  Self No No   Sig: TAKE ONE TABLET BY MOUTH DAILY   acetaminophen (TYLENOL) 500 mg tablet  Self Yes No   Sig: Take 500 mg by mouth every 6 (six) hours as needed for mild pain   atorvastatin (LIPITOR) 40 mg tablet   No No   Sig: TAKE ONE TABLET BY MOUTH EVERY DAY WITH DINNER   diazepam (VALIUM) 5 mg tablet   No No   Sig: Take 1 tablet (5 mg total) by mouth every 8 (eight) hours as needed for anxiety or sleep   meclizine (ANTIVERT) 25 mg tablet   No No   Sig: Take 1 tablet (25 mg total) by mouth 3 (three) times a day as needed for dizziness   metFORMIN (GLUCOPHAGE-XR) 500 mg 24 hr tablet   No No   Sig: Take 1 tablet (500 mg total) by mouth 2 (two) times a day with meals   metoprolol succinate (TOPROL-XL) 25 mg 24 hr tablet   No No   Sig: TAKE ONE TABLET BY MOUTH EVERY DAY   ondansetron (ZOFRAN) 4 mg tablet   No No   Sig: Take 1 tablet (4 mg total) by mouth every 8 (eight) hours as needed for nausea or vomiting   rivaroxaban (Xarelto) 20 mg tablet   No No   Sig: TAKE 1 TABLET EVERY DAY WITH BREAKFAST   valsartan (DIOVAN) 160 mg tablet   No No   Sig: TAKE ONE TABLET BY MOUTH EVERY DAY      Facility-Administered Medications: None     Patient's Medications   Discharge Prescriptions    No medications on file     No discharge procedures on file.  ED SEPSIS DOCUMENTATION   Time reflects when diagnosis was documented in both MDM as applicable and the Disposition within this note       Time User Action Codes Description Comment    7/7/2025 11:18 AM Ashley Collins [R53.1] Acute left-sided weakness     7/7/2025 12:18  PM Creyer, Lauren Add [I61.9] Intraparenchymal hemorrhage of brain (HCC)                    [1]   Past Medical History:  Diagnosis Date    Anxiety     Depression     DVT (deep venous thrombosis) (HCC)     Gross hematuria     LA...3/29/16   R...4/3/17     Hematuria     LA.....16    R....4/3/17    Hyperlipidemia     Hypertension     Long-term (current) use of anticoagulants     LA...3/29/16   R...4/3/17     Pre-operative cardiovascular examination, supraventricular arrhythmia 07/15/2022    Seasonal allergies    [2]   Past Surgical History:  Procedure Laterality Date    APPENDECTOMY      WISDOM TOOTH EXTRACTION     [3]   Family History  Problem Relation Name Age of Onset    Hypertension Mother      Leukemia Father      Heart disease Father      Alcohol abuse Father      Depression Father      Completed Suicide  Father      Depression Sister      Suicide Attempts Brother      Alcohol abuse Brother      Depression Brother      Heart disease Family      Leukemia Family     [4]   Social History  Tobacco Use    Smoking status: Former     Current packs/day: 0.00     Average packs/day: 1 pack/day for 9.0 years (9.0 ttl pk-yrs)     Types: Cigarettes     Start date:      Quit date:      Years since quittin.5    Smokeless tobacco: Never   Vaping Use    Vaping status: Never Used   Substance Use Topics    Alcohol use: Not Currently     Comment: social    Drug use: Yes     Types: Marijuana     Comment: Via pipe X 50 years        Lauren N Creyer, PA-C  25

## 2025-07-07 NOTE — STROKE DOCUMENTATION
Pt taken to [facility] ICU @ this time via critical care transport. Vitals are stable at time of transport, cardene remains infusing @ 7.5mg/hr. No changes to Neuro assessment performed at 1900 prior to departing department.

## 2025-07-08 ENCOUNTER — APPOINTMENT (INPATIENT)
Dept: NON INVASIVE DIAGNOSTICS | Facility: HOSPITAL | Age: 75
DRG: 064 | End: 2025-07-08
Payer: COMMERCIAL

## 2025-07-08 ENCOUNTER — APPOINTMENT (INPATIENT)
Dept: RADIOLOGY | Facility: HOSPITAL | Age: 75
DRG: 064 | End: 2025-07-08
Payer: COMMERCIAL

## 2025-07-08 ENCOUNTER — APPOINTMENT (INPATIENT)
Dept: NON INVASIVE DIAGNOSTICS | Facility: HOSPITAL | Age: 75
DRG: 064 | End: 2025-07-08
Attending: PHYSICIAN ASSISTANT
Payer: COMMERCIAL

## 2025-07-08 PROBLEM — R56.9 SEIZURE-LIKE ACTIVITY (HCC): Status: ACTIVE | Noted: 2025-07-07

## 2025-07-08 PROBLEM — I61.9 ICH (INTRACEREBRAL HEMORRHAGE) (HCC): Status: ACTIVE | Noted: 2025-07-08

## 2025-07-08 LAB
2HR DELTA HS TROPONIN: -6 NG/L
4HR DELTA HS TROPONIN: -8 NG/L
ANION GAP SERPL CALCULATED.3IONS-SCNC: 8 MMOL/L (ref 4–13)
AORTIC ROOT: 3.4 CM
ASCENDING AORTA: 3.4 CM
ATRIAL RATE: 267 BPM
ATRIAL RATE: 357 BPM
BASOPHILS # BLD AUTO: 0.04 THOUSANDS/ÂΜL (ref 0–0.1)
BASOPHILS NFR BLD AUTO: 0 % (ref 0–1)
BSA FOR ECHO PROCEDURE: 2.16 M2
BUN SERPL-MCNC: 12 MG/DL (ref 5–25)
CALCIUM SERPL-MCNC: 8.7 MG/DL (ref 8.4–10.2)
CARDIAC TROPONIN I PNL SERPL HS: 25 NG/L (ref ?–50)
CARDIAC TROPONIN I PNL SERPL HS: 27 NG/L (ref ?–50)
CARDIAC TROPONIN I PNL SERPL HS: 33 NG/L (ref ?–50)
CHLORIDE SERPL-SCNC: 103 MMOL/L (ref 96–108)
CHOLEST SERPL-MCNC: 96 MG/DL (ref ?–200)
CO2 SERPL-SCNC: 28 MMOL/L (ref 21–32)
CREAT SERPL-MCNC: 0.78 MG/DL (ref 0.6–1.3)
DOP CALC LVOT AREA: 3.8 CM2
DOP CALC LVOT DIAMETER: 2.2 CM
E WAVE DECELERATION TIME: 203 MS
E/A RATIO: 2.67
EOSINOPHIL # BLD AUTO: 0.25 THOUSAND/ÂΜL (ref 0–0.61)
EOSINOPHIL NFR BLD AUTO: 3 % (ref 0–6)
ERYTHROCYTE [DISTWIDTH] IN BLOOD BY AUTOMATED COUNT: 13.1 % (ref 11.6–15.1)
EST. AVERAGE GLUCOSE BLD GHB EST-MCNC: 146 MG/DL
FRACTIONAL SHORTENING: 31 (ref 28–44)
GFR SERPL CREATININE-BSD FRML MDRD: 88 ML/MIN/1.73SQ M
GLUCOSE SERPL-MCNC: 173 MG/DL (ref 65–140)
HBA1C MFR BLD: 6.7 %
HCT VFR BLD AUTO: 43.1 % (ref 36.5–49.3)
HDLC SERPL-MCNC: 38 MG/DL
HGB BLD-MCNC: 14.9 G/DL (ref 12–17)
IMM GRANULOCYTES # BLD AUTO: 0.03 THOUSAND/UL (ref 0–0.2)
IMM GRANULOCYTES NFR BLD AUTO: 0 % (ref 0–2)
INTERVENTRICULAR SEPTUM IN DIASTOLE (PARASTERNAL SHORT AXIS VIEW): 1.4 CM
INTERVENTRICULAR SEPTUM: 1.4 CM (ref 0.6–1.1)
LAAS-AP2: 25.2 CM2
LAAS-AP4: 21.5 CM2
LDLC SERPL CALC-MCNC: 24 MG/DL (ref 0–100)
LEFT ATRIUM SIZE: 4.3 CM
LEFT ATRIUM VOLUME (MOD BIPLANE): 61 ML
LEFT ATRIUM VOLUME INDEX (MOD BIPLANE): 28.2 ML/M2
LEFT INTERNAL DIMENSION IN SYSTOLE: 3.6 CM (ref 2.1–4)
LEFT VENTRICULAR INTERNAL DIMENSION IN DIASTOLE: 5.2 CM (ref 3.5–6)
LEFT VENTRICULAR POSTERIOR WALL IN END DIASTOLE: 1.4 CM
LEFT VENTRICULAR STROKE VOLUME: 72 ML
LV EF US.2D.A4C+ESTIMATED: 44 %
LVSV (TEICH): 72 ML
LYMPHOCYTES # BLD AUTO: 2.77 THOUSANDS/ÂΜL (ref 0.6–4.47)
LYMPHOCYTES NFR BLD AUTO: 28 % (ref 14–44)
MAGNESIUM SERPL-MCNC: 1.6 MG/DL (ref 1.9–2.7)
MCH RBC QN AUTO: 33.3 PG (ref 26.8–34.3)
MCHC RBC AUTO-ENTMCNC: 34.6 G/DL (ref 31.4–37.4)
MCV RBC AUTO: 96 FL (ref 82–98)
MONOCYTES # BLD AUTO: 0.93 THOUSAND/ÂΜL (ref 0.17–1.22)
MONOCYTES NFR BLD AUTO: 10 % (ref 4–12)
MV E'TISSUE VEL-LAT: 7 CM/S
MV E'TISSUE VEL-SEP: 8 CM/S
MV PEAK A VEL: 0.27 M/S
MV PEAK E VEL: 72 CM/S
MV STENOSIS PRESSURE HALF TIME: 59 MS
MV VALVE AREA P 1/2 METHOD: 3.73
NEUTROPHILS # BLD AUTO: 5.73 THOUSANDS/ÂΜL (ref 1.85–7.62)
NEUTS SEG NFR BLD AUTO: 59 % (ref 43–75)
NRBC BLD AUTO-RTO: 0 /100 WBCS
PHOSPHATE SERPL-MCNC: 3.1 MG/DL (ref 2.3–4.1)
PLATELET # BLD AUTO: 161 THOUSANDS/UL (ref 149–390)
PMV BLD AUTO: 12.1 FL (ref 8.9–12.7)
POTASSIUM SERPL-SCNC: 4.4 MMOL/L (ref 3.5–5.3)
QRS AXIS: -40 DEGREES
QRS AXIS: -46 DEGREES
QRSD INTERVAL: 66 MS
QRSD INTERVAL: 68 MS
QT INTERVAL: 396 MS
QT INTERVAL: 538 MS
QTC INTERVAL: 433 MS
QTC INTERVAL: 438 MS
RA PRESSURE ESTIMATED: 15 MMHG
RBC # BLD AUTO: 4.47 MILLION/UL (ref 3.88–5.62)
RIGHT ATRIUM AREA SYSTOLE A4C: 28.9 CM2
RIGHT VENTRICLE ID DIMENSION: 4 CM
RV PSP: 32 MMHG
SINOTUBULAR JUNCTION: 3 CM
SL CV LEFT ATRIUM LENGTH A2C: 6.9 CM
SL CV LV EF: 50
SL CV PED ECHO LEFT VENTRICLE DIASTOLIC VOLUME (MOD BIPLANE) 2D: 128 ML
SL CV PED ECHO LEFT VENTRICLE SYSTOLIC VOLUME (MOD BIPLANE) 2D: 55 ML
SL CV SINUS OF VALSALVA 2D: 3 CM
SODIUM SERPL-SCNC: 139 MMOL/L (ref 135–147)
STJ: 3 CM
T WAVE AXIS: 62 DEGREES
T WAVE AXIS: 94 DEGREES
TR MAX PG: 17 MMHG
TR PEAK VELOCITY: 2.1 M/S
TRICUSPID ANNULAR PLANE SYSTOLIC EXCURSION: 1.7 CM
TRICUSPID VALVE PEAK REGURGITATION VELOCITY: 2.06 M/S
TRIGL SERPL-MCNC: 168 MG/DL (ref ?–150)
TSH SERPL DL<=0.05 MIU/L-ACNC: 1.72 UIU/ML (ref 0.45–4.5)
VENTRICULAR RATE: 40 BPM
VENTRICULAR RATE: 72 BPM
WBC # BLD AUTO: 9.75 THOUSAND/UL (ref 4.31–10.16)

## 2025-07-08 PROCEDURE — 84443 ASSAY THYROID STIM HORMONE: CPT

## 2025-07-08 PROCEDURE — 93306 TTE W/DOPPLER COMPLETE: CPT

## 2025-07-08 PROCEDURE — 99291 CRITICAL CARE FIRST HOUR: CPT | Performed by: EMERGENCY MEDICINE

## 2025-07-08 PROCEDURE — 93306 TTE W/DOPPLER COMPLETE: CPT | Performed by: INTERNAL MEDICINE

## 2025-07-08 PROCEDURE — 99223 1ST HOSP IP/OBS HIGH 75: CPT | Performed by: PHYSICIAN ASSISTANT

## 2025-07-08 PROCEDURE — 93970 EXTREMITY STUDY: CPT | Performed by: SURGERY

## 2025-07-08 PROCEDURE — 85025 COMPLETE CBC W/AUTO DIFF WBC: CPT | Performed by: PHYSICIAN ASSISTANT

## 2025-07-08 PROCEDURE — 84100 ASSAY OF PHOSPHORUS: CPT | Performed by: PHYSICIAN ASSISTANT

## 2025-07-08 PROCEDURE — 97167 OT EVAL HIGH COMPLEX 60 MIN: CPT

## 2025-07-08 PROCEDURE — 70450 CT HEAD/BRAIN W/O DYE: CPT

## 2025-07-08 PROCEDURE — NC001 PR NO CHARGE: Performed by: PHYSICIAN ASSISTANT

## 2025-07-08 PROCEDURE — 84484 ASSAY OF TROPONIN QUANT: CPT

## 2025-07-08 PROCEDURE — 83735 ASSAY OF MAGNESIUM: CPT | Performed by: PHYSICIAN ASSISTANT

## 2025-07-08 PROCEDURE — 97163 PT EVAL HIGH COMPLEX 45 MIN: CPT

## 2025-07-08 PROCEDURE — 80048 BASIC METABOLIC PNL TOTAL CA: CPT | Performed by: PHYSICIAN ASSISTANT

## 2025-07-08 PROCEDURE — 83036 HEMOGLOBIN GLYCOSYLATED A1C: CPT

## 2025-07-08 PROCEDURE — 93970 EXTREMITY STUDY: CPT

## 2025-07-08 PROCEDURE — 93005 ELECTROCARDIOGRAM TRACING: CPT

## 2025-07-08 PROCEDURE — 80061 LIPID PANEL: CPT

## 2025-07-08 PROCEDURE — 93010 ELECTROCARDIOGRAM REPORT: CPT | Performed by: INTERNAL MEDICINE

## 2025-07-08 PROCEDURE — 92610 EVALUATE SWALLOWING FUNCTION: CPT

## 2025-07-08 RX ORDER — LOSARTAN POTASSIUM 50 MG/1
100 TABLET ORAL DAILY
Status: DISCONTINUED | OUTPATIENT
Start: 2025-07-09 | End: 2025-07-25 | Stop reason: HOSPADM

## 2025-07-08 RX ORDER — CALCIUM GLUCONATE 20 MG/ML
2 INJECTION, SOLUTION INTRAVENOUS ONCE
Status: COMPLETED | OUTPATIENT
Start: 2025-07-08 | End: 2025-07-08

## 2025-07-08 RX ORDER — ATROPINE SULFATE 0.1 MG/ML
INJECTION INTRAVENOUS
Status: DISPENSED
Start: 2025-07-08 | End: 2025-07-08

## 2025-07-08 RX ORDER — HYDRALAZINE HYDROCHLORIDE 20 MG/ML
10 INJECTION INTRAMUSCULAR; INTRAVENOUS EVERY 6 HOURS PRN
Status: DISCONTINUED | OUTPATIENT
Start: 2025-07-08 | End: 2025-07-10

## 2025-07-08 RX ORDER — MAGNESIUM SULFATE HEPTAHYDRATE 40 MG/ML
4 INJECTION, SOLUTION INTRAVENOUS ONCE
Status: COMPLETED | OUTPATIENT
Start: 2025-07-08 | End: 2025-07-08

## 2025-07-08 RX ORDER — ATORVASTATIN CALCIUM 40 MG/1
40 TABLET, FILM COATED ORAL
Status: DISCONTINUED | OUTPATIENT
Start: 2025-07-08 | End: 2025-07-08

## 2025-07-08 RX ORDER — HYDRALAZINE HYDROCHLORIDE 20 MG/ML
5 INJECTION INTRAMUSCULAR; INTRAVENOUS EVERY 6 HOURS PRN
Status: DISCONTINUED | OUTPATIENT
Start: 2025-07-08 | End: 2025-07-08

## 2025-07-08 RX ORDER — ATORVASTATIN CALCIUM 20 MG/1
20 TABLET, FILM COATED ORAL
Status: DISCONTINUED | OUTPATIENT
Start: 2025-07-09 | End: 2025-07-25 | Stop reason: HOSPADM

## 2025-07-08 RX ORDER — HYDRALAZINE HYDROCHLORIDE 20 MG/ML
5 INJECTION INTRAMUSCULAR; INTRAVENOUS ONCE
Status: COMPLETED | OUTPATIENT
Start: 2025-07-08 | End: 2025-07-08

## 2025-07-08 RX ORDER — HYDRALAZINE HYDROCHLORIDE 20 MG/ML
10 INJECTION INTRAMUSCULAR; INTRAVENOUS ONCE
Status: COMPLETED | OUTPATIENT
Start: 2025-07-08 | End: 2025-07-08

## 2025-07-08 RX ORDER — ONDANSETRON 2 MG/ML
4 INJECTION INTRAMUSCULAR; INTRAVENOUS EVERY 6 HOURS PRN
Status: DISCONTINUED | OUTPATIENT
Start: 2025-07-08 | End: 2025-07-25 | Stop reason: HOSPADM

## 2025-07-08 RX ADMIN — DEXMEDETOMIDINE HYDROCHLORIDE 0.7 MCG/KG/HR: 400 INJECTION INTRAVENOUS at 00:45

## 2025-07-08 RX ADMIN — CHLORHEXIDINE GLUCONATE 15 ML: 1.2 SOLUTION ORAL at 08:00

## 2025-07-08 RX ADMIN — Medication 6 MG: at 20:07

## 2025-07-08 RX ADMIN — CHLORHEXIDINE GLUCONATE 15 ML: 1.2 SOLUTION ORAL at 20:07

## 2025-07-08 RX ADMIN — HYDRALAZINE HYDROCHLORIDE 10 MG: 20 INJECTION INTRAMUSCULAR; INTRAVENOUS at 21:34

## 2025-07-08 RX ADMIN — HYDRALAZINE HYDROCHLORIDE 10 MG: 20 INJECTION INTRAMUSCULAR; INTRAVENOUS at 23:30

## 2025-07-08 RX ADMIN — ONDANSETRON 4 MG: 2 INJECTION, SOLUTION INTRAMUSCULAR; INTRAVENOUS at 23:18

## 2025-07-08 RX ADMIN — SODIUM CHLORIDE, SODIUM LACTATE, POTASSIUM CHLORIDE, AND CALCIUM CHLORIDE 75 ML/HR: .6; .31; .03; .02 INJECTION, SOLUTION INTRAVENOUS at 00:17

## 2025-07-08 RX ADMIN — HYDRALAZINE HYDROCHLORIDE 5 MG: 20 INJECTION INTRAMUSCULAR; INTRAVENOUS at 19:02

## 2025-07-08 RX ADMIN — SENNOSIDES AND DOCUSATE SODIUM 2 TABLET: 50; 8.6 TABLET ORAL at 08:00

## 2025-07-08 RX ADMIN — DEXMEDETOMIDINE HYDROCHLORIDE 0.7 MCG/KG/HR: 400 INJECTION INTRAVENOUS at 01:56

## 2025-07-08 RX ADMIN — MAGNESIUM SULFATE HEPTAHYDRATE 4 G: 40 INJECTION, SOLUTION INTRAVENOUS at 07:49

## 2025-07-08 RX ADMIN — LEVETIRACETAM 500 MG: 100 INJECTION, SOLUTION INTRAVENOUS at 08:00

## 2025-07-08 RX ADMIN — CALCIUM GLUCONATE 2 G: 20 INJECTION, SOLUTION INTRAVENOUS at 07:49

## 2025-07-08 RX ADMIN — HYDRALAZINE HYDROCHLORIDE 5 MG: 20 INJECTION INTRAMUSCULAR; INTRAVENOUS at 20:15

## 2025-07-08 RX ADMIN — ATORVASTATIN CALCIUM 40 MG: 40 TABLET, FILM COATED ORAL at 16:32

## 2025-07-08 RX ADMIN — SODIUM CHLORIDE, SODIUM LACTATE, POTASSIUM CHLORIDE, AND CALCIUM CHLORIDE 75 ML/HR: .6; .31; .03; .02 INJECTION, SOLUTION INTRAVENOUS at 13:31

## 2025-07-08 NOTE — PROGRESS NOTES
Progress Note - Critical Care/ICU   Name: Shawn Marquez 75 y.o. male I MRN: [id number]  Unit/Bed#: ICU 01 I Date of Admission: 7/7/2025   Date of Service: 7/8/2025 I Hospital Day: 1      Assessment & Plan   There are no hospital problems to display for this patient.      Neuro:   Diagnosis: R posterior basal ganglia IPH, vascular dementia, paranoid behavior, anxiety   CTH from 7/7:  Large acute intraparenchymal hemorrhage centered involving the posterior right basal ganglia, adjacent right thalamus, and posterior right insula, with a volume of approximately 23 mL. No midline shift.   CTA head 7/7:  No large vessel occlusion, high-grade stenosis, or intracranial aneurysm identified on CT angiogram of the head  No hemodynamically significant stenosis or dissection identified on CT angiogram of the neck.   Intraparenchymal hematoma centered in the right posterior basal ganglia/posterior limb of the right internal capsule and adjacent right thalamus, without active or extravasation of contrast or spot sign noted.  S/p Kcentra and DDVAP at [facility]  Etiology likely hypertensive  -180 on arrival to ED  Potential seizure like activity at [facility]  Plan:   STAT CTH for 2 point or greater decline in GCS or new focal neurologic deficit not previously identified on physical exam.   Q1hr neuro checks  F/u repeat CTH from   F/u AM CTH  NSG consultation  EVD watch  Keppra 500mg BID for seizure prophylaxis   Hold home valium, monitor for evidence of delirium or impulsiveness given hx of vascular dementia  Started on low dose precedex overnight for impulsiveness/agitation - no change in overall neuro exam  Strict BP control as below     CV:   Diagnosis: hx HTN, HLD, Afib (Xarelto)  Plan:   SBP goal <140mmHg  Continue Cardene gtt  Holding home meds in setting of NPO - restart once cleared by speech  Valsartan, Metoprolol, Atorvastatin  Hold AC for afib  Routine echo      Pulm:  No active issues     GI:   No active issues      :   No active issues     F/E/N:   F: LR @ 75cc/hr for hydration  E: K >4, Mag > 2  N: NPO until cleared by speech     Heme/Onc:   No active issues  - Hold VTE/AC in setting of H     Endo:   Diagnosis: T2DM  Plan:   Hold home glycemic control medications  ISS  BG goal 140 - 180     ID:   No active issues     MSK/Skin:   No active issues  Disposition: Critical care    ICU Core Measures     A: Assess, Prevent, and Manage Pain Has pain been assessed? Yes  Need for changes to pain regimen? No   B: Both SAT/SAT  N/A   C: Choice of Sedation RASS Goal: 0 Alert and Calm  Need for changes to sedation or analgesia regimen? No   D: Delirium CAM-ICU: Positive   E: Early Mobility  Plan for early mobility? No   F: Family Engagement Plan for family engagement today? Yes       Review of Invasive Devices:    Malini Plan: Continue for accurate I/O monitoring for 48 hours        Prophylaxis:  VTE VTE covered by:    None       Stress Ulcer  not ordered         24 Hour Events : Restless/agitated overnight with some hallucinations. Started on low dose precedex. Otherwise no acute events. No complaints this morning.     Subjective   Review of Systems: See HPI for Review of Systems    Objective :                   Vitals I/O      Most Recent Min/Max in 24hrs   Temp 98.1 °F (36.7 °C) Temp  Min: 98.1 °F (36.7 °C)  Max: 98.8 °F (37.1 °C)   Pulse 58 Pulse  Min: 57  Max: 93   Resp 20 Resp  Min: 13  Max: 22   /62 BP  Min: 99/63  Max: 188/87   O2 Sat 92 % SpO2  Min: 92 %  Max: 98 %      Intake/Output Summary (Last 24 hours) at 7/8/2025 0321  Last data filed at 7/8/2025 0200  Gross per 24 hour   Intake 247.36 ml   Output 450 ml   Net -202.64 ml       Diet NPO; Sips with meds    Invasive Monitoring           Physical Exam   Physical Exam  Vitals and nursing note reviewed.   Skin:     General: Skin is warm and dry.   Neck:      Vascular: No JVD.   Cardiovascular:      Rate and Rhythm: Normal rate and regular rhythm.      Pulses: Normal  pulses.      Heart sounds: Normal heart sounds.   Musculoskeletal:      Right lower leg: No edema.      Left lower leg: No edema.   Abdominal:      Palpations: Abdomen is soft.      Tenderness: There is no abdominal tenderness. There is no guarding.   Constitutional:       General: He is not in acute distress.     Appearance: He is well-developed and well-nourished. He is not ill-appearing.   Pulmonary:      Effort: Pulmonary effort is normal. No respiratory distress.      Breath sounds: Normal breath sounds.   Neurological:      Mental Status: He is alert and oriented to person, place and time. He is calm.      Comments: Somnolent but awakens to voice/noxious stimuli  Follows commands easily with RUE/RLE  Has certain degree of L neglect  - LUE roughly 1/5 - has some twitching to noxious nail bed pressure and does attempt to lift LUE with proximal muscles  - LLE briskly w/d to noxious nail bed stimuli  - Will intermittently spontaneously move L foot    CN exam:  - L facial droop noted  - R tongue deviation   - Absent sensation on L side of face, was not aware I was touching L cheek or temple region  - EOM's - able to cross midline but once he moves to far to the L, he will spontaneously shift back to center w/downward gaze.    Genitourinary/Anorectal:  Nayak present.        Diagnostic Studies        Lab Results: I have reviewed the following results:     Medications:  Scheduled PRN   chlorhexidine, 15 mL, Q12H DEIDRE  levETIRAcetam, 500 mg, Q12H DEIDRE  senna-docusate sodium, 2 tablet, BID      bisacodyl, 10 mg, Daily PRN       Continuous    dexmedetomidine, 0.1-0.7 mcg/kg/hr, Last Rate: 0.5 mcg/kg/hr (07/08/25 0220)  lactated ringers, 75 mL/hr, Last Rate: 75 mL/hr (07/08/25 0017)  niCARdipine, 1-15 mg/hr, Last Rate: Stopped (07/07/25 2215)         Labs:   CBC    Recent Labs     07/07/25  1122   WBC 10.41*   HGB 15.8   HCT 44.9        BMP    Recent Labs     07/07/25  1122   SODIUM 140   K 4.1      CO2 28    AGAP 8   BUN 9   CREATININE 0.76   CALCIUM 9.1       Coags    Recent Labs     07/07/25  1122   INR 1.39*   PTT 29        Additional Electrolytes  No recent results       Blood Gas    No recent results  No recent results LFTs  Recent Labs     07/07/25  1122   ALT 22   AST 20   ALKPHOS 66   ALB 4.2   TBILI 0.60       Infectious  No recent results  Glucose  Recent Labs     07/07/25  1122   GLUC 162*

## 2025-07-08 NOTE — PLAN OF CARE
Problem: Neurological Deficit  Goal: Neurological status is stable or improving  Description: Interventions:  - Monitor and assess patient's level of consciousness, motor function, sensory function, and level of assistance needed for ADLs.   - Monitor and report changes from baseline. Collaborate with interdisciplinary team to initiate plan and implement interventions as ordered.   - Provide and maintain a safe environment.  - Consider seizure precautions.  - Consider fall precautions.  - Consider aspiration precautions.  - Consider bleeding precautions.  Outcome: Progressing     Problem: Activity Intolerance/Impaired Mobility  Goal: Mobility/activity is maintained at optimum level for patient  Description: Interventions:  - Assess and monitor patient  barriers to mobility and need for assistive/adaptive devices.  - Assess patient's emotional response to limitations.  - Collaborate with interdisciplinary team and initiate plans and interventions as ordered.  - Encourage independent activity per ability.  - Maintain proper body alignment.  - Perform active/passive rom as tolerated/ordered.  - Plan activities to conserve energy.  - Turn patient as appropriate  Outcome: Progressing     Problem: Communication Impairment  Goal: Ability to express needs and understand communication  Description: Assess patient's communication skills and ability to understand information.  Patient will demonstrate use of effective communication techniques, alternative methods of communication and understanding even if not able to speak.     - Encourage communication and provide alternate methods of communication as needed.  - Collaborate with case management/ for discharge needs.  - Include patient/family/caregiver in decisions related to communication.  Outcome: Progressing     Problem: Potential for Aspiration  Goal: Non-ventilated patient's risk of aspiration is minimized  Description: Assess and monitor vital signs,  respiratory status, and labs (WBC).  Monitor for signs of aspiration (tachypnea, cough, rales, wheezing, cyanosis, fever).    - Assess and monitor patient's ability to swallow.  - Place patient up in chair to eat if possible.  - HOB up at 90 degrees to eat if unable to get patient up into chair.  - Supervise patient during oral intake.   - Instruct patient/ family to take small bites.  - Instruct patient/ family to take small single sips when taking liquids.  - Follow patient-specific strategies generated by speech pathologist.  Outcome: Progressing  Goal: Ventilated patient's risk of aspiration is minimized  Description: Assess and monitor vital signs, respiratory status, airway cuff pressure, and labs (WBC).  Monitor for signs of aspiration (tachypnea, cough, rales, wheezing, cyanosis, fever).    - Elevate head of bed 30 degrees if patient has tube feeding.  - Monitor tube feeding.  Outcome: Progressing     Problem: Neurological Deficit  Goal: Neurological status is stable or improving  Description: Interventions:  - Monitor and assess patient's level of consciousness, motor function, sensory function, and level of assistance needed for ADLs.   - Monitor and report changes from baseline. Collaborate with interdisciplinary team to initiate plan and implement interventions as ordered.   - Provide and maintain a safe environment.  - Consider seizure precautions.  - Consider fall precautions.  - Consider aspiration precautions.  - Consider bleeding precautions.  Outcome: Progressing     Problem: Activity Intolerance/Impaired Mobility  Goal: Mobility/activity is maintained at optimum level for patient  Description: Interventions:  - Assess and monitor patient  barriers to mobility and need for assistive/adaptive devices.  - Assess patient's emotional response to limitations.  - Collaborate with interdisciplinary team and initiate plans and interventions as ordered.  - Encourage independent activity per ability.  -  Maintain proper body alignment.  - Perform active/passive rom as tolerated/ordered.  - Plan activities to conserve energy.  - Turn patient as appropriate  Outcome: Progressing     Problem: Communication Impairment  Goal: Ability to express needs and understand communication  Description: Assess patient's communication skills and ability to understand information.  Patient will demonstrate use of effective communication techniques, alternative methods of communication and understanding even if not able to speak.     - Encourage communication and provide alternate methods of communication as needed.  - Collaborate with case management/ for discharge needs.  - Include patient/family/caregiver in decisions related to communication.  Outcome: Progressing     Problem: Potential for Aspiration  Goal: Non-ventilated patient's risk of aspiration is minimized  Description: Assess and monitor vital signs, respiratory status, and labs (WBC).  Monitor for signs of aspiration (tachypnea, cough, rales, wheezing, cyanosis, fever).    - Assess and monitor patient's ability to swallow.  - Place patient up in chair to eat if possible.  - HOB up at 90 degrees to eat if unable to get patient up into chair.  - Supervise patient during oral intake.   - Instruct patient/ family to take small bites.  - Instruct patient/ family to take small single sips when taking liquids.  - Follow patient-specific strategies generated by speech pathologist.  Outcome: Progressing  Goal: Ventilated patient's risk of aspiration is minimized  Description: Assess and monitor vital signs, respiratory status, airway cuff pressure, and labs (WBC).  Monitor for signs of aspiration (tachypnea, cough, rales, wheezing, cyanosis, fever).    - Elevate head of bed 30 degrees if patient has tube feeding.  - Monitor tube feeding.  Outcome: Progressing     Problem: Nutrition  Goal: Nutrition/Hydration status is improving  Description: Monitor and assess  patient's nutrition/hydration status for malnutrition (ex- brittle hair, bruises, dry skin, pale skin and conjunctiva, muscle wasting, smooth red tongue, and disorientation). Collaborate with interdisciplinary team and initiate plan and interventions as ordered.  Monitor patient's weight and dietary intake as ordered or per policy. Utilize nutrition screening tool and intervene per policy. Determine patient's food preferences and provide high-protein, high-caloric foods as appropriate.     - Assist patient with eating.  - Allow adequate time for meals.  - Encourage patient to take dietary supplement as ordered.  - Collaborate with clinical nutritionist.  - Include patient/family/caregiver in decisions related to nutrition.  Outcome: Progressing     Problem: PAIN - ADULT  Goal: Verbalizes/displays adequate comfort level or baseline comfort level  Description: Interventions:  - Encourage patient to monitor pain and request assistance  - Assess pain using appropriate pain scale  - Administer analgesics as ordered based on type and severity of pain and evaluate response  - Implement non-pharmacological measures as appropriate and evaluate response  - Consider cultural and social influences on pain and pain management  - Notify physician/advanced practitioner if interventions unsuccessful or patient reports new pain  - Educate patient/family on pain management process including their role and importance of  reporting pain   - Provide non-pharmacologic/complimentary pain relief interventions  Outcome: Progressing     Problem: SAFETY ADULT  Goal: Patient will remain free of falls  Description: INTERVENTIONS:  - Educate patient/family on patient safety including physical limitations  - Instruct patient to call for assistance with activity   - Consider consulting OT/PT to assist with strengthening/mobility based on AM PAC & JH-HLM score  - Consult OT/PT to assist with strengthening/mobility   - Keep Call bell within reach  -  Keep bed low and locked with side rails adjusted as appropriate  - Keep care items and personal belongings within reach  - Initiate and maintain comfort rounds  - Make Fall Risk Sign visible to staff  - Offer Toileting every  Hours, in advance of need  - Initiate/Maintain alarm  - Obtain necessary fall risk management equipment:   - Apply yellow socks and bracelet for high fall risk patients  - Consider moving patient to room near nurses station  Outcome: Progressing  Goal: Maintain or return to baseline ADL function  Description: INTERVENTIONS:  -  Assess patient's ability to carry out ADLs; assess patient's baseline for ADL function and identify physical deficits which impact ability to perform ADLs (bathing, care of mouth/teeth, toileting, grooming, dressing, etc.)  - Assess/evaluate cause of self-care deficits   - Assess range of motion  - Assess patient's mobility; develop plan if impaired  - Assess patient's need for assistive devices and provide as appropriate  - Encourage maximum independence but intervene and supervise when necessary  - Involve family in performance of ADLs  - Assess for home care needs following discharge   - Consider OT consult to assist with ADL evaluation and planning for discharge  - Provide patient education as appropriate  - Monitor functional capacity and physical performance, use of AM PAC & -HLM   - Monitor gait, balance and fatigue with ambulation    Outcome: Progressing  Goal: Maintains/Returns to pre admission functional level  Description: INTERVENTIONS:  - Perform AM-PAC 6 Click Basic Mobility/ Daily Activity assessment daily.  - Set and communicate daily mobility goal to care team and patient/family/caregiver.   - Collaborate with rehabilitation services on mobility goals if consulted  - Perform Range of Motion  times a day.  - Reposition patient every  hours.  - Dangle patient  times a day  - Stand patient  times a day  - Ambulate patient  times a day  - Out of bed to  chair  times a day   - Out of bed for meals times a day  - Out of bed for toileting  - Record patient progress and toleration of activity level   Outcome: Progressing

## 2025-07-08 NOTE — UTILIZATION REVIEW
NOTIFICATION OF INPATIENT ADMISSION   AUTHORIZATION REQUEST   SERVICING FACILITY:   [facility]  Address: [address] [place] [address]  Tax ID: [id number]  NPI: [id number] ATTENDING PROVIDER:  Attending Name and NPI#: Nat Trivedi Do [[id number]]  Address: [address] [place] [address]  Phone: [phone]   ADMISSION INFORMATION:  Place of Service: Inpatient [facility]  Place of Service Code: 21  Inpatient Admission Date/Time: 7/7/25  7:55 PM  Discharge Date/Time: No discharge date for patient encounter.  Admitting Diagnosis Code/Description:  Acute left-sided weakness [R53.1]     UTILIZATION REVIEW CONTACT:  Walter Villalta Utilization   Network Utilization Review Department  Phone: [phone]  Fax: [phone]  Email: [email or web address]  Contact for approvals/pending authorizations, clinical reviews, and discharge.     PHYSICIAN ADVISORY SERVICES:  Medical Necessity Denial & Iyea-li-Cpqd Review  Phone: [phone]  Fax: [phone]  Email: [email or web address]     DISCHARGE SUPPORT TEAM:  For Patients Discharge Needs & Updates  Phone: [phone] opt. 2 Fax: [phone]  Email: [email or web address]

## 2025-07-08 NOTE — PLAN OF CARE
Problem: Neurological Deficit  Goal: Neurological status is stable or improving  Description: Interventions:  - Monitor and assess patient's level of consciousness, motor function, sensory function, and level of assistance needed for ADLs.   - Monitor and report changes from baseline. Collaborate with interdisciplinary team to initiate plan and implement interventions as ordered.   - Provide and maintain a safe environment.  - Consider seizure precautions.  - Consider fall precautions.  - Consider aspiration precautions.  - Consider bleeding precautions.  Outcome: Progressing     Problem: Activity Intolerance/Impaired Mobility  Goal: Mobility/activity is maintained at optimum level for patient  Description: Interventions:  - Assess and monitor patient  barriers to mobility and need for assistive/adaptive devices.  - Assess patient's emotional response to limitations.  - Collaborate with interdisciplinary team and initiate plans and interventions as ordered.  - Encourage independent activity per ability.  - Maintain proper body alignment.  - Perform active/passive rom as tolerated/ordered.  - Plan activities to conserve energy.  - Turn patient as appropriate  Outcome: Progressing     Problem: Communication Impairment  Goal: Ability to express needs and understand communication  Description: Assess patient's communication skills and ability to understand information.  Patient will demonstrate use of effective communication techniques, alternative methods of communication and understanding even if not able to speak.     - Encourage communication and provide alternate methods of communication as needed.  - Collaborate with case management/ for discharge needs.  - Include patient/family/caregiver in decisions related to communication.  Outcome: Progressing     Problem: Potential for Aspiration  Goal: Non-ventilated patient's risk of aspiration is minimized  Description: Assess and monitor vital signs,  respiratory status, and labs (WBC).  Monitor for signs of aspiration (tachypnea, cough, rales, wheezing, cyanosis, fever).    - Assess and monitor patient's ability to swallow.  - Place patient up in chair to eat if possible.  - HOB up at 90 degrees to eat if unable to get patient up into chair.  - Supervise patient during oral intake.   - Instruct patient/ family to take small bites.  - Instruct patient/ family to take small single sips when taking liquids.  - Follow patient-specific strategies generated by speech pathologist.  Outcome: Progressing  Goal: Ventilated patient's risk of aspiration is minimized  Description: Assess and monitor vital signs, respiratory status, airway cuff pressure, and labs (WBC).  Monitor for signs of aspiration (tachypnea, cough, rales, wheezing, cyanosis, fever).    - Elevate head of bed 30 degrees if patient has tube feeding.  - Monitor tube feeding.  Outcome: Progressing     Problem: Nutrition  Goal: Nutrition/Hydration status is improving  Description: Monitor and assess patient's nutrition/hydration status for malnutrition (ex- brittle hair, bruises, dry skin, pale skin and conjunctiva, muscle wasting, smooth red tongue, and disorientation). Collaborate with interdisciplinary team and initiate plan and interventions as ordered.  Monitor patient's weight and dietary intake as ordered or per policy. Utilize nutrition screening tool and intervene per policy. Determine patient's food preferences and provide high-protein, high-caloric foods as appropriate.     - Assist patient with eating.  - Allow adequate time for meals.  - Encourage patient to take dietary supplement as ordered.  - Collaborate with clinical nutritionist.  - Include patient/family/caregiver in decisions related to nutrition.  Outcome: Progressing     Problem: PAIN - ADULT  Goal: Verbalizes/displays adequate comfort level or baseline comfort level  Description: Interventions:  - Encourage patient to monitor pain and  request assistance  - Assess pain using appropriate pain scale  - Administer analgesics as ordered based on type and severity of pain and evaluate response  - Implement non-pharmacological measures as appropriate and evaluate response  - Consider cultural and social influences on pain and pain management  - Notify physician/advanced practitioner if interventions unsuccessful or patient reports new pain  - Educate patient/family on pain management process including their role and importance of  reporting pain   - Provide non-pharmacologic/complimentary pain relief interventions  Outcome: Progressing     Problem: INFECTION - ADULT  Goal: Absence or prevention of progression during hospitalization  Description: INTERVENTIONS:  - Assess and monitor for signs and symptoms of infection  - Monitor lab/diagnostic results  - Monitor all insertion sites, i.e. indwelling lines, tubes, and drains  - Monitor endotracheal if appropriate and nasal secretions for changes in amount and color  - [place] appropriate cooling/warming therapies per order  - Administer medications as ordered  - Instruct and encourage patient and family to use good hand hygiene technique  - Identify and instruct in appropriate isolation precautions for identified infection/condition  Outcome: Progressing  Goal: Absence of fever/infection during neutropenic period  Description: INTERVENTIONS:  - Monitor WBC  - Perform strict hand hygiene  - Limit to healthy visitors only  - No plants, dried, fresh or silk flowers with pollock in patient room  Outcome: Progressing

## 2025-07-08 NOTE — PHYSICAL THERAPY NOTE
PHYSICAL THERAPY EVALUATION  NAME:  Shawn Marquez  DATE: 07/08/25    AGE:   75 y.o.  Mrn:   [id number]  ADMIT DX:  Acute left-sided weakness [R53.1]    Past Medical History[1]  Past Surgical History[2]    Length Of Stay: 1  PHYSICAL THERAPY EVALUATION :    07/08/25 1133   PT Last Visit   PT Visit Date 07/08/25   Note Type   Note type Evaluation   Pain Assessment   Pain Assessment Tool 0-10   Pain Score No Pain   Restrictions/Precautions   Weight Bearing Precautions Per Order No   Braces or Orthoses   (none)   Other Precautions Cognitive;Chair Alarm;Bed Alarm;Impulsive;Multiple lines;Telemetry;Fall Risk  (L> R weakness/ ataxia w/ knee buckling)   Home Living   Type of Home Mobile home   Home Layout One level;Stairs to enter with rails  (1 HR w/ 4 JAIMIE)   Bathroom Shower/Tub Tub/shower unit   Bathroom Toilet Standard   Bathroom Equipment Shower chair;Grab bars in shower   Home Equipment   (denies)   Prior Function   Level of [place] Independent with ADLs;Independent with functional mobility;Needs assistance with IADLS   Lives With Spouse   Receives Help From Family   IADLs Independent with medication management;Family/Friend/Other provides transportation;Family/Friend/Other provides meals   Falls in the last 6 months 1 to 4   Vocational Retired   Comments At baseline, pt lives w/ spouse marlon  mobile home w/ 4 JAIMIE; pt is Indep w/ all home and community distances mobility w/o AD +indep w/  all ADLs and spouse mostly assists w/ IADLs 0 drive.   General   Family/Caregiver Present Yes  (spouse present for portion of session - provided/ confirmed info on home setup)   Cognition   Overall Cognitive Status Impaired   Arousal/Participation Cooperative   Orientation Level Oriented to person;Oriented to place;Oriented to time   Memory Decreased recall of precautions;Decreased recall of recent events   Following Commands Follows one step commands with increased time or repetition   RUE Assessment   RUE Assessment X  "  LUE Assessment   LUE Assessment X   RLE Assessment   RLE Assessment X  (~4/5 throughout- difficulty consistantly following commands for MMT)   LLE Assessment   LLE Assessment X  (noted inc weakness throughout LLE compared to R- pt w/ diffiuclty following commands for MMT- active motion noted at hip knee and ankle ~2+/5)   Coordination   Movements are Fluid and Coordinated 0   Coordination and Movement Description severe ataxia w/ poor sitting balance   Sensation WFL   Finger to Nose & Finger to Finger  Impaired   Heel to [place] Impaired   Light Touch   RLE Light Touch Grossly intact   LLE Light Touch Grossly intact   Bed Mobility   Supine to Sit 2  Maximal assistance   Additional items Assist x 2   Additional Comments pt requried mod> maxA to maintain sitting balance EOB w/ VC TC + assist to maintain midline   Transfers   Sit to Stand 2  Maximal assistance   Additional items Assist x 2   Stand to Sit 2  Maximal assistance   Additional items Assist x 2   Stand pivot 2  Maximal assistance  (B/L knee block)   Additional items Assist x 2   Ambulation/Elevation   Gait pattern R Knee Bess;L Knee Bess;Improper Weight shift;Forward Flexion;Decreased foot clearance   Gait Assistance 2  Maximal assist   Additional items Assist x 2   Assistive Device None   Distance 2' w/ max A x2; manual weight shift and assist for advancement of B/L LE's + knee block- severe ataxia noted   Balance   Static Sitting Poor   Dynamic Sitting Poor   Static Standing Poor -   Dynamic Standing Poor -   Ambulatory Zero   Endurance Deficit   Endurance Deficit Yes   Activity Tolerance   Activity Tolerance Patient limited by fatigue   Medical Staff Made Aware OT RN and CM for d/.c planning   Nurse Made Aware yes   Assessment  Pt is 75 y.o. male seen for PT evaluation s/p admit to [facility] on 7/7/[facility]25   s/p falling off of his couch when standing up. The patient states that his legs \"felt weird\" and when he tried to stand up, he fell " "forward. Was found by his wife who called EMS.  CT head showed a \"large acute intraparenchymal hemorrhage centered involving the posterior right basal ganglia,adjacent right thalamus, and posterior right insula, with a volume of approximately 23 mL. No midline shift.  Dx including  R posterior basal ganglia IPH. Pt   has a past medical history of Anxiety, Depression, DVT (deep venous thrombosis) (HCC), Gross hematuria, Hematuria, Hyperlipidemia, Hypertension, Long-term (current) use of anticoagulants, Pre-operative cardiovascular examination, supraventricular arrhythmia (07/15/2022), and Seasonal allergies.  Personal factors affecting pt at time of IE include: steps to enter environment, advanced age, past experience, inability to perform IADLs, inability to perform ADLs, inability to ambulate household distances, limited insight into impairments; impaired cognition; and recent fall(s).  Due to acute medical issues, ongoing medical workup for primary dx; pain, fall risk, increased reliance on more restrictive AD compared to baseline;  decreased activity tolerance compared to baseline, increased assistance needed from caregiver at current time, continuous monitoring, trending labs;  multiple lines, decline in overall functional mobility status; health management issues; note unstable clinical picture (high complexity).  At baseline, pt lives w/ spouse marlon  mobile home w/ 4 JAIMIE; pt is Indep w/ all home and community distances mobility w/o AD +indep w/  all ADLs and spouse mostly assists w/ IADLs 0 drive. Currently,  pt  is requiring maxA x2 A for bed skills; maxA x2 for functional transfers and maxAx2 for ambulation 2-3 steps to chair w/ buckling and ataxia.   Pt presents functioning below baseline and currently w/ overall mobility deficits 2* to: L>R decreased LE strength/AROM; limited flexibility;  generalized weakness/ deconditioning; decreased endurance; decreased activity tolerance; decreased  fine and gross " motor coordination; impaired  sitting / standing balance; gait deviations;  knee buckling; ataxia; decreased safety awareness;  fatigue; impaired safety and judgement; distractibility; limited insight into current deficits; bed/ chair alarms; multiple lines;  Pt currently at risk for falls.  (Please find additional objective findings from PT assessment regarding body systems outlined above.) Pt will continue to benefit from skilled PT interventions to address stated impairments; to maximize functional potential; for ongoing pt/ family training; and DME needs.  PT is recommending PT Resource Intensity Level  1 on d/c.      PT will follow for STG as outlined on eval. Pt/ family agreeable to PT POC and goals as stated.   Prognosis Fair   Problem List Decreased strength;Decreased range of motion;Decreased endurance;Impaired balance;Decreased mobility;Decreased coordination;Decreased cognition;Impaired judgement;Decreased safety awareness;Impaired tone;Obesity   Barriers to Discharge Inaccessible home environment   Goals   Patient Goals get better   STG Expiration Date 07/22/25   Short Term Goal #1 In 14 days pt will:  Complete bed mobility skills with minAx1 to facilitate safe return to previous living environment and decrease burden on caregivers.  Perform all functional transfers with minAx1  to facilitate safe return to previous living environment.    Ambulate  with least restrictive AD 50'x2' w/ John without LOB and stable vitals for safe ambulation in home/ community environment.   Complete stair training up/ down 4 step/s w/  John for safe access to previous living environment and to increase community access.    Improve balance by 1 grade in order to decrease fall risk.    Improve LLE strength grades by 1/2 to increase independence w/ all functional mobility, transfers and gait.  Actively participate w/ PT for ongoing pt and family education; DME needs and D/C planning to promote highest level of function in  least restrictive environment.   PT Treatment Day 0   Plan   Treatment/Interventions ADL retraining;Functional transfer training;LE strengthening/ROM;Elevations;Therapeutic exercise;Endurance training;Cognitive reorientation;Equipment eval/education;Patient/family training;Bed mobility;Gait training;Compensatory technique education;Spoke to nursing;Spoke to advanced practitioner;OT   PT Frequency 3-5x/wk   Discharge Recommendation   Rehab Resource Intensity Level, PT I (Maximum Resource Intensity)   Equipment Recommended   (TBD)   AM-PAC Basic Mobility Inpatient   Turning in Flat Bed Without Bedrails 2   Lying on Back to Sitting on Edge of Flat Bed Without Bedrails 2   Moving Bed to Chair 1   Standing Up From Chair Using Arms 1   Walk in Room 1   Climb 3-5 Stairs With Railing 1   Basic Mobility Inpatient Raw Score 8   Turning Head Towards Sound 4   Follow Simple Instructions 3   Low Function Basic Mobility Raw Score  15   Low Function Basic Mobility Standardized Score  23.9   [facility] Highest Level Of Mobility   -[facility] Goal 3: Sit at edge of bed   -[facility] Achieved 4: Move to chair/commode   Barthel Index   Feeding 5   Bathing 0   Grooming Score 0   Dressing Score 0   Bladder Score 0   Bowels Score 5   Toilet Use Score 0   Transfers (Bed/Chair) Score 5   Mobility (Level Surface) Score 0   Stairs Score 0   Barthel Index Score 15   End of Consult   Patient Position at End of Consult Bedside chair;Bed/Chair alarm activated;All needs within reach     The patient's AM-PAC Basic Mobility Inpatient Short Form Raw Score is 8. A Raw score of less than or equal to 16 suggests the patient may benefit from discharge to post-acute rehabilitation services. Please also refer to the recommendation of the Physical Therapist for safe discharge planning.                    [1]   Past Medical History:  Diagnosis Date    Anxiety     Depression     DVT (deep venous thrombosis) ([facility])     Gross hematuria     LA...3/29/16   R...4/3/17      Hematuria     LA.....4/12/16    R....4/3/17    Hyperlipidemia     Hypertension     Long-term (current) use of anticoagulants     LA...3/29/16   R...4/3/17     Pre-operative cardiovascular examination, supraventricular arrhythmia 07/15/2022    Seasonal allergies    [2]   Past Surgical History:  Procedure Laterality Date    APPENDECTOMY      WISDOM TOOTH EXTRACTION

## 2025-07-08 NOTE — RESPIRATORY THERAPY NOTE
RT Protocol Note  Shawn Marquez 75 y.o. male MRN: [id number]  Unit/Bed#: ICU 01 Encounter: [id number]    Assessment    Active Problems:  There are no active Hospital Problems.      Home Pulmonary Medications:  N/a       Past Medical History[1]  Social History[2]    Subjective         Objective    Physical Exam:   Assessment Type: Assess only  General Appearance: Awake, Alert  Respiratory Pattern: Normal  Chest Assessment: Chest expansion symmetrical  Bilateral Breath Sounds: Clear, Diminished  O2 Device: RA    Vitals:  Blood pressure 149/73, pulse 87, temperature 98.6 °F (37 °C), resp. rate 18.          Imaging and other studies:     O2 Device: RA     Plan    Respiratory Plan: Discontinue Protocol        Resp Comments: Pt ordered on Resp protocol at thist zachery. Pt not here for resp related issues. Pt has no resp hx other than SMoking hx. PT takes no resp meds at home and is in no distress. Pt has clear lung sounds and is sating wnl on RA. No need for txs. WIll d/c protocol at this time        [1]   Past Medical History:  Diagnosis Date    Anxiety     Depression     DVT (deep venous thrombosis) (HCC)     Gross hematuria     LA...3/29/16   R...4/3/17     Hematuria     LA.....16    R....4/3/17    Hyperlipidemia     Hypertension     Long-term (current) use of anticoagulants     LA...3/29/16   R...4/3/17     Pre-operative cardiovascular examination, supraventricular arrhythmia 07/15/2022    Seasonal allergies    [2]   Social History  Socioeconomic History    Marital status: Legally      Spouse name: Sherice    Number of children: 2   Occupational History    Occupation: Retired   Tobacco Use    Smoking status: Former     Current packs/day: 0.00     Average packs/day: 1 pack/day for 9.0 years (9.0 ttl pk-yrs)     Types: Cigarettes     Start date:      Quit date:      Years since quittin.5    Smokeless tobacco: Never   Vaping Use    Vaping status: Never Used   Substance and Sexual Activity     Alcohol use: Not Currently     Comment: social    Drug use: Yes     Types: Marijuana     Comment: Via pipe X 50 years    Sexual activity: Yes     Partners: Female   Social History Narrative    Caffeine use     Social Drivers of Health     Financial Resource Strain: Low Risk  (12/14/2023)    Overall Financial Resource Strain (CARDIA)     Difficulty of Paying Living Expenses: Not hard at all   Food Insecurity: No Food Insecurity (12/16/2024)    Nursing - Inadequate Food Risk Classification     Worried About Running Out of Food in the Last Year: Never true     Ran Out of Food in the Last Year: Never true   Transportation Needs: No Transportation Needs (12/16/2024)    PRAPARE - Transportation     Lack of Transportation (Medical): No     Lack of Transportation (Non-Medical): No   Housing Stability: Unknown (12/16/2024)    Housing Stability Vital Sign     Unable to Pay for Housing in the Last Year: No     Homeless in the Last Year: No

## 2025-07-08 NOTE — SPEECH THERAPY NOTE
"Speech Language/Pathology  Speech-Language Pathology Bedside Swallow Evaluation      Patient Name: Shawn Marquez    Today's Date: 7/8/2025     Problem List  Active Problems:  There are no active Hospital Problems.      Past Medical History  Past Medical History[1]    Past Surgical History  Past Surgical History[2]    Summary   Pt presented with s/s suggestive of mild and mild-moderate oral and suspected minimal pharyngeal dysphagia.  Symptoms or concerns included decreased bolus propulsion, decreased mastication, and decreased bolus formation suspected pharyngeal residue and multiple swallows.  The pt has a difficult time keeping his head upright, it rests on his chest.  Speech is intelligible but mildly dysarthric.     Risk/s for Aspiration: new neuro.       Recommended Diet: soft/level 3 diet and thin liquids   Recommended Form of Meds: whole with puree   Aspiration precautions and swallowing strategies: upright posture, slow rate of feeding, small bites/sips, and alternating bites and sips  Other Recommendations: Continue frequent oral care, will follow        Current Medical Status  Pt is a 75 y.o. male who presented to [facility] as a transfer from [facility] with L sided facial droop. NIH 14.    CT head showed a \"large acute intraparenchymal hemorrhage centered involving the posterior right basal ganglia, adjacent right thalamus, and posterior right insula, with a volume of approximately 23 mL; no midline shift\".     Current Precautions:   Fall  Aspiration     Allergies:  No known food allergies    Past medical history:  Please see H&P for details    Special Studies:  See above    Social/Education/Vocational Hx:  Pt lives with family    Swallow Information   Current Risks for Dysphagia & Aspiration: CVA  Current Symptoms/Concerns: fails screen  Current Diet: NPO   Baseline Diet: regular diet and thin liquids-per pt/wife, some difficulty with harder chewy meats      Baseline Assessment "   Behavior/Cognition: alert, lethargic  Speech/Language Status: able to participate in basic conversation and able to follow commands  Patient Positioning: upright in bed, head is forward, needs help to bring it back and to neutral  Pain Status/Interventions/Response to Interventions:   No report of or nonverbal indications of pain.       Swallow Mechanism Exam  Facial: reduced movement  Labial: bilateral decreased ROM  Lingual: decreased ROM  Velum: unable to visualize  Mandible:  decreased ROM  Dentition: adequate  Vocal quality:weak   Volitional Cough: weak   Respiratory Status: on NC    Consistencies Assessed and Performance   Consistencies Administered: thin liquids, puree, mechanical soft solids, and hard solids    Oral Stage: mild, mild-moderate, decreased bolus propulsion, decreased mastication, decreased bolus formation, and delayed oral intiation  Mastication was slow and somewhat labored with the materials administered today.  Bolus formation and transfer were functional with no significant oral residue noted.  No overt s/s reduced oral control.    Pharyngeal Stage: minimal, suspected pharyngeal residue, and multiple swallows  Swallow Mechanics:  Swallowing initiation appeared prompt.  Laryngeal rise was palpated and judged to be within functional limits.  No coughing, throat clearing, change in vocal quality or respiratory status noted today.     Esophageal Concerns: none reported    Strategies and Efficacy: -    Summary and Recommendations (see above)    Results Reviewed with: patient and RN     Treatment Recommended: yes     Frequency of treatment: as able     Patient Stated Goal:-    Dysphagia LTG  -Patient will demonstrate safe and effective oral intake (without overt s/s significant oral/pharyngeal dysphagia including s/s penetration or aspiration) for the highest appropriate diet level.     Short Term Goals:  --Pt will tolerate Dysphagia 3/advanced (dental soft) diet and  thin liquid with no  significant s/s oral or pharyngeal dysphagia across 1-3 diagnostic session/s.    -Patient will tolerate trials of upgraded food and/or liquid texture with no significant s/s of oral or pharyngeal dysphagia including aspiration across 1-3 diagnostic sessions     -Patient will comply with a Video/Modified Barium Swallow study for more complete assessment of swallowing anatomy/physiology/aspiration risk and to assess efficacy of treatment techniques so as to best guide treatment plan    Speech Therapy Prognosis   Prognosis: fair    Prognosis Considerations: medical status           [1]   Past Medical History:  Diagnosis Date    Anxiety     Depression     DVT (deep venous thrombosis) (HCC)     Gross hematuria     LA...3/29/16   R...4/3/17     Hematuria     LA.....4/12/16    R....4/3/17    Hyperlipidemia     Hypertension     Long-term (current) use of anticoagulants     LA...3/29/16   R...4/3/17     Pre-operative cardiovascular examination, supraventricular arrhythmia 07/15/2022    Seasonal allergies    [2]   Past Surgical History:  Procedure Laterality Date    APPENDECTOMY      WISDOM TOOTH EXTRACTION

## 2025-07-08 NOTE — ASSESSMENT & PLAN NOTE
Reported seizure liek activity in the ER   S/p 1g keppra load on 7/7    Plan:   Continue seizure management per neurology   Continue keppra 500mg BID   Titrate AED regimen per neurology as needed

## 2025-07-08 NOTE — PROGRESS NOTES
I have personally seen and examined patient and reviewed all data with NICKIE. Agree with note, assessment and plan. Critical care time 44 minutes. Please refer to attending comments below. Critical care time does not include procedures, family meeting or teaching.       Visit Vitals  /61   Pulse (!) 43   Temp (!) 96.8 °F (36 °C)   Resp 19   Wt 93.9 kg (207 lb 0.2 oz)   SpO2 92%   BMI 28.08 kg/m²   Smoking Status Former   BSA 2.16 m²     GEN: NAD, awake and alert  HEENT: EOMI, PERRLA, MMM  CV: Bradycardia   resp:  CTA, no R/R/W  GI: soft,NT/ND  : urinary catheter in place  Neuro: Left upper extremity is antigravity, left facial droop, extraocular movements intact, slight left sided neglect, patient is unable to elevate left lower extremity, left upper extremity handgrip 5/5, right upper and lower extremity 5/5  Skin: warm, dry    All imaging and labs reviewed      Right BG ICH with evidence of mass effect with cerebral edema brain compression with an ICH score of 1  Right pontine hypodensity- age indeterminate  HTN emergency  Afib  Bradycardia- precedex vs cardiac etiology  HTN  DVT history  HLD-restart statin  DM2  Coagulopathy due to xeralto- s/p kcentra  Hypothermia  Depression    Goal systolic blood pressure:  110-150  LR 75 mL/h    Respiratory support:  4 L nasal cannula    I/O -115 mL   mL  BM 1    Devices:  7/5/2025 urinary catheter    Micro:  None    AED:  Keppra 500 mg IV every 12      This am HR reported to be in the 20s.  Patient is maintaining a mean arterial pressure greater than 65.  Vital signs reviewed and patient has a temperature of 96.8.  No blood pressures reported below 90 and no mean arterial pressures were reported below 65.  Patient's heart rate at the lowest was documented at 43 when he sleeping.  When he is awakened his heart rate increases to the high 60s to low 70s.  CT head revealed stable right basilar linear hemorrhage measuring 4.9 x 2.3 with slight interval increase in  surrounding vasogenic edema and mild mass effect around the right lateral ventricle.  Repeat imaging this morning appears to be the same.  Final read is pending.  Chest x-ray from yesterday with mild pulmonary venous congestion no other acute findings.  Laboratory data reviewed magnesium of 1.6.  Glucose ranged 148-173.    Monitor patient's neurologic exam every 2 hours during the day and every 4 hours at night.  Repeat imaging if patient has new focality to exam or decrease in GCS by greater than 2 points.  Repeat CT scan this morning appears to be unchanged.  MRI will be ordered.  Continue with stroke workup.  At this time no neurosurgical interventions are warranted.  Neurosurgery is aware of patient and following peripherally.  Maintain off Xarelto and antiplatelet medications with acute ICH.    Cardene infusion had been initiated for hypertension.  Patient is currently not on Cardene infusion.  Continue to monitor hemodynamic status.  He did have bradycardia which was initially attributed to Precedex.  He still has decline in his heart rate down to the 20s but is maintaining appropriate mean arterial blood pressure.  Bradycardia likely multifactorial with recent medication administration.  Patient also has hypothermia with a temperature of 96.8.  Will rewarmed to normothermia.  Additionally we will check an EKG and an echo as well as TSH.    The patient is able to pass speech and swallow evaluation will restart statin.  Hold all blood pressure medications at this time as patient is labile with his heart rate.    Patient has atrial fibrillation and also has history of DVT for which she was on anticoagulation.  Will recheck duplex lower extremities and hold beta-blockers with patient's bradycardia.    Update: 1208pm    HR currently 70 bpm    Melatonin 6 mg HS    Seroquel 12.5 mg HS    EKG reviewed, trop declining    Discontinue keppra    Hold metoprolol with bradycardia    Restart statin    Continue IVF until  tolerating po    Discontinue mahan    PMR consult    Update:     Since 11 PM patient's heart rate has slowly escalated and is presently 80 bpm.

## 2025-07-08 NOTE — ASSESSMENT & PLAN NOTE
R BG ICH   Presented to the [facility] ER on  as a pre-hospital stroke alert after his wife found him down after falling off the couch   Noted L sided deficits on arrival   Initial NIH 14   ICH score 2  Hx of afib on Eliquis and ASA s/p reversal w/ ddavp and kcentra   Pt transferred to [facility] for admission to the neuro ICU     Imagin/8 repeat CTH: Stable right basal ganglia hemorrhage and surrounding vasogenic edema. Stable mild mass effect on the right lateral ventricle and minimal 1 mm leftward midline shift. Re-demonstrated age-indeterminate hypodensity within the right hemipons compared to prior day exam however not noted on more remote exam from 2024. Differential includes age-indeterminate infarct. Correlate with MRI of the brain.   repeat CTH: Stable size of right basal ganglia hemorrhage measuring approximately 4.9 x 2.3 cm with slight interval increase in surrounding vasogenic edema. Mild mass effect on the right lateral ventricle without midline shift.    CTA head/neck: No large vessel occlusion, high-grade stenosis, or intracranial aneurysm identified on CT angiogram of the head. No hemodynamically significant stenosis or dissection identified on CT angiogram of the neck. Intraparenchymal hematoma centered in the right posterior basal ganglia/posterior limb of the right internal capsule and adjacent right thalamus, without active or extravasation of contrast or spot sign noted.   CTH: Large acute intraparenchymal hemorrhage centered involving the posterior right basal ganglia, adjacent right thalamus, and posterior right insula, with a volume of approximately 23 mL. No midline shift. Mild chronic white matter microangiopathic changes.    Plan:   Continue to closely monitor neuro exam   Frequent neuro checks per primary team   Repeat STAT CTH with any acute decline in GCS > 2pts or more in 1hr   Maintain normotensive BP goals, SBP < 160, MAP > 65   No acute neuro surgical intervention  indicated at this time   Case and imaging reviewed this am on rounds   CT head reveals right sided basal ganglia hemorrhage with noted surrounding mass effect but no significant midline shift.  This was stable on repeat imaging.  Patient remains keenly awake and alert.  There is no IVH.  There is no hydrocephalus.  Continue on the stroke pathway per neurology   MRI ordered   Echo ordered   Continue aggressive seizure control   Continue keppra 500mg BID --> advance AED regimen as needed  Recommend maximizing medical management   Optimize Na goal > 140   Hold all AC/AP meds   Home Eliquis and ASA held and reversed on arrival   recommend at least 2 week medication hold --> however will defer to neurology on timing to restart   Correct any coagulopathy   Maintain goal INR < 1.4, plt > 100   DVT ppx: SCDs, okay for chem dvt ppx from a nsgy standpoint   Medical management per primary team   Pain control per primary team   PT/OT   Social work following for assistance with dispo once medically cleared     Neurosurgery will sign off at this time. Will defer further management to neurology teams. Please reach out with any further questions or concerns.

## 2025-07-08 NOTE — H&P
H&P - Critical Care/ICU   Name: Shawn Marquez 75 y.o. male I MRN: [id number]  Unit/Bed#: ICU 01 I Date of Admission: 7/7/2025   Date of Service: 7/7/2025 I Hospital Day: 0       Assessment & Plan   There are no hospital problems to display for this patient.      Neuro:   Diagnosis: R posterior basal ganglia IPH, vascular dementia, paranoid behavior, anxiety   CTH from 7/7:  Large acute intraparenchymal hemorrhage centered involving the posterior right basal ganglia, adjacent right thalamus, and posterior right insula, with a volume of approximately 23 mL. No midline shift.   CTA head 7/7:  No large vessel occlusion, high-grade stenosis, or intracranial aneurysm identified on CT angiogram of the head  No hemodynamically significant stenosis or dissection identified on CT angiogram of the neck.   Intraparenchymal hematoma centered in the right posterior basal ganglia/posterior limb of the right internal capsule and adjacent right thalamus, without active or extravasation of contrast or spot sign noted.  S/p Kcentra and DDVAP at [facility]  Etiology likely hypertensive  -180 on arrival to ED  Potential seizure like activity at [facility]    Intracerebral Hemorrhage (ICH) Score:  Myah Coma Sore 13-15 equals +0   Age greater than or equal to 80 No   ICH Volume greater than or equal to 30 ml No   Intraventricular Hemorrhage No   Infratentorial Origin of Hemorrhage Yes (1 Point)   Total: 1     Plan:   STAT CTH for 2 point or greater decline in GCS or new focal neurologic deficit not previously identified on physical exam.   Q1hr neuro checks  F/u repeat CTH from [facility]  AM CTH  NSG consultation  EVD watch  Keppra 500mg BID for seizure prophylaxis   Hold home valium, monitor for evidence of delirium or impulsiveness given hx of vascular dementia  Strict BP control as below    CV:   Diagnosis: hx HTN, HLD, Afib (Xarelto)  Plan:   SBP goal <140mmHg  Continue Cardene gtt  Holding home meds in setting of NPO - restart once  "cleared by speech  Valsartan, Metoprolol, Atorvastatin  Hold AC for afib  Routine echo     Pulm:  No active issues    GI:   No active issues    :   No active issues    F/E/N:   F: LR @ 75cc/hr for hydration  E: K >4, Mag > 2  N: NPO until cleared by speech    Heme/Onc:   No active issues  - Hold VTE/AC in setting of IPH    Endo:   Diagnosis: T2DM  Plan:   Hold home glycemic control medications  ISS  BG goal 140 - 180    ID:   No active issues    MSK/Skin:   No active issues  Disposition: Critical care    History of Present Illness   Shawn Marquez is a 75 y.o. with a PMHx of vascular dementia, Afib (eliquis), HTN, HLD, and T2DM who presented to [facility] after falling off of his couch when standing up. The patient states that his legs \"felt weird\" and when he tried to stand up, he fell forward. Was found by his wife who called EMS. In the ED, pt was found to have a R basal ganglia bleed. There was a concern for a witnessed seizure episode in the CT scanner however it resolved prior to treatment. The patient was given Kcentra and DDVAP for AC/AP reversal and subsequently transferred to \[facility]\"" for closer monitoring.     On arrival, the patient states he remembers the fall and the events in the ED. He states he is having no pain, no headache, no weakness, numbness, or tingling.     History obtained from chart review and the patient.  Review of Systems: See HPI for Review of Systems    Historical Information   Past Medical History:  No date: Anxiety  No date: Depression  No date: DVT (deep venous thrombosis) (HCC)  No date: Gross hematuria      Comment:  LA...3/29/16   R...4/3/17   No date: Hematuria      Comment:  LA.....4/12/16    R....4/3/17  No date: Hyperlipidemia  No date: Hypertension  No date: Long-term (current) use of anticoagulants      Comment:  LA...3/29/16   R...4/3/17   07/15/2022: Pre-operative cardiovascular examination,   supraventricular arrhythmia  No date: Seasonal allergies Past Surgical History:  No " date: APPENDECTOMY  No date: WISDOM TOOTH EXTRACTION   Current Outpatient Medications   Medication Instructions    acetaminophen (TYLENOL) 500 mg, Every 6 hours PRN    ASPIRIN LOW DOSE 81 MG EC tablet TAKE ONE TABLET BY MOUTH DAILY    atorvastatin (LIPITOR) 40 mg, Oral, Daily with dinner    diazepam (VALIUM) 5 mg, Oral, Every 8 hours PRN    meclizine (ANTIVERT) 25 mg, Oral, 3 times daily PRN    metFORMIN (GLUCOPHAGE-XR) 500 mg, Oral, 2 times daily with meals    metoprolol succinate (TOPROL-XL) 25 mg, Oral, Daily    ondansetron (ZOFRAN) 4 mg, Oral, Every 8 hours PRN    rivaroxaban (Xarelto) 20 mg tablet TAKE 1 TABLET EVERY DAY WITH BREAKFAST    valsartan (DIOVAN) 160 mg, Oral, Daily    Allergies[1]   Social History[2] Family History[3]       Objective :                   Vitals I/O      Most Recent Min/Max in 24hrs   Temp   Temp  Min: 98.6 °F (37 °C)  Max: 98.6 °F (37 °C)   Pulse 93 Pulse  Min: 69  Max: 93   Resp 22 Resp  Min: 13  Max: 22   /88 BP  Min: 118/67  Max: 188/87   O2 Sat   SpO2  Min: 95 %  Max: 98 %      Intake/Output Summary (Last 24 hours) at 7/7/2025 2038  Last data filed at 7/7/2025 2001  Gross per 24 hour   Intake --   Output 450 ml   Net -450 ml       Diet NPO; Sips with meds    Invasive Monitoring           Physical Exam   Physical Exam  Vitals and nursing note reviewed.   Skin:     General: Skin is warm and dry.   Neck:      Vascular: No JVD.   Cardiovascular:      Rate and Rhythm: Normal rate and regular rhythm.      Pulses: Normal pulses.      Heart sounds: Normal heart sounds.   Musculoskeletal:      Right lower leg: No edema.      Left lower leg: No edema.   Abdominal:      Palpations: Abdomen is soft.      Tenderness: There is no abdominal tenderness. There is no guarding.   Constitutional:       General: He is not in acute distress.     Appearance: He is well-developed and well-nourished. He is not ill-appearing.   Pulmonary:      Effort: Pulmonary effort is normal. No respiratory  distress.      Breath sounds: Normal breath sounds.   Neurological:      Mental Status: He is alert and oriented to person, place and time. He is calm.      Comments: Patient is awake, alert, and oriented to person/place/time  Follows commands easily with RUE/RLE  Has certain degree of L neglect  - LUE roughly 1/5 - has some twitching to noxious nail bed pressure and does attempt to lift LUE with proximal muscles  - LLE briskly w/d to noxious nail bed stimuli  - Will intermittently spontaneously move L foot    CN exam:  - L facial droop noted  - R tongue deviation   - Absent sensation on L side of face, was not aware I was touching L cheek or temple region  - EOM's - able to cross midline but once he moves to far to the L, he will spontaneously shift back to center w/downward gaze.    Genitourinary/Anorectal:  Nayak present.        Diagnostic Studies        Lab Results: I have reviewed the following results:     Medications:  Scheduled PRN   chlorhexidine, 15 mL, Q12H DEIDRE  levETIRAcetam, 500 mg, Q12H DEIDRE  senna-docusate sodium, 2 tablet, BID      bisacodyl, 10 mg, Daily PRN       Continuous    niCARdipine, 1-15 mg/hr, Last Rate: 7.5 mg/hr (07/07/25 2029)         Labs:   CBC    Recent Labs     07/07/25  1122   WBC 10.41*   HGB 15.8   HCT 44.9        BMP    Recent Labs     07/07/25  1122   SODIUM 140   K 4.1      CO2 28   AGAP 8   BUN 9   CREATININE 0.76   CALCIUM 9.1       Coags    Recent Labs     07/07/25  1122   INR 1.39*   PTT 29        Additional Electrolytes  No recent results       Blood Gas    No recent results  No recent results LFTs  Recent Labs     07/07/25  1122   ALT 22   AST 20   ALKPHOS 66   ALB 4.2   TBILI 0.60       Infectious  No recent results  Glucose  Recent Labs     07/07/25  1122   GLUC 162*               [1] No Known Allergies  [2]   Social History  Tobacco Use    Smoking status: Former     Current packs/day: 0.00     Average packs/day: 1 pack/day for 9.0 years (9.0 ttl pk-yrs)      Types: Cigarettes     Start date:      Quit date:      Years since quittin.5    Smokeless tobacco: Never   Vaping Use    Vaping status: Never Used   Substance Use Topics    Alcohol use: Not Currently     Comment: social    Drug use: Yes     Types: Marijuana     Comment: Via pipe X 50 years   [3]   Family History  Problem Relation Name Age of Onset    Hypertension Mother      Leukemia Father      Heart disease Father      Alcohol abuse Father      Depression Father      Completed Suicide  Father      Depression Sister      Suicide Attempts Brother      Alcohol abuse Brother      Depression Brother      Heart disease Family      Leukemia Family

## 2025-07-08 NOTE — UTILIZATION REVIEW
"Initial Clinical Review    Admission: Date/Time/Statement:   Admission Orders (From admission, onward)       Ordered        07/07/25 2010  INPATIENT ADMISSION  Once                          Orders Placed This Encounter   Procedures    INPATIENT ADMISSION     Standing Status:   Standing     Number of Occurrences:   1     Level of Care:   Critical Care [15]     Estimated length of stay:   More than 2 Midnights     Certification:   I certify that inpatient services are medically necessary for this patient for a duration of greater than two midnights. See H&P and MD Progress Notes for additional information about the patient's course of treatment.     Initial Presentation: 75 y.o. male with PMHx including vascular dementia, Afib (eliquis), HTN, HLD, and T2DM, presented on 7/7/25 initially to [facility] then transferred to [facility], admitted Inpatient status dt Intracerebral hemorrhage.  Presented after falling off of his couch when standing up. The patient states that his legs \"felt weird\" and when he tried to stand up, he fell forward. Was found by his wife who called EMS. In the ED, pt was found to have a R basal ganglia bleed. There was a concern for a witnessed seizure episode in the CT scanner however it resolved prior to treatment. The patient was given Kcentra and DDVAP for AC/AP reversal and subsequently transferred to [facility] for closer monitoring and Neurosurgery evaluation.     Plan:  Admit to Critical Care Unit :  Neurosurgery consult, keep NPO, keep SBP <140, BB and Nicardipine infusion. Neuro exams Q1h. Repeat CT for any change in exam. SSI to keep BG <180.     Date: 7/8   Day 2: Per Neurosurgery: No acute neuro surgical intervention indicated at this time. Continue close neuro exam with freq neuro checks, STAT CT head any change, maintain normotension, continue stroke pathway and order MRI and Echo. Hold all AC AP meds. Pain control prn. PT OT madelyn, JAY consult for dispo " planning.     Date: 7/9  Day 3: Has surpassed a 2nd midnight with active treatments and services. Significant bradycardia while on Precedex. Overnight increasing hypotension, nausea/vomiting.  Unable to tolerate MRI, therefore went for stat CT head that was unchanged. Continue to monitor neuro status, VS and labs, DC Keppra. Hold BB and AC. Monitor electrolytes and replete prn. Continue accuchecks w/ SSI. Continue CC unit.     Scheduled Medications:  amLODIPine, 10 mg, Oral, Daily  atorvastatin, 20 mg, Oral, Daily With Dinner  calcium gluconate, 2 g, Intravenous, Once  chlorhexidine, 15 mL, Mouth/Throat, Q12H DEIDRE  losartan, 100 mg, Oral, Daily  magnesium sulfate, 2 g, Intravenous, Once  melatonin, 6 mg, Oral, HS  metoprolol tartrate, 12.5 mg, Oral, Q12H DEIDRE  polyethylene glycol, 17 g, Oral, Daily  senna-docusate sodium, 2 tablet, Oral, BID      Continuous IV Infusions:  niCARdipine, 1-15 mg/hr, Intravenous, Titrated      PRN Meds:  acetaminophen, 1,000 mg, Intravenous, Q6H PRN x2  bisacodyl, 10 mg, Rectal, Daily PRN  hydrALAZINE, 10 mg, Intravenous, Q6H PRN x2  ondansetron, 4 mg, Intravenous, Q6H PRN x1       Triage Vitals   Temperature Pulse Respirations Blood Pressure SpO2 Pain Score   07/07/25 2030 07/07/25 2000 07/07/25 2000 07/07/25 2000 07/07/25 2200 07/07/25 2000   98.8 °F (37.1 °C) 93 22 133/88 96 % No Pain     Weight (last 2 days)       Date/Time Weight    07/08/25 0857 93.9 (207)    07/08/25 0505 93.9 (207.01)            Vital Signs (last 3 days)       Date/Time Temp Pulse Resp BP MAP (mmHg) SpO2 Calculated FIO2 (%) - Nasal Cannula Nasal Cannula O2 Flow Rate (L/min) O2 Device Patient Position - Orthostatic VS Myah Coma Scale Score Pain    07/09/25 1105 -- 89 22 121/64 86 97 % 28 2 L/min Nasal cannula -- -- --    07/09/25 1050 -- -- -- -- -- -- -- -- -- -- -- 1    07/09/25 1035 -- 105 24 131/65 91 97 % 28 2 L/min Nasal cannula -- -- --    07/09/25 1000 -- 104 20 123/70 92 96 % 28 2 L/min Nasal cannula --  -- --    07/09/25 0930 -- 99 19 126/60 86 98 % 28 2 L/min Nasal cannula -- -- --    07/09/25 0900 -- 99 18 130/70 94 98 % 28 2 L/min Nasal cannula -- -- --    07/09/25 0830 -- 110 22 127/60 87 96 % 28 2 L/min Nasal cannula -- -- --    07/09/25 0815 -- 109 22 122/59 85 97 % 28 2 L/min Nasal cannula -- -- --    07/09/25 0730 98.8 °F (37.1 °C) 110 20 129/62 84 96 % 28 2 L/min Nasal cannula -- -- --    07/09/25 0600 -- 106 20 138/66 93 94 % -- -- -- -- -- --    07/09/25 0500 -- 108 20 138/66 95 93 % -- -- -- -- -- --    07/09/25 0400 -- -- -- -- -- -- -- -- -- -- 14 2 07/09/25 0330 -- 119 20 141/65 94 95 % -- -- -- -- -- --    07/09/25 0315 -- 128 20 156/75 104 95 % -- -- -- -- -- --    07/09/25 0300 -- 119 17 161/70 110 95 % -- -- -- -- -- --    07/09/25 0245 -- 128 20 168/84 118 96 % -- -- -- -- -- --    07/09/25 0215 -- 120 19 174/89 123 95 % -- -- -- -- -- --    07/09/25 0100 -- 120 18 160/76 109 95 % -- -- -- -- -- --    07/09/25 0000 98.9 °F (37.2 °C) 126 19 173/80 115 97 % 28 2 L/min Nasal cannula Lying 14 7    07/08/25 2330 -- 116 16 183/93 130 97 % -- -- -- -- -- --    07/08/25 2300 -- 114 18 168/96 125 98 % -- -- -- -- -- --    07/08/25 2200 -- 114 20 150/97 117 97 % -- -- -- -- -- --    07/08/25 2101 -- 107 19 168/102 129 99 % -- -- -- -- -- --    07/08/25 2100 -- -- 19 -- -- -- -- -- -- -- -- --    07/08/25 2000 98.8 °F (37.1 °C) 97 18 169/90 121 96 % -- -- None (Room air) Lying 14 2    07/08/25 1900 -- 94 19 160/86 116 95 % -- -- -- -- -- --    07/08/25 1800 -- -- -- -- -- -- -- -- -- -- 14 --    07/08/25 1600 -- 80 16 135/79 102 95 % -- -- -- -- 14 --    07/08/25 1400 -- -- -- -- -- -- -- -- -- -- 14 --    07/08/25 1345 97.9 °F (36.6 °C) 76 24 116/61 82 96 % -- -- -- -- -- --    07/08/25 1330 97.9 °F (36.6 °C) 77 29 129/67 92 95 % -- -- -- -- -- --    07/08/25 1300 98.1 °F (36.7 °C) 76 18 121/69 89 92 % -- -- -- -- -- --    07/08/25 1215 98.2 °F (36.8 °C) 73 23 112/63 83 95 % -- -- -- -- -- --     07/08/25 1200 98.2 °F (36.8 °C) 71 21 113/60 81 93 % -- -- -- -- 14 --    07/08/25 1134 -- -- -- -- -- -- -- -- -- -- -- No Pain    07/08/25 1133 -- -- -- -- -- -- -- -- -- -- -- No Pain    07/08/25 1100 98.2 °F (36.8 °C) 67 20 126/67 90 93 % -- -- -- -- 15 --    07/08/25 1000 97.3 °F (36.3 °C) 52 24 126/70 93 94 % -- -- -- -- 15 --    07/08/25 0900 96.6 °F (35.9 °C) 38 17 115/61 81 94 % -- -- -- -- 15 --    07/08/25 0857 96.6 °F (35.9 °C) 36 14 104/59 81 96 % -- -- -- -- -- --    07/08/25 0800 95.9 °F (35.5 °C) 36 19 104/59 76 95 % -- -- -- -- 14 No Pain    07/08/25 0700 -- -- -- -- -- -- -- -- -- -- 14 --    07/08/25 0600 96.4 °F (35.8 °C) 68 19 122/82 98 95 % -- -- -- -- 14 --    07/08/25 0500 96.8 °F (36 °C) 43 19 109/61 80 92 % 36 4 L/min Nasal cannula -- 14 --    07/08/25 0400 97.5 °F (36.4 °C) 43 21 114/62 84 95 % -- -- -- -- 14 --    07/08/25 0300 97.7 °F (36.5 °C) 48 16 104/63 79 98 % -- -- -- -- 14 --    07/08/25 0200 98.1 °F (36.7 °C) 58 20 105/62 79 92 % -- -- -- -- 14 --    07/08/25 0100 98.2 °F (36.8 °C) 57 18 110/59 79 98 % -- -- -- -- 14 --    07/08/25 0030 98.2 °F (36.8 °C) 89 20 119/63 87 95 % -- -- -- -- -- --    07/08/25 0000 98.4 °F (36.9 °C) 59 19 104/58 78 94 % -- -- -- -- 15 --    07/07/25 2300 98.6 °F (37 °C) 66 22 99/63 77 96 % -- -- -- -- 15 --    07/07/25 2200 98.8 °F (37.1 °C) 88 17 100/52 74 96 % -- -- -- -- 15 --    07/07/25 2100 98.6 °F (37 °C) 87 18 149/73 103 -- -- -- -- -- 15 --    07/07/25 2030 98.8 °F (37.1 °C) 89 18 134/82 102 -- -- -- -- -- 15 --    07/07/25 2000 -- 93 22 133/88 106 -- -- -- -- -- 15 No Pain              Pertinent Labs/Diagnostic Test Results:   Radiology:  CT head wo contrast   Final Interpretation by Andrea Fisher MD (07/09 0228)      Stable appearance of right basal ganglia intraparenchymal hemorrhage                  Workstation performed: [id number]          VAS VENOUS DUPLEX - LOWER LIMB BILATERAL   Final Interpretation by Bj Goodwin MD (07/08  1845)      CT head wo contrast   Final Interpretation by Buster Jin MD (07/08 0922)   Stable right basal ganglia hemorrhage and surrounding vasogenic edema. Stable mild mass effect on the right lateral ventricle and minimal 1 mm leftward midline shift.      Redemonstrated age-indeterminate hypodensity within the right hemipons compared to prior day exam however not noted on more remote exam from 7/6/2024. Differential includes age-indeterminate infarct. Correlate with MRI of the brain.         I personally discussed this study with Nat Trivedi on 7/8/2025 9:21 AM.                        Workstation performed: [id number]         MRI brain w wo contrast    (Results Pending)   CT head wo contrast    (Results Pending)   CT chest abdomen pelvis w contrast    (Results Pending)     Cardiology:  Echo complete w/ contrast if indicated   Final Result by Denis Nichole MD (07/08 0928)        Left Ventricle: Left ventricular cavity size is normal. Wall thickness    is increased. There is mild concentric hypertrophy. The left ventricular    ejection fraction is 50%. Systolic function is low normal. Although no    diagnostic regional wall motion abnormality was identified, this    possibility cannot be completely excluded on the basis of this study.     Left Atrium: The atrium is mildly dilated.     Right Atrium: The atrium is moderately dilated.     Aortic Valve: There is mild regurgitation.     Tricuspid Valve: There is mild regurgitation.      Technically difficult study         ECG 12 lead   Final Result by Seth Pastor MD (07/08 2216)   Atrial fibrillation with slow ventricular response   Left axis deviation   Low voltage QRS   Nonspecific T wave abnormality   Abnormal ECG   When compared with ECG of 07-Jul-2025 11:20, (unconfirmed)   Vent. rate has decreased by  32 bpm   Inverted T waves have replaced nonspecific T wave abnormality in Lateral    leads   Confirmed by Seth Pastor ([id number]) on  7/8/2025 10:16:40 PM        GI:  No orders to display           Results from last 7 days   Lab Units 07/09/25  0620 07/08/25  0501 07/07/25  1122   WBC Thousand/uL 13.43* 9.75 10.41*   HEMOGLOBIN g/dL 16.2 14.9 15.8   HEMATOCRIT % 45.9 43.1 44.9   PLATELETS Thousands/uL 175 161 166   TOTAL NEUT ABS Thousands/µL 10.65* 5.73 8.19*         Results from last 7 days   Lab Units 07/09/25  0620 07/08/25  0501 07/07/25  1122   SODIUM mmol/L 135 139 140   POTASSIUM mmol/L 3.8 4.4 4.1   CHLORIDE mmol/L 100 103 104   CO2 mmol/L 27 28 28   ANION GAP mmol/L 8 8 8   BUN mg/dL 12 12 9   CREATININE mg/dL 0.67 0.78 0.76   EGFR ml/min/1.73sq m 94 88 89   CALCIUM mg/dL 9.0 8.7 9.1   CALCIUM, IONIZED mmol/L 1.07*  --   --    MAGNESIUM mg/dL 1.7* 1.6*  --    PHOSPHORUS mg/dL 2.7 3.1  --      Results from last 7 days   Lab Units 07/07/25  1122   AST U/L 20   ALT U/L 22   ALK PHOS U/L 66   TOTAL PROTEIN g/dL 7.1   ALBUMIN g/dL 4.2   TOTAL BILIRUBIN mg/dL 0.60     Results from last 7 days   Lab Units 07/07/25  1613 07/07/25  1122   POC GLUCOSE mg/dl 157* 148*     Results from last 7 days   Lab Units 07/09/25  0620 07/08/25  0501 07/07/25  1122   GLUCOSE RANDOM mg/dL 154* 173* 162*         Results from last 7 days   Lab Units 07/08/25  1339   HEMOGLOBIN A1C % 6.7*   EAG mg/dl 146       Results from last 7 days   Lab Units 07/08/25  1207 07/08/25  0920 07/08/25  0750 07/07/25  1339 07/07/25  1122   HS TNI 0HR ng/L  --   --  33  --  21   HS TNI 2HR ng/L  --  27  --  19  --    HSTNI D2 ng/L  --  -6  --  -2  --    HS TNI 4HR ng/L 25  --   --   --   --    HSTNI D4 ng/L -8  --   --   --   --          Results from last 7 days   Lab Units 07/07/25  1122   PROTIME seconds 17.6*   INR  1.39*   PTT seconds 29     Results from last 7 days   Lab Units 07/08/25  0501   TSH 3RD GENERATON uIU/mL 1.719     Past Medical History[1]  Present on Admission:   Seizure-like activity (HCC)   DM II (diabetes mellitus, type II), controlled (HCC)   HLD  (hyperlipidemia)   Depression   Essential hypertension      Admitting Diagnosis: Acute left-sided weakness [R53.1]  Age/Sex: 75 y.o. male    Network Utilization Review Department  ATTENTION: Please call with any questions or concerns to [phone] and carefully listen to the prompts so that you are directed to the right person. All voicemails are confidential.   For Discharge needs, contact Care Management DC Support Team at [phone] opt. [phone]  Send all requests for admission clinical reviews, approved or denied determinations and any other requests to dedicated fax number below belonging to the campus where the patient is receiving treatment. List of dedicated fax numbers for the Facilities:  FACILITY NAME UR FAX NUMBER   ADMISSION DENIALS (Administrative/Medical Necessity) [phone]   DISCHARGE SUPPORT TEAM (NETWORK) [phone]   PARENT CHILD HEALTH (Maternity/NICU/Pediatrics) [phone]   [facility] [phone]   [facility] [phone]   [facility] [phone]   [facility] [phone]   [facility] [phone]   [facility] [phone]   [facility] [phone]   [facility] [phone]   [facility] [phone]   [facility] [phone]   [facility] [phone]   [facility] [phone]              [1]   Past Medical History:  Diagnosis Date    Anxiety     Depression     DVT (deep venous thrombosis) (HCC)     Gross hematuria     LA...3/29/16   R...4/3/17     Hematuria     LA.....4/12/16    R....4/3/17    Hyperlipidemia     Hypertension     Long-term (current) use of anticoagulants     LA...3/29/16   R...4/3/17     Pre-operative cardiovascular examination,  supraventricular arrhythmia 07/15/2022    Seasonal allergies

## 2025-07-08 NOTE — CONSULTS
Consultation - Neurosurgery   Name: Shawn Marquez 75 y.o. male I MRN: [id number]  Unit/Bed#: ICU 01 I Date of Admission: 2025   Date of Service: 2025 I Hospital Day: 1   Inpatient consult to Neurosurgery  Consult performed by: Karina Lam PA-C  Consult ordered by: Afua Valiente DO        Physician Requesting Evaluation: Nat Trivedi DO   Reason for Evaluation / Principal Problem: R BG ICH     Assessment & Plan  ICH (intracerebral hemorrhage) (HCC)  R BG ICH   Presented to the [facility] ER on  as a pre-hospital stroke alert after his wife found him down after falling off the couch   Noted L sided deficits on arrival   Initial NIH 14   ICH score 2  Hx of afib on Eliquis and ASA s/p reversal w/ ddavp and kcentra   Pt transferred to [facility] for admission to the neuro ICU     Imagin/8 repeat CTH: Stable right basal ganglia hemorrhage and surrounding vasogenic edema. Stable mild mass effect on the right lateral ventricle and minimal 1 mm leftward midline shift. Re-demonstrated age-indeterminate hypodensity within the right hemipons compared to prior day exam however not noted on more remote exam from 2024. Differential includes age-indeterminate infarct. Correlate with MRI of the brain.   repeat CTH: Stable size of right basal ganglia hemorrhage measuring approximately 4.9 x 2.3 cm with slight interval increase in surrounding vasogenic edema. Mild mass effect on the right lateral ventricle without midline shift.    CTA head/neck: No large vessel occlusion, high-grade stenosis, or intracranial aneurysm identified on CT angiogram of the head. No hemodynamically significant stenosis or dissection identified on CT angiogram of the neck. Intraparenchymal hematoma centered in the right posterior basal ganglia/posterior limb of the right internal capsule and adjacent right thalamus, without active or extravasation of contrast or spot sign noted.   CTH: Large acute intraparenchymal  Gynecology Discharge Summary    Indigo Bright    YOB: 1997  MRN# [id number]   Age: 27 year old         Date of Admission:  2024  Date of Discharge:  2024   Admitting Physician:  Darling Bar MD  Discharge Physician:  Maylin Brink MD  Discharging Service:  Gynecology     Primary Provider: No Ref-Primary, Physician          Admission Diagnoses:   Postpartum severe preeclampsia           Discharge Diagnosis:     Same as above              Discharge Disposition:     Discharged to home           Procedures:   None          Medications Prior to Admission:     Medications Prior to Admission   Medication Sig Dispense Refill Last Dose    loratadine (CLARITIN) 10 MG tablet        Prenatal Vit-Fe Fumarate-FA (PRENATAL PO)                 Discharge Medications:        Review of your medicines        START taking        Dose / Directions   NIFEdipine ER 60 MG 24 hr tablet  Commonly known as: ADALAT CC      Dose: 60 mg  Take 1 tablet (60 mg) by mouth every 24 hours  Quantity: 30 tablet  Refills: 0            CONTINUE these medicines which have NOT CHANGED        Dose / Directions   loratadine 10 MG tablet  Commonly known as: CLARITIN      Refills: 0     PRENATAL PO      Refills: 0               Where to get your medicines        These medications were sent to [facility] [address]  [address] [address]      Phone: [phone]   NIFEdipine ER 60 MG 24 hr tablet             Consultations:     Lactation             Brief History of Illness:   Indigo Bright is a 27 year old  who is PPD#8 from VAVD now presenting with severe range pressures, headaches, and vision changes. Patient notably has infant admitted to the NICU at this time.      Reports since last night she has felt generalized malaise. She reports she initially checked her blood pressures and had systolic of 140s and this persisted over several  checks. Her symptoms progressed and she noted a mild headache as well as 'dazed' vision. She had a bedside recheck her pressures this morning and was noted to have systolic of 170s. She presents to the ED for further evaluation. On admission she was found to have sustained severe range blood pressures.      She rates her headache as a 2/10 at this point. Primarily located in the right temple. Has a history of headaches as feels like this is not as bad. She has not tried any medical interventions on her own or in the ED yet. She or her partner have not noticed  any neurological deficits or mental fog. She otherwise denies chest pain, shortness of breath, RUQ/epigastric pain, LE swelling, or subjectively low UOP. Per patient report, had isolated elevated blood pressure postpartum, however never met criteria for hypertensive disorder of pregnancy.           Hospital Course:     Patient was initiated on IV Magnesium on HD#1, which was discontinued after 18hrs due to patient discomfort. Blood pressure was monitored and was normalized with nifedipine 60mg. On HD#3 patient was meeting goals for discharge. Lochia was minimal, pain was well controlled and BP was well controlled. Patient would like to follow up with [facility] clinic.              Discharge Instructions and Follow-Up:     Discharge diet: Regular   Discharge activity: Activity as tolerated   Discharge follow-up: Follow up in 3 days for BP check, then meet with your provider for 2 week and 6 week postpartum visits.    Other instructions: Continue to monitor BP and continue Nifedipine 60 until instructed otherwise.     Susannah Preston MD  Obstetrics and Gynecology, PGY1  04/20/2024, 12:41 PM    I personally examined and evaluated Indigo Bright on 4/20/2024.  I agree with the presentation, hospital details and plan of care this discharge summary with edits by me.   Maylin Brink MD MPH      hemorrhage centered involving the posterior right basal ganglia, adjacent right thalamus, and posterior right insula, with a volume of approximately 23 mL. No midline shift. Mild chronic white matter microangiopathic changes.    Plan:   Continue to closely monitor neuro exam   Frequent neuro checks per primary team   Repeat STAT CTH with any acute decline in GCS > 2pts or more in 1hr   Maintain normotensive BP goals, SBP < 160, MAP > 65   No acute neuro surgical intervention indicated at this time   Case and imaging reviewed this am on rounds   CT head reveals right sided basal ganglia hemorrhage with noted surrounding mass effect but no significant midline shift.  This was stable on repeat imaging.  Patient remains keenly awake and alert.  There is no IVH.  There is no hydrocephalus.  Continue on the stroke pathway per neurology   MRI ordered   Echo ordered   Continue aggressive seizure control   Continue keppra 500mg BID --> advance AED regimen as needed  Recommend maximizing medical management   Optimize Na goal > 140   Hold all AC/AP meds   Home Eliquis and ASA held and reversed on arrival   recommend at least 2 week medication hold --> however will defer to neurology on timing to restart   Correct any coagulopathy   Maintain goal INR < 1.4, plt > 100   DVT ppx: Bo, okay for chem dvt ppx from a nsgy standpoint   Medical management per primary team   Pain control per primary team   PT/OT   Social work following for assistance with dispo once medically cleared     Neurosurgery will sign off at this time. Will defer further management to neurology teams. Please reach out with any further questions or concerns.   Seizure-like activity (HCC)  Reported seizure liek activity in the ER   S/p 1g keppra load on 7/7    Plan:   Continue seizure management per neurology   Continue keppra 500mg BID   Titrate AED regimen per neurology as needed     Please contact the SecureChat role for the Neurosurgery service with any  questions/concerns.    History of Present Illness   HPI: Shawn Marquez is a 75 y.o. year old male who presented to the [facility] emergency department yesterday on 7/7/2025 as a prehospital stroke alert after he was found down by his wife with noted left-sided deficits.  Per report, the patient woke up at around 5 AM and appeared to be in his normal state of health.  He sat on the couch and when he stood up he reports he was unable to ambulate and fell over.  He was found down by his wife.  He was brought to the ER and initial NIH was noted to be 14.  He underwent stat CT imaging which revealed a right basal ganglia hemorrhage.  ICH score of 2.  Patient's home Eliquis and aspirin were held and reversed in the ER.  Per report, it appears the patient did have some witnessed seizure-like activity that resolved spontaneously prior to any abortive treatment.  Patient was given a 1 g Keppra load and started on maintenance dose Keppra.  Patient was ultimately transferred to [facility] for admission to the neuro ICU and higher level of care.    Review of Systems   Constitutional:  Negative for fatigue and fever.   Eyes:  Negative for photophobia and visual disturbance.   Respiratory:  Negative for cough and shortness of breath.    Cardiovascular:  Negative for chest pain.   Gastrointestinal:  Negative for nausea and vomiting.   Musculoskeletal:  Positive for gait problem and myalgias.   Neurological:  Positive for seizures, facial asymmetry, speech difficulty and weakness. Negative for dizziness, tremors, syncope, light-headedness, numbness and headaches.   Psychiatric/Behavioral:  Negative for agitation, behavioral problems, confusion and decreased concentration.      Medical History Review: I have reviewed the patient's PMH, PSH, Social History, Family History, Meds, and Allergies     Objective :  Temp:  [95.9 °F (35.5 °C)-98.8 °F (37.1 °C)] 95.9 °F (35.5 °C)  HR:  [36-93] 36  BP: ()/(52-95) 104/59  Resp:   [13-22] 19  SpO2:  [92 %-98 %] 95 %  O2 Device: Nasal cannula  Nasal Cannula O2 Flow Rate (L/min):  [4 L/min] 4 L/min    Physical Exam Neurological Exam  General appearance: alert, ill-appearing, elderly male, appears stated age, cooperative and no distress  Head: Normocephalic, without obvious abnormality, atraumatic  Eyes: EOMI, PERRL  Neck: supple, symmetrical, trachea midline and NT  Lungs: non labored breathing, no respiratory distress on nasal cannula  Heart: regular heart rate  Neurologic:   Mental status: Alert, oriented x3, thought content appropriate  -Mild dysarthria  -speech is slow  Cranial nerves:   - Noted left-sided facial droop  Sensory: normal to light touch bilaterally throughout  Motor: moving all extremities, noted L sided weakness   - RUE: intact   - LUE: able to weakly wiggle fingers and slightly bend elbow. Cannot lift off bed   - RLE: intact   - LLE: able to wiggle toes and slightly bend knee. Cannot lift off the bed.       Lab Results: I have reviewed the following results:  Recent Labs     07/07/25  1122 07/07/25  1339 07/08/25  0501 07/08/25  0750   WBC 10.41*  --  9.75  --    HGB 15.8  --  14.9  --    HCT 44.9  --  43.1  --      --  161  --    SODIUM 140  --  139  --    K 4.1  --  4.4  --      --  103  --    CO2 28  --  28  --    BUN 9  --  12  --    CREATININE 0.76  --  0.78  --    GLUC 162*  --  173*  --    MG  --   --  1.6*  --    PHOS  --   --  3.1  --    AST 20  --   --   --    ALT 22  --   --   --    ALB 4.2  --   --   --    TBILI 0.60  --   --   --    ALKPHOS 66  --   --   --    PTT 29  --   --   --    INR 1.39*  --   --   --    HSTNI0 21  --   --  33   HSTNI2  --  19  --   --      VTE Pharmacologic Prophylaxis: Sequential compression device (Venodyne)

## 2025-07-08 NOTE — OCCUPATIONAL THERAPY NOTE
"    Occupational Therapy Evaluation     Patient Name: Shawn Marquez  Today's Date: 7/8/2025  Problem List  Active Problems:    Seizure-like activity (HCC)    ICH (intracerebral hemorrhage) (HCC)    Past Medical History  Past Medical History[1]  Past Surgical History  Past Surgical History[2]      07/08/25 1134   OT Last Visit   OT Visit Date 07/08/25   Note Type   Note type Evaluation   Pain Assessment   Pain Assessment Tool 0-10   Pain Score No Pain   Restrictions/Precautions   Weight Bearing Precautions Per Order No   Other Precautions Cognitive;Chair Alarm;Bed Alarm;Multiple lines;Telemetry;Fall Risk;Pain;O2   Home Living   Type of Home Mobile home   Home Layout One level;Stairs to enter with rails  (4 JAIMIE)   Bathroom Shower/Tub Walk-in shower   Bathroom Toilet Standard   Bathroom Equipment Shower chair;Grab bars in shower   Home Equipment Other (Comment)  (denies any)   Prior Function   Level of [place] Independent with ADLs;Independent with functional mobility;Needs assistance with IADLS   Lives With Spouse   Receives Help From Family   IADLs Family/Friend/Other provides transportation;Family/Friend/Other provides meals;Independent with medication management   Falls in the last 6 months 1 to 4   Vocational Retired   Lifestyle   Autonomy I w/ ADLS, I w/ meds but assist w/ all other IADLS, (-) , I w/ transfers and functional mobility PTA   Reciprocal Relationships pt lives w/ his spouse   Service to Others retired   Intrinsic Gratification TV   General   Family/Caregiver Present Yes  (spouse)   Subjective   Subjective \" I don't wanna hurt anyone here\"   ADL   Eating Assistance 3  Moderate Assistance   Grooming Assistance 3  Moderate Assistance   UB Bathing Assistance 3  Moderate Assistance   LB Bathing Assistance 2  Maximal Assistance   UB Dressing Assistance 3  Moderate Assistance   LB Dressing Assistance 2  Maximal Assistance   Toileting Assistance  2  Maximal Assistance   Functional Assistance " 2  Maximal Assistance   Functional Deficit Steadying;Verbal cueing;Supervision/safety;Increased time to complete   Bed Mobility   Supine to Sit 2  Maximal assistance   Additional items Assist x 2;HOB elevated;Increased time required;Verbal cues;LE management   Sit to Supine Unable to assess   Additional Comments pt sat EOB w/ Mod A for sitting balance/trunk control; has posterior lean.   Transfers   Sit to Stand 2  Maximal assistance   Additional items Assist x 2;Increased time required;Verbal cues   Stand to Sit 2  Maximal assistance   Additional items Assist x 2;Increased time required;Verbal cues   Stand pivot 2  Maximal assistance   Additional items Assist x 2;Increased time required;Verbal cues   Additional Comments B/L HHA used and knee block   Functional Mobility   Additional Comments Unable to assess @ this time   Balance   Static Sitting Poor -   Dynamic Sitting Poor -   Static Standing Poor -   Dynamic Standing Poor -   Ambulatory Zero   Activity Tolerance   Activity Tolerance Patient limited by fatigue;Patient limited by pain   Medical Staff Made Aware PT, Vanessa, Gonzalez MAGALLON   Nurse Made Aware yes, Bell   RUE Assessment   RUE Assessment X  (grossly 4-/5)   LUE Assessment   LUE Assessment X  (grossly 2-/5)   Hand Function   Gross Motor Coordination Impaired   Fine Motor Coordination Impaired   Sensation   Light Touch No apparent deficits   Vision - Complex Assessment   Additional Comments difficult to assess 2/2 poor attention/arousal and command following; will continue to assess   Cognition   Overall Cognitive Status Impaired   Arousal/Participation Responsive;Cooperative;Lethargic   Attention Difficulty attending to directions   Orientation Level Oriented to person;Oriented to place;Oriented to time;Disoriented to situation   Memory Decreased recall of precautions;Decreased recall of recent events;Decreased short term memory   Following Commands Follows one step commands with increased time or  repetition   Comments pt is cooperative; very lethargic; minimally verbal; needs cues for safety and redirection. Spouse present to relay information about home setup. occasionally nonsensical thoughts stated.   Assessment   Limitation Decreased ADL status;Decreased UE strength;Decreased UE ROM;Decreased Safe judgement during ADL;Decreased cognition;Decreased endurance;Decreased self-care trans;Decreased high-level ADLs;Visual deficit;Decreased fine motor control   Prognosis Fair   Assessment Pt is a 74 y/o male seen for OT eval s/p adm to [facility] as a transfer from [facility] after he fell off his couch, found to have R basal ganglia bleed and concern for witnessed seizure episode. Pt  has a past medical history of Anxiety, Depression, DVT (deep venous thrombosis) (HCC), Gross hematuria, Hematuria, Hyperlipidemia, Hypertension, Long-term (current) use of anticoagulants, Pre-operative cardiovascular examination, supraventricular arrhythmia (07/15/2022), and Seasonal allergies. Pt with active OT orders and activity as tolerated orders. Pt lives with his spouse in mobile home w/ 4 JAIMIE w/ HR . Pt was I w/  ADLS and some IADLS, does not drive, & required no use of DME PTA. Pt is currently demonstrating the following occupational deficits: Mod A UB ADLS, Max A LB ADLS, MaX A x2 bed mobility, transfers w/ B/L HHA. These deficits that are impacting pt's baseline areas of occupation are a result of the following impairments: pain, endurance, activity tolerance, functional mobility, forward functional reach, balance, trunk control, functional standing tolerance, decreased I w/ ADLS/IADLS, strength, ROM, visual deficits, cognitive impairments, decreased safety awareness, decreased insight into deficits, impaired fine motor skills, and coordination deficits.The following Occupational Performance Areas to address include: eating, grooming, bathing/shower, toilet hygiene, dressing, medication management, socialization, health maintenance,  functional mobility, community mobility, clothing management, and household maintenance. Recommend moderate resource intensity (level ll) upon D/C. Pt to continue to benefit from acute immediate OT services to address the following goals 2-3x/week to  w/in 10-14 days:   Goals   Patient Goals to be more independent   LTG Time Frame 10-14   Long Term Goal #1 see below listed goals   Plan   Treatment Interventions ADL retraining;Functional transfer training;UE strengthening/ROM;Endurance training;Cognitive reorientation;Visual perceptual retraining;Patient/family training;Equipment evaluation/education;Compensatory technique education;Fine motor coordination activities;Continued evaluation;Energy conservation;Activityengagement   Goal Expiration Date 25   OT Frequency 2-3x/wk   Discharge Recommendation   Rehab Resource Intensity Level, OT II (Moderate Resource Intensity)   Additional Comments  The patient's raw score on the AM-PAC Daily Activity Inpatient Short Form is 12. A raw score of less than 19 suggests the patient may benefit from discharge to post-acute rehabilitation services. Please refer to the recommendation of the Occupational Therapist for safe discharge planning.   Additional Comments 2 Pt seen as a co-session due to the patient's co-morbidities, clinically unstable presentation, and present impairments which are a regression from the patient's baseline.   AM-PAC Daily Activity Inpatient   Lower Body Dressing 2   Bathing 2   Toileting 2   Upper Body Dressing 2   Grooming 2   Eating 2   Daily Activity Raw Score 12   Daily Activity Standardized Score (Calc for Raw Score >=11) 30.6   AM-PAC Applied Cognition Inpatient   Following a Speech/Presentation 2   Understanding Ordinary Conversation 3   Taking Medications 3   Remembering Where Things Are Placed or Put Away 3   Remembering List of 4-5 Errands 2   Taking Care of Complicated Tasks 1   Applied Cognition Raw Score 14   Applied Cognition  Standardized Score 32.02   End of Consult   Education Provided Yes;Family or social support of family present for education by provider   Patient Position at End of Consult Bedside chair;Bed/Chair alarm activated;All needs within reach   Nurse Communication Nurse aware of consult       GOALS  1) Pt will improve activity tolerance to G for min 30 min txment sessions  2) Pt will complete ADLs/self care w/ Min A w/ use of AD/DME as needed to increase independence in functional tasks  3) Pt will complete toileting w/ Min A w/ G hygiene/thoroughness using DME PRN  4) Pt will improve fx'l tfers on/off all surfaces using DME PRN w/ G balance/safety including toileting w/ Min A  5) Pt will improve bed mobility to Min A and sit EOB w/ G balance/trunk control as a prerequisite for further engagement in meaningful tasks  6) Pt will be attentive 100% of the time during ongoing cognitive assessment w/ G participation to A w/ safe d/c planning/recommendations  7) Pt will demonstrate G carryover of pt/caregiver education and training as appropriate w/o cues w/ G tolerance to increase safety during functional tasks  8) Pt will improve UB fx'l use/ROM to WFL and strength 1/2 MMT via AROM/AAROM/PROM in all planes as tolerated s/p skilled education of HEP w/o cues for carryover  9) Pt will engage in ongoing  assessments, screens, and activities t/o fx'l I/ADL/leisure tasks w/ G participation to A w/ adaptation and accomodations or rule out visual perceptual impairments  10) Pt will follow 100% simple one step verbal commands and be A/Ox4 consistently t/o use of external environmental cues to increase awareness during functional tasks    ** OTR to assess functional mobility when appropriate    Christina Lees MS, OTR/L             [1]   Past Medical History:  Diagnosis Date    Anxiety     Depression     DVT (deep venous thrombosis) (HCC)     Gross hematuria     LA...3/29/16   R...4/3/17     Hematuria     LA.....4/12/16     R....4/3/17    Hyperlipidemia     Hypertension     Long-term (current) use of anticoagulants     LA...3/29/16   R...4/3/17     Pre-operative cardiovascular examination, supraventricular arrhythmia 07/15/2022    Seasonal allergies    [2]   Past Surgical History:  Procedure Laterality Date    APPENDECTOMY      WISDOM TOOTH EXTRACTION

## 2025-07-08 NOTE — PLAN OF CARE
Problem: OCCUPATIONAL THERAPY ADULT  Goal: Performs self-care activities at highest level of function for planned discharge setting.  See evaluation for individualized goals.  Description: Treatment Interventions: ADL retraining, Functional transfer training, UE strengthening/ROM, Endurance training, Cognitive reorientation, Visual perceptual retraining, Patient/family training, Equipment evaluation/education, Compensatory technique education, Fine motor coordination activities, Continued evaluation, Energy conservation, Activityengagement          See flowsheet documentation for full assessment, interventions and recommendations.   Note: Limitation: Decreased ADL status, Decreased UE strength, Decreased UE ROM, Decreased Safe judgement during ADL, Decreased cognition, Decreased endurance, Decreased self-care trans, Decreased high-level ADLs, Visual deficit, Decreased fine motor control  Prognosis: Fair  Assessment: Pt is a 76 y/o male seen for OT eval s/p adm to [facility] as a transfer from [facility] after he fell off his couch, found to have R basal ganglia bleed and concern for witnessed seizure episode. Pt  has a past medical history of Anxiety, Depression, DVT (deep venous thrombosis) (HCC), Gross hematuria, Hematuria, Hyperlipidemia, Hypertension, Long-term (current) use of anticoagulants, Pre-operative cardiovascular examination, supraventricular arrhythmia (07/15/2022), and Seasonal allergies. Pt with active OT orders and activity as tolerated orders. Pt lives with his spouse in mobile home w/ 4 JAIMIE w/ HR . Pt was I w/  ADLS and some IADLS, does not drive, & required no use of DME PTA. Pt is currently demonstrating the following occupational deficits: Mod A UB ADLS, Max A LB ADLS, MaX A x2 bed mobility, transfers w/ B/L HHA. These deficits that are impacting pt's baseline areas of occupation are a result of the following impairments: pain, endurance, activity tolerance, functional mobility, forward functional reach,  balance, trunk control, functional standing tolerance, decreased I w/ ADLS/IADLS, strength, ROM, visual deficits, cognitive impairments, decreased safety awareness, decreased insight into deficits, impaired fine motor skills, and coordination deficits.The following Occupational Performance Areas to address include: eating, grooming, bathing/shower, toilet hygiene, dressing, medication management, socialization, health maintenance, functional mobility, community mobility, clothing management, and household maintenance. Recommend moderate resource intensity (level ll) upon D/C. Pt to continue to benefit from acute immediate OT services to address the following goals 2-3x/week to  w/in 10-14 days:     Rehab Resource Intensity Level, OT: II (Moderate Resource Intensity)     Christina Lees MS, OTR/L

## 2025-07-08 NOTE — PLAN OF CARE
"  Problem: PHYSICAL THERAPY ADULT  Goal: Performs mobility at highest level of function for planned discharge setting.  See evaluation for individualized goals.  Description: Treatment/Interventions: ADL retraining, Functional transfer training, LE strengthening/ROM, Elevations, Therapeutic exercise, Endurance training, Cognitive reorientation, Equipment eval/education, Patient/family training, Bed mobility, Gait training, Compensatory technique education, Spoke to nursing, Spoke to advanced practitioner, OT  Equipment Recommended:  (TBD)       See flowsheet documentation for full assessment, interventions and recommendations.  Outcome: Progressing  Note: Prognosis: Fair  Problem List: Decreased strength, Decreased range of motion, Decreased endurance, Impaired balance, Decreased mobility, Decreased coordination, Decreased cognition, Impaired judgement, Decreased safety awareness, Impaired tone, Obesity    Pt is 75 y.o. male seen for PT evaluation s/p admit to [facility] on 7/7/2025   s/p falling off of his couch when standing up. The patient states that his legs \"felt weird\" and when he tried to stand up, he fell forward. Was found by his wife who called EMS.  CT head showed a \"large acute intraparenchymal hemorrhage centered involving the posterior right basal ganglia,adjacent right thalamus, and posterior right insula, with a volume of approximately 23 mL. No midline shift.  Dx including  R posterior basal ganglia IPH. Pt   has a past medical history of Anxiety, Depression, DVT (deep venous thrombosis) (HCC), Gross hematuria, Hematuria, Hyperlipidemia, Hypertension, Long-term (current) use of anticoagulants, Pre-operative cardiovascular examination, supraventricular arrhythmia (07/15/2022), and Seasonal allergies.  Personal factors affecting pt at time of IE include: steps to enter environment, advanced age, past experience, inability to perform IADLs, inability to perform ADLs, inability to ambulate " household distances, limited insight into impairments; impaired cognition; and recent fall(s).  Due to acute medical issues, ongoing medical workup for primary dx; pain, fall risk, increased reliance on more restrictive AD compared to baseline;  decreased activity tolerance compared to baseline, increased assistance needed from caregiver at current time, continuous monitoring, trending labs;  multiple lines, decline in overall functional mobility status; health management issues; note unstable clinical picture (high complexity).  At baseline, pt lives w/ spouse marlon  mobile home w/ 4 JAIMIE; pt is Indep w/ all home and community distances mobility w/o AD +indep w/  all ADLs and spouse mostly assists w/ IADLs 0 drive. Currently,  pt  is requiring maxA x2 A for bed skills; maxA x2 for functional transfers and maxAx2 for ambulation 2-3 steps to chair w/ buckling and ataxia.   Pt presents functioning below baseline and currently w/ overall mobility deficits 2* to: L>R decreased LE strength/AROM; limited flexibility;  generalized weakness/ deconditioning; decreased endurance; decreased activity tolerance; decreased  fine and gross motor coordination; impaired  sitting / standing balance; gait deviations;  knee buckling; ataxia; decreased safety awareness;  fatigue; impaired safety and judgement; distractibility; limited insight into current deficits; bed/ chair alarms; multiple lines;  Pt currently at risk for falls.  (Please find additional objective findings from PT assessment regarding body systems outlined above.) Pt will continue to benefit from skilled PT interventions to address stated impairments; to maximize functional potential; for ongoing pt/ family training; and DME needs.  PT is recommending PT Resource Intensity Level  1 on d/c.      PT will follow for STG as outlined on eval. Pt/ family agreeable to PT POC and goals as stated.     Barriers to Discharge: Inaccessible home environment     Rehab Resource  Intensity Level, PT: I (Maximum Resource Intensity)    See flowsheet documentation for full assessment.

## 2025-07-09 ENCOUNTER — APPOINTMENT (INPATIENT)
Dept: RADIOLOGY | Facility: HOSPITAL | Age: 75
DRG: 064 | End: 2025-07-09
Payer: COMMERCIAL

## 2025-07-09 PROBLEM — I10 HTN (HYPERTENSION): Status: ACTIVE | Noted: 2025-07-09

## 2025-07-09 PROBLEM — Z76.89 ENCOUNTER FOR SKIN CARE: Status: ACTIVE | Noted: 2018-04-02

## 2025-07-09 PROBLEM — G81.90 HEMIPARESIS (HCC): Status: ACTIVE | Noted: 2025-07-09

## 2025-07-09 PROBLEM — R41.4 HEMINEGLECT: Status: ACTIVE | Noted: 2025-07-09

## 2025-07-09 PROBLEM — G93.6 CEREBRAL EDEMA (HCC): Status: ACTIVE | Noted: 2025-07-09

## 2025-07-09 PROBLEM — Z79.01: Status: ACTIVE | Noted: 2025-07-09

## 2025-07-09 PROBLEM — G93.5 BRAIN COMPRESSION (HCC): Status: ACTIVE | Noted: 2025-07-09

## 2025-07-09 PROBLEM — I48.91 ATRIAL FIBRILLATION (HCC): Status: ACTIVE | Noted: 2025-07-09

## 2025-07-09 PROBLEM — R00.1 BRADYCARDIA: Status: ACTIVE | Noted: 2025-07-09

## 2025-07-09 PROBLEM — I16.1 HYPERTENSIVE EMERGENCY: Status: ACTIVE | Noted: 2025-07-09

## 2025-07-09 LAB
ANION GAP SERPL CALCULATED.3IONS-SCNC: 8 MMOL/L (ref 4–13)
B BURGDOR IGG+IGM SER QL IA: NEGATIVE
BASOPHILS # BLD AUTO: 0.05 THOUSANDS/ÂΜL (ref 0–0.1)
BASOPHILS NFR BLD AUTO: 0 % (ref 0–1)
BUN SERPL-MCNC: 12 MG/DL (ref 5–25)
CA-I BLD-SCNC: 1.07 MMOL/L (ref 1.12–1.32)
CALCIUM SERPL-MCNC: 9 MG/DL (ref 8.4–10.2)
CHLORIDE SERPL-SCNC: 100 MMOL/L (ref 96–108)
CO2 SERPL-SCNC: 27 MMOL/L (ref 21–32)
CREAT SERPL-MCNC: 0.67 MG/DL (ref 0.6–1.3)
EOSINOPHIL # BLD AUTO: 0.03 THOUSAND/ÂΜL (ref 0–0.61)
EOSINOPHIL NFR BLD AUTO: 0 % (ref 0–6)
ERYTHROCYTE [DISTWIDTH] IN BLOOD BY AUTOMATED COUNT: 13 % (ref 11.6–15.1)
GFR SERPL CREATININE-BSD FRML MDRD: 94 ML/MIN/1.73SQ M
GLUCOSE SERPL-MCNC: 154 MG/DL (ref 65–140)
HCT VFR BLD AUTO: 45.9 % (ref 36.5–49.3)
HGB BLD-MCNC: 16.2 G/DL (ref 12–17)
IMM GRANULOCYTES # BLD AUTO: 0.06 THOUSAND/UL (ref 0–0.2)
IMM GRANULOCYTES NFR BLD AUTO: 0 % (ref 0–2)
LYMPHOCYTES # BLD AUTO: 1.6 THOUSANDS/ÂΜL (ref 0.6–4.47)
LYMPHOCYTES NFR BLD AUTO: 12 % (ref 14–44)
MAGNESIUM SERPL-MCNC: 1.7 MG/DL (ref 1.9–2.7)
MCH RBC QN AUTO: 33.4 PG (ref 26.8–34.3)
MCHC RBC AUTO-ENTMCNC: 35.3 G/DL (ref 31.4–37.4)
MCV RBC AUTO: 95 FL (ref 82–98)
MONOCYTES # BLD AUTO: 1.04 THOUSAND/ÂΜL (ref 0.17–1.22)
MONOCYTES NFR BLD AUTO: 8 % (ref 4–12)
NEUTROPHILS # BLD AUTO: 10.65 THOUSANDS/ÂΜL (ref 1.85–7.62)
NEUTS SEG NFR BLD AUTO: 80 % (ref 43–75)
NRBC BLD AUTO-RTO: 0 /100 WBCS
PHOSPHATE SERPL-MCNC: 2.7 MG/DL (ref 2.3–4.1)
PLATELET # BLD AUTO: 175 THOUSANDS/UL (ref 149–390)
PMV BLD AUTO: 11.1 FL (ref 8.9–12.7)
POTASSIUM SERPL-SCNC: 3.8 MMOL/L (ref 3.5–5.3)
RBC # BLD AUTO: 4.85 MILLION/UL (ref 3.88–5.62)
SODIUM SERPL-SCNC: 135 MMOL/L (ref 135–147)
WBC # BLD AUTO: 13.43 THOUSAND/UL (ref 4.31–10.16)

## 2025-07-09 PROCEDURE — 97110 THERAPEUTIC EXERCISES: CPT

## 2025-07-09 PROCEDURE — 97530 THERAPEUTIC ACTIVITIES: CPT

## 2025-07-09 PROCEDURE — 70450 CT HEAD/BRAIN W/O DYE: CPT

## 2025-07-09 PROCEDURE — 71260 CT THORAX DX C+: CPT

## 2025-07-09 PROCEDURE — 99291 CRITICAL CARE FIRST HOUR: CPT | Performed by: EMERGENCY MEDICINE

## 2025-07-09 PROCEDURE — 82330 ASSAY OF CALCIUM: CPT

## 2025-07-09 PROCEDURE — 99223 1ST HOSP IP/OBS HIGH 75: CPT | Performed by: STUDENT IN AN ORGANIZED HEALTH CARE EDUCATION/TRAINING PROGRAM

## 2025-07-09 PROCEDURE — 83735 ASSAY OF MAGNESIUM: CPT

## 2025-07-09 PROCEDURE — 86618 LYME DISEASE ANTIBODY: CPT

## 2025-07-09 PROCEDURE — 85025 COMPLETE CBC W/AUTO DIFF WBC: CPT

## 2025-07-09 PROCEDURE — 74177 CT ABD & PELVIS W/CONTRAST: CPT

## 2025-07-09 PROCEDURE — 84100 ASSAY OF PHOSPHORUS: CPT

## 2025-07-09 PROCEDURE — 97535 SELF CARE MNGMENT TRAINING: CPT

## 2025-07-09 PROCEDURE — 97112 NEUROMUSCULAR REEDUCATION: CPT

## 2025-07-09 PROCEDURE — 80048 BASIC METABOLIC PNL TOTAL CA: CPT

## 2025-07-09 RX ORDER — POLYETHYLENE GLYCOL 3350 17 G/17G
17 POWDER, FOR SOLUTION ORAL DAILY
Status: DISCONTINUED | OUTPATIENT
Start: 2025-07-09 | End: 2025-07-25 | Stop reason: HOSPADM

## 2025-07-09 RX ORDER — ACETAMINOPHEN 10 MG/ML
1000 INJECTION, SOLUTION INTRAVENOUS EVERY 6 HOURS PRN
Status: DISCONTINUED | OUTPATIENT
Start: 2025-07-09 | End: 2025-07-09

## 2025-07-09 RX ORDER — CARVEDILOL 6.25 MG/1
6.25 TABLET ORAL 2 TIMES DAILY WITH MEALS
Status: DISCONTINUED | OUTPATIENT
Start: 2025-07-09 | End: 2025-07-25 | Stop reason: HOSPADM

## 2025-07-09 RX ORDER — ACETAMINOPHEN 325 MG/1
975 TABLET ORAL EVERY 8 HOURS PRN
Status: DISCONTINUED | OUTPATIENT
Start: 2025-07-09 | End: 2025-07-11

## 2025-07-09 RX ORDER — MAGNESIUM SULFATE HEPTAHYDRATE 40 MG/ML
2 INJECTION, SOLUTION INTRAVENOUS ONCE
Status: COMPLETED | OUTPATIENT
Start: 2025-07-09 | End: 2025-07-09

## 2025-07-09 RX ORDER — TAMSULOSIN HYDROCHLORIDE 0.4 MG/1
0.4 CAPSULE ORAL
Status: DISCONTINUED | OUTPATIENT
Start: 2025-07-09 | End: 2025-07-25 | Stop reason: HOSPADM

## 2025-07-09 RX ORDER — ACETAMINOPHEN 10 MG/ML
1000 INJECTION, SOLUTION INTRAVENOUS EVERY 6 HOURS PRN
Status: DISCONTINUED | OUTPATIENT
Start: 2025-07-09 | End: 2025-07-11

## 2025-07-09 RX ORDER — CALCIUM GLUCONATE 20 MG/ML
2 INJECTION, SOLUTION INTRAVENOUS ONCE
Status: COMPLETED | OUTPATIENT
Start: 2025-07-09 | End: 2025-07-09

## 2025-07-09 RX ORDER — CALCIUM GLUCONATE 20 MG/ML
1 INJECTION, SOLUTION INTRAVENOUS ONCE
Status: DISCONTINUED | OUTPATIENT
Start: 2025-07-09 | End: 2025-07-09

## 2025-07-09 RX ORDER — AMLODIPINE BESYLATE 10 MG/1
10 TABLET ORAL DAILY
Status: DISCONTINUED | OUTPATIENT
Start: 2025-07-09 | End: 2025-07-25 | Stop reason: HOSPADM

## 2025-07-09 RX ADMIN — SENNOSIDES AND DOCUSATE SODIUM 2 TABLET: 50; 8.6 TABLET ORAL at 18:24

## 2025-07-09 RX ADMIN — NICARDIPINE HYDROCHLORIDE 2.5 MG/HR: 25 INJECTION, SOLUTION INTRAVENOUS at 01:56

## 2025-07-09 RX ADMIN — NICARDIPINE HYDROCHLORIDE 12 MG/HR: 25 INJECTION, SOLUTION INTRAVENOUS at 09:57

## 2025-07-09 RX ADMIN — TAMSULOSIN HYDROCHLORIDE 0.4 MG: 0.4 CAPSULE ORAL at 16:42

## 2025-07-09 RX ADMIN — ACETAMINOPHEN 1000 MG: 10 INJECTION INTRAVENOUS at 01:53

## 2025-07-09 RX ADMIN — ACETAMINOPHEN 1000 MG: 10 INJECTION INTRAVENOUS at 10:50

## 2025-07-09 RX ADMIN — IOHEXOL 75 ML: 350 INJECTION, SOLUTION INTRAVENOUS at 13:55

## 2025-07-09 RX ADMIN — Medication 6 MG: at 21:46

## 2025-07-09 RX ADMIN — AMLODIPINE BESYLATE 10 MG: 10 TABLET ORAL at 10:08

## 2025-07-09 RX ADMIN — NICARDIPINE HYDROCHLORIDE 10 MG/HR: 25 INJECTION, SOLUTION INTRAVENOUS at 12:15

## 2025-07-09 RX ADMIN — CARVEDILOL 6.25 MG: 6.25 TABLET, FILM COATED ORAL at 16:42

## 2025-07-09 RX ADMIN — MAGNESIUM SULFATE HEPTAHYDRATE 2 G: 40 INJECTION, SOLUTION INTRAVENOUS at 11:24

## 2025-07-09 RX ADMIN — LOSARTAN POTASSIUM 100 MG: 50 TABLET, FILM COATED ORAL at 10:08

## 2025-07-09 RX ADMIN — NICARDIPINE HYDROCHLORIDE 5 MG/HR: 25 INJECTION, SOLUTION INTRAVENOUS at 23:53

## 2025-07-09 RX ADMIN — CEFTRIAXONE SODIUM 1000 MG: 10 INJECTION, POWDER, FOR SOLUTION INTRAVENOUS at 18:30

## 2025-07-09 RX ADMIN — Medication 12.5 MG: at 10:08

## 2025-07-09 RX ADMIN — NICARDIPINE HYDROCHLORIDE 7 MG/HR: 25 INJECTION, SOLUTION INTRAVENOUS at 19:59

## 2025-07-09 RX ADMIN — NICARDIPINE HYDROCHLORIDE 12.5 MG/HR: 25 INJECTION, SOLUTION INTRAVENOUS at 07:48

## 2025-07-09 RX ADMIN — SENNOSIDES AND DOCUSATE SODIUM 2 TABLET: 50; 8.6 TABLET ORAL at 10:08

## 2025-07-09 RX ADMIN — NICARDIPINE HYDROCHLORIDE 7 MG/HR: 25 INJECTION, SOLUTION INTRAVENOUS at 15:41

## 2025-07-09 RX ADMIN — CALCIUM GLUCONATE 2 G: 20 INJECTION, SOLUTION INTRAVENOUS at 13:27

## 2025-07-09 RX ADMIN — CHLORHEXIDINE GLUCONATE 15 ML: 1.2 SOLUTION ORAL at 21:46

## 2025-07-09 RX ADMIN — POLYETHYLENE GLYCOL 3350 17 G: 17 POWDER, FOR SOLUTION ORAL at 10:07

## 2025-07-09 RX ADMIN — CHLORHEXIDINE GLUCONATE 15 ML: 1.2 SOLUTION ORAL at 10:08

## 2025-07-09 RX ADMIN — ATORVASTATIN CALCIUM 20 MG: 20 TABLET, FILM COATED ORAL at 16:42

## 2025-07-09 NOTE — PHYSICAL THERAPY NOTE
PHYSICAL THERAPY TREATMENT  NAME:  Shawn Marquez  DATE: 07/09/25    AGE:   75 y.o.  Mrn:   [id number]  ADMIT DX:  Acute left-sided weakness [R53.1]    Past Medical History[1]  Past Surgical History[2]    Length Of Stay: 2     07/09/25 1058   PT Last Visit   PT Visit Date 07/09/25   Note Type   Note Type Treatment   Pain Assessment   Pain Assessment Tool 0-10   Pain Score 1   Pain Location/Orientation Location: Head   Pain Onset/Description Onset: Ongoing   Patient's Stated Pain Goal No pain   Restrictions/Precautions   Weight Bearing Precautions Per Order No   Other Precautions Impulsive;Cognitive;Chair Alarm;Restraints;Multiple lines;Telemetry;Fall Risk;Pain  (L inattention; (L) weakness;)   General   Chart Reviewed Yes   Family/Caregiver Present Yes  (spouse)   Cognition   Overall Cognitive Status Impaired   Arousal/Participation Cooperative;Responsive   Attention Attends with cues to redirect   Orientation Level Oriented to person;Oriented to place   Memory Decreased recall of precautions;Decreased recall of recent events   Following Commands Follows one step commands with increased time or repetition   Comments cooperative w/ cues for safety and attention to task.  limited insight into deficits.   Bed Mobility   Supine to Sit 3  Moderate assistance   Additional items Assist x 2;Increased time required;Verbal cues;LE management   Additional Comments pt ists EOB w/ varied level of assist from modAx1 > brief periods of close SBA. pt sat total of ~14 mins EOB w. stable vitals   Transfers   Sit to Stand 2  Maximal assistance   Additional items Assist x 2   Stand to Sit 2  Maximal assistance   Additional items Assist x 2   Stand pivot 2  Maximal assistance  (stand pivot maxA x2 to R drop arm chair.)   Additional items Assist x 2   Balance   Static Sitting Poor   Dynamic Sitting Poor -   Static Standing Poor -   Dynamic Standing Poor -   Ambulatory Zero   Endurance Deficit   Endurance Deficit Yes   Activity  Tolerance   Activity Tolerance Patient limited by fatigue;Patient tolerated treatment well   Medical Staff Made Aware OT Génesis   Nurse Made Aware Yes- Nancy   Exercises   Knee AROM Long Arc Quad Sitting;5 reps;AAROM;AROM;Right;Left  (sitting EOB w/ manual assist for LLE)   Ankle Pumps Sitting;10 reps   Heel Cord Stretch Supine;Left   Neuro re-ed weightbearing through LUE and LLE EOB; VC+ TC for returning to midline . pt sat total ~14 mins EOB w/ varied level of assist.   Assessment   Prognosis Fair   Problem List Decreased strength;Decreased range of motion;Decreased endurance;Impaired balance;Decreased mobility;Decreased coordination;Decreased cognition;Impaired judgement;Decreased safety awareness;Impaired vision;Impaired sensation;Impaired tone   Assessment pt seen for PT session w/ focus on sitting balance; cues for attention to L throughout. Pt continues to have L inattention and L strength deficits limiting sitting and standing balance/ tolerance. Pt is distractable and requried cues for task focus + safety throughout. Education w/ spouse during session on sitting on pt's L and drawing pt's attention to L hemibody. Pt was repositioned in chair post session for comfort w/ all needs in reach. PT will continue to follow. d/c recommendations as noted below.   Barriers to Discharge Inaccessible home environment   Goals   Patient Goals to get better   STG Expiration Date 07/22/25   PT Treatment Day 1   Plan   Treatment/Interventions ADL retraining;Functional transfer training;LE strengthening/ROM;Elevations;Therapeutic exercise;Endurance training;Cognitive reorientation;Patient/family training;Equipment eval/education;Bed mobility;Gait training;Compensatory technique education;Spoke to nursing;Spoke to case management;Spoke to advanced practitioner;OT;Family   Progress Slow progress, decreased activity tolerance   PT Frequency 3-5x/wk   Discharge Recommendation   Rehab Resource Intensity Level, PT I (Maximum  Resource Intensity)   AM-PAC Basic Mobility Inpatient   Turning in Flat Bed Without Bedrails 2   Lying on Back to Sitting on Edge of Flat Bed Without Bedrails 2   Moving Bed to Chair 1   Standing Up From Chair Using Arms 1   Walk in Room 1   Climb 3-5 Stairs With Railing 1   Basic Mobility Inpatient Raw Score 8   Turning Head Towards Sound 4   Follow Simple Instructions 3   Low Function Basic Mobility Raw Score  15   Low Function Basic Mobility Standardized Score  23.9   [facility] Highest Level Of Mobility   -[facility] Goal 3: Sit at edge of bed   -[facility] Achieved 4: Move to chair/commode       The patient's AM-PAC Basic Mobility Inpatient Short Form Raw Score is 8. A Raw score of less than or equal to 16 suggests the patient may benefit from discharge to post-acute rehabilitation services. Please also refer to the recommendation of the Physical Therapist for safe discharge planning.          Vanessa Ly, PT        [1]   Past Medical History:  Diagnosis Date    Anxiety     Depression     DVT (deep venous thrombosis) (HCC)     Gross hematuria     LA...3/29/16   R...4/3/17     Hematuria     LA.....4/12/16    R....4/3/17    Hyperlipidemia     Hypertension     Long-term (current) use of anticoagulants     LA...3/29/16   R...4/3/17     Pre-operative cardiovascular examination, supraventricular arrhythmia 07/15/2022    Seasonal allergies    [2]   Past Surgical History:  Procedure Laterality Date    APPENDECTOMY      WISDOM TOOTH EXTRACTION

## 2025-07-09 NOTE — OCCUPATIONAL THERAPY NOTE
Occupational Therapy Treatment     Patient Name: Shawn Marquez  Today's Date: 7/9/2025  Problem List  Principal Problem:    ICH (intracerebral hemorrhage) (HCC)  Active Problems:    Essential hypertension    DM II (diabetes mellitus, type II), controlled (HCC)    Encounter for skin care    History of DVT (deep vein thrombosis)    HLD (hyperlipidemia)    Depression    Seizure-like activity (HCC)    Hypertensive emergency    Cerebral edema (HCC)    Brain compression (HCC)    Atrial fibrillation (HCC)    Bradycardia    HTN (hypertension)    Anticoagulated on rivaroxaban    Hemineglect    Hemiparesis (HCC)    Past Medical History  Past Medical History[1]  Past Surgical History  Past Surgical History[2]        07/09/25 1059   OT Last Visit   OT Visit Date 07/09/25   Note Type   Note Type Treatment   Pain Assessment   Pain Assessment Tool 0-10   Pain Score 1   Pain Location/Orientation Location: Head   Pain Onset/Description Onset: Ongoing;Descriptor: Aching;Descriptor: Discomfort   Patient's Stated Pain Goal No pain   Hospital Pain Intervention(s) Repositioned;Ambulation/increased activity;Emotional support   Restrictions/Precautions   Weight Bearing Precautions Per Order No   Other Precautions Cognitive;Chair Alarm;Bed Alarm;Multiple lines;Telemetry;O2;Fall Risk;Pain;Visual impairment;Impulsive  (2L O2; L inattention; L sided weakness)   Lifestyle   Autonomy I w/ ADLS, I w/ meds but assist w/ all other IADLS, (-) , I w/ transfers and functional mobility PTA   Reciprocal Relationships pt lives w/ his spouse   Service to Others retired   Intrinsic Gratification TV   ADL   Grooming Assistance 3  Moderate Assistance   Grooming Deficit Setup;Verbal cueing;Supervision/safety;Increased time to complete;Teeth care   Grooming Comments pt seated EOB and placed oral care in front of pt on tray table. Pt needed Max VC to find oral care supplies to L side of tray table. Pt able to hold toothebrush w/ R hand but  "required assist to open toothepaste & apply to brush. Pt able to brush teeth successfully and gargle w/ mouth wash w/ only use of RUE.   LB Dressing Assistance 2  Maximal Assistance   LB Dressing Deficit Don/doff R sock;Don/doff L sock   LB Dressing Comments Max A to don socks while supine in bed   Toileting Comments purewick in place; pt asking repetitively to urinate in garbage can instead.   Bed Mobility   Supine to Sit 3  Moderate assistance   Additional items Assist x 2;Increased time required;Verbal cues;LE management   Sit to Supine Unable to assess   Additional Comments Pt sat EOB w/ Mod-Max A for sitting balance/trunk control; has L lateral lean and L inattention. Pushes w/ RUE   Transfers   Sit to Stand 2  Maximal assistance   Additional items Assist x 2;Increased time required;Verbal cues   Stand to Sit 2  Maximal assistance   Additional items Assist x 2;Increased time required;Verbal cues   Stand pivot 2  Maximal assistance   Additional items Assist x 2;Increased time required;Verbal cues   Additional Comments B/L HHA used to drop arm recliner   Functional Mobility   Additional Comments Not appropriate @ this time.   Subjective   Subjective \"cannibus time\"   Cognition   Overall Cognitive Status Impaired   Arousal/Participation Responsive;Cooperative   Attention Difficulty attending to directions   Orientation Level Oriented to person;Disoriented to time;Disoriented to situation;Oriented to place   Memory Decreased recall of precautions;Decreased recall of recent events;Decreased short term memory   Following Commands Follows one step commands with increased time or repetition   Comments Pt is cooperative; needs frequent redirection to task; has poor insight/safety awareness and decreased insight into deficits   Vision   Vision Comments L inattention; Max VC and manual assist to turn head to L side to find supplies for ADL routine.   Activity Tolerance   Activity Tolerance Patient limited by fatigue "   Medical Staff Made Aware Vanessa ODEN; RN, Nancy   Assessment   Assessment Patient participated in Skilled OT session 7/9/2025 with interventions consisting of ADL re training with the use of correct body mechnaics, Energy Conservation techniques, safety awareness and fall prevention techniques, therapeutic exercise to: increase functional use of BUEs, increase BUE muscle strength ,  therapeutic activities to: increase activity tolerance, neuromuscular re education to: facilitate volitional use of limbs, elicit righting and equilibrium reactions for improved postural control and alignment during transitional movements, increase dynamic sit/ stand balance during functional activity , increase postural control, increase trunk control, and increase OOB/ sitting tolerance . Patient agreeable to OT treatment session, upon arrival patient was found supine in bed.  In comparison to previous session, patient with improvements in EOB sitting tolerance, arousal and oral care . Patient requiring frequent re direction, verbal cues for safety, verbal cues for correct technique, and verbal cues for pacing thru activity steps. Patient continues to be functioning below baseline level, occupational performance remains limited secondary to factors listed above and increased risk for falls and injury.   From OT standpoint, recommendation at time of d/c would be moderate resource intensity.   Patient to benefit from continued Occupational Therapy treatment while in the hospital to address deficits as defined above and maximize level of functional independence with ADLs and functional mobility.   Plan   Treatment Interventions ADL retraining;Visual perceptual retraining;Functional transfer training;UE strengthening/ROM;Endurance training;Cognitive reorientation;Patient/family training;Equipment evaluation/education;Neuromuscular reeducation;Fine motor coordination activities;Compensatory technique education;Continued evaluation;Energy  conservation;Activityengagement   Goal Expiration Date 07/22/25   OT Treatment Day 1   OT Frequency 2-3x/wk   Discharge Recommendation   Rehab Resource Intensity Level, OT II (Moderate Resource Intensity)   Additional Comments  The patient's raw score on the AM-PAC Daily Activity Inpatient Short Form is 11. A raw score of less than 19 suggests the patient may benefit from discharge to post-acute rehabilitation services. Please refer to the recommendation of the Occupational Therapist for safe discharge planning.   Additional Comments 2 Pt seen as a co-session due to the patient's co-morbidities, clinically unstable presentation, and present impairments which are a regression from the patient's baseline.   AM-PAC Daily Activity Inpatient   Lower Body Dressing 2   Bathing 2   Toileting 1   Upper Body Dressing 2   Grooming 2   Eating 2   Daily Activity Raw Score 11   Daily Activity Standardized Score (Calc for Raw Score >=11) 29.04   AM-PAC Applied Cognition Inpatient   Following a Speech/Presentation 2   Understanding Ordinary Conversation 2   Taking Medications 2   Remembering Where Things Are Placed or Put Away 2   Remembering List of 4-5 Errands 2   Taking Care of Complicated Tasks 1   Applied Cognition Raw Score 11   Applied Cognition Standardized Score 27.03   End of Consult   Education Provided Yes;Family or social support of family present for education by provider   Patient Position at End of Consult Bedside chair;Bed/Chair alarm activated;All needs within reach   Nurse Communication Nurse aware of consult       Christina Lees MS, OTR/L              [1]   Past Medical History:  Diagnosis Date    Anxiety     Depression     DVT (deep venous thrombosis) (HCC)     Gross hematuria     LA...3/29/16   R...4/3/17     Hematuria     LA.....4/12/16    R....4/3/17    Hyperlipidemia     Hypertension     Long-term (current) use of anticoagulants     LA...3/29/16   R...4/3/17     Pre-operative cardiovascular examination,  supraventricular arrhythmia 07/15/2022    Seasonal allergies    [2]   Past Surgical History:  Procedure Laterality Date    APPENDECTOMY      WISDOM TOOTH EXTRACTION

## 2025-07-09 NOTE — PLAN OF CARE
Problem: Neurological Deficit  Goal: Neurological status is stable or improving  Description: Interventions:  - Monitor and assess patient's level of consciousness, motor function, sensory function, and level of assistance needed for ADLs.   - Monitor and report changes from baseline. Collaborate with interdisciplinary team to initiate plan and implement interventions as ordered.   - Provide and maintain a safe environment.  - Consider seizure precautions.  - Consider fall precautions.  - Consider aspiration precautions.  - Consider bleeding precautions.  Outcome: Progressing     Problem: Activity Intolerance/Impaired Mobility  Goal: Mobility/activity is maintained at optimum level for patient  Description: Interventions:  - Assess and monitor patient  barriers to mobility and need for assistive/adaptive devices.  - Assess patient's emotional response to limitations.  - Collaborate with interdisciplinary team and initiate plans and interventions as ordered.  - Encourage independent activity per ability.  - Maintain proper body alignment.  - Perform active/passive rom as tolerated/ordered.  - Plan activities to conserve energy.  - Turn patient as appropriate  Outcome: Progressing     Problem: Communication Impairment  Goal: Ability to express needs and understand communication  Description: Assess patient's communication skills and ability to understand information.  Patient will demonstrate use of effective communication techniques, alternative methods of communication and understanding even if not able to speak.     - Encourage communication and provide alternate methods of communication as needed.  - Collaborate with case management/ for discharge needs.  - Include patient/family/caregiver in decisions related to communication.  Outcome: Progressing     Problem: Potential for Aspiration  Goal: Non-ventilated patient's risk of aspiration is minimized  Description: Assess and monitor vital signs,  respiratory status, and labs (WBC).  Monitor for signs of aspiration (tachypnea, cough, rales, wheezing, cyanosis, fever).    - Assess and monitor patient's ability to swallow.  - Place patient up in chair to eat if possible.  - HOB up at 90 degrees to eat if unable to get patient up into chair.  - Supervise patient during oral intake.   - Instruct patient/ family to take small bites.  - Instruct patient/ family to take small single sips when taking liquids.  - Follow patient-specific strategies generated by speech pathologist.  Outcome: Progressing  Goal: Ventilated patient's risk of aspiration is minimized  Description: Assess and monitor vital signs, respiratory status, airway cuff pressure, and labs (WBC).  Monitor for signs of aspiration (tachypnea, cough, rales, wheezing, cyanosis, fever).    - Elevate head of bed 30 degrees if patient has tube feeding.  - Monitor tube feeding.  Outcome: Progressing     Problem: Nutrition  Goal: Nutrition/Hydration status is improving  Description: Monitor and assess patient's nutrition/hydration status for malnutrition (ex- brittle hair, bruises, dry skin, pale skin and conjunctiva, muscle wasting, smooth red tongue, and disorientation). Collaborate with interdisciplinary team and initiate plan and interventions as ordered.  Monitor patient's weight and dietary intake as ordered or per policy. Utilize nutrition screening tool and intervene per policy. Determine patient's food preferences and provide high-protein, high-caloric foods as appropriate.     - Assist patient with eating.  - Allow adequate time for meals.  - Encourage patient to take dietary supplement as ordered.  - Collaborate with clinical nutritionist.  - Include patient/family/caregiver in decisions related to nutrition.  Outcome: Progressing     Problem: PAIN - ADULT  Goal: Verbalizes/displays adequate comfort level or baseline comfort level  Description: Interventions:  - Encourage patient to monitor pain and  request assistance  - Assess pain using appropriate pain scale  - Administer analgesics as ordered based on type and severity of pain and evaluate response  - Implement non-pharmacological measures as appropriate and evaluate response  - Consider cultural and social influences on pain and pain management  - Notify physician/advanced practitioner if interventions unsuccessful or patient reports new pain  - Educate patient/family on pain management process including their role and importance of  reporting pain   - Provide non-pharmacologic/complimentary pain relief interventions  Outcome: Progressing     Problem: INFECTION - ADULT  Goal: Absence or prevention of progression during hospitalization  Description: INTERVENTIONS:  - Assess and monitor for signs and symptoms of infection  - Monitor lab/diagnostic results  - Monitor all insertion sites, i.e. indwelling lines, tubes, and drains  - Monitor endotracheal if appropriate and nasal secretions for changes in amount and color  - Murray appropriate cooling/warming therapies per order  - Administer medications as ordered  - Instruct and encourage patient and family to use good hand hygiene technique  - Identify and instruct in appropriate isolation precautions for identified infection/condition  Outcome: Progressing  Goal: Absence of fever/infection during neutropenic period  Description: INTERVENTIONS:  - Monitor WBC  - Perform strict hand hygiene  - Limit to healthy visitors only  - No plants, dried, fresh or silk flowers with pollock in patient room  Outcome: Progressing     Problem: SAFETY ADULT  Goal: Patient will remain free of falls  Description: INTERVENTIONS:  - Educate patient/family on patient safety including physical limitations  - Instruct patient to call for assistance with activity   - Consider consulting OT/PT to assist with strengthening/mobility based on AM PAC & JH-HLM score  - Consult OT/PT to assist with strengthening/mobility   - Keep Call bell  within reach  - Keep bed low and locked with side rails adjusted as appropriate  - Keep care items and personal belongings within reach  - Initiate and maintain comfort rounds  - Make Fall Risk Sign visible to staff  - Offer Toileting every  Hours, in advance of need  - Initiate/Maintain alarm  - Obtain necessary fall risk management equipment:   - Apply yellow socks and bracelet for high fall risk patients  - Consider moving patient to room near nurses station  Outcome: Progressing  Goal: Maintain or return to baseline ADL function  Description: INTERVENTIONS:  -  Assess patient's ability to carry out ADLs; assess patient's baseline for ADL function and identify physical deficits which impact ability to perform ADLs (bathing, care of mouth/teeth, toileting, grooming, dressing, etc.)  - Assess/evaluate cause of self-care deficits   - Assess range of motion  - Assess patient's mobility; develop plan if impaired  - Assess patient's need for assistive devices and provide as appropriate  - Encourage maximum independence but intervene and supervise when necessary  - Involve family in performance of ADLs  - Assess for home care needs following discharge   - Consider OT consult to assist with ADL evaluation and planning for discharge  - Provide patient education as appropriate  - Monitor functional capacity and physical performance, use of AM PAC & JH-HLM   - Monitor gait, balance and fatigue with ambulation    Outcome: Progressing  Goal: Maintains/Returns to pre admission functional level  Description: INTERVENTIONS:  - Perform AM-PAC 6 Click Basic Mobility/ Daily Activity assessment daily.  - Set and communicate daily mobility goal to care team and patient/family/caregiver.   - Collaborate with rehabilitation services on mobility goals if consulted  - Perform Range of Motion  times a day.  - Reposition patient every  hours.  - Dangle patient  times a day  - Stand patient  times a day  - Ambulate patient  times a day  -  Out of bed to chair  times a day   - Out of bed for meals  times a day  - Out of bed for toileting  - Record patient progress and toleration of activity level   Outcome: Progressing     Problem: DISCHARGE PLANNING  Goal: Discharge to home or other facility with appropriate resources  Description: INTERVENTIONS:  - Identify barriers to discharge w/patient and caregiver  - Arrange for needed discharge resources and transportation as appropriate  - Identify discharge learning needs (meds, wound care, etc.)  - Arrange for interpretive services to assist at discharge as needed  - Refer to Case Management Department for coordinating discharge planning if the patient needs post-hospital services based on physician/advanced practitioner order or complex needs related to functional status, cognitive ability, or social support system  Outcome: Progressing     Problem: Knowledge Deficit  Goal: Patient/family/caregiver demonstrates understanding of disease process, treatment plan, medications, and discharge instructions  Description: Complete learning assessment and assess knowledge base.  Interventions:  - Provide teaching at level of understanding  - Provide teaching via preferred learning methods  Outcome: Progressing     Problem: Nutrition/Hydration-ADULT  Goal: Nutrient/Hydration intake appropriate for improving, restoring or maintaining nutritional needs  Description: Monitor and assess patient's nutrition/hydration status for malnutrition. Collaborate with interdisciplinary team and initiate plan and interventions as ordered.  Monitor patient's weight and dietary intake as ordered or per policy. Utilize nutrition screening tool and intervene as necessary. Determine patient's food preferences and provide high-protein, high-caloric foods as appropriate.     INTERVENTIONS:  - Monitor oral intake, urinary output, labs, and treatment plans  - Assess nutrition and hydration status and recommend course of action  - Evaluate  amount of meals eaten  - Assist patient with eating if necessary   - Allow adequate time for meals  - Recommend/ encourage appropriate diets, oral nutritional supplements, and vitamin/mineral supplements  - Order, calculate, and assess calorie counts as needed  - Recommend, monitor, and adjust tube feedings and TPN/PPN based on assessed needs  - Assess need for intravenous fluids  - Provide specific nutrition/hydration education as appropriate  - Include patient/family/caregiver in decisions related to nutrition  Outcome: Progressing     Problem: Prexisting or High Potential for Compromised Skin Integrity  Goal: Skin integrity is maintained or improved  Description: INTERVENTIONS:  - Identify patients at risk for skin breakdown  - Assess and monitor skin integrity including under and around medical devices   - Assess and monitor nutrition and hydration status  - Monitor labs  - Assess for incontinence   - Turn and reposition patient  - Assist with mobility/ambulation  - Relieve pressure over pretty prominences   - Avoid friction and shearing  - Provide appropriate hygiene as needed including keeping skin clean and dry  - Evaluate need for skin moisturizer/barrier cream  - Collaborate with interdisciplinary team  - Patient/family teaching  - Consider wound care consult    Assess:  - Review Francisco Javier scale daily  - Clean and moisturize skin every   - Inspect skin when repositioning, toileting, and assisting with ADLS  - Assess under medical devices such as  every   - Assess extremities for adequate circulation and sensation     Bed Management:  - Have minimal linens on bed & keep smooth, unwrinkled  - Change linens as needed when moist or perspiring  - Avoid sitting or lying in one position for more than  hours while in bed?Keep HOB at degrees   - Toileting:  - Offer bedside commode  - Assess for incontinence every   - Use incontinent care products after each incontinent episode such as     Activity:  - Mobilize patient   times a day  - Encourage activity and walks on unit  - Encourage or provide ROM exercises   - Turn and reposition patient every  Hours  - Use appropriate equipment to lift or move patient in bed  - Instruct/ Assist with weight shifting every  when out of bed in chair  - Consider limitation of chair time  hour intervals    Skin Care:  - Avoid use of baby powder, tape, friction and shearing, hot water or constrictive clothing  - Relieve pressure over bony prominences using   - Do not massage red bony areas    Next Steps:  - Teach patient strategies to minimize risks such as   - Consider consults to  interdisciplinary teams such as  Outcome: Progressing

## 2025-07-09 NOTE — ASSESSMENT & PLAN NOTE
Recommend multipodus boot for LLE   Shoulder subluxation precautions on affected side to prevent acute pain, chronic pain and loss of function: ensure arm is supported properly in bed, chair, wheelchair, and during transfers/ambulation.  Avoid affected arm excessively hanging down at side, over-extending or externally rotating. Provide pillows or lap tray support if needed.  Consider sling when mobilizing.  Gentle ROM and stretching exercises

## 2025-07-09 NOTE — PROGRESS NOTES
"Progress Note - Critical Care/ICU   Name: Shawn Marquez 75 y.o. male I MRN: [id number]  Unit/Bed#: ICU 01 I Date of Admission: 7/7/2025   Date of Service: 7/9/2025 I Hospital Day: 2       Assessment & Plan   Active Hospital Problems    Diagnosis Date Noted POA    ICH (intracerebral hemorrhage) (HCC) 07/08/2025 Unknown    Hypertensive emergency 07/09/2025 Unknown    Cerebral edema (HCC) 07/09/2025 Unknown    Brain compression (HCC) 07/09/2025 Unknown    Atrial fibrillation (HCC) 07/09/2025 Unknown    Bradycardia 07/09/2025 Unknown    HTN (hypertension) 07/09/2025 Unknown    Anticoagulated on rivaroxaban 07/09/2025 Not Applicable    Seizure-like activity (HCC) 07/07/2025 Yes    Depression 05/31/2024 Yes    HLD (hyperlipidemia) 02/12/2021 Yes    History of DVT (deep vein thrombosis) 12/17/2018 Not Applicable    DM II (diabetes mellitus, type II), controlled (HCC) 11/25/2015 Yes    Essential hypertension 11/22/2013 Yes      Resolved Hospital Problems   No resolved problems to display.       Neuro:   Diagnosis: Right BG ICH with mass effect and cerebral edema with brain compression (ICH 1; Size) with right pontine hypodensity  Presented 7/7 to [facility] with Left sided weakness, NIHSS 14   CTH:  \"Large acute intraparenchymal hemorrhage centered involving the posterior right basal ganglia, adjacent right thalamus, and posterior right insula, with a volume of approximately 23 mL\"  Given KCentra, DDAVP, [facility]  Patient has had waxing and waning mental status with times of agitation/delirium; Trial of precedex overnight 7/7-8 with bradycardia into the 20s  Tonight with intractable N/V, hypertension, unable to tolerate MRI, underwent STAT CTH which was unchanged  ? Seizure like activity  Plan:   Monitor mental status Q2 when awake Q4 overnight  Obtain MRI  Defer further imagine to neurosurgery  STAT CTH for change in GCS > 2 points, new focal deficit  Keppra D/C  SBP goal 110-150    CV:   Diagnosis: " Hypertensive emergency  Home medications: Metoprolol succinate 25 mg QD  Overnight required numerous doses of PRN hydralazine and eventually back on cardene infusion  Plan:   Start norvasc this AM  BP goals 110-150, consider allowing up to 160      Diagnosis: pAF  Home medication: Metoprolol succinate 25 mg QD; Rivaroxaban   Plan:   Hold Metoprolol given below  Monitor HR  Hold AC given bleeding    Diagnosis: Bradycardia  Bradycardia as low as 20 yesterday on precedex  Plan:   Hold metoprolol  Don't use precedex for sedation  F/u Lyme antibodies    Diagnosis: HLD  Plan:   Continue statin    Pulm:  No active isues    GI:   No active issues    :   No active issues    F/E/N:   Fluids: None  Electrolytes: Replete for K >4, <5 ; Phos > 3; and Mag > 2  Nutrition: PO diet     Heme/Onc:   Diagnosis: Anticoagulated  Plan:   S/p KCentra, Hold any further AC at this time    Diagnosis: Aspirin induced platelet dysfunction  Plan:   S/p DDAVP  Hold off any AP at [facility]    Endo:   Diagnosis: DM II  Plan:   Hold home metformin  SSI    ID:   No active issues    MSK/Skin:   No active issues  Disposition: Stepdown Level 1    ICU Core Measures     A: Assess, Prevent, and Manage Pain Has pain been assessed? Yes  Need for changes to pain regimen? No   B: Both SAT/SAT  N/A   C: Choice of Sedation RASS Goal: N/A patient not on sedation  Need for changes to sedation or analgesia regimen? N/A   D: Delirium CAM-ICU: Negative   E: Early Mobility  Plan for early mobility? Yes   F: Family Engagement Plan for family engagement today? Yes       Review of Invasive Devices:            Prophylaxis:  VTE VTE covered by:    None       Stress Ulcer  not ordered         HPI  75-year-old male with past medical history of Pester dementia, A-fib on Xarelto, hypertension, hyperlipidemia, type 2 diabetes who presented with right basal ganglia bleed and hypertensive emergency was transferred to [facility]    24 Hour Events :   Significant  bradycardia while on Precedex  Overnight increasing hypotension, nausea/vomiting.  Unable to tolerate MRI, therefore went for stat CT head that was unchanged  Subjective   Review of Systems: Review of Systems   Neurological:  Positive for headaches.       Objective :                   Vitals I/O      Most Recent Min/Max in 24hrs   Temp 98.9 °F (37.2 °C) Temp  Min: 95.9 °F (35.5 °C)  Max: 98.9 °F (37.2 °C)   Pulse (!) 119 Pulse  Min: 36  Max: 128   Resp 20 Resp  Min: 14  Max: 29   /65 BP  Min: 104/59  Max: 183/93   O2 Sat 95 % SpO2  Min: 92 %  Max: 99 %      Intake/Output Summary (Last 24 hours) at 7/9/2025 0401  Last data filed at 7/8/2025 2339  Gross per 24 hour   Intake 2127.49 ml   Output 1962 ml   Net 165.49 ml       Diet Dysphagia/Modified Consistency; Dysphagia 3-Dental Soft; Thin Liquid    Invasive Monitoring           Physical Exam   Physical Exam  Vitals and nursing note reviewed.   Eyes:      Conjunctiva/sclera: Conjunctivae normal.   Skin:     General: Skin is warm and dry.      Capillary Refill: Capillary refill takes less than 2 seconds.   HENT:      Head: Normocephalic and atraumatic.      Mouth/Throat:      Mouth: Mucous membranes are dry.   Neck:      Vascular: No JVD.   Cardiovascular:      Rate and Rhythm: Regular rhythm. Tachycardia present.      Pulses: Normal pulses.   Musculoskeletal:         General: Normal range of motion.   Abdominal: General: There is no distension.      Palpations: Abdomen is soft.      Tenderness: There is no abdominal tenderness.   Constitutional:       Appearance: He is well-developed and well-nourished.   Pulmonary:      Effort: Pulmonary effort is normal.      Breath sounds: No wheezing or rhonchi.   Neurological:      Comments: A and Ox2, self and place, L neglect, LUE 3/5 LLE 3/5. Right upper and lower extremity 5/5   Genitourinary/Anorectal:     Comments: deferred         Diagnostic Studies        Lab Results: I have reviewed the following results:      Medications:  Scheduled PRN   amLODIPine, 10 mg, Daily  atorvastatin, 20 mg, Daily With Dinner  chlorhexidine, 15 mL, Q12H DEIDRE  losartan, 100 mg, Daily  melatonin, 6 mg, HS  senna-docusate sodium, 2 tablet, BID      acetaminophen, 1,000 mg, Q6H PRN  bisacodyl, 10 mg, Daily PRN  hydrALAZINE, 10 mg, Q6H PRN  ondansetron, 4 mg, Q6H PRN       Continuous    niCARdipine, 1-15 mg/hr, Last Rate: 12.5 mg/hr (07/09/25 0318)         Labs:   CBC    Recent Labs     07/07/25  1122 07/08/25  0501   WBC 10.41* 9.75   HGB 15.8 14.9   HCT 44.9 43.1    161     BMP    Recent Labs     07/07/25  1122 07/08/25  0501   SODIUM 140 139   K 4.1 4.4    103   CO2 28 28   AGAP 8 8   BUN 9 12   CREATININE 0.76 0.78   CALCIUM 9.1 8.7       Coags    Recent Labs     07/07/25  1122   INR 1.39*   PTT 29        Additional Electrolytes  Recent Labs     07/08/25  0501   MG 1.6*   PHOS 3.1          Blood Gas    No recent results  No recent results LFTs  Recent Labs     07/07/25  1122   ALT 22   AST 20   ALKPHOS 66   ALB 4.2   TBILI 0.60       Infectious  No recent results  Glucose  Recent Labs     07/07/25  1122 07/08/25  0501   GLUC 162* 173*

## 2025-07-09 NOTE — CONSULTS
CONSULTATION - PMR   Name: Shawn Marquez 75 y.o. male I MRN: [id number]  Unit/Bed#: ICU 01 I Date of Admission: 2025   Date of Service: 2025 I Hospital Day: 2     Referred by:  Jose Gillespie MD   For:  Rehab needs   Assessment & Plan  ICH (intracerebral hemorrhage) (HCC)  HPI: He initially presented to the ED on  with a left-sided facial droop and left-sided weakness, NIHSS of 14. CT scan demonstrated large acute intraparenchymal hemorrhage centered involving the posterior right basal ganglia, adjacent right thalamus, and posterior right insula. Seizure-like activity reported in CT scanner, which resolved spontaneously. Loaded on Keppra, ASA and Xarelto reversed with DDAVP and Kcentra. Patient was subsequently transferred to [facility] ICU for further monitoring. Neurosurgery consulted. Repeat CTHs stable. Patient vomited on , repeat CTH stable and CT CAP pending.     Neuroimagin/9 repeat CTH: Stable acute intraparenchymal hemorrhage centered within the posterior aspect of the right basal ganglia with mild surrounding edema and localized mass effect    repeat CTH: Stable appearance of right basal ganglia intraparenchymal hemorrhage   repeat CTH: Stable right basal ganglia hemorrhage and surrounding vasogenic edema. Stable mild mass effect on the right lateral ventricle and minimal 1 mm leftward midline shift. Re-demonstrated age-indeterminate hypodensity within the right hemipons compared to prior day exam however not noted on more remote exam from 2024. Differential includes age-indeterminate infarct. Correlate with MRI of the brain.   repeat CTH: Stable size of right basal ganglia hemorrhage measuring approximately 4.9 x 2.3 cm with slight interval increase in surrounding vasogenic edema. Mild mass effect on the right lateral ventricle without midline shift.    CTA head/neck: No large vessel occlusion, high-grade stenosis, or intracranial aneurysm identified on CT  angiogram of the head. No hemodynamically significant stenosis or dissection identified on CT angiogram of the neck. Intraparenchymal hematoma centered in the right posterior basal ganglia/posterior limb of the right internal capsule and adjacent right thalamus, without active or extravasation of contrast or spot sign noted.  7/7 CTH: Large acute intraparenchymal hemorrhage centered involving the posterior right basal ganglia, adjacent right thalamus, and posterior right insula, with a volume of approximately 23 mL. No midline shift. Mild chronic white matter microangiopathic changes.    PMR recommendations:   - Multifactorial comorbidities: type 2 diabetes mellitus, history of recurrent DVTs, hypertension, persistent afib, and mood disorder  - Multifactorial functional diagnoses: Hemiparesis  and Hemispatial neglect  - Continue and advance PT/OT focusing on mobility, balance, transfers, ADLs, and safety awareness  - Continue Speech-Language Pathology (SLP) for cognitive-linguistic rehab  - Optimize nutrition, hydration, and electrolytes with frequent monitoring  - Monitor vitals at rest and with activity for orthostatic intolerance, autonomic instability, and deconditioning  - Adjust medication regimen as indicated  for spasticity, seizures, agitation, mood, pain, bowel/bladder dysfunction, and sleep-wake disturbances  - Frequent exams/assessments for neurologic stability, neurologic recovery, functional tolerance, and therapy progress, with additional diagnostic work-up, imaging, and management adjustments as clinically indicated.  - Monitor for delirium, neurocognitive fluctuations, and optimize sleep-wake cycle  - Overstimulation precautions and if confused provide frequent re-orientation, re-direction, and reassurance  - If impaired sleep or behavior (agitation, etc), recommend sleep-behavioral log and monitoring  - Limit sedating medications when possible  - Fall precautions - if safety concerns recommend  increasing rounding, adding virtual sitter, or in-person 1:1 at discretion of primary team  - Provide patient and (if applicable) caregiver education for medical conditions, equipment, medications, and care needs  - Interdisciplinary team coordination and frequent team meetings as indicated  - Pressure ulcer prevention protocol, DVT prophylaxis per primary team  - Bowel/bladder management program    Prior Level of Function and Social history:    Independent with ADLs  Independent with ambulation  Lives wit wife (retired), wide steps to enter home.     Current Level of Function:    ADLs mod-max assist   Transfers max assist   Bed mobility max assist   Recommended Diet: soft/level 3 diet and thin liquids     Disposition recommendation:  IRF (Inpatient rehab facility)  Patient is good candidate for transfer to IRF pending:    - Medical clearance/stability  - Facility acceptance, insurance authorization, and bed availability  - Significant changes (worsening or significant improvements) in functional status, in the interim, that would warrant dispo to different location  - If you have questions or want to discuss the case directly, please do not hesitate to reach out to us via [employer] Secure Chat @ [facility]     Medical necessity statement for IRF admission:  Patient requires IRF admission due to new functional deficits needing >=2 therapy disciplines, 3 hours/day therapy, 24-hr rehab nursing, and rehab physician oversight >=3x/week for complex medical management, complication prevention, and coordinated interdisciplinary care.    Visuospatial neglect:    OT - visual scanning training toward neglected side, use visual, auditory, and tactile cues to increase awareness toward affected side, apply anchoring techniques, train functional tasks for grooming, hygiene, dressing, and meal setup, use mirror therapy or limb activity when appropriate, integrate dual task activities to challenge spatial attention   PT -  train for safe mobility, use verbal and tactile prompts during gait and balance tasks, practice obstacle avoidance and navigation into neglected space, use visual flow cues to improve scanning when ambulating  RN - set-up room to encourage attention to neglected side but be sure call bell is in position that can be readily reached.  Reinforce scanning before tasks especially during meals and hygiene    Hemiparesis:   Recommend multipodus boot for LLE   Shoulder subluxation precautions on affected side to prevent acute pain, chronic pain and loss of function: ensure arm is supported properly in bed, chair, wheelchair, and during transfers/ambulation.  Avoid affected arm excessively hanging down at side, over-extending or externally rotating. Provide pillows or lap tray support if needed.  Consider sling when mobilizing.  Gentle ROM and stretching exercises     DM II (diabetes mellitus, type II), controlled (HCC)  Lab Results   Component Value Date    HGBA1C 6.7 (H) 07/08/2025       Recent Labs     07/07/25  1122 07/07/25  1613   POCGLU 148* 157*       Blood Sugar Average: Last 72 hrs:      Seizure-like activity (HCC)  Seizure-like activity reported in CT scanner, which resolved spontaneously  Continue keppra 500mg BID --> advance AED regimen as clinically indicated   Encounter for skin care  - Overall mgmt currently by primary team   - Consult would care for any wounds or significant concerns for skin integrity   - Increased risk of skin wounds/breakdown/rashes due to recent immobility and co-morbidities   - Turn patient Q2H in bed (use pillows or wedges), encourage wt shifts every 10-15 minutes if/when in chair/WC  - Patient and if pertinent caregiver education   - Hydragaurd (or other appropriate barrier cream) to buttocks and sacrum BID & PRN   - Allevyn foam to heels and/or float heels when in bed   - Optimal bowel/bladder hygiene; keep skin clean and dry   - EHOB waffle cushion to chair/WC when OOB  - Rec  nursing to document in chart and Notify MD if buttock, sacrum, heel, or other skin site develops erythema, skin breakdown, or rashes as soon as possible.  If patient is soiling themselves with urine or stool notify MD.  If you are unable to maintain skin integrity and prevent erythema due to frequency of soiling notify MD as soon as possible as well       Other medical issues:     As per primary service (and relevant consultants if applicable)    ==================================================================    Chief Complaint:  stroke     History of Present Illness:   Patient is a 75 year-old male, with a past medical history of chronic benzo use, type 2 diabetes mellitus, hyperlipidemia, DVT, hypertension, persistent afib, and mood disorder, presenting with acute intraparenchymal hemorrhage. He initially presented to the ED on 7/7 with a left-sided facial droop and left-sided weakness, NIHSS of 14. CT scan demonstrated large acute intraparenchymal hemorrhage centered involving the posterior right basal ganglia, adjacent right thalamus, and posterior right insula, with a volume of approximately 23 mL. Seizure-like activity reported in CT scanner, which resolved spontaneously. Loaded on Keppra, ASA and Xarelto revered with DDAVP and Kcentra. Patient was subsequently transferred to [facility] neuro ICU for further monitoring. Neurosurgery consulted. Repeat CTHs stable. Patient vomited on 7/9, repeat CTH  stable and CT CAP pending.     Review of Systems:     Positive for nausea, emesis, weakness   Please see HPI for details with other significant symptoms or history listed here:        Allergies[1]  Current Medications[2]    Past Medical History[3]    Past Surgical History[4]   Family History[5]    Social History available currently in EMR:  (See additional SH as outlined above)   Social History[6]    Physical Examination:   Temp:  [98.8 °F (37.1 °C)-100.2 °F (37.9 °C)] 100.2 °F (37.9 °C)  HR:  []  106  BP: (111-183)/() 115/63  Resp:  [16-24] 24  SpO2:  [93 %-99 %] 96 %  O2 Device: Nasal cannula  Nasal Cannula O2 Flow Rate (L/min):  [2 L/min] 2 L/min  General: NAD  HENT: MMM  Neck: Supple  Respiratory: No respiratory distress   Cardiovascular: No LE edema   Genitourinary: No mahan  SkiN/MSK/Extremities:  No obvious rashes on exposed skin   Neurologic/Psych:   MENTAL STATUS: alert but fatigued. Answers questions appropriate to context    Strength: R side 5 throughout. LUE 1, LLE 1 distally   Data:  Lab Results   Component Value Date    HGB 16.2 07/09/2025    HGB 16.1 (H) 02/18/2016    HCT 45.9 07/09/2025    HCT 47.1 (H) 02/18/2016    WBC 13.43 (H) 07/09/2025    WBC 6.5 02/18/2016     01/28/2016    K 3.8 07/09/2025    K 3.8 12/13/2024     07/09/2025     12/13/2024    GLUCOSE 72 01/28/2016    CREATININE 0.67 07/09/2025    CREATININE 1.01 01/28/2016    BUN 12 07/09/2025    BUN 10 12/13/2024     Pertinent labs and imaging reviewed.      Patient seen on date of service listed.    Thank you for allowing the PM&R service to participate in the care of this patient. We will continue to follow Shawn Marquez's progress with you. Please do not hesitate to call with questions or concerns.    Heather Carrera PA-C  Physical Medicine and Rehabilitation  [facility]         [1] No Known Allergies  [2]   Current Facility-Administered Medications:     acetaminophen (Ofirmev) injection 1,000 mg, 1,000 mg, Intravenous, Q6H PRN, Jesus Elena PA-C, Stopped at 07/09/25 1105    amLODIPine (NORVASC) tablet 10 mg, 10 mg, Oral, Daily, Jesus Elena PA-C, 10 mg at 07/09/25 1008    atorvastatin (LIPITOR) tablet 20 mg, 20 mg, Oral, Daily With Dinner, Afua Valiente DO    bisacodyl (DULCOLAX) rectal suppository 10 mg, 10 mg, Rectal, Daily PRN, Huy Dalal PA-C    chlorhexidine (PERIDEX) 0.12 % oral rinse 15 mL, 15 mL, Mouth/Throat, Q12H DEIDRE, Huy Dalal PA-C, 15 mL at 07/09/25 1002     hydrALAZINE (APRESOLINE) injection 10 mg, 10 mg, Intravenous, Q6H PRN, Jesus Elena PA-C, 10 mg at 07/08/25 2330    losartan (COZAAR) tablet 100 mg, 100 mg, Oral, Daily, Jesus Elena PA-C, 100 mg at 07/09/25 1008    melatonin tablet 6 mg, 6 mg, Oral, HS, Jesus Elena PA-C, 6 mg at 07/08/25 2007    metoprolol tartrate (LOPRESSOR) partial tablet 12.5 mg, 12.5 mg, Oral, Q12H DEIDRE, Afua Valiente DO, 12.5 mg at 07/09/25 1008    niCARdipine (CARDENE) 25 mg (STANDARD CONCENTRATION) in sodium chloride 0.9% 250 mL, 1-15 mg/hr, Intravenous, Titrated, Huy Dalal PA-C, Last Rate: 80 mL/hr at 07/09/25 1325, 8 mg/hr at 07/09/25 1325    ondansetron (ZOFRAN) injection 4 mg, 4 mg, Intravenous, Q6H PRN, Jesus Elena PA-C, 4 mg at 07/08/25 2318    polyethylene glycol (MIRALAX) packet 17 g, 17 g, Oral, Daily, Afua Valiente DO, 17 g at 07/09/25 1007    senna-docusate sodium (SENOKOT S) 8.6-50 mg per tablet 2 tablet, 2 tablet, Oral, BID, Huy Dalal PA-C, 2 tablet at 07/09/25 1008  [3]   Past Medical History:  Diagnosis Date    Anxiety     Depression     DVT (deep venous thrombosis) (HCC)     Gross hematuria     LA...3/29/16   R...4/3/17     Hematuria     LA.....4/12/16    R....4/3/17    Hyperlipidemia     Hypertension     Long-term (current) use of anticoagulants     LA...3/29/16   R...4/3/17     Pre-operative cardiovascular examination, supraventricular arrhythmia 07/15/2022    Seasonal allergies    [4]   Past Surgical History:  Procedure Laterality Date    APPENDECTOMY      WISDOM TOOTH EXTRACTION     [5]   Family History  Problem Relation Name Age of Onset    Hypertension Mother      Leukemia Father      Heart disease Father      Alcohol abuse Father      Depression Father      Completed Suicide  Father      Depression Sister      Suicide Attempts Brother      Alcohol abuse Brother      Depression Brother      Heart disease Family      Leukemia Family     [6]   Social History  Socioeconomic History    Marital status: Legally       Spouse name: Sherice    Number of children: 2   Occupational History    Occupation: Retired   Tobacco Use    Smoking status: Former     Current packs/day: 0.00     Average packs/day: 1 pack/day for 9.0 years (9.0 ttl pk-yrs)     Types: Cigarettes     Start date:      Quit date:      Years since quittin.5    Smokeless tobacco: Never   Vaping Use    Vaping status: Never Used   Substance and Sexual Activity    Alcohol use: Not Currently     Comment: social    Drug use: Yes     Types: Marijuana     Comment: Via pipe X 50 years    Sexual activity: Yes     Partners: Female   Social History Narrative    Caffeine use     Social Drivers of Health     Financial Resource Strain: Low Risk  (2023)    Overall Financial Resource Strain (CARDIA)     Difficulty of Paying Living Expenses: Not hard at all   Food Insecurity: No Food Insecurity (2024)    Nursing - Inadequate Food Risk Classification     Worried About Running Out of Food in the Last Year: Never true     Ran Out of Food in the Last Year: Never true   Transportation Needs: No Transportation Needs (2024)    PRAPARE - Transportation     Lack of Transportation (Medical): No     Lack of Transportation (Non-Medical): No   Housing Stability: Unknown (2024)    Housing Stability Vital Sign     Unable to Pay for Housing in the Last Year: No     Homeless in the Last Year: No

## 2025-07-09 NOTE — ASSESSMENT & PLAN NOTE
Visuospatial neglect:    OT - visual scanning training toward neglected side, use visual, auditory, and tactile cues to increase awareness toward affected side, apply anchoring techniques, train functional tasks for grooming, hygiene, dressing, and meal setup, use mirror therapy or limb activity when appropriate, integrate dual task activities to challenge spatial attention   PT - train for safe mobility, use verbal and tactile prompts during gait and balance tasks, practice obstacle avoidance and navigation into neglected space, use visual flow cues to improve scanning when ambulating  RN - set-up room to encourage attention to neglected side but be sure call bell is in position that can be readily reached.  Reinforce scanning before tasks especially during meals and hygiene

## 2025-07-09 NOTE — ASSESSMENT & PLAN NOTE
HPI: He initially presented to the ED on  with a left-sided facial droop and left-sided weakness, NIHSS of 14. CT scan demonstrated large acute intraparenchymal hemorrhage centered involving the posterior right basal ganglia, adjacent right thalamus, and posterior right insula. Seizure-like activity reported in CT scanner, which resolved spontaneously. Loaded on Keppra, ASA and Xarelto reversed with DDAVP and Kcentra. Patient was subsequently transferred to [facility] ICU for further monitoring. Neurosurgery consulted. Repeat CTHs stable. Patient vomited on , repeat CTH stable and CT CAP pending.     Neuroimagin/9 repeat CTH: Stable acute intraparenchymal hemorrhage centered within the posterior aspect of the right basal ganglia with mild surrounding edema and localized mass effect    repeat CTH: Stable appearance of right basal ganglia intraparenchymal hemorrhage   repeat CTH: Stable right basal ganglia hemorrhage and surrounding vasogenic edema. Stable mild mass effect on the right lateral ventricle and minimal 1 mm leftward midline shift. Re-demonstrated age-indeterminate hypodensity within the right hemipons compared to prior day exam however not noted on more remote exam from 2024. Differential includes age-indeterminate infarct. Correlate with MRI of the brain.   repeat CTH: Stable size of right basal ganglia hemorrhage measuring approximately 4.9 x 2.3 cm with slight interval increase in surrounding vasogenic edema. Mild mass effect on the right lateral ventricle without midline shift.    CTA head/neck: No large vessel occlusion, high-grade stenosis, or intracranial aneurysm identified on CT angiogram of the head. No hemodynamically significant stenosis or dissection identified on CT angiogram of the neck. Intraparenchymal hematoma centered in the right posterior basal ganglia/posterior limb of the right internal capsule and adjacent right thalamus, without active or  extravasation of contrast or spot sign noted.  7/7 CTH: Large acute intraparenchymal hemorrhage centered involving the posterior right basal ganglia, adjacent right thalamus, and posterior right insula, with a volume of approximately 23 mL. No midline shift. Mild chronic white matter microangiopathic changes.    PMR recommendations:   - Multifactorial comorbidities: type 2 diabetes mellitus, history of recurrent DVTs, hypertension, persistent afib, and mood disorder  - Multifactorial functional diagnoses: Hemiparesis  and Hemispatial neglect  - Continue and advance PT/OT focusing on mobility, balance, transfers, ADLs, and safety awareness  - Continue Speech-Language Pathology (SLP) for cognitive-linguistic rehab  - Optimize nutrition, hydration, and electrolytes with frequent monitoring  - Monitor vitals at rest and with activity for orthostatic intolerance, autonomic instability, and deconditioning  - Adjust medication regimen as indicated  for spasticity, seizures, agitation, mood, pain, bowel/bladder dysfunction, and sleep-wake disturbances  - Frequent exams/assessments for neurologic stability, neurologic recovery, functional tolerance, and therapy progress, with additional diagnostic work-up, imaging, and management adjustments as clinically indicated.  - Monitor for delirium, neurocognitive fluctuations, and optimize sleep-wake cycle  - Overstimulation precautions and if confused provide frequent re-orientation, re-direction, and reassurance  - If impaired sleep or behavior (agitation, etc), recommend sleep-behavioral log and monitoring  - Limit sedating medications when possible  - Fall precautions - if safety concerns recommend increasing rounding, adding virtual sitter, or in-person 1:1 at discretion of primary team  - Provide patient and (if applicable) caregiver education for medical conditions, equipment, medications, and care needs  - Interdisciplinary team coordination and frequent team meetings as  indicated  - Pressure ulcer prevention protocol, DVT prophylaxis per primary team  - Bowel/bladder management program    Prior Level of Function and Social history:    Independent with ADLs  Independent with ambulation  Lives wit wife (retired), wide steps to enter home.     Current Level of Function:    ADLs mod-max assist   Transfers max assist   Bed mobility max assist   Recommended Diet: soft/level 3 diet and thin liquids     Disposition recommendation:  IRF (Inpatient rehab facility)  Patient is good candidate for transfer to IRF pending:    - Medical clearance/stability  - Facility acceptance, insurance authorization, and bed availability  - Significant changes (worsening or significant improvements) in functional status, in the interim, that would warrant dispo to different location  - If you have questions or want to discuss the case directly, please do not hesitate to reach out to us via [employer] Secure Chat @ [facility]     Medical necessity statement for IRF admission:  Patient requires IRF admission due to new functional deficits needing >=2 therapy disciplines, 3 hours/day therapy, 24-hr rehab nursing, and rehab physician oversight >=3x/week for complex medical management, complication prevention, and coordinated interdisciplinary care.    Visuospatial neglect:    OT - visual scanning training toward neglected side, use visual, auditory, and tactile cues to increase awareness toward affected side, apply anchoring techniques, train functional tasks for grooming, hygiene, dressing, and meal setup, use mirror therapy or limb activity when appropriate, integrate dual task activities to challenge spatial attention   PT - train for safe mobility, use verbal and tactile prompts during gait and balance tasks, practice obstacle avoidance and navigation into neglected space, use visual flow cues to improve scanning when ambulating  RN - set-up room to encourage attention to neglected side but be sure  call bell is in position that can be readily reached.  Reinforce scanning before tasks especially during meals and hygiene    Hemiparesis:   Recommend multipodus boot for LLE   Shoulder subluxation precautions on affected side to prevent acute pain, chronic pain and loss of function: ensure arm is supported properly in bed, chair, wheelchair, and during transfers/ambulation.  Avoid affected arm excessively hanging down at side, over-extending or externally rotating. Provide pillows or lap tray support if needed.  Consider sling when mobilizing.  Gentle ROM and stretching exercises

## 2025-07-09 NOTE — ASSESSMENT & PLAN NOTE
Seizure-like activity reported in CT scanner, which resolved spontaneously  Continue keppra 500mg BID --> advance AED regimen as clinically indicated

## 2025-07-09 NOTE — ASSESSMENT & PLAN NOTE
Lab Results   Component Value Date    HGBA1C 6.7 (H) 07/08/2025       Recent Labs     07/07/25  1122 07/07/25  1613   POCGLU 148* 157*       Blood Sugar Average: Last 72 hrs:

## 2025-07-09 NOTE — PLAN OF CARE
Problem: OCCUPATIONAL THERAPY ADULT  Goal: Performs self-care activities at highest level of function for planned discharge setting.  See evaluation for individualized goals.  Description: Treatment Interventions: ADL retraining, Functional transfer training, UE strengthening/ROM, Endurance training, Cognitive reorientation, Visual perceptual retraining, Patient/family training, Equipment evaluation/education, Compensatory technique education, Fine motor coordination activities, Continued evaluation, Energy conservation, Activityengagement          See flowsheet documentation for full assessment, interventions and recommendations.   Outcome: Progressing  Note: Limitation: Decreased ADL status, Decreased UE strength, Decreased UE ROM, Decreased Safe judgement during ADL, Decreased cognition, Decreased endurance, Decreased self-care trans, Decreased high-level ADLs, Visual deficit, Decreased fine motor control  Prognosis: Fair  Assessment: Patient participated in Skilled OT session 7/9/2025 with interventions consisting of ADL re training with the use of correct body mechnaics, Energy Conservation techniques, safety awareness and fall prevention techniques, therapeutic exercise to: increase functional use of BUEs, increase BUE muscle strength ,  therapeutic activities to: increase activity tolerance, neuromuscular re education to: facilitate volitional use of limbs, elicit righting and equilibrium reactions for improved postural control and alignment during transitional movements, increase dynamic sit/ stand balance during functional activity , increase postural control, increase trunk control, and increase OOB/ sitting tolerance . Patient agreeable to OT treatment session, upon arrival patient was found supine in bed.  In comparison to previous session, patient with improvements in EOB sitting tolerance, arousal and oral care . Patient requiring frequent re direction, verbal cues for safety, verbal cues for correct  technique, and verbal cues for pacing thru activity steps. Patient continues to be functioning below baseline level, occupational performance remains limited secondary to factors listed above and increased risk for falls and injury.   From OT standpoint, recommendation at time of d/c would be moderate resource intensity.   Patient to benefit from continued Occupational Therapy treatment while in the hospital to address deficits as defined above and maximize level of functional independence with ADLs and functional mobility.     Rehab Resource Intensity Level, OT: II (Moderate Resource Intensity)        Christina Lees MS, OTR/L

## 2025-07-09 NOTE — PLAN OF CARE
Problem: PHYSICAL THERAPY ADULT  Goal: Performs mobility at highest level of function for planned discharge setting.  See evaluation for individualized goals.  Description: Treatment/Interventions: ADL retraining, Functional transfer training, LE strengthening/ROM, Elevations, Therapeutic exercise, Endurance training, Cognitive reorientation, Equipment eval/education, Patient/family training, Bed mobility, Gait training, Compensatory technique education, Spoke to nursing, Spoke to advanced practitioner, OT  Equipment Recommended:  (TBD)       See flowsheet documentation for full assessment, interventions and recommendations.  Outcome: Progressing  Note: Prognosis: Fair  Problem List: Decreased strength, Decreased range of motion, Decreased endurance, Impaired balance, Decreased mobility, Decreased coordination, Decreased cognition, Impaired judgement, Decreased safety awareness, Impaired vision, Impaired sensation, Impaired tone  Assessment: pt seen for PT session w/ focus on sitting balance; cues for attention to L throughout. Pt continues to have L inattention and L strength deficits limiting sitting and standing balance/ tolerance. Pt is distractable and requried cues for task focus + safety throughout. Education w/ spouse during session on sitting on pt's L and drawing pt's attention to L hemibody. Pt was repositioned in chair post session for comfort w/ all needs in reach. PT will continue to follow. d/c recommendations as noted below.  Barriers to Discharge: Inaccessible home environment     Rehab Resource Intensity Level, PT: I (Maximum Resource Intensity)    See flowsheet documentation for full assessment.

## 2025-07-10 ENCOUNTER — APPOINTMENT (INPATIENT)
Dept: RADIOLOGY | Facility: HOSPITAL | Age: 75
DRG: 064 | End: 2025-07-10
Payer: COMMERCIAL

## 2025-07-10 LAB
ANION GAP SERPL CALCULATED.3IONS-SCNC: 7 MMOL/L (ref 4–13)
BASOPHILS # BLD AUTO: 0.02 THOUSANDS/ÂΜL (ref 0–0.1)
BASOPHILS NFR BLD AUTO: 0 % (ref 0–1)
BUN SERPL-MCNC: 15 MG/DL (ref 5–25)
CA-I BLD-SCNC: 1.07 MMOL/L (ref 1.12–1.32)
CALCIUM SERPL-MCNC: 8.2 MG/DL (ref 8.4–10.2)
CHLORIDE SERPL-SCNC: 103 MMOL/L (ref 96–108)
CO2 SERPL-SCNC: 26 MMOL/L (ref 21–32)
CREAT SERPL-MCNC: 0.63 MG/DL (ref 0.6–1.3)
EOSINOPHIL # BLD AUTO: 0.2 THOUSAND/ÂΜL (ref 0–0.61)
EOSINOPHIL NFR BLD AUTO: 2 % (ref 0–6)
ERYTHROCYTE [DISTWIDTH] IN BLOOD BY AUTOMATED COUNT: 12.8 % (ref 11.6–15.1)
GFR SERPL CREATININE-BSD FRML MDRD: 97 ML/MIN/1.73SQ M
GLUCOSE SERPL-MCNC: 142 MG/DL (ref 65–140)
HCT VFR BLD AUTO: 42.9 % (ref 36.5–49.3)
HGB BLD-MCNC: 14.7 G/DL (ref 12–17)
IMM GRANULOCYTES # BLD AUTO: 0.06 THOUSAND/UL (ref 0–0.2)
IMM GRANULOCYTES NFR BLD AUTO: 1 % (ref 0–2)
LYMPHOCYTES # BLD AUTO: 1.9 THOUSANDS/ÂΜL (ref 0.6–4.47)
LYMPHOCYTES NFR BLD AUTO: 18 % (ref 14–44)
MAGNESIUM SERPL-MCNC: 1.8 MG/DL (ref 1.9–2.7)
MCH RBC QN AUTO: 33.6 PG (ref 26.8–34.3)
MCHC RBC AUTO-ENTMCNC: 34.3 G/DL (ref 31.4–37.4)
MCV RBC AUTO: 98 FL (ref 82–98)
MONOCYTES # BLD AUTO: 1.18 THOUSAND/ÂΜL (ref 0.17–1.22)
MONOCYTES NFR BLD AUTO: 11 % (ref 4–12)
NEUTROPHILS # BLD AUTO: 7.33 THOUSANDS/ÂΜL (ref 1.85–7.62)
NEUTS SEG NFR BLD AUTO: 68 % (ref 43–75)
NRBC BLD AUTO-RTO: 0 /100 WBCS
PHOSPHATE SERPL-MCNC: 2.7 MG/DL (ref 2.3–4.1)
PLATELET # BLD AUTO: 151 THOUSANDS/UL (ref 149–390)
PMV BLD AUTO: 11.5 FL (ref 8.9–12.7)
POTASSIUM SERPL-SCNC: 3.8 MMOL/L (ref 3.5–5.3)
PROCALCITONIN SERPL-MCNC: <0.05 NG/ML
RBC # BLD AUTO: 4.37 MILLION/UL (ref 3.88–5.62)
SODIUM SERPL-SCNC: 136 MMOL/L (ref 135–147)
WBC # BLD AUTO: 10.69 THOUSAND/UL (ref 4.31–10.16)

## 2025-07-10 PROCEDURE — 85025 COMPLETE CBC W/AUTO DIFF WBC: CPT

## 2025-07-10 PROCEDURE — 84145 PROCALCITONIN (PCT): CPT | Performed by: PHYSICIAN ASSISTANT

## 2025-07-10 PROCEDURE — 99232 SBSQ HOSP IP/OBS MODERATE 35: CPT | Performed by: INTERNAL MEDICINE

## 2025-07-10 PROCEDURE — NC001 PR NO CHARGE: Performed by: REGISTERED NURSE

## 2025-07-10 PROCEDURE — 80048 BASIC METABOLIC PNL TOTAL CA: CPT

## 2025-07-10 PROCEDURE — 70553 MRI BRAIN STEM W/O & W/DYE: CPT

## 2025-07-10 PROCEDURE — A9585 GADOBUTROL INJECTION: HCPCS | Performed by: PHYSICIAN ASSISTANT

## 2025-07-10 PROCEDURE — 92526 ORAL FUNCTION THERAPY: CPT

## 2025-07-10 PROCEDURE — 83735 ASSAY OF MAGNESIUM: CPT

## 2025-07-10 PROCEDURE — 84100 ASSAY OF PHOSPHORUS: CPT

## 2025-07-10 PROCEDURE — 82330 ASSAY OF CALCIUM: CPT

## 2025-07-10 RX ORDER — MAGNESIUM SULFATE HEPTAHYDRATE 40 MG/ML
2 INJECTION, SOLUTION INTRAVENOUS ONCE
Status: COMPLETED | OUTPATIENT
Start: 2025-07-10 | End: 2025-07-10

## 2025-07-10 RX ORDER — HYDRALAZINE HYDROCHLORIDE 20 MG/ML
10 INJECTION INTRAMUSCULAR; INTRAVENOUS EVERY 4 HOURS PRN
Status: DISCONTINUED | OUTPATIENT
Start: 2025-07-10 | End: 2025-07-25 | Stop reason: HOSPADM

## 2025-07-10 RX ORDER — POTASSIUM CHLORIDE 14.9 MG/ML
20 INJECTION INTRAVENOUS ONCE
Status: COMPLETED | OUTPATIENT
Start: 2025-07-10 | End: 2025-07-10

## 2025-07-10 RX ORDER — GADOBUTROL 604.72 MG/ML
9 INJECTION INTRAVENOUS
Status: DISCONTINUED | OUTPATIENT
Start: 2025-07-10 | End: 2025-07-19

## 2025-07-10 RX ORDER — GADOBUTROL 604.72 MG/ML
9 INJECTION INTRAVENOUS
Status: COMPLETED | OUTPATIENT
Start: 2025-07-10 | End: 2025-07-10

## 2025-07-10 RX ORDER — HEPARIN SODIUM 5000 [USP'U]/ML
5000 INJECTION, SOLUTION INTRAVENOUS; SUBCUTANEOUS EVERY 8 HOURS SCHEDULED
Status: DISCONTINUED | OUTPATIENT
Start: 2025-07-10 | End: 2025-07-25 | Stop reason: HOSPADM

## 2025-07-10 RX ADMIN — POTASSIUM CHLORIDE 20 MEQ: 14.9 INJECTION, SOLUTION INTRAVENOUS at 07:52

## 2025-07-10 RX ADMIN — CHLORHEXIDINE GLUCONATE 15 ML: 1.2 SOLUTION ORAL at 21:08

## 2025-07-10 RX ADMIN — HEPARIN SODIUM 5000 UNITS: 5000 INJECTION INTRAVENOUS; SUBCUTANEOUS at 21:08

## 2025-07-10 RX ADMIN — LOSARTAN POTASSIUM 100 MG: 50 TABLET, FILM COATED ORAL at 10:19

## 2025-07-10 RX ADMIN — SENNOSIDES AND DOCUSATE SODIUM 2 TABLET: 50; 8.6 TABLET ORAL at 10:19

## 2025-07-10 RX ADMIN — CARVEDILOL 6.25 MG: 6.25 TABLET, FILM COATED ORAL at 06:35

## 2025-07-10 RX ADMIN — HEPARIN SODIUM 5000 UNITS: 5000 INJECTION INTRAVENOUS; SUBCUTANEOUS at 14:39

## 2025-07-10 RX ADMIN — GADOBUTROL 9 ML: 604.72 INJECTION INTRAVENOUS at 17:14

## 2025-07-10 RX ADMIN — CEFTRIAXONE SODIUM 1000 MG: 10 INJECTION, POWDER, FOR SOLUTION INTRAVENOUS at 18:33

## 2025-07-10 RX ADMIN — Medication 6 MG: at 21:08

## 2025-07-10 RX ADMIN — TAMSULOSIN HYDROCHLORIDE 0.4 MG: 0.4 CAPSULE ORAL at 16:18

## 2025-07-10 RX ADMIN — ATORVASTATIN CALCIUM 20 MG: 20 TABLET, FILM COATED ORAL at 16:18

## 2025-07-10 RX ADMIN — CARVEDILOL 6.25 MG: 6.25 TABLET, FILM COATED ORAL at 16:18

## 2025-07-10 RX ADMIN — SENNOSIDES AND DOCUSATE SODIUM 2 TABLET: 50; 8.6 TABLET ORAL at 18:25

## 2025-07-10 RX ADMIN — MAGNESIUM SULFATE HEPTAHYDRATE 2 G: 40 INJECTION, SOLUTION INTRAVENOUS at 07:47

## 2025-07-10 RX ADMIN — AMLODIPINE BESYLATE 10 MG: 10 TABLET ORAL at 10:20

## 2025-07-10 RX ADMIN — CHLORHEXIDINE GLUCONATE 15 ML: 1.2 SOLUTION ORAL at 10:19

## 2025-07-10 RX ADMIN — POLYETHYLENE GLYCOL 3350 17 G: 17 POWDER, FOR SOLUTION ORAL at 10:19

## 2025-07-10 NOTE — SPEECH THERAPY NOTE
"Speech Language/Pathology  Speech-Language Pathology Progress Note      Patient Name: Shawn Marquez    Today's Date: 7/10/2025      Subjective:  Pt was awake and alert. He was sitting upright in bed. Patient stated \"I was going to try to open this myself (ensure) \".    Objective:  Pt was seen today for dysphagia therapy. Current diet is dysphagia 3 dental soft with thin liquids. Pt was on room air. Oral care had already been completed. Focus of today's session was to re assess the pt with solids- per staff he was pocketing earlier. Textures offered today included puree, regular solid, and thin liquid via straw.  Swallow function:   The pt self fed a sandwich- fair bite.  Mildly prolonged mastication w/ material on the L hanging from the lips.  He is able to clear and then manipulate the material.  Noted L sided lingual surface residue and L buccal cavity residue.  Cued to use lingual sweep- pt was able to complete 5/5 cued times as well as follow with a sip thin by straw.  Adequate sips from the straw w/o any coughing  Today his speech is clearer, more intelligible.    Assessment:  Swallow function is improving with current diet. Diet texture and liquid consistency remains appropriate at this time.  Pt may need to use lingual sweep and alternated sip thin w/ all po.      Plan:  Continue dysphagia 3 dental soft and thin liquids. Continue ST follow up. Subsequent sessions to focus on determining potential for diet texture advancement, improving pt's self monitoring, and improving use of strategies to minimize risk of aspiration.  "

## 2025-07-10 NOTE — PROGRESS NOTES
Progress Note - Critical Care/ICU   Name: Shawn Marquez 75 y.o. male I MRN: [id number]  Unit/Bed#: ICU 01 I Date of Admission: 7/7/2025   Date of Service: 7/10/2025 I Hospital Day: 3      Critical Care Interval Transfer Note:    Brief Hospital Summary: Right basal ganglia ICH, hypertensive emergency.  BP improved off Cardene drip.  CT head remained stable.  Speech evaluated and passed.  Barriers to discharge:   Neurology, MRI, PT/OT     Consults: IP CONSULT TO CASE MANAGEMENT  IP CONSULT TO NEUROSURGERY  IP CONSULT TO PHYSICAL MEDICINE REHAB    Recommended to review admission imaging for incidental findings and document in discharge navigator: Chart reviewed, no known incidental findings noted at this time.      Discharge plan per primary team       Patient seen and evaluated by Critical Care today and deemed to be appropriate for transfer to Med Surg with Telemetry. Spoke to Dr. Florian from [facility] to accept transfer. Critical care can be contacted via SecureChat with any questions or concerns. Please use the Critical Care AP Role in Secure Chat for any provider inquires until the patient is transferred out of the ICU or until tomorrow at 0600.

## 2025-07-10 NOTE — PROGRESS NOTES
"Progress Note - Critical Care/ICU   Name: Shawn Marquez 75 y.o. male I MRN: [id number]  Unit/Bed#: ICU 01 I Date of Admission: 7/7/2025   Date of Service: 7/10/2025 I Hospital Day: 3       Assessment & Plan   Active Hospital Problems    Diagnosis Date Noted POA    ICH (intracerebral hemorrhage) (HCC) 07/08/2025 Unknown    Hypertensive emergency 07/09/2025 Unknown    Cerebral edema (HCC) 07/09/2025 Unknown    Brain compression (HCC) 07/09/2025 Unknown    Atrial fibrillation (HCC) 07/09/2025 Unknown    Bradycardia 07/09/2025 Unknown    HTN (hypertension) 07/09/2025 Unknown    Anticoagulated on rivaroxaban 07/09/2025 Not Applicable    Hemineglect 07/09/2025 Unknown    Hemiparesis (HCC) 07/09/2025 Unknown    Seizure-like activity (HCC) 07/07/2025 Yes    Depression 05/31/2024 Yes    HLD (hyperlipidemia) 02/12/2021 Yes    History of DVT (deep vein thrombosis) 12/17/2018 Not Applicable    Encounter for skin care 04/02/2018 Yes    DM II (diabetes mellitus, type II), controlled ([facility]) 11/25/2015 Yes    Essential hypertension 11/22/2013 Yes      Resolved Hospital Problems   No resolved problems to display.     Neuro:   Diagnosis: Right BG ICH with mass effect and cerebral edema with brain compression (ICH 1; Size) with right pontine hypodensity  Presented 7/7 to [facility] with Left sided weakness, NIHSS 14   CTH:  \"Large acute intraparenchymal hemorrhage centered involving the posterior right basal ganglia, adjacent right thalamus, and posterior right insula, with a volume of approximately 23 mL\"  Given KCentra, DDAVP, transferred to [facility]  Patient has had waxing and waning mental status with times of agitation/delirium; Trial of precedex overnight 7/7-8 with bradycardia into the 20s  7/8  intractable N/V, hypertension, unable to tolerate MRI, underwent STAT CTH which was unchanged  7/9: Unchanged CT Head; still requiring cardene for SBP (see below)  Plan:   Monitor mental status Q2 when awake Q4 overnight  Obtain " "MRI  Defer further imagine to neurosurgery  STAT CTH for change in GCS > 2 points, new focal deficit  Keppra D/C'ed  SBP goal 110-150  (See below)     CV:   Diagnosis: Hypertensive emergency  Home medications: Metoprolol succinate 25 mg QD  7/8-7/9 equired numerous doses of PRN hydralazine and eventually back on cardene infusion  7/9: On max dose cardene; changed metoprolol to coreg, overall weaned to 2.6 mg /hr  Plan:   Continue norvasc 10, Coreg 6.25 mg BID; Losartan 100 mg BID  BP goals 110-150, consider allowing up to 160       Diagnosis: pAF  Home medication: Metoprolol succinate 25 mg QD; Rivaroxaban   Plan:   Changed to Coreg given persistent hypertension;  Monitor HR  Hold AC given bleeding     Diagnosis: Bradycardia  Bradycardia as low as 20 on precedex  Plan:   Tolerated addition of Coreg  Don't use precedex for sedation  F/u Lyme antibodies     Diagnosis: HLD  Plan:   Continue statin     Pulm:  No active isues     GI:   No active issues     :   No active issues     F/E/N:   Fluids: None  Electrolytes: Replete for K >4, <5 ; Phos > 3; and Mag > 2  Nutrition: PO diet      Heme/Onc:   Diagnosis: Anticoagulated  Plan:   S/p KCentra, Hold any further AC at this time     Diagnosis: Aspirin induced platelet dysfunction  Plan:   S/p DDAVP  Hold off any AP at [facility]     Endo:   Diagnosis: DM II  Plan:   Hold home metformin  SSI     ID:   Diagnosis: Possible Aspiration Pneumonitis vs PN  CTCAP 7/9:  \"nterstitial thickening bibasally with posterior predominance, possibly sequela of aspiration. \"  Started on Rocphin  Plan:   Rocepin day 2/5  Follow up culture   Follow procal       MSK/Skin:   No active issues    Disposition: Stepdown Level 2    ICU Core Measures     A: Assess, Prevent, and Manage Pain Has pain been assessed? Yes  Need for changes to pain regimen? No   B: Both SAT/SAT  N/A   C: Choice of Sedation RASS Goal: N/A patient not on sedation  Need for changes to sedation or analgesia regimen? N/A   D: " "Delirium CAM-ICU: Negative   E: Early Mobility  Plan for early mobility? Yes   F: Family Engagement Plan for family engagement today? Yes       Antibiotic Review: Awaiting culture results.     Review of Invasive Devices:            Prophylaxis:  VTE VTE covered by:    None       Stress Ulcer  not ordered         HPI:  \"75-year-old male with past medical history of Pester dementia, A-fib on Xarelto, hypertension, hyperlipidemia, type 2 diabetes who presented with right basal ganglia bleed and hypertensive emergency was transferred to [facility] \"    24 Hour Events :   Started on coreg given signfiicant hypertension  CT CAP with bibasilar opacities started on rocephin  Subjective   Review of Systems: Review of Systems   Gastrointestinal:  Positive for nausea.   All other systems reviewed and are negative.      Objective :                   Vitals I/O      Most Recent Min/Max in 24hrs   Temp 99.5 °F (37.5 °C) Temp  Min: 98.8 °F (37.1 °C)  Max: 100.6 °F (38.1 °C)   Pulse 101 Pulse  Min: 84  Max: 128   Resp 21 Resp  Min: 16  Max: 24   /72 BP  Min: 107/55  Max: 168/84   O2 Sat 96 % SpO2  Min: 93 %  Max: 98 %      Intake/Output Summary (Last 24 hours) at 7/10/2025 0234  Last data filed at 7/10/2025 0200  Gross per 24 hour   Intake 2493.5 ml   Output 880 ml   Net 1613.5 ml       Diet Dysphagia/Modified Consistency; Dysphagia 3-Dental Soft; Thin Liquid    Invasive Monitoring           Physical Exam   Physical Exam  Vitals and nursing note reviewed.   Skin:     General: Skin is dry.   HENT:      Head: Normocephalic and atraumatic.      Mouth/Throat:      Mouth: Mucous membranes are dry.   Neck:      Vascular: No JVD.   Cardiovascular:      Rate and Rhythm: Normal rate and regular rhythm.   Musculoskeletal:         General: Normal range of motion.   Abdominal: General: There is no distension.      Tenderness: There is no abdominal tenderness.   Constitutional:       Appearance: He is well-developed and " well-nourished.   Pulmonary:      Effort: Pulmonary effort is normal.      Breath sounds: No wheezing or rhonchi.   Neurological:      Comments: AAO x2, self and place, Left neglect, LUE 3/5, LLE 3/5; Right side 5/5          Diagnostic Studies        Lab Results: I have reviewed the following results:     Medications:  Scheduled PRN   amLODIPine, 10 mg, Daily  atorvastatin, 20 mg, Daily With Dinner  carvedilol, 6.25 mg, BID With Meals  cefTRIAXone, 1,000 mg, Q24H  chlorhexidine, 15 mL, Q12H DEIDRE  losartan, 100 mg, Daily  melatonin, 6 mg, HS  polyethylene glycol, 17 g, Daily  senna-docusate sodium, 2 tablet, BID  tamsulosin, 0.4 mg, Daily With Dinner      acetaminophen, 1,000 mg, Q6H PRN   Or  acetaminophen, 975 mg, Q8H PRN  bisacodyl, 10 mg, Daily PRN  hydrALAZINE, 10 mg, Q6H PRN  ondansetron, 4 mg, Q6H PRN       Continuous    niCARdipine, 1-15 mg/hr, Last Rate: 2.5 mg/hr (07/10/25 0152)         Labs:   CBC    Recent Labs     07/08/25  0501 07/09/25  0620   WBC 9.75 13.43*   HGB 14.9 16.2   HCT 43.1 45.9    175     BMP    Recent Labs     07/08/25  0501 07/09/25  0620   SODIUM 139 135   K 4.4 3.8    100   CO2 28 27   AGAP 8 8   BUN 12 12   CREATININE 0.78 0.67   CALCIUM 8.7 9.0       Coags    No recent results     Additional Electrolytes  Recent Labs     07/08/25  0501 07/09/25  0620   MG 1.6* 1.7*   PHOS 3.1 2.7   CAIONIZED  --  1.07*          Blood Gas    No recent results  No recent results LFTs  No recent results    Infectious  No recent results  Glucose  Recent Labs     07/08/25  0501 07/09/25  0620   GLUC 173* 154*

## 2025-07-10 NOTE — PLAN OF CARE
Problem: Neurological Deficit  Goal: Neurological status is stable or improving  Description: Interventions:  - Monitor and assess patient's level of consciousness, motor function, sensory function, and level of assistance needed for ADLs.   - Monitor and report changes from baseline. Collaborate with interdisciplinary team to initiate plan and implement interventions as ordered.   - Provide and maintain a safe environment.  - Consider seizure precautions.  - Consider fall precautions.  - Consider aspiration precautions.  - Consider bleeding precautions.  Outcome: Progressing     Problem: Activity Intolerance/Impaired Mobility  Goal: Mobility/activity is maintained at optimum level for patient  Description: Interventions:  - Assess and monitor patient  barriers to mobility and need for assistive/adaptive devices.  - Assess patient's emotional response to limitations.  - Collaborate with interdisciplinary team and initiate plans and interventions as ordered.  - Encourage independent activity per ability.  - Maintain proper body alignment.  - Perform active/passive rom as tolerated/ordered.  - Plan activities to conserve energy.  - Turn patient as appropriate  Outcome: Progressing     Problem: Communication Impairment  Goal: Ability to express needs and understand communication  Description: Assess patient's communication skills and ability to understand information.  Patient will demonstrate use of effective communication techniques, alternative methods of communication and understanding even if not able to speak.     - Encourage communication and provide alternate methods of communication as needed.  - Collaborate with case management/ for discharge needs.  - Include patient/family/caregiver in decisions related to communication.  Outcome: Progressing     Problem: Potential for Aspiration  Goal: Non-ventilated patient's risk of aspiration is minimized  Description: Assess and monitor vital signs,  respiratory status, and labs (WBC).  Monitor for signs of aspiration (tachypnea, cough, rales, wheezing, cyanosis, fever).    - Assess and monitor patient's ability to swallow.  - Place patient up in chair to eat if possible.  - HOB up at 90 degrees to eat if unable to get patient up into chair.  - Supervise patient during oral intake.   - Instruct patient/ family to take small bites.  - Instruct patient/ family to take small single sips when taking liquids.  - Follow patient-specific strategies generated by speech pathologist.  Outcome: Progressing  Goal: Ventilated patient's risk of aspiration is minimized  Description: Assess and monitor vital signs, respiratory status, airway cuff pressure, and labs (WBC).  Monitor for signs of aspiration (tachypnea, cough, rales, wheezing, cyanosis, fever).    - Elevate head of bed 30 degrees if patient has tube feeding.  - Monitor tube feeding.  Outcome: Progressing     Problem: Nutrition  Goal: Nutrition/Hydration status is improving  Description: Monitor and assess patient's nutrition/hydration status for malnutrition (ex- brittle hair, bruises, dry skin, pale skin and conjunctiva, muscle wasting, smooth red tongue, and disorientation). Collaborate with interdisciplinary team and initiate plan and interventions as ordered.  Monitor patient's weight and dietary intake as ordered or per policy. Utilize nutrition screening tool and intervene per policy. Determine patient's food preferences and provide high-protein, high-caloric foods as appropriate.     - Assist patient with eating.  - Allow adequate time for meals.  - Encourage patient to take dietary supplement as ordered.  - Collaborate with clinical nutritionist.  - Include patient/family/caregiver in decisions related to nutrition.  Outcome: Progressing     Problem: PAIN - ADULT  Goal: Verbalizes/displays adequate comfort level or baseline comfort level  Description: Interventions:  - Encourage patient to monitor pain and  request assistance  - Assess pain using appropriate pain scale  - Administer analgesics as ordered based on type and severity of pain and evaluate response  - Implement non-pharmacological measures as appropriate and evaluate response  - Consider cultural and social influences on pain and pain management  - Notify physician/advanced practitioner if interventions unsuccessful or patient reports new pain  - Educate patient/family on pain management process including their role and importance of  reporting pain   - Provide non-pharmacologic/complimentary pain relief interventions  Outcome: Progressing     Problem: INFECTION - ADULT  Goal: Absence or prevention of progression during hospitalization  Description: INTERVENTIONS:  - Assess and monitor for signs and symptoms of infection  - Monitor lab/diagnostic results  - Monitor all insertion sites, i.e. indwelling lines, tubes, and drains  - Monitor endotracheal if appropriate and nasal secretions for changes in amount and color  - Daphne appropriate cooling/warming therapies per order  - Administer medications as ordered  - Instruct and encourage patient and family to use good hand hygiene technique  - Identify and instruct in appropriate isolation precautions for identified infection/condition  Outcome: Progressing  Goal: Absence of fever/infection during neutropenic period  Description: INTERVENTIONS:  - Monitor WBC  - Perform strict hand hygiene  - Limit to healthy visitors only  - No plants, dried, fresh or silk flowers with pollock in patient room  Outcome: Progressing     Problem: SAFETY ADULT  Goal: Patient will remain free of falls  Description: INTERVENTIONS:  - Educate patient/family on patient safety including physical limitations  - Instruct patient to call for assistance with activity   - Consider consulting OT/PT to assist with strengthening/mobility based on AM PAC & JH-HLM score  - Consult OT/PT to assist with strengthening/mobility   - Keep Call bell  within reach  - Keep bed low and locked with side rails adjusted as appropriate  - Keep care items and personal belongings within reach  - Initiate and maintain comfort rounds  - Make Fall Risk Sign visible to staff  - Offer Toileting every   Hours, in advance of need  - Initiate/Maintain  alarm  - Obtain necessary fall risk management equipment:    - Apply yellow socks and bracelet for high fall risk patients  - Consider moving patient to room near nurses station  Outcome: Progressing  Goal: Maintain or return to baseline ADL function  Description: INTERVENTIONS:  -  Assess patient's ability to carry out ADLs; assess patient's baseline for ADL function and identify physical deficits which impact ability to perform ADLs (bathing, care of mouth/teeth, toileting, grooming, dressing, etc.)  - Assess/evaluate cause of self-care deficits   - Assess range of motion  - Assess patient's mobility; develop plan if impaired  - Assess patient's need for assistive devices and provide as appropriate  - Encourage maximum independence but intervene and supervise when necessary  - Involve family in performance of ADLs  - Assess for home care needs following discharge   - Consider OT consult to assist with ADL evaluation and planning for discharge  - Provide patient education as appropriate  - Monitor functional capacity and physical performance, use of AM PAC & JH-HLM   - Monitor gait, balance and fatigue with ambulation    Outcome: Progressing  Goal: Maintains/Returns to pre admission functional level  Description: INTERVENTIONS:  - Perform AM-PAC 6 Click Basic Mobility/ Daily Activity assessment daily.  - Set and communicate daily mobility goal to care team and patient/family/caregiver.   - Collaborate with rehabilitation services on mobility goals if consulted  - Perform Range of Motion   times a day.  - Reposition patient every   hours.  - Dangle patient   times a day  - Stand patient   times a day  - Ambulate patient   times a  day  - Out of bed to chair   times a day   - Out of bed for meals   times a day  - Out of bed for toileting  - Record patient progress and toleration of activity level   Outcome: Progressing     Problem: DISCHARGE PLANNING  Goal: Discharge to home or other facility with appropriate resources  Description: INTERVENTIONS:  - Identify barriers to discharge w/patient and caregiver  - Arrange for needed discharge resources and transportation as appropriate  - Identify discharge learning needs (meds, wound care, etc.)  - Arrange for interpretive services to assist at discharge as needed  - Refer to Case Management Department for coordinating discharge planning if the patient needs post-hospital services based on physician/advanced practitioner order or complex needs related to functional status, cognitive ability, or social support system  Outcome: Progressing     Problem: Knowledge Deficit  Goal: Patient/family/caregiver demonstrates understanding of disease process, treatment plan, medications, and discharge instructions  Description: Complete learning assessment and assess knowledge base.  Interventions:  - Provide teaching at level of understanding  - Provide teaching via preferred learning methods  Outcome: Progressing     Problem: Nutrition/Hydration-ADULT  Goal: Nutrient/Hydration intake appropriate for improving, restoring or maintaining nutritional needs  Description: Monitor and assess patient's nutrition/hydration status for malnutrition. Collaborate with interdisciplinary team and initiate plan and interventions as ordered.  Monitor patient's weight and dietary intake as ordered or per policy. Utilize nutrition screening tool and intervene as necessary. Determine patient's food preferences and provide high-protein, high-caloric foods as appropriate.     INTERVENTIONS:  - Monitor oral intake, urinary output, labs, and treatment plans  - Assess nutrition and hydration status and recommend course of action  -  Evaluate amount of meals eaten  - Assist patient with eating if necessary   - Allow adequate time for meals  - Recommend/ encourage appropriate diets, oral nutritional supplements, and vitamin/mineral supplements  - Order, calculate, and assess calorie counts as needed  - Recommend, monitor, and adjust tube feedings and TPN/PPN based on assessed needs  - Assess need for intravenous fluids  - Provide specific nutrition/hydration education as appropriate  - Include patient/family/caregiver in decisions related to nutrition  Outcome: Progressing     Problem: Prexisting or High Potential for Compromised Skin Integrity  Goal: Skin integrity is maintained or improved  Description: INTERVENTIONS:  - Identify patients at risk for skin breakdown  - Assess and monitor skin integrity including under and around medical devices   - Assess and monitor nutrition and hydration status  - Monitor labs  - Assess for incontinence   - Turn and reposition patient  - Assist with mobility/ambulation  - Relieve pressure over pretty prominences   - Avoid friction and shearing  - Provide appropriate hygiene as needed including keeping skin clean and dry  - Evaluate need for skin moisturizer/barrier cream  - Collaborate with interdisciplinary team  - Patient/family teaching  - Consider wound care consult    Assess:  - Review Francisco Javier scale daily  - Clean and moisturize skin every    - Inspect skin when repositioning, toileting, and assisting with ADLS  - Assess under medical devices such as   every    - Assess extremities for adequate circulation and sensation     Bed Management:  - Have minimal linens on bed & keep smooth, unwrinkled  - Change linens as needed when moist or perspiring  - Avoid sitting or lying in one position for more than   hours while in bed?Keep HOB at  degrees   - Toileting:  - Offer bedside commode  - Assess for incontinence every    - Use incontinent care products after each incontinent episode such as       Activity:  -  Mobilize patient     times a day  - Encourage activity and walks on unit  - Encourage or provide ROM exercises   - Turn and reposition patient every    Hours  - Use appropriate equipment to lift or move patient in bed  - Instruct/ Assist with weight shifting every   when out of bed in chair  - Consider limitation of chair time   hour intervals    Skin Care:  - Avoid use of baby powder, tape, friction and shearing, hot water or constrictive clothing  - Relieve pressure over bony prominences using    - Do not massage red bony areas    Next Steps:  - Teach patient strategies to minimize risks such as    - Consider consults to  interdisciplinary teams such as    Outcome: Progressing

## 2025-07-11 ENCOUNTER — TELEPHONE (OUTPATIENT)
Age: 75
End: 2025-07-11

## 2025-07-11 PROBLEM — I16.1 HYPERTENSIVE EMERGENCY: Status: RESOLVED | Noted: 2025-07-09 | Resolved: 2025-07-11

## 2025-07-11 PROBLEM — R00.1 BRADYCARDIA: Status: RESOLVED | Noted: 2025-07-09 | Resolved: 2025-07-11

## 2025-07-11 LAB
ANION GAP SERPL CALCULATED.3IONS-SCNC: 8 MMOL/L (ref 4–13)
BUN SERPL-MCNC: 16 MG/DL (ref 5–25)
CALCIUM SERPL-MCNC: 8.2 MG/DL (ref 8.4–10.2)
CHLORIDE SERPL-SCNC: 102 MMOL/L (ref 96–108)
CO2 SERPL-SCNC: 27 MMOL/L (ref 21–32)
CREAT SERPL-MCNC: 0.64 MG/DL (ref 0.6–1.3)
ERYTHROCYTE [DISTWIDTH] IN BLOOD BY AUTOMATED COUNT: 12.7 % (ref 11.6–15.1)
GFR SERPL CREATININE-BSD FRML MDRD: 96 ML/MIN/1.73SQ M
GLUCOSE SERPL-MCNC: 142 MG/DL (ref 65–140)
HCT VFR BLD AUTO: 41.1 % (ref 36.5–49.3)
HGB BLD-MCNC: 14.3 G/DL (ref 12–17)
MAGNESIUM SERPL-MCNC: 1.8 MG/DL (ref 1.9–2.7)
MCH RBC QN AUTO: 33.6 PG (ref 26.8–34.3)
MCHC RBC AUTO-ENTMCNC: 34.8 G/DL (ref 31.4–37.4)
MCV RBC AUTO: 97 FL (ref 82–98)
PHOSPHATE SERPL-MCNC: 2.6 MG/DL (ref 2.3–4.1)
PLATELET # BLD AUTO: 164 THOUSANDS/UL (ref 149–390)
PMV BLD AUTO: 11.5 FL (ref 8.9–12.7)
POTASSIUM SERPL-SCNC: 4 MMOL/L (ref 3.5–5.3)
RBC # BLD AUTO: 4.25 MILLION/UL (ref 3.88–5.62)
SODIUM SERPL-SCNC: 137 MMOL/L (ref 135–147)
WBC # BLD AUTO: 11.82 THOUSAND/UL (ref 4.31–10.16)

## 2025-07-11 PROCEDURE — 83735 ASSAY OF MAGNESIUM: CPT | Performed by: REGISTERED NURSE

## 2025-07-11 PROCEDURE — 85027 COMPLETE CBC AUTOMATED: CPT | Performed by: REGISTERED NURSE

## 2025-07-11 PROCEDURE — 80048 BASIC METABOLIC PNL TOTAL CA: CPT | Performed by: REGISTERED NURSE

## 2025-07-11 PROCEDURE — 99232 SBSQ HOSP IP/OBS MODERATE 35: CPT | Performed by: INTERNAL MEDICINE

## 2025-07-11 PROCEDURE — 97530 THERAPEUTIC ACTIVITIES: CPT

## 2025-07-11 PROCEDURE — 84100 ASSAY OF PHOSPHORUS: CPT | Performed by: REGISTERED NURSE

## 2025-07-11 PROCEDURE — 97112 NEUROMUSCULAR REEDUCATION: CPT

## 2025-07-11 RX ORDER — LANOLIN ALCOHOL/MO/W.PET/CERES
400 CREAM (GRAM) TOPICAL 2 TIMES DAILY
Status: DISCONTINUED | OUTPATIENT
Start: 2025-07-11 | End: 2025-07-25 | Stop reason: HOSPADM

## 2025-07-11 RX ORDER — ACETAMINOPHEN 325 MG/1
650 TABLET ORAL EVERY 6 HOURS PRN
Status: DISCONTINUED | OUTPATIENT
Start: 2025-07-11 | End: 2025-07-25 | Stop reason: HOSPADM

## 2025-07-11 RX ADMIN — TAMSULOSIN HYDROCHLORIDE 0.4 MG: 0.4 CAPSULE ORAL at 17:22

## 2025-07-11 RX ADMIN — MAGNESIUM OXIDE TAB 400 MG (241.3 MG ELEMENTAL MG) 400 MG: 400 (241.3 MG) TAB at 17:22

## 2025-07-11 RX ADMIN — CHLORHEXIDINE GLUCONATE 15 ML: 1.2 SOLUTION ORAL at 09:11

## 2025-07-11 RX ADMIN — CARVEDILOL 6.25 MG: 6.25 TABLET, FILM COATED ORAL at 09:12

## 2025-07-11 RX ADMIN — POLYETHYLENE GLYCOL 3350 17 G: 17 POWDER, FOR SOLUTION ORAL at 09:09

## 2025-07-11 RX ADMIN — CHLORHEXIDINE GLUCONATE 15 ML: 1.2 SOLUTION ORAL at 21:07

## 2025-07-11 RX ADMIN — HEPARIN SODIUM 5000 UNITS: 5000 INJECTION INTRAVENOUS; SUBCUTANEOUS at 13:12

## 2025-07-11 RX ADMIN — ATORVASTATIN CALCIUM 20 MG: 20 TABLET, FILM COATED ORAL at 17:22

## 2025-07-11 RX ADMIN — AMLODIPINE BESYLATE 10 MG: 10 TABLET ORAL at 09:09

## 2025-07-11 RX ADMIN — SENNOSIDES AND DOCUSATE SODIUM 2 TABLET: 50; 8.6 TABLET ORAL at 17:22

## 2025-07-11 RX ADMIN — LOSARTAN POTASSIUM 100 MG: 50 TABLET, FILM COATED ORAL at 09:09

## 2025-07-11 RX ADMIN — Medication 6 MG: at 21:08

## 2025-07-11 RX ADMIN — CARVEDILOL 6.25 MG: 6.25 TABLET, FILM COATED ORAL at 17:21

## 2025-07-11 RX ADMIN — HEPARIN SODIUM 5000 UNITS: 5000 INJECTION INTRAVENOUS; SUBCUTANEOUS at 21:12

## 2025-07-11 RX ADMIN — SENNOSIDES AND DOCUSATE SODIUM 2 TABLET: 50; 8.6 TABLET ORAL at 09:09

## 2025-07-11 RX ADMIN — HEPARIN SODIUM 5000 UNITS: 5000 INJECTION INTRAVENOUS; SUBCUTANEOUS at 05:31

## 2025-07-11 RX ADMIN — MAGNESIUM OXIDE TAB 400 MG (241.3 MG ELEMENTAL MG) 400 MG: 400 (241.3 MG) TAB at 09:09

## 2025-07-11 NOTE — PROGRESS NOTES
"Progress Note - Neurology   Name: Shawn Marquez 75 y.o. male I MRN: [id number]  [facility] Date of Admission: 7/7/2025   Date of Service: 7/11/2025 I Hospital Day: 4     Assessment & Plan  ICH (intracerebral hemorrhage) (HCC)  75-year-old male with history of atrial fibrillation on Xarelto, prior DVT, HTN, HLD.    Presented initially as stroke alert to [facility] on 7/7 after being found by family on the floor/fall with  left facial and left-sided weakness.  Imaging with large right hemisphere intraparenchymal hemorrhage.  AC/AP reversed with Kcentra and DDAVP and brought to [facility] for critical care management.    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; stable hemorrhage in the right corona radiata/temporal white matter, no evidence of underlying mass lesion  -CTA head/neck: no LVO nor critical stenosis/flow restrictive disease  -2D Echo: EF 50%, normal systolic function/wall motion, bilateral atrial dilation  -Hemoglobin A1C of 6.7, lipid panel with LDL of 24    Plan:  -Would plan for re-initiation of AC/Xarelto 4 weeks post ICH (approximately August 4th); no need for repeat neuroimaging prior to resuming Xarelto  -Per attending: No clear neurologic indication for AP, no need to resume from neurology standpoint   -Continue low dose statin  -Supportive care  -Therapy evaluations (anticipate rehab needs post-discharge given moderate/advanced L sided hemiparesis)   -Stroke education provided   -Neuro checks  -Will arrange for OP neurovascular/stroke clinic follow up   Essential hypertension  -Normotensive BP goals (SBP<140)  DM II (diabetes mellitus, type II), controlled (HCC)  Lab Results   Component Value Date    HGBA1C 6.7 (H) 07/08/2025     No results for input(s): \"POCGLU\" in the last 72 hours.    Blood Sugar Average: Last 72 hrs:    -Euglycemic goals  History of DVT (deep vein " thrombosis)  -VAS LE doppler 7/8: chronic non-occlusive LE DVT  HLD (hyperlipidemia)  -Updated lipid panel  -Low dose statin  Atrial fibrillation (HCC)  -On Xarelto PTA, held currently 2/2 ICH  -See above for timeline of re-initiating    No further inpatient neurologic workup, ok from neurology standpoint for discharge. Discussed plan of care with attending neurologist.     Shawn Marquez will need follow-up in in 6 weeks with neurovascular team for Other in 60 minute appointment. They will not require outpatient neurological testing.     Subjective   Patient resting in bedside chair, wife at bedside. Doing ok this morning; wife notes L LE swelling; L sided weakness improved today compared to yesterday, but overall still quite weak. No new/interim neurologic symptoms otherwise.     Review of Systems    Objective :  Temp:  [98 °F (36.7 °C)-99.7 °F (37.6 °C)] 98 °F (36.7 °C)  HR:  [75-99] 93  BP: (113-153)/(60-95) 139/79  Resp:  [19-28] 28  SpO2:  [95 %-99 %] 97 %  O2 Device: None (Room air)  Nasal Cannula O2 Flow Rate (L/min):  [4 L/min] 4 L/min    Examined alongside Dr. Alvarado this morning  Physical Exam  Constitutional:       Appearance: Normal appearance.   HENT:      Head: Normocephalic and atraumatic.     Eyes:      Extraocular Movements: Extraocular movements intact.      Conjunctiva/sclera: Conjunctivae normal.      Pupils: Pupils are equal, round, and reactive to light.       Cardiovascular:      Rate and Rhythm: Normal rate.   Abdominal:      General: There is no distension.     Musculoskeletal:      Cervical back: Normal range of motion and neck supple.      Left lower leg: Edema (L knee swelling noted) present.     Skin:     General: Skin is warm and dry.     Psychiatric:         Speech: Speech normal.     Neurological Exam  Mental Status  Awake, alert and oriented to person, place and time. Oriented to person, place and time. Speech is normal. Language is fluent with no aphasia. Attention and  concentration are normal.    Cranial Nerves  CN II: Visual acuity is normal.  CN III, IV, VI: Extraocular movements intact bilaterally. Pupils equal round and reactive to light bilaterally.  CN V: Facial sensation is normal.  CN VII:  Left: There is central facial weakness.  CN VIII: Hearing is normal.  CN IX, X: Palate elevates symmetrically  CN XI: Shoulder shrug strength is normal.  CN XII: Tongue midline without atrophy or fasciculations.  Suspect L visual field cut with finger counting in each quadrant..    Motor    5/5 R UE and LE strength throughout  3/5 at least in proximal L UE (antigravity resistance for 5-6 seconds before dropping to chair)  3/5 at least in proximal L LE (antigravity resistance for several seconds before dropping to chair).    Sensory  Diminished sensation/numbness to the L UE and LE with testing. .    Coordination    Cannot assess reliably on L 2/2 degree of proximal muscle weakness.    Gait    Deferred.        Lab Results: I have reviewed the following results:  Imaging Results Review: I personally reviewed the following image studies in PACS and associated radiology reports:  MRI brain w wo contrast   Final Result by Tae Armas MD (07/10 2347)         1. Acute to early subacute lacunar infarct in the right hemipons.   2. Stable hemorrhage and surrounding vasogenic edema in the right corona radiata and temporal white matter. No evidence of underlying solid lesion.      The study was marked in EPIC for immediate notification.      Workstation performed: [id number]         CT head wo contrast   Final Result by Servando Durbin DO (07/09 1442)      Stable acute intraparenchymal hemorrhage centered within the posterior aspect of the right basal ganglia with mild surrounding edema and localized mass effect.      Stable chronic microangiopathic change.                  Workstation performed: [id number]         CT chest abdomen pelvis w contrast   Final Result by Jazmin  MD Jaiden (07/09 1710)   Interstitial thickening bibasally with posterior predominance, possibly sequela of aspiration. Correlate with clinical findings. Patent bronchial tree.   No acute intra-abdominal pathology to account for intractable vomiting.   Mildly overdistended urinary bladder with some air in the lumen suggestive of recent catheterization. If no such history, correlate to exclude UTI.   Pancreatic head calcifications suggestive of chronic pancreatitis. No radiographic evidence of acute exacerbation.   Chronic findings, as per the body of the report.               Computerized Assisted Algorithm (CAA) may have aided analysis of applicable images.      Workstation performed: [id number]         CT head wo contrast   Final Result by Andrea Fisher MD (07/09 0228)      Stable appearance of right basal ganglia intraparenchymal hemorrhage                  Workstation performed: [id number]          VAS VENOUS DUPLEX - LOWER LIMB BILATERAL   Final Result by Bj Goodwin MD (07/08 1845)      CT head wo contrast   Final Result by Buster Jin MD (07/08 0922)   Stable right basal ganglia hemorrhage and surrounding vasogenic edema. Stable mild mass effect on the right lateral ventricle and minimal 1 mm leftward midline shift.      Redemonstrated age-indeterminate hypodensity within the right hemipons compared to prior day exam however not noted on more remote exam from 7/6/2024. Differential includes age-indeterminate infarct. Correlate with MRI of the brain.         I personally discussed this study with Nat Trivedi on 7/8/2025 9:21 AM.                        Workstation performed: [id number]             Other Study Results Review: EKG was reviewed.     VTE Pharmacologic Prophylaxis: Sequential compression device (Venodyne)  and Reason for no pharmacologic prophylaxis ICH    Please see attending's attestation for total time spent/billing. Discussed plan of care with patient and primary team: discussed  MRI results, timing for re-starting anticoagulation, supportive care, no need for ASA, OP neurology/stroke clinic follow up.    Please note dictation software was used in the formulation of this note. Please keep that in mind in light of any grammatical errors.

## 2025-07-11 NOTE — TELEPHONE ENCOUNTER
STILL ADMITTED;7/7/2025 - present (4 days)  [facility]    1ST ATTEMPT,     VIA [employer]     Thank you,     Alejandra         RESIDENT PT, LOV WITH , 2 YRS AGO, 11/3/2022    HFU/ [facility]/     ----- Message from Ankit Clements PA-C sent at 7/11/2025  3:16 PM EDT -----  Regarding: HFU  Shawn Marquez will need follow-up in in 6 weeks with neurovascular team for Other = ATTENDING, in 60 minute appointment. They will not require outpatient neurological testing.

## 2025-07-11 NOTE — [facility] ADMISSION
[facility]  met with pt bedside and called  family- pt's spouse petrona introduced self, explained role, reviewed [facility] program, services offered, acute rehab criteria, review of referral by [facility] Medical Director, insurance authorization process, copay, coins, OOP, and deductible, the three [facility] locations and anticipated rehab length of stay.; all questions answered. family first choice is [facility] . patient and family made aware [facility] reviewer will communicate with the  who will keep patient updated on referral status.

## 2025-07-11 NOTE — PROGRESS NOTES
Patient report was called to the [facility] Nursing unit. The patient will be transferred to Room 734 as a Medical-Surgical status patient with Telemetry. The patient's wife has been called and has been made aware of the room for his Transfer.

## 2025-07-11 NOTE — PROGRESS NOTES
"Progress Note - Hospitalist   Name: Shawn Marquez 75 y.o. male I MRN: [id number]  [facility] I Date of Admission: 7/7/2025   Date of Service: 7/11/2025 I Hospital Day: 4    Assessment & Plan  ICH (intracerebral hemorrhage) (HCC)  History of A-fib on Xarelto presented as stroke alert after being found by family on the floor with left facial and left-sided weakness.  AC/AP reversed with Kcentra and DDAVP with CTA H/N notable for Large acute intraparenchymal hemorrhage centered involving the posterior right basal ganglia, adjacent right thalamus, and posterior right insula, with a volume of approximately 23 mL and subsequent MRI brain showing Acute to early subacute lacunar infarct in the right hemipons and Stable hemorrhage and surrounding vasogenic edema in the right corona radiata and temporal white matter.  He followed by neurosurgery with no interventions indicated  Evaluated by neurology and recommended to hold aspirin and resume AC on 8/4  Patient recommended for acute rehab.  Case management following  Stable pending placement  Essential hypertension  Blood pressure currently stable.  He is off Cardene drip  Continue amlodipine 10 mg daily, carvedilol 6.25 mg twice daily and losartan 100 mg daily  DM II (diabetes mellitus, type II), controlled ([facility])  Lab Results   Component Value Date    HGBA1C 6.7 (H) 07/08/2025       No results for input(s): \"POCGLU\" in the last 72 hours.    Blood Sugar Average: Last 72 hrs:      History of DVT (deep vein thrombosis)  AC on hold as above  HLD (hyperlipidemia)  Continue atorvastatin  Hypertensive emergency (Resolved: 7/11/2025)    Atrial fibrillation (HCC)  Continue beta-blocker.  Hold AC as above  Bradycardia (Resolved: 7/11/2025)      VTE Pharmacologic Prophylaxis:   Moderate Risk (Score 3-4) - Pharmacological DVT Prophylaxis Contraindicated. Sequential Compression Devices Ordered.    Mobility:   Basic Mobility Inpatient Raw Score: 6  -[facility] Goal: 2: Bed " activities/Dependent transfer  JH-HLM Achieved: 3: Sit at edge of bed  JH-HLM Goal achieved. Continue to encourage appropriate mobility.    Patient Centered Rounds: I performed bedside rounds with nursing staff today.   Discussions with Specialists or Other Care Team Provider: Neurology    Education and Discussions with Family / Patient: Updated  (wife) at bedside.    Current Length of Stay: 4 day(s)  Current Patient Status: Inpatient   Certification Statement: The patient will continue to require additional inpatient hospital stay due to ICH  Discharge Plan: Anticipate discharge in 24-48 hrs to rehab facility.    Code Status: Level 1 - Full Code    Subjective   Patient seen and examined.  No acute complaint today.    Objective :  Temp:  [98 °F (36.7 °C)-99.7 °F (37.6 °C)] 98.9 °F (37.2 °C)  HR:  [80-99] 88  BP: (127-153)/(60-95) 141/82  Resp:  [19-28] 28  SpO2:  [95 %-99 %] 96 %  O2 Device: None (Room air)  Nasal Cannula O2 Flow Rate (L/min):  [4 L/min] 4 L/min    Body mass index is 29.57 kg/m².     Input and Output Summary (last 24 hours):     Intake/Output Summary (Last 24 hours) at 7/11/2025 1550  Last data filed at 7/11/2025 1100  Gross per 24 hour   Intake 650 ml   Output 655 ml   Net -5 ml       Physical Exam    Cardiovascular:      Rate and Rhythm: Normal rate and regular rhythm.   Pulmonary:      Effort: Pulmonary effort is normal.      Breath sounds: Normal breath sounds.   Abdominal:      Tenderness: There is no abdominal tenderness.     Neurological:      Mental Status: He is alert.      Motor: Weakness (left sided) present.           Lines/Drains:              Lab Results: I have reviewed the following results:   Results from last 7 days   Lab Units 07/11/25  0438 07/10/25  0550   WBC Thousand/uL 11.82* 10.69*   HEMOGLOBIN g/dL 14.3 14.7   HEMATOCRIT % 41.1 42.9   PLATELETS Thousands/uL 164 151   SEGS PCT %  --  68   LYMPHO PCT %  --  18   MONO PCT %  --  11   EOS PCT %  --  2     Results  from last 7 days   Lab Units 07/11/25  0438 07/08/25  0501 07/07/25  1122   SODIUM mmol/L 137   < > 140   POTASSIUM mmol/L 4.0   < > 4.1   CHLORIDE mmol/L 102   < > 104   CO2 mmol/L 27   < > 28   BUN mg/dL 16   < > 9   CREATININE mg/dL 0.64   < > 0.76   ANION GAP mmol/L 8   < > 8   CALCIUM mg/dL 8.2*   < > 9.1   ALBUMIN g/dL  --   --  4.2   TOTAL BILIRUBIN mg/dL  --   --  0.60   ALK PHOS U/L  --   --  66   ALT U/L  --   --  22   AST U/L  --   --  20   GLUCOSE RANDOM mg/dL 142*   < > 162*    < > = values in this interval not displayed.     Results from last 7 days   Lab Units 07/07/25  1122   INR  1.39*     Results from last 7 days   Lab Units 07/07/25  1613 07/07/25  1122   POC GLUCOSE mg/dl 157* 148*     Results from last 7 days   Lab Units 07/08/25  1339   HEMOGLOBIN A1C % 6.7*     Results from last 7 days   Lab Units 07/10/25  0550   PROCALCITONIN ng/ml <0.05       Recent Cultures (last 7 days):         Imaging Results Review: I reviewed radiology reports from this admission including: MRI brain.  Other Study Results Review: No additional pertinent studies reviewed.    Last 24 Hours Medication List:     Current Facility-Administered Medications:     acetaminophen (TYLENOL) tablet 650 mg, Q6H PRN    amLODIPine (NORVASC) tablet 10 mg, Daily    atorvastatin (LIPITOR) tablet 20 mg, Daily With Dinner    bisacodyl (DULCOLAX) rectal suppository 10 mg, Daily PRN    carvedilol (COREG) tablet 6.25 mg, BID With Meals    chlorhexidine (PERIDEX) 0.12 % oral rinse 15 mL, Q12H DEIDRE    [Transfer Hold] Gadobutrol injection (SINGLE-DOSE) SOLN 9 mL, Once in imaging    heparin (porcine) subcutaneous injection 5,000 Units, Q8H DEIDRE    hydrALAZINE (APRESOLINE) injection 10 mg, Q4H PRN    losartan (COZAAR) tablet 100 mg, Daily    magnesium Oxide (MAG-OX) tablet 400 mg, BID    melatonin tablet 6 mg, HS    ondansetron (ZOFRAN) injection 4 mg, Q6H PRN    polyethylene glycol (MIRALAX) packet 17 g, Daily    senna-docusate sodium (SENOKOT S)  8.6-50 mg per tablet 2 tablet, BID    tamsulosin (FLOMAX) capsule 0.4 mg, Daily With Dinner    Administrative Statements   Today, Patient Was Seen By: Renato Florian MD  I have spent a total time of 35 minutes in caring for this patient on the day of the visit/encounter including Diagnostic results, Counseling / Coordination of care, Documenting in the medical record, Reviewing/placing orders in the medical record (including tests, medications, and/or procedures), Obtaining or reviewing history  , and Communicating with other healthcare professionals .    **Please Note: This note may have been constructed using a voice recognition system.**

## 2025-07-11 NOTE — ASSESSMENT & PLAN NOTE
History of A-fib on Xarelto presented as stroke alert after being found by family on the floor with left facial and left-sided weakness.  AC/AP reversed with Kcentra and DDAVP with CTA H/N notable for Large acute intraparenchymal hemorrhage centered involving the posterior right basal ganglia, adjacent right thalamus, and posterior right insula, with a volume of approximately 23 mL and subsequent MRI brain showing Acute to early subacute lacunar infarct in the right hemipons and Stable hemorrhage and surrounding vasogenic edema in the right corona radiata and temporal white matter.  He followed by neurosurgery with no interventions indicated  Evaluated by neurology and recommended to hold aspirin and resume AC on 8/4  Patient recommended for acute rehab.  Case management following  Stable pending placement

## 2025-07-11 NOTE — ASSESSMENT & PLAN NOTE
"Lab Results   Component Value Date    HGBA1C 6.7 (H) 07/08/2025     No results for input(s): \"POCGLU\" in the last 72 hours.    Blood Sugar Average: Last 72 hrs:    -Euglycemic goals  "

## 2025-07-11 NOTE — PLAN OF CARE
Problem: Neurological Deficit  Goal: Neurological status is stable or improving  Description: Interventions:  - Monitor and assess patient's level of consciousness, motor function, sensory function, and level of assistance needed for ADLs.   - Monitor and report changes from baseline. Collaborate with interdisciplinary team to initiate plan and implement interventions as ordered.   - Provide and maintain a safe environment.  - Consider seizure precautions.  - Consider fall precautions.  - Consider aspiration precautions.  - Consider bleeding precautions.  Outcome: Progressing     Problem: Activity Intolerance/Impaired Mobility  Goal: Mobility/activity is maintained at optimum level for patient  Description: Interventions:  - Assess and monitor patient  barriers to mobility and need for assistive/adaptive devices.  - Assess patient's emotional response to limitations.  - Collaborate with interdisciplinary team and initiate plans and interventions as ordered.  - Encourage independent activity per ability.  - Maintain proper body alignment.  - Perform active/passive rom as tolerated/ordered.  - Plan activities to conserve energy.  - Turn patient as appropriate  Outcome: Progressing     Problem: Communication Impairment  Goal: Ability to express needs and understand communication  Description: Assess patient's communication skills and ability to understand information.  Patient will demonstrate use of effective communication techniques, alternative methods of communication and understanding even if not able to speak.     - Encourage communication and provide alternate methods of communication as needed.  - Collaborate with case management/ for discharge needs.  - Include patient/family/caregiver in decisions related to communication.  Outcome: Progressing     Problem: Potential for Aspiration  Goal: Non-ventilated patient's risk of aspiration is minimized  Description: Assess and monitor vital signs,  respiratory status, and labs (WBC).  Monitor for signs of aspiration (tachypnea, cough, rales, wheezing, cyanosis, fever).    - Assess and monitor patient's ability to swallow.  - Place patient up in chair to eat if possible.  - HOB up at 90 degrees to eat if unable to get patient up into chair.  - Supervise patient during oral intake.   - Instruct patient/ family to take small bites.  - Instruct patient/ family to take small single sips when taking liquids.  - Follow patient-specific strategies generated by speech pathologist.  Outcome: Progressing     Problem: Nutrition  Goal: Nutrition/Hydration status is improving  Description: Monitor and assess patient's nutrition/hydration status for malnutrition (ex- brittle hair, bruises, dry skin, pale skin and conjunctiva, muscle wasting, smooth red tongue, and disorientation). Collaborate with interdisciplinary team and initiate plan and interventions as ordered.  Monitor patient's weight and dietary intake as ordered or per policy. Utilize nutrition screening tool and intervene per policy. Determine patient's food preferences and provide high-protein, high-caloric foods as appropriate.     - Assist patient with eating.  - Allow adequate time for meals.  - Encourage patient to take dietary supplement as ordered.  - Collaborate with clinical nutritionist.  - Include patient/family/caregiver in decisions related to nutrition.  Outcome: Progressing     Problem: PAIN - ADULT  Goal: Verbalizes/displays adequate comfort level or baseline comfort level  Description: Interventions:  - Encourage patient to monitor pain and request assistance  - Assess pain using appropriate pain scale  - Administer analgesics as ordered based on type and severity of pain and evaluate response  - Implement non-pharmacological measures as appropriate and evaluate response  - Consider cultural and social influences on pain and pain management  - Notify physician/advanced practitioner if  interventions unsuccessful or patient reports new pain  - Educate patient/family on pain management process including their role and importance of  reporting pain   - Provide non-pharmacologic/complimentary pain relief interventions  Outcome: Progressing     Problem: INFECTION - ADULT  Goal: Absence or prevention of progression during hospitalization  Description: INTERVENTIONS:  - Assess and monitor for signs and symptoms of infection  - Monitor lab/diagnostic results  - Monitor all insertion sites, i.e. indwelling lines, tubes, and drains  - Monitor endotracheal if appropriate and nasal secretions for changes in amount and color  - [place] appropriate cooling/warming therapies per order  - Administer medications as ordered  - Instruct and encourage patient and family to use good hand hygiene technique  - Identify and instruct in appropriate isolation precautions for identified infection/condition  Outcome: Progressing  Goal: Absence of fever/infection during neutropenic period  Description: INTERVENTIONS:  - Monitor WBC  - Perform strict hand hygiene  - Limit to healthy visitors only  - No plants, dried, fresh or silk flowers with pollock in patient room  Outcome: Progressing     Problem: SAFETY ADULT  Goal: Patient will remain free of falls  Description: INTERVENTIONS:  - Educate patient/family on patient safety including physical limitations  - Instruct patient to call for assistance with activity   - Consider consulting OT/PT to assist with strengthening/mobility based on AM PAC & JH-HLM score  - Consult OT/PT to assist with strengthening/mobility   - Keep Call bell within reach  - Keep bed low and locked with side rails adjusted as appropriate  - Keep care items and personal belongings within reach  - Initiate and maintain comfort rounds  - Make Fall Risk Sign visible to staff  - Apply yellow socks and bracelet for high fall risk patients  - Consider moving patient to room near nurses station  Outcome:  Progressing  Goal: Maintain or return to baseline ADL function  Description: INTERVENTIONS:  -  Assess patient's ability to carry out ADLs; assess patient's baseline for ADL function and identify physical deficits which impact ability to perform ADLs (bathing, care of mouth/teeth, toileting, grooming, dressing, etc.)  - Assess/evaluate cause of self-care deficits   - Assess range of motion  - Assess patient's mobility; develop plan if impaired  - Assess patient's need for assistive devices and provide as appropriate  - Encourage maximum independence but intervene and supervise when necessary  - Involve family in performance of ADLs  - Assess for home care needs following discharge   - Consider OT consult to assist with ADL evaluation and planning for discharge  - Provide patient education as appropriate  - Monitor functional capacity and physical performance, use of AM PAC & JH-HLM   - Monitor gait, balance and fatigue with ambulation    Outcome: Progressing  Goal: Maintains/Returns to pre admission functional level  Description: INTERVENTIONS:  - Perform AM-PAC 6 Click Basic Mobility/ Daily Activity assessment daily.  - Set and communicate daily mobility goal to care team and patient/family/caregiver.   - Collaborate with rehabilitation services on mobility goals if consulted  - Out of bed for toileting  - Record patient progress and toleration of activity level   Outcome: Progressing     Problem: DISCHARGE PLANNING  Goal: Discharge to home or other facility with appropriate resources  Description: INTERVENTIONS:  - Identify barriers to discharge w/patient and caregiver  - Arrange for needed discharge resources and transportation as appropriate  - Identify discharge learning needs (meds, wound care, etc.)  - Arrange for interpretive services to assist at discharge as needed  - Refer to Case Management Department for coordinating discharge planning if the patient needs post-hospital services based on  physician/advanced practitioner order or complex needs related to functional status, cognitive ability, or social support system  Outcome: Progressing     Problem: Knowledge Deficit  Goal: Patient/family/caregiver demonstrates understanding of disease process, treatment plan, medications, and discharge instructions  Description: Complete learning assessment and assess knowledge base.  Interventions:  - Provide teaching at level of understanding  - Provide teaching via preferred learning methods  Outcome: Progressing     Problem: Nutrition/Hydration-ADULT  Goal: Nutrient/Hydration intake appropriate for improving, restoring or maintaining nutritional needs  Description: Monitor and assess patient's nutrition/hydration status for malnutrition. Collaborate with interdisciplinary team and initiate plan and interventions as ordered.  Monitor patient's weight and dietary intake as ordered or per policy. Utilize nutrition screening tool and intervene as necessary. Determine patient's food preferences and provide high-protein, high-caloric foods as appropriate.     INTERVENTIONS:  - Monitor oral intake, urinary output, labs, and treatment plans  - Assess nutrition and hydration status and recommend course of action  - Evaluate amount of meals eaten  - Assist patient with eating if necessary   - Allow adequate time for meals  - Recommend/ encourage appropriate diets, oral nutritional supplements, and vitamin/mineral supplements  - Order, calculate, and assess calorie counts as needed  - Recommend, monitor, and adjust tube feedings and TPN/PPN based on assessed needs  - Assess need for intravenous fluids  - Provide specific nutrition/hydration education as appropriate  - Include patient/family/caregiver in decisions related to nutrition  Outcome: Progressing     Problem: Prexisting or High Potential for Compromised Skin Integrity  Goal: Skin integrity is maintained or improved  Description: INTERVENTIONS:  - Identify patients  at risk for skin breakdown  - Assess and monitor skin integrity including under and around medical devices   - Assess and monitor nutrition and hydration status  - Monitor labs  - Assess for incontinence   - Turn and reposition patient  - Assist with mobility/ambulation  - Relieve pressure over pretty prominences   - Avoid friction and shearing  - Provide appropriate hygiene as needed including keeping skin clean and dry  - Evaluate need for skin moisturizer/barrier cream  - Collaborate with interdisciplinary team  - Patient/family teaching  - Consider wound care consult

## 2025-07-11 NOTE — RESTORATIVE TECHNICIAN NOTE
Restorative Technician Note      Patient Name: Shawn Marquez     Note Type: Mobility  Patient Position Upon Consult: Supine  Activity Performed: Dangled; Stood; Range of motion; Repositioned  Assistive Device: Other (Comment) (Assist x2 to sit pt EOB(perform reaching activities)/stand x1. Assisted PT Anthony.)  Range of Motion: All extremities  Education Provided: Yes  Patient Position at End of Consult: Supine; All needs within reach; Bed/Chair alarm activated      Jerod CHAN, Restorative Technician,

## 2025-07-11 NOTE — PLAN OF CARE
Problem: PHYSICAL THERAPY ADULT  Goal: Performs mobility at highest level of function for planned discharge setting.  See evaluation for individualized goals.  Description: Treatment/Interventions: ADL retraining, Functional transfer training, LE strengthening/ROM, Elevations, Therapeutic exercise, Endurance training, Cognitive reorientation, Equipment eval/education, Patient/family training, Bed mobility, Gait training, Compensatory technique education, Spoke to nursing, Spoke to advanced practitioner, OT  Equipment Recommended:  (TBD)       See flowsheet documentation for full assessment, interventions and recommendations.  Outcome: Progressing  Note: Prognosis: Good  Problem List: Decreased strength, Decreased endurance, Impaired balance, Decreased mobility, Decreased coordination, Decreased cognition, Impaired judgement, Decreased safety awareness  Assessment: Pt agreeable to participate in PT session. Pt performed functional mobility and therex as outlined above. L lean and poor trunk strength. Tasks throughout to promote upright and midline trunk orientation including mirror anterior to patient. Transitioned to R reaching and weight shift to improve trunk control and balance. Pt left supine in bed with bed alarm donned, call bell, phone, and all personal needs within reach. Pt will continue to benefit from skilled acute care PT to further address their functional mobility limitations.  Barriers to Discharge: Inaccessible home environment, Decreased caregiver support     Rehab Resource Intensity Level, PT: I (Maximum Resource Intensity)    See flowsheet documentation for full assessment.

## 2025-07-11 NOTE — PHYSICAL THERAPY NOTE
PHYSICAL THERAPY NOTE          Patient Name: Shawn Marquez  Today's Date: 7/11/2025 07/11/25 1354   PT Last Visit   PT Visit Date 07/11/25   Note Type   Note Type Treatment   Pain Assessment   Pain Assessment Tool 0-10   Pain Score No Pain   Restrictions/Precautions   Weight Bearing Precautions Per Order No   Braces or Orthoses   (sling for transfers LUE)   Other Precautions Cognitive;Chair Alarm;Bed Alarm;Telemetry;Multiple lines;Fall Risk  (L hemiparesis)   General   Chart Reviewed Yes   Family/Caregiver Present No   Cognition   Overall Cognitive Status Impaired   Arousal/Participation Cooperative   Attention Attends with cues to redirect   Orientation Level Oriented X4   Following Commands Follows one step commands with increased time or repetition   Comments pleasant   Subjective   Subjective agreeable, likes music   Bed Mobility   Supine to Sit 2  Maximal assistance   Additional items Assist x 1;Increased time required;HOB elevated;LE management;Verbal cues   Sit to Supine 2  Maximal assistance   Additional items Assist x 2;Increased time required;Verbal cues;LE management   Transfers   Sit to Stand 2  Maximal assistance   Additional items Assist x 2;Increased time required;Verbal cues   Stand to Sit 2  Maximal assistance   Additional items Assist x 2;Increased time required;Verbal cues   Additional Comments LUE supported, L knee block, R HHA and R knee block   Ambulation/Elevation   Gait pattern Not appropriate   Balance   Static Sitting Poor   Dynamic Sitting Poor -   Static Standing Poor -   Endurance Deficit   Endurance Deficit Yes   Activity Tolerance   Activity Tolerance Patient limited by fatigue   Medical Staff Made Aware elizabeth MAGALLON [facility] tech   Nurse Made Aware yes-cleared   Exercises   Neuro re-ed sitting EOB with modAx1 and mirror anterior for midline orientation. focus on R weight shifting as pt with L lean  and R reaching/weight shifting for sticking notes x6 reps. total EOB time x18 min   Assessment   Prognosis Good   Problem List Decreased strength;Decreased endurance;Impaired balance;Decreased mobility;Decreased coordination;Decreased cognition;Impaired judgement;Decreased safety awareness   Assessment Pt agreeable to participate in PT session. Pt performed functional mobility and therex as outlined above. L lean and poor trunk strength. Tasks throughout to promote upright and midline trunk orientation including mirror anterior to patient. Transitioned to R reaching and weight shift to improve trunk control and balance. Pt left supine in bed with bed alarm donned, call bell, phone, and all personal needs within reach. Pt will continue to benefit from skilled acute care PT to further address their functional mobility limitations.   Barriers to Discharge Inaccessible home environment;Decreased caregiver support   Goals   Patient Goals to improve   STG Expiration Date 07/22/25   PT Treatment Day 2   Plan   Treatment/Interventions Functional transfer training;LE strengthening/ROM;Therapeutic exercise;Endurance training;Cognitive reorientation;Patient/family training;Equipment eval/education;Gait training;Bed mobility;Spoke to nursing;Spoke to case management;OT   Progress Slow progress, decreased activity tolerance   PT Frequency 3-5x/wk   Discharge Recommendation   Rehab Resource Intensity Level, PT I (Maximum Resource Intensity)   AM-PAC Basic Mobility Inpatient   Turning in Flat Bed Without Bedrails 1   Lying on Back to Sitting on Edge of Flat Bed Without Bedrails 1   Moving Bed to Chair 1   Standing Up From Chair Using Arms 1   Walk in Room 1   Climb 3-5 Stairs With Railing 1   Basic Mobility Inpatient Raw Score 6   Turning Head Towards Sound 4   Follow Simple Instructions 3   Low Function Basic Mobility Raw Score  13   Low Function Basic Mobility Standardized Score  20.14   [facility] Highest Level Of Mobility    JH-HLM Goal 2: Bed activities/Dependent transfer   JH-HLM Achieved 3: Sit at edge of bed     Anthony Senior, PT, DPT, NCS

## 2025-07-11 NOTE — ASSESSMENT & PLAN NOTE
Blood pressure currently stable.  He is off Cardene drip  Continue amlodipine 10 mg daily, carvedilol 6.25 mg twice daily and losartan 100 mg daily

## 2025-07-11 NOTE — RESTORATIVE TECHNICIAN NOTE
Restorative Technician Note      Patient Name: Shawn Marquez     Note Type: Mobility  Patient Position Upon Consult: Supine  Activity Performed: Transferred; Dangled; Stood; Range of motion  Assistive Device: Other (Comment) (Assist x2)  Range of Motion: All extremities  Education Provided: Yes  Patient Position at End of Consult: Bedside chair; All needs within reach; Bed/Chair alarm activated    Jerod CHAN, Restorative Technician,

## 2025-07-11 NOTE — PLAN OF CARE
Problem: Neurological Deficit  Goal: Neurological status is stable or improving  Description: Interventions:  - Monitor and assess patient's level of consciousness, motor function, sensory function, and level of assistance needed for ADLs.   - Monitor and report changes from baseline. Collaborate with interdisciplinary team to initiate plan and implement interventions as ordered.   - Provide and maintain a safe environment.  - Consider seizure precautions.  - Consider fall precautions.  - Consider aspiration precautions.  - Consider bleeding precautions.  Outcome: Progressing     Problem: Communication Impairment  Goal: Ability to express needs and understand communication  Description: Assess patient's communication skills and ability to understand information.  Patient will demonstrate use of effective communication techniques, alternative methods of communication and understanding even if not able to speak.     - Encourage communication and provide alternate methods of communication as needed.  - Collaborate with case management/ for discharge needs.  - Include patient/family/caregiver in decisions related to communication.  Outcome: Progressing     Problem: Potential for Aspiration  Goal: Non-ventilated patient's risk of aspiration is minimized  Description: Assess and monitor vital signs, respiratory status, and labs (WBC).  Monitor for signs of aspiration (tachypnea, cough, rales, wheezing, cyanosis, fever).    - Assess and monitor patient's ability to swallow.  - Place patient up in chair to eat if possible.  - HOB up at 90 degrees to eat if unable to get patient up into chair.  - Supervise patient during oral intake.   - Instruct patient/ family to take small bites.  - Instruct patient/ family to take small single sips when taking liquids.  - Follow patient-specific strategies generated by speech pathologist.  Outcome: Progressing     Problem: Potential for Aspiration  Goal: Ventilated  patient's risk of aspiration is minimized  Description: Assess and monitor vital signs, respiratory status, airway cuff pressure, and labs (WBC).  Monitor for signs of aspiration (tachypnea, cough, rales, wheezing, cyanosis, fever).    - Elevate head of bed 30 degrees if patient has tube feeding.  - Monitor tube feeding.  Outcome: Progressing     Problem: Nutrition  Goal: Nutrition/Hydration status is improving  Description: Monitor and assess patient's nutrition/hydration status for malnutrition (ex- brittle hair, bruises, dry skin, pale skin and conjunctiva, muscle wasting, smooth red tongue, and disorientation). Collaborate with interdisciplinary team and initiate plan and interventions as ordered.  Monitor patient's weight and dietary intake as ordered or per policy. Utilize nutrition screening tool and intervene per policy. Determine patient's food preferences and provide high-protein, high-caloric foods as appropriate.     - Assist patient with eating.  - Allow adequate time for meals.  - Encourage patient to take dietary supplement as ordered.  - Collaborate with clinical nutritionist.  - Include patient/family/caregiver in decisions related to nutrition.  Outcome: Progressing     Problem: PAIN - ADULT  Goal: Verbalizes/displays adequate comfort level or baseline comfort level  Description: Interventions:  - Encourage patient to monitor pain and request assistance  - Assess pain using appropriate pain scale  - Administer analgesics as ordered based on type and severity of pain and evaluate response  - Implement non-pharmacological measures as appropriate and evaluate response  - Consider cultural and social influences on pain and pain management  - Notify physician/advanced practitioner if interventions unsuccessful or patient reports new pain  - Educate patient/family on pain management process including their role and importance of  reporting pain   - Provide non-pharmacologic/complimentary pain relief  interventions  Outcome: Progressing     Problem: INFECTION - ADULT  Goal: Absence or prevention of progression during hospitalization  Description: INTERVENTIONS:  - Assess and monitor for signs and symptoms of infection  - Monitor lab/diagnostic results  - Monitor all insertion sites, i.e. indwelling lines, tubes, and drains  - Monitor endotracheal if appropriate and nasal secretions for changes in amount and color  - [place] appropriate cooling/warming therapies per order  - Administer medications as ordered  - Instruct and encourage patient and family to use good hand hygiene technique  - Identify and instruct in appropriate isolation precautions for identified infection/condition  Outcome: Progressing     Problem: SAFETY ADULT  Goal: Patient will remain free of falls  Description: INTERVENTIONS:  - Educate patient/family on patient safety including physical limitations  - Instruct patient to call for assistance with activity   - Consider consulting OT/PT to assist with strengthening/mobility based on AM PAC & -HLM score  - Consult OT/PT to assist with strengthening/mobility   - Keep Call bell within reach  - Keep bed low and locked with side rails adjusted as appropriate  - Keep care items and personal belongings within reach  - Initiate and maintain comfort rounds  - Make Fall Risk Sign visible to staff  - Offer Toileting every 2 Hours, in advance of need  - Initiate/Maintain bed alarm  - Obtain necessary fall risk management equipment: bed alarm  - Apply yellow socks and bracelet for high fall risk patients  - Consider moving patient to room near nurses station  Outcome: Progressing     Problem: SAFETY ADULT  Goal: Maintain or return to baseline ADL function  Description: INTERVENTIONS:  -  Assess patient's ability to carry out ADLs; assess patient's baseline for ADL function and identify physical deficits which impact ability to perform ADLs (bathing, care of mouth/teeth, toileting, grooming, dressing,  etc.)  - Assess/evaluate cause of self-care deficits   - Assess range of motion  - Assess patient's mobility; develop plan if impaired  - Assess patient's need for assistive devices and provide as appropriate  - Encourage maximum independence but intervene and supervise when necessary  - Involve family in performance of ADLs  - Assess for home care needs following discharge   - Consider OT consult to assist with ADL evaluation and planning for discharge  - Provide patient education as appropriate  - Monitor functional capacity and physical performance, use of AM PAC & -HLM   - Monitor gait, balance and fatigue with ambulation    Outcome: Progressing     Problem: SAFETY ADULT  Goal: Maintains/Returns to pre admission functional level  Description: INTERVENTIONS:  - Perform AM-PAC 6 Click Basic Mobility/ Daily Activity assessment daily.  - Set and communicate daily mobility goal to care team and patient/family/caregiver.   - Collaborate with rehabilitation services on mobility goals if consulted  - Perform Range of Motion 3 times a day.  - Reposition patient every 2 hours.  - Dangle patient 3 times a day  - Stand patient 3 times a day  - Ambulate patient 3 times a day  - Out of bed to chair 3 times a day   - Out of bed for meals 3 times a day  - Out of bed for toileting  - Record patient progress and toleration of activity level   Outcome: Progressing     Problem: DISCHARGE PLANNING  Goal: Discharge to home or other facility with appropriate resources  Description: INTERVENTIONS:  - Identify barriers to discharge w/patient and caregiver  - Arrange for needed discharge resources and transportation as appropriate  - Identify discharge learning needs (meds, wound care, etc.)  - Arrange for interpretive services to assist at discharge as needed  - Refer to Case Management Department for coordinating discharge planning if the patient needs post-hospital services based on physician/advanced practitioner order or complex  needs related to functional status, cognitive ability, or social support system  Outcome: Progressing     Problem: Knowledge Deficit  Goal: Patient/family/caregiver demonstrates understanding of disease process, treatment plan, medications, and discharge instructions  Description: Complete learning assessment and assess knowledge base.  Interventions:  - Provide teaching at level of understanding  - Provide teaching via preferred learning methods  Outcome: Progressing

## 2025-07-11 NOTE — ASSESSMENT & PLAN NOTE
75-year-old male with history of atrial fibrillation on Xarelto, prior DVT, HTN, HLD.    Presented initially as stroke alert to [facility] on 7/7 after being found by family on the floor/fall with  left facial and left-sided weakness.  Imaging with large right hemisphere intraparenchymal hemorrhage.  AC/AP reversed with Kcentra and DDAVP and brought to [facility] for critical care management.    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; stable hemorrhage in the right corona radiata/temporal white matter, no evidence of underlying mass lesion  -CTA head/neck: no LVO nor critical stenosis/flow restrictive disease  -2D Echo: EF 50%, normal systolic function/wall motion, bilateral atrial dilation  -Hemoglobin A1C of 6.7, lipid panel with LDL of 24    Plan:  -Would plan for re-initiation of AC/Xarelto 4 weeks post ICH (approximately August 4th); no need for repeat neuroimaging prior to resuming Xarelto  -Per attending: No clear neurologic indication for AP, no need to resume from neurology standpoint   -Continue low dose statin  -Supportive care  -Therapy evaluations (anticipate rehab needs post-discharge given moderate/advanced L sided hemiparesis)   -Stroke education provided   -Neuro checks  -Will arrange for OP neurovascular/stroke clinic follow up

## 2025-07-12 PROCEDURE — 97535 SELF CARE MNGMENT TRAINING: CPT

## 2025-07-12 PROCEDURE — 99232 SBSQ HOSP IP/OBS MODERATE 35: CPT | Performed by: INTERNAL MEDICINE

## 2025-07-12 PROCEDURE — 97110 THERAPEUTIC EXERCISES: CPT

## 2025-07-12 RX ADMIN — MAGNESIUM OXIDE TAB 400 MG (241.3 MG ELEMENTAL MG) 400 MG: 400 (241.3 MG) TAB at 09:56

## 2025-07-12 RX ADMIN — HEPARIN SODIUM 5000 UNITS: 5000 INJECTION INTRAVENOUS; SUBCUTANEOUS at 21:03

## 2025-07-12 RX ADMIN — CARVEDILOL 6.25 MG: 6.25 TABLET, FILM COATED ORAL at 17:45

## 2025-07-12 RX ADMIN — SENNOSIDES AND DOCUSATE SODIUM 2 TABLET: 50; 8.6 TABLET ORAL at 17:45

## 2025-07-12 RX ADMIN — CARVEDILOL 6.25 MG: 6.25 TABLET, FILM COATED ORAL at 09:56

## 2025-07-12 RX ADMIN — SENNOSIDES AND DOCUSATE SODIUM 2 TABLET: 50; 8.6 TABLET ORAL at 09:56

## 2025-07-12 RX ADMIN — MAGNESIUM OXIDE TAB 400 MG (241.3 MG ELEMENTAL MG) 400 MG: 400 (241.3 MG) TAB at 17:45

## 2025-07-12 RX ADMIN — CHLORHEXIDINE GLUCONATE 15 ML: 1.2 SOLUTION ORAL at 09:56

## 2025-07-12 RX ADMIN — ATORVASTATIN CALCIUM 20 MG: 20 TABLET, FILM COATED ORAL at 17:45

## 2025-07-12 RX ADMIN — HEPARIN SODIUM 5000 UNITS: 5000 INJECTION INTRAVENOUS; SUBCUTANEOUS at 05:34

## 2025-07-12 RX ADMIN — POLYETHYLENE GLYCOL 3350 17 G: 17 POWDER, FOR SOLUTION ORAL at 09:57

## 2025-07-12 RX ADMIN — CHLORHEXIDINE GLUCONATE 15 ML: 1.2 SOLUTION ORAL at 20:50

## 2025-07-12 RX ADMIN — HEPARIN SODIUM 5000 UNITS: 5000 INJECTION INTRAVENOUS; SUBCUTANEOUS at 15:12

## 2025-07-12 RX ADMIN — ACETAMINOPHEN 650 MG: 325 TABLET ORAL at 21:02

## 2025-07-12 RX ADMIN — TAMSULOSIN HYDROCHLORIDE 0.4 MG: 0.4 CAPSULE ORAL at 17:45

## 2025-07-12 RX ADMIN — AMLODIPINE BESYLATE 10 MG: 10 TABLET ORAL at 09:56

## 2025-07-12 RX ADMIN — Medication 6 MG: at 20:50

## 2025-07-12 RX ADMIN — LOSARTAN POTASSIUM 100 MG: 50 TABLET, FILM COATED ORAL at 09:56

## 2025-07-12 NOTE — PLAN OF CARE
Problem: OCCUPATIONAL THERAPY ADULT  Goal: Performs self-care activities at highest level of function for planned discharge setting.  See evaluation for individualized goals.  Description: Treatment Interventions: ADL retraining, Functional transfer training, UE strengthening/ROM, Endurance training, Cognitive reorientation, Visual perceptual retraining, Patient/family training, Equipment evaluation/education, Compensatory technique education, Fine motor coordination activities, Continued evaluation, Energy conservation, Activityengagement          See flowsheet documentation for full assessment, interventions and recommendations.   Note: Limitation: Decreased ADL status, Decreased UE strength, Decreased UE ROM, Decreased Safe judgement during ADL, Decreased cognition, Decreased endurance, Decreased self-care trans, Decreased high-level ADLs, Visual deficit, Decreased fine motor control  Prognosis: Fair  Assessment: Patient participated in Skilled OT session this date with interventions consisting of self care tasks, EOB sitting, balance, left UE AAROM HEP . Patient agreeable to OT treatment session, upon arrival patient was found supine in bed.  In comparison to previous session, patient with improvements in sitting tolerance. Patient requiring verbal cues for safety, verbal cues for correct technique, verbal cues for pacing thru activity steps, cognitive assistance to anticipate next step, one step directives, and frequent rest periods. Patient continues to be functioning below baseline level, occupational performance remains limited secondary to factors listed above and increased risk for falls and injury.  The patient's raw score on the AM-PAC Daily Activity Inpatient Short Form is 11. A raw score of less than 19 suggests the patient may benefit from discharge to post-acute rehabilitation services. Please refer to the recommendation of the Occupational Therapist for safe discharge planning.  From OT  standpoint, recommendation at time of d/c would be max level I .   Patient to benefit from continued Occupational Therapy treatment while in the hospital to address deficits as defined above and maximize level of functional independence with ADLs and functional mobility.     Rehab Resource Intensity Level, OT: I (Maximum Resource Intensity)

## 2025-07-12 NOTE — PLAN OF CARE
Problem: Neurological Deficit  Goal: Neurological status is stable or improving  Description: Interventions:  - Monitor and assess patient's level of consciousness, motor function, sensory function, and level of assistance needed for ADLs.   - Monitor and report changes from baseline. Collaborate with interdisciplinary team to initiate plan and implement interventions as ordered.   - Provide and maintain a safe environment.  - Consider seizure precautions.  - Consider fall precautions.  - Consider aspiration precautions.  - Consider bleeding precautions.  Outcome: Progressing     Problem: Activity Intolerance/Impaired Mobility  Goal: Mobility/activity is maintained at optimum level for patient  Description: Interventions:  - Assess and monitor patient  barriers to mobility and need for assistive/adaptive devices.  - Assess patient's emotional response to limitations.  - Collaborate with interdisciplinary team and initiate plans and interventions as ordered.  - Encourage independent activity per ability.  - Maintain proper body alignment.  - Perform active/passive rom as tolerated/ordered.  - Plan activities to conserve energy.  - Turn patient as appropriate  Outcome: Progressing     Problem: Communication Impairment  Goal: Ability to express needs and understand communication  Description: Assess patient's communication skills and ability to understand information.  Patient will demonstrate use of effective communication techniques, alternative methods of communication and understanding even if not able to speak.     - Encourage communication and provide alternate methods of communication as needed.  - Collaborate with case management/ for discharge needs.  - Include patient/family/caregiver in decisions related to communication.  Outcome: Progressing     Problem: Nutrition  Goal: Nutrition/Hydration status is improving  Description: Monitor and assess patient's nutrition/hydration status for  malnutrition (ex- brittle hair, bruises, dry skin, pale skin and conjunctiva, muscle wasting, smooth red tongue, and disorientation). Collaborate with interdisciplinary team and initiate plan and interventions as ordered.  Monitor patient's weight and dietary intake as ordered or per policy. Utilize nutrition screening tool and intervene per policy. Determine patient's food preferences and provide high-protein, high-caloric foods as appropriate.     - Assist patient with eating.  - Allow adequate time for meals.  - Encourage patient to take dietary supplement as ordered.  - Collaborate with clinical nutritionist.  - Include patient/family/caregiver in decisions related to nutrition.  Outcome: Progressing     Problem: Knowledge Deficit  Goal: Patient/family/caregiver demonstrates understanding of disease process, treatment plan, medications, and discharge instructions  Description: Complete learning assessment and assess knowledge base.  Interventions:  - Provide teaching at level of understanding  - Provide teaching via preferred learning methods  Outcome: Progressing

## 2025-07-12 NOTE — ASSESSMENT & PLAN NOTE
"Lab Results   Component Value Date    HGBA1C 6.7 (H) 07/08/2025       No results for input(s): \"POCGLU\" in the last 72 hours.    Blood Sugar Average: Last 72 hrs:      "

## 2025-07-12 NOTE — CASE MANAGEMENT
Case Management Discharge Planning Note    Patient name Shawn Marquez  Location [facility] 734/[facility] 734-01 MRN [id number]  : 1950 Date 2025       Current Admission Date: 2025  Current Admission Diagnosis:ICH (intracerebral hemorrhage) (HCC)   Patient Active Problem List    Diagnosis Date Noted    Cerebral edema (HCC) 2025    Brain compression (HCC) 2025    Atrial fibrillation (HCC) 2025    HTN (hypertension) 2025    Anticoagulated on rivaroxaban 2025    Hemineglect 2025    Hemiparesis (HCC) 2025    ICH (intracerebral hemorrhage) (HCC) 2025    Seizure-like activity (HCC) 2025    Medical marijuana use 2024    Elevated brain natriuretic peptide (BNP) level 2024    Syncope 2024    Depression 2024    Vascular dementia with paranoia (HCC) 2023    Paranoid behavior (HCC) 2022    Longstanding persistent atrial fibrillation (HCC) 07/15/2022    Anxiety 2021    HLD (hyperlipidemia) 2021    Numbness 2019    Personal history of transient ischemic attack 2019    Anticoagulant long-term use 2019    Arthritis of knee 2019    History of DVT (deep vein thrombosis) 2018    Encounter for skin care 2018    Varicose veins with swelling 2016    BPH with obstruction/lower urinary tract symptoms 2016    Chronic venous insufficiency 2016    DM II (diabetes mellitus, type II), controlled ([facility]) 2015    Essential hypertension 2013      LOS (days): 5  Geometric Mean LOS (GMLOS) (days): 4.5  Days to GMLOS:0     OBJECTIVE:  Risk of Unplanned Readmission Score: 13.18         Current admission status: Inpatient   Preferred Pharmacy:   [id number] - [place] - [address]  [address] [place] [address]  Phone: [phone] Fax: [phone]    [facility] - [facility] [address]  [address]  [facility] [address]  Phone: [phone] Fax: [phone]    Primary Care Provider: SAM Baer    Primary Insurance: [employer]  Secondary Insurance:     DISCHARGE DETAILS:    Other Referral/Resources/Interventions Provided:  Referral Comments: Message sent to therapy, requesting OT to see for updated notes to submit for auth, awaiting response    Message received from OT, stating they will see patient today for insurance auth

## 2025-07-12 NOTE — PROGRESS NOTES
"Progress Note - Hospitalist   Name: Shawn Marquez 75 y.o. male I MRN: [id number]  [facility] I Date of Admission: 7/7/2025   Date of Service: 7/12/2025 I Hospital Day: 5    Assessment & Plan  ICH (intracerebral hemorrhage) ([facility])  History of A-fib on Xarelto presented as stroke alert after being found by family on the floor with left facial and left-sided weakness.  AC/AP reversed with Kcentra and DDAVP with CTA H/N notable for Large acute intraparenchymal hemorrhage centered involving the posterior right basal ganglia, adjacent right thalamus, and posterior right insula, with a volume of approximately 23 mL and subsequent MRI brain showing Acute to early subacute lacunar infarct in the right hemipons and Stable hemorrhage and surrounding vasogenic edema in the right corona radiata and temporal white matter.  He followed by neurosurgery with no interventions indicated  Evaluated by neurology and recommended to hold aspirin and resume AC on 8/4  Patient recommended for acute rehab.  Case management following  Stable pending placement  Essential hypertension  Blood pressure currently stable.  He is off Cardene drip  Continue amlodipine 10 mg daily, carvedilol 6.25 mg twice daily and losartan 100 mg daily  DM II (diabetes mellitus, type II), controlled ([facility])  Lab Results   Component Value Date    HGBA1C 6.7 (H) 07/08/2025       No results for input(s): \"POCGLU\" in the last 72 hours.    Blood Sugar Average: Last 72 hrs:      History of DVT (deep vein thrombosis)  AC on hold as above  HLD (hyperlipidemia)  Continue atorvastatin  Atrial fibrillation (HCC)  Continue beta-blocker.  Hold AC as above    VTE Pharmacologic Prophylaxis:   Moderate Risk (Score 3-4) - Pharmacological DVT Prophylaxis Contraindicated. Sequential Compression Devices Ordered.    Mobility:   Basic Mobility Inpatient Raw Score: 6  -[facility] Goal: 2: Bed activities/Dependent transfer  -[facility] Achieved: 2: Bed activities/Dependent " transfer  -[facility] Goal achieved. Continue to encourage appropriate mobility.    Patient Centered Rounds: I performed bedside rounds with nursing staff today.   Discussions with Specialists or Other Care Team Provider: CM    Education and Discussions with Family / Patient: Updated  (significant other) at bedside.    Current Length of Stay: 5 day(s)  Current Patient Status: Inpatient   Certification Statement: The patient will continue to require additional inpatient hospital stay due to placement   Discharge Plan: Anticipate discharge in 24-48 hrs to rehab facility.    Code Status: Level 1 - Full Code    Subjective   No acute complaints     Objective :  Temp:  [98.6 °F (37 °C)-99.8 °F (37.7 °C)] 98.6 °F (37 °C)  HR:  [84-93] 87  BP: (136-147)/(80-88) 141/88  Resp:  [17-18] 18  SpO2:  [95 %-97 %] 96 %  O2 Device: None (Room air)    Body mass index is 28.93 kg/m².     Input and Output Summary (last 24 hours):     Intake/Output Summary (Last 24 hours) at 7/12/2025 1213  Last data filed at 7/12/2025 1001  Gross per 24 hour   Intake 240 ml   Output 950 ml   Net -710 ml       Physical Exam    Cardiovascular:      Rate and Rhythm: Normal rate and regular rhythm.   Pulmonary:      Effort: Pulmonary effort is normal. No respiratory distress.   Abdominal:      Tenderness: There is no abdominal tenderness.     Neurological:      Mental Status: He is alert and oriented to person, place, and time.      Motor: Weakness present.           Lines/Drains:              Lab Results: I have reviewed the following results:   Results from last 7 days   Lab Units 07/11/25  0438 07/10/25  0550   WBC Thousand/uL 11.82* 10.69*   HEMOGLOBIN g/dL 14.3 14.7   HEMATOCRIT % 41.1 42.9   PLATELETS Thousands/uL 164 151   SEGS PCT %  --  68   LYMPHO PCT %  --  18   MONO PCT %  --  11   EOS PCT %  --  2     Results from last 7 days   Lab Units 07/11/25  0438 07/08/25  0501 07/07/25  1122   SODIUM mmol/L 137   < > 140   POTASSIUM mmol/L 4.0    < > 4.1   CHLORIDE mmol/L 102   < > 104   CO2 mmol/L 27   < > 28   BUN mg/dL 16   < > 9   CREATININE mg/dL 0.64   < > 0.76   ANION GAP mmol/L 8   < > 8   CALCIUM mg/dL 8.2*   < > 9.1   ALBUMIN g/dL  --   --  4.2   TOTAL BILIRUBIN mg/dL  --   --  0.60   ALK PHOS U/L  --   --  66   ALT U/L  --   --  22   AST U/L  --   --  20   GLUCOSE RANDOM mg/dL 142*   < > 162*    < > = values in this interval not displayed.     Results from last 7 days   Lab Units 07/07/25  1122   INR  1.39*     Results from last 7 days   Lab Units 07/07/25  1613 07/07/25  1122   POC GLUCOSE mg/dl 157* 148*     Results from last 7 days   Lab Units 07/08/25  1339   HEMOGLOBIN A1C % 6.7*     Results from last 7 days   Lab Units 07/10/25  0550   PROCALCITONIN ng/ml <0.05       Recent Cultures (last 7 days):         Imaging Results Review: No pertinent imaging studies reviewed.  Other Study Results Review: No additional pertinent studies reviewed.    Last 24 Hours Medication List:     Current Facility-Administered Medications:     acetaminophen (TYLENOL) tablet 650 mg, Q6H PRN    amLODIPine (NORVASC) tablet 10 mg, Daily    atorvastatin (LIPITOR) tablet 20 mg, Daily With Dinner    bisacodyl (DULCOLAX) rectal suppository 10 mg, Daily PRN    carvedilol (COREG) tablet 6.25 mg, BID With Meals    chlorhexidine (PERIDEX) 0.12 % oral rinse 15 mL, Q12H DEIDRE    [Transfer Hold] Gadobutrol injection (SINGLE-DOSE) SOLN 9 mL, Once in imaging    heparin (porcine) subcutaneous injection 5,000 Units, Q8H DEIDRE    hydrALAZINE (APRESOLINE) injection 10 mg, Q4H PRN    losartan (COZAAR) tablet 100 mg, Daily    magnesium Oxide (MAG-OX) tablet 400 mg, BID    melatonin tablet 6 mg, HS    ondansetron (ZOFRAN) injection 4 mg, Q6H PRN    polyethylene glycol (MIRALAX) packet 17 g, Daily    senna-docusate sodium (SENOKOT S) 8.6-50 mg per tablet 2 tablet, BID    tamsulosin (FLOMAX) capsule 0.4 mg, Daily With Dinner    Administrative Statements   Today, Patient Was Seen By: Renato  MD Anival      **Please Note: This note may have been constructed using a voice recognition system.**

## 2025-07-12 NOTE — PLAN OF CARE
Problem: Neurological Deficit  Goal: Neurological status is stable or improving  Description: Interventions:  - Monitor and assess patient's level of consciousness, motor function, sensory function, and level of assistance needed for ADLs.   - Monitor and report changes from baseline. Collaborate with interdisciplinary team to initiate plan and implement interventions as ordered.   - Provide and maintain a safe environment.  - Consider seizure precautions.  - Consider fall precautions.  - Consider aspiration precautions.  - Consider bleeding precautions.  7/12/2025 1945 by Mariaelena Zabala  Outcome: Progressing  7/12/2025 0643 by Mariaelena Zabala  Outcome: Progressing     Problem: Activity Intolerance/Impaired Mobility  Goal: Mobility/activity is maintained at optimum level for patient  Description: Interventions:  - Assess and monitor patient  barriers to mobility and need for assistive/adaptive devices.  - Assess patient's emotional response to limitations.  - Collaborate with interdisciplinary team and initiate plans and interventions as ordered.  - Encourage independent activity per ability.  - Maintain proper body alignment.  - Perform active/passive rom as tolerated/ordered.  - Plan activities to conserve energy.  - Turn patient as appropriate  7/12/2025 1945 by Mariaeelna Zabala  Outcome: Progressing  7/12/2025 0643 by Mariaelena Zabala  Outcome: Progressing     Problem: Nutrition  Goal: Nutrition/Hydration status is improving  Description: Monitor and assess patient's nutrition/hydration status for malnutrition (ex- brittle hair, bruises, dry skin, pale skin and conjunctiva, muscle wasting, smooth red tongue, and disorientation). Collaborate with interdisciplinary team and initiate plan and interventions as ordered.  Monitor patient's weight and dietary intake as ordered or per policy. Utilize nutrition screening tool and intervene per policy. Determine patient's food preferences and provide  high-protein, high-caloric foods as appropriate.     - Assist patient with eating.  - Allow adequate time for meals.  - Encourage patient to take dietary supplement as ordered.  - Collaborate with clinical nutritionist.  - Include patient/family/caregiver in decisions related to nutrition.  7/12/2025 1945 by Mariaelena Zabala  Outcome: Progressing  7/12/2025 0643 by Mariaelena Zabala  Outcome: Progressing     Problem: PAIN - ADULT  Goal: Verbalizes/displays adequate comfort level or baseline comfort level  Description: Interventions:  - Encourage patient to monitor pain and request assistance  - Assess pain using appropriate pain scale  - Administer analgesics as ordered based on type and severity of pain and evaluate response  - Implement non-pharmacological measures as appropriate and evaluate response  - Consider cultural and social influences on pain and pain management  - Notify physician/advanced practitioner if interventions unsuccessful or patient reports new pain  - Educate patient/family on pain management process including their role and importance of  reporting pain   - Provide non-pharmacologic/complimentary pain relief interventions  7/12/2025 1945 by Mariaelena Zabala  Outcome: Progressing  7/12/2025 0643 by Mariaelena Zabala  Outcome: Progressing     Problem: INFECTION - ADULT  Goal: Absence or prevention of progression during hospitalization  Description: INTERVENTIONS:  - Assess and monitor for signs and symptoms of infection  - Monitor lab/diagnostic results  - Monitor all insertion sites, i.e. indwelling lines, tubes, and drains  - Monitor endotracheal if appropriate and nasal secretions for changes in amount and color  - [place] appropriate cooling/warming therapies per order  - Administer medications as ordered  - Instruct and encourage patient and family to use good hand hygiene technique  - Identify and instruct in appropriate isolation precautions for identified  infection/condition  7/12/2025 1945 by Mariaelena Zabala  Outcome: Progressing  7/12/2025 0643 by Mariaelena Zabala  Outcome: Progressing     Problem: SAFETY ADULT  Goal: Patient will remain free of falls  Description: INTERVENTIONS:  - Educate patient/family on patient safety including physical limitations  - Instruct patient to call for assistance with activity   - Consider consulting OT/PT to assist with strengthening/mobility based on AM PAC & JH-HLM score  - Consult OT/PT to assist with strengthening/mobility   - Keep Call bell within reach  - Keep bed low and locked with side rails adjusted as appropriate  - Keep care items and personal belongings within reach  - Initiate and maintain comfort rounds  - Make Fall Risk Sign visible to staff  - Offer Toileting every 2 Hours, in advance of need  - Initiate/Maintain bed/chair alarm  - Obtain necessary fall risk management equipment: call bell within the reach, bed/chair alarm, gripper socks on.  - Apply yellow socks and bracelet for high fall risk patients  - Consider moving patient to room near nurses station  7/12/2025 1945 by Mariaelena Zabala  Outcome: Progressing  7/12/2025 0643 by Mariaelena Zabala  Outcome: Progressing

## 2025-07-12 NOTE — OCCUPATIONAL THERAPY NOTE
Occupational Therapy Progress Note     Patient Name: Shawn Marquez  Today's Date: 7/12/2025  Problem List  Principal Problem:    ICH (intracerebral hemorrhage) (HCC)  Active Problems:    Essential hypertension    DM II (diabetes mellitus, type II), controlled (HCC)    Encounter for skin care    History of DVT (deep vein thrombosis)    HLD (hyperlipidemia)    Depression    Seizure-like activity (HCC)    Cerebral edema (HCC)    Brain compression (HCC)    Atrial fibrillation (HCC)    HTN (hypertension)    Anticoagulated on rivaroxaban    Hemineglect    Hemiparesis (HCC)     07/12/25 1007   OT Last Visit   OT Visit Date 07/12/25   Note Type   Note Type Treatment for insurance authorization   Pain Assessment   Pain Assessment Tool 0-10   Pain Score No Pain   Restrictions/Precautions   Weight Bearing Precautions Per Order No   Other Precautions Cognitive;Chair Alarm;Bed Alarm  (left hemiparesis, left hemibody neglect, cognition)   Lifestyle   Autonomy I w/ ADLS, I w/ meds but assist w/ all other IADLS, (-) , I w/ transfers and functional mobility PTA   Reciprocal Relationships pt lives w/ his spouse   Service to Others retired   Intrinsic Gratification TV   ADL   Where Assessed Edge of bed  (with mod to max a for sitting balance)   Grooming Assistance 4  Minimal Assistance   Grooming Deficit Teeth care  (left UE assisted to utilize as a stablizer on table and tooth paste, right UE opened paste and  grasped/brushed  right dominant hand)   UB Bathing Assistance 2  Maximal Assistance   UB Bathing Deficit Right arm;Left arm;Chest   LB Dressing Assistance 1  Total Assistance   LB Dressing Deficit Don/doff R sock;Don/doff L sock   Bed Mobility   Supine to Sit 2  Maximal assistance   Additional items Assist x 1   Sit to Supine 2  Maximal assistance   Additional items Assist x 2   Additional Comments pt performed EOB sitting tolerance for 20 minutes with mod/max a left lateral lean + grasping bedrail at times with  left UE to maintain mildine sitting, encouraged left hand on bed to challenge balance ~performed teeth care EOB   Therapeutic Exercise - ROM   UE-ROM Yes   Cognition   Overall Cognitive Status Impaired   Arousal/Participation Cooperative   Attention Attends with cues to redirect   Orientation Level Oriented to person;Oriented to place;Disoriented to time;Oriented to situation   Memory Decreased recall of precautions;Decreased recall of recent events;Decreased short term memory   Following Commands Follows one step commands with increased time or repetition   Comments pt motivated for therapy, slow processing, memory with previous treatment with PT, impulsive at times with self care tasks, impaired problem solving with teeth care (placing water in basin from cup with straw/lid donned-spilling water 2x on table-unaware), decreased insight into deficits, impaired self awareness with midline sitting/self care tasks.   Perception   Perception Yes   Left Attention left hemibody neglect -forgetting left UE with exercises 3x proximal>distal   Activity Tolerance   Activity Tolerance Patient limited by fatigue   Medical Staff Made Aware RN   Assessment   Assessment Patient participated in Skilled OT session this date with interventions consisting of self care tasks, EOB sitting, balance, left UE AAROM HEP . Patient agreeable to OT treatment session, upon arrival patient was found supine in bed.  In comparison to previous session, patient with improvements in sitting tolerance. Patient requiring verbal cues for safety, verbal cues for correct technique, verbal cues for pacing thru activity steps, cognitive assistance to anticipate next step, one step directives, and frequent rest periods. Patient continues to be functioning below baseline level, occupational performance remains limited secondary to factors listed above and increased risk for falls and injury.  The patient's raw score on the AM-PAC Daily Activity Inpatient  Short Form is 11. A raw score of less than 19 suggests the patient may benefit from discharge to post-acute rehabilitation services. Please refer to the recommendation of the Occupational Therapist for safe discharge planning.  From OT standpoint, recommendation at time of d/c would be max level I .   Patient to benefit from continued Occupational Therapy treatment while in the hospital to address deficits as defined above and maximize level of functional independence with ADLs and functional mobility.   Plan   Treatment Interventions ADL retraining;Visual perceptual retraining;Functional transfer training;UE strengthening/ROM;Endurance training;Cognitive reorientation;Patient/family training;Equipment evaluation/education;Compensatory technique education;Continued evaluation;Energy conservation;Activityengagement   Goal Expiration Date 07/25/22   OT Treatment Day 2   OT Frequency 2-3x/wk   Discharge Recommendation   Rehab Resource Intensity Level, OT I (Maximum Resource Intensity)   AM-PAC Daily Activity Inpatient   Lower Body Dressing 1   Bathing 2   Toileting 1   Upper Body Dressing 2   Grooming 2   Eating 3   Daily Activity Raw Score 11   Daily Activity Standardized Score (Calc for Raw Score >=11) 29.04   AM-PAC Applied Cognition Inpatient   Following a Speech/Presentation 1   Understanding Ordinary Conversation 3   Taking Medications 2   Remembering Where Things Are Placed or Put Away 2   Remembering List of 4-5 Errands 2   Taking Care of Complicated Tasks 2   Applied Cognition Raw Score 12   Applied Cognition Standardized Score 28.82   Barthel Index   Feeding 5   Bathing 0   Grooming Score 0   Dressing Score 0   Bladder Score 0   Bowels Score 5   Toilet Use Score 0   Transfers (Bed/Chair) Score 5   Mobility (Level Surface) Score 0   Stairs Score 0   Barthel Index Score 15   Modified [place] Scale   Modified [place] Scale 4   End of Consult   Education Provided Yes;Family or social support of family present for  education by provider  (spouse present educated on left UE proper positioning in sitting ,protection strategies, principals of neuroplasicity for left UE recovery, positioning hand out posted on white board for pt/family /staff reference.)   Patient Position at End of Consult Supine;Bed/Chair alarm activated;All needs within reach left UE positioned properly on pillow to encourage attention, symmetrical sitting, shoulder protraction to decrease risk of pain/spasticity and edema management ~at end of session.    Nurse Communication Nurse aware of consult   LEFT UE ROM exercises - left UE strength - shoulder flexion 2-/5/Internal/external rotation 2-/5,   Elbow/bicep 2/5,  3+/5. One finger width glenohumeral subluxation. Pt performed AAROM with   Shoulder flexion, IR/ER, elbow flexion, wrist flexion/extension, forearm supination/pronation, finger flex  Ext 3-5x left UE assisted PRN instructed in SROM to assist full ROM to joints. Pt/spouse educated  And written/visual handouts provided with therapy ball for  strengthening.   Gabbi GARCIA/L

## 2025-07-12 NOTE — PLAN OF CARE
Problem: Neurological Deficit  Goal: Neurological status is stable or improving  Description: Interventions:  - Monitor and assess patient's level of consciousness, motor function, sensory function, and level of assistance needed for ADLs.   - Monitor and report changes from baseline. Collaborate with interdisciplinary team to initiate plan and implement interventions as ordered.   - Provide and maintain a safe environment.  - Consider seizure precautions.  - Consider fall precautions.  - Consider aspiration precautions.  - Consider bleeding precautions.  Outcome: Progressing     Problem: Activity Intolerance/Impaired Mobility  Goal: Mobility/activity is maintained at optimum level for patient  Description: Interventions:  - Assess and monitor patient  barriers to mobility and need for assistive/adaptive devices.  - Assess patient's emotional response to limitations.  - Collaborate with interdisciplinary team and initiate plans and interventions as ordered.  - Encourage independent activity per ability.  - Maintain proper body alignment.  - Perform active/passive rom as tolerated/ordered.  - Plan activities to conserve energy.  - Turn patient as appropriate  Outcome: Progressing     Problem: Communication Impairment  Goal: Ability to express needs and understand communication  Description: Assess patient's communication skills and ability to understand information.  Patient will demonstrate use of effective communication techniques, alternative methods of communication and understanding even if not able to speak.     - Encourage communication and provide alternate methods of communication as needed.  - Collaborate with case management/ for discharge needs.  - Include patient/family/caregiver in decisions related to communication.  Outcome: Progressing     Problem: Potential for Aspiration  Goal: Non-ventilated patient's risk of aspiration is minimized  Description: Assess and monitor vital signs,  respiratory status, and labs (WBC).  Monitor for signs of aspiration (tachypnea, cough, rales, wheezing, cyanosis, fever).    - Assess and monitor patient's ability to swallow.  - Place patient up in chair to eat if possible.  - HOB up at 90 degrees to eat if unable to get patient up into chair.  - Supervise patient during oral intake.   - Instruct patient/ family to take small bites.  - Instruct patient/ family to take small single sips when taking liquids.  - Follow patient-specific strategies generated by speech pathologist.  Outcome: Progressing     Problem: PAIN - ADULT  Goal: Verbalizes/displays adequate comfort level or baseline comfort level  Description: Interventions:  - Encourage patient to monitor pain and request assistance  - Assess pain using appropriate pain scale  - Administer analgesics as ordered based on type and severity of pain and evaluate response  - Implement non-pharmacological measures as appropriate and evaluate response  - Consider cultural and social influences on pain and pain management  - Notify physician/advanced practitioner if interventions unsuccessful or patient reports new pain  - Educate patient/family on pain management process including their role and importance of  reporting pain   - Provide non-pharmacologic/complimentary pain relief interventions  Outcome: Progressing     Problem: INFECTION - ADULT  Goal: Absence or prevention of progression during hospitalization  Description: INTERVENTIONS:  - Assess and monitor for signs and symptoms of infection  - Monitor lab/diagnostic results  - Monitor all insertion sites, i.e. indwelling lines, tubes, and drains  - Monitor endotracheal if appropriate and nasal secretions for changes in amount and color  - [place] appropriate cooling/warming therapies per order  - Administer medications as ordered  - Instruct and encourage patient and family to use good hand hygiene technique  - Identify and instruct in appropriate isolation  precautions for identified infection/condition  Outcome: Progressing  Goal: Absence of fever/infection during neutropenic period  Description: INTERVENTIONS:  - Monitor WBC  - Perform strict hand hygiene  - Limit to healthy visitors only  - No plants, dried, fresh or silk flowers with pollock in patient room  Outcome: Progressing     Problem: SAFETY ADULT  Goal: Patient will remain free of falls  Description: INTERVENTIONS:  - Educate patient/family on patient safety including physical limitations  - Instruct patient to call for assistance with activity   - Consider consulting OT/PT to assist with strengthening/mobility based on AM PAC & -HL score  - Consult OT/PT to assist with strengthening/mobility   - Keep Call bell within reach  - Keep bed low and locked with side rails adjusted as appropriate  - Keep care items and personal belongings within reach  - Initiate and maintain comfort rounds  - Make Fall Risk Sign visible to staff  - Apply yellow socks and bracelet for high fall risk patients  - Consider moving patient to room near nurses station  Outcome: Progressing  Goal: Maintain or return to baseline ADL function  Description: INTERVENTIONS:  -  Assess patient's ability to carry out ADLs; assess patient's baseline for ADL function and identify physical deficits which impact ability to perform ADLs (bathing, care of mouth/teeth, toileting, grooming, dressing, etc.)  - Assess/evaluate cause of self-care deficits   - Assess range of motion  - Assess patient's mobility; develop plan if impaired  - Assess patient's need for assistive devices and provide as appropriate  - Encourage maximum independence but intervene and supervise when necessary  - Involve family in performance of ADLs  - Assess for home care needs following discharge   - Consider OT consult to assist with ADL evaluation and planning for discharge  - Provide patient education as appropriate  - Monitor functional capacity and physical performance, use  of AM PAC & JH-HLM   - Monitor gait, balance and fatigue with ambulation    Outcome: Progressing  Goal: Maintains/Returns to pre admission functional level  Description: INTERVENTIONS:  - Perform AM-PAC 6 Click Basic Mobility/ Daily Activity assessment daily.  - Set and communicate daily mobility goal to care team and patient/family/caregiver.   - Collaborate with rehabilitation services on mobility goals if consulted  - Out of bed for toileting  - Record patient progress and toleration of activity level   Outcome: Progressing     Problem: DISCHARGE PLANNING  Goal: Discharge to home or other facility with appropriate resources  Description: INTERVENTIONS:  - Identify barriers to discharge w/patient and caregiver  - Arrange for needed discharge resources and transportation as appropriate  - Identify discharge learning needs (meds, wound care, etc.)  - Arrange for interpretive services to assist at discharge as needed  - Refer to Case Management Department for coordinating discharge planning if the patient needs post-hospital services based on physician/advanced practitioner order or complex needs related to functional status, cognitive ability, or social support system  Outcome: Progressing     Problem: Knowledge Deficit  Goal: Patient/family/caregiver demonstrates understanding of disease process, treatment plan, medications, and discharge instructions  Description: Complete learning assessment and assess knowledge base.  Interventions:  - Provide teaching at level of understanding  - Provide teaching via preferred learning methods  Outcome: Progressing     Problem: Nutrition/Hydration-ADULT  Goal: Nutrient/Hydration intake appropriate for improving, restoring or maintaining nutritional needs  Description: Monitor and assess patient's nutrition/hydration status for malnutrition. Collaborate with interdisciplinary team and initiate plan and interventions as ordered.  Monitor patient's weight and dietary intake as  ordered or per policy. Utilize nutrition screening tool and intervene as necessary. Determine patient's food preferences and provide high-protein, high-caloric foods as appropriate.     INTERVENTIONS:  - Monitor oral intake, urinary output, labs, and treatment plans  - Assess nutrition and hydration status and recommend course of action  - Evaluate amount of meals eaten  - Assist patient with eating if necessary   - Allow adequate time for meals  - Recommend/ encourage appropriate diets, oral nutritional supplements, and vitamin/mineral supplements  - Order, calculate, and assess calorie counts as needed  - Recommend, monitor, and adjust tube feedings and TPN/PPN based on assessed needs  - Assess need for intravenous fluids  - Provide specific nutrition/hydration education as appropriate  - Include patient/family/caregiver in decisions related to nutrition  Outcome: Progressing     Problem: Prexisting or High Potential for Compromised Skin Integrity  Goal: Skin integrity is maintained or improved  Description: INTERVENTIONS:  - Identify patients at risk for skin breakdown  - Assess and monitor skin integrity including under and around medical devices   - Assess and monitor nutrition and hydration status  - Monitor labs  - Assess for incontinence   - Turn and reposition patient  - Assist with mobility/ambulation  - Relieve pressure over pretty prominences   - Avoid friction and shearing  - Provide appropriate hygiene as needed including keeping skin clean and dry  - Evaluate need for skin moisturizer/barrier cream  - Collaborate with interdisciplinary team  - Patient/family teaching  - Consider wound care consult    Assess:  - Review Francisco Javier scale daily  - Assess extremities for adequate circulation and sensation     Bed Management:  - Have minimal linens on bed & keep smooth, unwrinkled  - Change linens as needed when moist or perspiring  - Avoid sitting or lying in one position for more than 2 hours while in bed?Keep  HOB at 30 degrees   - Toileting:  - Offer bedside commode  - Assess for incontinence every hour    Activity:  - Encourage activity and walks on unit  - Encourage or provide ROM exercises   - Turn and reposition patient every 2 Hours  - Use appropriate equipment to lift or move patient in bed  - Instruct/ Assist with weight shifting every 2 when out of bed in chair  - Consider limitation of chair time 2 hour intervals    Skin Care:  - Avoid use of baby powder, tape, friction and shearing, hot water or constrictive clothing  - Relieve pressure over bony prominences using pillows  - Do not massage red bony areas    Outcome: Progressing

## 2025-07-12 NOTE — DISCHARGE SUPPORT
Case Management Assessment & Discharge Planning Note    Patient name Shawn Marquez  [facility] MRN [id number]  : 1950 Date 2025       Current Admission Date: 2025  Current Admission Diagnosis:ICH (intracerebral hemorrhage) (HCC)   Patient Active Problem List    Diagnosis Date Noted    Cerebral edema (HCC) 2025    Brain compression (HCC) 2025    Atrial fibrillation (HCC) 2025    HTN (hypertension) 2025    Anticoagulated on rivaroxaban 2025    Hemineglect 2025    Hemiparesis (HCC) 2025    ICH (intracerebral hemorrhage) (HCC) 2025    Seizure-like activity ([facility]) 2025    Medical marijuana use 2024    Elevated brain natriuretic peptide (BNP) level 2024    Syncope 2024    Depression 2024    Vascular dementia with paranoia ([facility]) 2023    Paranoid behavior (HCC) 2022    Longstanding persistent atrial fibrillation (HCC) 07/15/2022    Anxiety 2021    HLD (hyperlipidemia) 2021    Numbness 2019    Personal history of transient ischemic attack 2019    Anticoagulant long-term use 2019    Arthritis of knee 2019    History of DVT (deep vein thrombosis) 2018    Encounter for skin care 2018    Varicose veins with swelling 2016    BPH with obstruction/lower urinary tract symptoms 2016    Chronic venous insufficiency 2016    DM II (diabetes mellitus, type II), controlled ([facility]) 2015    Essential hypertension 2013      LOS (days): 5  Geometric Mean LOS (GMLOS) (days): 4.5  Days to GMLOS:-0.3   Facility Authorization Initiated  [facility] received request for auth from : Nancy URIBE  Authorization Request Submitted for: Acute Rehab  Requested Start of Care Date: 25  Facility Name: [facility]  Facility NPI: [id number]  Facility MD: Dr. Depadua  [facility] MD NPI: [id number]  Authorization initiated by  contacting insurance: [employer]  Via: [employer] Portal  Clinicals submitted via: Portal Attachment  Pending reference #: [id number]   notified: Nancy URIBE     Updates to authorization status will be noted in chart.    Please reach out to CM for updates on any clinical information.

## 2025-07-12 NOTE — PROGRESS NOTES
Patient:    MRN:  [id number]    Christine Request ID:  [id number]    Level of care reserved:  Inpatient Rehab Facility    Partner Reserved:  [facility] - ([place]), [place] [address] [phone]    Clinical needs requested:    Geography searched:  20 miles around [id number]    Start of Service:    Request sent:  9:02am EDT on 7/10/2025 by Radha Larry    Partner reserved:  9:30am EDT on 7/12/2025 by Nancy Doyle    Choice list shared:  9:30am EDT on 7/12/2025 by Nancy Doyle

## 2025-07-13 PROCEDURE — 99232 SBSQ HOSP IP/OBS MODERATE 35: CPT | Performed by: INTERNAL MEDICINE

## 2025-07-13 RX ADMIN — HEPARIN SODIUM 5000 UNITS: 5000 INJECTION INTRAVENOUS; SUBCUTANEOUS at 21:55

## 2025-07-13 RX ADMIN — MAGNESIUM OXIDE TAB 400 MG (241.3 MG ELEMENTAL MG) 400 MG: 400 (241.3 MG) TAB at 09:26

## 2025-07-13 RX ADMIN — ACETAMINOPHEN 650 MG: 325 TABLET ORAL at 21:57

## 2025-07-13 RX ADMIN — AMLODIPINE BESYLATE 10 MG: 10 TABLET ORAL at 09:25

## 2025-07-13 RX ADMIN — SENNOSIDES AND DOCUSATE SODIUM 2 TABLET: 50; 8.6 TABLET ORAL at 09:25

## 2025-07-13 RX ADMIN — HEPARIN SODIUM 5000 UNITS: 5000 INJECTION INTRAVENOUS; SUBCUTANEOUS at 14:26

## 2025-07-13 RX ADMIN — SENNOSIDES AND DOCUSATE SODIUM 2 TABLET: 50; 8.6 TABLET ORAL at 17:33

## 2025-07-13 RX ADMIN — HEPARIN SODIUM 5000 UNITS: 5000 INJECTION INTRAVENOUS; SUBCUTANEOUS at 05:01

## 2025-07-13 RX ADMIN — CARVEDILOL 6.25 MG: 6.25 TABLET, FILM COATED ORAL at 17:33

## 2025-07-13 RX ADMIN — ATORVASTATIN CALCIUM 20 MG: 20 TABLET, FILM COATED ORAL at 17:33

## 2025-07-13 RX ADMIN — Medication 6 MG: at 21:55

## 2025-07-13 RX ADMIN — CHLORHEXIDINE GLUCONATE 15 ML: 1.2 SOLUTION ORAL at 21:55

## 2025-07-13 RX ADMIN — CARVEDILOL 6.25 MG: 6.25 TABLET, FILM COATED ORAL at 09:26

## 2025-07-13 RX ADMIN — LOSARTAN POTASSIUM 100 MG: 50 TABLET, FILM COATED ORAL at 09:25

## 2025-07-13 RX ADMIN — TAMSULOSIN HYDROCHLORIDE 0.4 MG: 0.4 CAPSULE ORAL at 17:33

## 2025-07-13 RX ADMIN — MAGNESIUM OXIDE TAB 400 MG (241.3 MG ELEMENTAL MG) 400 MG: 400 (241.3 MG) TAB at 17:33

## 2025-07-13 RX ADMIN — CHLORHEXIDINE GLUCONATE 15 ML: 1.2 SOLUTION ORAL at 09:26

## 2025-07-13 NOTE — PROGRESS NOTES
"Progress Note - Hospitalist   Name: Shawn Marquez 75 y.o. male I MRN: [id number]  [facility] I Date of Admission: 7/7/2025   Date of Service: 7/13/2025 I Hospital Day: 6    Assessment & Plan  ICH (intracerebral hemorrhage) (HCC)  History of A-fib on Xarelto presented as stroke alert after being found by family on the floor with left facial and left-sided weakness.  AC/AP reversed with Kcentra and DDAVP with CTA H/N notable for Large acute intraparenchymal hemorrhage centered involving the posterior right basal ganglia, adjacent right thalamus, and posterior right insula, with a volume of approximately 23 mL and subsequent MRI brain showing Acute to early subacute lacunar infarct in the right hemipons and Stable hemorrhage and surrounding vasogenic edema in the right corona radiata and temporal white matter.  Evaluated by neurosurgery with no interventions indicated  Evaluated by neurology and recommended to hold aspirin and resume AC on 8/4  Patient recommended for acute rehab.  Case management following  Stable pending placement  Essential hypertension  Blood pressure currently stable.  He is off Cardene drip  Continue amlodipine 10 mg daily, carvedilol 6.25 mg twice daily and losartan 100 mg daily  DM II (diabetes mellitus, type II), controlled ([facility])  Lab Results   Component Value Date    HGBA1C 6.7 (H) 07/08/2025       No results for input(s): \"POCGLU\" in the last 72 hours.    Blood Sugar Average: Last 72 hrs:      History of DVT (deep vein thrombosis)  AC on hold as above  HLD (hyperlipidemia)  Continue atorvastatin  Atrial fibrillation (HCC)  Continue beta-blocker.  Hold AC as above    VTE Pharmacologic Prophylaxis:   Moderate Risk (Score 3-4) - Pharmacological DVT Prophylaxis Ordered: heparin.    Mobility:   Basic Mobility Inpatient Raw Score: 6  JH-HLM Goal: 2: Bed activities/Dependent transfer  JH-HLM Achieved: 2: Bed activities/Dependent transfer  JH-HLM Goal achieved. Continue to encourage " appropriate mobility.    Patient Centered Rounds: I performed bedside rounds with nursing staff today.   Discussions with Specialists or Other Care Team Provider: RN    Education and Discussions with Family / Patient: Updated  (significant other) at bedside.    Current Length of Stay: 6 day(s)  Current Patient Status: Inpatient   Certification Statement: The patient will continue to require additional inpatient hospital stay due to placement  Discharge Plan: Anticipate discharge in 24-48 hrs to rehab facility.    Code Status: Level 1 - Full Code    Subjective   No acute complaints.     Objective :  Temp:  [98 °F (36.7 °C)-98.9 °F (37.2 °C)] 98.1 °F (36.7 °C)  HR:  [69-86] 80  BP: (139-142)/(88-90) 142/89  Resp:  [16] 16  SpO2:  [95 %-98 %] 96 %  O2 Device: None (Room air)    Body mass index is 28.54 kg/m².     Input and Output Summary (last 24 hours):     Intake/Output Summary (Last 24 hours) at 7/13/2025 1218  Last data filed at 7/13/2025 0830  Gross per 24 hour   Intake 510 ml   Output 550 ml   Net -40 ml       Physical Exam  Vitals and nursing note reviewed.     Cardiovascular:      Rate and Rhythm: Normal rate and regular rhythm.   Pulmonary:      Effort: No respiratory distress.   Abdominal:      Tenderness: There is no abdominal tenderness.     Musculoskeletal:      Right lower leg: No edema.      Left lower leg: No edema.     Neurological:      Mental Status: He is alert.      Motor: Weakness present.           Lines/Drains:              Lab Results: I have reviewed the following results:   Results from last 7 days   Lab Units 07/11/25  0438 07/10/25  0550   WBC Thousand/uL 11.82* 10.69*   HEMOGLOBIN g/dL 14.3 14.7   HEMATOCRIT % 41.1 42.9   PLATELETS Thousands/uL 164 151   SEGS PCT %  --  68   LYMPHO PCT %  --  18   MONO PCT %  --  11   EOS PCT %  --  2     Results from last 7 days   Lab Units 07/11/25  0438 07/08/25  0501 07/07/25  1122   SODIUM mmol/L 137   < > 140   POTASSIUM mmol/L 4.0   <  > 4.1   CHLORIDE mmol/L 102   < > 104   CO2 mmol/L 27   < > 28   BUN mg/dL 16   < > 9   CREATININE mg/dL 0.64   < > 0.76   ANION GAP mmol/L 8   < > 8   CALCIUM mg/dL 8.2*   < > 9.1   ALBUMIN g/dL  --   --  4.2   TOTAL BILIRUBIN mg/dL  --   --  0.60   ALK PHOS U/L  --   --  66   ALT U/L  --   --  22   AST U/L  --   --  20   GLUCOSE RANDOM mg/dL 142*   < > 162*    < > = values in this interval not displayed.     Results from last 7 days   Lab Units 07/07/25  1122   INR  1.39*     Results from last 7 days   Lab Units 07/07/25  1613 07/07/25  1122   POC GLUCOSE mg/dl 157* 148*     Results from last 7 days   Lab Units 07/08/25  1339   HEMOGLOBIN A1C % 6.7*     Results from last 7 days   Lab Units 07/10/25  0550   PROCALCITONIN ng/ml <0.05       Recent Cultures (last 7 days):         Imaging Results Review: I reviewed radiology reports from this admission including: CT head.  Other Study Results Review: No additional pertinent studies reviewed.    Last 24 Hours Medication List:     Current Facility-Administered Medications:     acetaminophen (TYLENOL) tablet 650 mg, Q6H PRN    amLODIPine (NORVASC) tablet 10 mg, Daily    atorvastatin (LIPITOR) tablet 20 mg, Daily With Dinner    bisacodyl (DULCOLAX) rectal suppository 10 mg, Daily PRN    carvedilol (COREG) tablet 6.25 mg, BID With Meals    chlorhexidine (PERIDEX) 0.12 % oral rinse 15 mL, Q12H DEIDRE    [Transfer Hold] Gadobutrol injection (SINGLE-DOSE) SOLN 9 mL, Once in imaging    heparin (porcine) subcutaneous injection 5,000 Units, Q8H DEIDRE    hydrALAZINE (APRESOLINE) injection 10 mg, Q4H PRN    losartan (COZAAR) tablet 100 mg, Daily    magnesium Oxide (MAG-OX) tablet 400 mg, BID    melatonin tablet 6 mg, HS    ondansetron (ZOFRAN) injection 4 mg, Q6H PRN    polyethylene glycol (MIRALAX) packet 17 g, Daily    senna-docusate sodium (SENOKOT S) 8.6-50 mg per tablet 2 tablet, BID    tamsulosin (FLOMAX) capsule 0.4 mg, Daily With Dinner    Administrative Statements   Today,  Patient Was Seen By: Renato Florian MD      **Please Note: This note may have been constructed using a voice recognition system.**

## 2025-07-13 NOTE — ASSESSMENT & PLAN NOTE
History of A-fib on Xarelto presented as stroke alert after being found by family on the floor with left facial and left-sided weakness.  AC/AP reversed with Kcentra and DDAVP with CTA H/N notable for Large acute intraparenchymal hemorrhage centered involving the posterior right basal ganglia, adjacent right thalamus, and posterior right insula, with a volume of approximately 23 mL and subsequent MRI brain showing Acute to early subacute lacunar infarct in the right hemipons and Stable hemorrhage and surrounding vasogenic edema in the right corona radiata and temporal white matter.  Evaluated by neurosurgery with no interventions indicated  Evaluated by neurology and recommended to hold aspirin and resume AC on 8/4  Patient recommended for acute rehab.  Case management following  Stable pending placement

## 2025-07-13 NOTE — PLAN OF CARE
Problem: Neurological Deficit  Goal: Neurological status is stable or improving  Description: Interventions:  - Monitor and assess patient's level of consciousness, motor function, sensory function, and level of assistance needed for ADLs.   - Monitor and report changes from baseline. Collaborate with interdisciplinary team to initiate plan and implement interventions as ordered.   - Provide and maintain a safe environment.  - Consider seizure precautions.  - Consider fall precautions.  - Consider aspiration precautions.  - Consider bleeding precautions.  Outcome: Progressing     Problem: Activity Intolerance/Impaired Mobility  Goal: Mobility/activity is maintained at optimum level for patient  Description: Interventions:  - Assess and monitor patient  barriers to mobility and need for assistive/adaptive devices.  - Assess patient's emotional response to limitations.  - Collaborate with interdisciplinary team and initiate plans and interventions as ordered.  - Encourage independent activity per ability.  - Maintain proper body alignment.  - Perform active/passive rom as tolerated/ordered.  - Plan activities to conserve energy.  - Turn patient as appropriate  Outcome: Progressing     Problem: Communication Impairment  Goal: Ability to express needs and understand communication  Description: Assess patient's communication skills and ability to understand information.  Patient will demonstrate use of effective communication techniques, alternative methods of communication and understanding even if not able to speak.     - Encourage communication and provide alternate methods of communication as needed.  - Collaborate with case management/ for discharge needs.  - Include patient/family/caregiver in decisions related to communication.  Outcome: Progressing     Problem: Potential for Aspiration  Goal: Non-ventilated patient's risk of aspiration is minimized  Description: Assess and monitor vital signs,  respiratory status, and labs (WBC).  Monitor for signs of aspiration (tachypnea, cough, rales, wheezing, cyanosis, fever).    - Assess and monitor patient's ability to swallow.  - Place patient up in chair to eat if possible.  - HOB up at 90 degrees to eat if unable to get patient up into chair.  - Supervise patient during oral intake.   - Instruct patient/ family to take small bites.  - Instruct patient/ family to take small single sips when taking liquids.  - Follow patient-specific strategies generated by speech pathologist.  Outcome: Progressing     Problem: Nutrition  Goal: Nutrition/Hydration status is improving  Description: Monitor and assess patient's nutrition/hydration status for malnutrition (ex- brittle hair, bruises, dry skin, pale skin and conjunctiva, muscle wasting, smooth red tongue, and disorientation). Collaborate with interdisciplinary team and initiate plan and interventions as ordered.  Monitor patient's weight and dietary intake as ordered or per policy. Utilize nutrition screening tool and intervene per policy. Determine patient's food preferences and provide high-protein, high-caloric foods as appropriate.     - Assist patient with eating.  - Allow adequate time for meals.  - Encourage patient to take dietary supplement as ordered.  - Collaborate with clinical nutritionist.  - Include patient/family/caregiver in decisions related to nutrition.  Outcome: Progressing     Problem: PAIN - ADULT  Goal: Verbalizes/displays adequate comfort level or baseline comfort level  Description: Interventions:  - Encourage patient to monitor pain and request assistance  - Assess pain using appropriate pain scale  - Administer analgesics as ordered based on type and severity of pain and evaluate response  - Implement non-pharmacological measures as appropriate and evaluate response  - Consider cultural and social influences on pain and pain management  - Notify physician/advanced practitioner if  interventions unsuccessful or patient reports new pain  - Educate patient/family on pain management process including their role and importance of  reporting pain   - Provide non-pharmacologic/complimentary pain relief interventions  Outcome: Progressing     Problem: INFECTION - ADULT  Goal: Absence or prevention of progression during hospitalization  Description: INTERVENTIONS:  - Assess and monitor for signs and symptoms of infection  - Monitor lab/diagnostic results  - Monitor all insertion sites, i.e. indwelling lines, tubes, and drains  - Monitor endotracheal if appropriate and nasal secretions for changes in amount and color  - [place] appropriate cooling/warming therapies per order  - Administer medications as ordered  - Instruct and encourage patient and family to use good hand hygiene technique  - Identify and instruct in appropriate isolation precautions for identified infection/condition  Outcome: Progressing  Goal: Absence of fever/infection during neutropenic period  Description: INTERVENTIONS:  - Monitor WBC  - Perform strict hand hygiene  - Limit to healthy visitors only  - No plants, dried, fresh or silk flowers with pollock in patient room  Outcome: Progressing     Problem: SAFETY ADULT  Goal: Patient will remain free of falls  Description: INTERVENTIONS:  - Educate patient/family on patient safety including physical limitations  - Instruct patient to call for assistance with activity   - Consider consulting OT/PT to assist with strengthening/mobility based on AM PAC & JH-HLM score  - Consult OT/PT to assist with strengthening/mobility   - Keep Call bell within reach  - Keep bed low and locked with side rails adjusted as appropriate  - Keep care items and personal belongings within reach  - Initiate and maintain comfort rounds  - Make Fall Risk Sign visible to staff  - Offer Toileting every 2 Hours, in advance of need  - Initiate/Maintain bed/chair alarm  - Obtain necessary fall risk management  equipment: call bell within the reach, bed/chair alarm, gripper socks on.  - Apply yellow socks and bracelet for high fall risk patients  - Consider moving patient to room near nurses station  Outcome: Progressing  Goal: Maintain or return to baseline ADL function  Description: INTERVENTIONS:  -  Assess patient's ability to carry out ADLs; assess patient's baseline for ADL function and identify physical deficits which impact ability to perform ADLs (bathing, care of mouth/teeth, toileting, grooming, dressing, etc.)  - Assess/evaluate cause of self-care deficits   - Assess range of motion  - Assess patient's mobility; develop plan if impaired  - Assess patient's need for assistive devices and provide as appropriate  - Encourage maximum independence but intervene and supervise when necessary  - Involve family in performance of ADLs  - Assess for home care needs following discharge   - Consider OT consult to assist with ADL evaluation and planning for discharge  - Provide patient education as appropriate  - Monitor functional capacity and physical performance, use of AM PAC & JH-HLM   - Monitor gait, balance and fatigue with ambulation    Outcome: Progressing  Goal: Maintains/Returns to pre admission functional level  Description: INTERVENTIONS:  - Perform AM-PAC 6 Click Basic Mobility/ Daily Activity assessment daily.  - Set and communicate daily mobility goal to care team and patient/family/caregiver.   - Collaborate with rehabilitation services on mobility goals if consulted  - Perform Range of Motion 2 times a day.  - Reposition patient every 2 hours.  - Dangle patient 3 times a day  - Stand patient 3 times a day  - Ambulate patient 2 times a day  - Out of bed to chair 3 times a day   - Out of bed for meals 3 times a day  - Out of bed for toileting  - Record patient progress and toleration of activity level   Outcome: Progressing     Problem: Knowledge Deficit  Goal: Patient/family/caregiver demonstrates  understanding of disease process, treatment plan, medications, and discharge instructions  Description: Complete learning assessment and assess knowledge base.  Interventions:  - Provide teaching at level of understanding  - Provide teaching via preferred learning methods  Outcome: Progressing     Problem: Nutrition/Hydration-ADULT  Goal: Nutrient/Hydration intake appropriate for improving, restoring or maintaining nutritional needs  Description: Monitor and assess patient's nutrition/hydration status for malnutrition. Collaborate with interdisciplinary team and initiate plan and interventions as ordered.  Monitor patient's weight and dietary intake as ordered or per policy. Utilize nutrition screening tool and intervene as necessary. Determine patient's food preferences and provide high-protein, high-caloric foods as appropriate.     INTERVENTIONS:  - Monitor oral intake, urinary output, labs, and treatment plans  - Assess nutrition and hydration status and recommend course of action  - Evaluate amount of meals eaten  - Assist patient with eating if necessary   - Allow adequate time for meals  - Recommend/ encourage appropriate diets, oral nutritional supplements, and vitamin/mineral supplements  - Order, calculate, and assess calorie counts as needed  - Recommend, monitor, and adjust tube feedings and TPN/PPN based on assessed needs  - Assess need for intravenous fluids  - Provide specific nutrition/hydration education as appropriate  - Include patient/family/caregiver in decisions related to nutrition  Outcome: Progressing     Problem: Prexisting or High Potential for Compromised Skin Integrity  Goal: Skin integrity is maintained or improved  Description: INTERVENTIONS:  - Identify patients at risk for skin breakdown  - Assess and monitor skin integrity including under and around medical devices   - Assess and monitor nutrition and hydration status  - Monitor labs  - Assess for incontinence   - Turn and  reposition patient  - Assist with mobility/ambulation  - Relieve pressure over pretty prominences   - Avoid friction and shearing  - Provide appropriate hygiene as needed including keeping skin clean and dry  - Evaluate need for skin moisturizer/barrier cream  - Collaborate with interdisciplinary team  - Patient/family teaching  - Consider wound care consult    Assess:  - Review Francisco Javier scale daily  - Assess extremities for adequate circulation and sensation     Bed Management:  - Have minimal linens on bed & keep smooth, unwrinkled  - Change linens as needed when moist or perspiring  -- Toileting:  - Offer bedside commode  -    Activity:  - Mobilize patient 2 times a day  - Encourage activity and walks on unit  - Encourage or provide ROM exercises   - Turn and reposition patient every 2 Hours  - Use appropriate equipment to lift or move patient in bed  - Instruct/ Assist with weight shifting every 2 hours when out of bed in chair  - Consider limitation of chair time 2 hour intervals    Skin Care:  - Avoid use of baby powder, tape, friction and shearing, hot water or constrictive clothing  - Relieve pressure over bony prominences using pillow  - Do not massage red bony areas    Next Steps:  - Teach patient strategies to minimize risks such as falls  - Consider consults to  interdisciplinary teams such as PT  Outcome: Progressing

## 2025-07-14 PROCEDURE — 97110 THERAPEUTIC EXERCISES: CPT

## 2025-07-14 PROCEDURE — 97112 NEUROMUSCULAR REEDUCATION: CPT

## 2025-07-14 PROCEDURE — 99232 SBSQ HOSP IP/OBS MODERATE 35: CPT | Performed by: INTERNAL MEDICINE

## 2025-07-14 RX ADMIN — ATORVASTATIN CALCIUM 20 MG: 20 TABLET, FILM COATED ORAL at 17:09

## 2025-07-14 RX ADMIN — HEPARIN SODIUM 5000 UNITS: 5000 INJECTION INTRAVENOUS; SUBCUTANEOUS at 05:07

## 2025-07-14 RX ADMIN — AMLODIPINE BESYLATE 10 MG: 10 TABLET ORAL at 09:13

## 2025-07-14 RX ADMIN — MAGNESIUM OXIDE TAB 400 MG (241.3 MG ELEMENTAL MG) 400 MG: 400 (241.3 MG) TAB at 09:13

## 2025-07-14 RX ADMIN — SENNOSIDES AND DOCUSATE SODIUM 2 TABLET: 50; 8.6 TABLET ORAL at 17:09

## 2025-07-14 RX ADMIN — TAMSULOSIN HYDROCHLORIDE 0.4 MG: 0.4 CAPSULE ORAL at 17:09

## 2025-07-14 RX ADMIN — Medication 6 MG: at 21:58

## 2025-07-14 RX ADMIN — CARVEDILOL 6.25 MG: 6.25 TABLET, FILM COATED ORAL at 09:13

## 2025-07-14 RX ADMIN — MAGNESIUM OXIDE TAB 400 MG (241.3 MG ELEMENTAL MG) 400 MG: 400 (241.3 MG) TAB at 17:09

## 2025-07-14 RX ADMIN — LOSARTAN POTASSIUM 100 MG: 50 TABLET, FILM COATED ORAL at 09:13

## 2025-07-14 RX ADMIN — CARVEDILOL 6.25 MG: 6.25 TABLET, FILM COATED ORAL at 17:09

## 2025-07-14 RX ADMIN — HEPARIN SODIUM 5000 UNITS: 5000 INJECTION INTRAVENOUS; SUBCUTANEOUS at 13:56

## 2025-07-14 NOTE — PLAN OF CARE
Problem: Neurological Deficit  Goal: Neurological status is stable or improving  Description: Interventions:  - Monitor and assess patient's level of consciousness, motor function, sensory function, and level of assistance needed for ADLs.   - Monitor and report changes from baseline. Collaborate with interdisciplinary team to initiate plan and implement interventions as ordered.   - Provide and maintain a safe environment.  - Consider seizure precautions.  - Consider fall precautions.  - Consider aspiration precautions.  - Consider bleeding precautions.  Outcome: Progressing     Problem: Activity Intolerance/Impaired Mobility  Goal: Mobility/activity is maintained at optimum level for patient  Description: Interventions:  - Assess and monitor patient  barriers to mobility and need for assistive/adaptive devices.  - Assess patient's emotional response to limitations.  - Collaborate with interdisciplinary team and initiate plans and interventions as ordered.  - Encourage independent activity per ability.  - Maintain proper body alignment.  - Perform active/passive rom as tolerated/ordered.  - Plan activities to conserve energy.  - Turn patient as appropriate  Outcome: Progressing     Problem: Communication Impairment  Goal: Ability to express needs and understand communication  Description: Assess patient's communication skills and ability to understand information.  Patient will demonstrate use of effective communication techniques, alternative methods of communication and understanding even if not able to speak.     - Encourage communication and provide alternate methods of communication as needed.  - Collaborate with case management/ for discharge needs.  - Include patient/family/caregiver in decisions related to communication.  Outcome: Progressing     Problem: Potential for Aspiration  Goal: Non-ventilated patient's risk of aspiration is minimized  Description: Assess and monitor vital signs,  respiratory status, and labs (WBC).  Monitor for signs of aspiration (tachypnea, cough, rales, wheezing, cyanosis, fever).    - Assess and monitor patient's ability to swallow.  - Place patient up in chair to eat if possible.  - HOB up at 90 degrees to eat if unable to get patient up into chair.  - Supervise patient during oral intake.   - Instruct patient/ family to take small bites.  - Instruct patient/ family to take small single sips when taking liquids.  - Follow patient-specific strategies generated by speech pathologist.  Outcome: Progressing     Problem: Nutrition  Goal: Nutrition/Hydration status is improving  Description: Monitor and assess patient's nutrition/hydration status for malnutrition (ex- brittle hair, bruises, dry skin, pale skin and conjunctiva, muscle wasting, smooth red tongue, and disorientation). Collaborate with interdisciplinary team and initiate plan and interventions as ordered.  Monitor patient's weight and dietary intake as ordered or per policy. Utilize nutrition screening tool and intervene per policy. Determine patient's food preferences and provide high-protein, high-caloric foods as appropriate.     - Assist patient with eating.  - Allow adequate time for meals.  - Encourage patient to take dietary supplement as ordered.  - Collaborate with clinical nutritionist.  - Include patient/family/caregiver in decisions related to nutrition.  Outcome: Progressing     Problem: PAIN - ADULT  Goal: Verbalizes/displays adequate comfort level or baseline comfort level  Description: Interventions:  - Encourage patient to monitor pain and request assistance  - Assess pain using appropriate pain scale  - Administer analgesics as ordered based on type and severity of pain and evaluate response  - Implement non-pharmacological measures as appropriate and evaluate response  - Consider cultural and social influences on pain and pain management  - Notify physician/advanced practitioner if  interventions unsuccessful or patient reports new pain  - Educate patient/family on pain management process including their role and importance of  reporting pain   - Provide non-pharmacologic/complimentary pain relief interventions  Outcome: Progressing     Problem: INFECTION - ADULT  Goal: Absence of fever/infection during neutropenic period  Description: INTERVENTIONS:  - Monitor WBC  - Perform strict hand hygiene  - Limit to healthy visitors only  - No plants, dried, fresh or silk flowers with pollock in patient room  Outcome: Progressing     Problem: SAFETY ADULT  Goal: Patient will remain free of falls  Description: INTERVENTIONS:  - Educate patient/family on patient safety including physical limitations  - Instruct patient to call for assistance with activity   - Consider consulting OT/PT to assist with strengthening/mobility based on AM PAC & JH-HLM score  - Consult OT/PT to assist with strengthening/mobility   - Keep Call bell within reach  - Keep bed low and locked with side rails adjusted as appropriate  - Keep care items and personal belongings within reach  - Initiate and maintain comfort rounds  - Make Fall Risk Sign visible to staff  - Offer Toileting every 2 Hours, in advance of need  - Initiate/Maintain bed/chair alarm  - Obtain necessary fall risk management equipment: call bell within the reach, bed/chair alarm, gripper socks on.  - Apply yellow socks and bracelet for high fall risk patients  - Consider moving patient to room near nurses station  Outcome: Progressing  Goal: Maintain or return to baseline ADL function  Description: INTERVENTIONS:  -  Assess patient's ability to carry out ADLs; assess patient's baseline for ADL function and identify physical deficits which impact ability to perform ADLs (bathing, care of mouth/teeth, toileting, grooming, dressing, etc.)  - Assess/evaluate cause of self-care deficits   - Assess range of motion  - Assess patient's mobility; develop plan if impaired  -  Assess patient's need for assistive devices and provide as appropriate  - Encourage maximum independence but intervene and supervise when necessary  - Involve family in performance of ADLs  - Assess for home care needs following discharge   - Consider OT consult to assist with ADL evaluation and planning for discharge  - Provide patient education as appropriate  - Monitor functional capacity and physical performance, use of AM PAC & JH-HLM   - Monitor gait, balance and fatigue with ambulation    Outcome: Progressing  Goal: Maintains/Returns to pre admission functional level  Description: INTERVENTIONS:  - Perform AM-PAC 6 Click Basic Mobility/ Daily Activity assessment daily.  - Set and communicate daily mobility goal to care team and patient/family/caregiver.   - Collaborate with rehabilitation services on mobility goals if consulted  - Perform Range of Motion 2 times a day.  - Reposition patient every 2 hours.  - Dangle patient 3 times a day  - Stand patient 3 times a day  - Ambulate patient 2 times a day  - Out of bed to chair 3 times a day   - Out of bed for meals 3 times a day  - Out of bed for toileting  - Record patient progress and toleration of activity level   Outcome: Progressing     Problem: DISCHARGE PLANNING  Goal: Discharge to home or other facility with appropriate resources  Description: INTERVENTIONS:  - Identify barriers to discharge w/patient and caregiver  - Arrange for needed discharge resources and transportation as appropriate  - Identify discharge learning needs (meds, wound care, etc.)  - Arrange for interpretive services to assist at discharge as needed  - Refer to Case Management Department for coordinating discharge planning if the patient needs post-hospital services based on physician/advanced practitioner order or complex needs related to functional status, cognitive ability, or social support system  Outcome: Progressing     Problem: Knowledge Deficit  Goal: Patient/family/caregiver  demonstrates understanding of disease process, treatment plan, medications, and discharge instructions  Description: Complete learning assessment and assess knowledge base.  Interventions:  - Provide teaching at level of understanding  - Provide teaching via preferred learning methods  Outcome: Progressing     Problem: Nutrition/Hydration-ADULT  Goal: Nutrient/Hydration intake appropriate for improving, restoring or maintaining nutritional needs  Description: Monitor and assess patient's nutrition/hydration status for malnutrition. Collaborate with interdisciplinary team and initiate plan and interventions as ordered.  Monitor patient's weight and dietary intake as ordered or per policy. Utilize nutrition screening tool and intervene as necessary. Determine patient's food preferences and provide high-protein, high-caloric foods as appropriate.     INTERVENTIONS:  - Monitor oral intake, urinary output, labs, and treatment plans  - Assess nutrition and hydration status and recommend course of action  - Evaluate amount of meals eaten  - Assist patient with eating if necessary   - Allow adequate time for meals  - Recommend/ encourage appropriate diets, oral nutritional supplements, and vitamin/mineral supplements  - Order, calculate, and assess calorie counts as needed  - Recommend, monitor, and adjust tube feedings and TPN/PPN based on assessed needs  - Assess need for intravenous fluids  - Provide specific nutrition/hydration education as appropriate  - Include patient/family/caregiver in decisions related to nutrition  Outcome: Progressing     Problem: Prexisting or High Potential for Compromised Skin Integrity  Goal: Skin integrity is maintained or improved  Description: INTERVENTIONS:  - Identify patients at risk for skin breakdown  - Assess and monitor skin integrity including under and around medical devices   - Assess and monitor nutrition and hydration status  - Monitor labs  - Assess for incontinence   - Turn  and reposition patient  - Assist with mobility/ambulation  - Relieve pressure over pretty prominences   - Avoid friction and shearing  - Provide appropriate hygiene as needed including keeping skin clean and dry  - Evaluate need for skin moisturizer/barrier cream  - Collaborate with interdisciplinary team  - Patient/family teaching  - Consider wound care consult

## 2025-07-14 NOTE — DISCHARGE SUPPORT
Case Management Assessment & Discharge Planning Note    Patient name Shawn Marquez  Location [facility] 734/[facility] 734-01 MRN [id number]  : 1950 Date 2025       Current Admission Date: 2025  Current Admission Diagnosis:ICH (intracerebral hemorrhage) (HCC)   Patient Active Problem List    Diagnosis Date Noted    Cerebral edema (HCC) 2025    Brain compression (HCC) 2025    Atrial fibrillation (HCC) 2025    HTN (hypertension) 2025    Anticoagulated on rivaroxaban 2025    Hemineglect 2025    Hemiparesis (HCC) 2025    ICH (intracerebral hemorrhage) (HCC) 2025    Seizure-like activity ([facility]) 2025    Medical marijuana use 2024    Elevated brain natriuretic peptide (BNP) level 2024    Syncope 2024    Depression 2024    Vascular dementia with paranoia ([facility]) 2023    Paranoid behavior (HCC) 2022    Longstanding persistent atrial fibrillation (HCC) 07/15/2022    Anxiety 2021    HLD (hyperlipidemia) 2021    Numbness 2019    Personal history of transient ischemic attack 2019    Anticoagulant long-term use 2019    Arthritis of knee 2019    History of DVT (deep vein thrombosis) 2018    Encounter for skin care 2018    Varicose veins with swelling 2016    BPH with obstruction/lower urinary tract symptoms 2016    Chronic venous insufficiency 2016    DM II (diabetes mellitus, type II), controlled ([facility]) 2015    Essential hypertension 2013      LOS (days): 7  Geometric Mean LOS (GMLOS) (days): 4.5  Days to GMLOS:-2.3   Facility Auth Adverse Decision  DC Support Center has received: Intent to Deny  For Level of Care: Acute Rehab  Insurance: [employer]  Information obtained via : Fax  Name/Phone # of Rep who called in information: [email or web address]  Reason for Adverse Decision: Does not meet criteria  Reference Number: [id number]  Facility Name: [facility]  to Peer?: Offered  Peer to Peer Phone #: [phone]  P2P Deadline: 7/15 @ 2:30 PM  For P2P Completion:: Information sent to Physician advisor group to complete P2P   notified: demarcus royal     Updates to authorization status will be noted in chart.    Please reach out to CM for updates on any clinical information.

## 2025-07-14 NOTE — RESTORATIVE TECHNICIAN NOTE
Restorative Technician Note      Patient Name: Shawn Marquez     Note Type: Mobility  Patient Position Upon Consult: Bedside chair  Activity Performed: Dangled; Stood; Repositioned; Range of motion  Assistive Device: Other (Comment) (Assist x2 to stand multiple times to work on standing tolerance/balance. Also, used quick . Assisted PT Anthony.)  Range of Motion: All extremities  Education Provided: Yes  Patient Position at End of Consult: Bedside chair; All needs within reach; Bed/Chair alarm activated    Jerod CHAN, Restorative Technician,

## 2025-07-14 NOTE — PROGRESS NOTES
"Progress Note - Hospitalist   Name: Shawn Marquez 75 y.o. male I MRN: [id number]  Unit/Bed#: [facility] 734-01 I Date of Admission: 7/7/2025   Date of Service: 7/14/2025 I Hospital Day: 7    Assessment & Plan  ICH (intracerebral hemorrhage) (HCC)  History of A-fib on Xarelto presented as stroke alert after being found by family on the floor with left facial and left-sided weakness.  AC/AP reversed with Kcentra and DDAVP with CTA H/N notable for Large acute intraparenchymal hemorrhage centered involving the posterior right basal ganglia, adjacent right thalamus, and posterior right insula, with a volume of approximately 23 mL and subsequent MRI brain showing Acute to early subacute lacunar infarct in the right hemipons and Stable hemorrhage and surrounding vasogenic edema in the right corona radiata and temporal white matter.  Evaluated by neurosurgery with no interventions indicated  Evaluated by neurology and recommended to hold aspirin and resume AC on 8/4  Patient recommended for acute rehab.  Case management following  Stable pending placement  Essential hypertension  Blood pressure currently stable.  He is off Cardene drip  Continue amlodipine 10 mg daily, carvedilol 6.25 mg twice daily and losartan 100 mg daily  DM II (diabetes mellitus, type II), controlled ([facility])  Lab Results   Component Value Date    HGBA1C 6.7 (H) 07/08/2025       No results for input(s): \"POCGLU\" in the last 72 hours.    Blood Sugar Average: Last 72 hrs:      History of DVT (deep vein thrombosis)  AC on hold as above  HLD (hyperlipidemia)  Continue atorvastatin  Atrial fibrillation (HCC)  Continue beta-blocker.  Hold AC as above    VTE Pharmacologic Prophylaxis:   Moderate Risk (Score 3-4) -heparin  Mobility:   Basic Mobility Inpatient Raw Score: 8  -HLM Goal: 3: Sit at edge of bed  JH-HLM Achieved: 4: Move to chair/commode  -HLM Goal NOT achieved. Continue with multidisciplinary rounding and encourage appropriate mobility to improve " upon [facility] goals.    Patient Centered Rounds: I performed bedside rounds with nursing staff today.   Discussions with Specialists or Other Care Team Provider: Case management    Education and Discussions with Family / Patient: Family at bedside.     Current Length of Stay: 7 day(s)  Current Patient Status: Inpatient   Certification Statement: The patient will continue to require additional inpatient hospital stay due to placement  Discharge Plan: Anticipate discharge in 24-48 hrs to rehab facility.    Code Status: Level 1 - Full Code    Subjective   Patient seen and examined.  No acute complaints.    Objective :  Temp:  [97.9 °F (36.6 °C)-99.2 °F (37.3 °C)] 97.9 °F (36.6 °C)  HR:  [80-82] 80  BP: (133-141)/(78-88) 133/85  SpO2:  [95 %-96 %] 95 %  O2 Device: None (Room air)    Body mass index is 28.43 kg/m².     Input and Output Summary (last 24 hours):     Intake/Output Summary (Last 24 hours) at 7/14/2025 1124  Last data filed at 7/14/2025 0934  Gross per 24 hour   Intake 420 ml   Output 400 ml   Net 20 ml       Physical Exam  Vitals and nursing note reviewed.     Cardiovascular:      Rate and Rhythm: Normal rate.   Pulmonary:      Effort: Pulmonary effort is normal.      Breath sounds: Normal breath sounds.   Abdominal:      Tenderness: There is no abdominal tenderness.     Neurological:      Mental Status: He is alert.      Motor: Weakness (Left upper extremity and left lower extremity) present.           Lines/Drains:              Lab Results: I have reviewed the following results:   Results from last 7 days   Lab Units 07/11/25  0438 07/10/25  0550   WBC Thousand/uL 11.82* 10.69*   HEMOGLOBIN g/dL 14.3 14.7   HEMATOCRIT % 41.1 42.9   PLATELETS Thousands/uL 164 151   SEGS PCT %  --  68   LYMPHO PCT %  --  18   MONO PCT %  --  11   EOS PCT %  --  2     Results from last 7 days   Lab Units 07/11/25  0438   SODIUM mmol/L 137   POTASSIUM mmol/L 4.0   CHLORIDE mmol/L 102   CO2 mmol/L 27   BUN mg/dL 16   CREATININE  mg/dL 0.64   ANION GAP mmol/L 8   CALCIUM mg/dL 8.2*   GLUCOSE RANDOM mg/dL 142*         Results from last 7 days   Lab Units 07/07/25  1613   POC GLUCOSE mg/dl 157*     Results from last 7 days   Lab Units 07/08/25  1339   HEMOGLOBIN A1C % 6.7*     Results from last 7 days   Lab Units 07/10/25  0550   PROCALCITONIN ng/ml <0.05       Recent Cultures (last 7 days):         Imaging Results Review: I reviewed radiology reports from this admission including: MRI brain.  Other Study Results Review: No additional pertinent studies reviewed.    Last 24 Hours Medication List:     Current Facility-Administered Medications:     acetaminophen (TYLENOL) tablet 650 mg, Q6H PRN    amLODIPine (NORVASC) tablet 10 mg, Daily    atorvastatin (LIPITOR) tablet 20 mg, Daily With Dinner    bisacodyl (DULCOLAX) rectal suppository 10 mg, Daily PRN    carvedilol (COREG) tablet 6.25 mg, BID With Meals    chlorhexidine (PERIDEX) 0.12 % oral rinse 15 mL, Q12H DEIDRE    [Transfer Hold] Gadobutrol injection (SINGLE-DOSE) SOLN 9 mL, Once in imaging    heparin (porcine) subcutaneous injection 5,000 Units, Q8H DEIDRE    hydrALAZINE (APRESOLINE) injection 10 mg, Q4H PRN    losartan (COZAAR) tablet 100 mg, Daily    magnesium Oxide (MAG-OX) tablet 400 mg, BID    melatonin tablet 6 mg, HS    ondansetron (ZOFRAN) injection 4 mg, Q6H PRN    polyethylene glycol (MIRALAX) packet 17 g, Daily    senna-docusate sodium (SENOKOT S) 8.6-50 mg per tablet 2 tablet, BID    tamsulosin (FLOMAX) capsule 0.4 mg, Daily With Dinner    Administrative Statements   Today, Patient Was Seen By: Renato Florian MD  I have spent a total time of 35 minutes in caring for this patient on the day of the visit/encounter including Diagnostic results, Counseling / Coordination of care, Documenting in the medical record, Reviewing/placing orders in the medical record (including tests, medications, and/or procedures), Obtaining or reviewing history  , and Communicating with other healthcare  professionals .    **Please Note: This note may have been constructed using a voice recognition system.**

## 2025-07-14 NOTE — PLAN OF CARE
Problem: Neurological Deficit  Goal: Neurological status is stable or improving  Description: Interventions:  - Monitor and assess patient's level of consciousness, motor function, sensory function, and level of assistance needed for ADLs.   - Monitor and report changes from baseline. Collaborate with interdisciplinary team to initiate plan and implement interventions as ordered.   - Provide and maintain a safe environment.  - Consider seizure precautions.  - Consider fall precautions.  - Consider aspiration precautions.  - Consider bleeding precautions.  7/13/2025 2233 by Mariaelena Zabala  Outcome: Progressing  7/13/2025 2233 by Mariaelena Zabala  Outcome: Progressing     Problem: Communication Impairment  Goal: Ability to express needs and understand communication  Description: Assess patient's communication skills and ability to understand information.  Patient will demonstrate use of effective communication techniques, alternative methods of communication and understanding even if not able to speak.     - Encourage communication and provide alternate methods of communication as needed.  - Collaborate with case management/ for discharge needs.  - Include patient/family/caregiver in decisions related to communication.  7/13/2025 2233 by Mariaelena Zabala  Outcome: Progressing  7/13/2025 2233 by Mariaelena Zabala  Outcome: Progressing     Problem: Nutrition  Goal: Nutrition/Hydration status is improving  Description: Monitor and assess patient's nutrition/hydration status for malnutrition (ex- brittle hair, bruises, dry skin, pale skin and conjunctiva, muscle wasting, smooth red tongue, and disorientation). Collaborate with interdisciplinary team and initiate plan and interventions as ordered.  Monitor patient's weight and dietary intake as ordered or per policy. Utilize nutrition screening tool and intervene per policy. Determine patient's food preferences and provide high-protein, high-caloric  foods as appropriate.     - Assist patient with eating.  - Allow adequate time for meals.  - Encourage patient to take dietary supplement as ordered.  - Collaborate with clinical nutritionist.  - Include patient/family/caregiver in decisions related to nutrition.  7/13/2025 2233 by Mariaelena Zabala  Outcome: Progressing  7/13/2025 2233 by Mariaelena Zabala  Outcome: Progressing     Problem: INFECTION - ADULT  Goal: Absence or prevention of progression during hospitalization  Description: INTERVENTIONS:  - Assess and monitor for signs and symptoms of infection  - Monitor lab/diagnostic results  - Monitor all insertion sites, i.e. indwelling lines, tubes, and drains  - Monitor endotracheal if appropriate and nasal secretions for changes in amount and color  - Frontier appropriate cooling/warming therapies per order  - Administer medications as ordered  - Instruct and encourage patient and family to use good hand hygiene technique  - Identify and instruct in appropriate isolation precautions for identified infection/condition  7/13/2025 2233 by Mariaelena Zabala  Outcome: Progressing  7/13/2025 2233 by Mariaelena Zabala  Outcome: Progressing     Problem: SAFETY ADULT  Goal: Patient will remain free of falls  Description: INTERVENTIONS:  - Educate patient/family on patient safety including physical limitations  - Instruct patient to call for assistance with activity   - Consider consulting OT/PT to assist with strengthening/mobility based on AM PAC & JH-HLM score  - Consult OT/PT to assist with strengthening/mobility   - Keep Call bell within reach  - Keep bed low and locked with side rails adjusted as appropriate  - Keep care items and personal belongings within reach  - Initiate and maintain comfort rounds  - Make Fall Risk Sign visible to staff  - Offer Toileting every 2 Hours, in advance of need  - Initiate/Maintain bed/chair alarm  - Obtain necessary fall risk management equipment: call bell within the reach,  bed/chair alarm, gripper socks on.  - Apply yellow socks and bracelet for high fall risk patients  - Consider moving patient to room near nurses station  7/13/2025 2233 by Mariaelena Zabala  Outcome: Progressing  7/13/2025 2233 by Mariaelena Zabala  Outcome: Progressing     Problem: Nutrition/Hydration-ADULT  Goal: Nutrient/Hydration intake appropriate for improving, restoring or maintaining nutritional needs  Description: Monitor and assess patient's nutrition/hydration status for malnutrition. Collaborate with interdisciplinary team and initiate plan and interventions as ordered.  Monitor patient's weight and dietary intake as ordered or per policy. Utilize nutrition screening tool and intervene as necessary. Determine patient's food preferences and provide high-protein, high-caloric foods as appropriate.     INTERVENTIONS:  - Monitor oral intake, urinary output, labs, and treatment plans  - Assess nutrition and hydration status and recommend course of action  - Evaluate amount of meals eaten  - Assist patient with eating if necessary   - Allow adequate time for meals  - Recommend/ encourage appropriate diets, oral nutritional supplements, and vitamin/mineral supplements  - Order, calculate, and assess calorie counts as needed  - Recommend, monitor, and adjust tube feedings and TPN/PPN based on assessed needs  - Assess need for intravenous fluids  - Provide specific nutrition/hydration education as appropriate  - Include patient/family/caregiver in decisions related to nutrition  7/13/2025 2233 by Mariaelena Zabala  Outcome: Progressing  7/13/2025 2233 by Mariaelena Zabala  Outcome: Progressing

## 2025-07-14 NOTE — PLAN OF CARE
Problem: PHYSICAL THERAPY ADULT  Goal: Performs mobility at highest level of function for planned discharge setting.  See evaluation for individualized goals.  Description: Treatment/Interventions: ADL retraining, Functional transfer training, LE strengthening/ROM, Elevations, Therapeutic exercise, Endurance training, Cognitive reorientation, Equipment eval/education, Patient/family training, Bed mobility, Gait training, Compensatory technique education, Spoke to nursing, Spoke to advanced practitioner, OT  Equipment Recommended:  (TBD)       See flowsheet documentation for full assessment, interventions and recommendations.  Outcome: Progressing  Note: Prognosis: Fair  Problem List: Decreased strength, Decreased endurance, Impaired balance, Decreased mobility, Decreased coordination, Decreased cognition, Impaired judgement, Decreased safety awareness, Obesity  Assessment: Pt agreeable to participate in PT session. Pt performed functional mobility and therex as outlined above. L lean in sitting and standing requiring VC and TC for same. While standing, A and TC to prevent L trunk/hip rotation as well. VC to not push toward L with RUE and to place equal weight through b/l LE. Some active LUE movement noted and encouraged pt to continue to move LUE and perform mental imagery. Pt left seated in chair with chair alarm, call bell, phone, and all personal needs within reach. Pt will continue to benefit from skilled acute care PT to further address their functional mobility limitations.  Barriers to Discharge: Inaccessible home environment, Decreased caregiver support     Rehab Resource Intensity Level, PT: I (Maximum Resource Intensity)    See flowsheet documentation for full assessment.

## 2025-07-14 NOTE — PHYSICAL THERAPY NOTE
PHYSICAL THERAPY NOTE          Patient Name: Shawn Marquez  Today's Date: 7/14/2025 07/14/25 1321   PT Last Visit   PT Visit Date 07/14/25   Note Type   Note Type Treatment   Pain Assessment   Pain Assessment Tool 0-10   Pain Score No Pain   Restrictions/Precautions   Weight Bearing Precautions Per Order No   Other Precautions Cognitive;Chair Alarm;Bed Alarm;Telemetry;Multiple lines;Fall Risk  (L hemiparesis)   General   Chart Reviewed Yes   Family/Caregiver Present No   Cognition   Overall Cognitive Status Impaired   Arousal/Participation Cooperative   Attention Attends with cues to redirect   Orientation Level Oriented to person;Oriented to place;Oriented to situation   Memory Decreased recall of recent events   Following Commands Follows one step commands without difficulty   Comments pleasant   Subjective   Subjective agreeable, fixated on peeing   Bed Mobility   Supine to Sit Unable to assess   Sit to Supine Unable to assess   Transfers   Sit to Stand 2  Maximal assistance   Additional items Assist x 2;Increased time required;Verbal cues   Stand to Sit 2  Maximal assistance   Additional items Assist x 2;Increased time required;Verbal cues   Additional Comments LUE supported, L knee block, R HHA, R knee block. x4 STS (x3 in quick  with knee blocker and anterior HR). standing x2 min all four trials   Balance   Static Sitting Poor   Dynamic Sitting Poor   Static Standing Poor -   Dynamic Standing Poor -   Endurance Deficit   Endurance Deficit Yes   Activity Tolerance   Activity Tolerance Patient limited by fatigue   Medical Staff Made Aware SPT, rest tech   Nurse Made Aware yes-cleared   Exercises   UE Exercise   (ARNALDO BARRERA: trace 2-/5 trap, bicep, tricep; x3 min)   Assessment   Prognosis Fair   Problem List Decreased strength;Decreased endurance;Impaired balance;Decreased mobility;Decreased coordination;Decreased  cognition;Impaired judgement;Decreased safety awareness;Obesity   Assessment Pt agreeable to participate in PT session. Pt performed functional mobility and therex as outlined above. L lean in sitting and standing requiring VC and TC for same. While standing, A and TC to prevent L trunk/hip rotation as well. VC to not push toward L with RUE and to place equal weight through b/l LE. Some active LUE movement noted and encouraged pt to continue to move LUE and perform mental imagery. Pt left seated in chair with chair alarm, call bell, phone, and all personal needs within reach. Pt will continue to benefit from skilled acute care PT to further address their functional mobility limitations.   Barriers to Discharge Inaccessible home environment;Decreased caregiver support   Goals   Patient Goals to improve   STG Expiration Date 07/22/25   PT Treatment Day 3   Plan   Treatment/Interventions Functional transfer training;LE strengthening/ROM;Therapeutic exercise;Endurance training;Cognitive reorientation;Patient/family training;Equipment eval/education;Bed mobility;Gait training;Spoke to case management;Spoke to nursing;OT   Progress Slow progress, decreased activity tolerance   PT Frequency 3-5x/wk   Discharge Recommendation   Rehab Resource Intensity Level, PT I (Maximum Resource Intensity)   AM-PAC Basic Mobility Inpatient   Turning in Flat Bed Without Bedrails 2   Lying on Back to Sitting on Edge of Flat Bed Without Bedrails 1   Moving Bed to Chair 1   Standing Up From Chair Using Arms 1   Walk in Room 1   Climb 3-5 Stairs With Railing 1   Basic Mobility Inpatient Raw Score 7   Turning Head Towards Sound 4   Follow Simple Instructions 3   Low Function Basic Mobility Raw Score  14   Low Function Basic Mobility Standardized Score  22.01   [facility] Highest Level Of Mobility   -HL Goal 2: Bed activities/Dependent transfer   -HLM Achieved 5: Stand (1 or more minutes)     Anthony Senior, PT, DPT, NCS

## 2025-07-15 PROCEDURE — 97535 SELF CARE MNGMENT TRAINING: CPT

## 2025-07-15 PROCEDURE — 92526 ORAL FUNCTION THERAPY: CPT

## 2025-07-15 PROCEDURE — 97112 NEUROMUSCULAR REEDUCATION: CPT

## 2025-07-15 PROCEDURE — 97530 THERAPEUTIC ACTIVITIES: CPT

## 2025-07-15 PROCEDURE — 99232 SBSQ HOSP IP/OBS MODERATE 35: CPT

## 2025-07-15 PROCEDURE — 97110 THERAPEUTIC EXERCISES: CPT

## 2025-07-15 RX ORDER — MAGNESIUM SULFATE HEPTAHYDRATE 40 MG/ML
2 INJECTION, SOLUTION INTRAVENOUS ONCE
Status: COMPLETED | OUTPATIENT
Start: 2025-07-15 | End: 2025-07-15

## 2025-07-15 RX ADMIN — ACETAMINOPHEN 650 MG: 325 TABLET ORAL at 17:08

## 2025-07-15 RX ADMIN — SENNOSIDES AND DOCUSATE SODIUM 2 TABLET: 50; 8.6 TABLET ORAL at 17:05

## 2025-07-15 RX ADMIN — ATORVASTATIN CALCIUM 20 MG: 20 TABLET, FILM COATED ORAL at 16:36

## 2025-07-15 RX ADMIN — CARVEDILOL 6.25 MG: 6.25 TABLET, FILM COATED ORAL at 09:50

## 2025-07-15 RX ADMIN — Medication 6 MG: at 21:12

## 2025-07-15 RX ADMIN — CHLORHEXIDINE GLUCONATE 15 ML: 1.2 SOLUTION ORAL at 09:49

## 2025-07-15 RX ADMIN — AMLODIPINE BESYLATE 10 MG: 10 TABLET ORAL at 09:50

## 2025-07-15 RX ADMIN — MAGNESIUM OXIDE TAB 400 MG (241.3 MG ELEMENTAL MG) 400 MG: 400 (241.3 MG) TAB at 17:05

## 2025-07-15 RX ADMIN — HEPARIN SODIUM 5000 UNITS: 5000 INJECTION INTRAVENOUS; SUBCUTANEOUS at 21:12

## 2025-07-15 RX ADMIN — TAMSULOSIN HYDROCHLORIDE 0.4 MG: 0.4 CAPSULE ORAL at 16:36

## 2025-07-15 RX ADMIN — LOSARTAN POTASSIUM 100 MG: 50 TABLET, FILM COATED ORAL at 09:50

## 2025-07-15 RX ADMIN — MAGNESIUM OXIDE TAB 400 MG (241.3 MG ELEMENTAL MG) 400 MG: 400 (241.3 MG) TAB at 09:50

## 2025-07-15 RX ADMIN — MAGNESIUM SULFATE HEPTAHYDRATE 2 G: 40 INJECTION, SOLUTION INTRAVENOUS at 16:36

## 2025-07-15 RX ADMIN — HEPARIN SODIUM 5000 UNITS: 5000 INJECTION INTRAVENOUS; SUBCUTANEOUS at 13:23

## 2025-07-15 RX ADMIN — CARVEDILOL 6.25 MG: 6.25 TABLET, FILM COATED ORAL at 16:36

## 2025-07-15 RX ADMIN — CHLORHEXIDINE GLUCONATE 15 ML: 1.2 SOLUTION ORAL at 21:12

## 2025-07-15 NOTE — PLAN OF CARE
Problem: Neurological Deficit  Goal: Neurological status is stable or improving  Description: Interventions:  - Monitor and assess patient's level of consciousness, motor function, sensory function, and level of assistance needed for ADLs.   - Monitor and report changes from baseline. Collaborate with interdisciplinary team to initiate plan and implement interventions as ordered.   - Provide and maintain a safe environment.  - Consider seizure precautions.  - Consider fall precautions.  - Consider aspiration precautions.  - Consider bleeding precautions.  7/14/2025 2352 by Rosanne Kemp RN  Outcome: Progressing  7/14/2025 2350 by Rosanne Kemp RN  Outcome: Progressing     Problem: Activity Intolerance/Impaired Mobility  Goal: Mobility/activity is maintained at optimum level for patient  Description: Interventions:  - Assess and monitor patient  barriers to mobility and need for assistive/adaptive devices.  - Assess patient's emotional response to limitations.  - Collaborate with interdisciplinary team and initiate plans and interventions as ordered.  - Encourage independent activity per ability.  - Maintain proper body alignment.  - Perform active/passive rom as tolerated/ordered.  - Plan activities to conserve energy.  - Turn patient as appropriate  7/14/2025 2352 by Rosanne Kemp RN  Outcome: Progressing  7/14/2025 2350 by Rosanne Kemp RN  Outcome: Progressing     Problem: Communication Impairment  Goal: Ability to express needs and understand communication  Description: Assess patient's communication skills and ability to understand information.  Patient will demonstrate use of effective communication techniques, alternative methods of communication and understanding even if not able to speak.     - Encourage communication and provide alternate methods of communication as needed.  - Collaborate with case management/ for discharge needs.  - Include patient/family/caregiver in  decisions related to communication.  7/14/2025 2352 by Rosanne Kemp RN  Outcome: Progressing  7/14/2025 2350 by Rosanne Kemp RN  Outcome: Progressing     Problem: Potential for Aspiration  Goal: Non-ventilated patient's risk of aspiration is minimized  Description: Assess and monitor vital signs, respiratory status, and labs (WBC).  Monitor for signs of aspiration (tachypnea, cough, rales, wheezing, cyanosis, fever).    - Assess and monitor patient's ability to swallow.  - Place patient up in chair to eat if possible.  - HOB up at 90 degrees to eat if unable to get patient up into chair.  - Supervise patient during oral intake.   - Instruct patient/ family to take small bites.  - Instruct patient/ family to take small single sips when taking liquids.  - Follow patient-specific strategies generated by speech pathologist.  7/14/2025 2352 by Rosanne Kemp RN  Outcome: Progressing  7/14/2025 2350 by Rosanne Kemp RN  Outcome: Progressing     Problem: Nutrition  Goal: Nutrition/Hydration status is improving  Description: Monitor and assess patient's nutrition/hydration status for malnutrition (ex- brittle hair, bruises, dry skin, pale skin and conjunctiva, muscle wasting, smooth red tongue, and disorientation). Collaborate with interdisciplinary team and initiate plan and interventions as ordered.  Monitor patient's weight and dietary intake as ordered or per policy. Utilize nutrition screening tool and intervene per policy. Determine patient's food preferences and provide high-protein, high-caloric foods as appropriate.     - Assist patient with eating.  - Allow adequate time for meals.  - Encourage patient to take dietary supplement as ordered.  - Collaborate with clinical nutritionist.  - Include patient/family/caregiver in decisions related to nutrition.  7/14/2025 2352 by Rosanne Kemp RN  Outcome: Progressing  7/14/2025 2350 by Rosanne Kemp RN  Outcome: Progressing     Problem: PAIN -  ADULT  Goal: Verbalizes/displays adequate comfort level or baseline comfort level  Description: Interventions:  - Encourage patient to monitor pain and request assistance  - Assess pain using appropriate pain scale  - Administer analgesics as ordered based on type and severity of pain and evaluate response  - Implement non-pharmacological measures as appropriate and evaluate response  - Consider cultural and social influences on pain and pain management  - Notify physician/advanced practitioner if interventions unsuccessful or patient reports new pain  - Educate patient/family on pain management process including their role and importance of  reporting pain   - Provide non-pharmacologic/complimentary pain relief interventions  7/14/2025 2352 by Rosanne Kemp RN  Outcome: Progressing  7/14/2025 2350 by Rosanne Kemp RN  Outcome: Progressing     Problem: INFECTION - ADULT  Goal: Absence of fever/infection during neutropenic period  Description: INTERVENTIONS:  - Monitor WBC  - Perform strict hand hygiene  - Limit to healthy visitors only  - No plants, dried, fresh or silk flowers with pollock in patient room  7/14/2025 2352 by Rosanne Kemp RN  Outcome: Progressing  7/14/2025 2350 by Rosanne Kemp RN  Outcome: Progressing     Problem: SAFETY ADULT  Goal: Patient will remain free of falls  Description: INTERVENTIONS:  - Educate patient/family on patient safety including physical limitations  - Instruct patient to call for assistance with activity   - Consider consulting OT/PT to assist with strengthening/mobility based on AM PAC & JH-HLM score  - Consult OT/PT to assist with strengthening/mobility   - Keep Call bell within reach  - Keep bed low and locked with side rails adjusted as appropriate  - Keep care items and personal belongings within reach  - Initiate and maintain comfort rounds  - Make Fall Risk Sign visible to staff  - Offer Toileting every 2 Hours, in advance of need  - Initiate/Maintain  bed/chair alarm  - Obtain necessary fall risk management equipment: call bell within the reach, bed/chair alarm, gripper socks on.  - Apply yellow socks and bracelet for high fall risk patients  - Consider moving patient to room near nurses station  7/14/2025 2352 by Rosanne Kemp RN  Outcome: Progressing  7/14/2025 2350 by Rosanne Kemp RN  Outcome: Progressing  Goal: Maintain or return to baseline ADL function  Description: INTERVENTIONS:  -  Assess patient's ability to carry out ADLs; assess patient's baseline for ADL function and identify physical deficits which impact ability to perform ADLs (bathing, care of mouth/teeth, toileting, grooming, dressing, etc.)  - Assess/evaluate cause of self-care deficits   - Assess range of motion  - Assess patient's mobility; develop plan if impaired  - Assess patient's need for assistive devices and provide as appropriate  - Encourage maximum independence but intervene and supervise when necessary  - Involve family in performance of ADLs  - Assess for home care needs following discharge   - Consider OT consult to assist with ADL evaluation and planning for discharge  - Provide patient education as appropriate  - Monitor functional capacity and physical performance, use of AM PAC & JH-HLM   - Monitor gait, balance and fatigue with ambulation    7/14/2025 2352 by Rosanne Kemp RN  Outcome: Progressing  7/14/2025 2350 by Rosanne Kemp RN  Outcome: Progressing  Goal: Maintains/Returns to pre admission functional level  Description: INTERVENTIONS:  - Perform AM-PAC 6 Click Basic Mobility/ Daily Activity assessment daily.  - Set and communicate daily mobility goal to care team and patient/family/caregiver.   - Collaborate with rehabilitation services on mobility goals if consulted  - Perform Range of Motion 2 times a day.  - Reposition patient every 2 hours.  - Dangle patient 3 times a day  - Stand patient 3 times a day  - Ambulate patient 2 times a day  - Out of  bed to chair 3 times a day   - Out of bed for meals 3 times a day  - Out of bed for toileting  - Record patient progress and toleration of activity level   7/14/2025 2352 by Rosanne Kemp RN  Outcome: Progressing  7/14/2025 2350 by Rosanne Kemp RN  Outcome: Progressing     Problem: DISCHARGE PLANNING  Goal: Discharge to home or other facility with appropriate resources  Description: INTERVENTIONS:  - Identify barriers to discharge w/patient and caregiver  - Arrange for needed discharge resources and transportation as appropriate  - Identify discharge learning needs (meds, wound care, etc.)  - Arrange for interpretive services to assist at discharge as needed  - Refer to Case Management Department for coordinating discharge planning if the patient needs post-hospital services based on physician/advanced practitioner order or complex needs related to functional status, cognitive ability, or social support system  7/14/2025 2352 by Rosanne Kemp RN  Outcome: Progressing  7/14/2025 2350 by Rosanne Kemp RN  Outcome: Progressing     Problem: Knowledge Deficit  Goal: Patient/family/caregiver demonstrates understanding of disease process, treatment plan, medications, and discharge instructions  Description: Complete learning assessment and assess knowledge base.  Interventions:  - Provide teaching at level of understanding  - Provide teaching via preferred learning methods  7/14/2025 2352 by Rosanne Kemp RN  Outcome: Progressing  7/14/2025 2350 by Rosanne Kemp RN  Outcome: Progressing     Problem: Nutrition/Hydration-ADULT  Goal: Nutrient/Hydration intake appropriate for improving, restoring or maintaining nutritional needs  Description: Monitor and assess patient's nutrition/hydration status for malnutrition. Collaborate with interdisciplinary team and initiate plan and interventions as ordered.  Monitor patient's weight and dietary intake as ordered or per policy. Utilize nutrition screening  tool and intervene as necessary. Determine patient's food preferences and provide high-protein, high-caloric foods as appropriate.     INTERVENTIONS:  - Monitor oral intake, urinary output, labs, and treatment plans  - Assess nutrition and hydration status and recommend course of action  - Evaluate amount of meals eaten  - Assist patient with eating if necessary   - Allow adequate time for meals  - Recommend/ encourage appropriate diets, oral nutritional supplements, and vitamin/mineral supplements  - Order, calculate, and assess calorie counts as needed  - Recommend, monitor, and adjust tube feedings and TPN/PPN based on assessed needs  - Assess need for intravenous fluids  - Provide specific nutrition/hydration education as appropriate  - Include patient/family/caregiver in decisions related to nutrition  7/14/2025 2352 by Rosanne Kemp RN  Outcome: Progressing  7/14/2025 2350 by Rosanne Kemp RN  Outcome: Progressing     Problem: Prexisting or High Potential for Compromised Skin Integrity  Goal: Skin integrity is maintained or improved  Description: INTERVENTIONS:  - Identify patients at risk for skin breakdown  - Assess and monitor skin integrity including under and around medical devices   - Assess and monitor nutrition and hydration status  - Monitor labs  - Assess for incontinence   - Turn and reposition patient  - Assist with mobility/ambulation  - Relieve pressure over pretty prominences   - Avoid friction and shearing  - Provide appropriate hygiene as needed including keeping skin clean and dry  - Evaluate need for skin moisturizer/barrier cream  - Collaborate with interdisciplinary team  - Patient/family teaching  - Consider wound care consult    Assess:  - Review Francisco Javier scale daily  - Clean and moisturize skin  - Inspect skin when repositioning, toileting, and assisting with ADLS  - Assess under medical devices  - Assess extremities for adequate circulation and sensation     Bed Management:  -  Have minimal linens on bed & keep smooth, unwrinkled  - Change linens as needed when moist or perspiring  - Avoid sitting or lying in one position for more than 2 hours while in bed?Keep HOB at 30 degrees   - Toileting:  - Offer bedside commode  - Assess for incontinence  - Use incontinent care products after each incontinent episode     Activity:  - Mobilize patient 2 times a day  - Encourage activity and walks on unit  - Encourage or provide ROM exercises   - Turn and reposition patient every 2 Hours  - Use appropriate equipment to lift or move patient in bed  - Instruct/ Assist with weight shifting every 2 when out of bed in chair  - Consider limitation of chair time 4 hour intervals    Skin Care:  - Avoid use of baby powder, tape, friction and shearing, hot water or constrictive clothing  - Relieve pressure over bony prominences  - Do not massage red bony areas    Next Steps:  - Teach patient strategies to minimize risks  - Consider consults to  interdisciplinary teams  7/14/2025 2352 by Rosanne Kemp RN  Outcome: Progressing  7/14/2025 2350 by Rosanne Kemp RN  Outcome: Progressing

## 2025-07-16 PROCEDURE — 99232 SBSQ HOSP IP/OBS MODERATE 35: CPT

## 2025-07-16 RX ADMIN — CARVEDILOL 6.25 MG: 6.25 TABLET, FILM COATED ORAL at 09:15

## 2025-07-16 RX ADMIN — SENNOSIDES AND DOCUSATE SODIUM 2 TABLET: 50; 8.6 TABLET ORAL at 09:16

## 2025-07-16 RX ADMIN — TAMSULOSIN HYDROCHLORIDE 0.4 MG: 0.4 CAPSULE ORAL at 17:36

## 2025-07-16 RX ADMIN — LOSARTAN POTASSIUM 100 MG: 50 TABLET, FILM COATED ORAL at 09:16

## 2025-07-16 RX ADMIN — POLYETHYLENE GLYCOL 3350 17 G: 17 POWDER, FOR SOLUTION ORAL at 09:15

## 2025-07-16 RX ADMIN — ATORVASTATIN CALCIUM 20 MG: 20 TABLET, FILM COATED ORAL at 17:36

## 2025-07-16 RX ADMIN — HEPARIN SODIUM 5000 UNITS: 5000 INJECTION INTRAVENOUS; SUBCUTANEOUS at 20:32

## 2025-07-16 RX ADMIN — CARVEDILOL 6.25 MG: 6.25 TABLET, FILM COATED ORAL at 17:36

## 2025-07-16 RX ADMIN — MAGNESIUM OXIDE TAB 400 MG (241.3 MG ELEMENTAL MG) 400 MG: 400 (241.3 MG) TAB at 09:16

## 2025-07-16 RX ADMIN — HEPARIN SODIUM 5000 UNITS: 5000 INJECTION INTRAVENOUS; SUBCUTANEOUS at 04:35

## 2025-07-16 RX ADMIN — Medication 6 MG: at 20:32

## 2025-07-16 RX ADMIN — AMLODIPINE BESYLATE 10 MG: 10 TABLET ORAL at 09:16

## 2025-07-16 RX ADMIN — MAGNESIUM OXIDE TAB 400 MG (241.3 MG ELEMENTAL MG) 400 MG: 400 (241.3 MG) TAB at 17:36

## 2025-07-16 RX ADMIN — CHLORHEXIDINE GLUCONATE 15 ML: 1.2 SOLUTION ORAL at 20:31

## 2025-07-16 RX ADMIN — HEPARIN SODIUM 5000 UNITS: 5000 INJECTION INTRAVENOUS; SUBCUTANEOUS at 15:00

## 2025-07-16 NOTE — RESTORATIVE TECHNICIAN NOTE
Restorative Technician Note      Patient Name: Shawn Marquez     Note Type: Mobility  Patient Position Upon Consult: Supine  Activity Performed: Transferred; Dangled; Stood; Range of motion  Assistive Device: Other (Comment) (Assist x2 to sit EOB/stand-pivot to the chair.)  Range of Motion: All extremities  Education Provided: Yes  Patient Position at End of Consult: Bedside chair; All needs within reach; Bed/Chair alarm activated    Jerod CHAN, Restorative Technician,

## 2025-07-16 NOTE — PLAN OF CARE
Problem: Neurological Deficit  Goal: Neurological status is stable or improving  Description: Interventions:  - Monitor and assess patient's level of consciousness, motor function, sensory function, and level of assistance needed for ADLs.   - Monitor and report changes from baseline. Collaborate with interdisciplinary team to initiate plan and implement interventions as ordered.   - Provide and maintain a safe environment.  - Consider seizure precautions.  - Consider fall precautions.  - Consider aspiration precautions.  - Consider bleeding precautions.  Outcome: Progressing     Problem: Activity Intolerance/Impaired Mobility  Goal: Mobility/activity is maintained at optimum level for patient  Description: Interventions:  - Assess and monitor patient  barriers to mobility and need for assistive/adaptive devices.  - Assess patient's emotional response to limitations.  - Collaborate with interdisciplinary team and initiate plans and interventions as ordered.  - Encourage independent activity per ability.  - Maintain proper body alignment.  - Perform active/passive rom as tolerated/ordered.  - Plan activities to conserve energy.  - Turn patient as appropriate  Outcome: Progressing     Problem: Communication Impairment  Goal: Ability to express needs and understand communication  Description: Assess patient's communication skills and ability to understand information.  Patient will demonstrate use of effective communication techniques, alternative methods of communication and understanding even if not able to speak.     - Encourage communication and provide alternate methods of communication as needed.  - Collaborate with case management/ for discharge needs.  - Include patient/family/caregiver in decisions related to communication.  Outcome: Progressing     Problem: Potential for Aspiration  Goal: Non-ventilated patient's risk of aspiration is minimized  Description: Assess and monitor vital signs,  respiratory status, and labs (WBC).  Monitor for signs of aspiration (tachypnea, cough, rales, wheezing, cyanosis, fever).    - Assess and monitor patient's ability to swallow.  - Place patient up in chair to eat if possible.  - HOB up at 90 degrees to eat if unable to get patient up into chair.  - Supervise patient during oral intake.   - Instruct patient/ family to take small bites.  - Instruct patient/ family to take small single sips when taking liquids.  - Follow patient-specific strategies generated by speech pathologist.  Outcome: Progressing     Problem: Nutrition  Goal: Nutrition/Hydration status is improving  Description: Monitor and assess patient's nutrition/hydration status for malnutrition (ex- brittle hair, bruises, dry skin, pale skin and conjunctiva, muscle wasting, smooth red tongue, and disorientation). Collaborate with interdisciplinary team and initiate plan and interventions as ordered.  Monitor patient's weight and dietary intake as ordered or per policy. Utilize nutrition screening tool and intervene per policy. Determine patient's food preferences and provide high-protein, high-caloric foods as appropriate.     - Assist patient with eating.  - Allow adequate time for meals.  - Encourage patient to take dietary supplement as ordered.  - Collaborate with clinical nutritionist.  - Include patient/family/caregiver in decisions related to nutrition.  Outcome: Progressing     Problem: PAIN - ADULT  Goal: Verbalizes/displays adequate comfort level or baseline comfort level  Description: Interventions:  - Encourage patient to monitor pain and request assistance  - Assess pain using appropriate pain scale  - Administer analgesics as ordered based on type and severity of pain and evaluate response  - Implement non-pharmacological measures as appropriate and evaluate response  - Consider cultural and social influences on pain and pain management  - Notify physician/advanced practitioner if  interventions unsuccessful or patient reports new pain  - Educate patient/family on pain management process including their role and importance of  reporting pain   - Provide non-pharmacologic/complimentary pain relief interventions  Outcome: Progressing     Problem: INFECTION - ADULT  Goal: Absence of fever/infection during neutropenic period  Description: INTERVENTIONS:  - Monitor WBC  - Perform strict hand hygiene  - Limit to healthy visitors only  - No plants, dried, fresh or silk flowers with pollock in patient room  Outcome: Progressing     Problem: SAFETY ADULT  Goal: Patient will remain free of falls  Description: INTERVENTIONS:  - Educate patient/family on patient safety including physical limitations  - Instruct patient to call for assistance with activity   - Consider consulting OT/PT to assist with strengthening/mobility based on AM PAC & JH-HLM score  - Consult OT/PT to assist with strengthening/mobility   - Keep Call bell within reach  - Keep bed low and locked with side rails adjusted as appropriate  - Keep care items and personal belongings within reach  - Initiate and maintain comfort rounds  - Make Fall Risk Sign visible to staff  - Offer Toileting every 2 Hours, in advance of need  - Initiate/Maintain bed/chair alarm  - Obtain necessary fall risk management equipment: call bell within the reach, bed/chair alarm, gripper socks on.  - Apply yellow socks and bracelet for high fall risk patients  - Consider moving patient to room near nurses station  Outcome: Progressing  Goal: Maintain or return to baseline ADL function  Description: INTERVENTIONS:  -  Assess patient's ability to carry out ADLs; assess patient's baseline for ADL function and identify physical deficits which impact ability to perform ADLs (bathing, care of mouth/teeth, toileting, grooming, dressing, etc.)  - Assess/evaluate cause of self-care deficits   - Assess range of motion  - Assess patient's mobility; develop plan if impaired  -  Assess patient's need for assistive devices and provide as appropriate  - Encourage maximum independence but intervene and supervise when necessary  - Involve family in performance of ADLs  - Assess for home care needs following discharge   - Consider OT consult to assist with ADL evaluation and planning for discharge  - Provide patient education as appropriate  - Monitor functional capacity and physical performance, use of AM PAC & JH-HLM   - Monitor gait, balance and fatigue with ambulation    Outcome: Progressing  Goal: Maintains/Returns to pre admission functional level  Description: INTERVENTIONS:  - Perform AM-PAC 6 Click Basic Mobility/ Daily Activity assessment daily.  - Set and communicate daily mobility goal to care team and patient/family/caregiver.   - Collaborate with rehabilitation services on mobility goals if consulted  - Perform Range of Motion 2 times a day.  - Reposition patient every 2 hours.  - Dangle patient 3 times a day  - Stand patient 3 times a day  - Ambulate patient 2 times a day  - Out of bed to chair 3 times a day   - Out of bed for meals 3 times a day  - Out of bed for toileting  - Record patient progress and toleration of activity level   Outcome: Progressing     Problem: DISCHARGE PLANNING  Goal: Discharge to home or other facility with appropriate resources  Description: INTERVENTIONS:  - Identify barriers to discharge w/patient and caregiver  - Arrange for needed discharge resources and transportation as appropriate  - Identify discharge learning needs (meds, wound care, etc.)  - Arrange for interpretive services to assist at discharge as needed  - Refer to Case Management Department for coordinating discharge planning if the patient needs post-hospital services based on physician/advanced practitioner order or complex needs related to functional status, cognitive ability, or social support system  Outcome: Progressing     Problem: Knowledge Deficit  Goal: Patient/family/caregiver  demonstrates understanding of disease process, treatment plan, medications, and discharge instructions  Description: Complete learning assessment and assess knowledge base.  Interventions:  - Provide teaching at level of understanding  - Provide teaching via preferred learning methods  Outcome: Progressing     Problem: Nutrition/Hydration-ADULT  Goal: Nutrient/Hydration intake appropriate for improving, restoring or maintaining nutritional needs  Description: Monitor and assess patient's nutrition/hydration status for malnutrition. Collaborate with interdisciplinary team and initiate plan and interventions as ordered.  Monitor patient's weight and dietary intake as ordered or per policy. Utilize nutrition screening tool and intervene as necessary. Determine patient's food preferences and provide high-protein, high-caloric foods as appropriate.     INTERVENTIONS:  - Monitor oral intake, urinary output, labs, and treatment plans  - Assess nutrition and hydration status and recommend course of action  - Evaluate amount of meals eaten  - Assist patient with eating if necessary   - Allow adequate time for meals  - Recommend/ encourage appropriate diets, oral nutritional supplements, and vitamin/mineral supplements  - Order, calculate, and assess calorie counts as needed  - Recommend, monitor, and adjust tube feedings and TPN/PPN based on assessed needs  - Assess need for intravenous fluids  - Provide specific nutrition/hydration education as appropriate  - Include patient/family/caregiver in decisions related to nutrition  Outcome: Progressing     Problem: Prexisting or High Potential for Compromised Skin Integrity  Goal: Skin integrity is maintained or improved  Description: INTERVENTIONS:  - Identify patients at risk for skin breakdown  - Assess and monitor skin integrity including under and around medical devices   - Assess and monitor nutrition and hydration status  - Monitor labs  - Assess for incontinence   - Turn  and reposition patient  - Assist with mobility/ambulation  - Relieve pressure over pretty prominences   - Avoid friction and shearing  - Provide appropriate hygiene as needed including keeping skin clean and dry  - Evaluate need for skin moisturizer/barrier cream  - Collaborate with interdisciplinary team  - Patient/family teaching  - Consider wound care consult    Assess:  - Review Francisco Javier scale daily  - Clean and moisturize skin  - Inspect skin when repositioning, toileting, and assisting with ADLS  - Assess under medical devices  - Assess extremities for adequate circulation and sensation     Bed Management:  - Have minimal linens on bed & keep smooth, unwrinkled  - Change linens as needed when moist or perspiring  - Avoid sitting or lying in one position for more than 2 hours while in bed?Keep HOB at 30 degrees   - Toileting:  - Offer bedside commode  - Assess for incontinence  - Use incontinent care products after each incontinent episode     Activity:  - Mobilize patient 2 times a day  - Encourage activity and walks on unit  - Encourage or provide ROM exercises   - Turn and reposition patient every 2 Hours  - Use appropriate equipment to lift or move patient in bed  - Instruct/ Assist with weight shifting every 2 when out of bed in chair  - Consider limitation of chair time 4 hour intervals    Skin Care:  - Avoid use of baby powder, tape, friction and shearing, hot water or constrictive clothing  - Relieve pressure over bony prominences  - Do not massage red bony areas    Next Steps:  - Teach patient strategies to minimize risks  - Consider consults to  interdisciplinary teams  Outcome: Progressing     Problem: Neurological Deficit  Goal: Neurological status is stable or improving  Description: Interventions:  - Monitor and assess patient's level of consciousness, motor function, sensory function, and level of assistance needed for ADLs.   - Monitor and report changes from baseline. Collaborate with  interdisciplinary team to initiate plan and implement interventions as ordered.   - Provide and maintain a safe environment.  - Consider seizure precautions.  - Consider fall precautions.  - Consider aspiration precautions.  - Consider bleeding precautions.  Outcome: Progressing

## 2025-07-16 NOTE — DISCHARGE SUPPORT
Rec'd request from  to send clincials for appeal that was filed for acute rehab denial. Case#  [id number]. As request, clinicals were sent from last 3 days including PMR to F#  [phone].    JAY aware: Martina PERALES

## 2025-07-16 NOTE — PROGRESS NOTES
"Progress Note - Hospitalist   Name: Shawn Marquez 75 y.o. male I MRN: [id number]  Unit/Bed#: [facility] 734-01 I Date of Admission: 7/7/2025   Date of Service: 7/16/2025 I Hospital Day: 9    Assessment & Plan  ICH (intracerebral hemorrhage) ([facility])  History of A-fib on Xarelto presented as stroke alert after being found by family on the floor with left facial and left-sided weakness.  AC/AP reversed with Kcentra and DDAVP with CTA H/N notable for Large acute intraparenchymal hemorrhage centered involving the posterior right basal ganglia, adjacent right thalamus, and posterior right insula, with a volume of approximately 23 mL and subsequent MRI brain showing Acute to early subacute lacunar infarct in the right hemipons and Stable hemorrhage and surrounding vasogenic edema in the right corona radiata and temporal white matter.  Evaluated by neurosurgery with no interventions indicated  Evaluated by neurology and recommended to hold aspirin and resume AC on 8/4  Patient recommended for acute rehab.  Case management following  Stable pending placement.  According to case management patient going through expedited appeal and needing to give updated PMNR notes otherwise medically stable for discharge.  Essential hypertension  Blood pressure currently stable.  He is off Cardene drip  Continue amlodipine 10 mg daily, carvedilol 6.25 mg twice daily and losartan 100 mg daily  DM II (diabetes mellitus, type II), controlled ([facility])  Lab Results   Component Value Date    HGBA1C 6.7 (H) 07/08/2025       No results for input(s): \"POCGLU\" in the last 72 hours.    Blood Sugar Average: Last 72 hrs:      History of DVT (deep vein thrombosis)  AC on hold as above  HLD (hyperlipidemia)  Continue atorvastatin  Atrial fibrillation (HCC)  Continue beta-blocker.  Hold AC as above    VTE Pharmacologic Prophylaxis:   Moderate Risk (Score 3-4) -heparin  Mobility:   Basic Mobility Inpatient Raw Score: 9  -[facility] Goal: 3: Sit at edge of bed  -[facility] " Achieved: 4: Move to chair/commode  JH-HLM Goal NOT achieved. Continue with multidisciplinary rounding and encourage appropriate mobility to improve upon JH-HLM goals.    Patient Centered Rounds: I performed bedside rounds with nursing staff today.   Discussions with Specialists or Other Care Team Provider: Case management    Education and Discussions with Family / Patient: Family at bedside.     Current Length of Stay: 9 day(s)  Current Patient Status: Inpatient   Certification Statement: The patient will continue to require additional inpatient hospital stay due to placement  Discharge Plan: Anticipate discharge in 24-48 hrs to rehab facility.    Code Status: Level 1 - Full Code    Subjective   Seen and examined.  Patient is doing well no subjective complaints and is awaiting rehab.    Objective :  Temp:  [98 °F (36.7 °C)-98.4 °F (36.9 °C)] 98 °F (36.7 °C)  HR:  [64-77] 77  BP: (124-126)/(81-87) 124/87  Resp:  [14-18] 18  SpO2:  [96 %] 96 %  O2 Device: None (Room air)    Body mass index is 27.72 kg/m².     Input and Output Summary (last 24 hours):     Intake/Output Summary (Last 24 hours) at 7/16/2025 1404  Last data filed at 7/16/2025 0401  Gross per 24 hour   Intake --   Output 500 ml   Net -500 ml       Physical Exam  Constitutional:       Appearance: He is ill-appearing.   HENT:      Nose: No congestion.   Pulmonary:      Effort: No respiratory distress.   Abdominal:      General: There is no distension.     Skin:     Findings: No lesion.     Neurological:      Comments: Left-sided weakness         Lines/Drains:              Lab Results: I have reviewed the following results:   Results from last 7 days   Lab Units 07/11/25  0438 07/10/25  0550   WBC Thousand/uL 11.82* 10.69*   HEMOGLOBIN g/dL 14.3 14.7   HEMATOCRIT % 41.1 42.9   PLATELETS Thousands/uL 164 151   SEGS PCT %  --  68   LYMPHO PCT %  --  18   MONO PCT %  --  11   EOS PCT %  --  2     Results from last 7 days   Lab Units 07/11/25  0438   SODIUM  mmol/L 137   POTASSIUM mmol/L 4.0   CHLORIDE mmol/L 102   CO2 mmol/L 27   BUN mg/dL 16   CREATININE mg/dL 0.64   ANION GAP mmol/L 8   CALCIUM mg/dL 8.2*   GLUCOSE RANDOM mg/dL 142*                     Results from last 7 days   Lab Units 07/10/25  0550   PROCALCITONIN ng/ml <0.05       Recent Cultures (last 7 days):         Imaging Results Review: I reviewed radiology reports from this admission including: MRI brain.  Other Study Results Review: No additional pertinent studies reviewed.    Last 24 Hours Medication List:     Current Facility-Administered Medications:     acetaminophen (TYLENOL) tablet 650 mg, Q6H PRN    amLODIPine (NORVASC) tablet 10 mg, Daily    atorvastatin (LIPITOR) tablet 20 mg, Daily With Dinner    bisacodyl (DULCOLAX) rectal suppository 10 mg, Daily PRN    carvedilol (COREG) tablet 6.25 mg, BID With Meals    chlorhexidine (PERIDEX) 0.12 % oral rinse 15 mL, Q12H DEIDRE    [Transfer Hold] Gadobutrol injection (SINGLE-DOSE) SOLN 9 mL, Once in imaging    heparin (porcine) subcutaneous injection 5,000 Units, Q8H DEIDRE    hydrALAZINE (APRESOLINE) injection 10 mg, Q4H PRN    losartan (COZAAR) tablet 100 mg, Daily    magnesium Oxide (MAG-OX) tablet 400 mg, BID    melatonin tablet 6 mg, HS    ondansetron (ZOFRAN) injection 4 mg, Q6H PRN    polyethylene glycol (MIRALAX) packet 17 g, Daily    senna-docusate sodium (SENOKOT S) 8.6-50 mg per tablet 2 tablet, BID    tamsulosin (FLOMAX) capsule 0.4 mg, Daily With Dinner    Administrative Statements   Today, Patient Was Seen By: Marco Santamaria MD      **Please Note: This note may have been constructed using a voice recognition system.**

## 2025-07-16 NOTE — PLAN OF CARE
Problem: Neurological Deficit  Goal: Neurological status is stable or improving  Description: Interventions:  - Monitor and assess patient's level of consciousness, motor function, sensory function, and level of assistance needed for ADLs.   - Monitor and report changes from baseline. Collaborate with interdisciplinary team to initiate plan and implement interventions as ordered.   - Provide and maintain a safe environment.  - Consider seizure precautions.  - Consider fall precautions.  - Consider aspiration precautions.  - Consider bleeding precautions.  Outcome: Progressing     Problem: Activity Intolerance/Impaired Mobility  Goal: Mobility/activity is maintained at optimum level for patient  Description: Interventions:  - Assess and monitor patient  barriers to mobility and need for assistive/adaptive devices.  - Assess patient's emotional response to limitations.  - Collaborate with interdisciplinary team and initiate plans and interventions as ordered.  - Encourage independent activity per ability.  - Maintain proper body alignment.  - Perform active/passive rom as tolerated/ordered.  - Plan activities to conserve energy.  - Turn patient as appropriate  Outcome: Progressing     Problem: Potential for Aspiration  Goal: Non-ventilated patient's risk of aspiration is minimized  Description: Assess and monitor vital signs, respiratory status, and labs (WBC).  Monitor for signs of aspiration (tachypnea, cough, rales, wheezing, cyanosis, fever).    - Assess and monitor patient's ability to swallow.  - Place patient up in chair to eat if possible.  - HOB up at 90 degrees to eat if unable to get patient up into chair.  - Supervise patient during oral intake.   - Instruct patient/ family to take small bites.  - Instruct patient/ family to take small single sips when taking liquids.  - Follow patient-specific strategies generated by speech pathologist.  Outcome: Progressing     Problem: Communication Impairment  Goal:  Ability to express needs and understand communication  Description: Assess patient's communication skills and ability to understand information.  Patient will demonstrate use of effective communication techniques, alternative methods of communication and understanding even if not able to speak.     - Encourage communication and provide alternate methods of communication as needed.  - Collaborate with case management/ for discharge needs.  - Include patient/family/caregiver in decisions related to communication.  Outcome: Progressing

## 2025-07-16 NOTE — ASSESSMENT & PLAN NOTE
History of A-fib on Xarelto presented as stroke alert after being found by family on the floor with left facial and left-sided weakness.  AC/AP reversed with Kcentra and DDAVP with CTA H/N notable for Large acute intraparenchymal hemorrhage centered involving the posterior right basal ganglia, adjacent right thalamus, and posterior right insula, with a volume of approximately 23 mL and subsequent MRI brain showing Acute to early subacute lacunar infarct in the right hemipons and Stable hemorrhage and surrounding vasogenic edema in the right corona radiata and temporal white matter.  Evaluated by neurosurgery with no interventions indicated  Evaluated by neurology and recommended to hold aspirin and resume AC on 8/4  Patient recommended for acute rehab.  Case management following  Stable pending placement.  According to case management patient going through expedited appeal and needing to give updated PMNR notes otherwise medically stable for discharge.

## 2025-07-16 NOTE — CASE MANAGEMENT
Case Management Progress Note    Patient name Shawn Marquez  Location [facility]-01 MRN [id number]  : 1950 Date 2025       LOS (days): 9  Geometric Mean LOS (GMLOS) (days): 4.5  Days to GMLOS:-4.2        OBJECTIVE:        Current admission status: Inpatient  Preferred Pharmacy:   [id number] - [place] - [address]  [address] [place] [address]  Phone: [phone] Fax: [phone]    [facility] - [facility] [address]  [address] [address]  Phone: [phone] Fax: [phone]    Primary Care Provider: SAM Baer    Primary Insurance: Auth0  Secondary Insurance:     PROGRESS NOTE:    CM spoke with pt and spouse bedside and provided an update on appeal. CM will place STR in [place], also and updated PMR was requested. CM will fax off updated notes for appeal. CM to follow up

## 2025-07-17 ENCOUNTER — DOCUMENTATION (OUTPATIENT)
Dept: NEUROLOGY | Facility: CLINIC | Age: 75
End: 2025-07-17

## 2025-07-17 PROBLEM — I63.50 RIGHT PONTINE STROKE (HCC): Status: ACTIVE | Noted: 2025-07-17

## 2025-07-17 PROCEDURE — 99232 SBSQ HOSP IP/OBS MODERATE 35: CPT

## 2025-07-17 PROCEDURE — 99233 SBSQ HOSP IP/OBS HIGH 50: CPT

## 2025-07-17 RX ADMIN — CHLORHEXIDINE GLUCONATE 15 ML: 1.2 SOLUTION ORAL at 08:22

## 2025-07-17 RX ADMIN — HEPARIN SODIUM 5000 UNITS: 5000 INJECTION INTRAVENOUS; SUBCUTANEOUS at 05:04

## 2025-07-17 RX ADMIN — SENNOSIDES AND DOCUSATE SODIUM 2 TABLET: 50; 8.6 TABLET ORAL at 17:10

## 2025-07-17 RX ADMIN — HEPARIN SODIUM 5000 UNITS: 5000 INJECTION INTRAVENOUS; SUBCUTANEOUS at 13:52

## 2025-07-17 RX ADMIN — LOSARTAN POTASSIUM 100 MG: 50 TABLET, FILM COATED ORAL at 08:22

## 2025-07-17 RX ADMIN — AMLODIPINE BESYLATE 10 MG: 10 TABLET ORAL at 08:22

## 2025-07-17 RX ADMIN — CARVEDILOL 6.25 MG: 6.25 TABLET, FILM COATED ORAL at 17:10

## 2025-07-17 RX ADMIN — MAGNESIUM OXIDE TAB 400 MG (241.3 MG ELEMENTAL MG) 400 MG: 400 (241.3 MG) TAB at 08:22

## 2025-07-17 RX ADMIN — TAMSULOSIN HYDROCHLORIDE 0.4 MG: 0.4 CAPSULE ORAL at 17:10

## 2025-07-17 RX ADMIN — HEPARIN SODIUM 5000 UNITS: 5000 INJECTION INTRAVENOUS; SUBCUTANEOUS at 21:25

## 2025-07-17 RX ADMIN — Medication 6 MG: at 21:25

## 2025-07-17 RX ADMIN — ATORVASTATIN CALCIUM 20 MG: 20 TABLET, FILM COATED ORAL at 17:10

## 2025-07-17 RX ADMIN — POLYETHYLENE GLYCOL 3350 17 G: 17 POWDER, FOR SOLUTION ORAL at 08:22

## 2025-07-17 RX ADMIN — CARVEDILOL 6.25 MG: 6.25 TABLET, FILM COATED ORAL at 08:22

## 2025-07-17 RX ADMIN — MAGNESIUM OXIDE TAB 400 MG (241.3 MG ELEMENTAL MG) 400 MG: 400 (241.3 MG) TAB at 17:10

## 2025-07-17 NOTE — ASSESSMENT & PLAN NOTE
"MRI brain showed R pontine lacunar ischemic stroke   Per neuro \"Yesterday's MRI brain showed the expected sequleae of his R basal ganglia ICH as well as an upper jude ischemic stroke, likely  related.  Ischemic strokes can be seen in up to 30% of patients with ICH in the acute phase, and hypothetically could be due to BP lowering in the acute ICH phase iso of subclinical small vessel disease throughout the brain (that can lead to ICH and  ischemic strokes). DOAC and on ASA at the time of the ICH. We do not know the best secondary prevention strategy in ICH patients who also have AF (Aspire trial results are pending) but it is safe to say that he should likely not be on both AC and Aspirin (unless cardiac disease requiring ASA). Would stop ASA and restart AC 4 weeks from the time of the index ICH (). \"  - PT, OT, fall precautions  - Optimal nutrition hydration  - Statin  - Monitor neuro exam closely    "

## 2025-07-17 NOTE — PROGRESS NOTES
Met patient at bedside. Introduced myself and my role. Gave out neurology stroke packet information. Patient left comfortably with call bell by side.

## 2025-07-17 NOTE — ASSESSMENT & PLAN NOTE
History of A-fib on Xarelto presented as stroke alert after being found by family on the floor with left facial and left-sided weakness.  AC/AP reversed with Kcentra and DDAVP with CTA H/N notable for Large acute intraparenchymal hemorrhage centered involving the posterior right basal ganglia, adjacent right thalamus, and posterior right insula, with a volume of approximately 23 mL and subsequent MRI brain showing Acute to early subacute lacunar infarct in the right hemipons and Stable hemorrhage and surrounding vasogenic edema in the right corona radiata and temporal white matter.  Evaluated by neurosurgery with no interventions indicated  Evaluated by neurology and recommended to hold aspirin and resume AC on 8/4  Patient recommended for acute rehab.  Case management following  Stable pending placement.  CM is working on placement

## 2025-07-17 NOTE — ASSESSMENT & PLAN NOTE
- Overall mgmt currently by primary team   - Consult would care for any wounds or significant concerns for skin integrity   - Increased risk of skin wounds/breakdown/rashes due to recent immobility and co-morbidities   - Turn patient Q2H in bed (use pillows or wedges), encourage wt shifts every 10-15 minutes if/when in chair/WC  - Patient and if pertinent caregiver education   - Hydragaurd (or other appropriate barrier cream) to buttocks and sacrum BID & PRN   - Allevyn foam to heels and/or float heels when in bed   - Optimal bowel/bladder hygiene; keep skin clean and dry   - EHOB waffle cushion to chair/WC when OOB  - Rec nursing to document in chart and Notify MD if buttock, sacrum, heel, or other skin site develops erythema, skin breakdown, or rashes as soon as possible.  If patient is soiling themselves with urine or stool notify MD.  If you are unable to maintain skin integrity and prevent erythema due to frequency of soiling notify MD as soon as possible as well

## 2025-07-17 NOTE — CASE MANAGEMENT
Case Management Progress Note    Patient name Shawn Marquez  Location [facility] 734/[facility] 734-01 MRN [id number]  : 1950 Date 2025       LOS (days): 10  Geometric Mean LOS (GMLOS) (days): 4.5  Days to GMLOS:-5.3        OBJECTIVE:        Current admission status: Inpatient  Preferred Pharmacy:   [id number]  [place] - [address]  [address] [place] [address]  Phone: [phone] Fax: [phone]    [facility] - [facility] [address]  [address] [address]  Phone: [phone] Fax: [phone]    Primary Care Provider: SAM Baer    Primary Insurance: [facility]  Secondary Insurance:     PROGRESS NOTE:  CM completed assignment of representative for CM to complete expedited appeal. CM faxed back to [facility]

## 2025-07-17 NOTE — ASSESSMENT & PLAN NOTE
HPI: He initially presented to the ED on  with a left-sided facial droop and left-sided weakness, NIHSS of 14. CT scan demonstrated large acute intraparenchymal hemorrhage centered involving the posterior right basal ganglia, adjacent right thalamus, and posterior right insula. Seizure-like activity reported in CT scanner, which resolved spontaneously. Loaded on Keppra, ASA and Xarelto reversed with DDAVP and Kcentra. Patient was subsequently transferred to [facility] ICU for further monitoring. Neurosurgery consulted. Repeat CTHs stable. Patient vomited on , repeat CTH stable and CT CAP with possible sequela of aspiration, mildly overdistended bladder, suggestive of chronic pancreatitis.     Neuroimagin/10 MRI Brain with and without contrast:   Acute to early subacute lacunar infarct in the right hemipons.  Stable hemorrhage and surrounding vasogenic edema in the right corona radiata and temporal white matter. No evidence of underlying solid lesion.   repeat CTH: Stable acute intraparenchymal hemorrhage centered within the posterior aspect of the right basal ganglia with mild surrounding edema and localized mass effect    repeat CTH: Stable appearance of right basal ganglia intraparenchymal hemorrhage   repeat CTH: Stable right basal ganglia hemorrhage and surrounding vasogenic edema. Stable mild mass effect on the right lateral ventricle and minimal 1 mm leftward midline shift. Re-demonstrated age-indeterminate hypodensity within the right hemipons compared to prior day exam however not noted on more remote exam from 2024. Differential includes age-indeterminate infarct. Correlate with MRI of the brain.   repeat CTH: Stable size of right basal ganglia hemorrhage measuring approximately 4.9 x 2.3 cm with slight interval increase in surrounding vasogenic edema. Mild mass effect on the right lateral ventricle without midline shift.    CTA head/neck: No large vessel occlusion,  high-grade stenosis, or intracranial aneurysm identified on CT angiogram of the head. No hemodynamically significant stenosis or dissection identified on CT angiogram of the neck. Intraparenchymal hematoma centered in the right posterior basal ganglia/posterior limb of the right internal capsule and adjacent right thalamus, without active or extravasation of contrast or spot sign noted.  7/7 CTH: Large acute intraparenchymal hemorrhage centered involving the posterior right basal ganglia, adjacent right thalamus, and posterior right insula, with a volume of approximately 23 mL. No midline shift. Mild chronic white matter microangiopathic changes.    PMR recommendations:   - Multifactorial comorbidities: type 2 diabetes mellitus, history of recurrent DVTs, hypertension, persistent afib, and mood disorder  - Multifactorial functional diagnoses: Hemiparesis , Cognitive impairment , Incoordination , Urinary incontinence, Fatigue , and Hemispatial neglect  - Continue and advance PT/OT focusing on mobility, balance, transfers, ADLs, and safety awareness  - Continue Speech-Language Pathology (SLP) for cognitive-linguistic rehab  - Optimize nutrition, hydration, and electrolytes with frequent monitoring  - Monitor vitals at rest and with activity for orthostatic intolerance, autonomic instability, and deconditioning  - Adjust medication regimen as indicated  for spasticity, seizures, agitation, mood, pain, bowel/bladder dysfunction, and sleep-wake disturbances  - Frequent exams/assessments for neurologic stability, neurologic recovery, functional tolerance, and therapy progress, with additional diagnostic work-up, imaging, and management adjustments as clinically indicated.  - Monitor for delirium, neurocognitive fluctuations, and optimize sleep-wake cycle  - Overstimulation precautions and if confused provide frequent re-orientation, re-direction, and reassurance  - If impaired sleep or behavior (agitation, etc), recommend  sleep-behavioral log and monitoring  - Limit sedating medications when possible  - Fall precautions - if safety concerns recommend increasing rounding, adding virtual sitter, or in-person 1:1 at discretion of primary team  - Provide patient and (if applicable) caregiver education for medical conditions, equipment, medications, and care needs  - Interdisciplinary team coordination and frequent team meetings as indicated  - Pressure ulcer prevention protocol, DVT prophylaxis per primary team  - Bowel/bladder management program    Prior Level of Function and Social history:    Independent with ADLs  Independent with ambulation  Lives wit wife (retired), wide steps to enter home.     Current Level of Function:    ADLs Total assist LBD, Max assist UBD, LBB; mod assist UBB  Transfers mod-max assist x2  Bed mobility max assist x1  Recommended Diet: soft/level 3 diet and thin liquids     Disposition recommendation:  IRF (Inpatient rehab facility)  Patient is good candidate for transfer to IRF pending:    - Medical clearance/stability  - Facility acceptance, insurance authorization, and bed availability  - Significant changes (worsening or significant improvements) in functional status, in the interim, that would warrant dispo to different location  - If you have questions or want to discuss the case directly, please do not hesitate to reach out to us via [employer] Secure Chat @ [facility]     Medical necessity statement for IRF admission:  Patient requires IRF admission due to new functional deficits needing >=2 therapy disciplines, 3 hours/day therapy, 24-hr rehab nursing, and rehab physician oversight >=3x/week for complex medical management, complication prevention, and coordinated interdisciplinary care.    Visuospatial neglect:    OT - visual scanning training toward neglected side, use visual, auditory, and tactile cues to increase awareness toward affected side, apply anchoring techniques, train functional  tasks for grooming, hygiene, dressing, and meal setup, use mirror therapy or limb activity when appropriate, integrate dual task activities to challenge spatial attention   PT - train for safe mobility, use verbal and tactile prompts during gait and balance tasks, practice obstacle avoidance and navigation into neglected space, use visual flow cues to improve scanning when ambulating  RN - set-up room to encourage attention to neglected side but be sure call bell is in position that can be readily reached.  Reinforce scanning before tasks especially during meals and hygiene    Hemiparesis:   Recommend multipodus boot for LLE   Shoulder subluxation precautions on affected side to prevent acute pain, chronic pain and loss of function: ensure arm is supported properly in bed, chair, wheelchair, and during transfers/ambulation.  Avoid affected arm excessively hanging down at side, over-extending or externally rotating. Provide pillows or lap tray support if needed.  Consider sling when mobilizing.  Gentle ROM and stretching exercises   Intensive PT, OT, fall precautions, PMR physician oversight     Cognitive impairment, fatigue:  Recommend intensive PT, OT, ST  Recommend MOCA cog screen in rehab   Recommend eval of med mgmt and IADLs   Monitor neuro-exam, wakefulness, mood, cognition, insight into deficits and safety awareness  Recommend patient (+/- family/caregiver education if applicable) and training as well as compensatory strategies to optimize safety, function, and decrease risk of complications  Ensure optimal assistance/supervision after d/c   Optimize sleep-wake cycle  Limit sedating medications when possible  Monitor and ensure optimal management electrolytes, nutrition, and hydration  Monitor for signs or symptoms of infection, medication intolerances, other systemic etiologies

## 2025-07-17 NOTE — ASSESSMENT & PLAN NOTE
"Lab Results   Component Value Date    HGBA1C 6.7 (H) 07/08/2025       No results for input(s): \"POCGLU\" in the last 72 hours.      Blood Sugar Average: Last 72 hrs:  - Current management by internal medicine  - Monitor for signs and symptoms of hypoglycemia   - Current meds: per primary  - Consistent carb diabetic diet  - Recommend close monitoring and optimal management during recovery/rehab phase as well       "

## 2025-07-17 NOTE — ASSESSMENT & PLAN NOTE
Seizure-like activity reported in CT scanner, which resolved spontaneously  Continue keppra d/c'd  Monitor for seizure like activity

## 2025-07-17 NOTE — PROGRESS NOTES
Bishnu Andres, can we re-order Eva's allergy shots please? Thank you! Progress Note - PMR   Name: Shawn Marquez 75 y.o. male I MRN: [id number]  [facility] I Date of Admission: 2025   Date of Service: 2025 I Hospital Day: 10     Assessment & Plan  ICH (intracerebral hemorrhage) (HCC)  HPI: He initially presented to the ED on  with a left-sided facial droop and left-sided weakness, NIHSS of 14. CT scan demonstrated large acute intraparenchymal hemorrhage centered involving the posterior right basal ganglia, adjacent right thalamus, and posterior right insula. Seizure-like activity reported in CT scanner, which resolved spontaneously. Loaded on Keppra, ASA and Xarelto reversed with DDAVP and Kcentra. Patient was subsequently transferred to [facility] ICU for further monitoring. Neurosurgery consulted. Repeat CTHs stable. Patient vomited on , repeat CTH stable and CT CAP with possible sequela of aspiration, mildly overdistended bladder, suggestive of chronic pancreatitis.     Neuroimagin/10 MRI Brain with and without contrast:   Acute to early subacute lacunar infarct in the right hemipons.  Stable hemorrhage and surrounding vasogenic edema in the right corona radiata and temporal white matter. No evidence of underlying solid lesion.   repeat CTH: Stable acute intraparenchymal hemorrhage centered within the posterior aspect of the right basal ganglia with mild surrounding edema and localized mass effect    repeat CTH: Stable appearance of right basal ganglia intraparenchymal hemorrhage   repeat CTH: Stable right basal ganglia hemorrhage and surrounding vasogenic edema. Stable mild mass effect on the right lateral ventricle and minimal 1 mm leftward midline shift. Re-demonstrated age-indeterminate hypodensity within the right hemipons compared to prior day exam however not noted on more remote exam from 2024. Differential includes age-indeterminate infarct. Correlate with MRI of the brain.   repeat CTH: Stable size of right  basal ganglia hemorrhage measuring approximately 4.9 x 2.3 cm with slight interval increase in surrounding vasogenic edema. Mild mass effect on the right lateral ventricle without midline shift.   7/7 CTA head/neck: No large vessel occlusion, high-grade stenosis, or intracranial aneurysm identified on CT angiogram of the head. No hemodynamically significant stenosis or dissection identified on CT angiogram of the neck. Intraparenchymal hematoma centered in the right posterior basal ganglia/posterior limb of the right internal capsule and adjacent right thalamus, without active or extravasation of contrast or spot sign noted.  7/7 CTH: Large acute intraparenchymal hemorrhage centered involving the posterior right basal ganglia, adjacent right thalamus, and posterior right insula, with a volume of approximately 23 mL. No midline shift. Mild chronic white matter microangiopathic changes.    PMR recommendations:   - Multifactorial comorbidities: type 2 diabetes mellitus, history of recurrent DVTs, hypertension, persistent afib, and mood disorder  - Multifactorial functional diagnoses: Hemiparesis , Cognitive impairment , Incoordination , Urinary incontinence, Fatigue , and Hemispatial neglect  - Continue and advance PT/OT focusing on mobility, balance, transfers, ADLs, and safety awareness  - Continue Speech-Language Pathology (SLP) for cognitive-linguistic rehab  - Optimize nutrition, hydration, and electrolytes with frequent monitoring  - Monitor vitals at rest and with activity for orthostatic intolerance, autonomic instability, and deconditioning  - Adjust medication regimen as indicated  for spasticity, seizures, agitation, mood, pain, bowel/bladder dysfunction, and sleep-wake disturbances  - Frequent exams/assessments for neurologic stability, neurologic recovery, functional tolerance, and therapy progress, with additional diagnostic work-up, imaging, and management adjustments as clinically indicated.  - Monitor  for delirium, neurocognitive fluctuations, and optimize sleep-wake cycle  - Overstimulation precautions and if confused provide frequent re-orientation, re-direction, and reassurance  - If impaired sleep or behavior (agitation, etc), recommend sleep-behavioral log and monitoring  - Limit sedating medications when possible  - Fall precautions - if safety concerns recommend increasing rounding, adding virtual sitter, or in-person 1:1 at discretion of primary team  - Provide patient and (if applicable) caregiver education for medical conditions, equipment, medications, and care needs  - Interdisciplinary team coordination and frequent team meetings as indicated  - Pressure ulcer prevention protocol, DVT prophylaxis per primary team  - Bowel/bladder management program    Prior Level of Function and Social history:    Independent with ADLs  Independent with ambulation  Lives wit wife (retired), wide steps to enter home.     Current Level of Function:    ADLs Total assist LBD, Max assist UBD, LBB; mod assist UBB  Transfers mod-max assist x2  Bed mobility max assist x1  Recommended Diet: soft/level 3 diet and thin liquids     Disposition recommendation:  IRF (Inpatient rehab facility)  Patient is good candidate for transfer to IRF pending:    - Medical clearance/stability  - Facility acceptance, insurance authorization, and bed availability  - Significant changes (worsening or significant improvements) in functional status, in the interim, that would warrant dispo to different location  - If you have questions or want to discuss the case directly, please do not hesitate to reach out to us via [facility] Secure Chat @ [facility]     Medical necessity statement for IRF admission:  Patient requires IRF admission due to new functional deficits needing >=2 therapy disciplines, 3 hours/day therapy, 24-hr rehab nursing, and rehab physician oversight >=3x/week for complex medical management, complication prevention, and  coordinated interdisciplinary care.    Visuospatial neglect:    OT - visual scanning training toward neglected side, use visual, auditory, and tactile cues to increase awareness toward affected side, apply anchoring techniques, train functional tasks for grooming, hygiene, dressing, and meal setup, use mirror therapy or limb activity when appropriate, integrate dual task activities to challenge spatial attention   PT - train for safe mobility, use verbal and tactile prompts during gait and balance tasks, practice obstacle avoidance and navigation into neglected space, use visual flow cues to improve scanning when ambulating  RN - set-up room to encourage attention to neglected side but be sure call bell is in position that can be readily reached.  Reinforce scanning before tasks especially during meals and hygiene    Hemiparesis:   Recommend multipodus boot for LLE   Shoulder subluxation precautions on affected side to prevent acute pain, chronic pain and loss of function: ensure arm is supported properly in bed, chair, wheelchair, and during transfers/ambulation.  Avoid affected arm excessively hanging down at side, over-extending or externally rotating. Provide pillows or lap tray support if needed.  Consider sling when mobilizing.  Gentle ROM and stretching exercises   Intensive PT, OT, fall precautions, PMR physician oversight     Cognitive impairment, fatigue:  Recommend intensive PT, OT, ST  Recommend MOCA cog screen in rehab   Recommend eval of med mgmt and IADLs   Monitor neuro-exam, wakefulness, mood, cognition, insight into deficits and safety awareness  Recommend patient (+/- family/caregiver education if applicable) and training as well as compensatory strategies to optimize safety, function, and decrease risk of complications  Ensure optimal assistance/supervision after d/c   Optimize sleep-wake cycle  Limit sedating medications when possible  Monitor and ensure optimal management electrolytes,  "nutrition, and hydration  Monitor for signs or symptoms of infection, medication intolerances, other systemic etiologies      Right pontine stroke (HCC)  MRI brain showed R pontine lacunar ischemic stroke   Per neuro \"Yesterday's MRI brain showed the expected sequleae of his R basal ganglia ICH as well as an upper jude ischemic stroke, likely  related.  Ischemic strokes can be seen in up to 30% of patients with ICH in the acute phase, and hypothetically could be due to BP lowering in the acute ICH phase iso of subclinical small vessel disease throughout the brain (that can lead to ICH and  ischemic strokes). DOAC and on ASA at the time of the ICH. We do not know the best secondary prevention strategy in ICH patients who also have AF (Aspire trial results are pending) but it is safe to say that he should likely not be on both AC and Aspirin (unless cardiac disease requiring ASA). Would stop ASA and restart AC 4 weeks from the time of the index ICH (). \"  - PT, OT, fall precautions  - Optimal nutrition hydration  - Statin  - Monitor neuro exam closely    DM II (diabetes mellitus, type II), controlled (HCC)  Lab Results   Component Value Date    HGBA1C 6.7 (H) 2025       No results for input(s): \"POCGLU\" in the last 72 hours.      Blood Sugar Average: Last 72 hrs:  - Current management by internal medicine  - Monitor for signs and symptoms of hypoglycemia   - Current meds: per primary  - Consistent carb diabetic diet  - Recommend close monitoring and optimal management during recovery/rehab phase as well       Seizure-like activity (HCC)  Seizure-like activity reported in CT scanner, which resolved spontaneously  Continue keppra d/c'd  Monitor for seizure like activity   Encounter for skin care  - Overall mgmt currently by primary team   - Consult would care for any wounds or significant concerns for skin integrity   - Increased risk of skin wounds/breakdown/rashes due to recent immobility and " co-morbidities   - Turn patient Q2H in bed (use pillows or wedges), encourage wt shifts every 10-15 minutes if/when in chair/WC  - Patient and if pertinent caregiver education   - Hydragaurd (or other appropriate barrier cream) to buttocks and sacrum BID & PRN   - Allevyn foam to heels and/or float heels when in bed   - Optimal bowel/bladder hygiene; keep skin clean and dry   - EHOB waffle cushion to chair/WC when OOB  - Rec nursing to document in chart and Notify MD if buttock, sacrum, heel, or other skin site develops erythema, skin breakdown, or rashes as soon as possible.  If patient is soiling themselves with urine or stool notify MD.  If you are unable to maintain skin integrity and prevent erythema due to frequency of soiling notify MD as soon as possible as well  Essential hypertension  - Current management at discretion of primary team   - Ensure optimal management during rehab process  - Monitor vitals with and without activity; monitor for orthostasis  - Monitor hemoglobin, electrolytes, kidney function, hydration status   - Current meds: per other providers      Subjective   On eval, patient reports ongoing L sided weakness and fatigue.  He denies significant pain, SOB, lightheadedness, or other new complaints.     Chief Complaint: f/u stroke      Objective :  Temp:  [97.8 °F (36.6 °C)-98.3 °F (36.8 °C)] 97.8 °F (36.6 °C)  HR:  [55-82] 82  BP: (120-146)/(70-89) 121/77  Resp:  [16-18] 18  SpO2:  [95 %-98 %] 97 %  O2 Device: None (Room air)      Physical Exam    General: Awake, in NAD  HENT: NCAT, MMM  Respiratory: Unlabored breathing  Cardiovascular: Regular rate   Gastrointestinal: Soft, non-distended  Genitourinary: No mahan  SkiN/MSK/Extremities:  No calf edema, no calf tenderness to palpation  Neurologic/Psych:   MENTAL STATUS: awake, oriented to person, place, time, and largely to situation; delayed processing speed  Affect: Blunted  CN II-XII: L facial weakness   Strength/MMT:  LUE 1/5, LLE 2+/5, R  side 5-/5 to 5/5       Scheduled Meds:  Current Facility-Administered Medications   Medication Dose Route Frequency Provider Last Rate    acetaminophen  650 mg Oral Q6H PRN Renato Florian MD      amLODIPine  10 mg Oral Daily SAM Powers      atorvastatin  20 mg Oral Daily With Dinner SAM Powers      bisacodyl  10 mg Rectal Daily PRN SAM Powers      carvedilol  6.25 mg Oral BID With Meals SAM Powers      chlorhexidine  15 mL Mouth/Throat Q12H DEIDRE SAM Powers      [Transfer Hold] Gadobutrol  9 mL Intravenous Once in imaging Lauren N Creyer, PA-C      heparin (porcine)  5,000 Units Subcutaneous Q8H [facility] SAM Powers      hydrALAZINE  10 mg Intravenous Q4H PRN SAM Powers      losartan  100 mg Oral Daily SAM Powers      magnesium Oxide  400 mg Oral BID Renato Florian MD      melatonin  6 mg Oral HS SAM Powers      ondansetron  4 mg Intravenous Q6H PRN SAM Powers      polyethylene glycol  17 g Oral Daily SAM Powers      senna-docusate sodium  2 tablet Oral BID SAM Powers      tamsulosin  0.4 mg Oral Daily With Dinner SAM Powers           Lab Results: I have reviewed the following results:  Results from last 7 days   Lab Units 07/11/25  0438   HEMOGLOBIN g/dL 14.3   HEMATOCRIT % 41.1   WBC Thousand/uL 11.82*   PLATELETS Thousands/uL 164     Results from last 7 days   Lab Units 07/11/25  0438   BUN mg/dL 16   SODIUM mmol/L 137   POTASSIUM mmol/L 4.0   CHLORIDE mmol/L 102   CREATININE mg/dL 0.64              50 minutes or greater spent for this encounter which included a combination of face-to-face time with Shawn Marquez and non-face-to-face time which in part specifically includes management of CVA and related deficits.  Face-to-face time included extended discussion with patient and wife regarding current  condition, medical history, mood, medical/rehabilitation management, and disposition.  Non face-to-face time included coordination of care with patient's co-managing AP and/or physician(s) thru communication and review of their recent documentation as well as reviewing vitals, bowel/bladder function, recent labs, and notes from therapy, CM, and nursing.

## 2025-07-17 NOTE — PROGRESS NOTES
"Progress Note - Hospitalist   Name: Shawn Marquez 75 y.o. male I MRN: [id number]  Unit/Bed#: [facility] 734-01 I Date of Admission: 7/7/2025   Date of Service: 7/17/2025 I Hospital Day: 10    Assessment & Plan  ICH (intracerebral hemorrhage) (HCC)  History of A-fib on Xarelto presented as stroke alert after being found by family on the floor with left facial and left-sided weakness.  AC/AP reversed with Kcentra and DDAVP with CTA H/N notable for Large acute intraparenchymal hemorrhage centered involving the posterior right basal ganglia, adjacent right thalamus, and posterior right insula, with a volume of approximately 23 mL and subsequent MRI brain showing Acute to early subacute lacunar infarct in the right hemipons and Stable hemorrhage and surrounding vasogenic edema in the right corona radiata and temporal white matter.  Evaluated by neurosurgery with no interventions indicated  Evaluated by neurology and recommended to hold aspirin and resume AC on 8/4  Patient recommended for acute rehab.  Case management following  Stable pending placement.  CM is working on placement  Essential hypertension  Blood pressure currently stable.  He is off Cardene drip  Continue amlodipine 10 mg daily, carvedilol 6.25 mg twice daily and losartan 100 mg daily  DM II (diabetes mellitus, type II), controlled ([facility])  Lab Results   Component Value Date    HGBA1C 6.7 (H) 07/08/2025       No results for input(s): \"POCGLU\" in the last 72 hours.    Blood Sugar Average: Last 72 hrs:    Well controlled  History of DVT (deep vein thrombosis)  AC on hold as above  HLD (hyperlipidemia)  Continue atorvastatin  Atrial fibrillation (HCC)  Continue beta-blocker.  Hold AC as above  Right pontine stroke (HCC)      VTE Pharmacologic Prophylaxis:   Moderate Risk (Score 3-4) -heparin  Mobility:   Basic Mobility Inpatient Raw Score: 11  -[facility] Goal: 4: Move to chair/commode  -HLM Achieved: 2: Bed activities/Dependent transfer  -HLM Goal NOT achieved. " Continue with multidisciplinary rounding and encourage appropriate mobility to improve upon -[facility] goals.    Patient Centered Rounds: I performed bedside rounds with nursing staff today.   Discussions with Specialists or Other Care Team Provider: Case management    Education and Discussions with Family / Patient: Family at bedside.     Current Length of Stay: 10 day(s)  Current Patient Status: Inpatient   Certification Statement: The patient will continue to require additional inpatient hospital stay due to placement  Discharge Plan: Anticipate discharge in 24-48 hrs to rehab facility.    Code Status: Level 1 - Full Code    Subjective   Patient does not report and headaches, shortness of breath, chest pain, abdominal pain, nausea vomiting, lower leg edema. Also reports good sleep      Objective :  Temp:  [97.8 °F (36.6 °C)-98.3 °F (36.8 °C)] 97.8 °F (36.6 °C)  HR:  [55-82] 82  BP: (120-146)/(70-89) 121/77  Resp:  [16-18] 18  SpO2:  [95 %-98 %] 97 %  O2 Device: None (Room air)    Body mass index is 27.22 kg/m².     Input and Output Summary (last 24 hours):     Intake/Output Summary (Last 24 hours) at 7/17/2025 1419  Last data filed at 7/17/2025 1100  Gross per 24 hour   Intake 90 ml   Output 200 ml   Net -110 ml       Physical Exam  Constitutional:       Appearance: He is ill-appearing.   HENT:      Nose: No congestion.   Pulmonary:      Effort: No respiratory distress.   Abdominal:      General: There is no distension.     Musculoskeletal:      Right lower leg: No edema.      Left lower leg: No edema.     Skin:     Findings: No lesion.           Lines/Drains:              Lab Results: I have reviewed the following results:   Results from last 7 days   Lab Units 07/11/25  0438   WBC Thousand/uL 11.82*   HEMOGLOBIN g/dL 14.3   HEMATOCRIT % 41.1   PLATELETS Thousands/uL 164     Results from last 7 days   Lab Units 07/11/25  0438   SODIUM mmol/L 137   POTASSIUM mmol/L 4.0   CHLORIDE mmol/L 102   CO2 mmol/L 27   BUN  mg/dL 16   CREATININE mg/dL 0.64   ANION GAP mmol/L 8   CALCIUM mg/dL 8.2*   GLUCOSE RANDOM mg/dL 142*                             Recent Cultures (last 7 days):         Imaging Results Review: I reviewed radiology reports from this admission including: MRI brain.  Other Study Results Review: No additional pertinent studies reviewed.    Last 24 Hours Medication List:     Current Facility-Administered Medications:     acetaminophen (TYLENOL) tablet 650 mg, Q6H PRN    amLODIPine (NORVASC) tablet 10 mg, Daily    atorvastatin (LIPITOR) tablet 20 mg, Daily With Dinner    bisacodyl (DULCOLAX) rectal suppository 10 mg, Daily PRN    carvedilol (COREG) tablet 6.25 mg, BID With Meals    chlorhexidine (PERIDEX) 0.12 % oral rinse 15 mL, Q12H DEIDRE    [Transfer Hold] Gadobutrol injection (SINGLE-DOSE) SOLN 9 mL, Once in imaging    heparin (porcine) subcutaneous injection 5,000 Units, Q8H DEIDRE    hydrALAZINE (APRESOLINE) injection 10 mg, Q4H PRN    losartan (COZAAR) tablet 100 mg, Daily    magnesium Oxide (MAG-OX) tablet 400 mg, BID    melatonin tablet 6 mg, HS    ondansetron (ZOFRAN) injection 4 mg, Q6H PRN    polyethylene glycol (MIRALAX) packet 17 g, Daily    senna-docusate sodium (SENOKOT S) 8.6-50 mg per tablet 2 tablet, BID    tamsulosin (FLOMAX) capsule 0.4 mg, Daily With Dinner    Administrative Statements   Today, Patient Was Seen By: Marco Santamaria MD      **Please Note: This note may have been constructed using a voice recognition system.**

## 2025-07-18 PROCEDURE — 97530 THERAPEUTIC ACTIVITIES: CPT

## 2025-07-18 PROCEDURE — 97112 NEUROMUSCULAR REEDUCATION: CPT

## 2025-07-18 PROCEDURE — 99232 SBSQ HOSP IP/OBS MODERATE 35: CPT

## 2025-07-18 PROCEDURE — 97535 SELF CARE MNGMENT TRAINING: CPT

## 2025-07-18 RX ADMIN — CHLORHEXIDINE GLUCONATE 15 ML: 1.2 SOLUTION ORAL at 08:20

## 2025-07-18 RX ADMIN — CARVEDILOL 6.25 MG: 6.25 TABLET, FILM COATED ORAL at 08:30

## 2025-07-18 RX ADMIN — AMLODIPINE BESYLATE 10 MG: 10 TABLET ORAL at 08:30

## 2025-07-18 RX ADMIN — Medication 6 MG: at 21:45

## 2025-07-18 RX ADMIN — HEPARIN SODIUM 5000 UNITS: 5000 INJECTION INTRAVENOUS; SUBCUTANEOUS at 05:42

## 2025-07-18 RX ADMIN — CARVEDILOL 6.25 MG: 6.25 TABLET, FILM COATED ORAL at 18:11

## 2025-07-18 RX ADMIN — ACETAMINOPHEN 650 MG: 325 TABLET ORAL at 14:41

## 2025-07-18 RX ADMIN — MAGNESIUM OXIDE TAB 400 MG (241.3 MG ELEMENTAL MG) 400 MG: 400 (241.3 MG) TAB at 08:30

## 2025-07-18 RX ADMIN — HEPARIN SODIUM 5000 UNITS: 5000 INJECTION INTRAVENOUS; SUBCUTANEOUS at 14:41

## 2025-07-18 RX ADMIN — MAGNESIUM OXIDE TAB 400 MG (241.3 MG ELEMENTAL MG) 400 MG: 400 (241.3 MG) TAB at 18:11

## 2025-07-18 RX ADMIN — TAMSULOSIN HYDROCHLORIDE 0.4 MG: 0.4 CAPSULE ORAL at 18:11

## 2025-07-18 RX ADMIN — LOSARTAN POTASSIUM 100 MG: 50 TABLET, FILM COATED ORAL at 08:30

## 2025-07-18 RX ADMIN — HEPARIN SODIUM 5000 UNITS: 5000 INJECTION INTRAVENOUS; SUBCUTANEOUS at 21:45

## 2025-07-18 RX ADMIN — ATORVASTATIN CALCIUM 20 MG: 20 TABLET, FILM COATED ORAL at 18:11

## 2025-07-18 NOTE — OCCUPATIONAL THERAPY NOTE
Occupational Therapy Progress Note     Patient Name: Shawn Marquez  Today's Date: 7/18/2025  Problem List  Principal Problem:    ICH (intracerebral hemorrhage) (HCC)  Active Problems:    Essential hypertension    DM II (diabetes mellitus, type II), controlled (HCC)    Encounter for skin care    History of DVT (deep vein thrombosis)    HLD (hyperlipidemia)    Depression    Seizure-like activity (HCC)    Cerebral edema (HCC)    Brain compression (HCC)    Atrial fibrillation (HCC)    HTN (hypertension)    Anticoagulated on rivaroxaban    Hemineglect    Hemiparesis (HCC)    Right pontine stroke (HCC)            07/18/25 0845   OT Last Visit   OT Visit Date 07/18/25   Note Type   Note Type Treatment for insurance authorization   Pain Assessment   Pain Assessment Tool 0-10   Pain Score No Pain   Restrictions/Precautions   Weight Bearing Precautions Per Order No   Other Precautions Cognitive;Chair Alarm;Bed Alarm;Impulsive;Fall Risk  (Left hemiparesis)   Lifestyle   Autonomy I w/ ADLS, I w/ meds but assist w/ all other IADLS, (-) , I w/ transfers and functional mobility PTA   Reciprocal Relationships pt lives w/ his spouse   Service to Others retired   Intrinsic Gratification TV   ADL   Where Assessed Edge of bed   Eating Assistance 5  Supervision/Setup   Eating Deficit Verbal cueing;Supervision/safety;Increased time to complete   Eating Comments Pt requires assistance for cutting up food and fine motor tasks involved with set up.   UB Bathing Assistance 3  Moderate Assistance   UB Bathing Deficit Left arm   UB Bathing Comments Pt requires a to lift L arm for application of deodorant this date.   LB Dressing Assistance 3  Moderate Assistance   LB Dressing Deficit Setup;Increased time to complete;Don/doff R sock;Don/doff L sock   LB Dressing Comments Pt able to doff socks in a figure 4 position w/ vc and requires mod a to set up and don socks.   Functional Standing Tolerance   Time ~.30 seconds, able to clear  bed although significant weakness and L side lean.   Activity Trial x2 STS ~.30 seconds but unable to progress due to weakness in L side and poor initiation of LLE. Third trial pt able to progress to transfer to chair.   Comments Max a x 2 w/ a third individual for assistance w/ vc and tactile cues and redirection.   Bed Mobility   Supine to Sit 2  Maximal assistance   Additional items Assist x 2;Increased time required;Verbal cues;LE management;HOB elevated;Bedrails   Sit to Supine 2  Maximal assistance   Additional items Assist x 2;Increased time required;LE management;Verbal cues   Additional Comments Pt greeted supine in bed upon arrival. Once EOB pt has significant L side lean. Requires max vc and tactile cues to maintain midline. Sits EOB for ~12-15 minutes.   Transfers   Sit to Stand 2  Maximal assistance   Additional items Assist x 2;Increased time required;Verbal cues   Stand to Sit 2  Maximal assistance   Additional items Assist x 2;Increased time required;Verbal cues   Stand pivot 2  Maximal assistance   Additional items Assist x 2;Increased time required;Verbal cues   Additional Comments x3 STS -R knee block, LUE support, LLE knee block. Cues to maintain midline.   Functional Mobility   Additional Comments Not appropriate at this time.   ROM- Right Upper Extremities   R Shoulder AROM;Flexion   R Position Seated   ROM - Left Upper Extremities    L Shoulder Flexion;PROM   L Elbow PROM;AAROM   L Hand Thumb;Index finger;Long finger;Ring finger;Little finger;Prolonged stretch;AROM   L Position Seated   Equipment Other (Comment)  (Stress ball)   L Weight/Reps/Sets x3   Cognition   Overall Cognitive Status Impaired   Arousal/Participation Responsive;Alert;Cooperative   Attention Attends with cues to redirect   Orientation Level Oriented to person;Oriented to place;Oriented to situation;Disoriented to time   Memory Decreased recall of recent events;Decreased recall of precautions   Following Commands Follows one  "step commands with increased time or repetition   Comments Pt is pleasant and cooperative. Requires increased time for processing time and response. Intermittently requires redirection to tasks. Pt is easily distractable with television, window open, noise, etc.   Activity Tolerance   Activity Tolerance Patient limited by fatigue   Medical Staff Made Aware Co-tx w/ PTA Susie and restorative Krystyn.   Assessment   Assessment Pt seen for OT treatment session on this date focused on ADL retraining, functional transfers and mobility, energy conservation, functional endurance, recall of safety precautions, and patient/caregiver education. Pt was greeted supine in bed upon arrival and cooperative throughout session. Family present for OT/PT session. Requires max ax2 to sit EOB. Requires max a x 2 -3 to maintain upright position in bed w/ max cues for maintaining midline. PTA and restorative tech supporting in the front of pt and cues for engaging L side.OT supporting pt from behind due to L sided weakness, and significant L sided/posterior lean. Pt able to reach to contralateral side w/ L hand to reach PTA name tag. Pt completes this 3x, initially pt requires vc and tactile input to elbow to initiate. Pt progresses to reach w/ active range of motion toward PTA nametag. Following this, pt instructed to clasp hands together in lap and use RUE to assist LUE to overhead position. Pt successful and completes 3 trials. Pt states he simulated \"winning the trophy\". Pt requires max a x 2  w/ b/l hha, LUE support and knee blockingfor STS w/ a 3rd individual for cues to maintain midline. Pt trials x2 and is able to clear bed although not able to initiate LLE to move and leaning to L side upon standing. On 3rd attempt pt able to initiate picking up R/LLEs for assistance in stand pivot to chair. Seated in chair pt able to  doff socks w/ set up,vc, and tactile cues. Requires mod a to don socks. Able to apply roll on deodorant w/ mod " a for b/l UE. Performs x5 reps of ball squeezes with ball this date. Reminders to pt and wife regarding appropriate positioning. Wife reports pt noncompliance although providing constant reminders. Pt LUE supported in chair w/ wedges, pillows, and blanket w/ L hand in pt view. Following session, pt was left in chair with chair alarm on and all needs within reach. Pt continues to be limited by functional status related to transfers and functional mobility, although demonstrates improvements in ADL tasks.  The patient's raw score on the AM-PAC Daily Activity Inpatient Short Form is 15. A raw score of less than 19 suggests the patient may benefit from discharge to post-acute rehabilitation services. Please refer to the recommendation of the Occupational Therapist for safe discharge planning. . Pt would benefit from continued acute OT intervention with plan to continue OT treatment sessions 2-3x per week. Recommend d/c to level one services.   Plan   Treatment Interventions ADL retraining;Functional transfer training;Endurance training;Cognitive reorientation;Patient/family training;Compensatory technique education;Continued evaluation;Energy conservation;Activityengagement   Discharge Recommendation   Rehab Resource Intensity Level, OT I (Maximum Resource Intensity)   AM-PAC Daily Activity Inpatient   Lower Body Dressing 2   Bathing 2   Toileting 2   Upper Body Dressing 2   Grooming 3   Eating 4   Daily Activity Raw Score 15   Daily Activity Standardized Score (Calc for Raw Score >=11) 34.69   -PAC Applied Cognition Inpatient   Following a Speech/Presentation 3   Understanding Ordinary Conversation 3   Taking Medications 3   Remembering Where Things Are Placed or Put Away 2   Remembering List of 4-5 Errands 2   Taking Care of Complicated Tasks 1   Applied Cognition Raw Score 14   Applied Cognition Standardized Score 32.02   End of Consult   Education Provided Yes;Family or social support of family present for  education by provider  (Wife- Sherice)   Patient Position at End of Consult Bed/Chair alarm activated;All needs within reach;Bedside chair   Nurse Communication Nurse aware of consult   End of Consult Comments Pt left seated in bedside chair w/ alarm on and all needs within reach.              France Rousseau, OTD, OTR/L

## 2025-07-18 NOTE — RESTORATIVE TECHNICIAN NOTE
Restorative Technician Note      Patient Name: Shawn Marquez     Note Type: Mobility  Patient Position Upon Consult: Supine  Activity Performed: Transferred; Dangled; Stood  Assistive Device: Other (Comment) (Assist x2 to sit EOB/stand-pivot. Assisted PTA Angella.)  Education Provided: Yes  Patient Position at End of Consult: Bedside chair; All needs within reach; Bed/Chair alarm activated      Jerod CHAN, Restorative Technician,

## 2025-07-18 NOTE — PLAN OF CARE
Problem: Neurological Deficit  Goal: Neurological status is stable or improving  Description: Interventions:  - Monitor and assess patient's level of consciousness, motor function, sensory function, and level of assistance needed for ADLs.   - Monitor and report changes from baseline. Collaborate with interdisciplinary team to initiate plan and implement interventions as ordered.   - Provide and maintain a safe environment.  - Consider seizure precautions.  - Consider fall precautions.  - Consider aspiration precautions.  - Consider bleeding precautions.  Outcome: Progressing     Problem: Activity Intolerance/Impaired Mobility  Goal: Mobility/activity is maintained at optimum level for patient  Description: Interventions:  - Assess and monitor patient  barriers to mobility and need for assistive/adaptive devices.  - Assess patient's emotional response to limitations.  - Collaborate with interdisciplinary team and initiate plans and interventions as ordered.  - Encourage independent activity per ability.  - Maintain proper body alignment.  - Perform active/passive rom as tolerated/ordered.  - Plan activities to conserve energy.  - Turn patient as appropriate  Outcome: Progressing     Problem: Communication Impairment  Goal: Ability to express needs and understand communication  Description: Assess patient's communication skills and ability to understand information.  Patient will demonstrate use of effective communication techniques, alternative methods of communication and understanding even if not able to speak.     - Encourage communication and provide alternate methods of communication as needed.  - Collaborate with case management/ for discharge needs.  - Include patient/family/caregiver in decisions related to communication.  Outcome: Progressing     Problem: Potential for Aspiration  Goal: Non-ventilated patient's risk of aspiration is minimized  Description: Assess and monitor vital signs,  respiratory status, and labs (WBC).  Monitor for signs of aspiration (tachypnea, cough, rales, wheezing, cyanosis, fever).    - Assess and monitor patient's ability to swallow.  - Place patient up in chair to eat if possible.  - HOB up at 90 degrees to eat if unable to get patient up into chair.  - Supervise patient during oral intake.   - Instruct patient/ family to take small bites.  - Instruct patient/ family to take small single sips when taking liquids.  - Follow patient-specific strategies generated by speech pathologist.  Outcome: Progressing     Problem: Nutrition  Goal: Nutrition/Hydration status is improving  Description: Monitor and assess patient's nutrition/hydration status for malnutrition (ex- brittle hair, bruises, dry skin, pale skin and conjunctiva, muscle wasting, smooth red tongue, and disorientation). Collaborate with interdisciplinary team and initiate plan and interventions as ordered.  Monitor patient's weight and dietary intake as ordered or per policy. Utilize nutrition screening tool and intervene per policy. Determine patient's food preferences and provide high-protein, high-caloric foods as appropriate.     - Assist patient with eating.  - Allow adequate time for meals.  - Encourage patient to take dietary supplement as ordered.  - Collaborate with clinical nutritionist.  - Include patient/family/caregiver in decisions related to nutrition.  Outcome: Progressing     Problem: PAIN - ADULT  Goal: Verbalizes/displays adequate comfort level or baseline comfort level  Description: Interventions:  - Encourage patient to monitor pain and request assistance  - Assess pain using appropriate pain scale  - Administer analgesics as ordered based on type and severity of pain and evaluate response  - Implement non-pharmacological measures as appropriate and evaluate response  - Consider cultural and social influences on pain and pain management  - Notify physician/advanced practitioner if  interventions unsuccessful or patient reports new pain  - Educate patient/family on pain management process including their role and importance of  reporting pain   - Provide non-pharmacologic/complimentary pain relief interventions  Outcome: Progressing     Problem: INFECTION - ADULT  Goal: Absence of fever/infection during neutropenic period  Description: INTERVENTIONS:  - Monitor WBC  - Perform strict hand hygiene  - Limit to healthy visitors only  - No plants, dried, fresh or silk flowers with pollock in patient room  Outcome: Progressing     Problem: SAFETY ADULT  Goal: Patient will remain free of falls  Description: INTERVENTIONS:  - Educate patient/family on patient safety including physical limitations  - Instruct patient to call for assistance with activity   - Consider consulting OT/PT to assist with strengthening/mobility based on AM PAC & JH-HLM score  - Consult OT/PT to assist with strengthening/mobility   - Keep Call bell within reach  - Keep bed low and locked with side rails adjusted as appropriate  - Keep care items and personal belongings within reach  - Initiate and maintain comfort rounds  - Make Fall Risk Sign visible to staff  - Offer Toileting every 2 Hours, in advance of need  - Initiate/Maintain bed/chair alarm  - Obtain necessary fall risk management equipment: call bell within the reach, bed/chair alarm, gripper socks on.  - Apply yellow socks and bracelet for high fall risk patients  - Consider moving patient to room near nurses station  Outcome: Progressing  Goal: Maintain or return to baseline ADL function  Description: INTERVENTIONS:  -  Assess patient's ability to carry out ADLs; assess patient's baseline for ADL function and identify physical deficits which impact ability to perform ADLs (bathing, care of mouth/teeth, toileting, grooming, dressing, etc.)  - Assess/evaluate cause of self-care deficits   - Assess range of motion  - Assess patient's mobility; develop plan if impaired  -  Assess patient's need for assistive devices and provide as appropriate  - Encourage maximum independence but intervene and supervise when necessary  - Involve family in performance of ADLs  - Assess for home care needs following discharge   - Consider OT consult to assist with ADL evaluation and planning for discharge  - Provide patient education as appropriate  - Monitor functional capacity and physical performance, use of AM PAC & JH-HLM   - Monitor gait, balance and fatigue with ambulation    Outcome: Progressing  Goal: Maintains/Returns to pre admission functional level  Description: INTERVENTIONS:  - Perform AM-PAC 6 Click Basic Mobility/ Daily Activity assessment daily.  - Set and communicate daily mobility goal to care team and patient/family/caregiver.   - Collaborate with rehabilitation services on mobility goals if consulted  - Perform Range of Motion 2 times a day.  - Reposition patient every 2 hours.  - Dangle patient 3 times a day  - Stand patient 3 times a day  - Ambulate patient 2 times a day  - Out of bed to chair 3 times a day   - Out of bed for meals 3 times a day  - Out of bed for toileting  - Record patient progress and toleration of activity level   Outcome: Progressing     Problem: DISCHARGE PLANNING  Goal: Discharge to home or other facility with appropriate resources  Description: INTERVENTIONS:  - Identify barriers to discharge w/patient and caregiver  - Arrange for needed discharge resources and transportation as appropriate  - Identify discharge learning needs (meds, wound care, etc.)  - Arrange for interpretive services to assist at discharge as needed  - Refer to Case Management Department for coordinating discharge planning if the patient needs post-hospital services based on physician/advanced practitioner order or complex needs related to functional status, cognitive ability, or social support system  Outcome: Progressing     Problem: Knowledge Deficit  Goal: Patient/family/caregiver  demonstrates understanding of disease process, treatment plan, medications, and discharge instructions  Description: Complete learning assessment and assess knowledge base.  Interventions:  - Provide teaching at level of understanding  - Provide teaching via preferred learning methods  Outcome: Progressing     Problem: Nutrition/Hydration-ADULT  Goal: Nutrient/Hydration intake appropriate for improving, restoring or maintaining nutritional needs  Description: Monitor and assess patient's nutrition/hydration status for malnutrition. Collaborate with interdisciplinary team and initiate plan and interventions as ordered.  Monitor patient's weight and dietary intake as ordered or per policy. Utilize nutrition screening tool and intervene as necessary. Determine patient's food preferences and provide high-protein, high-caloric foods as appropriate.     INTERVENTIONS:  - Monitor oral intake, urinary output, labs, and treatment plans  - Assess nutrition and hydration status and recommend course of action  - Evaluate amount of meals eaten  - Assist patient with eating if necessary   - Allow adequate time for meals  - Recommend/ encourage appropriate diets, oral nutritional supplements, and vitamin/mineral supplements  - Order, calculate, and assess calorie counts as needed  - Recommend, monitor, and adjust tube feedings and TPN/PPN based on assessed needs  - Assess need for intravenous fluids  - Provide specific nutrition/hydration education as appropriate  - Include patient/family/caregiver in decisions related to nutrition  Outcome: Progressing     Problem: Prexisting or High Potential for Compromised Skin Integrity  Goal: Skin integrity is maintained or improved  Description: INTERVENTIONS:  - Identify patients at risk for skin breakdown  - Assess and monitor skin integrity including under and around medical devices   - Assess and monitor nutrition and hydration status  - Monitor labs  - Assess for incontinence   - Turn  and reposition patient  - Assist with mobility/ambulation  - Relieve pressure over pretty prominences   - Avoid friction and shearing  - Provide appropriate hygiene as needed including keeping skin clean and dry  - Evaluate need for skin moisturizer/barrier cream  - Collaborate with interdisciplinary team  - Patient/family teaching  - Consider wound care consult    Assess:  - Review Francisco Javier scale daily  - Clean and moisturize skin  - Inspect skin when repositioning, toileting, and assisting with ADLS  - Assess under medical devices  - Assess extremities for adequate circulation and sensation     Bed Management:  - Have minimal linens on bed & keep smooth, unwrinkled  - Change linens as needed when moist or perspiring  - Avoid sitting or lying in one position for more than 2 hours while in bed?Keep HOB at 30 degrees   - Toileting:  - Offer bedside commode  - Assess for incontinence  - Use incontinent care products after each incontinent episode     Activity:  - Mobilize patient 2 times a day  - Encourage activity and walks on unit  - Encourage or provide ROM exercises   - Turn and reposition patient every 2 Hours  - Use appropriate equipment to lift or move patient in bed  - Instruct/ Assist with weight shifting every 2 when out of bed in chair  - Consider limitation of chair time 4 hour intervals    Skin Care:  - Avoid use of baby powder, tape, friction and shearing, hot water or constrictive clothing  - Relieve pressure over bony prominences  - Do not massage red bony areas    Next Steps:  - Teach patient strategies to minimize risks  - Consider consults to  interdisciplinary teams  Outcome: Progressing

## 2025-07-18 NOTE — PROGRESS NOTES
"Progress Note - Hospitalist   Name: Shawn Marquez 75 y.o. male I MRN: [id number]  [facility] I Date of Admission: 7/7/2025   Date of Service: 7/18/2025 I Hospital Day: 11    Assessment & Plan  ICH (intracerebral hemorrhage) (HCC)  History of A-fib on Xarelto presented as stroke alert after being found by family on the floor with left facial and left-sided weakness.  AC/AP reversed with Kcentra and DDAVP with CTA H/N notable for Large acute intraparenchymal hemorrhage centered involving the posterior right basal ganglia, adjacent right thalamus, and posterior right insula, with a volume of approximately 23 mL and subsequent MRI brain showing Acute to early subacute lacunar infarct in the right hemipons and Stable hemorrhage and surrounding vasogenic edema in the right corona radiata and temporal white matter.  Evaluated by neurosurgery with no interventions indicated  Evaluated by neurology and recommended to hold aspirin and resume AC on 8/4  Patient recommended for acute rehab.  Case management following  Stable pending placement.  CM is working on placement  Essential hypertension  Blood pressure currently stable.  He is off Cardene drip  Continue amlodipine 10 mg daily, carvedilol 6.25 mg twice daily and losartan 100 mg daily  DM II (diabetes mellitus, type II), controlled ([facility])  Lab Results   Component Value Date    HGBA1C 6.7 (H) 07/08/2025       No results for input(s): \"POCGLU\" in the last 72 hours.    Blood Sugar Average: Last 72 hrs:    Well controlled  History of DVT (deep vein thrombosis)  AC on hold as above  HLD (hyperlipidemia)  Continue atorvastatin  Atrial fibrillation (HCC)  Continue beta-blocker.  Hold AC as above  Right pontine stroke (HCC)      VTE Pharmacologic Prophylaxis:   Moderate Risk (Score 3-4) -heparin  Mobility:   Basic Mobility Inpatient Raw Score: 11  JH-HLM Goal: 4: Move to chair/commode  JH-HLM Achieved: 4: Move to chair/commode  JH-HLM Goal NOT achieved. Continue " with multidisciplinary rounding and encourage appropriate mobility to improve upon [facility] goals.    Patient Centered Rounds: I performed bedside rounds with nursing staff today.   Discussions with Specialists or Other Care Team Provider: Case management    Education and Discussions with Family / Patient: Family at bedside.     Current Length of Stay: 11 day(s)  Current Patient Status: Inpatient   Certification Statement: The patient will continue to require additional inpatient hospital stay due to placement  Discharge Plan: Anticipate discharge in 24-48 hrs to rehab facility.    Code Status: Level 1 - Full Code    Subjective   No complaints waiting for rehab placement        Objective :  Temp:  [97.7 °F (36.5 °C)-98.5 °F (36.9 °C)] 97.7 °F (36.5 °C)  HR:  [64-74] 65  BP: (129-148)/(74-87) 129/87  Resp:  [16] 16  SpO2:  [96 %-98 %] 98 %    Body mass index is 27.21 kg/m².     Input and Output Summary (last 24 hours):     Intake/Output Summary (Last 24 hours) at 7/18/2025 1216  Last data filed at 7/18/2025 1001  Gross per 24 hour   Intake --   Output 275 ml   Net -275 ml       Physical Exam  Constitutional:       Appearance: He is ill-appearing.   HENT:      Nose: No congestion.   Pulmonary:      Effort: No respiratory distress.   Abdominal:      General: There is no distension.     Skin:     Findings: No lesion.     Neurological:      Mental Status: He is oriented to person, place, and time.      Motor: Weakness present.           Lines/Drains:              Lab Results: I have reviewed the following results:                                       Recent Cultures (last 7 days):         Imaging Results Review: I reviewed radiology reports from this admission including: MRI brain.  Other Study Results Review: No additional pertinent studies reviewed.    Last 24 Hours Medication List:     Current Facility-Administered Medications:     acetaminophen (TYLENOL) tablet 650 mg, Q6H PRN    amLODIPine (NORVASC) tablet 10 mg,  Daily    atorvastatin (LIPITOR) tablet 20 mg, Daily With Dinner    bisacodyl (DULCOLAX) rectal suppository 10 mg, Daily PRN    carvedilol (COREG) tablet 6.25 mg, BID With Meals    chlorhexidine (PERIDEX) 0.12 % oral rinse 15 mL, Q12H DEIDRE    [Transfer Hold] Gadobutrol injection (SINGLE-DOSE) SOLN 9 mL, Once in imaging    heparin (porcine) subcutaneous injection 5,000 Units, Q8H DEIDRE    hydrALAZINE (APRESOLINE) injection 10 mg, Q4H PRN    losartan (COZAAR) tablet 100 mg, Daily    magnesium Oxide (MAG-OX) tablet 400 mg, BID    melatonin tablet 6 mg, HS    ondansetron (ZOFRAN) injection 4 mg, Q6H PRN    polyethylene glycol (MIRALAX) packet 17 g, Daily    senna-docusate sodium (SENOKOT S) 8.6-50 mg per tablet 2 tablet, BID    tamsulosin (FLOMAX) capsule 0.4 mg, Daily With Dinner    Administrative Statements   Today, Patient Was Seen By: Marco Santamaria MD      **Please Note: This note may have been constructed using a voice recognition system.**

## 2025-07-18 NOTE — PLAN OF CARE
Problem: OCCUPATIONAL THERAPY ADULT  Goal: Performs self-care activities at highest level of function for planned discharge setting.  See evaluation for individualized goals.  Description: Treatment Interventions: ADL retraining, Functional transfer training, UE strengthening/ROM, Endurance training, Cognitive reorientation, Visual perceptual retraining, Patient/family training, Equipment evaluation/education, Compensatory technique education, Fine motor coordination activities, Continued evaluation, Energy conservation, Activityengagement          See flowsheet documentation for full assessment, interventions and recommendations.   Note: Limitation: Decreased ADL status, Decreased UE strength, Decreased UE ROM, Decreased Safe judgement during ADL, Decreased cognition, Decreased endurance, Decreased self-care trans, Decreased high-level ADLs, Visual deficit, Decreased fine motor control  Prognosis: Fair  Assessment: Pt seen for OT treatment session on this date focused on ADL retraining, functional transfers and mobility, energy conservation, functional endurance, recall of safety precautions, and patient/caregiver education. Pt was greeted supine in bed upon arrival and cooperative throughout session. Family present for OT/PT session. Requires max ax2 to sit EOB. Requires max a x 2 -3 to maintain upright position in bed w/ max cues for maintaining midline. PTA and restorative tech supporting in the front of pt and cues for engaging L side.OT supporting pt from behind due to L sided weakness, and significant L sided/posterior lean. Pt able to reach to contralateral side w/ L hand to reach PTA name tag. Pt completes this 3x, initially pt requires vc and tactile input to elbow to initiate. Pt progresses to reach w/ active range of motion toward PTA nametag. Following this, pt instructed to clasp hands together in lap and use RUE to assist LUE to overhead position. Pt successful and completes 3 trials. Pt states he  "simulated \"winning the trophy\". Pt requires max a x 2  w/ b/l hha, LUE support and knee blockingfor STS w/ a 3rd individual for cues to maintain midline. Pt trials x2 and is able to clear bed although not able to initiate LLE to move and leaning to L side upon standing. On 3rd attempt pt able to initiate picking up R/LLEs for assistance in stand pivot to chair. Seated in chair pt able to  doff socks w/ set up,vc, and tactile cues. Requires mod a to don socks. Able to apply roll on deodorant w/ mod a for b/l UE. Performs x5 reps of ball squeezes with ball this date. Reminders to pt and wife regarding appropriate positioning. Wife reports pt noncompliance although providing constant reminders. Pt LUE supported in chair w/ wedges, pillows, and blanket w/ L hand in pt view. Following session, pt was left in chair with chair alarm on and all needs within reach. Pt continues to be limited by functional status related to transfers and functional mobility, although demonstrates improvements in ADL tasks.  The patient's raw score on the AM-PAC Daily Activity Inpatient Short Form is 15. A raw score of less than 19 suggests the patient may benefit from discharge to post-acute rehabilitation services. Please refer to the recommendation of the Occupational Therapist for safe discharge planning. . Pt would benefit from continued acute OT intervention with plan to continue OT treatment sessions 2-3x per week. Recommend d/c to level one services.     Rehab Resource Intensity Level, OT: I (Maximum Resource Intensity)          "

## 2025-07-18 NOTE — ASSESSMENT & PLAN NOTE
"Lab Results   Component Value Date    HGBA1C 6.7 (H) 07/08/2025       No results for input(s): \"POCGLU\" in the last 72 hours.    Blood Sugar Average: Last 72 hrs:    Well controlled  "

## 2025-07-18 NOTE — PLAN OF CARE
Problem: PHYSICAL THERAPY ADULT  Goal: Performs mobility at highest level of function for planned discharge setting.  See evaluation for individualized goals.  Description: Treatment/Interventions: ADL retraining, Functional transfer training, LE strengthening/ROM, Elevations, Therapeutic exercise, Endurance training, Cognitive reorientation, Equipment eval/education, Patient/family training, Bed mobility, Gait training, Compensatory technique education, Spoke to nursing, Spoke to advanced practitioner, OT  Equipment Recommended:  (TBD)       See flowsheet documentation for full assessment, interventions and recommendations.  Outcome: Progressing  Note: Prognosis: Fair  Problem List: Decreased strength, Decreased endurance, Impaired balance, Decreased mobility, Decreased coordination, Decreased cognition, Impaired judgement, Decreased safety awareness  Assessment: Patient lying supine in bed, agreeable to participate in therapy. He was able to advance LEs to EOB with max A x2 to sit EOB. He was able to sit EOB x10 mintues, but does require cues for upright posture and midline awareness. He exhibits heavy lean to the left throughout most of seated balance. He requires max A x2 to stand and pivot to bedside recliner. He does require L knee blocking and RLE advancement with constant direction. He was able to perform LE exercises, LLE PF/DF x10 reps with resistance. Patient would continue to benefit from skilled PT to maximize functional independence.  Barriers to Discharge: Inaccessible home environment     Rehab Resource Intensity Level, PT: I (Maximum Resource Intensity)    See flowsheet documentation for full assessment.

## 2025-07-18 NOTE — PHYSICAL THERAPY NOTE
Physical Therapy Progress Note        07/18/25 0925   PT Last Visit   PT Visit Date 07/18/25   Note Type   Note Type Treatment for insurance authorization   Education   Education Provided Yes   Pain Assessment   Pain Assessment Tool 0-10   Pain Score No Pain   Hospital Pain Intervention(s) Repositioned  (Increased activity)   Restrictions/Precautions   Other Precautions Cognitive;Chair Alarm;Fall Risk   General   Chart Reviewed Yes   Family/Caregiver Present Yes   Cognition   Overall Cognitive Status Impaired   Arousal/Participation Responsive   Comments Patient is pleasant and cooperative intermittently, does require redirection to task.   Bed Mobility   Supine to Sit 2  Maximal assistance   Additional items Assist x 2;Increased time required   Transfers   Sit to Stand 2  Maximal assistance   Additional items Assist x 2;Increased time required   Stand to Sit 2  Maximal assistance   Additional items Assist x 2;Increased time required   Stand pivot 2  Maximal assistance   Additional items Assist x 2;Increased time required   Additional Comments x3 STS, L knee block, R LE advancement assistance   Ambulation/Elevation   Gait pattern Not appropriate  (Pivot with assist of 2)   Gait Assistance 2  Maximal assist   Additional items Assist x 2   Assistive Device None   Distance 2' with max A x2, LLE knee block, RLE foot advancement   Balance   Static Sitting Poor +   Dynamic Sitting Poor   Static Standing Poor   Dynamic Standing Poor -   Ambulatory Poor -   Endurance Deficit   Endurance Deficit Yes   Endurance Deficit Description Limited by weakness and fatigue   Activity Tolerance   Activity Tolerance Patient limited by fatigue   Nurse Made Aware Appropriate to see per RN   Exercises   Neuro re-ed Pt sat EOB x10 minutes with cues for upright posture and to avoid LUE lean.   Assessment   Prognosis Fair   Problem List Decreased strength;Decreased endurance;Impaired balance;Decreased mobility;Decreased  coordination;Decreased cognition;Impaired judgement;Decreased safety awareness   Assessment Patient lying supine in bed, agreeable to participate in therapy. He was able to advance LEs to EOB with max A x2 to sit EOB. He was able to sit EOB x10 mintues, but does require cues for upright posture and midline awareness. He exhibits heavy lean to the left throughout most of seated balance. He requires max A x2 to stand and pivot to bedside recliner. He does require L knee blocking and RLE advancement with constant direction. He was able to perform LE exercises, LLE PF/DF x10 reps with resistance. Patient would continue to benefit from skilled PT to maximize functional independence.   Barriers to Discharge Inaccessible home environment   Goals   Patient Goals To get better   STG Expiration Date 07/22/25   PT Treatment Day 5   Plan   Treatment/Interventions Functional transfer training;LE strengthening/ROM;Endurance training;Bed mobility;Gait training;Spoke to nursing;Family   Progress Progressing toward goals   PT Frequency 3-5x/wk   Discharge Recommendation   Rehab Resource Intensity Level, PT I (Maximum Resource Intensity)   AM-PAC Basic Mobility Inpatient   Turning in Flat Bed Without Bedrails 3   Lying on Back to Sitting on Edge of Flat Bed Without Bedrails 2   Moving Bed to Chair 2   Standing Up From Chair Using Arms 2   Walk in Room 1   Climb 3-5 Stairs With Railing 1   Basic Mobility Inpatient Raw Score 11   Basic Mobility Standardized Score 30.25   Turning Head Towards Sound 4   Follow Simple Instructions 3   Low Function Basic Mobility Raw Score  18   Low Function Basic Mobility Standardized Score  29.25   [facility] Highest Level Of Mobility   -HL Goal 4: Move to chair/commode   -HLM Achieved 4: Move to chair/commode       Susie Singh, PTA

## 2025-07-18 NOTE — CASE MANAGEMENT
Case Management Discharge Planning Note    Patient name Shawn Marquez  Location [facility] 734/[facility] 734-01 MRN [id number]  : 1950 Date 2025       Current Admission Date: 2025  Current Admission Diagnosis:ICH (intracerebral hemorrhage) ([facility])   Patient Active Problem List    Diagnosis Date Noted    Right pontine stroke (HCC) 2025    Cerebral edema (HCC) 2025    Brain compression (HCC) 2025    Atrial fibrillation (HCC) 2025    HTN (hypertension) 2025    Anticoagulated on rivaroxaban 2025    Hemineglect 2025    Hemiparesis (HCC) 2025    ICH (intracerebral hemorrhage) ([facility]) 2025    Seizure-like activity ([facility]) 2025    Medical marijuana use 2024    Elevated brain natriuretic peptide (BNP) level 2024    Syncope 2024    Depression 2024    Vascular dementia with paranoia ([facility]) 2023    Paranoid behavior (HCC) 2022    Longstanding persistent atrial fibrillation (HCC) 07/15/2022    Anxiety 2021    HLD (hyperlipidemia) 2021    Numbness 2019    Personal history of transient ischemic attack 2019    Anticoagulant long-term use 2019    Arthritis of knee 2019    History of DVT (deep vein thrombosis) 2018    Encounter for skin care 2018    Varicose veins with swelling 2016    BPH with obstruction/lower urinary tract symptoms 2016    Chronic venous insufficiency 2016    DM II (diabetes mellitus, type II), controlled ([facility]) 2015    Essential hypertension 2013      LOS (days): 11  Geometric Mean LOS (GMLOS) (days): 4.5  Days to GMLOS:-6.4     OBJECTIVE:  Risk of Unplanned Readmission Score: 11.42         Current admission status: Inpatient   Preferred Pharmacy:   [id number]  [place] [address]  [address] [place] [address]  Phone: [phone] Fax: [phone]    [facility] -  [place], [place] - [address]  [address] [address]  Phone: [phone] Fax: [phone]    Primary Care Provider: SAM Baer    Primary Insurance: [employer] RE[employer]  Secondary Insurance:     DISCHARGE DETAILS:        Additional Comments: Updated notes faxed to [facility] for review. Referrals for STR please provdie list to spouse

## 2025-07-19 LAB
GLUCOSE SERPL-MCNC: 166 MG/DL (ref 65–140)
GLUCOSE SERPL-MCNC: 208 MG/DL (ref 65–140)

## 2025-07-19 PROCEDURE — 99232 SBSQ HOSP IP/OBS MODERATE 35: CPT | Performed by: INTERNAL MEDICINE

## 2025-07-19 PROCEDURE — 82948 REAGENT STRIP/BLOOD GLUCOSE: CPT

## 2025-07-19 RX ORDER — INSULIN LISPRO 100 [IU]/ML
1-6 INJECTION, SOLUTION INTRAVENOUS; SUBCUTANEOUS
Status: DISCONTINUED | OUTPATIENT
Start: 2025-07-19 | End: 2025-07-25 | Stop reason: HOSPADM

## 2025-07-19 RX ADMIN — CHLORHEXIDINE GLUCONATE 15 ML: 1.2 SOLUTION ORAL at 09:46

## 2025-07-19 RX ADMIN — HEPARIN SODIUM 5000 UNITS: 5000 INJECTION INTRAVENOUS; SUBCUTANEOUS at 14:43

## 2025-07-19 RX ADMIN — AMLODIPINE BESYLATE 10 MG: 10 TABLET ORAL at 09:46

## 2025-07-19 RX ADMIN — LOSARTAN POTASSIUM 100 MG: 50 TABLET, FILM COATED ORAL at 09:46

## 2025-07-19 RX ADMIN — POLYETHYLENE GLYCOL 3350 17 G: 17 POWDER, FOR SOLUTION ORAL at 09:46

## 2025-07-19 RX ADMIN — CARVEDILOL 6.25 MG: 6.25 TABLET, FILM COATED ORAL at 18:33

## 2025-07-19 RX ADMIN — CARVEDILOL 6.25 MG: 6.25 TABLET, FILM COATED ORAL at 09:46

## 2025-07-19 RX ADMIN — TAMSULOSIN HYDROCHLORIDE 0.4 MG: 0.4 CAPSULE ORAL at 18:32

## 2025-07-19 RX ADMIN — MAGNESIUM OXIDE TAB 400 MG (241.3 MG ELEMENTAL MG) 400 MG: 400 (241.3 MG) TAB at 18:33

## 2025-07-19 RX ADMIN — INSULIN LISPRO 2 UNITS: 100 INJECTION, SOLUTION INTRAVENOUS; SUBCUTANEOUS at 14:43

## 2025-07-19 RX ADMIN — CHLORHEXIDINE GLUCONATE 15 ML: 1.2 SOLUTION ORAL at 20:54

## 2025-07-19 RX ADMIN — HEPARIN SODIUM 5000 UNITS: 5000 INJECTION INTRAVENOUS; SUBCUTANEOUS at 05:52

## 2025-07-19 RX ADMIN — HEPARIN SODIUM 5000 UNITS: 5000 INJECTION INTRAVENOUS; SUBCUTANEOUS at 20:54

## 2025-07-19 RX ADMIN — ATORVASTATIN CALCIUM 20 MG: 20 TABLET, FILM COATED ORAL at 18:33

## 2025-07-19 RX ADMIN — INSULIN LISPRO 1 UNITS: 100 INJECTION, SOLUTION INTRAVENOUS; SUBCUTANEOUS at 18:34

## 2025-07-19 RX ADMIN — Medication 6 MG: at 20:54

## 2025-07-19 RX ADMIN — SENNOSIDES AND DOCUSATE SODIUM 2 TABLET: 50; 8.6 TABLET ORAL at 09:46

## 2025-07-19 RX ADMIN — MAGNESIUM OXIDE TAB 400 MG (241.3 MG ELEMENTAL MG) 400 MG: 400 (241.3 MG) TAB at 09:46

## 2025-07-19 NOTE — ASSESSMENT & PLAN NOTE
"Lab Results   Component Value Date    HGBA1C 6.7 (H) 07/08/2025       No results for input(s): \"POCGLU\" in the last 72 hours.    Blood Sugar Average: Last 72 hrs:    Well controlled  Add insulin sliding scale  "

## 2025-07-19 NOTE — ASSESSMENT & PLAN NOTE
History of A-fib on Xarelto presented as stroke alert after being found by family on the floor with left facial and left-sided weakness.  AC/AP reversed with Kcentra and DDAVP with CTA H/N notable for Large acute intraparenchymal hemorrhage centered involving the posterior right basal ganglia, adjacent right thalamus, and posterior right insula, with a volume of approximately 23 mL and subsequent MRI brain showing Acute to early subacute lacunar infarct in the right hemipons and Stable hemorrhage and surrounding vasogenic edema in the right corona radiata and temporal white matter.  Evaluated by neurosurgery with no interventions indicated  Evaluated by neurology and recommended to hold aspirin and resume AC on 8/4  Patient recommended for acute rehab.  Case management following  Stable pending placement.  CM is working on placement  7/19 stable for rehab  Check lab in a.m.

## 2025-07-19 NOTE — PROGRESS NOTES
"Progress Note - Hospitalist   Name: Shawn Marquez 75 y.o. male I MRN: [id number]  [facility] Date of Admission: 7/7/2025   Date of Service: 7/19/2025 I Hospital Day: 12    Assessment & Plan  ICH (intracerebral hemorrhage) (HCC)  History of A-fib on Xarelto presented as stroke alert after being found by family on the floor with left facial and left-sided weakness.  AC/AP reversed with Kcentra and DDAVP with CTA H/N notable for Large acute intraparenchymal hemorrhage centered involving the posterior right basal ganglia, adjacent right thalamus, and posterior right insula, with a volume of approximately 23 mL and subsequent MRI brain showing Acute to early subacute lacunar infarct in the right hemipons and Stable hemorrhage and surrounding vasogenic edema in the right corona radiata and temporal white matter.  Evaluated by neurosurgery with no interventions indicated  Evaluated by neurology and recommended to hold aspirin and resume AC on 8/4  Patient recommended for acute rehab.  Case management following  Stable pending placement.  CM is working on placement  7/19 stable for rehab  Check lab in a.m.  Essential hypertension  Blood pressure currently stable.  He is off Cardene drip  Continue amlodipine 10 mg daily, carvedilol 6.25 mg twice daily and losartan 100 mg daily  DM II (diabetes mellitus, type II), controlled ([facility])  Lab Results   Component Value Date    HGBA1C 6.7 (H) 07/08/2025       No results for input(s): \"POCGLU\" in the last 72 hours.    Blood Sugar Average: Last 72 hrs:    Well controlled  Add insulin sliding scale  History of DVT (deep vein thrombosis)  AC on hold as above  HLD (hyperlipidemia)  Continue atorvastatin  Atrial fibrillation (HCC)  Continue beta-blocker.  Hold AC as above  Right pontine stroke (HCC)      VTE Pharmacologic Prophylaxis:   Moderate Risk (Score 3-4) - Pharmacological DVT Prophylaxis Ordered: heparin.    Mobility:   Basic Mobility Inpatient Raw Score: " 11  JH-HLM Goal: 4: Move to chair/commode  JH-HLM Achieved: 4: Move to chair/commode  JH-HLM Goal achieved. Continue to encourage appropriate mobility.    Patient Centered Rounds: I performed bedside rounds with nursing staff today.   Discussions with Specialists or Other Care Team Provider: Nursing      Current Length of Stay: 12 day(s)  Current Patient Status: Inpatient   Certification Statement: The patient will continue to require additional inpatient hospital stay due to awaiting rehab placement      Code Status: Level 1 - Full Code    Subjective   Patient is comfortably sleeping in bed  Discussed with patient's nurse  No event overnight    Objective :  Temp:  [97.3 °F (36.3 °C)-98.5 °F (36.9 °C)] 98.5 °F (36.9 °C)  HR:  [59-85] 85  BP: (116-133)/(63-86) 133/86  Resp:  [17-18] 17  SpO2:  [97 %-98 %] 97 %  O2 Device: None (Room air)    Body mass index is 26.61 kg/m².     Input and Output Summary (last 24 hours):     Intake/Output Summary (Last 24 hours) at 7/19/2025 1218  Last data filed at 7/19/2025 0700  Gross per 24 hour   Intake --   Output 550 ml   Net -550 ml       Physical Exam  Patient is comfortably sleeping in bed  No respiratory distress  Tolerating oral diet    Lines/Drains:              Lab Results: I have reviewed the following results:                             Recent Cultures (last 7 days):         Imaging Results Review: No pertinent imaging studies reviewed.  Other Study Results Review: No additional pertinent studies reviewed.    Last 24 Hours Medication List:     Current Facility-Administered Medications:     acetaminophen (TYLENOL) tablet 650 mg, Q6H PRN    amLODIPine (NORVASC) tablet 10 mg, Daily    atorvastatin (LIPITOR) tablet 20 mg, Daily With Dinner    bisacodyl (DULCOLAX) rectal suppository 10 mg, Daily PRN    carvedilol (COREG) tablet 6.25 mg, BID With Meals    chlorhexidine (PERIDEX) 0.12 % oral rinse 15 mL, Q12H DEIDRE    heparin (porcine) subcutaneous injection 5,000 Units, Q8H  DEIDRE    hydrALAZINE (APRESOLINE) injection 10 mg, Q4H PRN    insulin lispro (HumALOG/ADMELOG) 100 units/mL subcutaneous injection 1-6 Units, TID AC **AND** Fingerstick Glucose (POCT), TID AC    losartan (COZAAR) tablet 100 mg, Daily    magnesium Oxide (MAG-OX) tablet 400 mg, BID    melatonin tablet 6 mg, HS    ondansetron (ZOFRAN) injection 4 mg, Q6H PRN    polyethylene glycol (MIRALAX) packet 17 g, Daily    senna-docusate sodium (SENOKOT S) 8.6-50 mg per tablet 2 tablet, BID    tamsulosin (FLOMAX) capsule 0.4 mg, Daily With Dinner    Administrative Statements   Today, Patient Was Seen By: Andrés Jackson DO      **Please Note: This note may have been constructed using a voice recognition system.**

## 2025-07-20 LAB
ANION GAP SERPL CALCULATED.3IONS-SCNC: 9 MMOL/L (ref 4–13)
BUN SERPL-MCNC: 13 MG/DL (ref 5–25)
CALCIUM SERPL-MCNC: 9.4 MG/DL (ref 8.4–10.2)
CHLORIDE SERPL-SCNC: 100 MMOL/L (ref 96–108)
CO2 SERPL-SCNC: 26 MMOL/L (ref 21–32)
CREAT SERPL-MCNC: 0.65 MG/DL (ref 0.6–1.3)
GFR SERPL CREATININE-BSD FRML MDRD: 95 ML/MIN/1.73SQ M
GLUCOSE SERPL-MCNC: 140 MG/DL (ref 65–140)
GLUCOSE SERPL-MCNC: 157 MG/DL (ref 65–140)
GLUCOSE SERPL-MCNC: 170 MG/DL (ref 65–140)
GLUCOSE SERPL-MCNC: 177 MG/DL (ref 65–140)
MAGNESIUM SERPL-MCNC: 2 MG/DL (ref 1.9–2.7)
POTASSIUM SERPL-SCNC: 4.1 MMOL/L (ref 3.5–5.3)
SODIUM SERPL-SCNC: 135 MMOL/L (ref 135–147)

## 2025-07-20 PROCEDURE — 99232 SBSQ HOSP IP/OBS MODERATE 35: CPT | Performed by: INTERNAL MEDICINE

## 2025-07-20 PROCEDURE — 82948 REAGENT STRIP/BLOOD GLUCOSE: CPT

## 2025-07-20 PROCEDURE — 80048 BASIC METABOLIC PNL TOTAL CA: CPT | Performed by: INTERNAL MEDICINE

## 2025-07-20 PROCEDURE — 83735 ASSAY OF MAGNESIUM: CPT | Performed by: INTERNAL MEDICINE

## 2025-07-20 RX ORDER — INSULIN GLARGINE 100 [IU]/ML
6 INJECTION, SOLUTION SUBCUTANEOUS
Status: DISCONTINUED | OUTPATIENT
Start: 2025-07-20 | End: 2025-07-25 | Stop reason: HOSPADM

## 2025-07-20 RX ADMIN — INSULIN LISPRO 1 UNITS: 100 INJECTION, SOLUTION INTRAVENOUS; SUBCUTANEOUS at 13:00

## 2025-07-20 RX ADMIN — AMLODIPINE BESYLATE 10 MG: 10 TABLET ORAL at 09:03

## 2025-07-20 RX ADMIN — POLYETHYLENE GLYCOL 3350 17 G: 17 POWDER, FOR SOLUTION ORAL at 09:02

## 2025-07-20 RX ADMIN — INSULIN GLARGINE 6 UNITS: 100 INJECTION, SOLUTION SUBCUTANEOUS at 22:01

## 2025-07-20 RX ADMIN — HEPARIN SODIUM 5000 UNITS: 5000 INJECTION INTRAVENOUS; SUBCUTANEOUS at 14:26

## 2025-07-20 RX ADMIN — ATORVASTATIN CALCIUM 20 MG: 20 TABLET, FILM COATED ORAL at 17:15

## 2025-07-20 RX ADMIN — CARVEDILOL 6.25 MG: 6.25 TABLET, FILM COATED ORAL at 17:15

## 2025-07-20 RX ADMIN — INSULIN LISPRO 1 UNITS: 100 INJECTION, SOLUTION INTRAVENOUS; SUBCUTANEOUS at 09:04

## 2025-07-20 RX ADMIN — INSULIN LISPRO 1 UNITS: 100 INJECTION, SOLUTION INTRAVENOUS; SUBCUTANEOUS at 17:22

## 2025-07-20 RX ADMIN — MAGNESIUM OXIDE TAB 400 MG (241.3 MG ELEMENTAL MG) 400 MG: 400 (241.3 MG) TAB at 09:03

## 2025-07-20 RX ADMIN — SENNOSIDES AND DOCUSATE SODIUM 2 TABLET: 50; 8.6 TABLET ORAL at 09:02

## 2025-07-20 RX ADMIN — HEPARIN SODIUM 5000 UNITS: 5000 INJECTION INTRAVENOUS; SUBCUTANEOUS at 22:01

## 2025-07-20 RX ADMIN — MAGNESIUM OXIDE TAB 400 MG (241.3 MG ELEMENTAL MG) 400 MG: 400 (241.3 MG) TAB at 17:15

## 2025-07-20 RX ADMIN — CHLORHEXIDINE GLUCONATE 15 ML: 1.2 SOLUTION ORAL at 09:02

## 2025-07-20 RX ADMIN — LOSARTAN POTASSIUM 100 MG: 50 TABLET, FILM COATED ORAL at 09:03

## 2025-07-20 RX ADMIN — TAMSULOSIN HYDROCHLORIDE 0.4 MG: 0.4 CAPSULE ORAL at 17:15

## 2025-07-20 RX ADMIN — CARVEDILOL 6.25 MG: 6.25 TABLET, FILM COATED ORAL at 09:02

## 2025-07-20 RX ADMIN — Medication 6 MG: at 22:01

## 2025-07-20 RX ADMIN — HEPARIN SODIUM 5000 UNITS: 5000 INJECTION INTRAVENOUS; SUBCUTANEOUS at 05:32

## 2025-07-20 NOTE — ASSESSMENT & PLAN NOTE
Lab Results   Component Value Date    HGBA1C 6.7 (H) 07/08/2025       Recent Labs     07/19/25  1236 07/19/25  1552 07/20/25  0643   POCGLU 208* 166* 170*       Blood Sugar Average: Last 72 hrs:  (P) [id number]  Continue with insulin sliding scale  Add Lantus at bedtime due to hyperglycemia

## 2025-07-20 NOTE — ASSESSMENT & PLAN NOTE
History of A-fib on Xarelto presented as stroke alert after being found by family on the floor with left facial and left-sided weakness.  AC/AP reversed with Kcentra and DDAVP with CTA H/N notable for Large acute intraparenchymal hemorrhage centered involving the posterior right basal ganglia, adjacent right thalamus, and posterior right insula, with a volume of approximately 23 mL and subsequent MRI brain showing Acute to early subacute lacunar infarct in the right hemipons and Stable hemorrhage and surrounding vasogenic edema in the right corona radiata and temporal white matter.  Evaluated by neurosurgery with no interventions indicated  Evaluated by neurology and recommended to hold aspirin and resume AC on 8/4  Patient recommended for acute rehab.  Case management following  Stable pending placement.  CM is working on placement  7/20 stable for rehab

## 2025-07-20 NOTE — PROGRESS NOTES
Progress Note - Hospitalist   Name: Shawn Marquez 75 y.o. male I MRN: [id number]  [facility] I Date of Admission: 7/7/2025   Date of Service: 7/20/2025 I Hospital Day: 13    Assessment & Plan  ICH (intracerebral hemorrhage) (HCC)  History of A-fib on Xarelto presented as stroke alert after being found by family on the floor with left facial and left-sided weakness.  AC/AP reversed with Kcentra and DDAVP with CTA H/N notable for Large acute intraparenchymal hemorrhage centered involving the posterior right basal ganglia, adjacent right thalamus, and posterior right insula, with a volume of approximately 23 mL and subsequent MRI brain showing Acute to early subacute lacunar infarct in the right hemipons and Stable hemorrhage and surrounding vasogenic edema in the right corona radiata and temporal white matter.  Evaluated by neurosurgery with no interventions indicated  Evaluated by neurology and recommended to hold aspirin and resume AC on 8/4  Patient recommended for acute rehab.  Case management following  Stable pending placement.  CM is working on placement  7/20 stable for rehab  Essential hypertension  Blood pressure currently stable.  He is off Cardene drip  Continue amlodipine 10 mg daily, carvedilol 6.25 mg twice daily and losartan 100 mg daily  DM II (diabetes mellitus, type II), controlled ([facility])  Lab Results   Component Value Date    HGBA1C 6.7 (H) 07/08/2025       Recent Labs     07/19/25  1236 07/19/25  1552 07/20/25  0643   POCGLU 208* 166* 170*       Blood Sugar Average: Last 72 hrs:  (P) 181.7578676449087958  Continue with insulin sliding scale  Add Lantus at bedtime due to hyperglycemia  History of DVT (deep vein thrombosis)  AC on hold as above  HLD (hyperlipidemia)  Continue atorvastatin  Atrial fibrillation (HCC)  Continue beta-blocker.  Hold AC as above  Right pontine stroke (HCC)      VTE Pharmacologic Prophylaxis:   Moderate Risk (Score 3-4) - Pharmacological DVT Prophylaxis  Ordered: heparin.    Mobility:   Basic Mobility Inpatient Raw Score: 11  JH-HLM Goal: 4: Move to chair/commode  JH-HLM Achieved: 4: Move to chair/commode  JH-HLM Goal achieved. Continue to encourage appropriate mobility.    Patient Centered Rounds: I performed bedside rounds with nursing staff today.   Discussions with Specialists or Other Care Team Provider: Nursing    Education and Discussions with Family / Patient: Patient.     Current Length of Stay: 13 day(s)  Current Patient Status: Inpatient   Certification Statement: The patient will continue to require additional inpatient hospital stay due to awaiting rehab placement      Code Status: Level 1 - Full Code    Subjective   Patient seen and examined  No complaints  No event overnight    Lab work reviewed  Objective :  Temp:  [98.2 °F (36.8 °C)-98.8 °F (37.1 °C)] 98.2 °F (36.8 °C)  HR:  [57-85] 64  BP: (109-151)/(68-89) 151/89  Resp:  [16-18] 18  SpO2:  [97 %-98 %] 97 %  O2 Device: None (Room air)    Body mass index is 26.61 kg/m².     Input and Output Summary (last 24 hours):     Intake/Output Summary (Last 24 hours) at 7/20/2025 0820  Last data filed at 7/20/2025 0500  Gross per 24 hour   Intake --   Output 1375 ml   Net -1375 ml       Physical Exam  Patient is awake alert  in no acute distress  Comfortable in bed   Lungs clear to oscillation bilateral  Heart no murmur  Abdomen soft  Lower extremities no edema    Lines/Drains:              Lab Results: I have reviewed the following results:       Results from last 7 days   Lab Units 07/20/25  0443   SODIUM mmol/L 135   POTASSIUM mmol/L 4.1   CHLORIDE mmol/L 100   CO2 mmol/L 26   BUN mg/dL 13   CREATININE mg/dL 0.65   ANION GAP mmol/L 9   CALCIUM mg/dL 9.4   GLUCOSE RANDOM mg/dL 140         Results from last 7 days   Lab Units 07/20/25  0643 07/19/25  1552 07/19/25  1236   POC GLUCOSE mg/dl 170* 166* 208*               Recent Cultures (last 7 days):         Imaging Results Review: No pertinent imaging studies  reviewed.  Other Study Results Review: No additional pertinent studies reviewed.    Last 24 Hours Medication List:     Current Facility-Administered Medications:     acetaminophen (TYLENOL) tablet 650 mg, Q6H PRN    amLODIPine (NORVASC) tablet 10 mg, Daily    atorvastatin (LIPITOR) tablet 20 mg, Daily With Dinner    bisacodyl (DULCOLAX) rectal suppository 10 mg, Daily PRN    carvedilol (COREG) tablet 6.25 mg, BID With Meals    chlorhexidine (PERIDEX) 0.12 % oral rinse 15 mL, Q12H DEIDRE    heparin (porcine) subcutaneous injection 5,000 Units, Q8H DEIDRE    hydrALAZINE (APRESOLINE) injection 10 mg, Q4H PRN    insulin lispro (HumALOG/ADMELOG) 100 units/mL subcutaneous injection 1-6 Units, TID AC **AND** Fingerstick Glucose (POCT), TID AC    losartan (COZAAR) tablet 100 mg, Daily    magnesium Oxide (MAG-OX) tablet 400 mg, BID    melatonin tablet 6 mg, HS    ondansetron (ZOFRAN) injection 4 mg, Q6H PRN    polyethylene glycol (MIRALAX) packet 17 g, Daily    senna-docusate sodium (SENOKOT S) 8.6-50 mg per tablet 2 tablet, BID    tamsulosin (FLOMAX) capsule 0.4 mg, Daily With Dinner    Administrative Statements   Today, Patient Was Seen By: Andrés Jackson DO      **Please Note: This note may have been constructed using a voice recognition system.**

## 2025-07-21 LAB
GLUCOSE SERPL-MCNC: 138 MG/DL (ref 65–140)
GLUCOSE SERPL-MCNC: 144 MG/DL (ref 65–140)
GLUCOSE SERPL-MCNC: 160 MG/DL (ref 65–140)
GLUCOSE SERPL-MCNC: 198 MG/DL (ref 65–140)

## 2025-07-21 PROCEDURE — 99232 SBSQ HOSP IP/OBS MODERATE 35: CPT | Performed by: STUDENT IN AN ORGANIZED HEALTH CARE EDUCATION/TRAINING PROGRAM

## 2025-07-21 PROCEDURE — 82948 REAGENT STRIP/BLOOD GLUCOSE: CPT

## 2025-07-21 PROCEDURE — 97530 THERAPEUTIC ACTIVITIES: CPT

## 2025-07-21 PROCEDURE — 97112 NEUROMUSCULAR REEDUCATION: CPT

## 2025-07-21 RX ADMIN — AMLODIPINE BESYLATE 10 MG: 10 TABLET ORAL at 09:18

## 2025-07-21 RX ADMIN — CARVEDILOL 6.25 MG: 6.25 TABLET, FILM COATED ORAL at 09:20

## 2025-07-21 RX ADMIN — Medication 6 MG: at 21:13

## 2025-07-21 RX ADMIN — INSULIN GLARGINE 6 UNITS: 100 INJECTION, SOLUTION SUBCUTANEOUS at 21:13

## 2025-07-21 RX ADMIN — MAGNESIUM OXIDE TAB 400 MG (241.3 MG ELEMENTAL MG) 400 MG: 400 (241.3 MG) TAB at 09:18

## 2025-07-21 RX ADMIN — HEPARIN SODIUM 5000 UNITS: 5000 INJECTION INTRAVENOUS; SUBCUTANEOUS at 05:10

## 2025-07-21 RX ADMIN — CARVEDILOL 6.25 MG: 6.25 TABLET, FILM COATED ORAL at 15:42

## 2025-07-21 RX ADMIN — HEPARIN SODIUM 5000 UNITS: 5000 INJECTION INTRAVENOUS; SUBCUTANEOUS at 21:13

## 2025-07-21 RX ADMIN — LOSARTAN POTASSIUM 100 MG: 50 TABLET, FILM COATED ORAL at 09:18

## 2025-07-21 RX ADMIN — HEPARIN SODIUM 5000 UNITS: 5000 INJECTION INTRAVENOUS; SUBCUTANEOUS at 14:45

## 2025-07-21 RX ADMIN — INSULIN LISPRO 2 UNITS: 100 INJECTION, SOLUTION INTRAVENOUS; SUBCUTANEOUS at 12:58

## 2025-07-21 RX ADMIN — TAMSULOSIN HYDROCHLORIDE 0.4 MG: 0.4 CAPSULE ORAL at 15:42

## 2025-07-21 RX ADMIN — ATORVASTATIN CALCIUM 20 MG: 20 TABLET, FILM COATED ORAL at 15:42

## 2025-07-21 RX ADMIN — MAGNESIUM OXIDE TAB 400 MG (241.3 MG ELEMENTAL MG) 400 MG: 400 (241.3 MG) TAB at 17:30

## 2025-07-21 NOTE — ASSESSMENT & PLAN NOTE
History of A-fib on Xarelto presented as stroke alert after being found by family on the floor with left facial and left-sided weakness.  AC/AP reversed with Kcentra and DDAVP with CTA H/N notable for Large acute intraparenchymal hemorrhage centered involving the posterior right basal ganglia, adjacent right thalamus, and posterior right insula, with a volume of approximately 23 mL and subsequent MRI brain showing Acute to early subacute lacunar infarct in the right hemipons and Stable hemorrhage and surrounding vasogenic edema in the right corona radiata and temporal white matter.  Evaluated by neurosurgery with no interventions indicated  Evaluated by neurology and recommended to discontinue aspirin and plan to resume Xarelto on 8/4  Patient recommended for acute rehab.  Case management following  Stable pending placement.  CM is working on placement  7/20 stable for rehab

## 2025-07-21 NOTE — RESTORATIVE TECHNICIAN NOTE
Restorative Technician Note      Patient Name: Shawn Marquez     Note Type: Mobility  Patient Position Upon Consult: Supine  Activity Performed: Ambulated; Transferred; Dangled; Stood; Range of motion; Repositioned  Assistive Device: Other (Comment) (Assist x2 to sit EOB/OOB to the chair/STS at handrail x3 and ambulate with chair follow. Assisted PT Saba.)  Education Provided: Yes  Patient Position at End of Consult: Bedside chair; All needs within reach; Bed/Chair alarm activated    Jerod CHAN, Restorative Technician,

## 2025-07-21 NOTE — PHYSICAL THERAPY NOTE
PHYSICAL THERAPY NOTE          Patient Name: Shawn Marquez  Today's Date: 7/21/2025 07/21/25 1224   PT Last Visit   PT Visit Date 07/21/25   Note Type   Note Type Treatment   Pain Assessment   Pain Assessment Tool 0-10   Pain Score No Pain   Restrictions/Precautions   Weight Bearing Precautions Per Order No   Braces or Orthoses Other (Comment)  (LUE sling for transfers)   Other Precautions Chair Alarm;Impulsive;Cognitive;Bed Alarm;Multiple lines;Fall Risk  (L hemiparesis)   General   Chart Reviewed Yes   Response to Previous Treatment Patient with no complaints from previous session.   Family/Caregiver Present No   Cognition   Overall Cognitive Status Impaired   Arousal/Participation Responsive;Arousable;Cooperative   Attention Attends with cues to redirect   Orientation Level Oriented to person;Oriented to place;Oriented to situation   Memory Decreased recall of recent events;Decreased recall of precautions   Following Commands Follows one step commands with increased time or repetition   Comments pt pleasant and cooperative   Subjective   Subjective pt agreeable to mobilize   Bed Mobility   Supine to Sit 3  Moderate assistance   Additional items Assist x 1;Increased time required;Verbal cues;LE management;Bedrails   Sit to Supine Unable to assess   Additional Comments pt OOB in chair at end of session   Transfers   Sit to Stand 2  Maximal assistance   Additional items Assist x 2;Increased time required;Verbal cues   Stand to Sit 2  Maximal assistance   Additional items Assist x 2;Increased time required;Verbal cues   Stand pivot 2  Maximal assistance   Additional items Assist x 2;Increased time required;Verbal cues   Additional Comments x5 STS total throughout session. x3 at handrail in hallway, b/l knee block. heavy pushing to L   Ambulation/Elevation   Gait pattern Improper Weight shift;L Knee Bess;R Foot  drag;Decreased foot clearance;Short stride;Step to;Ataxia;Excessively slow;Knees flexed   Gait Assistance 2  Maximal assist  (+chair follow)   Additional items Assist x 2;Verbal cues   Assistive Device Other (Comment)  (b/l HHA)   Distance 2' forwards   Stair Management Assistance Not tested   Ambulation/Elevation Additional Comments requires assistance to weight shift as well as L knee block and assistance for RLE advancement. very heavy lean/psuhing to L side   Balance   Static Sitting Poor +   Dynamic Sitting Poor   Static Standing Poor -   Dynamic Standing Poor -   Ambulatory Poor -   Endurance Deficit   Endurance Deficit Yes   Endurance Deficit Description pt limited by weakness, fatigue and decreased activity tolerance   Activity Tolerance   Activity Tolerance Patient limited by fatigue   Medical Staff Made Aware restorative Jerod   Nurse Made Aware yes-RN cleared   Exercises   Knee AROM Long Arc Quad Sitting;10 reps;AROM;Bilateral   Neuro re-ed pt stting EOB for 12 minutes with mod-max A demonstrating heavy L lateral lean requiring cues to correct   Assessment   Prognosis Fair   Problem List Decreased strength;Decreased endurance;Impaired balance;Decreased mobility;Decreased cognition;Decreased safety awareness;Decreased coordination;Impaired judgement;Impaired tone   Assessment Pt pleasant and agreeable to participate in PT session. Completes mobility and therapeutic activity as outlined above. Pt requires mod-max A to sit EOB due to L lateral lean requiring constant verbal, tactile and visual cues to correct. Pt continues to requires max Ax2 to stand pivot to drop arm chair. Pt able to progress to mod Ax2 standing at the handrail in hallway with RUE support and L knee block. Pt able to stand for ~60 seconds for 3 trials. Pt requires cues to maintain upright posture and avoid leaning to L in standing. Attempted to take steps forwards with chair follow however pt very unsteady requiring max Ax2 to advance  RLE and control heavy L lateral lean. Pt able to advance LLE however demonstrates ataxia. Pt is progressing towards his goals and will continue to benefit from skilled acute PT services to address deficits and promote mobility. Pt left upright in chair with chair alarm donned, call bell and personal items within reach and all needs met.   Barriers to Discharge Inaccessible home environment;Decreased caregiver support   Goals   Patient Goals to improve   STG Expiration Date 08/04/25  (goals extended, remain appropriate for pt at this time)   PT Treatment Day 6   Plan   Treatment/Interventions Functional transfer training;Therapeutic exercise;LE strengthening/ROM;Cognitive reorientation;Patient/family training;Equipment eval/education;Endurance training;Gait training;Compensatory technique education;Bed mobility;Continued evaluation;Spoke to nursing   Progress Progressing toward goals   PT Frequency 3-5x/wk   Discharge Recommendation   Rehab Resource Intensity Level, PT I (Maximum Resource Intensity)   AM-PAC Basic Mobility Inpatient   Turning in Flat Bed Without Bedrails 3   Lying on Back to Sitting on Edge of Flat Bed Without Bedrails 2   Moving Bed to Chair 1   Standing Up From Chair Using Arms 2   Walk in Room 1   Climb 3-5 Stairs With Railing 1   Basic Mobility Inpatient Raw Score 10   Turning Head Towards Sound 3   Follow Simple Instructions 3   Low Function Basic Mobility Raw Score  16   Low Function Basic Mobility Standardized Score  25.72   [facility] Highest Level Of Mobility   -[facility] Goal 4: Move to chair/commode   -[facility] Achieved 5: Stand (1 or more minutes)   Education   Education Provided Mobility training;Home exercise program   Patient Reinforcement needed   End of Consult   Patient Position at End of Consult Bedside chair;Bed/Chair alarm activated;All needs within reach   Saba Thomas DPT

## 2025-07-21 NOTE — PROGRESS NOTES
Progress Note - Hospitalist   Name: Shawn Marquez 75 y.o. male I MRN: [id number]  Unit/Bed#: [facility] 734-01 I Date of Admission: 7/7/2025   Date of Service: 7/21/2025 I Hospital Day: 14    Assessment & Plan  ICH (intracerebral hemorrhage) (HCC)  History of A-fib on Xarelto presented as stroke alert after being found by family on the floor with left facial and left-sided weakness.  AC/AP reversed with Kcentra and DDAVP with CTA H/N notable for Large acute intraparenchymal hemorrhage centered involving the posterior right basal ganglia, adjacent right thalamus, and posterior right insula, with a volume of approximately 23 mL and subsequent MRI brain showing Acute to early subacute lacunar infarct in the right hemipons and Stable hemorrhage and surrounding vasogenic edema in the right corona radiata and temporal white matter.  Evaluated by neurosurgery with no interventions indicated  Evaluated by neurology and recommended to discontinue aspirin and plan to resume Xarelto on 8/4  Patient recommended for acute rehab.  Case management following  Stable pending placement.  CM is working on placement  7/20 stable for rehab  Essential hypertension  Blood pressure currently stable.  He is off Cardene drip  Continue amlodipine 10 mg daily, carvedilol 6.25 mg twice daily and losartan 100 mg daily  DM II (diabetes mellitus, type II), controlled ([facility])  Lab Results   Component Value Date    HGBA1C 6.7 (H) 07/08/2025       Recent Labs     07/20/25  1107 07/20/25  1619 07/21/25  0646 07/21/25  1136   POCGLU 177* 157* 144* 198*       Blood Sugar Average: Last 72 hrs:  (P) 174.2925697249228592    Continue Lantus 6 units at bedtime with mealtime correctional scale    History of DVT (deep vein thrombosis)  AC on hold as above  HLD (hyperlipidemia)  Continue atorvastatin  Atrial fibrillation (HCC)  Continue beta-blocker.  Hold AC as above  Right pontine stroke (HCC)      VTE Pharmacologic Prophylaxis:   Moderate Risk (Score 3-4) -  Pharmacological DVT Prophylaxis Ordered: heparin.    Mobility:   Basic Mobility Inpatient Raw Score: 11  JH-HLM Goal: 4: Move to chair/commode  JH-HLM Achieved: 6: Walk 10 steps or more  JH-HLM Goal achieved. Continue to encourage appropriate mobility.    Patient Centered Rounds: I performed bedside rounds with nursing staff today.   Discussions with Specialists or Other Care Team Provider: RN    Education and Discussions with Family / Patient: Updated  (wife) at bedside.    Current Length of Stay: 14 day(s)  Current Patient Status: Inpatient   Certification Statement: The patient will continue to require additional inpatient hospital stay due to awaiting placement to rehab for safe discharge  Discharge Plan: Medically stable, awaiting placement to rehab for safe discharge    Code Status: Level 1 - Full Code    Subjective   Patient assessed at bedside.  Offers no complaints.    Objective :  Temp:  [97.6 °F (36.4 °C)-98.3 °F (36.8 °C)] 97.6 °F (36.4 °C)  HR:  [56-80] 60  BP: ()/(66-81) 121/79  Resp:  [15] 15  SpO2:  [95 %-99 %] 98 %  O2 Device: None (Room air)    Body mass index is 26.79 kg/m².     Input and Output Summary (last 24 hours):     Intake/Output Summary (Last 24 hours) at 7/21/2025 1440  Last data filed at 7/21/2025 0729  Gross per 24 hour   Intake 640 ml   Output 125 ml   Net 515 ml       Physical Exam  Vitals reviewed.   Constitutional:       General: He is not in acute distress.     Appearance: Normal appearance. He is not ill-appearing.     Cardiovascular:      Rate and Rhythm: Normal rate and regular rhythm.      Heart sounds: Normal heart sounds.   Pulmonary:      Effort: Pulmonary effort is normal. No respiratory distress.      Breath sounds: No wheezing or rales.   Abdominal:      Palpations: Abdomen is soft.      Tenderness: There is no abdominal tenderness.     Musculoskeletal:      Right lower leg: No edema.      Left lower leg: No edema.     Neurological:      Mental  Status: He is alert and oriented to person, place, and time. Mental status is at baseline.      Motor: Weakness (Left-sided weakness, more predominant in the upper extremity) present.           Lines/Drains:              Lab Results: I have reviewed the following results:       Results from last 7 days   Lab Units 07/20/25  0443   SODIUM mmol/L 135   POTASSIUM mmol/L 4.1   CHLORIDE mmol/L 100   CO2 mmol/L 26   BUN mg/dL 13   CREATININE mg/dL 0.65   ANION GAP mmol/L 9   CALCIUM mg/dL 9.4   GLUCOSE RANDOM mg/dL 140         Results from last 7 days   Lab Units 07/21/25  1136 07/21/25  0646 07/20/25  1619 07/20/25  1107 07/20/25  0643 07/19/25  1552 07/19/25  1236   POC GLUCOSE mg/dl 198* 144* 157* 177* 170* 166* 208*               Recent Cultures (last 7 days):         Imaging Results Review: I reviewed radiology reports from this admission including: MRI brain.  Other Study Results Review: No additional pertinent studies reviewed.    Last 24 Hours Medication List:     Current Facility-Administered Medications:     acetaminophen (TYLENOL) tablet 650 mg, Q6H PRN    amLODIPine (NORVASC) tablet 10 mg, Daily    atorvastatin (LIPITOR) tablet 20 mg, Daily With Dinner    bisacodyl (DULCOLAX) rectal suppository 10 mg, Daily PRN    carvedilol (COREG) tablet 6.25 mg, BID With Meals    chlorhexidine (PERIDEX) 0.12 % oral rinse 15 mL, Q12H DEIDRE    heparin (porcine) subcutaneous injection 5,000 Units, Q8H DEIDRE    hydrALAZINE (APRESOLINE) injection 10 mg, Q4H PRN    insulin glargine (LANTUS) subcutaneous injection 6 Units 0.06 mL, HS    insulin lispro (HumALOG/ADMELOG) 100 units/mL subcutaneous injection 1-6 Units, TID AC **AND** Fingerstick Glucose (POCT), TID AC    losartan (COZAAR) tablet 100 mg, Daily    magnesium Oxide (MAG-OX) tablet 400 mg, BID    melatonin tablet 6 mg, HS    ondansetron (ZOFRAN) injection 4 mg, Q6H PRN    polyethylene glycol (MIRALAX) packet 17 g, Daily    senna-docusate sodium (SENOKOT S) 8.6-50 mg per tablet  2 tablet, BID    tamsulosin (FLOMAX) capsule 0.4 mg, Daily With Dinner    Administrative Statements   Today, Patient Was Seen By: Luzmaria Jin, DO  I have spent a total time of 35 minutes in caring for this patient on the day of the visit/encounter including Impressions, Counseling / Coordination of care, Documenting in the medical record, Reviewing/placing orders in the medical record (including tests, medications, and/or procedures), Obtaining or reviewing history  , and Communicating with other healthcare professionals .    **Please Note: This note may have been constructed using a voice recognition system.**

## 2025-07-21 NOTE — PLAN OF CARE
Problem: PHYSICAL THERAPY ADULT  Goal: Performs mobility at highest level of function for planned discharge setting.  See evaluation for individualized goals.  Description: Treatment/Interventions: ADL retraining, Functional transfer training, LE strengthening/ROM, Elevations, Therapeutic exercise, Endurance training, Cognitive reorientation, Equipment eval/education, Patient/family training, Bed mobility, Gait training, Compensatory technique education, Spoke to nursing, Spoke to advanced practitioner, OT  Equipment Recommended:  (TBD)       See flowsheet documentation for full assessment, interventions and recommendations.  Outcome: Progressing  Note: Prognosis: Fair  Problem List: Decreased strength, Decreased endurance, Impaired balance, Decreased mobility, Decreased cognition, Decreased safety awareness, Decreased coordination, Impaired judgement, Impaired tone  Assessment: Pt pleasant and agreeable to participate in PT session. Completes mobility and therapeutic activity as outlined above. Pt requires mod-max A to sit EOB due to L lateral lean requiring constant verbal, tactile and visual cues to correct. Pt continues to requires max Ax2 to stand pivot to drop arm chair. Pt able to progress to mod Ax2 standing at the handrail in hallway with RUE support and L knee block. Pt able to stand for ~60 seconds for 3 trials. Pt requires cues to maintain upright posture and avoid leaning to L in standing. Attempted to take steps forwards with chair follow however pt very unsteady requiring max Ax2 to advance RLE and control heavy L lateral lean. Pt able to advance LLE however demonstrates ataxia. Pt is progressing towards his goals and will continue to benefit from skilled acute PT services to address deficits and promote mobility. Pt left upright in chair with chair alarm donned, call bell and personal items within reach and all needs met.  Barriers to Discharge: Inaccessible home environment, Decreased caregiver  support     Rehab Resource Intensity Level, PT: I (Maximum Resource Intensity)    See flowsheet documentation for full assessment.

## 2025-07-21 NOTE — ASSESSMENT & PLAN NOTE
Lab Results   Component Value Date    HGBA1C 6.7 (H) 07/08/2025       Recent Labs     07/20/25  1107 07/20/25  1619 07/21/25  0646 07/21/25  1136   POCGLU 177* 157* 144* 198*       Blood Sugar Average: Last 72 hrs:  (P) 174.7954358421995424    Continue Lantus 6 units at bedtime with mealtime correctional scale

## 2025-07-22 LAB
GLUCOSE SERPL-MCNC: 124 MG/DL (ref 65–140)
GLUCOSE SERPL-MCNC: 138 MG/DL (ref 65–140)
GLUCOSE SERPL-MCNC: 165 MG/DL (ref 65–140)
GLUCOSE SERPL-MCNC: 217 MG/DL (ref 65–140)

## 2025-07-22 PROCEDURE — 97535 SELF CARE MNGMENT TRAINING: CPT

## 2025-07-22 PROCEDURE — 99232 SBSQ HOSP IP/OBS MODERATE 35: CPT | Performed by: STUDENT IN AN ORGANIZED HEALTH CARE EDUCATION/TRAINING PROGRAM

## 2025-07-22 PROCEDURE — 82948 REAGENT STRIP/BLOOD GLUCOSE: CPT

## 2025-07-22 RX ORDER — METHOCARBAMOL 500 MG/1
500 TABLET, FILM COATED ORAL EVERY 8 HOURS PRN
Status: DISCONTINUED | OUTPATIENT
Start: 2025-07-22 | End: 2025-07-25 | Stop reason: HOSPADM

## 2025-07-22 RX ORDER — LIDOCAINE 50 MG/G
1 PATCH TOPICAL DAILY
Status: DISCONTINUED | OUTPATIENT
Start: 2025-07-22 | End: 2025-07-25 | Stop reason: HOSPADM

## 2025-07-22 RX ADMIN — ATORVASTATIN CALCIUM 20 MG: 20 TABLET, FILM COATED ORAL at 15:37

## 2025-07-22 RX ADMIN — TAMSULOSIN HYDROCHLORIDE 0.4 MG: 0.4 CAPSULE ORAL at 15:37

## 2025-07-22 RX ADMIN — HEPARIN SODIUM 5000 UNITS: 5000 INJECTION INTRAVENOUS; SUBCUTANEOUS at 05:38

## 2025-07-22 RX ADMIN — MAGNESIUM OXIDE TAB 400 MG (241.3 MG ELEMENTAL MG) 400 MG: 400 (241.3 MG) TAB at 17:03

## 2025-07-22 RX ADMIN — INSULIN GLARGINE 6 UNITS: 100 INJECTION, SOLUTION SUBCUTANEOUS at 21:35

## 2025-07-22 RX ADMIN — SENNOSIDES AND DOCUSATE SODIUM 2 TABLET: 50; 8.6 TABLET ORAL at 17:03

## 2025-07-22 RX ADMIN — MAGNESIUM OXIDE TAB 400 MG (241.3 MG ELEMENTAL MG) 400 MG: 400 (241.3 MG) TAB at 08:00

## 2025-07-22 RX ADMIN — HEPARIN SODIUM 5000 UNITS: 5000 INJECTION INTRAVENOUS; SUBCUTANEOUS at 13:21

## 2025-07-22 RX ADMIN — LIDOCAINE 1 PATCH: 50 PATCH CUTANEOUS at 13:21

## 2025-07-22 RX ADMIN — CARVEDILOL 6.25 MG: 6.25 TABLET, FILM COATED ORAL at 15:37

## 2025-07-22 RX ADMIN — INSULIN LISPRO 2 UNITS: 100 INJECTION, SOLUTION INTRAVENOUS; SUBCUTANEOUS at 11:32

## 2025-07-22 RX ADMIN — INSULIN LISPRO 1 UNITS: 100 INJECTION, SOLUTION INTRAVENOUS; SUBCUTANEOUS at 07:56

## 2025-07-22 RX ADMIN — SENNOSIDES AND DOCUSATE SODIUM 2 TABLET: 50; 8.6 TABLET ORAL at 08:00

## 2025-07-22 RX ADMIN — CARVEDILOL 6.25 MG: 6.25 TABLET, FILM COATED ORAL at 08:00

## 2025-07-22 RX ADMIN — Medication 6 MG: at 21:35

## 2025-07-22 RX ADMIN — AMLODIPINE BESYLATE 10 MG: 10 TABLET ORAL at 08:00

## 2025-07-22 RX ADMIN — POLYETHYLENE GLYCOL 3350 17 G: 17 POWDER, FOR SOLUTION ORAL at 08:14

## 2025-07-22 RX ADMIN — LOSARTAN POTASSIUM 100 MG: 50 TABLET, FILM COATED ORAL at 08:00

## 2025-07-22 RX ADMIN — HEPARIN SODIUM 5000 UNITS: 5000 INJECTION INTRAVENOUS; SUBCUTANEOUS at 21:35

## 2025-07-22 NOTE — OCCUPATIONAL THERAPY NOTE
Occupational Therapy Progress Note     Patient Name: Shawn Marquez  Today's Date: 7/22/2025  Problem List  Principal Problem:    ICH (intracerebral hemorrhage) (HCC)  Active Problems:    Essential hypertension    DM II (diabetes mellitus, type II), controlled (HCC)    Encounter for skin care    History of DVT (deep vein thrombosis)    HLD (hyperlipidemia)    Depression    Seizure-like activity (HCC)    Cerebral edema (HCC)    Brain compression (HCC)    Atrial fibrillation (HCC)    HTN (hypertension)    Anticoagulated on rivaroxaban    Hemineglect    Hemiparesis (HCC)    Right pontine stroke (HCC)       OCCUPATIONAL THERAPY       07/22/25 1030   OT Last Visit   OT Visit Date 07/22/25   Note Type   Note Type Treatment for insurance authorization   Pain Assessment   Pain Assessment Tool 0-10   Pain Score No Pain   Restrictions/Precautions   Weight Bearing Precautions Per Order No   Braces or Orthoses Other (Comment)  (LUE sling for transfers)   Other Precautions Chair Alarm;Bed Alarm   Lifestyle   Autonomy I w/ ADLS, I w/ meds but assist w/ all other IADLS, (-) , I w/ transfers and functional mobility PTA   Reciprocal Relationships pt lives w/ his spouse   Service to Others retired   Intrinsic Gratification TV   ADL   Where Assessed Edge of bed  (and seated at sink)   Grooming Assistance 4  Minimal Assistance   Grooming Deficit Wash/dry face;Wash/dry hands;Teeth care   UB Bathing Assistance 4  Minimal Assistance   UB Bathing Deficit Left arm   UB Bathing Comments HOHA with RUE   LB Bathing Assistance 2  Maximal Assistance   UB Dressing Assistance 3  Moderate Assistance   UB Dressing Deficit Thread RUE;Thread LUE;Pull around back   LB Dressing Assistance 3  Moderate Assistance   Toileting Assistance  2  Maximal Assistance   Bed Mobility   Supine to Sit 3  Moderate assistance   Additional items Assist x 1   Transfers   Sit to Stand 2  Maximal assistance   Additional items Assist x 2   Stand to Sit 2  Maximal  "assistance   Additional items Assist x 2   Stand pivot 2  Maximal assistance   Additional items Assist x 2   Neuromuscular Education   Weight Bearing Technique Yes   LUE Weight Bearing Forearm seated   Response to Weight Bearing Technique Improved attention to LUE   Subjective   Subjective \"I have trouble focusing, I have been told\"   Cognition   Overall Cognitive Status Impaired   Arousal/Participation Responsive;Arousable;Cooperative   Attention Attends with cues to redirect   Orientation Level Oriented to person;Oriented to place;Oriented to situation   Memory Decreased recall of recent events;Decreased recall of precautions   Following Commands Follows one step commands with increased time or repetition   Activity Tolerance   Activity Tolerance Patient tolerated treatment well   Assessment   Assessment Pt seen for Occupational Therapy session with focus on activity tolerance, bed mob, functional transfers, sitting balance and tolerance and standing tolerance and balance for pt engagement in UB/LB self-care tasks and neuromuscular re-education to LUE. Pt cleared by RN/Rashard  for pt participated in OT session. Pt presented supine/HOB raised pt awake/alert and agreeable to participate in therapy following pt identifiers confirmed. Pt did not report a therapy goal this session however pt was pleasant and cooperative with all therapy requests.Pt required assist for bed mob, and UB/LB self-care tasks 2* balance and coordination.  He was able to tolerate sitting EOB LUE neuromuscular re-education/weightbearing and for grooming however required min /mod assist to maintain balance and coordination. Pt continues to require assistancve x 2 for functional transfers/stand pivot to bedside chair 2* pt coordination and balance deficits. Pt remains appropriate for    I ([employer]) Pt set up to bedside chair post session, chair alarm activated and all needs within pt reach   Plan   Treatment Interventions ADL retraining "   Goal Expiration Date 07/25/25   OT Treatment Day 2   OT Frequency 2-3x/wk   Discharge Recommendation   Rehab Resource Intensity Level, OT I (Maximum Resource Intensity)   AM-PAC Daily Activity Inpatient   Lower Body Dressing 2   Bathing 2   Toileting 2   Upper Body Dressing 2   Grooming 3   Eating 4   Daily Activity Raw Score 15   Daily Activity Standardized Score (Calc for Raw Score >=11) 34.69   AM-PAC Applied Cognition Inpatient   Following a Speech/Presentation 3   Understanding Ordinary Conversation 3   Taking Medications 3   Remembering Where Things Are Placed or Put Away 2   Remembering List of 4-5 Errands 2   Taking Care of Complicated Tasks 1   Applied Cognition Raw Score 14   Applied Cognition Standardized Score 32.02   Barthel Index   Feeding 5   Bathing 0   Grooming Score 0   Dressing Score 5   Bladder Score 0   Bowels Score 0   Toilet Use Score 0   Transfers (Bed/Chair) Score 5   Mobility (Level Surface) Score 0   Stairs Score 0   Barthel Index Score 15   Modified Upton Scale   Modified Yuli Scale 4             Linda Pereira OTR/L

## 2025-07-22 NOTE — ASSESSMENT & PLAN NOTE
Lab Results   Component Value Date    HGBA1C 6.7 (H) 07/08/2025       Recent Labs     07/21/25  1541 07/21/25  2108 07/22/25  0635 07/22/25  1034   POCGLU 138 160* 165* 217*   Blood Sugar Average: Last 72 hrs:  (P) 172.1634066784963168  Continue Lantus 6 units at bedtime with mealtime correctional scale

## 2025-07-22 NOTE — RESTORATIVE TECHNICIAN NOTE
Restorative Technician Note      Patient Name: Shawn Marquez     Note Type: Mobility  Patient Position Upon Consult: Supine  Activity Performed: Ambulated; Dangled; Stood  Assistive Device: Other (Comment) (Assist x2 to sit EOB/OOB to the chair. Assisted OT Linda.)  Education Provided: Yes  Patient Position at End of Consult: Bedside chair; All needs within reach; Bed/Chair alarm activated    Jerod CHAN, Restorative Technician,

## 2025-07-22 NOTE — PROGRESS NOTES
Progress Note - Hospitalist   Name: Shawn Marquez 75 y.o. male I MRN: [id number]  Unit/Bed#: [facility] 734-01 I Date of Admission: 7/7/2025   Date of Service: 7/22/2025 I Hospital Day: 15    Assessment & Plan  ICH (intracerebral hemorrhage) (HCC)  History of A-fib on Xarelto presented as stroke alert after being found by family on the floor with left facial and left-sided weakness.  AC/AP reversed with Kcentra and DDAVP with CTA H/N notable for Large acute intraparenchymal hemorrhage centered involving the posterior right basal ganglia, adjacent right thalamus, and posterior right insula, with a volume of approximately 23 mL and subsequent MRI brain showing Acute to early subacute lacunar infarct in the right hemipons and Stable hemorrhage and surrounding vasogenic edema in the right corona radiata and temporal white matter.  Evaluated by neurosurgery with no interventions indicated  Evaluated by neurology and recommended to discontinue aspirin and plan to resume Xarelto on 8/4  Patient recommended for acute rehab.  Case management following  Stable pending placement.  CM is working on placement  Essential hypertension  Blood pressure currently stable.  He is off Cardene drip  Continue amlodipine 10 mg daily, carvedilol 6.25 mg twice daily and losartan 100 mg daily  DM II (diabetes mellitus, type II), controlled ([facility])  Lab Results   Component Value Date    HGBA1C 6.7 (H) 07/08/2025       Recent Labs     07/21/25  1541 07/21/25  2108 07/22/25  0635 07/22/25  1034   POCGLU 138 160* 165* 217*   Blood Sugar Average: Last 72 hrs:  (P) 172.3336598704215267  Continue Lantus 6 units at bedtime with mealtime correctional scale  History of DVT (deep vein thrombosis)  AC on hold as above  HLD (hyperlipidemia)  Continue atorvastatin  Atrial fibrillation (HCC)  Continue beta-blocker.  Hold AC as above    VTE Pharmacologic Prophylaxis:   Moderate Risk (Score 3-4) - Pharmacological DVT Prophylaxis Ordered: heparin.    Mobility:    Basic Mobility Inpatient Raw Score: 10  JH-HLM Goal: 4: Move to chair/commode  JH-HLM Achieved: 4: Move to chair/commode  JH-HLM Goal achieved. Continue to encourage appropriate mobility.    Patient Centered Rounds: I performed bedside rounds with nursing staff today.   Discussions with Specialists or Other Care Team Provider: N/A    Education and Discussions with Family / Patient: Attempted to update  (wife) via phone. Left voicemail.     Current Length of Stay: 15 day(s)  Current Patient Status: Inpatient   Certification Statement: The patient will continue to require additional inpatient hospital stay due to CVA  Discharge Plan: Anticipate discharge in 24-48 hrs to rehab facility.    Code Status: Level 1 - Full Code    Subjective   Patient seen and examined at bedside, he is resting comfortably in chair at bedside.  Continues to have left upper extremity weakness.  No new concerns    Objective :  Temp:  [97.6 °F (36.4 °C)-98.1 °F (36.7 °C)] 98.1 °F (36.7 °C)  HR:  [60-94] 94  BP: (112-123)/(77-79) 112/77  Resp:  [17] 17  SpO2:  [97 %-98 %] 97 %    Body mass index is 26.58 kg/m².     Input and Output Summary (last 24 hours):     Intake/Output Summary (Last 24 hours) at 7/22/2025 1329  Last data filed at 7/22/2025 0800  Gross per 24 hour   Intake 600 ml   Output 275 ml   Net 325 ml     Physical Exam  Vitals reviewed.   Constitutional:       General: He is not in acute distress.     Appearance: Normal appearance. He is not ill-appearing.   HENT:      Head: Normocephalic.     Eyes:      General: No scleral icterus.      Cardiovascular:      Rate and Rhythm: Normal rate and regular rhythm.      Heart sounds: Normal heart sounds.   Pulmonary:      Effort: Pulmonary effort is normal. No respiratory distress.      Breath sounds: No wheezing, rhonchi or rales.   Abdominal:      Palpations: Abdomen is soft.      Tenderness: There is no abdominal tenderness. There is no guarding or rebound.      Musculoskeletal:      Right lower leg: No edema.      Left lower leg: No edema.     Skin:     General: Skin is warm.     Neurological:      Mental Status: He is alert and oriented to person, place, and time. Mental status is at baseline.      Motor: Weakness (LUE) present.         Lab Results: I have reviewed the following results:       Results from last 7 days   Lab Units 07/20/25  0443   SODIUM mmol/L 135   POTASSIUM mmol/L 4.1   CHLORIDE mmol/L 100   CO2 mmol/L 26   BUN mg/dL 13   CREATININE mg/dL 0.65   ANION GAP mmol/L 9   CALCIUM mg/dL 9.4   GLUCOSE RANDOM mg/dL 140         Results from last 7 days   Lab Units 07/22/25  1034 07/22/25  0635 07/21/25  2108 07/21/25  1541 07/21/25  1136 07/21/25  0646 07/20/25  1619 07/20/25  1107 07/20/25  0643 07/19/25  1552 07/19/25  1236   POC GLUCOSE mg/dl 217* 165* 160* 138 198* 144* 157* 177* 170* 166* 208*               Recent Cultures (last 7 days):         Imaging Results Review: I reviewed radiology reports from this admission including: MRI brain.      Last 24 Hours Medication List:     Current Facility-Administered Medications:     acetaminophen (TYLENOL) tablet 650 mg, Q6H PRN    amLODIPine (NORVASC) tablet 10 mg, Daily    atorvastatin (LIPITOR) tablet 20 mg, Daily With Dinner    bisacodyl (DULCOLAX) rectal suppository 10 mg, Daily PRN    carvedilol (COREG) tablet 6.25 mg, BID With Meals    heparin (porcine) subcutaneous injection 5,000 Units, Q8H DEIDRE    hydrALAZINE (APRESOLINE) injection 10 mg, Q4H PRN    insulin glargine (LANTUS) subcutaneous injection 6 Units 0.06 mL, HS    insulin lispro (HumALOG/ADMELOG) 100 units/mL subcutaneous injection 1-6 Units, TID AC **AND** Fingerstick Glucose (POCT), TID AC    lidocaine (LIDODERM) 5 % patch 1 patch, Daily    losartan (COZAAR) tablet 100 mg, Daily    magnesium Oxide (MAG-OX) tablet 400 mg, BID    melatonin tablet 6 mg, HS    methocarbamol (ROBAXIN) tablet 500 mg, Q8H PRN    ondansetron (ZOFRAN) injection 4 mg, Q6H  PRN    polyethylene glycol (MIRALAX) packet 17 g, Daily    senna-docusate sodium (SENOKOT S) 8.6-50 mg per tablet 2 tablet, BID    tamsulosin (FLOMAX) capsule 0.4 mg, Daily With Dinner    Administrative Statements   Today, Patient Was Seen By: Shashi Barney DO      **Please Note: This note may have been constructed using a voice recognition system.**

## 2025-07-22 NOTE — ASSESSMENT & PLAN NOTE
History of A-fib on Xarelto presented as stroke alert after being found by family on the floor with left facial and left-sided weakness.  AC/AP reversed with Kcentra and DDAVP with CTA H/N notable for Large acute intraparenchymal hemorrhage centered involving the posterior right basal ganglia, adjacent right thalamus, and posterior right insula, with a volume of approximately 23 mL and subsequent MRI brain showing Acute to early subacute lacunar infarct in the right hemipons and Stable hemorrhage and surrounding vasogenic edema in the right corona radiata and temporal white matter.  Evaluated by neurosurgery with no interventions indicated  Evaluated by neurology and recommended to discontinue aspirin and plan to resume Xarelto on 8/4  Patient recommended for acute rehab.  Case management following  Stable pending placement.  CM is working on placement

## 2025-07-22 NOTE — PLAN OF CARE
Problem: OCCUPATIONAL THERAPY ADULT  Goal: Performs self-care activities at highest level of function for planned discharge setting.  See evaluation for individualized goals.  Description: Treatment Interventions: ADL retraining, Functional transfer training, UE strengthening/ROM, Endurance training, Cognitive reorientation, Visual perceptual retraining, Patient/family training, Equipment evaluation/education, Compensatory technique education, Fine motor coordination activities, Continued evaluation, Energy conservation, Activityengagement          See flowsheet documentation for full assessment, interventions and recommendations.   Outcome: Progressing  Note: Limitation: Decreased ADL status, Decreased UE strength, Decreased UE ROM, Decreased Safe judgement during ADL, Decreased cognition, Decreased endurance, Decreased self-care trans, Decreased high-level ADLs, Visual deficit, Decreased fine motor control  Prognosis: Fair  Assessment: Pt seen for Occupational Therapy session with focus on activity tolerance, bed mob, functional transfers, sitting balance and tolerance and standing tolerance and balance for pt engagement in UB/LB self-care tasks and neuromuscular re-education to LUE. Pt cleared by RN/Rashard  for pt participated in OT session. Pt presented supine/HOB raised pt awake/alert and agreeable to participate in therapy following pt identifiers confirmed. Pt did not report a therapy goal this session however pt was pleasant and cooperative with all therapy requests.Pt required assist for bed mob, and UB/LB self-care tasks 2* balance and coordination.  He was able to tolerate sitting EOB LUE neuromuscular re-education/weightbearing and for grooming however required min /mod assist to maintain balance and coordination. Pt continues to require assistancve x 2 for functional transfers/stand pivot to bedside chair 2* pt coordination and balance deficits. Pt remains appropriate for    I ([employer]) Pt  set up to bedside chair post session, chair alarm activated and all needs within pt reach     Rehab Resource Intensity Level, OT: I (Maximum [employer] Intensity)

## 2025-07-23 LAB
ANION GAP SERPL CALCULATED.3IONS-SCNC: 8 MMOL/L (ref 4–13)
BUN SERPL-MCNC: 16 MG/DL (ref 5–25)
CALCIUM SERPL-MCNC: 9.1 MG/DL (ref 8.4–10.2)
CHLORIDE SERPL-SCNC: 100 MMOL/L (ref 96–108)
CO2 SERPL-SCNC: 26 MMOL/L (ref 21–32)
CREAT SERPL-MCNC: 0.69 MG/DL (ref 0.6–1.3)
ERYTHROCYTE [DISTWIDTH] IN BLOOD BY AUTOMATED COUNT: 12.1 % (ref 11.6–15.1)
GFR SERPL CREATININE-BSD FRML MDRD: 93 ML/MIN/1.73SQ M
GLUCOSE SERPL-MCNC: 135 MG/DL (ref 65–140)
GLUCOSE SERPL-MCNC: 137 MG/DL (ref 65–140)
GLUCOSE SERPL-MCNC: 146 MG/DL (ref 65–140)
GLUCOSE SERPL-MCNC: 178 MG/DL (ref 65–140)
GLUCOSE SERPL-MCNC: 182 MG/DL (ref 65–140)
HCT VFR BLD AUTO: 44.4 % (ref 36.5–49.3)
HGB BLD-MCNC: 15.6 G/DL (ref 12–17)
MCH RBC QN AUTO: 33.7 PG (ref 26.8–34.3)
MCHC RBC AUTO-ENTMCNC: 35.1 G/DL (ref 31.4–37.4)
MCV RBC AUTO: 96 FL (ref 82–98)
PLATELET # BLD AUTO: 220 THOUSANDS/UL (ref 149–390)
PMV BLD AUTO: 11 FL (ref 8.9–12.7)
POTASSIUM SERPL-SCNC: 4.3 MMOL/L (ref 3.5–5.3)
RBC # BLD AUTO: 4.63 MILLION/UL (ref 3.88–5.62)
SODIUM SERPL-SCNC: 134 MMOL/L (ref 135–147)
WBC # BLD AUTO: 8.7 THOUSAND/UL (ref 4.31–10.16)

## 2025-07-23 PROCEDURE — 82948 REAGENT STRIP/BLOOD GLUCOSE: CPT

## 2025-07-23 PROCEDURE — 80048 BASIC METABOLIC PNL TOTAL CA: CPT | Performed by: STUDENT IN AN ORGANIZED HEALTH CARE EDUCATION/TRAINING PROGRAM

## 2025-07-23 PROCEDURE — 99232 SBSQ HOSP IP/OBS MODERATE 35: CPT | Performed by: STUDENT IN AN ORGANIZED HEALTH CARE EDUCATION/TRAINING PROGRAM

## 2025-07-23 PROCEDURE — 85027 COMPLETE CBC AUTOMATED: CPT | Performed by: STUDENT IN AN ORGANIZED HEALTH CARE EDUCATION/TRAINING PROGRAM

## 2025-07-23 RX ADMIN — ATORVASTATIN CALCIUM 20 MG: 20 TABLET, FILM COATED ORAL at 16:34

## 2025-07-23 RX ADMIN — INSULIN GLARGINE 6 UNITS: 100 INJECTION, SOLUTION SUBCUTANEOUS at 21:38

## 2025-07-23 RX ADMIN — Medication 6 MG: at 21:34

## 2025-07-23 RX ADMIN — LIDOCAINE 1 PATCH: 50 PATCH CUTANEOUS at 08:18

## 2025-07-23 RX ADMIN — MAGNESIUM OXIDE TAB 400 MG (241.3 MG ELEMENTAL MG) 400 MG: 400 (241.3 MG) TAB at 08:18

## 2025-07-23 RX ADMIN — MAGNESIUM OXIDE TAB 400 MG (241.3 MG ELEMENTAL MG) 400 MG: 400 (241.3 MG) TAB at 17:13

## 2025-07-23 RX ADMIN — HEPARIN SODIUM 5000 UNITS: 5000 INJECTION INTRAVENOUS; SUBCUTANEOUS at 05:20

## 2025-07-23 RX ADMIN — LOSARTAN POTASSIUM 100 MG: 50 TABLET, FILM COATED ORAL at 08:18

## 2025-07-23 RX ADMIN — HEPARIN SODIUM 5000 UNITS: 5000 INJECTION INTRAVENOUS; SUBCUTANEOUS at 13:35

## 2025-07-23 RX ADMIN — AMLODIPINE BESYLATE 10 MG: 10 TABLET ORAL at 08:18

## 2025-07-23 RX ADMIN — INSULIN LISPRO 1 UNITS: 100 INJECTION, SOLUTION INTRAVENOUS; SUBCUTANEOUS at 11:25

## 2025-07-23 RX ADMIN — ACETAMINOPHEN 650 MG: 325 TABLET ORAL at 19:33

## 2025-07-23 RX ADMIN — CARVEDILOL 6.25 MG: 6.25 TABLET, FILM COATED ORAL at 16:34

## 2025-07-23 RX ADMIN — TAMSULOSIN HYDROCHLORIDE 0.4 MG: 0.4 CAPSULE ORAL at 16:34

## 2025-07-23 RX ADMIN — HEPARIN SODIUM 5000 UNITS: 5000 INJECTION INTRAVENOUS; SUBCUTANEOUS at 21:33

## 2025-07-23 RX ADMIN — SENNOSIDES AND DOCUSATE SODIUM 2 TABLET: 50; 8.6 TABLET ORAL at 08:18

## 2025-07-23 RX ADMIN — CARVEDILOL 6.25 MG: 6.25 TABLET, FILM COATED ORAL at 08:18

## 2025-07-23 NOTE — RESTORATIVE TECHNICIAN NOTE
Restorative Technician Note      Patient Name: Shawn Marquez     Note Type: Mobility  Patient Position Upon Consult: Supine  Activity Performed: Dangled; Stood; Transferred  Assistive Device: Other (Comment) (Assist x2 OOB to the chair.)  Education Provided: Yes  Patient Position at End of Consult: Bedside chair; All needs within reach; Bed/Chair alarm activated    Jerod CHAN, Restorative Technician,

## 2025-07-23 NOTE — CASE MANAGEMENT
Case Management Discharge Planning Note    Patient name Shawn Marquez  Location [facility] 734/[facility] 734-01 MRN [id number]  : 1950 Date 2025       Current Admission Date: 2025  Current Admission Diagnosis:ICH (intracerebral hemorrhage) ([facility])   Patient Active Problem List    Diagnosis Date Noted    Right pontine stroke (HCC) 2025    Cerebral edema (HCC) 2025    Brain compression (HCC) 2025    Atrial fibrillation (HCC) 2025    HTN (hypertension) 2025    Anticoagulated on rivaroxaban 2025    Hemineglect 2025    Hemiparesis (HCC) 2025    ICH (intracerebral hemorrhage) ([facility]) 2025    Seizure-like activity ([facility]) 2025    Medical marijuana use 2024    Elevated brain natriuretic peptide (BNP) level 2024    Syncope 2024    Depression 2024    Vascular dementia with paranoia ([facility]) 2023    Paranoid behavior (HCC) 2022    Longstanding persistent atrial fibrillation ([facility]) 07/15/2022    Anxiety 2021    HLD (hyperlipidemia) 2021    Numbness 2019    Personal history of transient ischemic attack 2019    Anticoagulant long-term use 2019    Arthritis of knee 2019    History of DVT (deep vein thrombosis) 2018    Encounter for skin care 2018    Varicose veins with swelling 2016    BPH with obstruction/lower urinary tract symptoms 2016    Chronic venous insufficiency 2016    DM II (diabetes mellitus, type II), controlled ([facility]) 2015    Essential hypertension 2013      LOS (days): 16  Geometric Mean LOS (GMLOS) (days): 4.5  Days to GMLOS:-11.3     OBJECTIVE:  Risk of Unplanned Readmission Score: 12.57         Current admission status: Inpatient   Preferred Pharmacy:   [id number]  [place] - [address]  [address] [facility] [address] [place] [address]  Phone: [phone] Fax: [phone]    [facility] -  [place], [place] - [address]  [address] [address]  Phone: [phone] Fax: [phone]    Primary Care Provider: SAM Baer    Primary Insurance: [employer]  REP  Secondary Insurance:     DISCHARGE DETAILS:        [phone] requested call back re:referral     CM spoke with Jack re:expedited appeal. Appeal has been upheld and no additional appeals will be completed        CM faxed clinicals and PT/OT for consideration [phone]

## 2025-07-23 NOTE — ASSESSMENT & PLAN NOTE
Lab Results   Component Value Date    HGBA1C 6.7 (H) 07/08/2025     Recent Labs     07/22/25  1609 07/22/25  2234 07/23/25  0625 07/23/25  1119   POCGLU 124 138 146* 182*   Blood Sugar Average: Last 72 hrs:  (P) 162.4139412817099298  Continue Lantus 6 units at bedtime with mealtime correctional scale

## 2025-07-23 NOTE — PROGRESS NOTES
Progress Note - Hospitalist   Name: Shawn Marquez 75 y.o. male I MRN: [id number]  Unit/Bed#: [facility] 734-01 I Date of Admission: 7/7/2025   Date of Service: 7/23/2025 I Hospital Day: 16    Assessment & Plan  ICH (intracerebral hemorrhage) (HCC)  History of A-fib on Xarelto presented as stroke alert after being found by family on the floor with left facial and left-sided weakness.  AC/AP reversed with Kcentra and DDAVP with CTA H/N notable for Large acute intraparenchymal hemorrhage centered involving the posterior right basal ganglia, adjacent right thalamus, and posterior right insula, with a volume of approximately 23 mL and subsequent MRI brain showing Acute to early subacute lacunar infarct in the right hemipons and Stable hemorrhage and surrounding vasogenic edema in the right corona radiata and temporal white matter.  Evaluated by neurosurgery with no interventions indicated  Evaluated by neurology and recommended to discontinue aspirin and plan to resume Xarelto on 8/4  Patient recommended for acute rehab.  Case management following  Stable pending placement.  CM is working on placement  Essential hypertension  Blood pressure currently stable.  He is off Cardene drip  Continue amlodipine 10 mg daily, carvedilol 6.25 mg twice daily and losartan 100 mg daily  DM II (diabetes mellitus, type II), controlled ([facility])  Lab Results   Component Value Date    HGBA1C 6.7 (H) 07/08/2025     Recent Labs     07/22/25  1609 07/22/25  2234 07/23/25  0625 07/23/25  1119   POCGLU 124 138 146* 182*   Blood Sugar Average: Last 72 hrs:  (P) [id number]  Continue Lantus 6 units at bedtime with mealtime correctional scale  History of DVT (deep vein thrombosis)  AC on hold as above  HLD (hyperlipidemia)  Continue atorvastatin  Atrial fibrillation (HCC)  Continue beta-blocker.  Hold AC as above    VTE Pharmacologic Prophylaxis:   Moderate Risk (Score 3-4) - Pharmacological DVT Prophylaxis Ordered: heparin.    Mobility:    Basic Mobility Inpatient Raw Score: 10  JH-HLM Goal: 4: Move to chair/commode  JH-HLM Achieved: 4: Move to chair/commode  JH-HLM Goal achieved. Continue to encourage appropriate mobility.    Patient Centered Rounds: I performed bedside rounds with nursing staff today.   Discussions with Specialists or Other Care Team Provider: N/A    Education and Discussions with Family / Patient: Updated  (wife) at bedside.    Current Length of Stay: 16 day(s)  Current Patient Status: Inpatient   Certification Statement: The patient will continue to require additional inpatient hospital stay due to Rehab assessment  Discharge Plan: Anticipate discharge in 24-48 hrs to rehab facility.    Code Status: Level 1 - Full Code    Subjective   Patient seen and examined at bedside, he is resting comfortably with his wife present at bedside.  No new complaints at present.  Patient is hopeful to go outside briefly    Objective :  Temp:  [98.1 °F (36.7 °C)-98.5 °F (36.9 °C)] 98.1 °F (36.7 °C)  HR:  [74-82] 82  BP: (118-130)/(75-79) 130/75  Resp:  [16-18] 16  SpO2:  [95 %-96 %] 96 %  O2 Device: None (Room air)    Body mass index is 26.45 kg/m².     Input and Output Summary (last 24 hours):     Intake/Output Summary (Last 24 hours) at 7/23/2025 1528  Last data filed at 7/23/2025 0900  Gross per 24 hour   Intake 300 ml   Output 475 ml   Net -175 ml     Physical Exam  Vitals reviewed.   Constitutional:       General: He is not in acute distress.     Appearance: Normal appearance. He is not ill-appearing.   HENT:      Head: Normocephalic.     Eyes:      General: No scleral icterus.      Cardiovascular:      Rate and Rhythm: Normal rate and regular rhythm.      Heart sounds: Normal heart sounds.   Pulmonary:      Effort: Pulmonary effort is normal. No respiratory distress.      Breath sounds: No wheezing, rhonchi or rales.   Abdominal:      Palpations: Abdomen is soft.      Tenderness: There is no abdominal tenderness. There is no  guarding or rebound.     Musculoskeletal:      Right lower leg: No edema.      Left lower leg: No edema.     Skin:     General: Skin is warm.     Neurological:      Mental Status: He is alert and oriented to person, place, and time. Mental status is at baseline.      Motor: Weakness (LUE) present.         Lab Results: I have reviewed the following results:   Results from last 7 days   Lab Units 07/23/25  0601   WBC Thousand/uL 8.70   HEMOGLOBIN g/dL 15.6   HEMATOCRIT % 44.4   PLATELETS Thousands/uL 220     Results from last 7 days   Lab Units 07/23/25  0601   SODIUM mmol/L 134*   POTASSIUM mmol/L 4.3   CHLORIDE mmol/L 100   CO2 mmol/L 26   BUN mg/dL 16   CREATININE mg/dL 0.69   ANION GAP mmol/L 8   CALCIUM mg/dL 9.1   GLUCOSE RANDOM mg/dL 137         Results from last 7 days   Lab Units 07/23/25  1119 07/23/25  0625 07/22/25  2234 07/22/25  1609 07/22/25  1034 07/22/25  0635 07/21/25  2108 07/21/25  1541 07/21/25  1136 07/21/25  0646 07/20/25  1619 07/20/25  1107   POC GLUCOSE mg/dl 182* 146* 138 124 217* 165* 160* 138 198* 144* 157* 177*               Recent Cultures (last 7 days):         Imaging Results Review: I reviewed radiology reports from this admission including: MRI brain.      Last 24 Hours Medication List:     Current Facility-Administered Medications:     acetaminophen (TYLENOL) tablet 650 mg, Q6H PRN    amLODIPine (NORVASC) tablet 10 mg, Daily    atorvastatin (LIPITOR) tablet 20 mg, Daily With Dinner    bisacodyl (DULCOLAX) rectal suppository 10 mg, Daily PRN    carvedilol (COREG) tablet 6.25 mg, BID With Meals    heparin (porcine) subcutaneous injection 5,000 Units, Q8H DEIDRE    hydrALAZINE (APRESOLINE) injection 10 mg, Q4H PRN    insulin glargine (LANTUS) subcutaneous injection 6 Units 0.06 mL, HS    insulin lispro (HumALOG/ADMELOG) 100 units/mL subcutaneous injection 1-6 Units, TID AC **AND** Fingerstick Glucose (POCT), TID AC    lidocaine (LIDODERM) 5 % patch 1 patch, Daily    losartan (COZAAR)  tablet 100 mg, Daily    magnesium Oxide (MAG-OX) tablet 400 mg, BID    melatonin tablet 6 mg, HS    methocarbamol (ROBAXIN) tablet 500 mg, Q8H PRN    ondansetron (ZOFRAN) injection 4 mg, Q6H PRN    polyethylene glycol (MIRALAX) packet 17 g, Daily    senna-docusate sodium (SENOKOT S) 8.6-50 mg per tablet 2 tablet, BID    tamsulosin (FLOMAX) capsule 0.4 mg, Daily With Dinner    Administrative Statements   Today, Patient Was Seen By: Shashi Barney DO      **Please Note: This note may have been constructed using a voice recognition system.**

## 2025-07-24 LAB
GLUCOSE SERPL-MCNC: 133 MG/DL (ref 65–140)
GLUCOSE SERPL-MCNC: 159 MG/DL (ref 65–140)
GLUCOSE SERPL-MCNC: 186 MG/DL (ref 65–140)
GLUCOSE SERPL-MCNC: 194 MG/DL (ref 65–140)

## 2025-07-24 PROCEDURE — 97112 NEUROMUSCULAR REEDUCATION: CPT

## 2025-07-24 PROCEDURE — 99232 SBSQ HOSP IP/OBS MODERATE 35: CPT | Performed by: STUDENT IN AN ORGANIZED HEALTH CARE EDUCATION/TRAINING PROGRAM

## 2025-07-24 PROCEDURE — 97530 THERAPEUTIC ACTIVITIES: CPT

## 2025-07-24 PROCEDURE — 82948 REAGENT STRIP/BLOOD GLUCOSE: CPT

## 2025-07-24 PROCEDURE — 97535 SELF CARE MNGMENT TRAINING: CPT

## 2025-07-24 RX ADMIN — AMLODIPINE BESYLATE 10 MG: 10 TABLET ORAL at 09:37

## 2025-07-24 RX ADMIN — LIDOCAINE 1 PATCH: 50 PATCH CUTANEOUS at 09:36

## 2025-07-24 RX ADMIN — HEPARIN SODIUM 5000 UNITS: 5000 INJECTION INTRAVENOUS; SUBCUTANEOUS at 21:41

## 2025-07-24 RX ADMIN — HEPARIN SODIUM 5000 UNITS: 5000 INJECTION INTRAVENOUS; SUBCUTANEOUS at 05:19

## 2025-07-24 RX ADMIN — LOSARTAN POTASSIUM 100 MG: 50 TABLET, FILM COATED ORAL at 09:37

## 2025-07-24 RX ADMIN — ATORVASTATIN CALCIUM 20 MG: 20 TABLET, FILM COATED ORAL at 16:46

## 2025-07-24 RX ADMIN — INSULIN LISPRO 1 UNITS: 100 INJECTION, SOLUTION INTRAVENOUS; SUBCUTANEOUS at 06:09

## 2025-07-24 RX ADMIN — TAMSULOSIN HYDROCHLORIDE 0.4 MG: 0.4 CAPSULE ORAL at 16:46

## 2025-07-24 RX ADMIN — INSULIN GLARGINE 6 UNITS: 100 INJECTION, SOLUTION SUBCUTANEOUS at 21:41

## 2025-07-24 RX ADMIN — MAGNESIUM OXIDE TAB 400 MG (241.3 MG ELEMENTAL MG) 400 MG: 400 (241.3 MG) TAB at 09:37

## 2025-07-24 RX ADMIN — HEPARIN SODIUM 5000 UNITS: 5000 INJECTION INTRAVENOUS; SUBCUTANEOUS at 14:07

## 2025-07-24 RX ADMIN — Medication 6 MG: at 21:41

## 2025-07-24 RX ADMIN — INSULIN LISPRO 2 UNITS: 100 INJECTION, SOLUTION INTRAVENOUS; SUBCUTANEOUS at 12:04

## 2025-07-24 RX ADMIN — MAGNESIUM OXIDE TAB 400 MG (241.3 MG ELEMENTAL MG) 400 MG: 400 (241.3 MG) TAB at 18:04

## 2025-07-24 NOTE — ASSESSMENT & PLAN NOTE
Lab Results   Component Value Date    HGBA1C 6.7 (H) 07/08/2025     Recent Labs     07/23/25  1618 07/23/25  2101 07/24/25  0604 07/24/25  1039   POCGLU 135 178* 186* 194*   Blood Sugar Average: Last 72 hrs:  (P) [id number]  Continue Lantus 6 units at bedtime with mealtime correctional scale  Reasonably controlled at this time

## 2025-07-24 NOTE — PLAN OF CARE
Problem: PHYSICAL THERAPY ADULT  Goal: Performs mobility at highest level of function for planned discharge setting.  See evaluation for individualized goals.  Description: Treatment/Interventions: ADL retraining, Functional transfer training, LE strengthening/ROM, Elevations, Therapeutic exercise, Endurance training, Cognitive reorientation, Equipment eval/education, Patient/family training, Bed mobility, Gait training, Compensatory technique education, Spoke to nursing, Spoke to advanced practitioner, OT  Equipment Recommended:  (TBD)       See flowsheet documentation for full assessment, interventions and recommendations.  Outcome: Progressing  Note: Prognosis: Fair  Problem List: Decreased strength, Decreased endurance, Impaired balance, Decreased mobility, Decreased cognition, Decreased coordination, Impaired judgement, Decreased safety awareness, Impaired tone  Assessment: Pt pleasant and agreeable to participate in PT session. Pt able to complete multiple STS throughout session with mod Ax2. Continues to require max Ax2 to stand pivot to R due to RLE pushing to L. Pt able to complete STS and maintain standing posture for increase periods of time. Pt requires cues for upright posture, hip extension and preventing L lean/pushing. Attempted to ambulate forwards with max Ax2 b/l HHA vs with the maxi move. Pt continue to require extensive assistance to advance RLE, prevent L knee block and promote upright posture. Pt reports groin pain with the maxi move harness. Pt is slowly progressing towards his goals and is motivated to improve. Pt left upright in chair with chair alarm donned, call bell and personal items within reach and all needs met.  Barriers to Discharge: Inaccessible home environment, Decreased caregiver support     Rehab Resource Intensity Level, PT: I (Maximum Resource Intensity)    See flowsheet documentation for full assessment.

## 2025-07-24 NOTE — PLAN OF CARE
Problem: Neurological Deficit  Goal: Neurological status is stable or improving  Description: Interventions:  - Monitor and assess patient's level of consciousness, motor function, sensory function, and level of assistance needed for ADLs.   - Monitor and report changes from baseline. Collaborate with interdisciplinary team to initiate plan and implement interventions as ordered.   - Provide and maintain a safe environment.  - Consider seizure precautions.  - Consider fall precautions.  - Consider aspiration precautions.  - Consider bleeding precautions.  Outcome: Progressing     Problem: Activity Intolerance/Impaired Mobility  Goal: Mobility/activity is maintained at optimum level for patient  Description: Interventions:  - Assess and monitor patient  barriers to mobility and need for assistive/adaptive devices.  - Assess patient's emotional response to limitations.  - Collaborate with interdisciplinary team and initiate plans and interventions as ordered.  - Encourage independent activity per ability.  - Maintain proper body alignment.  - Perform active/passive rom as tolerated/ordered.  - Plan activities to conserve energy.  - Turn patient as appropriate  Outcome: Progressing     Problem: Nutrition  Goal: Nutrition/Hydration status is improving  Description: Monitor and assess patient's nutrition/hydration status for malnutrition (ex- brittle hair, bruises, dry skin, pale skin and conjunctiva, muscle wasting, smooth red tongue, and disorientation). Collaborate with interdisciplinary team and initiate plan and interventions as ordered.  Monitor patient's weight and dietary intake as ordered or per policy. Utilize nutrition screening tool and intervene per policy. Determine patient's food preferences and provide high-protein, high-caloric foods as appropriate.     - Assist patient with eating.  - Allow adequate time for meals.  - Encourage patient to take dietary supplement as ordered.  - Collaborate with  clinical nutritionist.  - Include patient/family/caregiver in decisions related to nutrition.  Outcome: Progressing     Problem: SAFETY ADULT  Goal: Patient will remain free of falls  Description: INTERVENTIONS:  - Educate patient/family on patient safety including physical limitations  - Instruct patient to call for assistance with activity   - Consider consulting OT/PT to assist with strengthening/mobility based on AM PAC & JH-HLM score  - Consult OT/PT to assist with strengthening/mobility   - Keep Call bell within reach  - Keep bed low and locked with side rails adjusted as appropriate  - Keep care items and personal belongings within reach  - Initiate and maintain comfort rounds  - Make Fall Risk Sign visible to staff  - Offer Toileting every 2 Hours, in advance of need  - Initiate/Maintain bed/chair alarm  - Obtain necessary fall risk management equipment: call bell within the reach, bed/chair alarm, gripper socks on.  - Apply yellow socks and bracelet for high fall risk patients  - Consider moving patient to room near nurses station  Outcome: Progressing     Problem: DISCHARGE PLANNING  Goal: Discharge to home or other facility with appropriate resources  Description: INTERVENTIONS:  - Identify barriers to discharge w/patient and caregiver  - Arrange for needed discharge resources and transportation as appropriate  - Identify discharge learning needs (meds, wound care, etc.)  - Arrange for interpretive services to assist at discharge as needed  - Refer to Case Management Department for coordinating discharge planning if the patient needs post-hospital services based on physician/advanced practitioner order or complex needs related to functional status, cognitive ability, or social support system  Outcome: Progressing     Problem: Nutrition/Hydration-ADULT  Goal: Nutrient/Hydration intake appropriate for improving, restoring or maintaining nutritional needs  Description: Monitor and assess patient's  nutrition/hydration status for malnutrition. Collaborate with interdisciplinary team and initiate plan and interventions as ordered.  Monitor patient's weight and dietary intake as ordered or per policy. Utilize nutrition screening tool and intervene as necessary. Determine patient's food preferences and provide high-protein, high-caloric foods as appropriate.     INTERVENTIONS:  - Monitor oral intake, urinary output, labs, and treatment plans  - Assess nutrition and hydration status and recommend course of action  - Evaluate amount of meals eaten  - Assist patient with eating if necessary   - Allow adequate time for meals  - Recommend/ encourage appropriate diets, oral nutritional supplements, and vitamin/mineral supplements  - Order, calculate, and assess calorie counts as needed  - Recommend, monitor, and adjust tube feedings and TPN/PPN based on assessed needs  - Assess need for intravenous fluids  - Provide specific nutrition/hydration education as appropriate  - Include patient/family/caregiver in decisions related to nutrition  Outcome: Progressing

## 2025-07-24 NOTE — TELEPHONE ENCOUNTER
DIAGNOSIS: LEFT LEG PAIN BELOW THE KNEE ABOVE THE ANKLE/SELF/BCBS/NO IMAGES/NO SURGERY     APPOINTMENT DATE: 7/24/25   NOTES STATUS DETAILS   MEDICATION LIST Internal             Patient returned call stating they were okay with appt changing.     Jane Shepherd 7/2 @ 2p (In-Person)

## 2025-07-24 NOTE — ASSESSMENT & PLAN NOTE
History of A-fib on Xarelto presented as stroke alert after being found by family on the floor with left facial and left-sided weakness.  AC/AP reversed with Kcentra and DDAVP with CTA H/N notable for Large acute intraparenchymal hemorrhage centered involving the posterior right basal ganglia, adjacent right thalamus, and posterior right insula, with a volume of approximately 23 mL and subsequent MRI brain showing Acute to early subacute lacunar infarct in the right hemipons and Stable hemorrhage and surrounding vasogenic edema in the right corona radiata and temporal white matter.  Evaluated by neurosurgery with no interventions indicated  Evaluated by neurology and recommended to discontinue aspirin and plan to resume Xarelto on 8/4 pending stability of repeat imaging  Patient recommended for acute rehab.  Case management following  Stable pending placement.  JAY is working on placement, unfortunately referral was denied. Further placement attempts pending

## 2025-07-24 NOTE — PHYSICAL THERAPY NOTE
PHYSICAL THERAPY NOTE          Patient Name: Shawn Marquez  Today's Date: 7/24/2025 07/24/25 1229   PT Last Visit   PT Visit Date 07/24/25   Note Type   Note Type Treatment   Pain Assessment   Pain Assessment Tool 0-10   Pain Score No Pain   Restrictions/Precautions   Weight Bearing Precautions Per Order No   Other Precautions Cognitive;Chair Alarm;Bed Alarm;Impulsive;Multiple lines;Fall Risk  (L hemiparesis)   General   Chart Reviewed Yes   Response to Previous Treatment Patient with no complaints from previous session.   Family/Caregiver Present Yes  (wife)   Cognition   Overall Cognitive Status Impaired   Arousal/Participation Alert;Responsive;Cooperative   Attention Attends with cues to redirect   Orientation Level Oriented to person;Oriented to place;Oriented to time   Memory Decreased recall of recent events;Decreased recall of precautions   Following Commands Follows one step commands with increased time or repetition   Comments pt pleasant and cooperative   Subjective   Subjective pt agreeable to mobilize   Bed Mobility   Supine to Sit Unable to assess   Sit to Supine Unable to assess   Additional Comments pt greeted sitting upright at EOB with OT and left in chair at end of session   Transfers   Sit to Stand 3  Moderate assistance   Additional items Assist x 2;Increased time required;Verbal cues   Stand to Sit 3  Moderate assistance   Additional items Assist x 2;Increased time required;Verbal cues   Stand pivot 2  Maximal assistance   Additional items Assist x 2;Increased time required;Verbal cues   Additional Comments ARNALDO supported, R HHA, L knee block. x7 STS throughout session   Ambulation/Elevation   Gait pattern Improper Weight shift;L Knee Bess;L Hemiparesis;Narrow DEVI;Decreased foot clearance;Foward flexed;Short stride;Excessively slow   Gait Assistance 2  Maximal assist  (+chair follow)   Additional items  Assist x 2;Verbal cues   Assistive Device Other (Comment)  (b/l HHA vs maxi move)   Distance 2' (HHA)+ 3' (maxi move)   Stair Management Assistance Not tested   Ambulation/Elevation Additional Comments multiple seated rest breaks. heavy L lateral lean   Balance   Static Sitting Poor +   Dynamic Sitting Poor   Static Standing Poor -   Dynamic Standing Poor -   Ambulatory Poor -   Endurance Deficit   Endurance Deficit Yes   Endurance Deficit Description pt limited by L hemiparesis, fatigue and decreased activity tolerance   Activity Tolerance   Activity Tolerance Patient limited by fatigue;Patient limited by pain  (pain with maxi move strap)   Medical Staff Made Aware restorative Jerod   Nurse Made Aware yes-RN cleared   Exercises   Neuro re-ed x3 STS in hallway with R HR for 40-70 seconds each.   Assessment   Prognosis Fair   Problem List Decreased strength;Decreased endurance;Impaired balance;Decreased mobility;Decreased cognition;Decreased coordination;Impaired judgement;Decreased safety awareness;Impaired tone   Assessment Pt pleasant and agreeable to participate in PT session. Pt able to complete multiple STS throughout session with mod Ax2. Continues to require max Ax2 to stand pivot to R due to RLE pushing to L. Pt able to complete STS and maintain standing posture for increase periods of time. Pt requires cues for upright posture, hip extension and preventing L lean/pushing. Attempted to ambulate forwards with max Ax2 b/l HHA vs with the maxi move. Pt continue to require extensive assistance to advance RLE, prevent L knee block and promote upright posture. Pt reports groin pain with the maxi move harness. Pt is slowly progressing towards his goals and is motivated to improve. Pt left upright in chair with chair alarm donned, call bell and personal items within reach and all needs met.   Barriers to Discharge Inaccessible home environment;Decreased caregiver support   Goals   Patient Goals to improve and get  out of the hospital   STG Expiration Date 08/04/25   PT Treatment Day 7   Plan   Treatment/Interventions Functional transfer training;LE strengthening/ROM;Therapeutic exercise;Endurance training;Cognitive reorientation;Patient/family training;Equipment eval/education;Bed mobility;Gait training;Compensatory technique education;Continued evaluation;Spoke to nursing   Progress Slow progress, decreased activity tolerance   PT Frequency 3-5x/wk   Discharge Recommendation   Rehab Resource Intensity Level, PT I (Maximum Resource Intensity)   AM-PAC Basic Mobility Inpatient   Turning in Flat Bed Without Bedrails 3   Lying on Back to Sitting on Edge of Flat Bed Without Bedrails 2   Moving Bed to Chair 1   Standing Up From Chair Using Arms 2   Walk in Room 1   Climb 3-5 Stairs With Railing 1   Basic Mobility Inpatient Raw Score 10   Turning Head Towards Sound 4   Follow Simple Instructions 3   Low Function Basic Mobility Raw Score  17   Low Function Basic Mobility Standardized Score  27.46   [facility] Highest Level Of Mobility   -[facility] Goal 4: Move to chair/commode   -HLM Achieved 5: Stand (1 or more minutes)   Education   Education Provided Mobility training;Home exercise program;Assistive device   Patient Demonstrates acceptance/verbal understanding   End of Consult   Patient Position at End of Consult Bedside chair;Bed/Chair alarm activated;All needs within reach   Saba Thomas DPT

## 2025-07-24 NOTE — PLAN OF CARE
Problem: OCCUPATIONAL THERAPY ADULT  Goal: Performs self-care activities at highest level of function for planned discharge setting.  See evaluation for individualized goals.  Description: Treatment Interventions: ADL retraining, Functional transfer training, UE strengthening/ROM, Endurance training, Cognitive reorientation, Visual perceptual retraining, Patient/family training, Equipment evaluation/education, Compensatory technique education, Fine motor coordination activities, Continued evaluation, Energy conservation, Activityengagement          See flowsheet documentation for full assessment, interventions and recommendations.   Outcome: Progressing  Note: Limitation: Decreased ADL status, Decreased UE strength, Decreased UE ROM, Decreased Safe judgement during ADL, Decreased cognition, Decreased endurance, Decreased self-care trans, Decreased high-level ADLs, Visual deficit, Decreased fine motor control  Prognosis: Fair  Assessment: Pt seen for Occupational Therapy session with focus on activity tolerance, bed mob, sitting balance and tolerance for pt engagement in UB/LB self-care tasks. Pt cleared by RN/ Rashard for pt participated in OT session. Pt presented supine/HOB raised pt awake/alert and agreeable to participate in therapy following pt identifiers confirmed. Pt did not report a therapy goal this session however pt was pleasant and cooperative with all therapy requests.  Pt required assist for LB dressing 2* pt need for cognitive support, pt coordination and LUE strength.  He was able to tolerate sitting EOB for self-feeding. Pt required assist for steadying unsupported sitting balance.  Pt remains appropriate for  I (Maximum Resources) . Pt left with Physical Therapist post session.     Rehab Resource Intensity Level, OT: I (Maximum Resource Intensity)

## 2025-07-24 NOTE — OCCUPATIONAL THERAPY NOTE
Occupational Therapy Progress Note     Patient Name: Shawn Marquez  Today's Date: 7/24/2025  Problem List  Principal Problem:    ICH (intracerebral hemorrhage) (HCC)  Active Problems:    Essential hypertension    DM II (diabetes mellitus, type II), controlled (HCC)    Encounter for skin care    History of DVT (deep vein thrombosis)    HLD (hyperlipidemia)    Depression    Seizure-like activity (HCC)    Cerebral edema (HCC)    Brain compression (HCC)    Atrial fibrillation (HCC)    HTN (hypertension)    Anticoagulated on rivaroxaban    Hemineglect    Hemiparesis (HCC)    Right pontine stroke (HCC)         OCCUPATIONAL THERAPY         07/24/25 1150   OT Last Visit   OT Visit Date 07/24/25   Note Type   Note Type Treatment for insurance authorization   Pain Assessment   Pain Assessment Tool 0-10   Pain Score No Pain   Restrictions/Precautions   Weight Bearing Precautions Per Order No   Braces or Orthoses Other (Comment)  (LUE sling for transfers)   Other Precautions Chair Alarm;Bed Alarm;Fall Risk   Lifestyle   Autonomy I w/ ADLS, I w/ meds but assist w/ all other IADLS, (-) , I w/ transfers and functional mobility PTA   Reciprocal Relationships pt lives w/ his spouse   Service to Others retired   Intrinsic Gratification TV   ADL   Where Assessed Edge of bed   Eating Assistance 5  Supervision/Setup   Eating Deficit Other (Comment);Increased time to complete  (assist to steady pt unsupported sitting balance)   Grooming Assistance 4  Minimal Assistance   UB Bathing Assistance 4  Minimal Assistance   LB Bathing Assistance 2  Maximal Assistance   UB Dressing Assistance 3  Moderate Assistance   LB Dressing Assistance 3  Moderate Assistance   LB Dressing Deficit Thread RLE into pants;Thread LLE into pants;Pull up over hips;Increased time to complete   LB Dressing Comments pt completed bed level LB dressing   Bed Mobility   Supine to Sit 3  Moderate assistance   Additional items Assist x 1   Transfers   Sit to  Stand 2  Maximal assistance   Additional items Assist x 2   Stand to Sit 2  Maximal assistance   Additional items Assist x 2   Stand pivot 2  Maximal assistance   Additional items Assist x 2   Neuromuscular Education   Weight Bearing Technique Yes   LUE Weight Bearing Forearm seated   Cognition   Overall Cognitive Status Impaired   Arousal/Participation Responsive;Arousable;Cooperative   Attention Attends with cues to redirect   Orientation Level Oriented to person;Oriented to place;Disoriented to situation;Oriented to time   Memory Decreased recall of recent events;Decreased recall of precautions   Following Commands Follows one step commands with increased time or repetition   Activity Tolerance   Activity Tolerance Patient tolerated treatment well   Assessment   Assessment Pt seen for Occupational Therapy session with focus on activity tolerance, bed mob, sitting balance and tolerance for pt engagement in UB/LB self-care tasks. Pt cleared by RN/ Rashard for pt participated in OT session. Pt presented supine/HOB raised pt awake/alert and agreeable to participate in therapy following pt identifiers confirmed. Pt did not report a therapy goal this session however pt was pleasant and cooperative with all therapy requests.  Pt required assist for LB dressing 2* pt need for cognitive support, pt coordination and LUE strength.  He was able to tolerate sitting EOB for self-feeding. Pt required assist for steadying unsupported sitting balance.  Pt remains appropriate for  I ([employer]) . Pt left with Physical Therapist post session.   Plan   Treatment Interventions ADL retraining;Endurance training;Patient/family training;Neuromuscular reeducation   Goal Expiration Date 08/07/25  (Goals extented this session due to pt requires increased time allowed to achieve. Pt original goals remain appropriate at this time.)   OT Treatment Day 3   OT Frequency 2-3x/wk   Discharge Recommendation   Rehab Resource Intensity Level,  OT I (Maximum Resource Intensity)   AM-PAC Daily Activity Inpatient   Lower Body Dressing 2   Bathing 2   Toileting 2   Upper Body Dressing 2   Grooming 2   Eating 3   Daily Activity Raw Score 13   Daily Activity Standardized Score (Calc for Raw Score >=11) 32.03   AM-PAC Applied Cognition Inpatient   Following a Speech/Presentation 3   Understanding Ordinary Conversation 3   Taking Medications 3   Remembering Where Things Are Placed or Put Away 2   Remembering List of 4-5 Errands 2   Taking Care of Complicated Tasks 1   Applied Cognition Raw Score 14   Applied Cognition Standardized Score 32.02   Barthel Index   Grooming Score 0   Dressing Score 5   Toilet Use Score 0   Transfers (Bed/Chair) Score 5   Mobility (Level Surface) Score 0           Linda Pereira OTR/L

## 2025-07-24 NOTE — RESTORATIVE TECHNICIAN NOTE
Restorative Technician Note      Patient Name: Shawn Marquez     Note Type: Mobility  Patient Position Upon Consult: Supine  Activity Performed: Ambulated; Transferred; Dangled; Stood; Range of motion; Repositioned  Assistive Device: Other (Comment) (Assist x2 to sit EOB to eat lunch. Assisted OT Linda. Assist x2 for transfer OOB to the chair/STS in denise at handrail/ambulate 1x HHA vs 2x with Maxi move. Assisted PT aSba.)  Range of Motion: All extremities  Education Provided: Yes  Patient Position at End of Consult: Bedside chair; All needs within reach; Bed/Chair alarm activated    Jerod CHAN, Restorative Technician,

## 2025-07-24 NOTE — PROGRESS NOTES
Progress Note - Hospitalist   Name: Shawn Marquez 75 y.o. male I MRN: [id number]  Unit/Bed#: [facility] 734-01 I Date of Admission: 7/7/2025   Date of Service: 7/24/2025 I Hospital Day: 17    Assessment & Plan  ICH (intracerebral hemorrhage) (HCC)  History of A-fib on Xarelto presented as stroke alert after being found by family on the floor with left facial and left-sided weakness.  AC/AP reversed with Kcentra and DDAVP with CTA H/N notable for Large acute intraparenchymal hemorrhage centered involving the posterior right basal ganglia, adjacent right thalamus, and posterior right insula, with a volume of approximately 23 mL and subsequent MRI brain showing Acute to early subacute lacunar infarct in the right hemipons and Stable hemorrhage and surrounding vasogenic edema in the right corona radiata and temporal white matter.  Evaluated by neurosurgery with no interventions indicated  Evaluated by neurology and recommended to discontinue aspirin and plan to resume Xarelto on 8/4 pending stability of repeat imaging  Patient recommended for acute rehab.  Case management following  Stable pending placement.  CM is working on placement, unfortunately referral was denied. Further placement attempts pending  Essential hypertension  Blood pressure currently stable.  He is off Cardene drip  Continue amlodipine 10 mg daily, carvedilol 6.25 mg twice daily and losartan 100 mg daily  DM II (diabetes mellitus, type II), controlled ([facility])  Lab Results   Component Value Date    HGBA1C 6.7 (H) 07/08/2025     Recent Labs     07/23/25  1618 07/23/25  2101 07/24/25  0604 07/24/25  1039   POCGLU 135 178* 186* 194*   Blood Sugar Average: Last 72 hrs:  (P) [id number]  Continue Lantus 6 units at bedtime with mealtime correctional scale  Reasonably controlled at this time  History of DVT (deep vein thrombosis)  AC on hold as above  HLD (hyperlipidemia)  Continue atorvastatin  Atrial fibrillation (HCC)  Continue beta-blocker.  Hold  AC as above    VTE Pharmacologic Prophylaxis:   Moderate Risk (Score 3-4) - Pharmacological DVT Prophylaxis Ordered: heparin.    Mobility:   Basic Mobility Inpatient Raw Score: 10  JH-HLM Goal: 4: Move to chair/commode  JH-HLM Achieved: 4: Move to chair/commode  JH-HLM Goal achieved. Continue to encourage appropriate mobility.    Patient Centered Rounds: I performed bedside rounds with nursing staff today.   Discussions with Specialists or Other Care Team Provider: N/A    Education and Discussions with Family / Patient: Will be available to update wife today at bedside.     Current Length of Stay: 17 day(s)  Current Patient Status: Inpatient   Certification Statement: The patient will continue to require additional inpatient hospital stay due to Placement pending  Discharge Plan: Anticipate discharge in 24-48 hrs to rehab facility.    Code Status: Level 1 - Full Code    Subjective   Patient seen and examined at bedside, he is resting comfortably.  He is little frustrated at his prolonged hospitalization but has no new medical complaints at this time    Objective :  Temp:  [97.5 °F (36.4 °C)-98.8 °F (37.1 °C)] 97.5 °F (36.4 °C)  HR:  [] 128  BP: (106-125)/() 125/83  Resp:  [18] 18  SpO2:  [96 %-98 %] 97 %  O2 Device: None (Room air)    Body mass index is 27.33 kg/m².     Input and Output Summary (last 24 hours):     Intake/Output Summary (Last 24 hours) at 7/24/2025 1240  Last data filed at 7/24/2025 0601  Gross per 24 hour   Intake 240 ml   Output 300 ml   Net -60 ml     Physical Exam  Vitals reviewed.   Constitutional:       General: He is not in acute distress.     Appearance: Normal appearance. He is not ill-appearing.   HENT:      Head: Normocephalic.     Eyes:      General: No scleral icterus.      Cardiovascular:      Rate and Rhythm: Normal rate and regular rhythm.      Heart sounds: Normal heart sounds.   Pulmonary:      Effort: Pulmonary effort is normal. No respiratory distress.      Breath  sounds: No wheezing, rhonchi or rales.   Abdominal:      Palpations: Abdomen is soft.      Tenderness: There is no abdominal tenderness. There is no guarding or rebound.     Musculoskeletal:      Right lower leg: No edema.      Left lower leg: No edema.     Skin:     General: Skin is warm.     Neurological:      Mental Status: He is alert and oriented to person, place, and time. Mental status is at baseline.      Motor: Weakness (LUE) present.         Lab Results: I have reviewed the following results:   Results from last 7 days   Lab Units 07/23/25  0601   WBC Thousand/uL 8.70   HEMOGLOBIN g/dL 15.6   HEMATOCRIT % 44.4   PLATELETS Thousands/uL 220     Results from last 7 days   Lab Units 07/23/25  0601   SODIUM mmol/L 134*   POTASSIUM mmol/L 4.3   CHLORIDE mmol/L 100   CO2 mmol/L 26   BUN mg/dL 16   CREATININE mg/dL 0.69   ANION GAP mmol/L 8   CALCIUM mg/dL 9.1   GLUCOSE RANDOM mg/dL 137         Results from last 7 days   Lab Units 07/24/25  1039 07/24/25  0604 07/23/25  2101 07/23/25  1618 07/23/25  1119 07/23/25  0625 07/22/25  2234 07/22/25  1609 07/22/25  1034 07/22/25  0635 07/21/25  2108 07/21/25  1541   POC GLUCOSE mg/dl 194* 186* 178* 135 182* 146* 138 124 217* 165* 160* 138               Recent Cultures (last 7 days):         Imaging Results Review: I reviewed radiology reports from this admission including: MRI brain.      Last 24 Hours Medication List:     Current Facility-Administered Medications:     acetaminophen (TYLENOL) tablet 650 mg, Q6H PRN    amLODIPine (NORVASC) tablet 10 mg, Daily    atorvastatin (LIPITOR) tablet 20 mg, Daily With Dinner    bisacodyl (DULCOLAX) rectal suppository 10 mg, Daily PRN    carvedilol (COREG) tablet 6.25 mg, BID With Meals    heparin (porcine) subcutaneous injection 5,000 Units, Q8H DEIDRE    hydrALAZINE (APRESOLINE) injection 10 mg, Q4H PRN    insulin glargine (LANTUS) subcutaneous injection 6 Units 0.06 mL, HS    insulin lispro (HumALOG/ADMELOG) 100 units/mL  subcutaneous injection 1-6 Units, TID AC **AND** Fingerstick Glucose (POCT), TID AC    lidocaine (LIDODERM) 5 % patch 1 patch, Daily    losartan (COZAAR) tablet 100 mg, Daily    magnesium Oxide (MAG-OX) tablet 400 mg, BID    melatonin tablet 6 mg, HS    methocarbamol (ROBAXIN) tablet 500 mg, Q8H PRN    ondansetron (ZOFRAN) injection 4 mg, Q6H PRN    polyethylene glycol (MIRALAX) packet 17 g, Daily    senna-docusate sodium (SENOKOT S) 8.6-50 mg per tablet 2 tablet, BID    tamsulosin (FLOMAX) capsule 0.4 mg, Daily With Dinner    Administrative Statements   Today, Patient Was Seen By: Shashi Barney DO      **Please Note: This note may have been constructed using a voice recognition system.**

## 2025-07-24 NOTE — DISCHARGE SUPPORT
Case Management Assessment & Discharge Planning Note    Patient name Shawn Marquez  Location [facility] 734/[facility] 734-01 MRN [id number]  : 1950 Date 2025       Current Admission Date: 2025  Current Admission Diagnosis:ICH (intracerebral hemorrhage) (HCC)   Patient Active Problem List    Diagnosis Date Noted    Right pontine stroke (HCC) 2025    Cerebral edema (HCC) 2025    Brain compression (HCC) 2025    Atrial fibrillation (HCC) 2025    HTN (hypertension) 2025    Anticoagulated on rivaroxaban 2025    Hemineglect 2025    Hemiparesis (HCC) 2025    ICH (intracerebral hemorrhage) (HCC) 2025    Seizure-like activity ([facility]) 2025    Medical marijuana use 2024    Elevated brain natriuretic peptide (BNP) level 2024    Syncope 2024    Depression 2024    Vascular dementia with paranoia (HCC) 2023    Paranoid behavior (HCC) 2022    Longstanding persistent atrial fibrillation (HCC) 07/15/2022    Anxiety 2021    HLD (hyperlipidemia) 2021    Numbness 2019    Personal history of transient ischemic attack 2019    Anticoagulant long-term use 2019    Arthritis of knee 2019    History of DVT (deep vein thrombosis) 2018    Encounter for skin care 2018    Varicose veins with swelling 2016    BPH with obstruction/lower urinary tract symptoms 2016    Chronic venous insufficiency 2016    DM II (diabetes mellitus, type II), controlled ([facility]) 2015    Essential hypertension 2013      LOS (days): 17  Geometric Mean LOS (GMLOS) (days): 4.5  Days to GMLOS:-12.3   Facility Authorization Initiated  [place] [facility] received request for auth from : Martina PERALES  Authorization Request Submitted for: SNF  Requested Start of Care Date: 25  Facility Name: [facility]  Facility NPI: [id number]  Facility MD: Dr. Pedro Hong  Facility MD NPI:  [id number]  Authorization initiated by contacting insurance: [employer]  Via: PAYMEY Portal  Clinicals submitted via: Portal Attachment  Pending reference #: [id number]   notified: Martina PERALES     Updates to authorization status will be noted in chart.    Please reach out to CM for updates on any clinical information.

## 2025-07-24 NOTE — CASE MANAGEMENT
Case Management Progress Note    Patient name Shawn Marquez  Location [facility] 734/[facility] 734-01 MRN [id number]  : 1950 Date 2025       LOS (days): 17  Geometric Mean LOS (GMLOS) (days): 4.5  Days to GMLOS:-12.3        OBJECTIVE:        Current admission status: Inpatient  Preferred Pharmacy:   [id number]  [place] - [address]  [address] [place] [address]  Phone: [phone] Fax: [phone]    [facility] - [place], [place] - [address] [facility]  [address] [address]  Phone: [phone] Fax: [phone]    Primary Care Provider: SAM Baer    Primary Insurance: [employer]  REP  Secondary Insurance:     PROGRESS NOTE:    Spouse is in agreement with [facility] Heather, insurance auth has been submitted

## 2025-07-25 VITALS
RESPIRATION RATE: 18 BRPM | HEIGHT: 72 IN | SYSTOLIC BLOOD PRESSURE: 126 MMHG | TEMPERATURE: 97.7 F | HEART RATE: 70 BPM | WEIGHT: 201.5 LBS | DIASTOLIC BLOOD PRESSURE: 74 MMHG | BODY MASS INDEX: 27.29 KG/M2 | OXYGEN SATURATION: 96 %

## 2025-07-25 LAB
GLUCOSE SERPL-MCNC: 150 MG/DL (ref 65–140)
GLUCOSE SERPL-MCNC: 240 MG/DL (ref 65–140)

## 2025-07-25 PROCEDURE — 82948 REAGENT STRIP/BLOOD GLUCOSE: CPT

## 2025-07-25 PROCEDURE — 99239 HOSP IP/OBS DSCHRG MGMT >30: CPT | Performed by: STUDENT IN AN ORGANIZED HEALTH CARE EDUCATION/TRAINING PROGRAM

## 2025-07-25 RX ORDER — ATORVASTATIN CALCIUM 20 MG/1
20 TABLET, FILM COATED ORAL
Start: 2025-07-25

## 2025-07-25 RX ORDER — INSULIN LISPRO 100 [IU]/ML
1-6 INJECTION, SOLUTION INTRAVENOUS; SUBCUTANEOUS
Start: 2025-07-25

## 2025-07-25 RX ORDER — POLYETHYLENE GLYCOL 3350 17 G/17G
17 POWDER, FOR SOLUTION ORAL DAILY
Start: 2025-07-26

## 2025-07-25 RX ORDER — LANOLIN ALCOHOL/MO/W.PET/CERES
400 CREAM (GRAM) TOPICAL 2 TIMES DAILY
Start: 2025-07-25

## 2025-07-25 RX ORDER — LOSARTAN POTASSIUM 100 MG/1
100 TABLET ORAL DAILY
Start: 2025-07-26

## 2025-07-25 RX ORDER — CARVEDILOL 6.25 MG/1
6.25 TABLET ORAL 2 TIMES DAILY WITH MEALS
Start: 2025-07-25

## 2025-07-25 RX ORDER — AMOXICILLIN 250 MG
2 CAPSULE ORAL 2 TIMES DAILY
Start: 2025-07-25

## 2025-07-25 RX ORDER — TAMSULOSIN HYDROCHLORIDE 0.4 MG/1
0.4 CAPSULE ORAL
Start: 2025-07-25

## 2025-07-25 RX ORDER — INSULIN GLARGINE 100 [IU]/ML
6 INJECTION, SOLUTION SUBCUTANEOUS
Start: 2025-07-25

## 2025-07-25 RX ORDER — AMLODIPINE BESYLATE 10 MG/1
10 TABLET ORAL DAILY
Start: 2025-07-26

## 2025-07-25 RX ADMIN — HEPARIN SODIUM 5000 UNITS: 5000 INJECTION INTRAVENOUS; SUBCUTANEOUS at 05:37

## 2025-07-25 RX ADMIN — SENNOSIDES AND DOCUSATE SODIUM 2 TABLET: 50; 8.6 TABLET ORAL at 09:15

## 2025-07-25 RX ADMIN — AMLODIPINE BESYLATE 10 MG: 10 TABLET ORAL at 09:15

## 2025-07-25 RX ADMIN — CARVEDILOL 6.25 MG: 6.25 TABLET, FILM COATED ORAL at 09:15

## 2025-07-25 RX ADMIN — INSULIN LISPRO 1 UNITS: 100 INJECTION, SOLUTION INTRAVENOUS; SUBCUTANEOUS at 09:13

## 2025-07-25 RX ADMIN — MAGNESIUM OXIDE TAB 400 MG (241.3 MG ELEMENTAL MG) 400 MG: 400 (241.3 MG) TAB at 09:15

## 2025-07-25 RX ADMIN — LIDOCAINE 1 PATCH: 50 PATCH CUTANEOUS at 09:15

## 2025-07-25 RX ADMIN — INSULIN LISPRO 3 UNITS: 100 INJECTION, SOLUTION INTRAVENOUS; SUBCUTANEOUS at 12:11

## 2025-07-25 RX ADMIN — LOSARTAN POTASSIUM 100 MG: 50 TABLET, FILM COATED ORAL at 09:15

## 2025-07-25 RX ADMIN — POLYETHYLENE GLYCOL 3350 17 G: 17 POWDER, FOR SOLUTION ORAL at 09:15

## 2025-07-25 NOTE — DISCHARGE SUPPORT
Case Management Assessment & Discharge Planning Note    Patient name Shawn Marquez  Location [facility] 734/[facility] 734-01 MRN [id number]  : 1950 Date 2025       Current Admission Date: 2025  Current Admission Diagnosis:ICH (intracerebral hemorrhage) (HCC)   Patient Active Problem List    Diagnosis Date Noted    Right pontine stroke (HCC) 2025    Cerebral edema (HCC) 2025    Brain compression (HCC) 2025    Atrial fibrillation (HCC) 2025    HTN (hypertension) 2025    Anticoagulated on rivaroxaban 2025    Hemineglect 2025    Hemiparesis (HCC) 2025    ICH (intracerebral hemorrhage) (HCC) 2025    Seizure-like activity ([facility]) 2025    Medical marijuana use 2024    Elevated brain natriuretic peptide (BNP) level 2024    Syncope 2024    Depression 2024    Vascular dementia with paranoia (HCC) 2023    Paranoid behavior (HCC) 2022    Longstanding persistent atrial fibrillation (HCC) 07/15/2022    Anxiety 2021    HLD (hyperlipidemia) 2021    Numbness 2019    Personal history of transient ischemic attack 2019    Anticoagulant long-term use 2019    Arthritis of knee 2019    History of DVT (deep vein thrombosis) 2018    Encounter for skin care 2018    Varicose veins with swelling 2016    BPH with obstruction/lower urinary tract symptoms 2016    Chronic venous insufficiency 2016    DM II (diabetes mellitus, type II), controlled ([facility]) 2015    Essential hypertension 2013      LOS (days): 18  Geometric Mean LOS (GMLOS) (days): 4.5  Days to GMLOS:-13.1   Facility Authorization Approved  [facility] received approved auth for: SNF  Insurance: [employer]  Determination made after peer to peer was completed?: Not applicable  Authorization Obtained Via: Portal  Facility Name: [facility]  Approved Authorization Number: [id number]  Start of Care  Date: 07/24/25  Next Review Date: 07/28/25  Submit Next Review to: H & CC Portal or F: [phone]   notified: Martina PERALES     Updates to authorization status will be noted in chart.    Please reach out to CM for updates on any clinical information.

## 2025-07-25 NOTE — CASE MANAGEMENT
Case Management Discharge Planning Note    Patient name Shawn Marquez  Location [facility] 734/[facility] 734-01 MRN [id number]  : 1950 Date 2025       Current Admission Date: 2025  Current Admission Diagnosis:ICH (intracerebral hemorrhage) ([facility])   Patient Active Problem List    Diagnosis Date Noted    Right pontine stroke (HCC) 2025    Cerebral edema (HCC) 2025    Brain compression (HCC) 2025    Atrial fibrillation (HCC) 2025    HTN (hypertension) 2025    Anticoagulated on rivaroxaban 2025    Hemineglect 2025    Hemiparesis (HCC) 2025    ICH (intracerebral hemorrhage) ([facility]) 2025    Seizure-like activity ([facility]) 2025    Medical marijuana use 2024    Elevated brain natriuretic peptide (BNP) level 2024    Syncope 2024    Depression 2024    Vascular dementia with paranoia ([facility]) 2023    Paranoid behavior (HCC) 2022    Longstanding persistent atrial fibrillation ([facility]) 07/15/2022    Anxiety 2021    HLD (hyperlipidemia) 2021    Numbness 2019    Personal history of transient ischemic attack 2019    Anticoagulant long-term use 2019    Arthritis of knee 2019    History of DVT (deep vein thrombosis) 2018    Encounter for skin care 2018    Varicose veins with swelling 2016    BPH with obstruction/lower urinary tract symptoms 2016    Chronic venous insufficiency 2016    DM II (diabetes mellitus, type II), controlled ([facility]) 2015    Essential hypertension 2013      LOS (days): 18  Geometric Mean LOS (GMLOS) (days): 4.5  Days to GMLOS:-13.1     OBJECTIVE:  Risk of Unplanned Readmission Score: 12.93         Current admission status: Inpatient   Preferred Pharmacy:   [id number]  [place] [address]  [address] [place] [address]  Phone: [phone] Fax: [phone]    [facility] -  [place], [place] - [address]  [address] [address]  Phone: [phone] Fax: [phone]    Primary Care Provider: SAM Baer    Primary Insurance: [employer] REP  Secondary Insurance:     DISCHARGE DETAILS:    Discharge planning discussed with:: bedside with pt and spouse  Freedom of Choice: Yes  Comments - Freedom of Choice: Pt and spouse in agreement with [facility] contacted family/caregiver?: No- see comments (spouse was bedside)     Did patient/caregiver verbalize understanding of patient care needs?: N/A- going to facility  Were patient/caregiver advised of the risks associated with not following Treatment Team discharge recommendations?: Yes    Contacts  Patient Contacts: Sherice Marquez  Relationship to Patient:: Family (spouse)  Contact Method: Phone  Phone Number: [phone]  Reason/Outcome: Continuity of Care, Emergency Contact, Discharge Planning    Requested Home Health Care         Is the patient interested in HHC at discharge?: No    DME Referral Provided  Referral made for DME?: No    Other Referral/Resources/Interventions Provided:  Interventions: SNF, Transportation  Referral Comments: Referral to SNF    Would you like to participate in [facility]?  : No - Declined    Treatment Team Recommendation: SNF  Expected Discharge Disposition: Skilled Nursing Facility     Transport at Discharge : [facility]     Number/Name of Dispatcher: MICHELLE [phone]  Transported by (Company and Unit #): [employer] [phone]  ETA of Transport (Date): 07/25/25  ETA of Transport (Time): 1330        Accepting Facility Name, City & State : [facility]  Receiving Facility/Agency Phone Number: [phone]  Facility/Agency Fax Number: [phone]

## 2025-07-25 NOTE — DISCHARGE INSTR - AVS FIRST PAGE
6-week outpatient neurology follow-up planned  Hold aspirin for now and continue to hold Xarelto until 8/4.  On 8/4, may resume Xarelto 20 mg daily  Long-term blood pressure goal less than 130/80  Marijuana cessation recommended

## 2025-07-25 NOTE — ASSESSMENT & PLAN NOTE
"Venous duplex 7/8 showing \"There is evidence of chronic non-occlusive deep vein thrombosis in the common femoral, femoral, popliteal, one of the paired gastrocnemius and one of the paired peroneal veins. No evidence of superficial thrombophlebitis noted.\"  AC on hold as above  "

## 2025-07-25 NOTE — DISCHARGE SUMMARY
Discharge Summary - Hospitalist   Name: Shawn Marquez 75 y.o. male I MRN: [id number]  Unit/Bed#: [facility] 734-01 I Date of Admission: 7/7/2025   Date of Service: 7/25/2025 I Hospital Day: 18     Assessment & Plan  ICH (intracerebral hemorrhage) (HCC)  History of A-fib on Xarelto presented as stroke alert after being found by family on the floor with left facial and left-sided weakness.  AC/AP reversed with Kcentra and DDAVP with CTA H/N notable for Large acute intraparenchymal hemorrhage centered involving the posterior right basal ganglia, adjacent right thalamus, and posterior right insula, with a volume of approximately 23 mL and subsequent MRI brain showing Acute to early subacute lacunar infarct in the right hemipons and Stable hemorrhage and surrounding vasogenic edema in the right corona radiata and temporal white matter.  Clinically suspect nontraumatic right basal ganglia hemorrhage due to uncontrolled hypertension  Evaluated by neurosurgery with no interventions indicated  Evaluated by neurology and recommended to discontinue aspirin and plan to resume Xarelto on 8/4 after which it can be continued  Patient recommended for acute rehab.  Case management following  Stable pending placement.  CM is working on placement, unfortunately referral was denied. Further placement attempts pending  Essential hypertension  Blood pressure currently stable.  He is off Cardene drip  Continue amlodipine 10 mg daily, carvedilol 6.25 mg twice daily and losartan 100 mg daily  DM II (diabetes mellitus, type II), controlled (HCC)  Lab Results   Component Value Date    HGBA1C 6.7 (H) 07/08/2025     Recent Labs     07/24/25  1557 07/24/25  2056 07/25/25  0624 07/25/25  1052   POCGLU 133 159* 150* 240*   Blood Sugar Average: Last 72 hrs:  (P) 167.4389408142960239  Continue Lantus 6 units at bedtime with mealtime correctional scale  Reasonably controlled at this time  History of DVT (deep vein thrombosis)  Venous duplex 7/8 showing  "\"There is evidence of chronic non-occlusive deep vein thrombosis in the common femoral, femoral, popliteal, one of the paired gastrocnemius and one of the paired peroneal veins. No evidence of superficial thrombophlebitis noted.\"  AC on hold as above  HLD (hyperlipidemia)  Continue atorvastatin  Atrial fibrillation (HCC)  Continue beta-blocker.  Hold AC as above     Medical Problems       Resolved Problems  Date Reviewed: 6/16/2025          Resolved    Hypertensive emergency 7/11/2025     Resolved by  Renato Florian MD    Bradycardia 7/11/2025     Resolved by  Renato Florian MD        Discharging Physician / Practitioner: Shashi Barney DO  PCP: SAM Baer  Admission Date:   Admission Orders (From admission, onward)       Ordered        07/07/25 2010  INPATIENT ADMISSION  Once                          Discharge Date: 07/25/25    Next Steps for Physician/AP Assuming Care:  6-week outpatient neurology follow-up planned  Hold aspirin for now and continue to hold Xarelto until 8/4.  On 8/4, may resume Xarelto 20 mg daily  Long-term blood pressure goal less than 130/80  Marijuana cessation recommended    Test Results Pending at Discharge (will require follow up):  N/A    Medication Changes for Discharge & Rationale:   Start atorvastatin 20mg po qd  Start losartan 100mg po qd  Start Norvasc 10mg po qd  Start Coreg 6.25mg po bid  Start Lantus 6 nightly with ISS  Start magnesium oxide 400mg po bid  Bowel regimen of choice  See after visit summary for reconciled discharge medications provided to patient and/or family.     Consultations During Hospital Stay:  PMR  Neurology  Neurosurgery    Procedures Performed:   None    Significant Findings / Test Results: (Results paraphrased by myself, see chart for full details)  CTA 7/7- No large vessel occlusion, high-grade stenosis, or intracranial aneurysm identified on CT angiogram of the head. No hemodynamically significant stenosis or dissection identified on CT " angiogram of the neck. Intraparenchymal hematoma centered in the right posterior basal ganglia/posterior limb of the right internal capsule and adjacent right thalamus, without active or extravasation of contrast or spot sign noted.   MRI 7/10- 1. Acute to early subacute lacunar infarct in the right hemipons. Stable hemorrhage and surrounding vasogenic edema in the right corona radiata and temporal white matter. No evidence of underlying solid lesion.  Echocardiogram 7/8- Technically difficult study. LVEF 50%. Systolic function is low normal. Although no diagnostic regional wall motion abnormality was identified, this possibility cannot be completely excluded on the basis of this study.   Venous duplex Lower limb BL- There is evidence of chronic non-occlusive deep vein thrombosis in the Left common femoral, femoral, popliteal, one of the paired gastrocnemius and one of the paired peroneal veins. No evidence of superficial thrombophlebitis noted.    Incidental Findings:   N/A     Hospital Course:   Shawn Marquez is a 75 y.o. male patient who originally presented to the hospital on 7/7/2025 due to fall and was found to have right basal ganglia intraparenchymal hemorrhage.  He takes aspirin and Xarelto for history of DVT and was given DDAVP and Kcentra as reversal agents. BP noted to be markedly elevated so patient was started on a Cardene drip. He was admitted to ICU and subsequent MRI showed lacunar infarct in the right hemipons.  Patient found to have primarily left-sided weakness and dysarthria, neuro was also concern for seizure-like activity so patient was started on AEDs for a brief course which patient completed.  Eventually patient remained clinically stable and CT head imaging similarly remained stable.  He was discharged to the [facility] floor.  He continues to have left-sided weakness most notably in the left upper extremity.    Pressure became reasonably controlled on an oral blood pressure regimen  including Coreg, Norvasc, and losartan.  Long-term blood pressure goal is <130/80, and patient had been reasonably controlled in the hospital.  Neurology is recommending holding anticoagulation for a total of 4 weeks from the day of the CVA.  Aspirin is to be held entirely for now.  Therefore patient was discharged with a hold on his Xarelto with recommendation to restart it on 8/4.  He was noted to have chronic appearing lower extremity nonocclusive DVTs on the left.    Please see above list of diagnoses and related plan for additional information.     Discharge Day Visit / Exam:   Subjective: Resting comfortably in bed.  No new complaints at present  Vitals: Blood Pressure: 126/74 (07/25/25 0915)  Pulse: 70 (07/25/25 0702)  Temperature: 97.7 °F (36.5 °C) (07/25/25 0702)  Temp Source: Oral (07/23/25 1552)  Respirations: 18 (07/24/25 0718)  Height: 6' (182.9 cm) (07/08/25 0857)  Weight - Scale: 91.4 kg (201 lb 8 oz) (07/24/25 0551)  SpO2: 96 % (07/25/25 0702)  Physical Exam  Vitals reviewed.   Constitutional:       General: He is not in acute distress.     Appearance: Normal appearance. He is not ill-appearing.   HENT:      Head: Normocephalic.     Eyes:      General: No scleral icterus.      Cardiovascular:      Rate and Rhythm: Normal rate and regular rhythm.      Heart sounds: Normal heart sounds.   Pulmonary:      Effort: Pulmonary effort is normal. No respiratory distress.      Breath sounds: No wheezing, rhonchi or rales.   Abdominal:      Palpations: Abdomen is soft.      Tenderness: There is no abdominal tenderness. There is no guarding or rebound.     Musculoskeletal:      Right lower leg: No edema.      Left lower leg: No edema.     Skin:     General: Skin is warm.     Neurological:      Mental Status: He is alert and oriented to person, place, and time. Mental status is at baseline.      Motor: Weakness (LUE) present.       Discussion with Family: Updated  (wife) at bedside.    Discharge  instructions/Information to patient and family:   See after visit summary for information provided to patient and family.      Provisions for Follow-Up Care:  See after visit summary for information related to follow-up care and any pertinent home health orders.      Mobility at time of Discharge:   Basic Mobility Inpatient Raw Score: 10  JH-HLM Goal: 4: Move to chair/commode  JH-HLM Achieved: 3: Sit at edge of bed  HLM Goal NOT achieved. Continue to encourage mobility in post discharge setting.     Disposition:   Assisted Living Facility at [facility]    Planned Readmission: No    Administrative Statements   Discharge Statement:  I have spent a total time of 45 minutes in caring for this patient on the day of the visit/encounter. >30 minutes of time was spent on: Documenting in the medical record.    **Please Note: This note may have been constructed using a voice recognition system**

## 2025-07-25 NOTE — ASSESSMENT & PLAN NOTE
History of A-fib on Xarelto presented as stroke alert after being found by family on the floor with left facial and left-sided weakness.  AC/AP reversed with Kcentra and DDAVP with CTA H/N notable for Large acute intraparenchymal hemorrhage centered involving the posterior right basal ganglia, adjacent right thalamus, and posterior right insula, with a volume of approximately 23 mL and subsequent MRI brain showing Acute to early subacute lacunar infarct in the right hemipons and Stable hemorrhage and surrounding vasogenic edema in the right corona radiata and temporal white matter.  Clinically suspect nontraumatic right basal ganglia hemorrhage due to uncontrolled hypertension  Evaluated by neurosurgery with no interventions indicated  Evaluated by neurology and recommended to discontinue aspirin and plan to resume Xarelto on 8/4 after which it can be continued  Patient recommended for acute rehab.  Case management following  Stable pending placement.  JAY is working on placement, unfortunately referral was denied. Further placement attempts pending

## 2025-07-25 NOTE — ASSESSMENT & PLAN NOTE
Lab Results   Component Value Date    HGBA1C 6.7 (H) 07/08/2025     Recent Labs     07/24/25  1557 07/24/25 2056 07/25/25 0624 07/25/25  1052   POCGLU 133 159* 150* 240*   Blood Sugar Average: Last 72 hrs:  (P) [id number]  Continue Lantus 6 units at bedtime with mealtime correctional scale  Reasonably controlled at this time

## 2025-07-28 NOTE — UTILIZATION REVIEW
NOTIFICATION OF ADMISSION DISCHARGE   This is a Notification of Discharge from [facility]. Please be advised that this patient has been discharge from our facility. Below you will find the admission and discharge date and time including the patient’s disposition.   UTILIZATION REVIEW CONTACT:  Utilization Review Assistants  Network Utilization Review Department  Phone: [phone] x carefully listen to the prompts. All voicemails are confidential.  Email: [email or web address]     ADMISSION INFORMATION  PRESENTATION DATE: 7/7/2025  7:55 PM  OBERVATION ADMISSION DATE: N/A  INPATIENT ADMISSION DATE: 7/7/25  7:55 PM   DISCHARGE DATE: 7/25/2025  1:55 PM   DISPOSITION:Non [facility] SNF/TCU/SNU    Network Utilization Review Department  ATTENTION: Please call with any questions or concerns to [phone] and carefully listen to the prompts so that you are directed to the right person. All voicemails are confidential.   For Discharge needs, contact Care Management DC Support Team at [phone] opt. 2  Send all requests for admission clinical reviews, approved or denied determinations and any other requests to dedicated fax number below belonging to the campus where the patient is receiving treatment. List of dedicated fax numbers for the Facilities:  FACILITY NAME UR FAX NUMBER   ADMISSION DENIALS (Administrative/Medical Necessity) [phone]   DISCHARGE SUPPORT TEAM ([facility]) [phone]   PARENT CHILD HEALTH (Maternity/NICU/Pediatrics) [phone]   [facility] [phone]   [facility] [phone]   Formerly [facility], [facility] [phone]   [facility] [phone]   formerly [facility] [phone]   General acute hospital [phone]   [facility] [phone]   [facility] [phone]   [facility]  [facility] [phone]   [facility] [phone]   [facility] [phone]   [facility] [phone]

## 2025-08-05 ENCOUNTER — TELEPHONE (OUTPATIENT)
Dept: NEUROLOGY | Facility: CLINIC | Age: 75
End: 2025-08-05

## 2025-08-18 DIAGNOSIS — Z79.01 ANTICOAGULANT LONG-TERM USE: ICD-10-CM

## 2025-08-20 RX ORDER — RIVAROXABAN 20 MG/1
TABLET, FILM COATED ORAL
Qty: 90 TABLET | Refills: 1 | Status: SHIPPED | OUTPATIENT
Start: 2025-08-20